# Patient Record
Sex: FEMALE | Race: WHITE | NOT HISPANIC OR LATINO | Employment: OTHER | URBAN - METROPOLITAN AREA
[De-identification: names, ages, dates, MRNs, and addresses within clinical notes are randomized per-mention and may not be internally consistent; named-entity substitution may affect disease eponyms.]

---

## 2018-11-05 ENCOUNTER — OFFICE VISIT (OUTPATIENT)
Dept: PULMONOLOGY | Facility: CLINIC | Age: 68
End: 2018-11-05
Payer: MEDICARE

## 2018-11-05 DIAGNOSIS — J44.9 CHRONIC OBSTRUCTIVE PULMONARY DISEASE, UNSPECIFIED COPD TYPE: ICD-10-CM

## 2018-11-05 DIAGNOSIS — R05.9 COUGH: Primary | ICD-10-CM

## 2018-11-05 DIAGNOSIS — R09.02 HYPOXEMIA: ICD-10-CM

## 2018-11-05 DIAGNOSIS — E88.01 HETEROZYGOUS ALPHA 1-ANTITRYPSIN DEFICIENCY: ICD-10-CM

## 2018-11-05 PROCEDURE — 99214 OFFICE O/P EST MOD 30 MIN: CPT | Mod: ,,, | Performed by: NURSE PRACTITIONER

## 2018-11-05 RX ORDER — DOXYCYCLINE HYCLATE 100 MG
100 TABLET ORAL EVERY 12 HOURS
Qty: 14 TABLET | Refills: 0 | Status: SHIPPED | OUTPATIENT
Start: 2018-11-05 | End: 2019-04-23 | Stop reason: ALTCHOICE

## 2018-11-05 RX ORDER — PREDNISONE 10 MG/1
10 TABLET ORAL DAILY
Qty: 20 TABLET | Refills: 0 | Status: SHIPPED | OUTPATIENT
Start: 2018-11-05 | End: 2019-07-01

## 2018-11-05 NOTE — PATIENT INSTRUCTIONS
COPD Flare    You have had a flare-up of your COPD.  COPD, or chronic obstructive pulmonary disease, is a common lung disease. It causes your airways to become irritated and narrower. This makes it harder for you to breathe. Emphysema and chronic bronchitis are both types of COPD. This is a chronic condition, which means you always have it. Sometimes it gets worse. When this happens, it is called a flare-up.  Symptoms of COPD  People with COPD may have symptoms most of the time. In a flare-up, your symptoms get worse. These symptoms may mean you are having a flare-up:  · Shortness of breath, shallow or rapid breathing, or wheezing that gets worse  · Lung infection  · Cough that gets worse  · More mucus, thicker mucus or mucus of a different color  · Tiredness, decreased energy, or trouble doing your usual activities  · Fever  · Chest tightness  · Your symptoms dont get better even when you use your usual medicines, inhalers, and nebulizer  · Trouble talking  · You feel confused  Causes of flare-ups  Unfortunately, a flare-up can happen even though you did everything right, and you followed your doctors instructions. Some causes of flare-ups are:  · Smoking or secondhand smoke  · Colds, the flu, or respiratory infections  · Air pollution  · Sudden change in the weather  · Dust, irritating chemicals, or strong fumes  · Not taking your medicines as prescribed  Home care  Here are some things you can do at home to treat a flare-up:  · Try not to panic. This makes it harder to breathe, and keeps you from doing the right things.  · Dont smoke or be around others who are smoking.  · Try to drink more fluids than usual during a flare-up, unless your doctor has told you not to because of heart and kidney problems. More fluids can help loosen the mucus.  · Use your inhalers and nebulizer, if you have one, as you have been told to.  · If you were given antibiotics, take them until they are used up or your doctor tells you  to stop. Its important to finish the antibiotics, even though you feel better. This will make sure the infection has cleared.  · If you were given prednisone or another steroid, finish it even if you feel better.  Preventing a flare-up  Even though flare-ups happen, the best way to treat one is to prevent it before it starts. Here are some pointers:  · Dont smoke or be around others who are smoking.  · Take your medicines as you have been told.  · Talk with your doctor about getting a flu shot every year. Also find out if you need a pneumonia shot.  · If there is a weather advisory warning to stay indoors, try to stay inside when possible.  · Try to eat healthy and get plenty of sleep.  · Try to avoid things that usually set you off, like dust, chemical fumes, hairsprays, or strong perfumes.  Follow-up care  Follow up with your healthcare provider, or as advised.  If a culture was done, you will be told if your treatment needs to be changed. You can call as directed for the results.  If X-rays were done, you will be notified of any new findings that may affect your care.  Call 911  Call 911 if any of these occur:  · You have trouble breathing  · You feel confused or its difficult to wake you up  · You faint or lose consciousness  · You have a rapid heart rate  · You have new pain in your chest, arm, shoulder, neck or upper back  When to seek medical advice  Call your healthcare provider right away if any of these occur:  · Wheezing or shortness of breath gets worse  · You need to use your inhalers more often than usual without relief  · Fever of 100.4°F (38ºC) or higher, or as directed by your healthcare provider  · Coughing up lots of dark-colored or bloody mucus (sputum)  · Chest pain with each breath  · You do not start to get better within 24 hours  · Swelling of your ankles gets worse  · Dizziness or weakness  Date Last Reviewed: 9/1/2016  © 3467-9977 [employer]. [address] [facility],  [place] [address]. All rights reserved. This information is not intended as a substitute for professional medical care. Always follow your healthcare professional's instructions.      Chest xray  Alpha 1 level  Doxycycline 100mg by mouth twice a day for a week  Short prednisone taper  Sputum culture  Continue your Symbicort, Tudorza and daliresp   PFTs before your next visit

## 2018-11-07 ENCOUNTER — TELEPHONE (OUTPATIENT)
Dept: PULMONOLOGY | Facility: CLINIC | Age: 68
End: 2018-11-07

## 2018-11-07 NOTE — TELEPHONE ENCOUNTER
Alpha 1 level is 117 which is WNL.  Chest xray is stable.  Awaiting sputum culture ID and sensitivies as it is growing heavy growth gram neg iliana.

## 2018-11-08 RX ORDER — LEVOFLOXACIN 500 MG/1
500 TABLET, FILM COATED ORAL DAILY
Qty: 7 TABLET | Refills: 0 | Status: SHIPPED | OUTPATIENT
Start: 2018-11-08 | End: 2019-04-23 | Stop reason: ALTCHOICE

## 2018-11-08 NOTE — TELEPHONE ENCOUNTER
Sputum culture  11/07/18Reduced Normal kervin present           Heavy growth of Serratia marcescens      _____________________________________________________________________________      Organism                      S.ron      Antibiotic                 ANGEL       INTRPCOST      _____________________________________________________________________________      Amox/K Clav'ate           >16/8   R       $      Ceftriaxone                <=8    S       $      Cefazolin                  >16    R       $      Ciprofloxacin              <=1    S       $$      Cefepime                   <=4    S       $$      Cefuroxime                 >16    R       $$      Ertapenem                  <=2    S       $$$      Tetracycline               >8     R       $      Gentamicin                 <=4    S       $      Tobramycin                 <=4    S       $$      Imipenem                   <=1    S       $$$      Levofloxacin               <=2    S       $      Meropenem                  <=1    S       $$$      Piperacillin/Tazo         <=16    S       $$      Trimeth/Sulfa            <=2/38   S       $      Ampicillin                 >16    R       $        I spoke with the patient told her to stop the doxycycline and I sent out Levaquin for her to start taking.  She is aware of all results

## 2018-12-03 ENCOUNTER — TELEPHONE (OUTPATIENT)
Dept: PULMONOLOGY | Facility: CLINIC | Age: 68
End: 2018-12-03

## 2018-12-03 NOTE — TELEPHONE ENCOUNTER
Here a couple weeks ago gave medications for congestion still having some problems wants to speak with someone about it .

## 2018-12-05 ENCOUNTER — OFFICE VISIT (OUTPATIENT)
Dept: PULMONOLOGY | Facility: CLINIC | Age: 68
End: 2018-12-05
Payer: MEDICARE

## 2018-12-05 VITALS
WEIGHT: 200 LBS | SYSTOLIC BLOOD PRESSURE: 125 MMHG | BODY MASS INDEX: 33.32 KG/M2 | OXYGEN SATURATION: 90 % | DIASTOLIC BLOOD PRESSURE: 75 MMHG | HEART RATE: 73 BPM | HEIGHT: 65 IN

## 2018-12-05 DIAGNOSIS — J44.1 COPD EXACERBATION: ICD-10-CM

## 2018-12-05 DIAGNOSIS — J96.11 CHRONIC HYPOXEMIC RESPIRATORY FAILURE: ICD-10-CM

## 2018-12-05 PROCEDURE — 99214 OFFICE O/P EST MOD 30 MIN: CPT | Mod: ,,, | Performed by: NURSE PRACTITIONER

## 2018-12-05 RX ORDER — DIGOXIN 250 MCG
TABLET ORAL
COMMUNITY
Start: 2018-10-26 | End: 2019-11-25 | Stop reason: SDUPTHER

## 2018-12-05 RX ORDER — LEVOFLOXACIN 500 MG/1
500 TABLET, FILM COATED ORAL DAILY
Qty: 7 TABLET | Refills: 0 | Status: SHIPPED | OUTPATIENT
Start: 2018-12-05 | End: 2019-04-23 | Stop reason: ALTCHOICE

## 2018-12-05 RX ORDER — SERTRALINE HYDROCHLORIDE 100 MG/1
TABLET, FILM COATED ORAL
Status: ON HOLD | COMMUNITY
Start: 2018-09-24 | End: 2019-11-29 | Stop reason: HOSPADM

## 2018-12-05 RX ORDER — FUROSEMIDE 40 MG/1
20 TABLET ORAL
COMMUNITY
Start: 2018-09-22 | End: 2020-12-17 | Stop reason: CLARIF

## 2018-12-05 RX ORDER — METOPROLOL TARTRATE 100 MG/1
TABLET ORAL
Status: ON HOLD | COMMUNITY
Start: 2018-09-22 | End: 2019-11-29 | Stop reason: HOSPADM

## 2018-12-05 RX ORDER — ZOLPIDEM TARTRATE 5 MG/1
TABLET ORAL
COMMUNITY
Start: 2018-09-07 | End: 2019-01-17 | Stop reason: SDUPTHER

## 2018-12-05 RX ORDER — SPIRONOLACTONE 25 MG/1
25 TABLET ORAL DAILY
COMMUNITY
Start: 2018-09-22 | End: 2021-02-11

## 2018-12-05 RX ORDER — OMEPRAZOLE 20 MG/1
CAPSULE, DELAYED RELEASE ORAL
Status: ON HOLD | COMMUNITY
Start: 2018-09-24 | End: 2019-11-29 | Stop reason: HOSPADM

## 2018-12-05 RX ORDER — PREDNISONE 10 MG/1
TABLET ORAL
Qty: 40 TABLET | Refills: 0 | Status: SHIPPED | OUTPATIENT
Start: 2018-12-05 | End: 2019-07-01

## 2018-12-05 NOTE — PROGRESS NOTES
SUBJECTIVE:    Patient ID: Lisa Smith is a 68 y.o. female.    Chief Complaint: Cough (follow up from upper resp infection.) and COPD    HPI   Patient here today to be checked on because she is still not feeling back to base line since last illness.  She states that over the weekend she felt very bad but better today.  She produces discolored mucous all day and feels herself wheezing.  Her sputum grew Serratia in November she finished the Levaquin that was called in.  She is using Tudora, Symbicort, and Daliresp faithfully.  She wears her oxygen all the time.    Past Medical History:   Diagnosis Date    Atrial fibrillation     Bell's palsy     GI bleed     Heterozygous alpha 1-antitrypsin deficiency     Lung disease     copd    MONSERRAT (obstructive sleep apnea)     Pneumonia     Pulmonary edema      Past Surgical History:   Procedure Laterality Date    CARDIAC ELECTROPHYSIOLOGY STUDY AND ABLATION      CARPAL TUNNEL RELEASE      CHOLECYSTECTOMY      HAND SURGERY      HYSTERECTOMY      INSERT / REPLACE / REMOVE PACEMAKER      KNEE ARTHROSCOPY       Family History   Problem Relation Age of Onset    Kidney failure Mother     Cancer Father     Cancer Brother         Social History:   Marital Status:   Occupation: Data Unavailable  Alcohol History:  reports that she does not drink alcohol.  Tobacco History:  reports that she quit smoking about 7 years ago. Her smoking use included cigarettes. She started smoking about 47 years ago. She has a 60.00 pack-year smoking history. she has never used smokeless tobacco.  Drug History:  reports that she does not use drugs.    Review of patient's allergies indicates:   Allergen Reactions    Sulfa (sulfonamide antibiotics)        Current Outpatient Medications   Medication Sig Dispense Refill    furosemide (LASIX) 40 MG tablet       metoprolol tartrate (LOPRESSOR) 100 MG tablet       omeprazole (PRILOSEC) 20 MG capsule       sertraline (ZOLOFT) 100 MG  "tablet       spironolactone (ALDACTONE) 25 MG tablet       zolpidem (AMBIEN) 5 MG Tab       digoxin (LANOXIN) 250 mcg tablet       doxycycline (VIBRA-TABS) 100 MG tablet Take 1 tablet (100 mg total) by mouth every 12 (twelve) hours. 14 tablet 0    levoFLOXacin (LEVAQUIN) 500 MG tablet Take 1 tablet (500 mg total) by mouth once daily. 7 tablet 0    levoFLOXacin (LEVAQUIN) 500 MG tablet Take 1 tablet (500 mg total) by mouth once daily. 7 tablet 0    predniSONE (DELTASONE) 10 MG tablet Take 1 tablet (10 mg total) by mouth once daily. Take 4 x2 days, 0p9tybd, 9a3xqev, 9k0xilg with breakfast 20 tablet 0    predniSONE (DELTASONE) 10 MG tablet Take 9l1toxf, 8b7bavz, 5z9lkaj, 9f5zzfs 40 tablet 0     No current facility-administered medications for this visit.        Alpha-1 Antitrypsin: MZ level was 117 on 11/2018      Review of Systems  General: not feeling well  Eyes: Vision is good.  ENT:  No sinusitis or pharyngitis.   Heart:: No chest pain or palpitations.  Lungs: wheezing, and lots of mucous production, worsening dyspnea .  GI: no appetite  : No dysuria, hesitancy, or nocturia.  Musculoskeletal: No joint pain or myalgias.  Skin: No lesions or rashes.  Neuro: No headaches or neuropathy.  Lymph: No edema or adenopathy.  Psych: No anxiety or depression.  Endo: No weight change.    OBJECTIVE:      /75 (BP Location: Left arm, Patient Position: Sitting)   Pulse 73   Ht 5' 5" (1.651 m)   Wt 90.7 kg (200 lb)   SpO2 (!) 90% Comment: did not have o2 on  BMI 33.28 kg/m²     Physical Exam  GENERAL: Older patient in no distress.  HEENT: Pupils equal and reactive. Extraocular movements intact. Nose intact.      Pharynx moist.  NECK: Supple.   HEART: Regular rate and rhythm. No murmur or gallop auscultated.  LUNGS: expiratory wheezing and rhonchi posteriorly and anteriorly   ABDOMEN: Bowel sounds present. Non-tender, no masses palpated.  EXTREMITIES: Normal muscle tone and joint movement, no cyanosis or " clubbing.   LYMPHATICS: No adenopathy palpated, no edema.  SKIN: Dry, intact, no lesions.   NEURO: Cranial nerves II-XII intact. Motor strength 5/5 bilaterally, upper and lower extremities.  PSYCH: Appropriate affect.    Assessment:       1. COPD exacerbation    2. Chronic hypoxemic respiratory failure          Plan:       COPD exacerbation  -     Culture, Respiratory with Gram Stain  -     Culture, Respiratory with Gram Stain    Chronic hypoxemic respiratory failure    Other orders  -     predniSONE (DELTASONE) 10 MG tablet; Take 7v5fzwl, 4x7lvyq, 9a3gjma, 1c9tvxs  Dispense: 40 tablet; Refill: 0  -     levoFLOXacin (LEVAQUIN) 500 MG tablet; Take 1 tablet (500 mg total) by mouth once daily.  Dispense: 7 tablet; Refill: 0       Prednisone long taper   Sputum culture  levaquin for a week   Continue the mucinex 1200mg twice a day   Call if you do not get better  Follow-up in about 2 months (around 2/5/2019).

## 2018-12-07 ENCOUNTER — TELEPHONE (OUTPATIENT)
Dept: PULMONOLOGY | Facility: CLINIC | Age: 68
End: 2018-12-07

## 2019-01-17 RX ORDER — ZOLPIDEM TARTRATE 5 MG/1
5 TABLET ORAL NIGHTLY PRN
Qty: 30 TABLET | Refills: 2 | Status: ON HOLD | OUTPATIENT
Start: 2019-01-17 | End: 2019-11-29 | Stop reason: HOSPADM

## 2019-02-07 ENCOUNTER — OFFICE VISIT (OUTPATIENT)
Dept: PULMONOLOGY | Facility: CLINIC | Age: 69
End: 2019-02-07
Payer: MEDICARE

## 2019-02-07 VITALS
OXYGEN SATURATION: 88 % | DIASTOLIC BLOOD PRESSURE: 80 MMHG | WEIGHT: 203 LBS | HEART RATE: 75 BPM | BODY MASS INDEX: 33.82 KG/M2 | HEIGHT: 65 IN | SYSTOLIC BLOOD PRESSURE: 130 MMHG

## 2019-02-07 DIAGNOSIS — J96.11 CHRONIC HYPOXEMIC RESPIRATORY FAILURE: ICD-10-CM

## 2019-02-07 DIAGNOSIS — J44.9 CHRONIC OBSTRUCTIVE PULMONARY DISEASE, UNSPECIFIED COPD TYPE: Primary | ICD-10-CM

## 2019-02-07 PROCEDURE — 99214 OFFICE O/P EST MOD 30 MIN: CPT | Mod: ,,, | Performed by: NURSE PRACTITIONER

## 2019-02-07 PROCEDURE — 99214 PR OFFICE/OUTPT VISIT, EST, LEVL IV, 30-39 MIN: ICD-10-PCS | Mod: ,,, | Performed by: NURSE PRACTITIONER

## 2019-02-07 RX ORDER — LOSARTAN POTASSIUM 25 MG/1
TABLET ORAL
COMMUNITY
Start: 2019-01-09 | End: 2019-02-07

## 2019-02-07 RX ORDER — PANTOPRAZOLE SODIUM 20 MG/1
TABLET, DELAYED RELEASE ORAL
COMMUNITY
Start: 2019-01-16 | End: 2019-11-25 | Stop reason: SDUPTHER

## 2019-02-07 RX ORDER — CLOPIDOGREL BISULFATE 75 MG/1
75 TABLET ORAL DAILY
COMMUNITY
Start: 2019-01-09 | End: 2021-02-08 | Stop reason: SDUPTHER

## 2019-02-07 NOTE — PATIENT INSTRUCTIONS
Treatment for COPD    Your healthcare provider will prescribe the best treatments for your COPD.  Treatment  Recommendations include the following:  · Medicines. Some medicines help relieve symptoms when you have them. Others are taken daily to control inflammation in the lungs. Always take your medicines as prescribed. Learn the names of your medicines, as well as how and when to use them.  · Oxygen therapy. Oxygen may be prescribed if tests show that your blood contains too little oxygen.  · Smoking. If you smoke, quit. Smoking is the main cause of COPD. Quitting will help you be able to better manage your COPD. Ask your healthcare provider about ways to help you quit smoking.  · Avoiding infections. Infections, like a cold or the flu, can cause your symptoms to worsen. Try to stay away from people who are sick. Wash your hands often. And, ask your healthcare provider about vaccines for the flu and pneumonia.  Coping with shortness of breath  Coping tips include the following:  · Exercise. Try to be as active as possible. This will improve energy levels and strengthen your muscles, so you can do more.  · Breathing techniques. Ask your healthcare provider or nurse to show you how to do pursed-lip breathing.  · Balance rest and activity. Each day, try to balance rest periods with activity. For example, you might start the day with getting dressed and eating breakfast, then relax and read the paper. After that, take a brief walk. And then sit with your feet up for a while.  · Pulmonary rehabilitation. Ask your provider, or call your local hospital to find out about pulmonary rehab programs. The programs help with managing your disease, breathing techniques, exercise, support and counseling.  · Healthy eating. Eating a healthy, balanced diet and making an effort to maintain your ideal weight are important to staying as healthy as possible. Make sure you have a lot of fruit and vegetables every day, as well as  balanced portions of whole grains, lean meats and fish, and low-fat dairy products.  Date Last Reviewed: 5/1/2016  © 4055-0837 [employer]. [address] [place] [address], [place] [address]. All rights reserved. This information is not intended as a substitute for professional medical care. Always follow your healthcare professional's instructions.      Continue the Symbicort, Tudorza and Daliresp   Oxygen all the time  Filled out paperwork for [facility]  Follow-up in about 6 months (around 8/7/2019).

## 2019-02-07 NOTE — PROGRESS NOTES
SUBJECTIVE:    Patient ID: Lisa Smith is a 68 y.o. female.    Chief Complaint: COPD    HPI   Patient here today feeling well.  She does still cough occasionally throughout the day but does not feel bad. She is using symbicort, Tudorza, and Daliresp. She wears her oxygen all the time.    Past Medical History:   Diagnosis Date    Atrial fibrillation     Bell's palsy     GI bleed     Heterozygous alpha 1-antitrypsin deficiency     Lung disease     copd    MONSERRAT (obstructive sleep apnea)     Pneumonia     Pulmonary edema      Past Surgical History:   Procedure Laterality Date    CARDIAC ELECTROPHYSIOLOGY STUDY AND ABLATION      CARPAL TUNNEL RELEASE      CHOLECYSTECTOMY      HAND SURGERY      HYSTERECTOMY      INSERT / REPLACE / REMOVE PACEMAKER      KNEE ARTHROSCOPY       Family History   Problem Relation Age of Onset    Kidney failure Mother     Cancer Father     Cancer Brother         Social History:   Marital Status:   Occupation: Data Unavailable  Alcohol History:  reports that she does not drink alcohol.  Tobacco History:  reports that she quit smoking about 8 years ago. Her smoking use included cigarettes. She started smoking about 48 years ago. She has a 60.00 pack-year smoking history. she has never used smokeless tobacco.  Drug History:  reports that she does not use drugs.    Review of patient's allergies indicates:   Allergen Reactions    Sulfa (sulfonamide antibiotics)        Current Outpatient Medications   Medication Sig Dispense Refill    clopidogrel (PLAVIX) 75 mg tablet       digoxin (LANOXIN) 250 mcg tablet       furosemide (LASIX) 40 MG tablet       metoprolol tartrate (LOPRESSOR) 100 MG tablet       omeprazole (PRILOSEC) 20 MG capsule       pantoprazole (PROTONIX) 20 MG tablet       sertraline (ZOLOFT) 100 MG tablet       spironolactone (ALDACTONE) 25 MG tablet       zolpidem (AMBIEN) 5 MG Tab Take 1 tablet (5 mg total) by mouth nightly as needed. 30 tablet 2  "   doxycycline (VIBRA-TABS) 100 MG tablet Take 1 tablet (100 mg total) by mouth every 12 (twelve) hours. 14 tablet 0    levoFLOXacin (LEVAQUIN) 500 MG tablet Take 1 tablet (500 mg total) by mouth once daily. 7 tablet 0    levoFLOXacin (LEVAQUIN) 500 MG tablet Take 1 tablet (500 mg total) by mouth once daily. 7 tablet 0    predniSONE (DELTASONE) 10 MG tablet Take 1 tablet (10 mg total) by mouth once daily. Take 4 x2 days, 1j4vdzq, 6v0uwoo, 3g6npfl with breakfast 20 tablet 0    predniSONE (DELTASONE) 10 MG tablet Take 5i1yhex, 2j8dkln, 4o1sszc, 2c3mbvt 40 tablet 0     No current facility-administered medications for this visit.        Alpha-1 Antitrypsin: MZ  Last PFT: 2018  Last Chest xray 11/2018    Review of Systems  General: Feeling Well.  Eyes: Vision is good.  ENT:  No sinusitis or pharyngitis.   Heart:: No chest pain or palpitations.  Lungs: still has a cough occasionally   GI: No Nausea, vomiting, constipation, diarrhea, or reflux.  : No dysuria, hesitancy, or nocturia.  Musculoskeletal: No joint pain or myalgias.  Skin: No lesions or rashes.  Neuro: No headaches or neuropathy.  Lymph: No edema or adenopathy.  Psych: No anxiety or depression.  Endo: weight is stable     OBJECTIVE:      /80 (BP Location: Left arm, Patient Position: Sitting, BP Method: Medium (Manual))   Pulse 75   Ht 5' 5" (1.651 m)   Wt 92.1 kg (203 lb)   SpO2 (!) 88% Comment: RA  BMI 33.78 kg/m²     Physical Exam  GENERAL: Older patient in no distress.  HEENT: Pupils equal and reactive. Extraocular movements intact. Nose intact.      Pharynx moist.  NECK: Supple.   HEART: Regular rate and rhythm. No murmur or gallop auscultated.  LUNGS: wheeze cleared after first breath to left upper posteriorly Lung excursion symmetrical. No change in fremitus. No adventitial noises.  ABDOMEN: Bowel sounds present. Non-tender, no masses palpated.  EXTREMITIES: Normal muscle tone and joint movement, no cyanosis or clubbing.   LYMPHATICS: No " adenopathy palpated, no edema.  SKIN: Dry, intact, no lesions.   NEURO: Cranial nerves II-XII intact. Motor strength 5/5 bilaterally, upper and lower extremities.  PSYCH: Appropriate affect.    Assessment:       1. Chronic obstructive pulmonary disease, unspecified COPD type    2. Chronic hypoxemic respiratory failure          Plan:       Chronic obstructive pulmonary disease, unspecified COPD type    Chronic hypoxemic respiratory failure       Continue the Symbicort, Tudorza and Daliresp   Oxygen all the time  Filled out paperwork for [facility] and gave scripts to send  Follow-up in about 6 months (around 8/7/2019).

## 2019-02-19 ENCOUNTER — DOCUMENTATION ONLY (OUTPATIENT)
Dept: PULMONOLOGY | Facility: CLINIC | Age: 69
End: 2019-02-19

## 2019-02-19 NOTE — PROGRESS NOTES
Called rebekah stated that she needs to call her insurance company an see what medication they will cover other than ambien .

## 2019-04-22 ENCOUNTER — TELEPHONE (OUTPATIENT)
Dept: PULMONOLOGY | Facility: CLINIC | Age: 69
End: 2019-04-22

## 2019-04-22 RX ORDER — LOSARTAN POTASSIUM 25 MG/1
TABLET ORAL
COMMUNITY
Start: 2019-03-25 | End: 2019-07-01

## 2019-04-22 RX ORDER — METOPROLOL SUCCINATE 100 MG/1
100 TABLET, EXTENDED RELEASE ORAL DAILY
COMMUNITY
Start: 2019-03-06 | End: 2021-02-26

## 2019-04-22 NOTE — TELEPHONE ENCOUNTER
Started last Thursday with body aches ,cough, and SOB . Wants to know if she can come in or can we call her in something .

## 2019-04-23 ENCOUNTER — OFFICE VISIT (OUTPATIENT)
Dept: PULMONOLOGY | Facility: CLINIC | Age: 69
End: 2019-04-23
Payer: MEDICARE

## 2019-04-23 VITALS
HEIGHT: 65 IN | HEART RATE: 68 BPM | SYSTOLIC BLOOD PRESSURE: 110 MMHG | DIASTOLIC BLOOD PRESSURE: 70 MMHG | BODY MASS INDEX: 31.49 KG/M2 | WEIGHT: 189 LBS | OXYGEN SATURATION: 93 %

## 2019-04-23 DIAGNOSIS — R09.02 HYPOXEMIA: ICD-10-CM

## 2019-04-23 DIAGNOSIS — R05.9 COUGH: Primary | ICD-10-CM

## 2019-04-23 DIAGNOSIS — J44.1 COPD EXACERBATION: ICD-10-CM

## 2019-04-23 DIAGNOSIS — J44.9 CHRONIC OBSTRUCTIVE PULMONARY DISEASE, UNSPECIFIED COPD TYPE: ICD-10-CM

## 2019-04-23 PROCEDURE — 99214 PR OFFICE/OUTPT VISIT, EST, LEVL IV, 30-39 MIN: ICD-10-PCS | Mod: ,,, | Performed by: NURSE PRACTITIONER

## 2019-04-23 PROCEDURE — 99214 OFFICE O/P EST MOD 30 MIN: CPT | Mod: ,,, | Performed by: NURSE PRACTITIONER

## 2019-04-23 RX ORDER — BENZONATATE 200 MG/1
200 CAPSULE ORAL 3 TIMES DAILY PRN
Qty: 90 CAPSULE | Refills: 3 | Status: SHIPPED | OUTPATIENT
Start: 2019-04-23 | End: 2019-05-03

## 2019-04-23 RX ORDER — LEVOFLOXACIN 500 MG/1
500 TABLET, FILM COATED ORAL DAILY
Qty: 7 TABLET | Refills: 0 | Status: ON HOLD | OUTPATIENT
Start: 2019-04-23 | End: 2019-11-29 | Stop reason: HOSPADM

## 2019-04-23 RX ORDER — PREDNISONE 10 MG/1
TABLET ORAL
Qty: 20 TABLET | Refills: 0 | Status: SHIPPED | OUTPATIENT
Start: 2019-04-23 | End: 2019-07-01

## 2019-04-23 NOTE — PROGRESS NOTES
SUBJECTIVE:    Patient ID: Lisa Smith is a 68 y.o. female.    Chief Complaint: Cough and COPD    HPI   Patient here today not feeling well.  She developed a dry cough and increased shortness of breath last Thursday.  She had low grade fever for 2 days but has not run any since Saturday.  She is using her Symbicort, Tudorza and Daliresp.  She is not producing mucous but feels very winded and states she has coughing spells.    Past Medical History:   Diagnosis Date    Atrial fibrillation     Bell's palsy     GI bleed     Heterozygous alpha 1-antitrypsin deficiency     Lung disease     copd    MONSERRAT (obstructive sleep apnea)     Pneumonia     Pulmonary edema      Past Surgical History:   Procedure Laterality Date    CARDIAC ELECTROPHYSIOLOGY STUDY AND ABLATION      CARPAL TUNNEL RELEASE      CHOLECYSTECTOMY      HAND SURGERY      HYSTERECTOMY      INSERT / REPLACE / REMOVE PACEMAKER      KNEE ARTHROSCOPY       Family History   Problem Relation Age of Onset    Kidney failure Mother     Cancer Father     Cancer Brother         Social History:   Marital Status:   Occupation: Data Unavailable  Alcohol History:  reports that she does not drink alcohol.  Tobacco History:  reports that she quit smoking about 8 years ago. Her smoking use included cigarettes. She started smoking about 48 years ago. She has a 60.00 pack-year smoking history. She has never used smokeless tobacco.  Drug History:  reports that she does not use drugs.    Review of patient's allergies indicates:   Allergen Reactions    Sulfa (sulfonamide antibiotics)        Current Outpatient Medications   Medication Sig Dispense Refill    clopidogrel (PLAVIX) 75 mg tablet       digoxin (LANOXIN) 250 mcg tablet       furosemide (LASIX) 40 MG tablet       losartan (COZAAR) 25 MG tablet       metoprolol succinate (TOPROL-XL) 100 MG 24 hr tablet       omeprazole (PRILOSEC) 20 MG capsule       pantoprazole (PROTONIX) 20 MG tablet     "   sertraline (ZOLOFT) 100 MG tablet       spironolactone (ALDACTONE) 25 MG tablet       zolpidem (AMBIEN) 5 MG Tab Take 1 tablet (5 mg total) by mouth nightly as needed. 30 tablet 2    benzonatate (TESSALON) 200 MG capsule Take 1 capsule (200 mg total) by mouth 3 (three) times daily as needed. 90 capsule 3    levoFLOXacin (LEVAQUIN) 500 MG tablet Take 1 tablet (500 mg total) by mouth once daily. 7 tablet 0    metoprolol tartrate (LOPRESSOR) 100 MG tablet       predniSONE (DELTASONE) 10 MG tablet Take 1 tablet (10 mg total) by mouth once daily. Take 4 x2 days, 8e4ojfl, 5h6nmix, 1m2ibbb with breakfast 20 tablet 0    predniSONE (DELTASONE) 10 MG tablet Take 0n5rrna, 4c3gepa, 0n0vwyd, 5a0osfr 40 tablet 0    predniSONE (DELTASONE) 10 MG tablet Take 4 tabs x 2 days, then take 3 tabs x 2 days, then take 2 tabs x 2 days, then take 1 tab x 2 days. 20 tablet 0     No current facility-administered medications for this visit.        Alpha-1 Antitrypsin: MZ  Last PFT: 2018  Last Chest xray 11/2018    Review of Systems   Respiratory: Positive for cough.      General:not feeling well   Eyes: Vision is good.  ENT:  No sinusitis or pharyngitis.   Heart:: No chest pain or palpitations.  Lungs: dry coughing spells since Thursday,  Increased dyspnea    GI: No Nausea, vomiting, constipation, diarrhea, or reflux.  : No dysuria, hesitancy, or nocturia.  Musculoskeletal: No joint pain or myalgias.  Skin: No lesions or rashes.  Neuro: No headaches or neuropathy.  Lymph: No edema or adenopathy.  Psych: No anxiety or depression.  Endo: losing weight     OBJECTIVE:      /70 (BP Location: Left arm, Patient Position: Sitting, BP Method: Medium (Manual))   Pulse 68   Ht 5' 5" (1.651 m)   Wt 85.7 kg (189 lb)   SpO2 (!) 93% Comment: 3 lpm pd  BMI 31.45 kg/m²     Physical Exam  GENERAL: Older patient in no distress.  HEENT: Pupils equal and reactive. Extraocular movements intact. Nose intact.      Pharynx moist.  NECK: Supple. "   HEART: Regular rate and rhythm. No murmur or gallop auscultated.  LUNGS: crackles to bases and right upper posteriorly and anteriorly   ABDOMEN: Bowel sounds present. Non-tender, no masses palpated.  EXTREMITIES: Normal muscle tone and joint movement, no cyanosis or clubbing.   LYMPHATICS: No adenopathy palpated, no edema.  SKIN: Dry, intact, no lesions.   NEURO: Cranial nerves II-XII intact. Motor strength 5/5 bilaterally, upper and lower extremities.  PSYCH: Appropriate affect.    Assessment:       1. Cough    2. Chronic obstructive pulmonary disease, unspecified COPD type    3. COPD exacerbation    4. Hypoxemia          Plan:       Cough  -     X-Ray Chest PA And Lateral; Future; Expected date: 04/23/2019    Chronic obstructive pulmonary disease, unspecified COPD type    COPD exacerbation    Hypoxemia    Other orders  -     predniSONE (DELTASONE) 10 MG tablet; Take 4 tabs x 2 days, then take 3 tabs x 2 days, then take 2 tabs x 2 days, then take 1 tab x 2 days.  Dispense: 20 tablet; Refill: 0  -     benzonatate (TESSALON) 200 MG capsule; Take 1 capsule (200 mg total) by mouth 3 (three) times daily as needed.  Dispense: 90 capsule; Refill: 3  -     levoFLOXacin (LEVAQUIN) 500 MG tablet; Take 1 tablet (500 mg total) by mouth once daily.  Dispense: 7 tablet; Refill: 0       Continue the Symbicort, Tudorza and Daliresp   Chest xray now   Prednisone short taper  Tessalon 200mg by mouth three times a day as needed for cough   Continue the antihistamine  Flonase 2 puffs to each nostril daily    Follow up in about 3 months (around 7/23/2019).    Chest xray shows a right upper lobe pneumonia sending out Levaquin for a week as well the patient is aware, will repeat chest xray in 3 weeks she was instructed to call if she gets worse

## 2019-04-23 NOTE — PATIENT INSTRUCTIONS
Continue the Symbicort, Tudorza and Daliresp   Chest xray   Prednisone short taper  Tessalon 200mg by mouth three times a day as needed for cough   Continue the antihistamine  Flonase 2 puffs to each nostril daily

## 2019-05-03 ENCOUNTER — TELEPHONE (OUTPATIENT)
Dept: PULMONOLOGY | Facility: CLINIC | Age: 69
End: 2019-05-03

## 2019-05-03 DIAGNOSIS — J18.9 PNEUMONIA DUE TO INFECTIOUS ORGANISM, UNSPECIFIED LATERALITY, UNSPECIFIED PART OF LUNG: Primary | ICD-10-CM

## 2019-05-06 ENCOUNTER — DOCUMENTATION ONLY (OUTPATIENT)
Dept: PULMONOLOGY | Facility: CLINIC | Age: 69
End: 2019-05-06

## 2019-05-06 DIAGNOSIS — J44.9 CHRONIC OBSTRUCTIVE PULMONARY DISEASE, UNSPECIFIED COPD TYPE: Primary | ICD-10-CM

## 2019-05-06 RX ORDER — ZOLPIDEM TARTRATE 5 MG/1
5 TABLET ORAL NIGHTLY PRN
Qty: 30 TABLET | Refills: 2 | OUTPATIENT
Start: 2019-05-06 | End: 2019-11-04

## 2019-05-08 ENCOUNTER — TELEPHONE (OUTPATIENT)
Dept: PULMONOLOGY | Facility: CLINIC | Age: 69
End: 2019-05-08

## 2019-05-08 DIAGNOSIS — J44.9 CHRONIC OBSTRUCTIVE PULMONARY DISEASE, UNSPECIFIED COPD TYPE: Primary | ICD-10-CM

## 2019-05-08 NOTE — TELEPHONE ENCOUNTER
Chest xray:  IMPRESSION: Stable chronic parenchymal opacities as described, having similar  appearance over multiple prior radiographs dating back to 01/11/2018, with no definite evidence of acute cardiopulmonary disease.

## 2019-05-08 NOTE — TELEPHONE ENCOUNTER
----- Message from Krystal Thayer sent at 5/8/2019  9:00 AM CDT -----  Looking for results of chest xray done on Monday.

## 2019-05-09 DIAGNOSIS — J44.9 CHRONIC OBSTRUCTIVE PULMONARY DISEASE, UNSPECIFIED COPD TYPE: Primary | ICD-10-CM

## 2019-05-09 RX ORDER — ZOLPIDEM TARTRATE 5 MG/1
5 TABLET ORAL NIGHTLY PRN
Qty: 30 TABLET | Refills: 2 | Status: SHIPPED | OUTPATIENT
Start: 2019-05-09 | End: 2019-11-07

## 2019-07-01 ENCOUNTER — OFFICE VISIT (OUTPATIENT)
Dept: PULMONOLOGY | Facility: CLINIC | Age: 69
End: 2019-07-01
Payer: MEDICARE

## 2019-07-01 VITALS
OXYGEN SATURATION: 94 % | SYSTOLIC BLOOD PRESSURE: 100 MMHG | BODY MASS INDEX: 29.99 KG/M2 | HEIGHT: 65 IN | HEART RATE: 69 BPM | WEIGHT: 180 LBS | DIASTOLIC BLOOD PRESSURE: 60 MMHG

## 2019-07-01 DIAGNOSIS — J44.9 CHRONIC OBSTRUCTIVE PULMONARY DISEASE, UNSPECIFIED COPD TYPE: ICD-10-CM

## 2019-07-01 DIAGNOSIS — J96.11 CHRONIC HYPOXEMIC RESPIRATORY FAILURE: ICD-10-CM

## 2019-07-01 DIAGNOSIS — E88.01 HETEROZYGOUS ALPHA 1-ANTITRYPSIN DEFICIENCY: ICD-10-CM

## 2019-07-01 DIAGNOSIS — J44.1 COPD EXACERBATION: Primary | ICD-10-CM

## 2019-07-01 PROCEDURE — 99214 OFFICE O/P EST MOD 30 MIN: CPT | Mod: ,,, | Performed by: NURSE PRACTITIONER

## 2019-07-01 PROCEDURE — 99214 PR OFFICE/OUTPT VISIT, EST, LEVL IV, 30-39 MIN: ICD-10-PCS | Mod: ,,, | Performed by: NURSE PRACTITIONER

## 2019-07-01 RX ORDER — ALBUTEROL SULFATE 1.25 MG/3ML
1.25 SOLUTION RESPIRATORY (INHALATION) EVERY 6 HOURS PRN
Qty: 120 VIAL | Refills: 6 | Status: SHIPPED | OUTPATIENT
Start: 2019-07-01 | End: 2020-01-03 | Stop reason: SDUPTHER

## 2019-07-01 RX ORDER — PREDNISONE 10 MG/1
TABLET ORAL
Qty: 20 TABLET | Refills: 0 | Status: ON HOLD | OUTPATIENT
Start: 2019-07-01 | End: 2019-11-29 | Stop reason: HOSPADM

## 2019-07-01 NOTE — PROGRESS NOTES
SUBJECTIVE:    Patient ID: Lisa Smith is a 68 y.o. female.    Chief Complaint: Cough    .  Patient here today with complaints of not feeling that well this morning. She has felt more short of breath with more yellow mucous production. She is using Symbicort, Tudorza, and Daliresp. She states she will no longer be able to use the Tudorza after this month because she will not be able to get with AZ[facility]ME.  This is her 4th exacerbation in 6 months.  She can not afford copay for Prolastin therapy.  She is still dieting and exercising. She uses her Albuterol three times a month.   Past Medical History:   Diagnosis Date    Atrial fibrillation     Bell's palsy     GI bleed     Heterozygous alpha 1-antitrypsin deficiency     Lung disease     copd    MONSERRAT (obstructive sleep apnea)     Pneumonia     Pulmonary edema      Past Surgical History:   Procedure Laterality Date    CARDIAC ELECTROPHYSIOLOGY STUDY AND ABLATION      CARPAL TUNNEL RELEASE      CHOLECYSTECTOMY      HAND SURGERY      HYSTERECTOMY      INSERT / REPLACE / REMOVE PACEMAKER      KNEE ARTHROSCOPY       Family History   Problem Relation Age of Onset    Kidney failure Mother     Cancer Father     Cancer Brother         Social History:   Marital Status:   Occupation: Data Unavailable  Alcohol History:  reports that she does not drink alcohol.  Tobacco History:  reports that she quit smoking about 8 years ago. Her smoking use included cigarettes. She started smoking about 48 years ago. She has a 60.00 pack-year smoking history. She has never used smokeless tobacco.  Drug History:  reports that she does not use drugs.    Review of patient's allergies indicates:   Allergen Reactions    Sulfa (sulfonamide antibiotics)        Current Outpatient Medications   Medication Sig Dispense Refill    clopidogrel (PLAVIX) 75 mg tablet       digoxin (LANOXIN) 250 mcg tablet       furosemide (LASIX) 40 MG tablet 20 mg.       metoprolol tartrate  "(LOPRESSOR) 100 MG tablet       omeprazole (PRILOSEC) 20 MG capsule       pantoprazole (PROTONIX) 20 MG tablet       spironolactone (ALDACTONE) 25 MG tablet       zolpidem (AMBIEN) 5 MG Tab Take 1 tablet (5 mg total) by mouth nightly as needed. 30 tablet 2    albuterol (ACCUNEB) 1.25 mg/3 mL Nebu Take 3 mLs (1.25 mg total) by nebulization every 6 (six) hours as needed. Rescue 120 vial 6    levoFLOXacin (LEVAQUIN) 500 MG tablet Take 1 tablet (500 mg total) by mouth once daily. 7 tablet 0    metoprolol succinate (TOPROL-XL) 100 MG 24 hr tablet       predniSONE (DELTASONE) 10 MG tablet Take 4 tabs x 2 days, then take 3 tabs x 2 days, then take 2 tabs x 2 days, then take 1 tab x 2 days. 20 tablet 0    sertraline (ZOLOFT) 100 MG tablet       zolpidem (AMBIEN) 5 MG Tab Take 1 tablet (5 mg total) by mouth nightly as needed. 30 tablet 2     No current facility-administered medications for this visit.        Alpha-1 Antitrypsin: MZ  Last PFT: 2018  Last Chest xray 05/2019  Review of Systems   Respiratory: Positive for cough.      General:no fever  Eyes: Vision is good.  ENT:  No sinusitis or pharyngitis.   Heart:: No chest pain or palpitations.  Lungs: increased dyspnea, yellow mucous   GI: No Nausea, vomiting, constipation, diarrhea, or reflux.  : No dysuria, hesitancy, or nocturia.  Musculoskeletal: No joint pain or myalgias.  Skin: No lesions or rashes.  Neuro: No headaches or neuropathy.  Lymph: No edema or adenopathy.  Psych: No anxiety or depression.  Endo: losing weight from diet and exercise     OBJECTIVE:      /60 (BP Location: Left arm, Patient Position: Sitting, BP Method: Medium (Manual))   Pulse 69   Ht 5' 5" (1.651 m)   Wt 81.6 kg (180 lb)   SpO2 (!) 94%   BMI 29.95 kg/m²     Physical Exam  GENERAL: Older patient in no distress.  HEENT: Pupils equal and reactive. Extraocular movements intact. Nose intact.      Pharynx moist.  NECK: Supple.   HEART: Regular rate and rhythm. No murmur or " gallop auscultated.  LUNGS: expiratory wheezing bilaterally   ABDOMEN: Bowel sounds present. Non-tender, no masses palpated.  EXTREMITIES: Normal muscle tone and joint movement, no cyanosis or clubbing.   LYMPHATICS: No adenopathy palpated, no edema.  SKIN: Dry, intact, no lesions.   NEURO: Cranial nerves II-XII intact. Motor strength 5/5 bilaterally, upper and lower extremities.  PSYCH: Appropriate affect.    Assessment:       1. COPD exacerbation    2. Chronic obstructive pulmonary disease, unspecified COPD type    3. Chronic hypoxemic respiratory failure    4. Heterozygous alpha 1-antitrypsin deficiency        Can not afford copay for prolastin  Plan:       COPD exacerbation    Chronic obstructive pulmonary disease, unspecified COPD type    Chronic hypoxemic respiratory failure    Heterozygous alpha 1-antitrypsin deficiency    Other orders  -     albuterol (ACCUNEB) 1.25 mg/3 mL Nebu; Take 3 mLs (1.25 mg total) by nebulization every 6 (six) hours as needed. Rescue  Dispense: 120 vial; Refill: 6  -     predniSONE (DELTASONE) 10 MG tablet; Take 4 tabs x 2 days, then take 3 tabs x 2 days, then take 2 tabs x 2 days, then take 1 tab x 2 days.  Dispense: 20 tablet; Refill: 0       Follow up in about 3 months (around 10/1/2019).      Refill albuterol for nebulizer  Mucinex 1200mg twice a day   Try Trelegy 1 puff daily, rinse after you use it  Continue the Daliresp  Do not use the Symbicort or Tudorza while using the Trelegy  Call me in 2 weeks with an update if like trelegy better will send to pharmacy and do [employer] paperwork    Prednisone short taper  Follow up in 3 months

## 2019-07-11 ENCOUNTER — TELEPHONE (OUTPATIENT)
Dept: PULMONOLOGY | Facility: CLINIC | Age: 69
End: 2019-07-11

## 2019-07-11 NOTE — TELEPHONE ENCOUNTER
----- Message from Krystal Thayer sent at 7/11/2019 10:36 AM CDT -----  Was in last week and you wanted her to call and speak with you about the new meds she has.

## 2019-07-11 NOTE — TELEPHONE ENCOUNTER
I spoke with her she loves [employer]. She will go on line and print [employer] assistance forms

## 2019-07-24 ENCOUNTER — OFFICE VISIT (OUTPATIENT)
Dept: PULMONOLOGY | Facility: CLINIC | Age: 69
End: 2019-07-24
Payer: MEDICARE

## 2019-07-24 VITALS
OXYGEN SATURATION: 94 % | WEIGHT: 185 LBS | HEIGHT: 65 IN | BODY MASS INDEX: 30.82 KG/M2 | HEART RATE: 72 BPM | SYSTOLIC BLOOD PRESSURE: 130 MMHG | DIASTOLIC BLOOD PRESSURE: 82 MMHG

## 2019-07-24 DIAGNOSIS — R09.02 HYPOXEMIA: ICD-10-CM

## 2019-07-24 DIAGNOSIS — J96.11 CHRONIC HYPOXEMIC RESPIRATORY FAILURE: ICD-10-CM

## 2019-07-24 DIAGNOSIS — J44.1 CHRONIC OBSTRUCTIVE PULMONARY DISEASE WITH ACUTE EXACERBATION: Primary | ICD-10-CM

## 2019-07-24 DIAGNOSIS — J44.9 CHRONIC OBSTRUCTIVE PULMONARY DISEASE, UNSPECIFIED COPD TYPE: ICD-10-CM

## 2019-07-24 DIAGNOSIS — G47.33 OSA (OBSTRUCTIVE SLEEP APNEA): ICD-10-CM

## 2019-07-24 PROCEDURE — 99214 PR OFFICE/OUTPT VISIT, EST, LEVL IV, 30-39 MIN: ICD-10-PCS | Mod: ,,, | Performed by: INTERNAL MEDICINE

## 2019-07-24 PROCEDURE — 1101F PR PT FALLS ASSESS DOC 0-1 FALLS W/OUT INJ PAST YR: ICD-10-PCS | Mod: ,,, | Performed by: INTERNAL MEDICINE

## 2019-07-24 PROCEDURE — 99214 OFFICE O/P EST MOD 30 MIN: CPT | Mod: ,,, | Performed by: INTERNAL MEDICINE

## 2019-07-24 PROCEDURE — 1101F PT FALLS ASSESS-DOCD LE1/YR: CPT | Mod: ,,, | Performed by: INTERNAL MEDICINE

## 2019-07-24 RX ORDER — PREDNISONE 10 MG/1
TABLET ORAL
Qty: 20 TABLET | Refills: 0 | Status: ON HOLD | OUTPATIENT
Start: 2019-07-24 | End: 2019-11-29 | Stop reason: HOSPADM

## 2019-07-24 RX ORDER — ROFLUMILAST 500 UG/1
500 TABLET ORAL DAILY
Status: ON HOLD | COMMUNITY
End: 2019-11-29 | Stop reason: HOSPADM

## 2019-07-24 RX ORDER — DILTIAZEM HYDROCHLORIDE 120 MG/1
180 CAPSULE, COATED, EXTENDED RELEASE ORAL DAILY
COMMUNITY
End: 2021-02-26

## 2019-07-24 RX ORDER — AZITHROMYCIN 250 MG/1
TABLET, FILM COATED ORAL
Qty: 6 TABLET | Refills: 0 | Status: ON HOLD | OUTPATIENT
Start: 2019-07-24 | End: 2019-11-29 | Stop reason: HOSPADM

## 2019-07-24 NOTE — PROGRESS NOTES
SUBJECTIVE:    Patient ID: Lisa Smith is a 68 y.o. female.    Chief Complaint: Cough (yellow sputum since yesterday has tessalon douglas )    HPI The patient was more short of breath and she was producing yellow sputum yesterday.  Today she is feeling better. She is doing better with the Trelegy. Her exercise tolerance is significantly better.  Past Medical History:   Diagnosis Date    Atrial fibrillation     Bell's palsy     COPD (chronic obstructive pulmonary disease)     GI bleed     Heterozygous alpha 1-antitrypsin deficiency     Lung disease     copd    MONSERRAT (obstructive sleep apnea)     Pneumonia     Pulmonary edema      Past Surgical History:   Procedure Laterality Date    CARDIAC ELECTROPHYSIOLOGY STUDY AND ABLATION      CARPAL TUNNEL RELEASE      CHOLECYSTECTOMY      HAND SURGERY      HYSTERECTOMY      INSERT / REPLACE / REMOVE PACEMAKER      KNEE ARTHROSCOPY       Family History   Problem Relation Age of Onset    Kidney failure Mother     Cancer Father     Cancer Brother         Social History:   Marital Status:   Occupation: housewife  Alcohol History:  reports that she does not drink alcohol.  Tobacco History:  reports that she quit smoking about 8 years ago. Her smoking use included cigarettes. She started smoking about 48 years ago. She has a 60.00 pack-year smoking history. She has never used smokeless tobacco.  Drug History:  reports that she does not use drugs.    Review of patient's allergies indicates:   Allergen Reactions    Sulfa (sulfonamide antibiotics)        Current Outpatient Medications   Medication Sig Dispense Refill    albuterol (ACCUNEB) 1.25 mg/3 mL Nebu Take 3 mLs (1.25 mg total) by nebulization every 6 (six) hours as needed. Rescue 120 vial 6    clopidogrel (PLAVIX) 75 mg tablet       digoxin (LANOXIN) 250 mcg tablet       diltiaZEM (CARTIA XT) 120 MG Cp24 Take 180 mg by mouth once daily.      fluticasone-umeclidin-vilanter (TRELEGY ELLIPTA)  100-62.5-25 mcg DsDv Inhale into the lungs.      furosemide (LASIX) 40 MG tablet 20 mg.       metoprolol succinate (TOPROL-XL) 100 MG 24 hr tablet       metoprolol tartrate (LOPRESSOR) 100 MG tablet       omeprazole (PRILOSEC) 20 MG capsule       pantoprazole (PROTONIX) 20 MG tablet       roflumilast (DALIRESP) 500 mcg Tab Take 500 mcg by mouth once daily.      spironolactone (ALDACTONE) 25 MG tablet       zolpidem (AMBIEN) 5 MG Tab Take 1 tablet (5 mg total) by mouth nightly as needed. 30 tablet 2    azithromycin (ZITHROMAX Z-CHRISTI) 250 MG tablet 2 on day one, then one daily 6 tablet 0    levoFLOXacin (LEVAQUIN) 500 MG tablet Take 1 tablet (500 mg total) by mouth once daily. 7 tablet 0    predniSONE (DELTASONE) 10 MG tablet Take 4 tabs x 2 days, then take 3 tabs x 2 days, then take 2 tabs x 2 days, then take 1 tab x 2 days. 20 tablet 0    predniSONE (DELTASONE) 10 MG tablet Take 4 tabs x 2 days, then take 3 tabs x 2 days, then take 2 tabs x 2 days, then take 1 tab x 2 days. 20 tablet 0    sertraline (ZOLOFT) 100 MG tablet       zolpidem (AMBIEN) 5 MG Tab Take 1 tablet (5 mg total) by mouth nightly as needed. 30 tablet 2     No current facility-administered medications for this visit.        Alpha-1 Antitrypsin: MZ  Last PFT: 6/25/18  Last CT:  Last CXR: 5/6/2019The lungs are symmetrically inflated, with localized right upper lobe  reticulonodular and groundglass opacities, unchanged over multiple prior exams  dating back to 01/11/2018. There are additional scattered chronic reticular  densities in both mid and lower lung zones, with stable chronic right lower  lung pleural thickening and or fluid. Right basilar opacities and blunting of  both costophrenic angles are unchanged. There is no new pleural or parenchymal abnormality.      Review of Systems  General: Feeling Well.  Eyes: Vision is good.  ENT:  No sinusitis or pharyngitis.   Heart:: No chest pain or palpitations.  Lungs: No cough, sputum, or  "wheezing.  GI: No Nausea, vomiting, constipation, diarrhea, or reflux.  : No dysuria, hesitancy, or nocturia.  Musculoskeletal: No joint pain or myalgias.  Skin: No lesions or rashes.  Neuro: No headaches or neuropathy.  Lymph: No edema or adenopathy.  Psych: No anxiety or depression.  Endo: No weight change.    OBJECTIVE:      /82 (BP Location: Left arm, Patient Position: Sitting)   Pulse 72   Ht 5' 5" (1.651 m)   Wt 83.9 kg (185 lb)   SpO2 (!) 94%   BMI 30.79 kg/m²     Physical Exam  GENERAL: Older patient in no distress.  HEENT: Pupils equal and reactive. Extraocular movements intact. Nose intact.      Pharynx moist.  NECK: Supple.   HEART: Regular rate and rhythm. No murmur or gallop auscultated.  LUNGS: There is an expiratory wheeze heard in the left lung base. This is low pitched coarse. There is a faint wheeze heard in the right upper posterior thorax. Lung excursion symmetrical. No change in fremitus.  ABDOMEN: Bowel sounds present. Non-tender, no masses palpated.EXTREMITIES: Normal muscle tone and joint movement, no cyanosis or clubbing.   LYMPHATICS: No adenopathy palpated, no edema.  SKIN: Dry, intact, no lesions.   NEURO: Cranial nerves II-XII intact. Motor strength 5/5 bilaterally, upper and lower extremities.  PSYCH: Appropriate affect.    Assessment:     No diagnosis found.   See plan  Plan:       Chronic obstructive pulmonary disease with acute exacerbation  -     azithromycin (ZITHROMAX Z-CHRISTI) 250 MG tablet; 2 on day one, then one daily  Dispense: 6 tablet; Refill: 0  -     predniSONE (DELTASONE) 10 MG tablet; Take 4 tabs x 2 days, then take 3 tabs x 2 days, then take 2 tabs x 2 days, then take 1 tab x 2 days.  Dispense: 20 tablet; Refill: 0    Chronic hypoxemic respiratory failure    Chronic obstructive pulmonary disease, unspecified COPD type    Hypoxemia    MONSERRAT (obstructive sleep apnea)         Follow up in about 3 months (around 10/24/2019).        "

## 2019-07-24 NOTE — PATIENT INSTRUCTIONS
Chronic Lung Disease: Preventing Lung Infections  Chronic lung diseases include chronic obstructive pulmonary disease (COPD), which includes chronic bronchitis and emphysema. Other chronic lung diseases include pulmonary fibrosis, sarcoidosis, and other conditions. When you have chronic lung diseases, it's very important to protect yourself from respiratory infections, like colds, the flu, and lung infections. Infections may cause your lung condition to worsen. Although you can't completely avoid them, there are things you can do to lessen the chance of infections.    Take precautions  Taking the following precautions can help you avoid illness:  · Remember to keep your hands away from your nose and mouth. Germs on your hands get into your respiratory system this way.  · Wash your hands often. When you wash them:  ¨ Use soap and warm water.  ¨ Rub your hands together well for at least 20 seconds.  ¨ Make sure to rinse them well.  ¨ Dry your hands on clean towels or air-dry them.  · Use hand  containing alcohol, if you are unable to wash your hands. Use the  after touching doorknobs, handles, and supermarket carts, for example, since lots of people touch them. Then wash your hands as soon as you can.  · To help prevent the flu, get a flu vaccination every year. This may be given at your healthcare provider's office, a drugstore, or pharmacy, or at work. Get your flu shot as soon as the vaccines are available in your area. This is usually around September each year.  · To help prevent pneumococcal pneumonia, get pneumonia vaccinations. Talk with your healthcare provider about which pneumococcal vaccinations you need.  · Try to stay away from people with respiratory infections, such as colds or the flu. Stay away from crowded places, like shopping centers or movie theatres during cold and flu season.  · If you smoke, think about quitting. In addition to causing or worsening many lung conditions, the  lung damage from smoking increases your risk of infections. Stay away from others who smoke, too. This is also harmful and increases your chance of infections.  Date Last Reviewed: 4/14/2016  © 9227-6525 [employer]. [address] [place] [address], [place] [address]. All rights reserved. This information is not intended as a substitute for professional medical care. Always follow your healthcare professional's instructions.      Keep your October appointment  :Let me know if you have to start the prednisone and zithromax

## 2019-08-12 DIAGNOSIS — J44.1 CHRONIC OBSTRUCTIVE PULMONARY DISEASE WITH ACUTE EXACERBATION: Primary | ICD-10-CM

## 2019-10-01 ENCOUNTER — OFFICE VISIT (OUTPATIENT)
Dept: PULMONOLOGY | Facility: CLINIC | Age: 69
End: 2019-10-01
Payer: MEDICARE

## 2019-10-01 VITALS
BODY MASS INDEX: 29.09 KG/M2 | HEIGHT: 66 IN | OXYGEN SATURATION: 96 % | SYSTOLIC BLOOD PRESSURE: 110 MMHG | WEIGHT: 181 LBS | DIASTOLIC BLOOD PRESSURE: 70 MMHG | HEART RATE: 76 BPM

## 2019-10-01 DIAGNOSIS — J44.9 CHRONIC OBSTRUCTIVE PULMONARY DISEASE, UNSPECIFIED COPD TYPE: Primary | ICD-10-CM

## 2019-10-01 PROCEDURE — 1101F PT FALLS ASSESS-DOCD LE1/YR: CPT | Mod: S$GLB,,, | Performed by: NURSE PRACTITIONER

## 2019-10-01 PROCEDURE — 99214 OFFICE O/P EST MOD 30 MIN: CPT | Mod: S$GLB,,, | Performed by: NURSE PRACTITIONER

## 2019-10-01 PROCEDURE — 99214 PR OFFICE/OUTPT VISIT, EST, LEVL IV, 30-39 MIN: ICD-10-PCS | Mod: S$GLB,,, | Performed by: NURSE PRACTITIONER

## 2019-10-01 PROCEDURE — 1101F PR PT FALLS ASSESS DOC 0-1 FALLS W/OUT INJ PAST YR: ICD-10-PCS | Mod: S$GLB,,, | Performed by: NURSE PRACTITIONER

## 2019-10-01 RX ORDER — BENZONATATE 200 MG/1
200 CAPSULE ORAL 3 TIMES DAILY PRN
Refills: 3 | COMMUNITY
Start: 2019-08-15 | End: 2020-01-13

## 2019-10-01 NOTE — PROGRESS NOTES
SUBJECTIVE:    Patient ID: Lisa Smith is a 68 y.o. female.    Chief Complaint: COPD (follow up 3 mo )    HPI     Patient here today feeling well. She loves Trelegy, feels great benefit from it. She is exercising on her own still.  She is wearing oxygen with sleep, and as needed. She is no longer taking Ambien, she is taking Melatonin.  She is also taking Daliresp.     Past Medical History:   Diagnosis Date    Atrial fibrillation     Bell's palsy     COPD (chronic obstructive pulmonary disease)     GI bleed     Heterozygous alpha 1-antitrypsin deficiency     Lung disease     copd    MONSERRAT (obstructive sleep apnea)     Pneumonia     Pulmonary edema      Past Surgical History:   Procedure Laterality Date    CARDIAC ELECTROPHYSIOLOGY STUDY AND ABLATION      CARPAL TUNNEL RELEASE      CHOLECYSTECTOMY      HAND SURGERY      HYSTERECTOMY      INSERT / REPLACE / REMOVE PACEMAKER      KNEE ARTHROSCOPY       Family History   Problem Relation Age of Onset    Kidney failure Mother     Cancer Father     Cancer Brother         Social History:   Marital Status:   Occupation: housewife  Alcohol History:  reports that she does not drink alcohol.  Tobacco History:  reports that she quit smoking about 8 years ago. Her smoking use included cigarettes. She started smoking about 48 years ago. She has a 60.00 pack-year smoking history. She has never used smokeless tobacco.  Drug History:  reports that she does not use drugs.    Review of patient's allergies indicates:   Allergen Reactions    Sulfa (sulfonamide antibiotics)        Current Outpatient Medications   Medication Sig Dispense Refill    albuterol (ACCUNEB) 1.25 mg/3 mL Nebu Take 3 mLs (1.25 mg total) by nebulization every 6 (six) hours as needed. Rescue 120 vial 6    clopidogrel (PLAVIX) 75 mg tablet       digoxin (LANOXIN) 250 mcg tablet       diltiaZEM (CARTIA XT) 120 MG Cp24 Take 180 mg by mouth once daily.       fluticasone-umeclidin-vilanter (TRELEGY ELLIPTA) 100-62.5-25 mcg DsDv Inhale into the lungs.      FLUZONE HIGH-DOSE 2019-20, PF, 180 mcg/0.5 mL Syrg       furosemide (LASIX) 40 MG tablet 20 mg.       metoprolol succinate (TOPROL-XL) 100 MG 24 hr tablet       pantoprazole (PROTONIX) 20 MG tablet       roflumilast (DALIRESP) 500 mcg Tab Take 500 mcg by mouth once daily.      spironolactone (ALDACTONE) 25 MG tablet       azithromycin (ZITHROMAX Z-CHRISTI) 250 MG tablet 2 on day one, then one daily (Patient not taking: Reported on 10/1/2019) 6 tablet 0    benzonatate (TESSALON) 200 MG capsule Take 200 mg by mouth 3 (three) times daily as needed.  3    fluticasone-umeclidin-vilanter (TRELEGY ELLIPTA) 100-62.5-25 mcg DsDv Inhale 1 puff into the lungs once daily. 3 each 3    levoFLOXacin (LEVAQUIN) 500 MG tablet Take 1 tablet (500 mg total) by mouth once daily. (Patient not taking: Reported on 10/1/2019) 7 tablet 0    metoprolol tartrate (LOPRESSOR) 100 MG tablet       omeprazole (PRILOSEC) 20 MG capsule       predniSONE (DELTASONE) 10 MG tablet Take 4 tabs x 2 days, then take 3 tabs x 2 days, then take 2 tabs x 2 days, then take 1 tab x 2 days. (Patient not taking: Reported on 10/1/2019) 20 tablet 0    predniSONE (DELTASONE) 10 MG tablet Take 4 tabs x 2 days, then take 3 tabs x 2 days, then take 2 tabs x 2 days, then take 1 tab x 2 days. (Patient not taking: Reported on 10/1/2019) 20 tablet 0    sertraline (ZOLOFT) 100 MG tablet       zolpidem (AMBIEN) 5 MG Tab Take 1 tablet (5 mg total) by mouth nightly as needed. (Patient not taking: Reported on 10/1/2019) 30 tablet 2    zolpidem (AMBIEN) 5 MG Tab Take 1 tablet (5 mg total) by mouth nightly as needed. (Patient not taking: Reported on 10/1/2019) 30 tablet 2     No current facility-administered medications for this visit.        Alpha-1 Antitrypsin: MZ  Last PFT: 6/25/18    Last CXR: 5/6/2019    Review of Systems  General: Feeling Well.  Eyes: Vision is  "good.  ENT:  No sinusitis or pharyngitis.   Heart:: No chest pain or palpitations.  Lungs: No cough, sputum, or wheezing.  GI: No Nausea, vomiting, constipation, diarrhea, or reflux.  : No dysuria, hesitancy, or nocturia.  Musculoskeletal: No joint pain or myalgias.  Skin: No lesions or rashes.  Neuro: No headaches or neuropathy.  Lymph: No edema or adenopathy.  Psych: No anxiety or depression.  Endo: No weight change.    OBJECTIVE:      /70 (BP Location: Left arm, Patient Position: Sitting)   Pulse 76   Ht 5' 6" (1.676 m)   Wt 82.1 kg (181 lb)   SpO2 96%   BMI 29.21 kg/m²     Physical Exam  GENERAL: Older patient in no distress.  HEENT: Pupils equal and reactive. Extraocular movements intact. Nose intact.      Pharynx moist.  NECK: Supple.   HEART: Regular rate and rhythm. No murmur or gallop auscultated.  LUNGS: clear   ABDOMEN: Bowel sounds present. Non-tender, no masses palpated.  EXTREMITIES: Normal muscle tone and joint movement, no cyanosis or clubbing.   LYMPHATICS: No adenopathy palpated, no edema.  SKIN: Dry, intact, no lesions.   NEURO: Cranial nerves II-XII intact. Motor strength 5/5 bilaterally, upper and lower extremities.  PSYCH: Appropriate affect.    Assessment:    COPD  Hypoxemia    Plan:            Continue the Trelegy daily  Already had flu shot  Keep exercising and losing weight  Follow up in 6 months   [employer] forms given for the trelegy  Do the Az&Me for Daliresp  PFT  sats need to be 90% or above      Follow up in about 6 months (around 4/1/2020).        "

## 2019-10-01 NOTE — PATIENT INSTRUCTIONS
Treatment for COPD    Your healthcare provider will prescribe the best treatments for your COPD.  Treatment  Recommendations include the following:  · Medicines. Some medicines help relieve symptoms when you have them. Others are taken daily to control inflammation in the lungs. Always take your medicines as prescribed. Learn the names of your medicines, as well as how and when to use them.  · Oxygen therapy. Oxygen may be prescribed if tests show that your blood contains too little oxygen.  · Smoking. If you smoke, quit. Smoking is the main cause of COPD. Quitting will help you be able to better manage your COPD. Ask your healthcare provider about ways to help you quit smoking.  · Avoiding infections. Infections, like a cold or the flu, can cause your symptoms to worsen. Try to stay away from people who are sick. Wash your hands often. And, ask your healthcare provider about vaccines for the flu and pneumonia.  Coping with shortness of breath  Coping tips include the following:  · Exercise. Try to be as active as possible. This will improve energy levels and strengthen your muscles, so you can do more.  · Breathing techniques. Ask your healthcare provider or nurse to show you how to do pursed-lip breathing.  · Balance rest and activity. Each day, try to balance rest periods with activity. For example, you might start the day with getting dressed and eating breakfast, then relax and read the paper. After that, take a brief walk. And then sit with your feet up for a while.  · Pulmonary rehabilitation. Ask your provider, or call your local hospital to find out about pulmonary rehab programs. The programs help with managing your disease, breathing techniques, exercise, support and counseling.  · Healthy eating. Eating a healthy, balanced diet and making an effort to maintain your ideal weight are important to staying as healthy as possible. Make sure you have a lot of fruit and vegetables every day, as well as  balanced portions of whole grains, lean meats and fish, and low-fat dairy products.  Date Last Reviewed: 5/1/2016  © 7816-4487 [employer] [address] [place] [address], [place] [address]. All rights reserved. This information is not intended as a substitute for professional medical care. Always follow your healthcare professional's instructions.    Continue the Trelegy daily  Already had flu shot  Keep exercising  Follow up in 6 months   PFT  [employer] forms given  Do the Az&Me for Cezar

## 2019-10-18 ENCOUNTER — HOSPITAL ENCOUNTER (OUTPATIENT)
Dept: PULMONOLOGY | Facility: HOSPITAL | Age: 69
Discharge: HOME | End: 2019-10-18
Attending: NURSE PRACTITIONER
Payer: MEDICARE

## 2019-10-18 DIAGNOSIS — J44.9 CHRONIC OBSTRUCTIVE PULMONARY DISEASE, UNSPECIFIED COPD TYPE: ICD-10-CM

## 2019-10-18 PROCEDURE — 94729 DIFFUSING CAPACITY: CPT

## 2019-10-18 PROCEDURE — 94727 GAS DIL/WSHOT DETER LNG VOL: CPT

## 2019-10-18 PROCEDURE — 94060 EVALUATION OF WHEEZING: CPT

## 2019-10-21 ENCOUNTER — TELEPHONE (OUTPATIENT)
Dept: PULMONOLOGY | Facility: CLINIC | Age: 69
End: 2019-10-21

## 2019-10-21 NOTE — TELEPHONE ENCOUNTER
PFT shows moderate obstruction, no restriction, moderate diffusion defect.  Good response in small airway.    Obstruction, and TLC better, DLCO a little worse.

## 2019-10-31 ENCOUNTER — TELEPHONE (OUTPATIENT)
Dept: PULMONOLOGY | Facility: CLINIC | Age: 69
End: 2019-10-31

## 2019-10-31 RX ORDER — ROFLUMILAST 500 UG/1
500 TABLET ORAL DAILY
Qty: 90 TABLET | Refills: 6 | Status: SHIPPED | OUTPATIENT
Start: 2019-10-31 | End: 2022-11-21 | Stop reason: SDUPTHER

## 2019-10-31 NOTE — TELEPHONE ENCOUNTER
----- Message from Krystal Thayer sent at 10/31/2019 11:00 AM CDT -----  Needs RX faxed to [facility] for her Daliresp ASAP. The fax number is [phone]. Please let her know when it has been done.

## 2019-11-25 ENCOUNTER — HOSPITAL ENCOUNTER (INPATIENT)
Facility: HOSPITAL | Age: 69
LOS: 3 days | Discharge: HOME | DRG: 193 | End: 2019-11-29
Attending: EMERGENCY MEDICINE | Admitting: FAMILY MEDICINE
Payer: MEDICARE

## 2019-11-25 ENCOUNTER — CLINICAL SUPPORT (OUTPATIENT)
Dept: CARDIOLOGY | Facility: HOSPITAL | Age: 69
DRG: 193 | End: 2019-11-25
Attending: FAMILY MEDICINE
Payer: MEDICARE

## 2019-11-25 VITALS — HEIGHT: 65 IN | BODY MASS INDEX: 30.49 KG/M2 | WEIGHT: 183 LBS

## 2019-11-25 DIAGNOSIS — R07.9 CHEST PAIN: ICD-10-CM

## 2019-11-25 DIAGNOSIS — J18.9 PNEUMONIA OF RIGHT LUNG DUE TO INFECTIOUS ORGANISM, UNSPECIFIED PART OF LUNG: ICD-10-CM

## 2019-11-25 DIAGNOSIS — J96.21 ACUTE ON CHRONIC RESPIRATORY FAILURE WITH HYPOXIA: ICD-10-CM

## 2019-11-25 DIAGNOSIS — J96.11 CHRONIC HYPOXEMIC RESPIRATORY FAILURE: Chronic | ICD-10-CM

## 2019-11-25 DIAGNOSIS — R09.02 HYPOXIA: Primary | ICD-10-CM

## 2019-11-25 DIAGNOSIS — R06.02 SHORTNESS OF BREATH: ICD-10-CM

## 2019-11-25 DIAGNOSIS — J18.9 PNEUMONIA: ICD-10-CM

## 2019-11-25 DIAGNOSIS — R06.02 SOB (SHORTNESS OF BREATH): ICD-10-CM

## 2019-11-25 DIAGNOSIS — J44.0 CHRONIC OBSTRUCTIVE PULMONARY DISEASE WITH ACUTE LOWER RESPIRATORY INFECTION: Chronic | ICD-10-CM

## 2019-11-25 DIAGNOSIS — J18.9 MULTIFOCAL PNEUMONIA: ICD-10-CM

## 2019-11-25 PROBLEM — J44.9 COPD (CHRONIC OBSTRUCTIVE PULMONARY DISEASE): Chronic | Status: ACTIVE | Noted: 2019-10-01

## 2019-11-25 PROBLEM — I25.10 CAD (CORONARY ARTERY DISEASE): Chronic | Status: ACTIVE | Noted: 2019-11-25

## 2019-11-25 PROBLEM — I10 HYPERTENSION: Chronic | Status: ACTIVE | Noted: 2019-11-25

## 2019-11-25 PROBLEM — G47.33 OSA (OBSTRUCTIVE SLEEP APNEA): Status: ACTIVE | Noted: 2019-11-25

## 2019-11-25 PROBLEM — I48.91 ATRIAL FIBRILLATION: Chronic | Status: ACTIVE | Noted: 2019-11-25

## 2019-11-25 PROBLEM — I10 HYPERTENSION: Status: ACTIVE | Noted: 2019-11-25

## 2019-11-25 PROBLEM — I48.91 ATRIAL FIBRILLATION: Status: ACTIVE | Noted: 2019-11-25

## 2019-11-25 PROBLEM — G47.33 OSA (OBSTRUCTIVE SLEEP APNEA): Chronic | Status: ACTIVE | Noted: 2019-11-25

## 2019-11-25 PROBLEM — Z95.0 S/P PLACEMENT OF CARDIAC PACEMAKER: Chronic | Status: ACTIVE | Noted: 2019-11-25

## 2019-11-25 PROBLEM — I35.0 AORTIC VALVE STENOSIS: Chronic | Status: ACTIVE | Noted: 2019-11-25

## 2019-11-25 PROBLEM — E88.01 HETEROZYGOUS ALPHA 1-ANTITRYPSIN DEFICIENCY: Chronic | Status: ACTIVE | Noted: 2018-11-05

## 2019-11-25 PROBLEM — I35.0 AORTIC VALVE STENOSIS: Status: ACTIVE | Noted: 2019-11-25

## 2019-11-25 PROBLEM — E87.1 HYPONATREMIA: Status: ACTIVE | Noted: 2019-11-25

## 2019-11-25 LAB
ALBUMIN SERPL BCP-MCNC: 4.5 G/DL (ref 3.5–5.2)
ALP SERPL-CCNC: 60 U/L (ref 55–135)
ALT SERPL W/O P-5'-P-CCNC: 12 U/L (ref 10–44)
ANION GAP SERPL CALC-SCNC: 11 MMOL/L (ref 8–16)
ANISOCYTOSIS BLD QL SMEAR: SLIGHT
AST SERPL-CCNC: 29 U/L (ref 10–40)
BASOPHILS # BLD AUTO: ABNORMAL K/UL (ref 0–0.2)
BASOPHILS NFR BLD: 0 % (ref 0–1.9)
BILIRUB SERPL-MCNC: 0.9 MG/DL (ref 0.1–1)
BNP SERPL-MCNC: 129 PG/ML (ref 0–99)
BUN SERPL-MCNC: 20 MG/DL (ref 8–23)
CALCIUM SERPL-MCNC: 9.2 MG/DL (ref 8.7–10.5)
CHLORIDE SERPL-SCNC: 96 MMOL/L (ref 95–110)
CO2 SERPL-SCNC: 26 MMOL/L (ref 23–29)
CREAT SERPL-MCNC: 0.7 MG/DL (ref 0.5–1.4)
DIFFERENTIAL METHOD: ABNORMAL
DIGOXIN SERPL-MCNC: 1.3 NG/ML (ref 0.8–2)
EOSINOPHIL # BLD AUTO: ABNORMAL K/UL (ref 0–0.5)
EOSINOPHIL NFR BLD: 0 % (ref 0–8)
ERYTHROCYTE [DISTWIDTH] IN BLOOD BY AUTOMATED COUNT: 14.6 % (ref 11.5–14.5)
EST. GFR  (AFRICAN AMERICAN): >60 ML/MIN/1.73 M^2
EST. GFR  (NON AFRICAN AMERICAN): >60 ML/MIN/1.73 M^2
GLUCOSE SERPL-MCNC: 127 MG/DL (ref 70–110)
HCT VFR BLD AUTO: 42.2 % (ref 37–48.5)
HGB BLD-MCNC: 14 G/DL (ref 12–16)
IMM GRANULOCYTES # BLD AUTO: ABNORMAL K/UL (ref 0–0.04)
IMM GRANULOCYTES NFR BLD AUTO: ABNORMAL % (ref 0–0.5)
INFLUENZA A, MOLECULAR: NEGATIVE
INFLUENZA B, MOLECULAR: NEGATIVE
INR PPP: 1
LDH SERPL L TO P-CCNC: 2.07 MMOL/L (ref 0.5–2.2)
LYMPHOCYTES # BLD AUTO: ABNORMAL K/UL (ref 1–4.8)
LYMPHOCYTES NFR BLD: 3 % (ref 18–48)
MAGNESIUM SERPL-MCNC: 1.5 MG/DL (ref 1.6–2.6)
MCH RBC QN AUTO: 31.4 PG (ref 27–31)
MCHC RBC AUTO-ENTMCNC: 33.2 G/DL (ref 32–36)
MCV RBC AUTO: 95 FL (ref 82–98)
MONOCYTES # BLD AUTO: ABNORMAL K/UL (ref 0.3–1)
MONOCYTES NFR BLD: 6 % (ref 4–15)
NEUTROPHILS # BLD AUTO: ABNORMAL K/UL (ref 1.8–7.7)
NEUTROPHILS NFR BLD: 81 % (ref 38–73)
NEUTS BAND NFR BLD MANUAL: 10 %
NRBC BLD-RTO: 0 /100 WBC
PLATELET # BLD AUTO: 297 K/UL (ref 150–350)
PMV BLD AUTO: 9.3 FL (ref 9.2–12.9)
POTASSIUM SERPL-SCNC: 4.4 MMOL/L (ref 3.5–5.1)
PROCALCITONIN SERPL IA-MCNC: 0.4 NG/ML (ref 0–0.5)
PROT SERPL-MCNC: 8 G/DL (ref 6–8.4)
PROTHROMBIN TIME: 12.8 SEC (ref 10.6–14.8)
RBC # BLD AUTO: 4.46 M/UL (ref 4–5.4)
SAMPLE: NORMAL
SODIUM SERPL-SCNC: 133 MMOL/L (ref 136–145)
SPECIMEN SOURCE: NORMAL
TROPONIN I SERPL DL<=0.01 NG/ML-MCNC: <0.03 NG/ML (ref 0.02–0.04)
WBC # BLD AUTO: 22.3 K/UL (ref 3.9–12.7)

## 2019-11-25 PROCEDURE — 25000242 PHARM REV CODE 250 ALT 637 W/ HCPCS: Performed by: EMERGENCY MEDICINE

## 2019-11-25 PROCEDURE — 63600175 PHARM REV CODE 636 W HCPCS: Performed by: FAMILY MEDICINE

## 2019-11-25 PROCEDURE — 87502 INFLUENZA DNA AMP PROBE: CPT

## 2019-11-25 PROCEDURE — 99285 EMERGENCY DEPT VISIT HI MDM: CPT | Mod: 25

## 2019-11-25 PROCEDURE — 85610 PROTHROMBIN TIME: CPT

## 2019-11-25 PROCEDURE — 85007 BL SMEAR W/DIFF WBC COUNT: CPT

## 2019-11-25 PROCEDURE — 83880 ASSAY OF NATRIURETIC PEPTIDE: CPT

## 2019-11-25 PROCEDURE — 83735 ASSAY OF MAGNESIUM: CPT

## 2019-11-25 PROCEDURE — 84145 PROCALCITONIN (PCT): CPT

## 2019-11-25 PROCEDURE — 27000221 HC OXYGEN, UP TO 24 HOURS

## 2019-11-25 PROCEDURE — 96365 THER/PROPH/DIAG IV INF INIT: CPT

## 2019-11-25 PROCEDURE — 80162 ASSAY OF DIGOXIN TOTAL: CPT

## 2019-11-25 PROCEDURE — G0378 HOSPITAL OBSERVATION PER HR: HCPCS

## 2019-11-25 PROCEDURE — 25000003 PHARM REV CODE 250: Performed by: EMERGENCY MEDICINE

## 2019-11-25 PROCEDURE — 94761 N-INVAS EAR/PLS OXIMETRY MLT: CPT

## 2019-11-25 PROCEDURE — 85027 COMPLETE CBC AUTOMATED: CPT

## 2019-11-25 PROCEDURE — 94640 AIRWAY INHALATION TREATMENT: CPT

## 2019-11-25 PROCEDURE — 25000003 PHARM REV CODE 250: Performed by: FAMILY MEDICINE

## 2019-11-25 PROCEDURE — 63600175 PHARM REV CODE 636 W HCPCS: Performed by: EMERGENCY MEDICINE

## 2019-11-25 PROCEDURE — 96375 TX/PRO/DX INJ NEW DRUG ADDON: CPT

## 2019-11-25 PROCEDURE — 87205 SMEAR GRAM STAIN: CPT

## 2019-11-25 PROCEDURE — 93306 TTE W/DOPPLER COMPLETE: CPT

## 2019-11-25 PROCEDURE — 87040 BLOOD CULTURE FOR BACTERIA: CPT

## 2019-11-25 PROCEDURE — 96368 THER/DIAG CONCURRENT INF: CPT

## 2019-11-25 PROCEDURE — 87070 CULTURE OTHR SPECIMN AEROBIC: CPT

## 2019-11-25 PROCEDURE — 25500020 PHARM REV CODE 255: Performed by: EMERGENCY MEDICINE

## 2019-11-25 PROCEDURE — 80053 COMPREHEN METABOLIC PANEL: CPT

## 2019-11-25 PROCEDURE — 83605 ASSAY OF LACTIC ACID: CPT

## 2019-11-25 PROCEDURE — 84484 ASSAY OF TROPONIN QUANT: CPT

## 2019-11-25 PROCEDURE — 36415 COLL VENOUS BLD VENIPUNCTURE: CPT

## 2019-11-25 PROCEDURE — 93005 ELECTROCARDIOGRAM TRACING: CPT

## 2019-11-25 PROCEDURE — 25000242 PHARM REV CODE 250 ALT 637 W/ HCPCS: Performed by: FAMILY MEDICINE

## 2019-11-25 RX ORDER — CLOPIDOGREL BISULFATE 75 MG/1
75 TABLET ORAL DAILY
Status: DISCONTINUED | OUTPATIENT
Start: 2019-11-25 | End: 2019-11-29 | Stop reason: HOSPADM

## 2019-11-25 RX ORDER — DIGOXIN 250 MCG
0.25 TABLET ORAL DAILY
Status: DISCONTINUED | OUTPATIENT
Start: 2019-11-25 | End: 2019-11-29 | Stop reason: HOSPADM

## 2019-11-25 RX ORDER — ROFLUMILAST 500 UG/1
500 TABLET ORAL DAILY
Status: DISCONTINUED | OUTPATIENT
Start: 2019-11-25 | End: 2019-11-29 | Stop reason: HOSPADM

## 2019-11-25 RX ORDER — DIGOXIN 250 MCG
0.25 TABLET ORAL DAILY
COMMUNITY
Start: 2019-11-04 | End: 2020-09-10 | Stop reason: SDUPTHER

## 2019-11-25 RX ORDER — PREDNISONE 20 MG/1
40 TABLET ORAL DAILY
Status: DISCONTINUED | OUTPATIENT
Start: 2019-11-26 | End: 2019-11-29 | Stop reason: HOSPADM

## 2019-11-25 RX ORDER — IPRATROPIUM BROMIDE 0.5 MG/2.5ML
0.5 SOLUTION RESPIRATORY (INHALATION)
Status: COMPLETED | OUTPATIENT
Start: 2019-11-25 | End: 2019-11-25

## 2019-11-25 RX ORDER — FLUTICASONE FUROATE AND VILANTEROL 100; 25 UG/1; UG/1
1 POWDER RESPIRATORY (INHALATION) DAILY
Status: DISCONTINUED | OUTPATIENT
Start: 2019-11-25 | End: 2019-11-29 | Stop reason: HOSPADM

## 2019-11-25 RX ORDER — ACETAMINOPHEN 325 MG/1
650 TABLET ORAL EVERY 8 HOURS PRN
Status: DISCONTINUED | OUTPATIENT
Start: 2019-11-25 | End: 2019-11-29 | Stop reason: HOSPADM

## 2019-11-25 RX ORDER — ACETAMINOPHEN 325 MG/1
650 TABLET ORAL EVERY 4 HOURS PRN
Status: DISCONTINUED | OUTPATIENT
Start: 2019-11-25 | End: 2019-11-29 | Stop reason: HOSPADM

## 2019-11-25 RX ORDER — IPRATROPIUM BROMIDE AND ALBUTEROL SULFATE 2.5; .5 MG/3ML; MG/3ML
3 SOLUTION RESPIRATORY (INHALATION)
Status: DISCONTINUED | OUTPATIENT
Start: 2019-11-25 | End: 2019-11-25

## 2019-11-25 RX ORDER — POTASSIUM CHLORIDE 20 MEQ/15ML
40 SOLUTION ORAL
Status: DISCONTINUED | OUTPATIENT
Start: 2019-11-25 | End: 2019-11-29 | Stop reason: HOSPADM

## 2019-11-25 RX ORDER — FUROSEMIDE 20 MG/1
20 TABLET ORAL DAILY
Status: DISCONTINUED | OUTPATIENT
Start: 2019-11-25 | End: 2019-11-29 | Stop reason: HOSPADM

## 2019-11-25 RX ORDER — GLUCAGON 1 MG
1 KIT INJECTION
Status: DISCONTINUED | OUTPATIENT
Start: 2019-11-25 | End: 2019-11-29 | Stop reason: HOSPADM

## 2019-11-25 RX ORDER — IBUPROFEN 200 MG
16 TABLET ORAL
Status: DISCONTINUED | OUTPATIENT
Start: 2019-11-25 | End: 2019-11-29 | Stop reason: HOSPADM

## 2019-11-25 RX ORDER — SODIUM,POTASSIUM PHOSPHATES 280-250MG
2 POWDER IN PACKET (EA) ORAL
Status: DISCONTINUED | OUTPATIENT
Start: 2019-11-25 | End: 2019-11-29 | Stop reason: HOSPADM

## 2019-11-25 RX ORDER — OSELTAMIVIR PHOSPHATE 75 MG/1
75 CAPSULE ORAL 2 TIMES DAILY
Status: DISCONTINUED | OUTPATIENT
Start: 2019-11-25 | End: 2019-11-29 | Stop reason: HOSPADM

## 2019-11-25 RX ORDER — DILTIAZEM HYDROCHLORIDE 180 MG/1
180 CAPSULE, COATED, EXTENDED RELEASE ORAL NIGHTLY
Status: DISCONTINUED | OUTPATIENT
Start: 2019-11-25 | End: 2019-11-29 | Stop reason: HOSPADM

## 2019-11-25 RX ORDER — METHYLPREDNISOLONE SOD SUCC 125 MG
125 VIAL (EA) INJECTION
Status: COMPLETED | OUTPATIENT
Start: 2019-11-25 | End: 2019-11-25

## 2019-11-25 RX ORDER — ONDANSETRON 2 MG/ML
4 INJECTION INTRAMUSCULAR; INTRAVENOUS EVERY 8 HOURS PRN
Status: DISCONTINUED | OUTPATIENT
Start: 2019-11-25 | End: 2019-11-29 | Stop reason: HOSPADM

## 2019-11-25 RX ORDER — ASPIRIN 325 MG
325 TABLET ORAL
Status: COMPLETED | OUTPATIENT
Start: 2019-11-25 | End: 2019-11-25

## 2019-11-25 RX ORDER — POTASSIUM CHLORIDE 20 MEQ/15ML
20 SOLUTION ORAL
Status: DISCONTINUED | OUTPATIENT
Start: 2019-11-25 | End: 2019-11-29 | Stop reason: HOSPADM

## 2019-11-25 RX ORDER — LANOLIN ALCOHOL/MO/W.PET/CERES
400 CREAM (GRAM) TOPICAL DAILY
COMMUNITY
Start: 2019-10-08

## 2019-11-25 RX ORDER — ACETAMINOPHEN 500 MG
5 TABLET ORAL NIGHTLY PRN
COMMUNITY
Start: 2019-10-04 | End: 2022-01-19

## 2019-11-25 RX ORDER — LANOLIN ALCOHOL/MO/W.PET/CERES
800 CREAM (GRAM) TOPICAL
Status: DISCONTINUED | OUTPATIENT
Start: 2019-11-25 | End: 2019-11-29 | Stop reason: HOSPADM

## 2019-11-25 RX ORDER — POLYETHYLENE GLYCOL 3350 17 G/17G
17 POWDER, FOR SOLUTION ORAL DAILY
Status: DISCONTINUED | OUTPATIENT
Start: 2019-11-25 | End: 2019-11-29 | Stop reason: HOSPADM

## 2019-11-25 RX ORDER — SODIUM CHLORIDE 0.9 % (FLUSH) 0.9 %
2 SYRINGE (ML) INJECTION
Status: DISCONTINUED | OUTPATIENT
Start: 2019-11-25 | End: 2019-11-29 | Stop reason: HOSPADM

## 2019-11-25 RX ORDER — SPIRONOLACTONE 25 MG/1
25 TABLET ORAL DAILY
Status: DISCONTINUED | OUTPATIENT
Start: 2019-11-25 | End: 2019-11-29 | Stop reason: HOSPADM

## 2019-11-25 RX ORDER — ACETAMINOPHEN 10 MG/ML
1000 INJECTION, SOLUTION INTRAVENOUS EVERY 8 HOURS
Status: DISCONTINUED | OUTPATIENT
Start: 2019-11-25 | End: 2019-11-25

## 2019-11-25 RX ORDER — TALC
6 POWDER (GRAM) TOPICAL NIGHTLY PRN
Status: DISCONTINUED | OUTPATIENT
Start: 2019-11-25 | End: 2019-11-29 | Stop reason: HOSPADM

## 2019-11-25 RX ORDER — IBUPROFEN 200 MG
24 TABLET ORAL
Status: DISCONTINUED | OUTPATIENT
Start: 2019-11-25 | End: 2019-11-29 | Stop reason: HOSPADM

## 2019-11-25 RX ORDER — MAGNESIUM SULFATE 1 G/100ML
1 INJECTION INTRAVENOUS ONCE
Status: COMPLETED | OUTPATIENT
Start: 2019-11-25 | End: 2019-11-25

## 2019-11-25 RX ORDER — IPRATROPIUM BROMIDE AND ALBUTEROL SULFATE 2.5; .5 MG/3ML; MG/3ML
3 SOLUTION RESPIRATORY (INHALATION) EVERY 6 HOURS PRN
Status: DISCONTINUED | OUTPATIENT
Start: 2019-11-25 | End: 2019-11-29 | Stop reason: HOSPADM

## 2019-11-25 RX ORDER — PANTOPRAZOLE SODIUM 40 MG/1
40 TABLET, DELAYED RELEASE ORAL DAILY
Status: DISCONTINUED | OUTPATIENT
Start: 2019-11-25 | End: 2019-11-29 | Stop reason: HOSPADM

## 2019-11-25 RX ORDER — ENOXAPARIN SODIUM 100 MG/ML
40 INJECTION SUBCUTANEOUS EVERY 24 HOURS
Status: DISCONTINUED | OUTPATIENT
Start: 2019-11-25 | End: 2019-11-29 | Stop reason: HOSPADM

## 2019-11-25 RX ORDER — METOPROLOL SUCCINATE 25 MG/1
100 TABLET, EXTENDED RELEASE ORAL DAILY
Status: DISCONTINUED | OUTPATIENT
Start: 2019-11-25 | End: 2019-11-29 | Stop reason: HOSPADM

## 2019-11-25 RX ORDER — PANTOPRAZOLE SODIUM 40 MG/1
40 TABLET, DELAYED RELEASE ORAL DAILY
COMMUNITY
Start: 2019-10-30 | End: 2020-10-03 | Stop reason: SDUPTHER

## 2019-11-25 RX ORDER — LEVALBUTEROL 1.25 MG/.5ML
1.25 SOLUTION, CONCENTRATE RESPIRATORY (INHALATION)
Status: COMPLETED | OUTPATIENT
Start: 2019-11-25 | End: 2019-11-25

## 2019-11-25 RX ORDER — IPRATROPIUM BROMIDE AND ALBUTEROL SULFATE 2.5; .5 MG/3ML; MG/3ML
3 SOLUTION RESPIRATORY (INHALATION)
Status: DISCONTINUED | OUTPATIENT
Start: 2019-11-25 | End: 2019-11-27

## 2019-11-25 RX ORDER — DILTIAZEM HYDROCHLORIDE 180 MG/1
180 CAPSULE, COATED, EXTENDED RELEASE ORAL DAILY
Status: DISCONTINUED | OUTPATIENT
Start: 2019-11-25 | End: 2019-11-25

## 2019-11-25 RX ADMIN — AZITHROMYCIN MONOHYDRATE 500 MG: 500 INJECTION, POWDER, LYOPHILIZED, FOR SOLUTION INTRAVENOUS at 04:11

## 2019-11-25 RX ADMIN — MAGNESIUM SULFATE HEPTAHYDRATE 1 G: 1 INJECTION, SOLUTION INTRAVENOUS at 12:11

## 2019-11-25 RX ADMIN — IPRATROPIUM BROMIDE AND ALBUTEROL SULFATE 3 ML: .5; 3 SOLUTION RESPIRATORY (INHALATION) at 07:11

## 2019-11-25 RX ADMIN — IOHEXOL 100 ML: 350 INJECTION, SOLUTION INTRAVENOUS at 10:11

## 2019-11-25 RX ADMIN — FUROSEMIDE 20 MG: 20 TABLET ORAL at 05:11

## 2019-11-25 RX ADMIN — CEFTRIAXONE SODIUM 1 G: 1 INJECTION, POWDER, FOR SOLUTION INTRAMUSCULAR; INTRAVENOUS at 05:11

## 2019-11-25 RX ADMIN — ENOXAPARIN SODIUM 40 MG: 100 INJECTION SUBCUTANEOUS at 05:11

## 2019-11-25 RX ADMIN — PANTOPRAZOLE SODIUM 40 MG: 40 TABLET, DELAYED RELEASE ORAL at 05:11

## 2019-11-25 RX ADMIN — LEVALBUTEROL HYDROCHLORIDE 1.25 MG: 1.25 SOLUTION, CONCENTRATE RESPIRATORY (INHALATION) at 08:11

## 2019-11-25 RX ADMIN — IPRATROPIUM BROMIDE 0.5 MG: 0.5 SOLUTION RESPIRATORY (INHALATION) at 08:11

## 2019-11-25 RX ADMIN — ASPIRIN 325 MG ORAL TABLET 325 MG: 325 PILL ORAL at 09:11

## 2019-11-25 RX ADMIN — DILTIAZEM HYDROCHLORIDE 180 MG: 180 CAPSULE, COATED, EXTENDED RELEASE ORAL at 09:11

## 2019-11-25 RX ADMIN — METHYLPREDNISOLONE SODIUM SUCCINATE 125 MG: 125 INJECTION, POWDER, FOR SOLUTION INTRAMUSCULAR; INTRAVENOUS at 09:11

## 2019-11-25 RX ADMIN — DOXYCYCLINE 100 MG: 100 INJECTION, POWDER, LYOPHILIZED, FOR SOLUTION INTRAVENOUS at 10:11

## 2019-11-25 RX ADMIN — SODIUM CHLORIDE, SODIUM LACTATE, POTASSIUM CHLORIDE, AND CALCIUM CHLORIDE 1000 ML: .6; .31; .03; .02 INJECTION, SOLUTION INTRAVENOUS at 09:11

## 2019-11-25 RX ADMIN — CEFTRIAXONE 2 G: 2 INJECTION, SOLUTION INTRAVENOUS at 09:11

## 2019-11-25 NOTE — ED TRIAGE NOTES
"Present to the ER with c/o SOB since last night, states " a few days but It got real bad last night" Hx: copd, reports non productive cough, reports she is unable to cough sputum, reports back, shoulder pain, frontal HA, chest pain with coughing, on home oxygen PRN SOB, rates pain 10/10, in no distress,   "

## 2019-11-25 NOTE — HPI
69-year-old female history of COPD on 3 L O2, chronic hypoxic respiratory failure, HFpEF, CKD3, s/p pacemaker, obesity, previous MONSERRAT (lost 100 lbs) comes in for shortness of breath.  Reports that she was in her usual health about 1 week ago which is solid developed some shortness of breath and nonproductive coughing with myalgias and generalized weakness.  Reports that symptoms worsen following 2 days however improved the following 3 days after that.  Reports that she was nearing baseline 2 days ago.  Reports that yesterday night she is tender developed a severe nonproductive cough with worsening shortness of breath.  Endorses using her inhalers and nebulizers as prescribed however had minimal relief.  Endorses having chills but unaware if she had any fevers.  Attempt to sleep throughout the night however continue workup with nonproductive coughing and shortness of breath.  She checked her O2 sats which was 85-88% on home 3 L oxygen.  Patient did want to call her daughter because she doing a workup.  This morning patient reports that her symptoms did not improve and she called her daughter to take her to the ED for further evaluation.  Of note, patient reports that she uses oxygen 3 L all the time at home.  Does not use oxygen when she leaves the house.    In the ED, vitals were stable with oxygen saturations that were appropriate with patient's history of COPD.  Labs significant for leukocytosis of 22.3, CTA chest found findings compatible with multilobar pneumonia.  ASA, Rocephin, doxycycline, bronchodilators, IV fluids, steroids, magnesium sulfate.

## 2019-11-25 NOTE — PLAN OF CARE
11/25/19 1709   SANTOS Message   Medicare Outpatient and Observation Notification regarding financial responsibility Given to patient/caregiver;Explained to patient/caregiver;Signed/date by patient/caregiver   Date SANTOS was signed 11/25/19   Time SANTOS was signed [id number]

## 2019-11-25 NOTE — SUBJECTIVE & OBJECTIVE
Past Medical History:   Diagnosis Date    Atrial fibrillation     Bell's palsy     COPD (chronic obstructive pulmonary disease)     GI bleed     Heterozygous alpha 1-antitrypsin deficiency     Hypertension     Lung disease     copd    MONSERRAT (obstructive sleep apnea)     Pneumonia     Pulmonary edema        Past Surgical History:   Procedure Laterality Date    CARDIAC ELECTROPHYSIOLOGY STUDY AND ABLATION      CARPAL TUNNEL RELEASE      CHOLECYSTECTOMY      HAND SURGERY      HYSTERECTOMY      INSERT / REPLACE / REMOVE PACEMAKER      KNEE ARTHROSCOPY         Review of patient's allergies indicates:   Allergen Reactions    Sulfa (sulfonamide antibiotics)        No current facility-administered medications on file prior to encounter.      Current Outpatient Medications on File Prior to Encounter   Medication Sig    albuterol (ACCUNEB) 1.25 mg/3 mL Nebu Take 3 mLs (1.25 mg total) by nebulization every 6 (six) hours as needed. Rescue    clopidogrel (PLAVIX) 75 mg tablet     digoxin (LANOXIN) 250 mcg tablet Take 0.25 mg by mouth once daily.    diltiaZEM (CARTIA XT) 120 MG Cp24 Take 180 mg by mouth once daily.    fluticasone-umeclidin-vilanter (TRELEGY ELLIPTA) 100-62.5-25 mcg DsDv Inhale 1 puff into the lungs once daily.    magnesium oxide (MAG-OX) 400 mg (241.3 mg magnesium) tablet Take 400 mg by mouth once daily.    melatonin 5 mg Tab Take 5 mg by mouth nightly as needed.    metoprolol succinate (TOPROL-XL) 100 MG 24 hr tablet     pantoprazole (PROTONIX) 40 MG tablet Take 40 mg by mouth once daily.    roflumilast (DALIRESP) 500 mcg Tab Take 1 tablet (500 mcg total) by mouth once daily.    spironolactone (ALDACTONE) 25 MG tablet 25 mg once daily.     [DISCONTINUED] digoxin (LANOXIN) 250 mcg tablet     azithromycin (ZITHROMAX Z-CHRISTI) 250 MG tablet 2 on day one, then one daily (Patient not taking: Reported on 10/1/2019)    benzonatate (TESSALON) 200 MG capsule Take 200 mg by mouth 3 (three) times  daily as needed.    fluticasone-umeclidin-vilanter (TRELEGY ELLIPTA) 100-62.5-25 mcg DsDv Inhale into the lungs.    FLUZONE HIGH-DOSE , PF, 180 mcg/0.5 mL Syrg     furosemide (LASIX) 40 MG tablet 20 mg.     levoFLOXacin (LEVAQUIN) 500 MG tablet Take 1 tablet (500 mg total) by mouth once daily. (Patient not taking: Reported on 10/1/2019)    metoprolol tartrate (LOPRESSOR) 100 MG tablet     omeprazole (PRILOSEC) 20 MG capsule     predniSONE (DELTASONE) 10 MG tablet Take 4 tabs x 2 days, then take 3 tabs x 2 days, then take 2 tabs x 2 days, then take 1 tab x 2 days. (Patient not taking: Reported on 10/1/2019)    predniSONE (DELTASONE) 10 MG tablet Take 4 tabs x 2 days, then take 3 tabs x 2 days, then take 2 tabs x 2 days, then take 1 tab x 2 days. (Patient not taking: Reported on 10/1/2019)    roflumilast (DALIRESP) 500 mcg Tab Take 500 mcg by mouth once daily.    sertraline (ZOLOFT) 100 MG tablet     zolpidem (AMBIEN) 5 MG Tab Take 1 tablet (5 mg total) by mouth nightly as needed. (Patient not taking: Reported on 10/1/2019)    [DISCONTINUED] pantoprazole (PROTONIX) 20 MG tablet      Family History     Problem Relation (Age of Onset)    Cancer Father, Brother    Kidney failure Mother        Tobacco Use    Smoking status: Former Smoker     Packs/day: 2.00     Years: 30.00     Pack years: 60.00     Types: Cigarettes     Start date: 1971     Last attempt to quit: 2011     Years since quittin.8    Smokeless tobacco: Never Used   Substance and Sexual Activity    Alcohol use: No     Frequency: Never    Drug use: No    Sexual activity: Never     Review of Systems   Constitutional: Positive for activity change, chills and fatigue. Negative for fever and unexpected weight change.   HENT: Positive for rhinorrhea. Negative for ear pain, sneezing and sore throat.    Eyes: Negative for visual disturbance.   Respiratory: Positive for shortness of breath and wheezing. Negative for cough and chest  tightness.    Cardiovascular: Negative for chest pain.   Gastrointestinal: Negative for abdominal pain, constipation, diarrhea, nausea and vomiting.   Endocrine: Negative for polyuria.   Genitourinary: Negative for dysuria and hematuria.   Musculoskeletal: Positive for arthralgias, back pain and myalgias.   Neurological: Negative for seizures and headaches.     Objective:     Vital Signs (Most Recent):  Temp: 99 °F (37.2 °C) (11/25/19 1041)  Pulse: 60 (11/25/19 1105)  Resp: (!) 25 (11/25/19 1105)  BP: 132/63 (11/25/19 1100)  SpO2: 95 % (11/25/19 1105) Vital Signs (24h Range):  Temp:  [99 °F (37.2 °C)-99.1 °F (37.3 °C)] 99 °F (37.2 °C)  Pulse:  [60-67] 60  Resp:  [24-36] 25  SpO2:  [90 %-98 %] 95 %  BP: (110-172)/(60-77) 132/63     Weight: 83 kg (183 lb)  Body mass index is 30.45 kg/m².    Physical Exam   Constitutional: She is oriented to person, place, and time. She appears well-developed and well-nourished. No distress.   HENT:   Head: Normocephalic and atraumatic.   Right Ear: External ear normal.   Left Ear: External ear normal.   Nose: Nose normal.   Mouth/Throat: Oropharynx is clear and moist. No oropharyngeal exudate.   Eyes: Pupils are equal, round, and reactive to light. Conjunctivae and EOM are normal. Right eye exhibits no discharge. Left eye exhibits no discharge. No scleral icterus.   Neck: Normal range of motion. Neck supple. No thyromegaly present.   Cardiovascular: Normal rate, regular rhythm and intact distal pulses. Exam reveals no gallop and no friction rub.   Murmur heard.  Pulmonary/Chest: Effort normal. No respiratory distress. She has wheezes. She has rales. She exhibits no tenderness.   3 L NC   Abdominal: Soft. Bowel sounds are normal. She exhibits no distension and no mass. There is no tenderness. There is no rebound and no guarding. No hernia.   Musculoskeletal: Normal range of motion. She exhibits no edema or tenderness.   Lymphadenopathy:     She has no cervical adenopathy.    Neurological: She is alert and oriented to person, place, and time.   Skin: Skin is warm. She is not diaphoretic.   Psychiatric: She has a normal mood and affect. Her behavior is normal. Judgment and thought content normal.   Nursing note and vitals reviewed.        CRANIAL NERVES     CN III, IV, VI   Pupils are equal, round, and reactive to light.  Extraocular motions are normal.        Significant Labs:   CBC:   Recent Labs   Lab 11/25/19  0849   WBC 22.30*   HGB 14.0   HCT 42.2        CMP:   Recent Labs   Lab 11/25/19  0849   *   K 4.4   CL 96   CO2 26   *   BUN 20   CREATININE 0.7   CALCIUM 9.2   PROT 8.0   ALBUMIN 4.5   BILITOT 0.9   ALKPHOS 60   AST 29   ALT 12   ANIONGAP 11   EGFRNONAA >60.0     Troponin:   Recent Labs   Lab 11/25/19  0849   TROPONINI <0.030     EKG  My personal interpretation: No acute ST elevation or depression appreciated     Significant Imaging:   CTA chest  Impression:       1. Scattered right lung airspace opacities compatible with multilobar pneumonia, with associated scattered bronchiolitis throughout the right lung, as well as in the lingula and in the left lower lobe.  2. Trace simple appearing right pleural effusion.  3. Several enlarged mediastinal and right hilar lymph nodes are nonspecific, but presumably reactive.  4. Negative for pulmonary thromboembolism, with enlarged central pulmonary arteries, suggesting pulmonary arterial hypertension.  5. Cardiomegaly, with aortic and coronary arterial vascular calcifications.  6. Small sliding-type hiatal hernia.  .       CTA head  Impression:       1. No acute intracranial process.    2. Chronic/involutional findings as noted above.         CXR 1 view  Impression:       1.  Patchy consolidative airspace opacities predominantly in the right lung suggestive of multifocal pneumonia, or possibly pulmonary edema in the appropriate clinical setting.  Consider radiographic follow-up to document resolution.    2.  Trace  bilateral pleural effusions and adjacent atelectasis.

## 2019-11-25 NOTE — ASSESSMENT & PLAN NOTE
Likely 2/2 multifocal pneumonia, likely viral vs COPD exacerbation  On 3 L at home  Procalcitonin pending  Empiric rocephin + azithro IV  BCx pending  Sputum Cx pending  Tamiflu BID  Bronchodilators, steroids  Echo pending    Hyponatremia  Fluid restrict    Note:  Dr. Pérez, Dr. Haro are patient's cardio and pulm

## 2019-11-25 NOTE — ED PROVIDER NOTES
Encounter Date: 2019       History     Chief Complaint   Patient presents with    Shortness of Breath     X FEW DAYS    Cough    BODYACHES     A 69-year-old female presented emergency department with 4-5 days of productive cough and wheezing and shortness of breath and generalized malaise and weakness. Patient also complains of chest pain only with coughing and deep inspiration.  Patient complains of a headache which started this morning.  Patient describes this headache as mild to moderate.  Patient states she has pain on the left side of the neck when she turns the head to the left.  Denies abdominal pain or any focal weakness however has generalized malaise and weakness.        Review of patient's allergies indicates:   Allergen Reactions    Sulfa (sulfonamide antibiotics)      Past Medical History:   Diagnosis Date    Atrial fibrillation     Bell's palsy     COPD (chronic obstructive pulmonary disease)     GI bleed     Heterozygous alpha 1-antitrypsin deficiency     Hypertension     Lung disease     copd    MONSERRAT (obstructive sleep apnea)     Pneumonia     Pulmonary edema      Past Surgical History:   Procedure Laterality Date    CARDIAC ELECTROPHYSIOLOGY STUDY AND ABLATION      CARPAL TUNNEL RELEASE      CHOLECYSTECTOMY      HAND SURGERY      HYSTERECTOMY      INSERT / REPLACE / REMOVE PACEMAKER      KNEE ARTHROSCOPY       Family History   Problem Relation Age of Onset    Kidney failure Mother     Cancer Father     Cancer Brother      Social History     Tobacco Use    Smoking status: Former Smoker     Packs/day: 2.00     Years: 30.00     Pack years: 60.00     Types: Cigarettes     Start date: 1971     Last attempt to quit: 2011     Years since quittin.8    Smokeless tobacco: Never Used   Substance Use Topics    Alcohol use: No     Frequency: Never    Drug use: No     Review of Systems   Constitutional: Positive for appetite change and fatigue.   HENT: Positive for  congestion, postnasal drip, rhinorrhea and sinus pressure. Negative for ear pain and sore throat.    Eyes: Negative for pain and visual disturbance.   Respiratory: Positive for cough, shortness of breath and wheezing.    Cardiovascular: Negative.  Negative for chest pain.   Gastrointestinal: Negative for abdominal pain, constipation, diarrhea and vomiting.   Genitourinary: Negative for difficulty urinating and dysuria.   Musculoskeletal: Positive for neck pain. Negative for myalgias.   Skin: Negative.    Neurological: Positive for headaches.   Hematological: Negative for adenopathy.   All other systems reviewed and are negative.      Physical Exam     Initial Vitals [11/25/19 0834]   BP Pulse Resp Temp SpO2   137/64 63 (!) 26 99 °F (37.2 °C) (!) 93 %      MAP       --         Physical Exam    Nursing note and vitals reviewed.  Constitutional: She appears well-developed and well-nourished. No distress.   HENT:   Head: Normocephalic and atraumatic.   Nose: Nose normal.   Mouth/Throat: Oropharynx is clear and moist. No oropharyngeal exudate.   Eyes: Conjunctivae and EOM are normal. Pupils are equal, round, and reactive to light. Right eye exhibits no discharge. Left eye exhibits no discharge.   Neck: Normal range of motion. Neck supple. No thyromegaly present. No tracheal deviation present. No JVD present.   Cardiovascular: Normal rate, regular rhythm, normal heart sounds and intact distal pulses.   No murmur heard.  Pulmonary/Chest: No stridor. No respiratory distress. She has no wheezes. She has rhonchi. She has no rales.   Right lung rhonchi noted.  Slightly decreased breath sounds diffusely   Abdominal: Soft. Bowel sounds are normal. She exhibits no distension. There is no tenderness. There is no rebound.   Musculoskeletal: Normal range of motion. She exhibits no edema or tenderness.   Left paraspinal cervical tenderness and pain in the neck muscles when patient looks to the left which could be reproduced.    Neurological: She is alert and oriented to person, place, and time. No cranial nerve deficit or sensory deficit. GCS score is 15. GCS eye subscore is 4. GCS verbal subscore is 5. GCS motor subscore is 6.   Skin: Skin is warm. Capillary refill takes less than 2 seconds.   Psychiatric: She has a normal mood and affect. Thought content normal.         ED Course   Procedures  Labs Reviewed   CBC W/ AUTO DIFFERENTIAL - Abnormal; Notable for the following components:       Result Value    WBC 22.30 (*)     Mean Corpuscular Hemoglobin 31.4 (*)     RDW 14.6 (*)     All other components within normal limits   CULTURE, BLOOD   CULTURE, BLOOD   COMPREHENSIVE METABOLIC PANEL   TROPONIN I   B-TYPE NATRIURETIC PEPTIDE   PROTIME-INR   MAGNESIUM   LACTIC ACID, PLASMA     EKG Readings: (Independently Interpreted)   Initial Reading: No STEMI. Rhythm: Paced Rhythm. Ectopy: No Ectopy.       Imaging Results          X-Ray Chest AP Portable (Final result)  Result time 11/25/19 08:57:50    Final result by Ian Moreno MD (11/25/19 08:57:50)                 Impression:      1.  Patchy consolidative airspace opacities predominantly in the right lung suggestive of multifocal pneumonia, or possibly pulmonary edema in the appropriate clinical setting.  Consider radiographic follow-up to document resolution.    2.  Trace bilateral pleural effusions and adjacent atelectasis.      Electronically signed by: Ian Moreno MD  Date:    11/25/2019  Time:    08:57             Narrative:    CLINICAL HISTORY:  ([id number])70 y/o  (1950) F    CHF;    TECHNIQUE:  (A#[id number], exam time 11/25/2019 8:55)    XR CHEST AP PORTABLE [id number]    COMPARISON:  Most recent radiograph from 05/06/2019.    FINDINGS:  Patchy airspace opacity in the base of the right upper lobe and right costophrenic sulcus.  There is a faint interstitial opacity in the infrahilar left lower lung zone.  There is blunting of both costophrenic angles consistent with trace  pleural effusions and adjacent atelectasis. No pneumothorax is identified. The heart is mildly enlarged. Left-sided pacemaker is unchanged in configuration. The mediastinum is within normal limits. Osseous structures appear within normal limits. The visualized upper abdomen is unremarkable.                              X-Rays:   Independently Interpreted Readings:   Other Readings:  Chest x-ray shows right middle and lower lobe infiltrates    Medical Decision Making:   Differential Diagnosis:   69-year-old female productive cough and wheezing and shortness of breath and pleuritic chest pain worse with exertion.  Patient hypoxic with minimal exertion.  Patient has a headache and left-sided neck pain started this morning.  Patient's headache is gradual onset headache which started this morning and worse every time she coughs or moves her head to the left.  Patient has neck pain appears to be musculoskeletal pain.  Patient does not have any neck stiffness or photophobia.  Patient does not have any meningeal signs.  Given patient's age head CT done.  CT of the chest for further evaluation of symptoms. IV fluids given.  Broad-spectrum antibiotics started.  Given patient's presentation and chest x-ray findings and hypoxia Hospital Medicine consulted for evaluation for admission and further management.  Patient given steroids and breathing treatments in the emergency department.  Symptoms improved.  Hypoxia treated with supplemental oxygen and patient maintaining oxygen saturations well with supplemental oxygen.  Clinical Tests:   Lab Tests: Reviewed  Radiological Study: Reviewed  Medical Tests: Reviewed                                 Clinical Impression:       ICD-10-CM ICD-9-CM   1. Hypoxia R09.02 799.02   2. Shortness of breath R06.02 786.05   3. Pneumonia of right lung due to infectious organism, unspecified part of lung J18.9 483.8     Headache                        Josh De La Cruz MD  11/25/19 0950

## 2019-11-25 NOTE — H&P
[facility]  History & Physical    Patient Name: Lisa Smith  MRN: [id number]  Admission Date: 11/25/2019  Attending Physician: Sabino Walker MD   Primary Care Provider: Raymond Pérez MD         Patient information was obtained from patient, relative(s) and ER records.     Subjective:     Principal Problem:Acute on chronic respiratory failure with hypoxia    Chief Complaint:   Chief Complaint   Patient presents with    Shortness of Breath     X FEW DAYS    Cough    BODYACHES        HPI: 69-year-old female history of COPD on 3 L O2, chronic hypoxic respiratory failure, HFpEF, CKD3, s/p pacemaker, obesity, previous MONSERRAT (lost 100 lbs) comes in for shortness of breath.  Reports that she was in her usual health about 1 week ago which is solid developed some shortness of breath and nonproductive coughing with myalgias and generalized weakness.  Reports that symptoms worsen following 2 days however improved the following 3 days after that.  Reports that she was nearing baseline 2 days ago.  Reports that yesterday night she is tender developed a severe nonproductive cough with worsening shortness of breath.  Endorses using her inhalers and nebulizers as prescribed however had minimal relief.  Endorses having chills but unaware if she had any fevers.  Attempt to sleep throughout the night however continue workup with nonproductive coughing and shortness of breath.  She checked her O2 sats which was 85-88% on home 3 L oxygen.  Patient did want to call her daughter because she doing a workup.  This morning patient reports that her symptoms did not improve and she called her daughter to take her to the ED for further evaluation.  Of note, patient reports that she uses oxygen 3 L all the time at home.  Does not use oxygen when she leaves the house.    In the ED, vitals were stable with oxygen saturations that were appropriate with patient's history of COPD.  Labs significant for  leukocytosis of 22.3, CTA chest found findings compatible with multilobar pneumonia.  ASA, Rocephin, doxycycline, bronchodilators, IV fluids, steroids, magnesium sulfate.    Past Medical History:   Diagnosis Date    Atrial fibrillation     Bell's palsy     COPD (chronic obstructive pulmonary disease)     GI bleed     Heterozygous alpha 1-antitrypsin deficiency     Hypertension     Lung disease     copd    MONSERRAT (obstructive sleep apnea)     Pneumonia     Pulmonary edema        Past Surgical History:   Procedure Laterality Date    CARDIAC ELECTROPHYSIOLOGY STUDY AND ABLATION      CARPAL TUNNEL RELEASE      CHOLECYSTECTOMY      HAND SURGERY      HYSTERECTOMY      INSERT / REPLACE / REMOVE PACEMAKER      KNEE ARTHROSCOPY         Review of patient's allergies indicates:   Allergen Reactions    Sulfa (sulfonamide antibiotics)        No current facility-administered medications on file prior to encounter.      Current Outpatient Medications on File Prior to Encounter   Medication Sig    albuterol (ACCUNEB) 1.25 mg/3 mL Nebu Take 3 mLs (1.25 mg total) by nebulization every 6 (six) hours as needed. Rescue    clopidogrel (PLAVIX) 75 mg tablet     digoxin (LANOXIN) 250 mcg tablet Take 0.25 mg by mouth once daily.    diltiaZEM (CARTIA XT) 120 MG Cp24 Take 180 mg by mouth once daily.    fluticasone-umeclidin-vilanter (TRELEGY ELLIPTA) 100-62.5-25 mcg DsDv Inhale 1 puff into the lungs once daily.    magnesium oxide (MAG-OX) 400 mg (241.3 mg magnesium) tablet Take 400 mg by mouth once daily.    melatonin 5 mg Tab Take 5 mg by mouth nightly as needed.    metoprolol succinate (TOPROL-XL) 100 MG 24 hr tablet     pantoprazole (PROTONIX) 40 MG tablet Take 40 mg by mouth once daily.    roflumilast (DALIRESP) 500 mcg Tab Take 1 tablet (500 mcg total) by mouth once daily.    spironolactone (ALDACTONE) 25 MG tablet 25 mg once daily.     [DISCONTINUED] digoxin (LANOXIN) 250 mcg tablet     azithromycin  (ZITHROMAX Z-CHRISTI) 250 MG tablet 2 on day one, then one daily (Patient not taking: Reported on 10/1/2019)    benzonatate (TESSALON) 200 MG capsule Take 200 mg by mouth 3 (three) times daily as needed.    fluticasone-umeclidin-vilanter (TRELEGY ELLIPTA) 100-62.5-25 mcg DsDv Inhale into the lungs.    FLUZONE HIGH-DOSE 2019, PF, 180 mcg/0.5 mL Syrg     furosemide (LASIX) 40 MG tablet 20 mg.     levoFLOXacin (LEVAQUIN) 500 MG tablet Take 1 tablet (500 mg total) by mouth once daily. (Patient not taking: Reported on 10/1/2019)    metoprolol tartrate (LOPRESSOR) 100 MG tablet     omeprazole (PRILOSEC) 20 MG capsule     predniSONE (DELTASONE) 10 MG tablet Take 4 tabs x 2 days, then take 3 tabs x 2 days, then take 2 tabs x 2 days, then take 1 tab x 2 days. (Patient not taking: Reported on 10/1/2019)    predniSONE (DELTASONE) 10 MG tablet Take 4 tabs x 2 days, then take 3 tabs x 2 days, then take 2 tabs x 2 days, then take 1 tab x 2 days. (Patient not taking: Reported on 10/1/2019)    roflumilast (DALIRESP) 500 mcg Tab Take 500 mcg by mouth once daily.    sertraline (ZOLOFT) 100 MG tablet     zolpidem (AMBIEN) 5 MG Tab Take 1 tablet (5 mg total) by mouth nightly as needed. (Patient not taking: Reported on 10/1/2019)    [DISCONTINUED] pantoprazole (PROTONIX) 20 MG tablet      Family History     Problem Relation (Age of Onset)    Cancer Father, Brother    Kidney failure Mother        Tobacco Use    Smoking status: Former Smoker     Packs/day: 2.00     Years: 30.00     Pack years: 60.00     Types: Cigarettes     Start date: 1971     Last attempt to quit: 2011     Years since quittin.8    Smokeless tobacco: Never Used   Substance and Sexual Activity    Alcohol use: No     Frequency: Never    Drug use: No    Sexual activity: Never     Review of Systems   Constitutional: Positive for activity change, chills and fatigue. Negative for fever and unexpected weight change.   HENT: Positive for rhinorrhea.  Negative for ear pain, sneezing and sore throat.    Eyes: Negative for visual disturbance.   Respiratory: Positive for shortness of breath and wheezing. Negative for cough and chest tightness.    Cardiovascular: Negative for chest pain.   Gastrointestinal: Negative for abdominal pain, constipation, diarrhea, nausea and vomiting.   Endocrine: Negative for polyuria.   Genitourinary: Negative for dysuria and hematuria.   Musculoskeletal: Positive for arthralgias, back pain and myalgias.   Neurological: Negative for seizures and headaches.     Objective:     Vital Signs (Most Recent):  Temp: 99 °F (37.2 °C) (11/25/19 1041)  Pulse: 60 (11/25/19 1105)  Resp: (!) 25 (11/25/19 1105)  BP: 132/63 (11/25/19 1100)  SpO2: 95 % (11/25/19 1105) Vital Signs (24h Range):  Temp:  [99 °F (37.2 °C)-99.1 °F (37.3 °C)] 99 °F (37.2 °C)  Pulse:  [60-67] 60  Resp:  [24-36] 25  SpO2:  [90 %-98 %] 95 %  BP: (110-172)/(60-77) 132/63     Weight: 83 kg (183 lb)  Body mass index is 30.45 kg/m².    Physical Exam   Constitutional: She is oriented to person, place, and time. She appears well-developed and well-nourished. No distress.   HENT:   Head: Normocephalic and atraumatic.   Right Ear: External ear normal.   Left Ear: External ear normal.   Nose: Nose normal.   Mouth/Throat: Oropharynx is clear and moist. No oropharyngeal exudate.   Eyes: Pupils are equal, round, and reactive to light. Conjunctivae and EOM are normal. Right eye exhibits no discharge. Left eye exhibits no discharge. No scleral icterus.   Neck: Normal range of motion. Neck supple. No thyromegaly present.   Cardiovascular: Normal rate, regular rhythm and intact distal pulses. Exam reveals no gallop and no friction rub.   Murmur heard.  Pulmonary/Chest: Effort normal. No respiratory distress. She has wheezes. She has rales. She exhibits no tenderness.   3 L NC   Abdominal: Soft. Bowel sounds are normal. She exhibits no distension and no mass. There is no tenderness. There is no  rebound and no guarding. No hernia.   Musculoskeletal: Normal range of motion. She exhibits no edema or tenderness.   Lymphadenopathy:     She has no cervical adenopathy.   Neurological: She is alert and oriented to person, place, and time.   Skin: Skin is warm. She is not diaphoretic.   Psychiatric: She has a normal mood and affect. Her behavior is normal. Judgment and thought content normal.   Nursing note and vitals reviewed.        CRANIAL NERVES     CN III, IV, VI   Pupils are equal, round, and reactive to light.  Extraocular motions are normal.        Significant Labs:   CBC:   Recent Labs   Lab 11/25/19  0849   WBC 22.30*   HGB 14.0   HCT 42.2        CMP:   Recent Labs   Lab 11/25/19  0849   *   K 4.4   CL 96   CO2 26   *   BUN 20   CREATININE 0.7   CALCIUM 9.2   PROT 8.0   ALBUMIN 4.5   BILITOT 0.9   ALKPHOS 60   AST 29   ALT 12   ANIONGAP 11   EGFRNONAA >60.0     Troponin:   Recent Labs   Lab 11/25/19  0849   TROPONINI <0.030     EKG  My personal interpretation: No acute ST elevation or depression appreciated     Significant Imaging:   CTA chest  Impression:       1. Scattered right lung airspace opacities compatible with multilobar pneumonia, with associated scattered bronchiolitis throughout the right lung, as well as in the lingula and in the left lower lobe.  2. Trace simple appearing right pleural effusion.  3. Several enlarged mediastinal and right hilar lymph nodes are nonspecific, but presumably reactive.  4. Negative for pulmonary thromboembolism, with enlarged central pulmonary arteries, suggesting pulmonary arterial hypertension.  5. Cardiomegaly, with aortic and coronary arterial vascular calcifications.  6. Small sliding-type hiatal hernia.  .       CTA head  Impression:       1. No acute intracranial process.    2. Chronic/involutional findings as noted above.         CXR 1 view  Impression:       1.  Patchy consolidative airspace opacities predominantly in the right lung  suggestive of multifocal pneumonia, or possibly pulmonary edema in the appropriate clinical setting.  Consider radiographic follow-up to document resolution.    2.  Trace bilateral pleural effusions and adjacent atelectasis.           Assessment/Plan:     * Acute on chronic respiratory failure with hypoxia  Likely 2/2 multifocal pneumonia, likely viral vs COPD exacerbation  On 3 L at home  Procalcitonin pending  Empiric rocephin + azithro IV  BCx pending  Sputum Cx pending  Tamiflu BID  Bronchodilators, steroids  Echo pending    Hyponatremia  Fluid restrict    Note:  Dr. Pérez, Dr. Haro are patient's cardio and pulm    VTE Risk Mitigation (From admission, onward)         Ordered     enoxaparin injection 40 mg  Daily      11/25/19 1156     IP VTE HIGH RISK PATIENT  Once      11/25/19 1156                   Sabino Walker MD  Department of Hospital Medicine   [facility]  Date of service: 11/25/2019 2:37 PM

## 2019-11-26 PROBLEM — R09.02 HYPOXIA: Status: ACTIVE | Noted: 2019-11-26

## 2019-11-26 LAB
ANION GAP SERPL CALC-SCNC: 9 MMOL/L (ref 8–16)
AORTIC ROOT ANNULUS: 3.24 CM
AORTIC VALVE CUSP SEPERATION: 1 CM
AV INDEX (PROSTH): 0.24
AV MEAN GRADIENT: 33 MMHG
AV PEAK GRADIENT: 59 MMHG
AV VALVE AREA: 0.86 CM2
AV VELOCITY RATIO: 24.89
BACTERIA BLD CULT: ABNORMAL
BSA FOR ECHO PROCEDURE: 1.95 M2
BUN SERPL-MCNC: 14 MG/DL (ref 8–23)
CALCIUM SERPL-MCNC: 9.3 MG/DL (ref 8.7–10.5)
CHLORIDE SERPL-SCNC: 101 MMOL/L (ref 95–110)
CO2 SERPL-SCNC: 28 MMOL/L (ref 23–29)
CREAT SERPL-MCNC: 0.7 MG/DL (ref 0.5–1.4)
CV ECHO LV RWT: 0.38 CM
DOP CALC AO PEAK VEL: 3.83 M/S
DOP CALC AO VTI: 81.45 CM
DOP CALC LVOT AREA: 3.6 CM2
DOP CALC LVOT DIAMETER: 2.15 CM
DOP CALC LVOT PEAK VEL: 95.33 M/S
DOP CALC LVOT STROKE VOLUME: 69.78 CM3
DOP CALCLVOT PEAK VEL VTI: 19.23 CM
E WAVE DECELERATION TIME: 178.19 MSEC
E/A RATIO: 4.71
E/E' RATIO: 12 M/S
ECHO LV POSTERIOR WALL: 1.08 CM (ref 0.6–1.1)
ERYTHROCYTE [DISTWIDTH] IN BLOOD BY AUTOMATED COUNT: 14.6 % (ref 11.5–14.5)
EST. GFR  (AFRICAN AMERICAN): >60 ML/MIN/1.73 M^2
EST. GFR  (NON AFRICAN AMERICAN): >60 ML/MIN/1.73 M^2
FRACTIONAL SHORTENING: 25 % (ref 28–44)
GLUCOSE SERPL-MCNC: 129 MG/DL (ref 70–110)
HCT VFR BLD AUTO: 35.4 % (ref 37–48.5)
HGB BLD-MCNC: 11.6 G/DL (ref 12–16)
INTERVENTRICULAR SEPTUM: 1.36 CM (ref 0.6–1.1)
IVRT: 71.83 MSEC
LEFT ATRIUM SIZE: 4.52 CM
LEFT INTERNAL DIMENSION IN SYSTOLE: 4.26 CM (ref 2.1–4)
LEFT VENTRICLE DIASTOLIC VOLUME INDEX: 59.43 ML/M2
LEFT VENTRICLE DIASTOLIC VOLUME: 113.2 ML
LEFT VENTRICLE MASS INDEX: 153 G/M2
LEFT VENTRICLE SYSTOLIC VOLUME INDEX: 26.6 ML/M2
LEFT VENTRICLE SYSTOLIC VOLUME: 50.6 ML
LEFT VENTRICULAR INTERNAL DIMENSION IN DIASTOLE: 5.66 CM (ref 3.5–6)
LEFT VENTRICULAR MASS: 291.9 G
LV LATERAL E/E' RATIO: 11 M/S
LV SEPTAL E/E' RATIO: 13.2 M/S
MAGNESIUM SERPL-MCNC: 2 MG/DL (ref 1.6–2.6)
MCH RBC QN AUTO: 31.4 PG (ref 27–31)
MCHC RBC AUTO-ENTMCNC: 32.8 G/DL (ref 32–36)
MCV RBC AUTO: 96 FL (ref 82–98)
MV PEAK A VEL: 0.28 M/S
MV PEAK E VEL: 1.32 M/S
PHOSPHATE SERPL-MCNC: 3.3 MG/DL (ref 2.7–4.5)
PISA TR MAX VEL: 3.23 M/S
PLATELET # BLD AUTO: 221 K/UL (ref 150–350)
PMV BLD AUTO: 9.6 FL (ref 9.2–12.9)
POTASSIUM SERPL-SCNC: 4 MMOL/L (ref 3.5–5.1)
PV PEAK VELOCITY: 180.89 CM/S
RA PRESSURE: 8 MMHG
RBC # BLD AUTO: 3.7 M/UL (ref 4–5.4)
RIGHT VENTRICULAR END-DIASTOLIC DIMENSION: 260 CM
SODIUM SERPL-SCNC: 138 MMOL/L (ref 136–145)
TDI LATERAL: 0.12 M/S
TDI SEPTAL: 0.1 M/S
TDI: 0.11 M/S
TR MAX PG: 42 MMHG
TV REST PULMONARY ARTERY PRESSURE: 50 MMHG
WBC # BLD AUTO: 18.66 K/UL (ref 3.9–12.7)

## 2019-11-26 PROCEDURE — 94640 AIRWAY INHALATION TREATMENT: CPT

## 2019-11-26 PROCEDURE — 83735 ASSAY OF MAGNESIUM: CPT

## 2019-11-26 PROCEDURE — 27000221 HC OXYGEN, UP TO 24 HOURS

## 2019-11-26 PROCEDURE — 85027 COMPLETE CBC AUTOMATED: CPT

## 2019-11-26 PROCEDURE — 94761 N-INVAS EAR/PLS OXIMETRY MLT: CPT

## 2019-11-26 PROCEDURE — 63600175 PHARM REV CODE 636 W HCPCS: Performed by: FAMILY MEDICINE

## 2019-11-26 PROCEDURE — 25000003 PHARM REV CODE 250: Performed by: INTERNAL MEDICINE

## 2019-11-26 PROCEDURE — 84100 ASSAY OF PHOSPHORUS: CPT

## 2019-11-26 PROCEDURE — 80048 BASIC METABOLIC PNL TOTAL CA: CPT

## 2019-11-26 PROCEDURE — 63600175 PHARM REV CODE 636 W HCPCS: Performed by: INTERNAL MEDICINE

## 2019-11-26 PROCEDURE — 12000002 HC ACUTE/MED SURGE SEMI-PRIVATE ROOM

## 2019-11-26 PROCEDURE — 25000003 PHARM REV CODE 250: Performed by: FAMILY MEDICINE

## 2019-11-26 PROCEDURE — 36415 COLL VENOUS BLD VENIPUNCTURE: CPT

## 2019-11-26 PROCEDURE — 25000242 PHARM REV CODE 250 ALT 637 W/ HCPCS: Performed by: FAMILY MEDICINE

## 2019-11-26 RX ORDER — GUAIFENESIN 600 MG/1
600 TABLET, EXTENDED RELEASE ORAL 2 TIMES DAILY
Status: DISCONTINUED | OUTPATIENT
Start: 2019-11-26 | End: 2019-11-29 | Stop reason: HOSPADM

## 2019-11-26 RX ORDER — CODEINE PHOSPHATE AND GUAIFENESIN 10; 100 MG/5ML; MG/5ML
10 SOLUTION ORAL EVERY 4 HOURS PRN
Status: DISCONTINUED | OUTPATIENT
Start: 2019-11-26 | End: 2019-11-29 | Stop reason: HOSPADM

## 2019-11-26 RX ADMIN — IPRATROPIUM BROMIDE AND ALBUTEROL SULFATE 3 ML: .5; 3 SOLUTION RESPIRATORY (INHALATION) at 08:11

## 2019-11-26 RX ADMIN — CLOPIDOGREL BISULFATE 75 MG: 75 TABLET, FILM COATED ORAL at 10:11

## 2019-11-26 RX ADMIN — DILTIAZEM HYDROCHLORIDE 180 MG: 180 CAPSULE, COATED, EXTENDED RELEASE ORAL at 08:11

## 2019-11-26 RX ADMIN — PREDNISONE 40 MG: 20 TABLET ORAL at 10:11

## 2019-11-26 RX ADMIN — ENOXAPARIN SODIUM 40 MG: 100 INJECTION SUBCUTANEOUS at 05:11

## 2019-11-26 RX ADMIN — FUROSEMIDE 20 MG: 20 TABLET ORAL at 11:11

## 2019-11-26 RX ADMIN — CEFTRIAXONE SODIUM 1 G: 1 INJECTION, POWDER, FOR SOLUTION INTRAMUSCULAR; INTRAVENOUS at 01:11

## 2019-11-26 RX ADMIN — ACETAMINOPHEN 650 MG: 325 TABLET ORAL at 04:11

## 2019-11-26 RX ADMIN — IPRATROPIUM BROMIDE AND ALBUTEROL SULFATE 3 ML: .5; 3 SOLUTION RESPIRATORY (INHALATION) at 02:11

## 2019-11-26 RX ADMIN — ACETAMINOPHEN 650 MG: 325 TABLET ORAL at 02:11

## 2019-11-26 RX ADMIN — FLUTICASONE FUROATE AND VILANTEROL TRIFENATATE 1 PUFF: 100; 25 POWDER RESPIRATORY (INHALATION) at 07:11

## 2019-11-26 RX ADMIN — SPIRONOLACTONE 25 MG: 25 TABLET ORAL at 10:11

## 2019-11-26 RX ADMIN — GUAIFENESIN 600 MG: 600 TABLET, EXTENDED RELEASE ORAL at 08:11

## 2019-11-26 RX ADMIN — ROFLUMILAST 500 MCG: 500 TABLET ORAL at 10:11

## 2019-11-26 RX ADMIN — OSELTAMIVIR PHOSPHATE 75 MG: 75 CAPSULE ORAL at 08:11

## 2019-11-26 RX ADMIN — VANCOMYCIN HYDROCHLORIDE 1500 MG: 1 INJECTION, POWDER, LYOPHILIZED, FOR SOLUTION INTRAVENOUS at 04:11

## 2019-11-26 RX ADMIN — IPRATROPIUM BROMIDE AND ALBUTEROL SULFATE 3 ML: .5; 3 SOLUTION RESPIRATORY (INHALATION) at 07:11

## 2019-11-26 RX ADMIN — AZITHROMYCIN MONOHYDRATE 500 MG: 500 INJECTION, POWDER, LYOPHILIZED, FOR SOLUTION INTRAVENOUS at 05:11

## 2019-11-26 RX ADMIN — PANTOPRAZOLE SODIUM 40 MG: 40 TABLET, DELAYED RELEASE ORAL at 04:11

## 2019-11-26 RX ADMIN — OSELTAMIVIR PHOSPHATE 75 MG: 75 CAPSULE ORAL at 10:11

## 2019-11-26 NOTE — PROGRESS NOTES
Notified by RN that one set of blood cultures is growing Gram + cocci.  On IV abx.  I have added a one time dose of IV vanc while await speciation and sensitivities.

## 2019-11-26 NOTE — PLAN OF CARE
11/26/19 0729   Patient Assessment/Suction   Level of Consciousness (AVPU) alert   Respiratory Effort Normal;Unlabored   Expansion/Accessory Muscles/Retractions no retractions;no use of accessory muscles   All Lung Fields Breath Sounds coarse;rhonchi   Cough Type good;productive  (little bit of red sputum per pt)   PRE-TX-O2   O2 Device (Oxygen Therapy) nasal cannula   $ Is the patient on Low Flow Oxygen? Yes   Flow (L/min) 2.5  (increased to 3, per pt home regimen)   SpO2 97 %   Pulse Oximetry Type Intermittent   $ Pulse Oximetry - Multiple Charge Pulse Oximetry - Multiple   Pulse 62   Resp 18   Aerosol Therapy   $ Aerosol Therapy Charges Aerosol Treatment   Daily Review of Necessity (SVN) completed   Respiratory Treatment Status (SVN) given   Treatment Route (SVN) mask   Patient Position (SVN) sitting on edge of bed   Post Treatment Assessment (SVN) breath sounds improved   Signs of Intolerance (SVN) none   Inhaler   $ Inhaler Charges MDI (Metered Dose Inahler) Treatment   Daily Review of Necessity (Inhaler) completed   Respiratory Treatment Status (Inhaler) given   Treatment Route (Inhaler) mouthpiece   Patient Position (Inhaler) sitting on edge of bed   Post Treatment Assessment (Inhaler) breath sounds improved   Signs of Intolerance (Inhaler) none   Breath Sounds Post-Respiratory Treatment   Post-treatment Heart Rate (beats/min) 67   Post-treatment Resp Rate (breaths/min) 18

## 2019-11-26 NOTE — PLAN OF CARE
11/25/19 1929   Patient Assessment/Suction   Level of Consciousness (AVPU) alert   Respiratory Effort Short of breath   All Lung Fields Breath Sounds crackles, coarse   PRE-TX-O2   O2 Device (Oxygen Therapy) nasal cannula   $ Is the patient on Low Flow Oxygen? Yes   Flow (L/min) 2   SpO2 98 %   Pulse Oximetry Type Intermittent   $ Pulse Oximetry - Multiple Charge Pulse Oximetry - Multiple   Pulse 71   Resp 20   Aerosol Therapy   $ Aerosol Therapy Charges Aerosol Treatment   Respiratory Treatment Status (SVN) given   Treatment Route (SVN) mask   Patient Position (SVN) HOB elevated   Post Treatment Assessment (SVN) breath sounds improved   Signs of Intolerance (SVN) none   Breath Sounds Post-Respiratory Treatment   Post-treatment Heart Rate (beats/min) 70   Post-treatment Resp Rate (breaths/min) 19

## 2019-11-26 NOTE — PROGRESS NOTES
[facility]  Progress Note    Patient Name: Lisa Smith  MRN: [id number]  Admission Date: 11/25/2019  Attending Physician: Dima Seals MD   Primary Care Provider: Raymond Pérez MD  DOS: 11/26/2019    Subjective:     Principal Problem:Acute on chronic respiratory failure with hypoxia    Chief Complaint:   Chief Complaint   Patient presents with    Shortness of Breath     X FEW DAYS    Cough    BODYACHES        HPI: 69-year-old female history of COPD on 3 L O2, chronic hypoxic respiratory failure, HFpEF, CKD3, s/p pacemaker, obesity, previous MONSERRAT (lost 100 lbs) comes in for shortness of breath.  Reports that she was in her usual health about 1 week ago which is solid developed some shortness of breath and nonproductive coughing with myalgias and generalized weakness.  Reports that symptoms worsen following 2 days however improved the following 3 days after that.  Reports that she was nearing baseline 2 days ago.  Reports that yesterday night she is tender developed a severe nonproductive cough with worsening shortness of breath.  Endorses using her inhalers and nebulizers as prescribed however had minimal relief.  Endorses having chills but unaware if she had any fevers.  Attempt to sleep throughout the night however continue workup with nonproductive coughing and shortness of breath.  She checked her O2 sats which was 85-88% on home 3 L oxygen.  Patient did want to call her daughter because she doing a workup.  This morning patient reports that her symptoms did not improve and she called her daughter to take her to the ED for further evaluation.  Of note, patient reports that she uses oxygen 3 L all the time at home.  Does not use oxygen when she leaves the house.    In the ED, vitals were stable with oxygen saturations that were appropriate with patient's history of COPD.  Labs significant for leukocytosis of 22.3, CTA chest found findings compatible with multilobar  pneumonia.  ASA, Rocephin, doxycycline, bronchodilators, IV fluids, steroids, magnesium sulfate.    Interval history:  Today the patient reports persistent shortness of breath, constant timing, worse with exertion, improved with medical treatment overnight.  She has significant cough is productive of sputum with occasional blood tinge.  She has some pain associated with coughing.  No fever or chills.  She is currently on 3 L nasal cannula which is her home regimen.  No chest pain. No abdominal pain, nausea, or vomiting.  Family at bedside for discussion.     Physical exam:  Vital signs reviewed  General:  Appears frail, sitting up at bedside comfortably, nontoxic  Head and eyes:  Anicteric sclerae, no conjunctival discharge  ENT:  Moist mucous membranes  Pulmonary:  Comfortable work of breathing, speaking comfortably, nasal cannula oxygen 3 L in place, decent air movement with some expiratory wheezes diffusely  Cardiovascular:  2+ radial pulses, regular rate and rhythm  GI:  Abdomen soft and nontender  Skin:  Dry and warm no jaundice  Psych:  Mood is calm, affect normal, insight good  Neuro:  Nonfocal motor exam, alert and oriented, fluent speech    Laboratory data:  Hemoglobin 11  Hematocrit 35  Sodium 138  Magnesium 2  WBC 18    Respiratory culture with normal respiratory kervin  Blood culture with strep viridans    Echocardiogram:  · Eccentric left ventricular hypertrophy.  · Mild left ventricular enlargement.  · Low normal left ventricular systolic function. The estimated ejection fraction is 50%  · Grade II (moderate) left ventricular diastolic dysfunction consistent with pseudonormalization.  · No wall motion abnormalities.  · Normal right ventricular systolic function.  · Mild left atrial enlargement.  · Mild right atrial enlargement.  · Moderate aortic valve stenosis.  · Aortic valve area is 0.86 cm2; peak velocity is 3.83 m/s; mean gradient is 33 mmHg.  · Mild mitral regurgitation.  · Mild mitral  sclerosis.  · Moderate tricuspid regurgitation.  · Moderate to severe pulmonary hypertension present.  · Intermediate central venous pressure (8 mm Hg).  · The estimated PA systolic pressure is 50 mm Hg     1) atrial fib With lbbb  2) moderate aortic steno sis  3) moderate pulmonary hi bp   4) grade ll diastolic dysfunction  Mean left atrial pressure = 12 mmhg     Assessment/Plan:     Assessment:  Acute exacerbation of COPD  Acute on chronic hypoxemic respiratory failure  Likely acute bacterial pneumonia  Possible influenza  Suspect contaminated blood culture  Atrial fibrillation  Left bundle-branch block  Aortic stenosis  Pulmonary hypertension likely secondary to underlying COPD  Hypertensive heart disease with diastolic dysfunction  Hypomagnesemia improved  Hyponatremia improved  Anemia likely due to chronic disease    Plan update today:  Consultation with the patient's established pulmonologist Dr. Haro  Repeat blood culture.  Strep viridans is likely a contaminant.  Repeat sputum culture if patient continues to have productive cough.  Will add mucolytic and antitussive.  Continue bronchodilators.    Continue empiric respiratory antibiotics.  Continue empiric Tamiflu.  Check procalcitonin.  Check respiratory viral panel.  Continue supplemental oxygen and wean as able  Mobilize with PT/OT as able  VTE prophylaxis  This patient is appropriate for inpatient status based on severity of illness with workup and management as above.  Please see history and physical from yesterday for additional information including PMFSHx and ROS which has not changed except as noted.  This is a high-risk patient secondary to severe exacerbation of chronic illness    * Acute on chronic respiratory failure with hypoxia  Likely 2/2 multifocal pneumonia, likely viral vs COPD exacerbation  On 3 L at home  Procalcitonin pending  Empiric rocephin + azithro IV  BCx pending  Sputum Cx pending  Tamiflu BID  Bronchodilators, steroids  Echo  pending    Hyponatremia  Fluid restrict    Note:  Dr. Pérez, Dr. Haro are patient's cardio and pulm    VTE Risk Mitigation (From admission, onward)         Ordered     enoxaparin injection 40 mg  Daily      11/25/19 1156     IP VTE HIGH RISK PATIENT  Once      11/25/19 1156                   iDma Seals MD  Department of Hospital Medicine   [facility]  Date of service: 11/26/2019 2:37 PM

## 2019-11-27 LAB
ANION GAP SERPL CALC-SCNC: 8 MMOL/L (ref 8–16)
BACTERIA SPEC AEROBE CULT: NORMAL
BUN SERPL-MCNC: 15 MG/DL (ref 8–23)
CALCIUM SERPL-MCNC: 9.2 MG/DL (ref 8.7–10.5)
CHLORIDE SERPL-SCNC: 102 MMOL/L (ref 95–110)
CO2 SERPL-SCNC: 28 MMOL/L (ref 23–29)
CREAT SERPL-MCNC: 0.6 MG/DL (ref 0.5–1.4)
ERYTHROCYTE [DISTWIDTH] IN BLOOD BY AUTOMATED COUNT: 14.4 % (ref 11.5–14.5)
EST. GFR  (AFRICAN AMERICAN): >60 ML/MIN/1.73 M^2
EST. GFR  (NON AFRICAN AMERICAN): >60 ML/MIN/1.73 M^2
GLUCOSE SERPL-MCNC: 96 MG/DL (ref 70–110)
GRAM STN SPEC: NORMAL
HCT VFR BLD AUTO: 35 % (ref 37–48.5)
HGB BLD-MCNC: 11.3 G/DL (ref 12–16)
MAGNESIUM SERPL-MCNC: 1.9 MG/DL (ref 1.6–2.6)
MCH RBC QN AUTO: 31.1 PG (ref 27–31)
MCHC RBC AUTO-ENTMCNC: 32.3 G/DL (ref 32–36)
MCV RBC AUTO: 96 FL (ref 82–98)
PHOSPHATE SERPL-MCNC: 3.2 MG/DL (ref 2.7–4.5)
PLATELET # BLD AUTO: 249 K/UL (ref 150–350)
PMV BLD AUTO: 9.8 FL (ref 9.2–12.9)
POTASSIUM SERPL-SCNC: 3.7 MMOL/L (ref 3.5–5.1)
PROCALCITONIN SERPL IA-MCNC: 0.25 NG/ML (ref 0–0.5)
RBC # BLD AUTO: 3.63 M/UL (ref 4–5.4)
SODIUM SERPL-SCNC: 138 MMOL/L (ref 136–145)
WBC # BLD AUTO: 14.67 K/UL (ref 3.9–12.7)

## 2019-11-27 PROCEDURE — 84145 PROCALCITONIN (PCT): CPT

## 2019-11-27 PROCEDURE — 25000242 PHARM REV CODE 250 ALT 637 W/ HCPCS: Performed by: FAMILY MEDICINE

## 2019-11-27 PROCEDURE — 63600175 PHARM REV CODE 636 W HCPCS: Performed by: FAMILY MEDICINE

## 2019-11-27 PROCEDURE — 97163 PT EVAL HIGH COMPLEX 45 MIN: CPT

## 2019-11-27 PROCEDURE — 80048 BASIC METABOLIC PNL TOTAL CA: CPT

## 2019-11-27 PROCEDURE — 83735 ASSAY OF MAGNESIUM: CPT

## 2019-11-27 PROCEDURE — 27000221 HC OXYGEN, UP TO 24 HOURS

## 2019-11-27 PROCEDURE — 25000003 PHARM REV CODE 250: Performed by: INTERNAL MEDICINE

## 2019-11-27 PROCEDURE — 12000002 HC ACUTE/MED SURGE SEMI-PRIVATE ROOM

## 2019-11-27 PROCEDURE — 94640 AIRWAY INHALATION TREATMENT: CPT

## 2019-11-27 PROCEDURE — 25000003 PHARM REV CODE 250: Performed by: FAMILY MEDICINE

## 2019-11-27 PROCEDURE — 97116 GAIT TRAINING THERAPY: CPT

## 2019-11-27 PROCEDURE — 87040 BLOOD CULTURE FOR BACTERIA: CPT

## 2019-11-27 PROCEDURE — 36415 COLL VENOUS BLD VENIPUNCTURE: CPT

## 2019-11-27 PROCEDURE — 97530 THERAPEUTIC ACTIVITIES: CPT

## 2019-11-27 PROCEDURE — 97165 OT EVAL LOW COMPLEX 30 MIN: CPT

## 2019-11-27 PROCEDURE — 84100 ASSAY OF PHOSPHORUS: CPT

## 2019-11-27 PROCEDURE — 94761 N-INVAS EAR/PLS OXIMETRY MLT: CPT

## 2019-11-27 PROCEDURE — 25000242 PHARM REV CODE 250 ALT 637 W/ HCPCS: Performed by: INTERNAL MEDICINE

## 2019-11-27 PROCEDURE — 85027 COMPLETE CBC AUTOMATED: CPT

## 2019-11-27 RX ORDER — AZITHROMYCIN 250 MG/1
500 TABLET, FILM COATED ORAL DAILY
Status: DISCONTINUED | OUTPATIENT
Start: 2019-11-27 | End: 2019-11-29 | Stop reason: HOSPADM

## 2019-11-27 RX ORDER — IPRATROPIUM BROMIDE AND ALBUTEROL SULFATE 2.5; .5 MG/3ML; MG/3ML
3 SOLUTION RESPIRATORY (INHALATION) EVERY 6 HOURS
Status: DISCONTINUED | OUTPATIENT
Start: 2019-11-27 | End: 2019-11-29 | Stop reason: HOSPADM

## 2019-11-27 RX ADMIN — PREDNISONE 40 MG: 20 TABLET ORAL at 09:11

## 2019-11-27 RX ADMIN — IPRATROPIUM BROMIDE AND ALBUTEROL SULFATE 3 ML: .5; 3 SOLUTION RESPIRATORY (INHALATION) at 09:11

## 2019-11-27 RX ADMIN — CEFTRIAXONE SODIUM 1 G: 1 INJECTION, POWDER, FOR SOLUTION INTRAMUSCULAR; INTRAVENOUS at 01:11

## 2019-11-27 RX ADMIN — ENOXAPARIN SODIUM 40 MG: 100 INJECTION SUBCUTANEOUS at 05:11

## 2019-11-27 RX ADMIN — METOPROLOL SUCCINATE 100 MG: 25 TABLET, FILM COATED, EXTENDED RELEASE ORAL at 09:11

## 2019-11-27 RX ADMIN — FUROSEMIDE 20 MG: 20 TABLET ORAL at 09:11

## 2019-11-27 RX ADMIN — GUAIFENESIN 600 MG: 600 TABLET, EXTENDED RELEASE ORAL at 09:11

## 2019-11-27 RX ADMIN — DILTIAZEM HYDROCHLORIDE 180 MG: 180 CAPSULE, COATED, EXTENDED RELEASE ORAL at 10:11

## 2019-11-27 RX ADMIN — DIGOXIN 0.25 MG: 250 TABLET ORAL at 09:11

## 2019-11-27 RX ADMIN — ROFLUMILAST 500 MCG: 500 TABLET ORAL at 09:11

## 2019-11-27 RX ADMIN — IPRATROPIUM BROMIDE AND ALBUTEROL SULFATE 3 ML: .5; 3 SOLUTION RESPIRATORY (INHALATION) at 07:11

## 2019-11-27 RX ADMIN — OSELTAMIVIR PHOSPHATE 75 MG: 75 CAPSULE ORAL at 10:11

## 2019-11-27 RX ADMIN — IPRATROPIUM BROMIDE AND ALBUTEROL SULFATE 3 ML: .5; 3 SOLUTION RESPIRATORY (INHALATION) at 01:11

## 2019-11-27 RX ADMIN — FLUTICASONE FUROATE AND VILANTEROL TRIFENATATE 1 PUFF: 100; 25 POWDER RESPIRATORY (INHALATION) at 07:11

## 2019-11-27 RX ADMIN — AZITHROMYCIN 500 MG: 250 TABLET, FILM COATED ORAL at 10:11

## 2019-11-27 RX ADMIN — PANTOPRAZOLE SODIUM 40 MG: 40 TABLET, DELAYED RELEASE ORAL at 05:11

## 2019-11-27 RX ADMIN — OSELTAMIVIR PHOSPHATE 75 MG: 75 CAPSULE ORAL at 09:11

## 2019-11-27 RX ADMIN — SPIRONOLACTONE 25 MG: 25 TABLET ORAL at 09:11

## 2019-11-27 RX ADMIN — CLOPIDOGREL BISULFATE 75 MG: 75 TABLET, FILM COATED ORAL at 09:11

## 2019-11-27 RX ADMIN — GUAIFENESIN 600 MG: 600 TABLET, EXTENDED RELEASE ORAL at 10:11

## 2019-11-27 NOTE — CONSULTS
Pulmonary/Critical Care Consult      Patient name: Lisa Smith  MRN: [id number]  Date: 11/27/2019    Admit Date: 11/25/2019  Consult Requested By: Dima Seals MD    Reason for Consult:   Pneumonia and COPD exacerbation    HPI:    The patient states she began feeling poorly last week when she developed some lumbar back pain.  Over the weekend she developed some neck pain.  Sunday, she was feeling well went to [employer] when out to dinner but awakened Monday morning very short of breath and unable to catch her breath.  She was admitted to the hospital Monday morning.  We are being consulted this morning.  She ise xpectorating purulent bloody mucus.  ROS  General: Feeling a little better.  She is still fatigued.  Eyes: Vision is good.  ENT:  Her neck still hurts.  Heart:: No chest pain or palpitations.  Lungs:  She is coughing and producing bloody purulent mucus.  GI: No Nausea, vomiting, constipation, diarrhea, or reflux.  : No dysuria, hesitancy, or nocturia.  Skin: No lesions or rashes.  Musculoskeletal:  Her back hurts in her neck hurts.  Neuro: No headaches or neuropathy.  Lymph: No edema or adenopathy.  Psych: No anxiety or depression.  Endo: No weight change.    Past Medical History    Past Medical History:   Diagnosis Date    Atrial fibrillation     Bell's palsy     COPD (chronic obstructive pulmonary disease)     GI bleed     Heterozygous alpha 1-antitrypsin deficiency     Hypertension     Lung disease     copd    MONSERRAT (obstructive sleep apnea)     Pneumonia     Pulmonary edema        Past Surgical History    Past Surgical History:   Procedure Laterality Date    CARDIAC ELECTROPHYSIOLOGY STUDY AND ABLATION      CARPAL TUNNEL RELEASE      CHOLECYSTECTOMY      HAND SURGERY      HYSTERECTOMY      INSERT / REPLACE / REMOVE PACEMAKER      KNEE ARTHROSCOPY         Medications (scheduled):      albuterol-ipratropium  3 mL Nebulization Q6H WAKE    azithromycin  500 mg Intravenous Q24H     cefTRIAXone (ROCEPHIN) IVPB  1 g Intravenous Q24H    clopidogrel  75 mg Oral Daily    digoxin  0.25 mg Oral Daily    diltiaZEM  180 mg Oral QHS    enoxaparin  40 mg Subcutaneous Daily    fluticasone furoate-vilanterol  1 puff Inhalation Daily    furosemide  20 mg Oral Daily    guaiFENesin  600 mg Oral BID    metoprolol succinate  100 mg Oral Daily    oseltamivir  75 mg Oral BID    pantoprazole  40 mg Oral Daily    polyethylene glycol  17 g Oral Daily    predniSONE  40 mg Oral Daily    roflumilast  500 mcg Oral Daily    spironolactone  25 mg Oral Daily       Medications (infusions):         Medications (prn):     acetaminophen, acetaminophen, albuterol-ipratropium, dextrose 50%, dextrose 50%, glucagon (human recombinant), glucose, glucose, guaifenesin-codeine 100-10 mg/5 ml, magnesium oxide, magnesium oxide, melatonin, ondansetron, potassium chloride 10%, potassium chloride 10%, potassium chloride 10%, potassium, sodium phosphates, potassium, sodium phosphates, potassium, sodium phosphates, sodium chloride 0.9%  Review of patient's allergies indicates:   Allergen Reactions    Sulfa (sulfonamide antibiotics)          Social History:     Tobacco:   Social History     Tobacco Use   Smoking Status Former Smoker    Packs/day: 2.00    Years: 30.00    Pack years: 60.00    Types: Cigarettes    Start date: 1971    Last attempt to quit: 2011    Years since quittin.8   Smokeless Tobacco Never Used                                EtOH:   Social History     Substance and Sexual Activity   Alcohol Use No    Frequency: Never                                Drugs:   Social History     Substance and Sexual Activity   Drug Use No                                Family History:   Family History   Problem Relation Age of Onset    Kidney failure Mother     Cancer Father     Cancer Brother        Physical Exam    Vital signs:  Temp:  [97.5 °F (36.4 °C)-98.4 °F (36.9 °C)]   Pulse:  [58-73]   Resp:   "[18-21]   BP: ()/(57-75)   SpO2:  [93 %-100 %]     Intake/Output:     Intake/Output Summary (Last 24 hours) at 11/27/2019 0805  Last data filed at 11/27/2019 0604  Gross per 24 hour   Intake 960 ml   Output --   Net 960 ml        BMI: Estimated body mass index is 30.45 kg/m² as calculated from the following:    Height as of this encounter: 5' 5" (1.651 m).    Weight as of this encounter: 83 kg (183 lb).    Physical Exam  GENERAL: Older patient in no distress.  HEENT: Pupils equal and reactive. Extraocular movements intact. Nose intact.      Pharynx moist.  NECK: Supple.   HEART: Regular rate and rhythm. No murmur or gallop auscultated.  LUNGS:  There crackles and wheezes bilaterally.  There are rhonchi in the right upper anterior thorax.  Her cough sounds productive.  Lung excursion symmetrical. No change in fremitus.   ABDOMEN: Bowel sounds present. Non-tender, no masses palpated.  : Normal anatomy.   EXTREMITIES: Normal muscle tone and joint movement, no cyanosis or clubbing.   LYMPHATICS: No adenopathy palpated, no edema.  SKIN: Dry, intact, no lesions.   NEURO: Cranial nerves II-XII intact. Motor strength 5/5 bilaterally, upper and lower extremities.  PSYCH: Appropriate affect.    Laboratory    Recent Labs   Lab 11/27/19 0512   WBC 14.67*   RBC 3.63*   HGB 11.3*   HCT 35.0*      MCV 96   MCH 31.1*   MCHC 32.3       Recent Labs   Lab 11/27/19 0512   CALCIUM 9.2      K 3.7   CO2 28      BUN 15   CREATININE 0.6         Microbiology:       Microbiology Results (last 7 days)     Procedure Component Value Units Date/Time    Blood culture [[id number]] Collected:  11/27/19 0512    Order Status:  Sent Specimen:  Blood Updated:  11/27/19 0529    Respiratory Viral Panel by PCR Hume; Nasopharyngeal Swab [[id number]]     Order Status:  No result Specimen:  Respiratory     Culture, Respiratory with Gram Stain [[id number]] Collected:  11/25/19 1146    Order Status:  Completed Specimen:  Respiratory " from Sputum Updated:  11/26/19 1542     Respiratory Culture Normal respiratory kervin     Gram Stain (Respiratory) <10 epithelial cells per low power field.     Gram Stain (Respiratory) Many WBC's     Gram Stain (Respiratory) Many Gram negative coccobacilli     Gram Stain (Respiratory) Few Gram positive cocci in pairs    Blood culture [[id number]] Collected:  11/25/19 0932    Order Status:  Completed Specimen:  Blood from Peripheral, Hand, Left Updated:  11/26/19 1032     Blood Culture, Routine No Growth to date      No Growth to date    Blood culture [[id number]]  (Abnormal) Collected:  11/25/19 0923    Order Status:  Completed Specimen:  Blood from Peripheral, Antecubital, Left Updated:  11/26/19 1028     Blood Culture, Routine Gram stain aer bottle: Gram positive cocci      Results called to and read back by: Carmela Huffman Rn/1223      VIRIDANS STREPTOCOCCUS  Organism is a probable contaminant            Radiology  CTA 11/25/19  Impression:       1. Scattered right lung airspace opacities compatible with multilobar pneumonia, with associated scattered bronchiolitis throughout the right lung, as well as in the lingula and in the left lower lobe.  2. Trace simple appearing right pleural effusion.  3. Several enlarged mediastinal and right hilar lymph nodes are nonspecific, but presumably reactive.  4. Negative for pulmonary thromboembolism, with enlarged central pulmonary arteries, suggesting pulmonary arterial hypertension.  5. Cardiomegaly, with aortic and coronary arterial vascular calcifications.  6. Small sliding-type hiatal hernia.  .           Impression/Plan    Multilobar pneumonia   COPD  Alpha 1 deficiency (genotype MZ)-could not afford copay for Prolastin   MONSERRAT-corrected with significant weight loss  Hypoxemia   Contaminant blood culture    Change Zithromax to po  Change duonebs to q6  Continue Prednisone   Continue Breo  Oxygen per protocol  OOB

## 2019-11-27 NOTE — PT/OT/SLP EVAL
Physical Therapy Evaluation    Patient Name:  Lisa Smith   MRN:  [id number]    Recommendations:     Discharge Recommendations:  home   Discharge Equipment Recommendations: none   Barriers to discharge: None    Assessment:     Lisa Smith is a 69 y.o. female admitted with a medical diagnosis of Acute on chronic respiratory failure with hypoxia.  She presents with the following impairments/functional limitations:  impaired endurance, weakness, impaired functional mobilty, gait instability. Pt lives at home alone and was I with ADL's and mobility w/o an AD. Pt reported baseline  Unsteadiness with gait.  Pt ambulated in the  [facility] with  CGA w/o AD, but pt was recommended to use her RW for now for increased stability.  Pt had mod SOB after gait of 100 ft  . 02 sat was 93% after gait  with 4 L 02 in place.  Pt should be safe for return home alone at D/C as pt has  several family members living close by.    Rehab Prognosis: Good; patient would benefit from acute skilled PT services to address these deficits and reach maximum level of function.    Recent Surgery: * No surgery found *      Plan:     During this hospitalization, patient to be seen once more to address the identified rehab impairments via therapeutic activities, therapeutic exercises, gait training and progress toward the following goals:    · Plan of Care Expires:  12/27/19    Subjective     Chief Complaint: GAN  Patient/Family Comments/goals: home  Pain/Comfort:  ·      Patients cultural, spiritual, Yarsani conflicts given the current situation:      Living Environment:  Pt lives at home in a one story house without entry steps.  Has family members living close by.  Prior to admission, patients level of function was independent .  Equipment used at home: None .  DME owned (not currently used): rolling walker.  Upon discharge, patient will have assistance from family.    Objective:     Communicated with nurse prior to session.  Patient found up in  chair with    upon PT entry to room.    General Precautions: Standard,     Orthopedic Precautions:    Braces:       Exams:  · Cognitive Exam:  Patient is oriented to Person, Place, Time and Situation  · RLE ROM: WNL  · RLE Strength: WNL  · LLE ROM: WNL  · LLE Strength: WNL    Functional Mobility:  · Transfers:     · Sit to Stand:  modified independence with no AD  · Gait: AD.      Therapeutic Activities and Exercises:  T/f and gait training with assesssment  Of 02 sat on 4 L 02     AM-PAC 6 CLICK MOBILITY  Total Score:22     Patient left up in chair with call button in reach.    GOALS:   Multidisciplinary Problems     Physical Therapy Goals        Problem: Physical Therapy Goal    Goal Priority Disciplines Outcome Goal Variances Interventions   Physical Therapy Goal     PT, PT/OT      Description:  Goals to be met by:D/C    Patient will increase functional independence with mobility by performing:    Gait  x 150 f feet with Modified [place] using Rolling Walker.                       History:     Past Medical History:   Diagnosis Date    Atrial fibrillation     Bell's palsy     COPD (chronic obstructive pulmonary disease)     GI bleed     Heterozygous alpha 1-antitrypsin deficiency     Hypertension     Lung disease     copd    MONSERRAT (obstructive sleep apnea)     Pneumonia     Pulmonary edema        Past Surgical History:   Procedure Laterality Date    CARDIAC ELECTROPHYSIOLOGY STUDY AND ABLATION      CARPAL TUNNEL RELEASE      CHOLECYSTECTOMY      HAND SURGERY      HYSTERECTOMY      INSERT / REPLACE / REMOVE PACEMAKER      KNEE ARTHROSCOPY         Time Tracking:     PT Received On: 11/27/19  PT Start Time: 1050     PT Stop Time: 1102  PT Total Time (min): 12 min     Billable Minutes: Evaluation 4 mins and Gait Training 8 mins      Cindy Simon, PT  11/27/2019

## 2019-11-27 NOTE — PLAN OF CARE
11/26/19 2046   Patient Assessment/Suction   Level of Consciousness (AVPU) alert   Respiratory Effort Unlabored   Expansion/Accessory Muscles/Retractions no use of accessory muscles   All Lung Fields Breath Sounds diminished   Rhythm/Pattern, Respiratory unlabored   Cough Frequency infrequent   Cough Type good   PRE-TX-O2   O2 Device (Oxygen Therapy) room air  (PT NOT WEARING NC 3LPM)   SpO2 (!) 93 %   Pulse 70   Resp 18   Aerosol Therapy   $ Aerosol Therapy Charges Aerosol Treatment   Daily Review of Necessity (SVN) completed   Respiratory Treatment Status (SVN) given   Treatment Route (SVN) mask   Patient Position (SVN) sitting in chair   Post Treatment Assessment (SVN) breath sounds improved   Signs of Intolerance (SVN) none   Breath Sounds Post-Respiratory Treatment   Throughout All Fields Post-Treatment All Fields   Throughout All Fields Post-Treatment no change   Post-treatment Heart Rate (beats/min) 68   Post-treatment Resp Rate (breaths/min) 18

## 2019-11-27 NOTE — CARE UPDATE
11/27/19 0735   Patient Assessment/Suction   Level of Consciousness (AVPU) alert   Respiratory Effort Normal   All Lung Fields Breath Sounds coarse;diminished   Cough Frequency infrequent   Cough Type good   PRE-TX-O2   O2 Device (Oxygen Therapy) nasal cannula   $ Is the patient on Low Flow Oxygen? Yes   Flow (L/min) 3   SpO2 97 %   Pulse Oximetry Type Intermittent   $ Pulse Oximetry - Multiple Charge Pulse Oximetry - Multiple   Pulse 70   Resp 18   Aerosol Therapy   $ Aerosol Therapy Charges Aerosol Treatment   Daily Review of Necessity (SVN) completed   Respiratory Treatment Status (SVN) given   Treatment Route (SVN) mask   Patient Position (SVN) HOB elevated   Post Treatment Assessment (SVN) breath sounds improved   Signs of Intolerance (SVN) none   Inhaler   $ Inhaler Charges MDI (Metered Dose Inahler) Treatment   Daily Review of Necessity (Inhaler) completed   Respiratory Treatment Status (Inhaler) given   Treatment Route (Inhaler) mouthpiece   Patient Position (Inhaler) HOB elevated   Post Treatment Assessment (Inhaler) breath sounds improved   Signs of Intolerance (Inhaler) none   Breath Sounds Post-Respiratory Treatment   Throughout All Fields Post-Treatment All Fields   Throughout All Fields Post-Treatment no change   Post-treatment Heart Rate (beats/min) 68   Post-treatment Resp Rate (breaths/min) 20

## 2019-11-27 NOTE — PT/OT/SLP EVAL
Occupational Therapy   Evaluation and Discharge Note    Name: Lisa Smith  MRN: [id number]  Admitting Diagnosis:  Acute on chronic respiratory failure with hypoxia      Recommendations:     Discharge Recommendations: home  Discharge Equipment Recommendations:  none  Barriers to discharge:  None    Assessment:     Lisa Smith is a 69 y.o. female with a medical diagnosis of Acute on chronic respiratory failure with hypoxia. At this time, patient is functioning near her baseline except for c/o increased SOB.  Pt was able to complete ADLs with 3L02 and 02 sats in mid 90's throughout.  She is already using energy conservation techniques at home with good understanding and safety awareness.      Plan:     During this hospitalization, patient does not require further acute OT services.  Please re-consult if situation changes.    · Plan of Care Reviewed with:      Subjective     Chief Complaint: sob  Patient/Family Comments/goals: return home    Occupational Profile:  Living Environment: pt lives alone in a single story home with no steps to enter  Previous level of function: Independent ADLs and IADLs, driving, uses power chair at the store for long distances, uses energy conservation techniques  Roles and Routines: independent caretaker for self  Equipment Used at home:  rollator, shower chair  Assistance upon Discharge: pt has a daughter and grandson who are supportive and live nearby    Pain/Comfort:  · Pain Rating 1: 0/10  · Pain Rating Post-Intervention 1: 0/10    Objective:     Communicated with: RN prior to session.  Patient found up in chair with oxygen upon OT entry to room.    General Precautions: Standard, fall   Orthopedic Precautions:N/A   Braces: N/A     Occupational Performance:    Functional Mobility/Transfers:  · Patient completed Sit <> Stand Transfer with stand by assistance  with  no assistive device   · Functional Mobility: completed in hospital room SBA no AD    Activities of Daily  Living:  · Grooming: stand by assistance standing at sink  · Bathing: pt reports just getting out of shower; she completed the task with modified independence; she did not wear her oxygen; OT provided education on importance of wearing oxygen with strenous tasks such as bathing and she verbalized understanding      Cognitive/Visual Perceptual:  Cognitive/Psychosocial Skills:     -       Oriented to: Person, Place, Time and Situation   -       Follows Commands/attention:Follows multistep  commands  -       Communication: clear/fluent  -       Memory: No Deficits noted  -       Safety awareness/insight to disability: intact   -       Mood/Affect/Coping skills/emotional control: appropriate to situation    Physical Exam:  Upper Extremity Range of Motion:     -       Right Upper Extremity: WFL  -       Left Upper Extremity: WFL  Upper Extremity Strength:    -       Right Upper Extremity: WFL  -       Left Upper Extremity: WFL   Strength:    -       Right Upper Extremity: WFL  -       Left Upper Extremity: WFL  Fine Motor Coordination:    -       Intact    AMPAC 6 Click ADL:  AMPAC Total Score: 24    Treatment & Education:  OT role/discharge  Reviewed energy conservation techniques; discussed specific techniques to increase safety/independence with bathing (ventilation, use of shower chair/non slip mat, PLB, energy saving techniques); pt verbalized understanding  Education:    Patient left up in chair with all lines intact and call button in reach      History:     Past Medical History:   Diagnosis Date    Atrial fibrillation     Bell's palsy     COPD (chronic obstructive pulmonary disease)     GI bleed     Heterozygous alpha 1-antitrypsin deficiency     Hypertension     Lung disease     copd    MONSERRAT (obstructive sleep apnea)     Pneumonia     Pulmonary edema        Past Surgical History:   Procedure Laterality Date    CARDIAC ELECTROPHYSIOLOGY STUDY AND ABLATION      CARPAL TUNNEL RELEASE       CHOLECYSTECTOMY      HAND SURGERY      HYSTERECTOMY      INSERT / REPLACE / REMOVE PACEMAKER      KNEE ARTHROSCOPY         Time Tracking:     OT Date of Treatment: 11/27/19  OT Start Time: 1020  OT Stop Time: 1040  OT Total Time (min): 20 min    Billable Minutes:Evaluation 10  Therapeutic Activity 10    Luis Ervin, OT  11/27/2019

## 2019-11-27 NOTE — PROGRESS NOTES
[facility] Medicine  Progress Note    Patient Name: Lisa Smith  MRN: [id number]  Admission Date: 11/25/2019  Attending Physician: Dima Seals MD   Primary Care Provider: Raymond Pérez MD  DOS: 11/27/2019    Subjective:     Principal Problem:Acute on chronic respiratory failure with hypoxia    Chief Complaint:   Chief Complaint   Patient presents with    Shortness of Breath     X FEW DAYS    Cough    BODYACHES        HPI: 69-year-old female history of COPD on 3 L O2, chronic hypoxic respiratory failure, HFpEF, CKD3, s/p pacemaker, obesity, previous MONSERRAT (lost 100 lbs) comes in for shortness of breath.  Reports that she was in her usual health about 1 week ago which is solid developed some shortness of breath and nonproductive coughing with myalgias and generalized weakness.  Reports that symptoms worsen following 2 days however improved the following 3 days after that.  Reports that she was nearing baseline 2 days ago.  Reports that yesterday night she is tender developed a severe nonproductive cough with worsening shortness of breath.  Endorses using her inhalers and nebulizers as prescribed however had minimal relief.  Endorses having chills but unaware if she had any fevers.  Attempt to sleep throughout the night however continue workup with nonproductive coughing and shortness of breath.  She checked her O2 sats which was 85-88% on home 3 L oxygen.  Patient did want to call her daughter because she doing a workup.  This morning patient reports that her symptoms did not improve and she called her daughter to take her to the ED for further evaluation.  Of note, patient reports that she uses oxygen 3 L all the time at home.  Does not use oxygen when she leaves the house.    In the ED, vitals were stable with oxygen saturations that were appropriate with patient's history of COPD.  Labs significant for leukocytosis of 22.3, CTA chest found findings compatible with multilobar  pneumonia.  ASA, Rocephin, doxycycline, bronchodilators, IV fluids, steroids, magnesium sulfate.    Interval history:  Today the patient reports persistent but improving shortness of breath, constant timing, mild intensity, worse with exertion.  Persistent cough is productive of sputum with unchanged occasional blood tinge.  She has some pain associated with coughing that is better with medication.  No fever or chills.  She is currently on 3 L nasal cannula which is her home regimen.  No abdominal pain, nausea, or vomiting.  Family at bedside for discussion.     Physical exam:  Vital signs reviewed  General:  Appears frail, sitting up at bedside comfortably, nontoxic  Head and eyes:  Anicteric sclerae, no conjunctival discharge  ENT:  Moist mucous membranes  Pulmonary:  Comfortable work of breathing, speaking comfortably, nasal cannula oxygen 3 L in place, decent air movement with minimal scattered expiratory wheezes  Cardiovascular:  2+ radial pulses, regular rate and rhythm  GI:  Abdomen soft and nontender  Skin:  Dry and warm no jaundice  Psych:  Mood is calm, affect normal, insight good  Neuro:  Nonfocal motor exam, alert and oriented, fluent speech    Laboratory data:  Hemoglobin 11  Hematocrit 35  Repeat blood culture no growth to date  Respiratory culture with normal respiratory kervin  Blood culture with strep viridans    Echocardiogram:  · Eccentric left ventricular hypertrophy.  · Mild left ventricular enlargement.  · Low normal left ventricular systolic function. The estimated ejection fraction is 50%  · Grade II (moderate) left ventricular diastolic dysfunction consistent with pseudonormalization.  · No wall motion abnormalities.  · Normal right ventricular systolic function.  · Mild left atrial enlargement.  · Mild right atrial enlargement.  · Moderate aortic valve stenosis.  · Aortic valve area is 0.86 cm2; peak velocity is 3.83 m/s; mean gradient is 33 mmHg.  · Mild mitral regurgitation.  · Mild mitral  sclerosis.  · Moderate tricuspid regurgitation.  · Moderate to severe pulmonary hypertension present.  · Intermediate central venous pressure (8 mm Hg).  · The estimated PA systolic pressure is 50 mm Hg     1) atrial fib With lbbb  2) moderate aortic steno sis  3) moderate pulmonary hi bp   4) grade ll diastolic dysfunction  Mean left atrial pressure = 12 mmhg     Assessment/Plan:     Assessment:  Acute exacerbation of COPD  Acute on chronic hypoxemic respiratory failure  Likely acute bacterial pneumonia  Possible influenza  Suspect contaminated blood culture  Atrial fibrillation  Left bundle-branch block  Aortic stenosis  Pulmonary hypertension likely secondary to underlying COPD  Hypertensive heart disease with diastolic dysfunction  Hypomagnesemia improved  Hyponatremia improved  Anemia likely due to chronic disease    Plan update today:  Appreciate Pulmonary  Repeat blood culture negative.  Strep viridans is likely a contaminant.  Sputum culture with normal respiratory kervin.  Continue mucolytic and antitussive.  Continue serial bronchodilators q.6 hours.    Continue empiric respiratory antibiotics, transition to oral azithromycin.  Continue empiric Tamiflu.  Continue steroids and transition of prednisone in near future.  Continue inhaled corticosteroid  Follow-up respiratory viral panel.  Procalcitonin negative  Continue supplemental oxygen at home regimen baseline  Mobilize with PT/OT as able  VTE prophylaxis with Lovenox  This is a high-risk patient secondary to severe exacerbation of chronic illness    * Acute on chronic respiratory failure with hypoxia  Likely 2/2 multifocal pneumonia, likely viral vs COPD exacerbation  On 3 L at home  Procalcitonin pending  Empiric rocephin + azithro IV  BCx pending  Sputum Cx pending  Tamiflu BID  Bronchodilators, steroids  Echo pending    Hyponatremia  Fluid restrict    Note:  Dr. Pérez, Dr. Haro are patient's cardio and pulm    VTE Risk Mitigation (From admission,  onward)         Ordered     enoxaparin injection 40 mg  Daily      11/25/19 1156     IP VTE HIGH RISK PATIENT  Once      11/25/19 1156                   Dima Seals MD  Department of Hospital Medicine   [facility]

## 2019-11-28 LAB
ANION GAP SERPL CALC-SCNC: 8 MMOL/L (ref 8–16)
BUN SERPL-MCNC: 19 MG/DL (ref 8–23)
CALCIUM SERPL-MCNC: 9.2 MG/DL (ref 8.7–10.5)
CHLORIDE SERPL-SCNC: 101 MMOL/L (ref 95–110)
CO2 SERPL-SCNC: 31 MMOL/L (ref 23–29)
CREAT SERPL-MCNC: 0.6 MG/DL (ref 0.5–1.4)
ERYTHROCYTE [DISTWIDTH] IN BLOOD BY AUTOMATED COUNT: 14.6 % (ref 11.5–14.5)
EST. GFR  (AFRICAN AMERICAN): >60 ML/MIN/1.73 M^2
EST. GFR  (NON AFRICAN AMERICAN): >60 ML/MIN/1.73 M^2
GLUCOSE SERPL-MCNC: 99 MG/DL (ref 70–110)
HCT VFR BLD AUTO: 33.2 % (ref 37–48.5)
HGB BLD-MCNC: 10.8 G/DL (ref 12–16)
MAGNESIUM SERPL-MCNC: 2 MG/DL (ref 1.6–2.6)
MCH RBC QN AUTO: 31.6 PG (ref 27–31)
MCHC RBC AUTO-ENTMCNC: 32.5 G/DL (ref 32–36)
MCV RBC AUTO: 97 FL (ref 82–98)
PHOSPHATE SERPL-MCNC: 4.7 MG/DL (ref 2.7–4.5)
PLATELET # BLD AUTO: 262 K/UL (ref 150–350)
PMV BLD AUTO: 9.5 FL (ref 9.2–12.9)
POTASSIUM SERPL-SCNC: 4.1 MMOL/L (ref 3.5–5.1)
RBC # BLD AUTO: 3.42 M/UL (ref 4–5.4)
SODIUM SERPL-SCNC: 140 MMOL/L (ref 136–145)
WBC # BLD AUTO: 11.37 K/UL (ref 3.9–12.7)

## 2019-11-28 PROCEDURE — 25000003 PHARM REV CODE 250: Performed by: INTERNAL MEDICINE

## 2019-11-28 PROCEDURE — 85027 COMPLETE CBC AUTOMATED: CPT

## 2019-11-28 PROCEDURE — 94640 AIRWAY INHALATION TREATMENT: CPT

## 2019-11-28 PROCEDURE — 27000221 HC OXYGEN, UP TO 24 HOURS

## 2019-11-28 PROCEDURE — 25000003 PHARM REV CODE 250: Performed by: FAMILY MEDICINE

## 2019-11-28 PROCEDURE — 84100 ASSAY OF PHOSPHORUS: CPT

## 2019-11-28 PROCEDURE — 80048 BASIC METABOLIC PNL TOTAL CA: CPT

## 2019-11-28 PROCEDURE — 12000002 HC ACUTE/MED SURGE SEMI-PRIVATE ROOM

## 2019-11-28 PROCEDURE — 94761 N-INVAS EAR/PLS OXIMETRY MLT: CPT

## 2019-11-28 PROCEDURE — 83735 ASSAY OF MAGNESIUM: CPT

## 2019-11-28 PROCEDURE — 25000242 PHARM REV CODE 250 ALT 637 W/ HCPCS: Performed by: INTERNAL MEDICINE

## 2019-11-28 PROCEDURE — 63600175 PHARM REV CODE 636 W HCPCS: Performed by: FAMILY MEDICINE

## 2019-11-28 PROCEDURE — 36415 COLL VENOUS BLD VENIPUNCTURE: CPT

## 2019-11-28 PROCEDURE — 99232 PR SUBSEQUENT HOSPITAL CARE,LEVL II: ICD-10-PCS | Mod: ,,, | Performed by: INTERNAL MEDICINE

## 2019-11-28 PROCEDURE — 99232 SBSQ HOSP IP/OBS MODERATE 35: CPT | Mod: ,,, | Performed by: INTERNAL MEDICINE

## 2019-11-28 RX ADMIN — FLUTICASONE FUROATE AND VILANTEROL TRIFENATATE 1 PUFF: 100; 25 POWDER RESPIRATORY (INHALATION) at 07:11

## 2019-11-28 RX ADMIN — ENOXAPARIN SODIUM 40 MG: 100 INJECTION SUBCUTANEOUS at 05:11

## 2019-11-28 RX ADMIN — DIGOXIN 0.25 MG: 250 TABLET ORAL at 08:11

## 2019-11-28 RX ADMIN — SPIRONOLACTONE 25 MG: 25 TABLET ORAL at 08:11

## 2019-11-28 RX ADMIN — PANTOPRAZOLE SODIUM 40 MG: 40 TABLET, DELAYED RELEASE ORAL at 06:11

## 2019-11-28 RX ADMIN — IPRATROPIUM BROMIDE AND ALBUTEROL SULFATE 3 ML: .5; 3 SOLUTION RESPIRATORY (INHALATION) at 07:11

## 2019-11-28 RX ADMIN — PREDNISONE 40 MG: 20 TABLET ORAL at 08:11

## 2019-11-28 RX ADMIN — OSELTAMIVIR PHOSPHATE 75 MG: 75 CAPSULE ORAL at 09:11

## 2019-11-28 RX ADMIN — IPRATROPIUM BROMIDE AND ALBUTEROL SULFATE 3 ML: .5; 3 SOLUTION RESPIRATORY (INHALATION) at 01:11

## 2019-11-28 RX ADMIN — ROFLUMILAST 500 MCG: 500 TABLET ORAL at 08:11

## 2019-11-28 RX ADMIN — METOPROLOL SUCCINATE 100 MG: 25 TABLET, FILM COATED, EXTENDED RELEASE ORAL at 08:11

## 2019-11-28 RX ADMIN — DILTIAZEM HYDROCHLORIDE 180 MG: 180 CAPSULE, COATED, EXTENDED RELEASE ORAL at 08:11

## 2019-11-28 RX ADMIN — GUAIFENESIN 600 MG: 600 TABLET, EXTENDED RELEASE ORAL at 08:11

## 2019-11-28 RX ADMIN — FLUTICASONE FUROATE AND VILANTEROL TRIFENATATE 1 PUFF: 100; 25 POWDER RESPIRATORY (INHALATION) at 09:11

## 2019-11-28 RX ADMIN — CLOPIDOGREL BISULFATE 75 MG: 75 TABLET, FILM COATED ORAL at 09:11

## 2019-11-28 RX ADMIN — FUROSEMIDE 20 MG: 20 TABLET ORAL at 08:11

## 2019-11-28 RX ADMIN — OSELTAMIVIR PHOSPHATE 75 MG: 75 CAPSULE ORAL at 08:11

## 2019-11-28 RX ADMIN — AZITHROMYCIN 500 MG: 250 TABLET, FILM COATED ORAL at 08:11

## 2019-11-28 RX ADMIN — CEFTRIAXONE SODIUM 1 G: 1 INJECTION, POWDER, FOR SOLUTION INTRAMUSCULAR; INTRAVENOUS at 01:11

## 2019-11-28 NOTE — PROGRESS NOTES
Progress Note  PULMONARY    Admit Date: 11/25/2019 11/28/2019      SUBJECTIVE:     Nov 28- pt seen Dr Haro yesterday, copd and pneumonia--had c/o back pain,  Had hemoptysis- none since yesterday.        PFSH and Allergies reviewed.    OBJECTIVE:     Vitals (Most recent):  Vitals:    11/28/19 0820   BP: 116/66   Pulse: 61   Resp: 16   Temp: 98.2 °F (36.8 °C)       Vitals (24 hour range):  Temp:  [97.5 °F (36.4 °C)-98.6 °F (37 °C)]   Pulse:  [60-86]   Resp:  [16-19]   BP: ()/(50-70)   SpO2:  [19 %-100 %]     No intake or output data in the 24 hours ending 11/28/19 1003       Physical Exam:  The patient's neuro status (alertness,orientation,cognitive function,motor skills,), pharyngeal exam (oral lesions, hygiene, abn dentition,), Neck (jvd,mass,thyroid,nodes in neck and above/below clavicle),RESPIRATORY(symmetry,effort,fremitus,percussion,auscultation),  Cor(rhythm,heart tones including gallops,perfusion,edema)ABD(distention,hepatic&splenomegaly,tenderness,masses), Skin(rash,cyanosis),Psyc(affect,judgement,).  Exam negative except for these pertinent findings:    Alert, chest is symmetric, no distress, normal percussion, normal fremitus and good normal breath sounds      Radiographs reviewed: view by direct vision - ct chest 2/8/18- vol loss right lung, bronchiectasis seen left lower, now 2/25 patchy pneumonia right, vol loss.      No results found for this or any previous visit.]    Labs     Recent Labs   Lab 11/28/19  0505   WBC 11.37   HGB 10.8*   HCT 33.2*        Recent Labs   Lab 11/28/19  0505      K 4.1      CO2 31*   BUN 19   CREATININE 0.6   GLU 99   CALCIUM 9.2   MG 2.0   PHOS 4.7*   No results for input(s): PH, PCO2, PO2, HCO3 in the last 24 hours.  Microbiology Results (last 7 days)     Procedure Component Value Units Date/Time    Blood culture [[id number]] Collected:  11/27/19 0512    Order Status:  Completed Specimen:  Blood Updated:  11/28/19 0632     Blood Culture, Routine  No Growth to date      No Growth to date    Blood culture [[id number]] Collected:  11/25/19 0932    Order Status:  Completed Specimen:  Blood from Peripheral, Hand, Left Updated:  11/27/19 1032     Blood Culture, Routine No Growth to date      No Growth to date      No Growth to date    Culture, Respiratory with Gram Stain [[id number]] Collected:  11/25/19 1146    Order Status:  Completed Specimen:  Respiratory from Sputum Updated:  11/27/19 0844     Respiratory Culture Normal respiratory kervin     Gram Stain (Respiratory) <10 epithelial cells per low power field.     Gram Stain (Respiratory) Many WBC's     Gram Stain (Respiratory) Many Gram negative coccobacilli     Gram Stain (Respiratory) Few Gram positive cocci in pairs    Respiratory Viral Panel by PCR [place]; Nasopharyngeal Swab [[id number]]     Order Status:  No result Specimen:  Respiratory     Blood culture [[id number]]  (Abnormal) Collected:  11/25/19 0923    Order Status:  Completed Specimen:  Blood from Peripheral, Antecubital, Left Updated:  11/26/19 1028     Blood Culture, Routine Gram stain aer bottle: Gram positive cocci      Results called to and read back by: Carmela Huffman Rn/[id number]      VIRIDANS STREPTOCOCCUS  Organism is a probable contaminant            Impression:  Active Hospital Problems    Diagnosis  POA    *Acute on chronic respiratory failure with hypoxia [J96.21]  Yes    Hypoxia [R09.02]  Yes    Multifocal pneumonia [J18.9]  Yes    CAD (coronary artery disease) [I25.10]  Yes     Chronic     Dr. Pérez cardiologist      Aortic valve stenosis [I35.0]  Yes     Chronic    S/P placement of cardiac pacemaker [Z95.0]  Yes     Chronic    Pneumonia [J18.9]  Yes    Hyponatremia [E87.1]  Yes    COPD (chronic obstructive pulmonary disease) [J44.9]  Yes     Chronic     Dr. Haro pulmonologist      Chronic hypoxemic respiratory failure [J96.11]  Yes     Chronic     3 L at home      Heterozygous alpha 1-antitrypsin deficiency [E88.01]  Yes      Chronic      Resolved Hospital Problems   No resolved problems to display.               Plan:     Nov 28 strep viridans bacteremia?, nl kervin sputum from 25th.    Wbc 22 on 25th to 11.4 today.  Chr right lung vol loss-- has had pneumonias intermittently since child- last pneumonia was mid 90's    Consider outpt in a day or 2, uses 3lpm ox at home, mobilizes well                            .

## 2019-11-28 NOTE — PLAN OF CARE
11/28/19 0730   Inhaler   $ Inhaler Charges MDI (Metered Dose [place]) Treatment  (anoro)   Daily Review of Necessity (Inhaler) completed   Respiratory Treatment Status (Inhaler) given;mouth rinsed post treatment   Treatment Route (Inhaler) mouthpiece;breath hold   Patient Position (Inhaler) sitting in chair   Post Treatment Assessment (Inhaler) breath sounds unchanged   Signs of Intolerance (Inhaler) none

## 2019-11-28 NOTE — PLAN OF CARE
11/27/19 2113   Patient Assessment/Suction   Level of Consciousness (AVPU) alert   Respiratory Effort Unlabored   Expansion/Accessory Muscles/Retractions no use of accessory muscles   All Lung Fields Breath Sounds crackles, fine   Rhythm/Pattern, Respiratory unlabored   Cough Frequency infrequent   Cough Type no productive sputum   PRE-TX-O2   O2 Device (Oxygen Therapy) nasal cannula   Flow (L/min) 3   SpO2 98 %   Pulse 72   Resp 18   Aerosol Therapy   $ Aerosol Therapy Charges Aerosol Treatment   Daily Review of Necessity (SVN) completed   Respiratory Treatment Status (SVN) given   Treatment Route (SVN) mask   Patient Position (SVN) semi-Martin's   Post Treatment Assessment (SVN) breath sounds improved   Signs of Intolerance (SVN) none   Breath Sounds Post-Respiratory Treatment   Throughout All Fields Post-Treatment All Fields   Throughout All Fields Post-Treatment no change   Post-treatment Heart Rate (beats/min) 73   Post-treatment Resp Rate (breaths/min) 18

## 2019-11-28 NOTE — PLAN OF CARE
11/28/19 0724   Patient Assessment/Suction   Level of Consciousness (AVPU) alert   Respiratory Effort Normal;Unlabored   Expansion/Accessory Muscles/Retractions expansion symmetric;no use of accessory muscles   ANDREW Breath Sounds diminished   LLL Breath Sounds diminished   RUL Breath Sounds clear   RML Breath Sounds clear   RLL Breath Sounds clear   Rhythm/Pattern, Respiratory pattern regular   Cough Frequency infrequent   Cough Type   (intermittently productive per pt)   Secretions Amount small   Secretions Color red-streaked   Secretions Characteristics thin   PRE-TX-O2   O2 Device (Oxygen Therapy) nasal cannula   $ Is the patient on Low Flow Oxygen? Yes   Flow (L/min) 3  (home setting)   SpO2 98 %   Pulse Oximetry Type Intermittent   $ Pulse Oximetry - Multiple Charge Pulse Oximetry - Multiple   Pulse 60   Resp 18   Positioning Sitting in chair   Aerosol Therapy   $ Aerosol Therapy Charges Aerosol Treatment   Daily Review of Necessity (SVN) completed   Respiratory Treatment Status (SVN) given   Treatment Route (SVN) mask;oxygen   Patient Position (SVN) HOB elevated   Post Treatment Assessment (SVN) increased aeration   Signs of Intolerance (SVN) none   Breath Sounds Post-Respiratory Treatment   Throughout All Fields Post-Treatment All Fields   Throughout All Fields Post-Treatment aeration increased   Post-treatment Heart Rate (beats/min) 60   Post-treatment Resp Rate (breaths/min) 18

## 2019-11-29 VITALS
WEIGHT: 183 LBS | HEART RATE: 60 BPM | SYSTOLIC BLOOD PRESSURE: 116 MMHG | DIASTOLIC BLOOD PRESSURE: 68 MMHG | OXYGEN SATURATION: 95 % | TEMPERATURE: 98 F | BODY MASS INDEX: 30.49 KG/M2 | RESPIRATION RATE: 18 BRPM | HEIGHT: 65 IN

## 2019-11-29 LAB
ANION GAP SERPL CALC-SCNC: 9 MMOL/L (ref 8–16)
BUN SERPL-MCNC: 20 MG/DL (ref 8–23)
CALCIUM SERPL-MCNC: 9.1 MG/DL (ref 8.7–10.5)
CHLORIDE SERPL-SCNC: 103 MMOL/L (ref 95–110)
CO2 SERPL-SCNC: 29 MMOL/L (ref 23–29)
CREAT SERPL-MCNC: 0.5 MG/DL (ref 0.5–1.4)
ERYTHROCYTE [DISTWIDTH] IN BLOOD BY AUTOMATED COUNT: 14.4 % (ref 11.5–14.5)
EST. GFR  (AFRICAN AMERICAN): >60 ML/MIN/1.73 M^2
EST. GFR  (NON AFRICAN AMERICAN): >60 ML/MIN/1.73 M^2
GLUCOSE SERPL-MCNC: 89 MG/DL (ref 70–110)
HCT VFR BLD AUTO: 35.3 % (ref 37–48.5)
HGB BLD-MCNC: 11.5 G/DL (ref 12–16)
MAGNESIUM SERPL-MCNC: 1.9 MG/DL (ref 1.6–2.6)
MCH RBC QN AUTO: 31.4 PG (ref 27–31)
MCHC RBC AUTO-ENTMCNC: 32.6 G/DL (ref 32–36)
MCV RBC AUTO: 96 FL (ref 82–98)
PHOSPHATE SERPL-MCNC: 4.1 MG/DL (ref 2.7–4.5)
PLATELET # BLD AUTO: 280 K/UL (ref 150–350)
PMV BLD AUTO: 9.2 FL (ref 9.2–12.9)
POTASSIUM SERPL-SCNC: 4.1 MMOL/L (ref 3.5–5.1)
RBC # BLD AUTO: 3.66 M/UL (ref 4–5.4)
SODIUM SERPL-SCNC: 141 MMOL/L (ref 136–145)
WBC # BLD AUTO: 9.5 K/UL (ref 3.9–12.7)

## 2019-11-29 PROCEDURE — 94761 N-INVAS EAR/PLS OXIMETRY MLT: CPT

## 2019-11-29 PROCEDURE — 85027 COMPLETE CBC AUTOMATED: CPT

## 2019-11-29 PROCEDURE — 84100 ASSAY OF PHOSPHORUS: CPT

## 2019-11-29 PROCEDURE — 63600175 PHARM REV CODE 636 W HCPCS: Performed by: FAMILY MEDICINE

## 2019-11-29 PROCEDURE — 94640 AIRWAY INHALATION TREATMENT: CPT

## 2019-11-29 PROCEDURE — 36415 COLL VENOUS BLD VENIPUNCTURE: CPT

## 2019-11-29 PROCEDURE — 25000242 PHARM REV CODE 250 ALT 637 W/ HCPCS: Performed by: INTERNAL MEDICINE

## 2019-11-29 PROCEDURE — 25000003 PHARM REV CODE 250: Performed by: FAMILY MEDICINE

## 2019-11-29 PROCEDURE — 83735 ASSAY OF MAGNESIUM: CPT

## 2019-11-29 PROCEDURE — 80048 BASIC METABOLIC PNL TOTAL CA: CPT

## 2019-11-29 PROCEDURE — 25000003 PHARM REV CODE 250: Performed by: INTERNAL MEDICINE

## 2019-11-29 PROCEDURE — 97116 GAIT TRAINING THERAPY: CPT

## 2019-11-29 PROCEDURE — 27000221 HC OXYGEN, UP TO 24 HOURS

## 2019-11-29 PROCEDURE — 97530 THERAPEUTIC ACTIVITIES: CPT

## 2019-11-29 RX ORDER — PREDNISONE 20 MG/1
10 TABLET ORAL DAILY
Qty: 30 TABLET | Refills: 0 | Status: SHIPPED | OUTPATIENT
Start: 2019-11-30 | End: 2020-01-13

## 2019-11-29 RX ORDER — CEFUROXIME AXETIL 250 MG/1
250 TABLET ORAL EVERY 12 HOURS
Qty: 12 TABLET | Refills: 0 | Status: SHIPPED | OUTPATIENT
Start: 2019-11-29 | End: 2019-12-05

## 2019-11-29 RX ORDER — OSELTAMIVIR PHOSPHATE 75 MG/1
75 CAPSULE ORAL 2 TIMES DAILY
Qty: 2 CAPSULE | Refills: 0 | Status: SHIPPED | OUTPATIENT
Start: 2019-11-29 | End: 2019-11-30

## 2019-11-29 RX ORDER — AZITHROMYCIN 500 MG/1
250 TABLET, FILM COATED ORAL DAILY
Qty: 2 TABLET | Refills: 0 | Status: SHIPPED | OUTPATIENT
Start: 2019-11-30 | End: 2019-12-02

## 2019-11-29 RX ORDER — CODEINE PHOSPHATE AND GUAIFENESIN 10; 100 MG/5ML; MG/5ML
10 SOLUTION ORAL EVERY 4 HOURS PRN
Qty: 120 ML | Refills: 0 | Status: SHIPPED | OUTPATIENT
Start: 2019-11-29 | End: 2019-12-09

## 2019-11-29 RX ADMIN — CLOPIDOGREL BISULFATE 75 MG: 75 TABLET, FILM COATED ORAL at 10:11

## 2019-11-29 RX ADMIN — ROFLUMILAST 500 MCG: 500 TABLET ORAL at 10:11

## 2019-11-29 RX ADMIN — GUAIFENESIN 600 MG: 600 TABLET, EXTENDED RELEASE ORAL at 10:11

## 2019-11-29 RX ADMIN — FLUTICASONE FUROATE AND VILANTEROL TRIFENATATE 1 PUFF: 100; 25 POWDER RESPIRATORY (INHALATION) at 07:11

## 2019-11-29 RX ADMIN — SPIRONOLACTONE 25 MG: 25 TABLET ORAL at 10:11

## 2019-11-29 RX ADMIN — IPRATROPIUM BROMIDE AND ALBUTEROL SULFATE 3 ML: .5; 3 SOLUTION RESPIRATORY (INHALATION) at 12:11

## 2019-11-29 RX ADMIN — IPRATROPIUM BROMIDE AND ALBUTEROL SULFATE 3 ML: .5; 3 SOLUTION RESPIRATORY (INHALATION) at 07:11

## 2019-11-29 RX ADMIN — PREDNISONE 40 MG: 20 TABLET ORAL at 10:11

## 2019-11-29 RX ADMIN — CEFTRIAXONE SODIUM 1 G: 1 INJECTION, POWDER, FOR SOLUTION INTRAMUSCULAR; INTRAVENOUS at 02:11

## 2019-11-29 RX ADMIN — IPRATROPIUM BROMIDE AND ALBUTEROL SULFATE 3 ML: .5; 3 SOLUTION RESPIRATORY (INHALATION) at 02:11

## 2019-11-29 RX ADMIN — DIGOXIN 0.25 MG: 250 TABLET ORAL at 10:11

## 2019-11-29 RX ADMIN — FUROSEMIDE 20 MG: 20 TABLET ORAL at 10:11

## 2019-11-29 RX ADMIN — OSELTAMIVIR PHOSPHATE 75 MG: 75 CAPSULE ORAL at 10:11

## 2019-11-29 RX ADMIN — AZITHROMYCIN 500 MG: 250 TABLET, FILM COATED ORAL at 10:11

## 2019-11-29 RX ADMIN — PANTOPRAZOLE SODIUM 40 MG: 40 TABLET, DELAYED RELEASE ORAL at 06:11

## 2019-11-29 RX ADMIN — METOPROLOL SUCCINATE 100 MG: 25 TABLET, FILM COATED, EXTENDED RELEASE ORAL at 10:11

## 2019-11-29 NOTE — PT/OT/SLP PROGRESS
Physical Therapy Treatment    Patient Name:  Lisa Smith   MRN:  [id number]    Recommendations:     Discharge Recommendations:  home   Discharge Equipment Recommendations: none   Barriers to discharge: None    Assessment:     Lisa Smith is a 69 y.o. female admitted with a medical diagnosis of Acute on chronic respiratory failure with hypoxia.  She presents with the following impairments/functional limitations:    .    Rehab Prognosis: Good; patient would benefit from acute skilled PT services to address these deficits and reach maximum level of function.    Recent Surgery: * No surgery found *      Plan:     During this hospitalization, patient to be seen 6 x/week to address the identified rehab impairments via therapeutic activities, therapeutic exercises, gait training and progress toward the following goals:    · Plan of Care Expires:  12/27/19    Subjective     Chief Complaint: none  Patient/Family Comments/goals: home  Pain/Comfort:  ·        Objective:     Communicated with RN prior to session.  Patient found supine with   upon PT entry to room.     General Precautions: Standard,     Orthopedic Precautions:    Braces:       Functional Mobility:  · Bed Mobility:     · Supine to Sit: independence      AM-PAC 6 CLICK MOBILITYamb 175 ft without device with independence. 02 3lpm.          Therapeutic Activities and Exercises:   bed mobility with independence    Patient left up in chair with call button in reach..    GOALS:   Multidisciplinary Problems     Physical Therapy Goals        Problem: Physical Therapy Goal    Goal Priority Disciplines Outcome Goal Variances Interventions   Physical Therapy Goal     PT, PT/OT Ongoing, Progressing     Description:  Goals to be met by:D/C    Patient will increase functional independence with mobility by performing:    Gait  x 150 f feet with Modified [place] using Rolling Walker.                       Time Tracking:     PT Received On: 11/29/19  PT Start Time:  0945     PT Stop Time: 1008  PT Total Time (min): 23 min     Billable Minutes: Gait Training 13 min and Therapeutic Activity 10 min    Treatment Type: Treatment  PT/PTA: PTA     PTA Visit Number: 1     Modesto Gomez PTA  11/29/2019

## 2019-11-29 NOTE — PLAN OF CARE
11/28/19 1955   Patient Assessment/Suction   Level of Consciousness (AVPU) alert   Respiratory Effort Normal;Unlabored   Expansion/Accessory Muscles/Retractions no use of accessory muscles   All Lung Fields Breath Sounds clear;diminished   Rhythm/Pattern, Respiratory unlabored   Cough Frequency infrequent   Cough Type good;nonproductive   PRE-TX-O2   O2 Device (Oxygen Therapy) nasal cannula   Flow (L/min) 3   SpO2 98 %   Pulse Oximetry Type Intermittent   $ Pulse Oximetry - Multiple Charge Pulse Oximetry - Multiple   Pulse 76   Resp 18   Aerosol Therapy   $ Aerosol Therapy Charges Aerosol Treatment   Daily Review of Necessity (SVN) completed   Respiratory Treatment Status (SVN) given   Treatment Route (SVN) mask   Patient Position (SVN) HOB elevated   Post Treatment Assessment (SVN) breath sounds unchanged   Signs of Intolerance (SVN) none   Breath Sounds Post-Respiratory Treatment   Post-treatment Heart Rate (beats/min) 72   Post-treatment Resp Rate (breaths/min) 16

## 2019-11-29 NOTE — PT/OT/SLP DISCHARGE
Physical Therapy Discharge Summary    Name: Lisa Smith  MRN: [id number]   Principal Problem: Acute on chronic respiratory failure with hypoxia     Patient Discharged from acute Physical Therapy on 11/29/2019.  Please refer to prior PT noted date on 11/29/2019 for functional status.     Assessment:     Patient has met all goals and is not appropriate for therapy.    Objective:     GOALS:   Multidisciplinary Problems     Physical Therapy Goals     Not on file          Multidisciplinary Problems (Resolved)        Problem: Physical Therapy Goal    Goal Priority Disciplines Outcome Goal Variances Interventions   Physical Therapy Goal   (Resolved)     PT, PT/OT Met     Description:  Goals to be met by:D/C    Patient will increase functional independence with mobility by performing:    Gait  x 150 f feet with Modified [place] using Rolling Walker.                       Reasons for Discontinuation of Therapy Services  Satisfactory goal achievement.      Plan:     Patient Discharged to: Home no PT services needed.    Gris Cortez, PT  11/29/2019

## 2019-11-29 NOTE — PROGRESS NOTES
[facility] Medicine  Progress Note    Patient Name: Lisa Smith  MRN: [id number]  Admission Date: 11/25/2019  Attending Physician: Dima Seals MD   Primary Care Provider: Raymond Pérez MD  DOS: 11/28/2019    Subjective:     Principal Problem:Acute on chronic respiratory failure with hypoxia    Chief Complaint:   Chief Complaint   Patient presents with    Shortness of Breath     X FEW DAYS    Cough    BODYACHES        HPI: 69-year-old female history of COPD on 3 L O2, chronic hypoxic respiratory failure, HFpEF, CKD3, s/p pacemaker, obesity, previous MONSERRAT (lost 100 lbs) comes in for shortness of breath.  Reports that she was in her usual health about 1 week ago which is solid developed some shortness of breath and nonproductive coughing with myalgias and generalized weakness.  Reports that symptoms worsen following 2 days however improved the following 3 days after that.  Reports that she was nearing baseline 2 days ago.  Reports that yesterday night she is tender developed a severe nonproductive cough with worsening shortness of breath.  Endorses using her inhalers and nebulizers as prescribed however had minimal relief.  Endorses having chills but unaware if she had any fevers.  Attempt to sleep throughout the night however continue workup with nonproductive coughing and shortness of breath.  She checked her O2 sats which was 85-88% on home 3 L oxygen.  Patient did want to call her daughter because she doing a workup.  This morning patient reports that her symptoms did not improve and she called her daughter to take her to the ED for further evaluation.  Of note, patient reports that she uses oxygen 3 L all the time at home.  Does not use oxygen when she leaves the house.    In the ED, vitals were stable with oxygen saturations that were appropriate with patient's history of COPD.  Labs significant for leukocytosis of 22.3, CTA chest found findings compatible with multilobar  pneumonia.  ASA, Rocephin, doxycycline, bronchodilators, IV fluids, steroids, magnesium sulfate.    Interval history:  Today the patient reports improving shortness of breath, constant timing, mild intensity, still worsens with exertion.  Cough is improving with resolution of blood-tinged sputum.  No fever or chills.  She is currently on 3 L nasal cannula which is her home regimen.  No abdominal pain, nausea, or vomiting.  Family at bedside for discussion.     Physical exam:  Vital signs reviewed  General:  Appears frail, sitting up at bedside comfortably, nontoxic  Head and eyes:  Anicteric sclerae, no conjunctival discharge  ENT:  Moist mucous membranes  Pulmonary:  Comfortable work of breathing, 3LNC in place, decent air movement with no wheezes, diminished breath sounds at bases  Cardiovascular:  2+ radial pulses, regular rate and rhythm  GI:  Abdomen soft and nontender    Laboratory data:  Hemoglobin 10  Hematocrit 33  Repeat blood culture no growth to date  Respiratory culture with normal respiratory kervin  Blood culture with strep viridans    Echocardiogram:  · Eccentric left ventricular hypertrophy.  · Mild left ventricular enlargement.  · Low normal left ventricular systolic function. The estimated ejection fraction is 50%  · Grade II (moderate) left ventricular diastolic dysfunction consistent with pseudonormalization.  · No wall motion abnormalities.  · Normal right ventricular systolic function.  · Mild left atrial enlargement.  · Mild right atrial enlargement.  · Moderate aortic valve stenosis.  · Aortic valve area is 0.86 cm2; peak velocity is 3.83 m/s; mean gradient is 33 mmHg.  · Mild mitral regurgitation.  · Mild mitral sclerosis.  · Moderate tricuspid regurgitation.  · Moderate to severe pulmonary hypertension present.  · Intermediate central venous pressure (8 mm Hg).  · The estimated PA systolic pressure is 50 mm Hg     1) atrial fib With lbbb  2) moderate aortic steno sis  3) moderate pulmonary  hi bp   4) grade ll diastolic dysfunction  Mean left atrial pressure = 12 mmhg     Assessment/Plan:     Assessment:  Acute exacerbation of COPD  Acute on chronic hypoxemic respiratory failure  Likely acute bacterial pneumonia  Possible influenza  Suspect contaminated blood culture  Atrial fibrillation  Left bundle-branch block  Aortic stenosis  Pulmonary hypertension likely secondary to underlying COPD  Hypertensive heart disease with diastolic dysfunction  Hypomagnesemia improved  Hyponatremia improved  Anemia likely due to chronic disease    Plan update today:  Appreciate Pulmonary  Repeat blood culture negative.  Strep viridans is likely a contaminant.  Sputum culture with normal respiratory kervin.  Continue mucolytic and antitussive.  Continue serial bronchodilators q.6 hours.    Continue oral azithromycin.  Continue empiric Tamiflu.  Continue prednisone.  Continue inhaled corticosteroid  Follow-up respiratory viral panel.  Procalcitonin negative  Continue supplemental oxygen at home regimen baseline  Mobilize with PT/OT as able  VTE prophylaxis with Lovenox  This is a moderate risk patient secondary to multiple comorbid conditions and multiple medications being managed; prescription drug management  Disposition:  Possible discharge 24 to 48 hr    * Acute on chronic respiratory failure with hypoxia  Likely 2/2 multifocal pneumonia, likely viral vs COPD exacerbation  On 3 L at home  Procalcitonin pending  Empiric rocephin + azithro IV  BCx pending  Sputum Cx pending  Tamiflu BID  Bronchodilators, steroids  Echo pending    Hyponatremia  Fluid restrict    Note:  Dr. Pérez, Dr. Haro are patient's cardio and pulm    VTE Risk Mitigation (From admission, onward)         Ordered     enoxaparin injection 40 mg  Daily      11/25/19 1156     IP VTE HIGH RISK PATIENT  Once      11/25/19 1156                   Dima Seals MD  Department of Hospital Medicine   Count includes the [facility]

## 2019-11-29 NOTE — PROGRESS NOTES
"[facility]  Adult Nutrition   Progress Note (Initial Assessment)     SUMMARY     Recommendations  Recommendation/Intervention: 1.) Continue diet as tolerated 2.)  to assist with meal choices daily  Goals: 1.) Pt to continue to meet >75% estimated needs via PO diet  Nutrition Goal Status: new    Reason for Assessment    Reason For Assessment: length of stay  Diagnosis: pulmonary disease  Relevant Medical History: COPD, CKD stage III, MONSERRAT    Nutrition Risk Screen    Nutrition Risk Screen: no indicators present     MST Score: 0  Have you recently lost weight without trying?: No  Weight loss score: 0  Have you been eating poorly because of a decreased appetite?: No  Appetite score: 0       Nutrition/Diet History    Patient Reported Diet/Restrictions/Preferences: general  Food Allergies: NKFA  Factors Affecting Nutritional Intake: None identified at this time    Anthropometrics    Temp: 98 °F (36.7 °C)  Height Method: Stated  Height: 5' 5" (165.1 cm)  Height (inches): 65 in  Weight Method: Bed Scale  Weight: 83 kg (183 lb)  Weight (lb): 183 lb  Ideal Body Weight (IBW), Female: 125 lb  % Ideal Body Weight, Female (lb): 146.4 lb  BMI (Calculated): 30.5  BMI Grade: 30 - 34.9- obesity - grade I       Weight History:  Wt Readings from Last 10 Encounters:   11/29/19 83 kg (183 lb)   11/25/19 83 kg (183 lb)   10/01/19 82.1 kg (181 lb)   07/24/19 83.9 kg (185 lb)   07/01/19 81.6 kg (180 lb)   04/23/19 85.7 kg (189 lb)   02/07/19 92.1 kg (203 lb)   12/05/18 90.7 kg (200 lb)   11/05/18 (P) 91.2 kg (201 lb)       Lab/Procedures/Meds: Pertinent Labs Reviewed  Clinical Chemistry:  Recent Labs   Lab 11/25/19  0849  11/27/19  0512 11/28/19  0505 11/29/19  0504   *   < > 138 140 141   K 4.4   < > 3.7 4.1 4.1   CL 96   < > 102 101 103   CO2 26   < > 28 31* 29   *   < > 96 99 89   BUN 20   < > 15 19 20   CREATININE 0.7   < > 0.6 0.6 0.5   CALCIUM 9.2   < > 9.2 9.2 9.1   PROT 8.0  --   --   --   --  "   ALBUMIN 4.5  --   --   --   --    BILITOT 0.9  --   --   --   --    ALKPHOS 60  --   --   --   --    AST 29  --   --   --   --    ALT 12  --   --   --   --    ANIONGAP 11   < > 8 8 9   ESTGFRAFRICA >60.0   < > >60.0 >60.0 >60.0   EGFRNONAA >60.0   < > >60.0 >60.0 >60.0   MG 1.5*   < > 1.9 2.0 1.9   PHOS  --    < > 3.2 4.7* 4.1    < > = values in this interval not displayed.     CBC:   Recent Labs   Lab 11/29/19  0504   WBC 9.50   RBC 3.66*   HGB 11.5*   HCT 35.3*      MCV 96   MCH 31.4*   MCHC 32.6     Cardiac Profile:  Recent Labs   Lab 11/25/19  0849   *   TROPONINI <0.030       Medications: Pertinent Medications reviewed  Scheduled Meds:   albuterol-ipratropium  3 mL Nebulization Q6H    azithromycin  500 mg Oral Daily    cefTRIAXone (ROCEPHIN) IVPB  1 g Intravenous Q24H    clopidogrel  75 mg Oral Daily    digoxin  0.25 mg Oral Daily    diltiaZEM  180 mg Oral QHS    enoxaparin  40 mg Subcutaneous Daily    fluticasone furoate-vilanterol  1 puff Inhalation Daily    furosemide  20 mg Oral Daily    guaiFENesin  600 mg Oral BID    metoprolol succinate  100 mg Oral Daily    oseltamivir  75 mg Oral BID    pantoprazole  40 mg Oral Daily    polyethylene glycol  17 g Oral Daily    predniSONE  40 mg Oral Daily    roflumilast  500 mcg Oral Daily    spironolactone  25 mg Oral Daily     Continuous Infusions:  PRN Meds:.acetaminophen, acetaminophen, albuterol-ipratropium, dextrose 50%, dextrose 50%, glucagon (human recombinant), glucose, glucose, guaifenesin-codeine 100-10 mg/5 ml, magnesium oxide, magnesium oxide, melatonin, ondansetron, potassium chloride 10%, potassium chloride 10%, potassium chloride 10%, potassium, sodium phosphates, potassium, sodium phosphates, potassium, sodium phosphates, sodium chloride 0.9%    Estimated/Assessed Needs    Weight Used For Calorie Calculations: 83 kg (182 lb 15.7 oz)  Energy Calorie Requirements (kcal): 5725-1121 (20-25 kcal/kg)  Energy Need Method:  Kcal/kg  Protein Requirements: 86 g (1.5 g/kg) per IBW  Weight Used For Protein Calculations: 57 kg (125 lb 10.6 oz)(IBW)  Fluid Requirements (mL): 2000 ml per MD  Estimated Fluid Requirement Method: other (see comments)  RDA Method (mL): 1660       Nutrition Prescription Ordered    Current Diet Order: cardiac; 2L fluid restriction    Evaluation of Received Nutrient/Fluid Intake    Energy Calories Required: meeting needs  Protein Required: meeting needs  Fluid Required: meeting needs  Tolerance: tolerating   No intake or output data in the 24 hours ending 11/29/19 1425     % Meal Intake: 75 - 100 %    Dietitian Rounds Brief    Pt assessed by RD 2' LOS. Tolerating diet; intake good. Denies any N/V. LBM 11/28. Appetite improving as pt is feeling better. States ready to go home.  No needs voiced at moment.    Nutrition Risk    Level of Risk/Frequency of Follow-up: moderate       Monitor and Evaluation    Food and Nutrient Intake: food and beverage intake, energy intake  Food and Nutrient Adminstration: diet order  Physical Activity and Function: nutrition-related ADLs and IADLs  Anthropometric Measurements: weight change  Biochemical Data, Medical Tests and Procedures: gastrointestinal profile, electrolyte and renal panel, glucose/endocrine profile  Nutrition-Focused Physical Findings: overall appearance     Nutrition Follow-Up    RD Follow-up?: Yes    Ermelinda Vu RD 11/29/2019 2:25 PM

## 2019-11-29 NOTE — PLAN OF CARE
Problem: Physical Therapy Goal  Goal: Physical Therapy Goal  Description  Goals to be met by:D/C    Patient will increase functional independence with mobility by performing:    Gait  x 150 f feet with Modified [place] using Rolling Walker.      Outcome: Ongoing, Progressing   Pt progressing to reach goals.

## 2019-11-29 NOTE — NURSING
Pt stated that she feels way better and she is ready to go home. She feels that the current tx has been working.

## 2019-11-29 NOTE — CARE UPDATE
11/29/19 0753   PRE-TX-O2   O2 Device (Oxygen Therapy) nasal cannula   $ Is the patient on Low Flow Oxygen? Yes   Flow (L/min) 3   SpO2 99 %   Pulse Oximetry Type Intermittent   $ Pulse Oximetry - Multiple Charge Pulse Oximetry - Multiple   Pulse 63   Resp 20   Aerosol Therapy   $ Aerosol Therapy Charges Aerosol Treatment   Daily Review of Necessity (SVN) completed   Respiratory Treatment Status (SVN) given   Treatment Route (SVN) mask;oxygen   Patient Position (SVN) sitting in chair   Signs of Intolerance (SVN) none   Inhaler   $ Inhaler Charges MDI (Metered Dose Inahler) Treatment   Daily Review of Necessity (Inhaler) completed   Respiratory Treatment Status (Inhaler) given;independent   Treatment Route (Inhaler) breath hold;mouthpiece   Patient Position (Inhaler) sitting in chair   Post Treatment Assessment (Inhaler) breath sounds improved   Signs of Intolerance (Inhaler) none   Breath Sounds Post-Respiratory Treatment   Throughout All Fields Post-Treatment All Fields   Throughout All Fields Post-Treatment aeration increased

## 2019-11-30 LAB — BACTERIA BLD CULT: NORMAL

## 2019-12-02 ENCOUNTER — TELEPHONE (OUTPATIENT)
Dept: PULMONOLOGY | Facility: CLINIC | Age: 69
End: 2019-12-02

## 2019-12-02 LAB — BACTERIA BLD CULT: NORMAL

## 2019-12-03 ENCOUNTER — HOSPITAL ENCOUNTER (OUTPATIENT)
Dept: RADIOLOGY | Facility: HOSPITAL | Age: 69
Discharge: HOME | End: 2019-12-03
Attending: NURSE PRACTITIONER
Payer: MEDICARE

## 2019-12-03 ENCOUNTER — OFFICE VISIT (OUTPATIENT)
Dept: PULMONOLOGY | Facility: CLINIC | Age: 69
End: 2019-12-03
Payer: MEDICARE

## 2019-12-03 ENCOUNTER — TELEPHONE (OUTPATIENT)
Dept: PULMONOLOGY | Facility: CLINIC | Age: 69
End: 2019-12-03

## 2019-12-03 VITALS
BODY MASS INDEX: 30.29 KG/M2 | WEIGHT: 182 LBS | DIASTOLIC BLOOD PRESSURE: 70 MMHG | SYSTOLIC BLOOD PRESSURE: 122 MMHG | OXYGEN SATURATION: 93 % | HEART RATE: 72 BPM

## 2019-12-03 DIAGNOSIS — J18.9 PNEUMONIA DUE TO INFECTIOUS ORGANISM, UNSPECIFIED LATERALITY, UNSPECIFIED PART OF LUNG: Primary | ICD-10-CM

## 2019-12-03 DIAGNOSIS — J44.0 CHRONIC OBSTRUCTIVE PULMONARY DISEASE WITH ACUTE LOWER RESPIRATORY INFECTION: Chronic | ICD-10-CM

## 2019-12-03 DIAGNOSIS — J18.9 MULTIFOCAL PNEUMONIA: ICD-10-CM

## 2019-12-03 DIAGNOSIS — J18.9 PNEUMONIA DUE TO INFECTIOUS ORGANISM, UNSPECIFIED LATERALITY, UNSPECIFIED PART OF LUNG: ICD-10-CM

## 2019-12-03 DIAGNOSIS — R09.02 HYPOXEMIA: ICD-10-CM

## 2019-12-03 PROBLEM — G47.33 OSA (OBSTRUCTIVE SLEEP APNEA): Status: RESOLVED | Noted: 2019-11-25 | Resolved: 2019-12-03

## 2019-12-03 PROCEDURE — 1111F PR DISCHARGE MEDS RECONCILED W/ CURRENT OUTPATIENT MED LIST: ICD-10-PCS | Mod: S$GLB,,, | Performed by: NURSE PRACTITIONER

## 2019-12-03 PROCEDURE — 1111F DSCHRG MED/CURRENT MED MERGE: CPT | Mod: S$GLB,,, | Performed by: NURSE PRACTITIONER

## 2019-12-03 PROCEDURE — 1159F MED LIST DOCD IN RCRD: CPT | Mod: S$GLB,,, | Performed by: NURSE PRACTITIONER

## 2019-12-03 PROCEDURE — 1126F PR PAIN SEVERITY QUANTIFIED, NO PAIN PRESENT: ICD-10-PCS | Mod: S$GLB,,, | Performed by: NURSE PRACTITIONER

## 2019-12-03 PROCEDURE — 71046 X-RAY EXAM CHEST 2 VIEWS: CPT | Mod: TC

## 2019-12-03 PROCEDURE — 1159F PR MEDICATION LIST DOCUMENTED IN MEDICAL RECORD: ICD-10-PCS | Mod: S$GLB,,, | Performed by: NURSE PRACTITIONER

## 2019-12-03 PROCEDURE — 1126F AMNT PAIN NOTED NONE PRSNT: CPT | Mod: S$GLB,,, | Performed by: NURSE PRACTITIONER

## 2019-12-03 PROCEDURE — 1101F PT FALLS ASSESS-DOCD LE1/YR: CPT | Mod: S$GLB,,, | Performed by: NURSE PRACTITIONER

## 2019-12-03 PROCEDURE — 99214 OFFICE O/P EST MOD 30 MIN: CPT | Mod: S$GLB,,, | Performed by: NURSE PRACTITIONER

## 2019-12-03 PROCEDURE — 99214 PR OFFICE/OUTPT VISIT, EST, LEVL IV, 30-39 MIN: ICD-10-PCS | Mod: S$GLB,,, | Performed by: NURSE PRACTITIONER

## 2019-12-03 PROCEDURE — 1101F PR PT FALLS ASSESS DOC 0-1 FALLS W/OUT INJ PAST YR: ICD-10-PCS | Mod: S$GLB,,, | Performed by: NURSE PRACTITIONER

## 2019-12-03 NOTE — PROGRESS NOTES
SUBJECTIVE:    Patient ID: Lisa Smith is a 69 y.o. female.    Chief Complaint: Hospital Follow Up (pneumonia admitted monday an discharged friday @[facility] ) and Cough (still cough up mucus )        Patient here today for hospital follow up. She was in hospital for 5 days last week for multilobar pneumonia.  She is wearing her oxygen all the time presently.  She is using Trelegy and taking Dalirsp.  She feels much better, not coughing and not running fever. However she does have night sweats occasionally. She states she was supposed to be discharged with antibiotics and steroids but none were sent to her pharmacy.  So she has been on none since Friday.    Past Medical History:   Diagnosis Date    Atrial fibrillation     Bell's palsy     COPD (chronic obstructive pulmonary disease)     GI bleed     Heterozygous alpha 1-antitrypsin deficiency     Hypertension     Lung disease     copd    MONSERRAT (obstructive sleep apnea)     Pneumonia     Pulmonary edema      Past Surgical History:   Procedure Laterality Date    CARDIAC ELECTROPHYSIOLOGY STUDY AND ABLATION      CARPAL TUNNEL RELEASE      CHOLECYSTECTOMY      HAND SURGERY      HYSTERECTOMY      INSERT / REPLACE / REMOVE PACEMAKER      KNEE ARTHROSCOPY       Family History   Problem Relation Age of Onset    Kidney failure Mother     Cancer Father     Cancer Brother         Social History:   Marital Status:   Occupation: housewife  Alcohol History:  reports that she does not drink alcohol.  Tobacco History:  reports that she quit smoking about 8 years ago. Her smoking use included cigarettes. She started smoking about 48 years ago. She has a 60.00 pack-year smoking history. She has never used smokeless tobacco.  Drug History:  reports that she does not use drugs.    Review of patient's allergies indicates:   Allergen Reactions    Sulfa (sulfonamide antibiotics)        Current Outpatient Medications   Medication Sig Dispense Refill    albuterol  (ACCUNEB) 1.25 mg/3 mL Nebu Take 3 mLs (1.25 mg total) by nebulization every 6 (six) hours as needed. Rescue 120 vial 6    benzonatate (TESSALON) 200 MG capsule Take 200 mg by mouth 3 (three) times daily as needed.  3    cefUROXime (CEFTIN) 250 MG tablet Take 1 tablet (250 mg total) by mouth every 12 (twelve) hours. for 6 days 12 tablet 0    clopidogrel (PLAVIX) 75 mg tablet       digoxin (LANOXIN) 250 mcg tablet Take 0.25 mg by mouth once daily.      diltiaZEM (CARTIA XT) 120 MG Cp24 Take 180 mg by mouth once daily.      fluticasone-umeclidin-vilanter (TRELEGY ELLIPTA) 100-62.5-25 mcg DsDv Inhale 1 puff into the lungs once daily. 3 each 3    FLUZONE HIGH-DOSE 2019-20, PF, 180 mcg/0.5 mL Syrg       furosemide (LASIX) 40 MG tablet 20 mg.       guaifenesin-codeine 100-10 mg/5 ml (TUSSI-ORGANIDIN NR)  mg/5 mL syrup Take 10 mLs by mouth every 4 (four) hours as needed for Cough or Congestion. 120 mL 0    magnesium oxide (MAG-OX) 400 mg (241.3 mg magnesium) tablet Take 400 mg by mouth once daily.      melatonin 5 mg Tab Take 5 mg by mouth nightly as needed.      metoprolol succinate (TOPROL-XL) 100 MG 24 hr tablet       pantoprazole (PROTONIX) 40 MG tablet Take 40 mg by mouth once daily.      predniSONE (DELTASONE) 20 MG tablet Take 0.5 tablets (10 mg total) by mouth once daily. Take 3 tablets daily x5 days, then take 2 tablets daily x5 days, then take 1 tablet daily x5 days 30 tablet 0    roflumilast (DALIRESP) 500 mcg Tab Take 1 tablet (500 mcg total) by mouth once daily. 90 tablet 6    spironolactone (ALDACTONE) 25 MG tablet 25 mg once daily.        No current facility-administered medications for this visit.        Alpha-1 Antitrypsin: MZ  Last PFT: 6/25/18  Last ct 11/2019    General: feeling fatigued still but much better then last week, has had hot flashes lately   Eyes: Vision is good.  ENT:  No sinusitis or pharyngitis.   Heart:: No chest pain or palpitations.  Lungs: shortness of breath is  better, mucous not bloody or brown anymore  GI: No Nausea, vomiting, constipation, diarrhea, or reflux.  : No dysuria, hesitancy, or nocturia.  Musculoskeletal: No joint pain or myalgias.  Skin: No lesions or rashes.  Neuro: No headaches or neuropathy.  Lymph: No edema or adenopathy.  Psych: No anxiety or depression.  Endo: No weight change.    OBJECTIVE:      /70 (BP Location: Left arm, Patient Position: Sitting)   Pulse 72   Wt 82.6 kg (182 lb)   LMP  (LMP Unknown)   SpO2 (!) 93% Comment: 3.0 oxygen level  BMI 30.29 kg/m²     Physical Exam  GENERAL: Older patient in no distress.  HEENT: Pupils equal and reactive. Extraocular movements intact. Nose intact.      Pharynx moist.  NECK: Supple.   HEART: Regular rate and rhythm.  murmur   LUNGS: rhonchi and crackles to left posteriorly and anteriorly   ABDOMEN: Bowel sounds present. Non-tender, no masses palpated.  EXTREMITIES: Normal muscle tone and joint movement, no cyanosis or clubbing.   LYMPHATICS: No adenopathy palpated, no edema.  SKIN: Dry, intact, no lesions.   NEURO: Cranial nerves II-XII intact. Motor strength 5/5 bilaterally, upper and lower extremities.  PSYCH: Appropriate affect.    Assessment:    Multilobar pneumonia  COPD  Chronic hypoxemic respiratory failure     Plan:          Chest xray   CBC  Continue the Reuben and Cezar   Will call you with results   Follow up in about 3 months (around 3/3/2020).

## 2019-12-03 NOTE — TELEPHONE ENCOUNTER
Impression       1. Improved aeration of the right lung with persistent yet decreased patchy opacities.  2. Persistent obscuration of the right hemidiaphragm and costophrenic angle may reflect pleural effusion, atelectasis and/or consolidation.  3. Unchanged blunting of the left costophrenic angle, reflecting a small pleural effusion, atelectasis and/or scarring     WBC normal on CBC.    Called patient told her will hold off on more antibiotic and steroids since she is feeling better. Will repeat a chest xray in 2 weeks to make sure her chest xray is continuing to improve.

## 2019-12-04 NOTE — DISCHARGE SUMMARY
[facility] Medicine  Discharge Summary      Patient Name: Lisa Smith  MRN: [id number]  Admission Date: 11/25/2019  Discharge Date and Time: 11/29/2019  5:31 PM  Discharging Provider: Dima Seals MD  Primary Care Provider: Raymond Pérez MD    HPI:  69-year-old female history of COPD on 3 L O2, chronic hypoxic respiratory failure, HFpEF, CKD3, s/p pacemaker, obesity, previous MONSERRAT (lost 100 lbs) comes in for shortness of breath.  Reports that she was in her usual health about 1 week ago which is solid developed some shortness of breath and nonproductive coughing with myalgias and generalized weakness.  Reports that symptoms worsen following 2 days however improved the following 3 days after that.  Reports that she was nearing baseline 2 days ago.  Reports that yesterday night she is tender developed a severe nonproductive cough with worsening shortness of breath.  Endorses using her inhalers and nebulizers as prescribed however had minimal relief.  Endorses having chills but unaware if she had any fevers.  Attempt to sleep throughout the night however continue workup with nonproductive coughing and shortness of breath.  She checked her O2 sats which was 85-88% on home 3 L oxygen.  Patient did want to call her daughter because she doing a workup.  This morning patient reports that her symptoms did not improve and she called her daughter to take her to the ED for further evaluation.  Of note, patient reports that she uses oxygen 3 L all the time at home.  Does not use oxygen when she leaves the house. In the ED, vitals were stable with oxygen saturations that were appropriate with patient's history of COPD.  Labs significant for leukocytosis of 22.3, CTA chest found findings compatible with multilobar pneumonia.  ASA, Rocephin, doxycycline, bronchodilators, IV fluids, steroids, magnesium sulfate.    Hospital course:  Patient was admitted to the hospital for treatment including serial  bronchodilators, supplemental oxygen, systemic steroids, and empiric IV antibiotics.  Cultures of blood and sputum were obtained.  Influenza nasal swab testing was negative but with high clinical concern the patient was started on Tamiflu therapy.  Consultation was obtained with Pulmonary and Cardiology.  Patient had initial blood culture positive with strep viridans which is likely a contaminant.  Repeat blood culture the next day was negative.  Sputum culture grew normal respiratory kervin.  The patient's antibiotic regimen was weaned to oral azithromycin.  She slowly improved with medical treatment and systemic steroids were weaned to oral prednisone.  Patient slowly began to mobilize better and supplemental oxygen was weaned to home baseline 3 L nasal cannula.  Today the patient is doing much better, eating well, and mobilizing without difficulty.  She is stable for discharge home.  She will continue course of oral prednisone, oral antibiotics, and antitussive at discharge. The patient and family are in understanding and agreement with discharge plan.     Discharge diagnoses:  Acute exacerbation of COPD  Acute on chronic hypoxemic respiratory failure  Likely acute bacterial pneumonia  Possible influenza  Suspect contaminated blood culture  Atrial fibrillation  Left bundle-branch block  Aortic stenosis  Pulmonary hypertension likely secondary to underlying COPD  Hypertensive heart disease with diastolic dysfunction  Hypomagnesemia improved  Hyponatremia improved  Anemia likely due to chronic disease    Discharge Physical exam:  General:  sitting up at bedside comfortably, nontoxic  ENT:  Moist mucous membranes  Pulmonary:  Comfortable work of breathing, 3LNC in place, good air movement without wheezing  Cardiovascular:  2+ radial pulses, regular rate and rhythm     Echocardiogram:  · Eccentric left ventricular hypertrophy.  · Mild left ventricular enlargement.  · Low normal left ventricular systolic function. The  estimated ejection fraction is 50%  · Grade II (moderate) left ventricular diastolic dysfunction consistent with pseudonormalization.  · No wall motion abnormalities.  · Normal right ventricular systolic function.  · Mild left atrial enlargement.  · Mild right atrial enlargement.  · Moderate aortic valve stenosis.  · Aortic valve area is 0.86 cm2; peak velocity is 3.83 m/s; mean gradient is 33 mmHg.  · Mild mitral regurgitation.  · Mild mitral sclerosis.  · Moderate tricuspid regurgitation.  · Moderate to severe pulmonary hypertension present.  · Intermediate central venous pressure (8 mm Hg).  · The estimated PA systolic pressure is 50 mm Hg     1) atrial fib With lbbb  2) moderate aortic steno sis  3) moderate pulmonary hi bp   4) grade ll diastolic dysfunction  Mean left atrial pressure = 12 mmhg      Consults:   Consults (From admission, onward)        Status Ordering Provider     Inpatient consult to Pulmonology  Once     Provider:  Kathryn Haro MD    Completed CAMACHO LE        Final Active Diagnoses:    Diagnosis Date Noted POA    PRINCIPAL PROBLEM:  Acute on chronic respiratory failure with hypoxia [J96.21] 11/25/2019 Yes    Hypoxia [R09.02] 11/26/2019 Yes    Multifocal pneumonia [J18.9] 11/25/2019 Yes    CAD (coronary artery disease) [I25.10] 11/25/2019 Yes     Chronic    Aortic valve stenosis [I35.0] 11/25/2019 Yes     Chronic    S/P placement of cardiac pacemaker [Z95.0] 11/25/2019 Yes     Chronic    Pneumonia [J18.9] 11/25/2019 Yes    Hyponatremia [E87.1] 11/25/2019 Yes    COPD (chronic obstructive pulmonary disease) [J44.9] 10/01/2019 Yes     Chronic    Chronic hypoxemic respiratory failure [J96.11] 12/05/2018 Yes     Chronic    Heterozygous alpha 1-antitrypsin deficiency [E88.01] 11/05/2018 Yes     Chronic      Problems Resolved During this Admission:       Discharged Condition: good    Disposition: Home or Self Care    Follow Up:  Follow-up Information     Raymond Pérez MD  In 2 weeks.    Specialty:  Cardiology  Contact information:  [address]  [facility]  [place] [id number]  [phone]             Kathryn Haro MD In 1 week.    Specialties:  Pulmonary Disease, Sleep Medicine  Contact information:  [address]  [place] [id number]  [phone]                 Patient Instructions:      Diet Cardiac     Notify your health care provider if you experience any of the following:  difficulty breathing or increased cough     Notify your health care provider if you experience any of the following:  temperature >100.4     Activity as tolerated       Pending Diagnostic Studies:     None         Medications:  Reconciled Home Medications:      Medication List      START taking these medications    cefUROXime 250 MG tablet  Commonly known as:  CEFTIN  Take 1 tablet (250 mg total) by mouth every 12 (twelve) hours. for 6 days     guaifenesin-codeine 100-10 mg/5 ml  mg/5 mL syrup  Commonly known as:  TUSSI-ORGANIDIN NR  Take 10 mLs by mouth every 4 (four) hours as needed for Cough or Congestion.        CHANGE how you take these medications    fluticasone-umeclidin-vilanter 100-62.5-25 mcg Dsdv  Commonly known as:  Trelegy Ellipta  Inhale 1 puff into the lungs once daily.  What changed:  Another medication with the same name was removed. Continue taking this medication, and follow the directions you see here.     predniSONE 20 MG tablet  Commonly known as:  DELTASONE  Take 0.5 tablets (10 mg total) by mouth once daily. Take 3 tablets daily x5 days, then take 2 tablets daily x5 days, then take 1 tablet daily x5 days  What changed:    · medication strength  · how much to take  · how to take this  · when to take this  · additional instructions  · Another medication with the same name was removed. Continue taking this medication, and follow the directions you see here.     roflumilast 500 mcg Tab  Commonly known as:  Daliresp  Take 1 tablet (500 mcg total)  by mouth once daily.  What changed:  Another medication with the same name was removed. Continue taking this medication, and follow the directions you see here.        CONTINUE taking these medications    albuterol 1.25 mg/3 mL Nebu  Commonly known as:  ACCUNEB  Take 3 mLs (1.25 mg total) by nebulization every 6 (six) hours as needed. Rescue     benzonatate 200 MG capsule  Commonly known as:  TESSALON  Take 200 mg by mouth 3 (three) times daily as needed.     Cartia  MG Cp24  Generic drug:  diltiaZEM  Take 180 mg by mouth once daily.     clopidogrel 75 mg tablet  Commonly known as:  PLAVIX     digoxin 250 mcg tablet  Commonly known as:  LANOXIN  Take 0.25 mg by mouth once daily.     Fluzone High-Dose 2019-20 (PF) 180 mcg/0.5 mL Syrg  Generic drug:  flu vacc [id number](65yr up)PF     furosemide 40 MG tablet  Commonly known as:  LASIX  20 mg.     magnesium oxide 400 mg (241.3 mg magnesium) tablet  Commonly known as:  MAG-OX  Take 400 mg by mouth once daily.     melatonin 5 mg Tab  Take 5 mg by mouth nightly as needed.     metoprolol succinate 100 MG 24 hr tablet  Commonly known as:  TOPROL-XL     pantoprazole 40 MG tablet  Commonly known as:  PROTONIX  Take 40 mg by mouth once daily.     spironolactone 25 MG tablet  Commonly known as:  ALDACTONE  25 mg once daily.        STOP taking these medications    azithromycin 250 MG tablet  Commonly known as:  Zithromax Z-Perico     levoFLOXacin 500 MG tablet  Commonly known as:  LEVAQUIN     metoprolol tartrate 100 MG tablet  Commonly known as:  LOPRESSOR     omeprazole 20 MG capsule  Commonly known as:  PRILOSEC     sertraline 100 MG tablet  Commonly known as:  ZOLOFT     zolpidem 5 MG Tab  Commonly known as:  AMBIEN        ASK your doctor about these medications    azithromycin 500 MG tablet  Commonly known as:  ZITHROMAX  Take 0.5 tablets (250 mg total) by mouth once daily. for 2 days  Ask about: Should I take this medication?     oseltamivir 75 MG capsule  Commonly known  as:  TAMIFLU  Take 1 capsule (75 mg total) by mouth 2 (two) times daily. for 2 doses  Ask about: Should I take this medication?            Indwelling Lines/Drains at time of discharge:   Lines/Drains/Airways     None                 Time spent on the discharge of patient: 36 minutes  Patient was seen and examined on the date of discharge and determined to be suitable for discharge.      Dima Seals MD  Department of Hospital Medicine  [facility]

## 2019-12-05 ENCOUNTER — HOSPITAL ENCOUNTER (EMERGENCY)
Facility: HOSPITAL | Age: 69
Discharge: HOME | End: 2019-12-06
Attending: EMERGENCY MEDICINE
Payer: MEDICARE

## 2019-12-05 DIAGNOSIS — R10.9 FLANK PAIN: ICD-10-CM

## 2019-12-05 DIAGNOSIS — J18.9 PNEUMONIA: ICD-10-CM

## 2019-12-05 DIAGNOSIS — R06.02 SHORTNESS OF BREATH: Primary | ICD-10-CM

## 2019-12-05 LAB
ALBUMIN SERPL BCP-MCNC: 3.7 G/DL (ref 3.5–5.2)
ALP SERPL-CCNC: 64 U/L (ref 55–135)
ALT SERPL W/O P-5'-P-CCNC: 15 U/L (ref 10–44)
ANION GAP SERPL CALC-SCNC: 12 MMOL/L (ref 8–16)
APTT PPP: 28.1 SEC (ref 23.6–33.3)
AST SERPL-CCNC: 20 U/L (ref 10–40)
BASOPHILS # BLD AUTO: 0.07 K/UL (ref 0–0.2)
BASOPHILS NFR BLD: 0.6 % (ref 0–1.9)
BILIRUB SERPL-MCNC: 0.7 MG/DL (ref 0.1–1)
BUN SERPL-MCNC: 29 MG/DL (ref 8–23)
CALCIUM SERPL-MCNC: 9.2 MG/DL (ref 8.7–10.5)
CHLORIDE SERPL-SCNC: 98 MMOL/L (ref 95–110)
CO2 SERPL-SCNC: 27 MMOL/L (ref 23–29)
CREAT SERPL-MCNC: 0.7 MG/DL (ref 0.5–1.4)
DIFFERENTIAL METHOD: ABNORMAL
EOSINOPHIL # BLD AUTO: 0.1 K/UL (ref 0–0.5)
EOSINOPHIL NFR BLD: 0.4 % (ref 0–8)
ERYTHROCYTE [DISTWIDTH] IN BLOOD BY AUTOMATED COUNT: 14 % (ref 11.5–14.5)
EST. GFR  (AFRICAN AMERICAN): >60 ML/MIN/1.73 M^2
EST. GFR  (NON AFRICAN AMERICAN): >60 ML/MIN/1.73 M^2
GLUCOSE SERPL-MCNC: 130 MG/DL (ref 70–110)
HCT VFR BLD AUTO: 37.4 % (ref 37–48.5)
HGB BLD-MCNC: 12.1 G/DL (ref 12–16)
IMM GRANULOCYTES # BLD AUTO: 0.14 K/UL (ref 0–0.04)
IMM GRANULOCYTES NFR BLD AUTO: 1.2 % (ref 0–0.5)
INR PPP: 1
LACTATE SERPL-SCNC: 1.1 MMOL/L (ref 0.5–1.9)
LIPASE SERPL-CCNC: 47 U/L (ref 4–60)
LYMPHOCYTES # BLD AUTO: 1 K/UL (ref 1–4.8)
LYMPHOCYTES NFR BLD: 8.8 % (ref 18–48)
MCH RBC QN AUTO: 31.1 PG (ref 27–31)
MCHC RBC AUTO-ENTMCNC: 32.4 G/DL (ref 32–36)
MCV RBC AUTO: 96 FL (ref 82–98)
MONOCYTES # BLD AUTO: 0.5 K/UL (ref 0.3–1)
MONOCYTES NFR BLD: 4.1 % (ref 4–15)
NEUTROPHILS # BLD AUTO: 10.1 K/UL (ref 1.8–7.7)
NEUTROPHILS NFR BLD: 84.9 % (ref 38–73)
NRBC BLD-RTO: 0 /100 WBC
OB PNL STL: NEGATIVE
PLATELET # BLD AUTO: 324 K/UL (ref 150–350)
PMV BLD AUTO: 9.3 FL (ref 9.2–12.9)
POTASSIUM SERPL-SCNC: 4.1 MMOL/L (ref 3.5–5.1)
PROT SERPL-MCNC: 7.6 G/DL (ref 6–8.4)
PROTHROMBIN TIME: 12.6 SEC (ref 10.6–14.8)
RBC # BLD AUTO: 3.89 M/UL (ref 4–5.4)
SODIUM SERPL-SCNC: 137 MMOL/L (ref 136–145)
WBC # BLD AUTO: 11.84 K/UL (ref 3.9–12.7)

## 2019-12-05 PROCEDURE — 99285 EMERGENCY DEPT VISIT HI MDM: CPT | Mod: 25

## 2019-12-05 PROCEDURE — 85730 THROMBOPLASTIN TIME PARTIAL: CPT

## 2019-12-05 PROCEDURE — 63600175 PHARM REV CODE 636 W HCPCS: Performed by: STUDENT IN AN ORGANIZED HEALTH CARE EDUCATION/TRAINING PROGRAM

## 2019-12-05 PROCEDURE — 82272 OCCULT BLD FECES 1-3 TESTS: CPT

## 2019-12-05 PROCEDURE — 96376 TX/PRO/DX INJ SAME DRUG ADON: CPT

## 2019-12-05 PROCEDURE — 96374 THER/PROPH/DIAG INJ IV PUSH: CPT | Mod: 59

## 2019-12-05 PROCEDURE — 80053 COMPREHEN METABOLIC PANEL: CPT

## 2019-12-05 PROCEDURE — 85610 PROTHROMBIN TIME: CPT

## 2019-12-05 PROCEDURE — 96375 TX/PRO/DX INJ NEW DRUG ADDON: CPT

## 2019-12-05 PROCEDURE — 83605 ASSAY OF LACTIC ACID: CPT

## 2019-12-05 PROCEDURE — 93005 ELECTROCARDIOGRAM TRACING: CPT

## 2019-12-05 PROCEDURE — 83690 ASSAY OF LIPASE: CPT

## 2019-12-05 PROCEDURE — 85025 COMPLETE CBC W/AUTO DIFF WBC: CPT

## 2019-12-05 RX ORDER — MORPHINE SULFATE 4 MG/ML
4 INJECTION, SOLUTION INTRAMUSCULAR; INTRAVENOUS
Status: COMPLETED | OUTPATIENT
Start: 2019-12-05 | End: 2019-12-05

## 2019-12-05 RX ORDER — PANTOPRAZOLE SODIUM 40 MG/10ML
80 INJECTION, POWDER, LYOPHILIZED, FOR SOLUTION INTRAVENOUS
Status: DISCONTINUED | OUTPATIENT
Start: 2019-12-05 | End: 2019-12-05

## 2019-12-05 RX ORDER — HYDROMORPHONE HYDROCHLORIDE 1 MG/ML
0.5 INJECTION, SOLUTION INTRAMUSCULAR; INTRAVENOUS; SUBCUTANEOUS
Status: COMPLETED | OUTPATIENT
Start: 2019-12-05 | End: 2019-12-05

## 2019-12-05 RX ORDER — ONDANSETRON 2 MG/ML
4 INJECTION INTRAMUSCULAR; INTRAVENOUS
Status: COMPLETED | OUTPATIENT
Start: 2019-12-05 | End: 2019-12-05

## 2019-12-05 RX ADMIN — HYDROMORPHONE HYDROCHLORIDE 0.5 MG: 1 INJECTION, SOLUTION INTRAMUSCULAR; INTRAVENOUS; SUBCUTANEOUS at 10:12

## 2019-12-05 RX ADMIN — MORPHINE SULFATE 4 MG: 4 INJECTION INTRAVENOUS at 09:12

## 2019-12-05 RX ADMIN — ONDANSETRON 4 MG: 2 INJECTION INTRAMUSCULAR; INTRAVENOUS at 09:12

## 2019-12-05 RX ADMIN — SODIUM CHLORIDE, POTASSIUM CHLORIDE, SODIUM LACTATE AND CALCIUM CHLORIDE 500 ML: 600; 310; 30; 20 INJECTION, SOLUTION INTRAVENOUS at 11:12

## 2019-12-06 VITALS
RESPIRATION RATE: 18 BRPM | HEART RATE: 64 BPM | OXYGEN SATURATION: 100 % | WEIGHT: 182 LBS | SYSTOLIC BLOOD PRESSURE: 159 MMHG | HEIGHT: 65 IN | BODY MASS INDEX: 30.32 KG/M2 | DIASTOLIC BLOOD PRESSURE: 71 MMHG | TEMPERATURE: 98 F

## 2019-12-06 LAB
BILIRUB UR QL STRIP: NEGATIVE
CLARITY UR: CLEAR
COLOR UR: YELLOW
GLUCOSE UR QL STRIP: NEGATIVE
HGB UR QL STRIP: NEGATIVE
KETONES UR QL STRIP: NEGATIVE
LEUKOCYTE ESTERASE UR QL STRIP: NEGATIVE
NITRITE UR QL STRIP: NEGATIVE
PH UR STRIP: 8 [PH] (ref 5–8)
PROT UR QL STRIP: ABNORMAL
SP GR UR STRIP: 1.02 (ref 1–1.03)
URN SPEC COLLECT METH UR: ABNORMAL
UROBILINOGEN UR STRIP-ACNC: NEGATIVE EU/DL

## 2019-12-06 PROCEDURE — 63600175 PHARM REV CODE 636 W HCPCS: Performed by: EMERGENCY MEDICINE

## 2019-12-06 PROCEDURE — 81003 URINALYSIS AUTO W/O SCOPE: CPT

## 2019-12-06 PROCEDURE — 96361 HYDRATE IV INFUSION ADD-ON: CPT

## 2019-12-06 RX ORDER — HYDROMORPHONE HYDROCHLORIDE 1 MG/ML
0.5 INJECTION, SOLUTION INTRAMUSCULAR; INTRAVENOUS; SUBCUTANEOUS
Status: COMPLETED | OUTPATIENT
Start: 2019-12-06 | End: 2019-12-06

## 2019-12-06 RX ADMIN — HYDROMORPHONE HYDROCHLORIDE 0.5 MG: 1 INJECTION, SOLUTION INTRAMUSCULAR; INTRAVENOUS; SUBCUTANEOUS at 12:12

## 2019-12-06 NOTE — ED PROVIDER NOTES
Encounter Date: 2019       History     Chief Complaint   Patient presents with    Shortness of Breath     SOB for most of the day. Recently D/C for pneumonia and MD Tahir told her she still has some.    Abdominal Pain     N/V since this afternoon     HPI     The patient is a 69F w/ PMH of alpha-1 AT deficiency, CAD, aortic valve stenosis, pacemaker, discharged 5 days ago from hospital after stay for pneumonia presenting with acute onset right flank pain, vomiting, and 1 dark stool this morning. Patient state that her shortness of breath ahs improved since she left hospital, also endorses resolution of productive cough. States that she has vomiting three times today (non-bloody) but has been able to tolerate small sips of water. She denies abdominal pain, dysuria. No chest pain. Denies diarrhea. Endorses 1 dark stool earlier today. ROS otherwise negative.    Review of patient's allergies indicates:   Allergen Reactions    Sulfa (sulfonamide antibiotics)      Past Medical History:   Diagnosis Date    Atrial fibrillation     Bell's palsy     COPD (chronic obstructive pulmonary disease)     GI bleed     Heterozygous alpha 1-antitrypsin deficiency     Hypertension     Lung disease     copd    MONSERRAT (obstructive sleep apnea)     Pneumonia     Pulmonary edema      Past Surgical History:   Procedure Laterality Date    CARDIAC ELECTROPHYSIOLOGY STUDY AND ABLATION      CARPAL TUNNEL RELEASE      CHOLECYSTECTOMY      HAND SURGERY      HYSTERECTOMY      INSERT / REPLACE / REMOVE PACEMAKER      KNEE ARTHROSCOPY       Family History   Problem Relation Age of Onset    Kidney failure Mother     Cancer Father     Cancer Brother      Social History     Tobacco Use    Smoking status: Former Smoker     Packs/day: 2.00     Years: 30.00     Pack years: 60.00     Types: Cigarettes     Start date: 1971     Last attempt to quit: 2011     Years since quittin.8    Smokeless tobacco: Never Used    Substance Use Topics    Alcohol use: No     Frequency: Never    Drug use: No     Review of Systems   Constitutional: Negative for fever.   HENT: Negative for sore throat.    Respiratory: Negative for shortness of breath.    Cardiovascular: Negative for chest pain.   Gastrointestinal: Positive for nausea and vomiting. Negative for diarrhea.   Genitourinary: Negative for dysuria.   Musculoskeletal: Negative for back pain.   Skin: Negative for rash.   Neurological: Negative for weakness.   Hematological: Does not bruise/bleed easily.       Physical Exam     Initial Vitals [12/05/19 2058]   BP Pulse Resp Temp SpO2   (!) 193/84 68 (!) 34 98 °F (36.7 °C) 100 %      MAP       --         Physical Exam    Constitutional: She appears well-developed and well-nourished.   HENT:   Head: Normocephalic and atraumatic.   Eyes: Right eye exhibits no chemosis, no discharge and no exudate. Left eye exhibits no chemosis, no discharge and no exudate.   Neck: Normal range of motion. No stridor present.   Cardiovascular: Normal rate, regular rhythm and normal heart sounds.   4/6 systolic murmur   Pulmonary/Chest: Breath sounds normal. No respiratory distress. She has no wheezes. She has no rales.   Abdominal: Soft. She exhibits no distension. There is no tenderness.   Neurological: She is alert.   Skin: Skin is warm and dry.   Psychiatric: She has a normal mood and affect. Her speech is normal and behavior is normal.         ED Course   Procedures  Labs Reviewed   CBC W/ AUTO DIFFERENTIAL - Abnormal; Notable for the following components:       Result Value    RBC 3.89 (*)     Mean Corpuscular Hemoglobin 31.1 (*)     Immature Granulocytes 1.2 (*)     Gran # (ANC) 10.1 (*)     Immature Grans (Abs) 0.14 (*)     Gran% 84.9 (*)     Lymph% 8.8 (*)     All other components within normal limits   COMPREHENSIVE METABOLIC PANEL - Abnormal; Notable for the following components:    Glucose 130 (*)     BUN, Bld 29 (*)     All other components  within normal limits   URINALYSIS, REFLEX TO URINE CULTURE - Abnormal; Notable for the following components:    Protein, UA Trace (*)     All other components within normal limits    Narrative:     Preferred Collection Type->Urine, Clean Catch  Specimen Source->Urine   LIPASE   PROTIME-INR   LACTIC ACID, PLASMA   APTT   OCCULT BLOOD X 1, STOOL          Imaging Results          X-Ray Chest PA And Lateral (In process)                CT Abdomen Pelvis  Without Contrast (Final result)  Result time 12/05/19 22:22:03   Procedure changed from CT Abdomen Without Contrast     Final result by Gaurang Reyes MD (12/05/19 22:22:03)                 Narrative:        Exam: CT OF THE ABDOMEN/PELVIS WITHOUT CONTRAST    Clinical data: Left flank pain.    Technique: Direct contiguous axial CT images were acquired through the abdomen  and pelvis without contrast using soft tissue and bone algorithms.  Reformatted/MPR images were performed. Radiation dose: CTDIvol = 14.00 mGy, DLP  = 710.80 mGy x cm.    Limitations: Lack of intravenous contrast limits evaluation of solid viscera.  Lack of oral contrast limits evaluation of the bowel loops.    Prior Studies: No prior studies submitted.    Findings: Lung bases: Bilateral minimal pleural effusion with pleural thickening  and underlying atelectasis, predominantly on right side.    Liver:   Unremarkable size, contour, and density. No evidence of mass. No  evidence of dilated ducts.    Gallbladder: Status post cholecystectomy.    Spleen:  Grossly unremarkable.    Pancreas/adrenal glands:   Grossly unremarkable size, contour and density.    Kidneys:   In anatomic position. Grossly unremarkable renal size, contour and  density. No renal or ureteral calculi. No hydronephrosis.  Perinephric space is  unremarkable.    Retroperitoneum: No retroperitoneal lymphadenopathy.  Atheromatous changes in  abdominal aorta and its branches.  The IVC is grossly unremarkable.    Peritoneal cavity:  No  evidence of free air or ascites.    Gastrointestinal tract: Nondistended small bowel and colon.  No evidence of  obstruction.  Numerous colonic diverticuli without diverticulitis.    Appendix:  Unremarkable.    Pelvis:  Solid and hollow viscera grossly unremarkable. There is no evidence of  lymphadenopathy or pelvic fluid collection within the pelvis.    Osseous structures:  No acute or destructive bony process identified.  Degenerative changes are seen in spine.    IMPRESSION:    Colonic diverticulosis without acute diverticulitis.    2.  Bilateral minimal pleural effusion with pleural thickening and underlying  atelectasis, predominantly on right side.    3.  No other significant abnormality observed.    Recommendation:    Follow up as clinically indicated.    All CT scans at this facility utilize dose modulation, iterative reconstruction,  and/or weight based dosing when appropriate to reduce radiation dose to as low  as reasonably achievable.      Electronically Signed by EWELINA GRIMES MD at 12/6/2019 1:11:54 AM                                     APC / Resident Notes:   PGY-3 MDM:     Assessment: Patient is a 69 y.o.F w/ PMH of alpha-1 AT deficiency, CAD, aortic valve stenosis, pacemaker, discharged 5 days ago from hospital after stay for pneumonia presenting with acute onset right flank pain, vomiting, and 1 dark stool this morning. She denies abdominal pain, dysuria. No chest pain. Denies diarrhea. Endorses 1 dark stool earlier today. ROS otherwise negative. Vital signs stable, patient afebrile, non-tachycardic and non-hypoxic. She does appear in significant pain. Ddx includes but is not limited to nephrolithiasis, diverticulitis, MSK, UTI. Stool guiac negative with good sample.     Ddx: CT abdomen reveals non-obstructing stone in left kidney. Labs WNL. Urine without evidence of infection.     EKG ventricular ly paced at rate of 60 BPM. Sgarbossa criteria negative.     Patient with much improved pain.  Return precautions discussed. .bbstable    Workup ongoing, will continue to re-assess patient and update management as needed.     Renu Mendez  [facility], PGY3   12/6/2019 2:00 AM                                      Clinical Impression:       ICD-10-CM ICD-9-CM   1. Shortness of breath R06.02 786.05   2. Pneumonia J18.9 486   3. Flank pain R10.9 789.09                             Renu Mendez MD  Resident  12/06/19 0205

## 2019-12-06 NOTE — ED NOTES
Pt states she is having left sided flank pain. She denies any pain with urination. States she had a kidney stone when she was in her 20s but none since. Pain 10/10.

## 2019-12-12 NOTE — PT/OT/SLP DISCHARGE
Occupational Therapy Discharge Summary    Lisa Smith  MRN: [id number]   Principal Problem: Acute on chronic respiratory failure with hypoxia      Patient Discharged from acute Occupational Therapy on 11/29/2019.  Please refer to prior OT note dated 11/27/2019 for functional status.    Assessment:      Patient appropriate for care in another setting.    Objective:     GOALS:   Multidisciplinary Problems     Occupational Therapy Goals     Not on file                Reasons for Discontinuation of Therapy Services  Transfer to alternate level of care.      Plan:     Patient Discharged to: Home no OT services needed    Luis Ervin, OT  12/12/2019

## 2019-12-31 ENCOUNTER — TELEPHONE (OUTPATIENT)
Dept: PULMONOLOGY | Facility: CLINIC | Age: 69
End: 2019-12-31

## 2020-01-02 ENCOUNTER — TELEPHONE (OUTPATIENT)
Dept: PULMONOLOGY | Facility: CLINIC | Age: 70
End: 2020-01-02

## 2020-01-02 DIAGNOSIS — R05.9 COUGH: Primary | ICD-10-CM

## 2020-01-03 RX ORDER — ALBUTEROL SULFATE 1.25 MG/3ML
SOLUTION RESPIRATORY (INHALATION)
Qty: 360 ML | Refills: 5 | Status: SHIPPED | OUTPATIENT
Start: 2020-01-03 | End: 2020-06-17

## 2020-01-07 ENCOUNTER — HOSPITAL ENCOUNTER (OUTPATIENT)
Dept: RADIOLOGY | Facility: HOSPITAL | Age: 70
Discharge: HOME | End: 2020-01-07
Attending: NURSE PRACTITIONER
Payer: MEDICARE

## 2020-01-07 DIAGNOSIS — R05.9 COUGH: ICD-10-CM

## 2020-01-07 PROCEDURE — 71046 X-RAY EXAM CHEST 2 VIEWS: CPT | Mod: TC

## 2020-01-08 ENCOUNTER — TELEPHONE (OUTPATIENT)
Dept: PULMONOLOGY | Facility: CLINIC | Age: 70
End: 2020-01-08

## 2020-01-08 DIAGNOSIS — R05.9 COUGH: Primary | ICD-10-CM

## 2020-01-08 NOTE — TELEPHONE ENCOUNTER
Impression       Stable cardiomegaly, scattered right lung parenchymal opacities, and small right and tiny left pleural effusions     I spoke with the patient she is aware of chest xray results. She has an appointment with her cardiologist next week Dr. MASOUD Pérez. Will send copy of report to him, likely needs more diuretic, she states she takes 20mg a day.  She has been still coughing.  I ordered a sputum culture.

## 2020-01-09 ENCOUNTER — LAB VISIT (OUTPATIENT)
Dept: LAB | Facility: HOSPITAL | Age: 70
End: 2020-01-09
Attending: INTERNAL MEDICINE
Payer: MEDICARE

## 2020-01-09 DIAGNOSIS — R05.9 COUGH: ICD-10-CM

## 2020-01-09 LAB
GRAM STN SPEC: NORMAL

## 2020-01-09 PROCEDURE — 87205 SMEAR GRAM STAIN: CPT

## 2020-01-10 ENCOUNTER — TELEPHONE (OUTPATIENT)
Dept: PULMONOLOGY | Facility: CLINIC | Age: 70
End: 2020-01-10

## 2020-01-10 DIAGNOSIS — R05.9 COUGH: Primary | ICD-10-CM

## 2020-01-10 NOTE — TELEPHONE ENCOUNTER
----- Message from Obed Dejesus MA sent at 1/9/2020  3:13 PM CST -----  Carlee from [facility] lab called said they could not perform sputum culture or gramstain on specimen provided due to it being spit.

## 2020-01-13 ENCOUNTER — LAB VISIT (OUTPATIENT)
Dept: LAB | Facility: HOSPITAL | Age: 70
End: 2020-01-13
Attending: NURSE PRACTITIONER
Payer: MEDICARE

## 2020-01-13 ENCOUNTER — OFFICE VISIT (OUTPATIENT)
Dept: FAMILY MEDICINE | Facility: CLINIC | Age: 70
End: 2020-01-13
Payer: MEDICARE

## 2020-01-13 VITALS
TEMPERATURE: 98 F | HEIGHT: 65 IN | DIASTOLIC BLOOD PRESSURE: 76 MMHG | SYSTOLIC BLOOD PRESSURE: 124 MMHG | WEIGHT: 183.63 LBS | OXYGEN SATURATION: 98 % | BODY MASS INDEX: 30.59 KG/M2 | HEART RATE: 63 BPM

## 2020-01-13 DIAGNOSIS — H72.91 ACUTE OTITIS MEDIA WITH PERFORATION, RIGHT: Primary | ICD-10-CM

## 2020-01-13 DIAGNOSIS — H66.91 ACUTE OTITIS MEDIA WITH PERFORATION, RIGHT: Primary | ICD-10-CM

## 2020-01-13 DIAGNOSIS — R05.9 COUGH: ICD-10-CM

## 2020-01-13 DIAGNOSIS — J30.9 ALLERGIC RHINITIS, UNSPECIFIED SEASONALITY, UNSPECIFIED TRIGGER: ICD-10-CM

## 2020-01-13 PROCEDURE — 87077 CULTURE AEROBIC IDENTIFY: CPT

## 2020-01-13 PROCEDURE — 1125F AMNT PAIN NOTED PAIN PRSNT: CPT | Mod: S$GLB,,, | Performed by: NURSE PRACTITIONER

## 2020-01-13 PROCEDURE — 1101F PT FALLS ASSESS-DOCD LE1/YR: CPT | Mod: S$GLB,,, | Performed by: NURSE PRACTITIONER

## 2020-01-13 PROCEDURE — 87147 CULTURE TYPE IMMUNOLOGIC: CPT | Mod: 59

## 2020-01-13 PROCEDURE — 1125F PR PAIN SEVERITY QUANTIFIED, PAIN PRESENT: ICD-10-PCS | Mod: S$GLB,,, | Performed by: NURSE PRACTITIONER

## 2020-01-13 PROCEDURE — 1101F PR PT FALLS ASSESS DOC 0-1 FALLS W/OUT INJ PAST YR: ICD-10-PCS | Mod: S$GLB,,, | Performed by: NURSE PRACTITIONER

## 2020-01-13 PROCEDURE — 1159F PR MEDICATION LIST DOCUMENTED IN MEDICAL RECORD: ICD-10-PCS | Mod: S$GLB,,, | Performed by: NURSE PRACTITIONER

## 2020-01-13 PROCEDURE — 99213 PR OFFICE/OUTPT VISIT, EST, LEVL III, 20-29 MIN: ICD-10-PCS | Mod: S$GLB,,, | Performed by: NURSE PRACTITIONER

## 2020-01-13 PROCEDURE — 87070 CULTURE OTHR SPECIMN AEROBIC: CPT

## 2020-01-13 PROCEDURE — 87186 SC STD MICRODIL/AGAR DIL: CPT

## 2020-01-13 PROCEDURE — 87205 SMEAR GRAM STAIN: CPT

## 2020-01-13 PROCEDURE — 99213 OFFICE O/P EST LOW 20 MIN: CPT | Mod: S$GLB,,, | Performed by: NURSE PRACTITIONER

## 2020-01-13 PROCEDURE — 1159F MED LIST DOCD IN RCRD: CPT | Mod: S$GLB,,, | Performed by: NURSE PRACTITIONER

## 2020-01-13 RX ORDER — LORATADINE 10 MG/1
10 TABLET ORAL DAILY
Qty: 30 TABLET | Refills: 2 | Status: SHIPPED | OUTPATIENT
Start: 2020-01-13 | End: 2021-05-02 | Stop reason: SDUPTHER

## 2020-01-13 RX ORDER — ACETAMINOPHEN 325 MG/1
325 TABLET ORAL EVERY 6 HOURS PRN
COMMUNITY

## 2020-01-13 RX ORDER — AMOXICILLIN AND CLAVULANATE POTASSIUM 875; 125 MG/1; MG/1
1 TABLET, FILM COATED ORAL 2 TIMES DAILY
Qty: 14 TABLET | Refills: 0 | Status: SHIPPED | OUTPATIENT
Start: 2020-01-13 | End: 2020-01-20

## 2020-01-13 RX ORDER — FLUTICASONE PROPIONATE 50 MCG
2 SPRAY, SUSPENSION (ML) NASAL DAILY
Qty: 1 BOTTLE | Refills: 2 | Status: SHIPPED | OUTPATIENT
Start: 2020-01-13 | End: 2022-04-11

## 2020-01-13 NOTE — PROGRESS NOTES
"    SUBJECTIVE:      Patient ID: Lisa Smith is a 69 y.o. female.    Chief Complaint: Ear Fullness (popped  4 days ago, feels full, yellow drainage from it thurs)    New patient here for c/o right ear pain and fullness over the past week. She felt a "pop" in her right ear 4 days ago and then had some yellowish/clear discharge drain out. She denies any pain at this time but does report some fullness still. She denies any recent illness or UTI symptoms. She was treated for pneumonia in November and is currently seeing pulmonary (Tahir) and cardiology (Beto).     Ear Fullness    There is pain in the right ear. This is a new problem. The current episode started in the past 7 days. The problem has been unchanged. There has been no fever. Associated symptoms include ear discharge (clear/yellow). Pertinent negatives include no abdominal pain, coughing, headaches, rhinorrhea, sore throat or vomiting. She has tried nothing for the symptoms. Her past medical history is significant for hearing loss.       Family History   Problem Relation Age of Onset    Kidney failure Mother     Cancer Father     Cancer Brother       Social History     Socioeconomic History    Marital status:      Spouse name: Not on file    Number of children: 1    Years of education: Not on file    Highest education level: Not on file   Occupational History    Occupation: RETIRED     Comment: [employer]   Social Needs    Financial resource strain: Not on file    Food insecurity:     Worry: Not on file     Inability: Not on file    Transportation needs:     Medical: Not on file     Non-medical: Not on file   Tobacco Use    Smoking status: Former Smoker     Packs/day: 2.00     Years: 30.00     Pack years: 60.00     Types: Cigarettes     Start date: 1971     Last attempt to quit: 2011     Years since quittin.9    Smokeless tobacco: Never Used   Substance and Sexual Activity    Alcohol use: No     " Frequency: Never    Drug use: No    Sexual activity: Never   Lifestyle    Physical activity:     Days per week: Not on file     Minutes per session: Not on file    Stress: Not on file   Relationships    Social connections:     Talks on phone: Not on file     Gets together: Not on file     Attends Sikhism service: Not on file     Active member of club or organization: Not on file     Attends meetings of clubs or organizations: Not on file     Relationship status: Not on file   Other Topics Concern    Not on file   Social History Narrative    Not on file     Current Outpatient Medications   Medication Sig Dispense Refill    acetaminophen (TYLENOL) 325 MG tablet Take 325 mg by mouth every 6 (six) hours as needed for Pain.      albuterol (ACCUNEB) 1.25 mg/3 mL Nebu INHALE THE CONTENTS OF 1 VIAL BY NEBULIZATION EVERY 6 (SIX) HOURS AS NEEDED. RESCUE. 360 mL 5    clopidogrel (PLAVIX) 75 mg tablet Take 75 mg by mouth once daily.       digoxin (LANOXIN) 250 mcg tablet Take 0.25 mg by mouth once daily.      diltiaZEM (CARTIA XT) 120 MG Cp24 Take 180 mg by mouth once daily.      fluticasone-umeclidin-vilanter (TRELEGY ELLIPTA) 100-62.5-25 mcg DsDv Inhale 1 puff into the lungs once daily. 3 each 3    furosemide (LASIX) 40 MG tablet 20 mg.       magnesium oxide (MAG-OX) 400 mg (241.3 mg magnesium) tablet Take 400 mg by mouth once daily.      melatonin 5 mg Tab Take 5 mg by mouth nightly as needed.      metoprolol succinate (TOPROL-XL) 100 MG 24 hr tablet Take 100 mg by mouth once daily.       pantoprazole (PROTONIX) 40 MG tablet Take 40 mg by mouth once daily.      roflumilast (DALIRESP) 500 mcg Tab Take 1 tablet (500 mcg total) by mouth once daily. 90 tablet 6    spironolactone (ALDACTONE) 25 MG tablet Take 25 mg by mouth once daily.       amoxicillin-clavulanate 875-125mg (AUGMENTIN) 875-125 mg per tablet Take 1 tablet by mouth 2 (two) times daily. Take with food for 7 days 14 tablet 0    fluticasone  "propionate (FLONASE) 50 mcg/actuation nasal spray 2 sprays (100 mcg total) by Each Nostril route once daily. 1 Bottle 2    loratadine (CLARITIN) 10 mg tablet Take 1 tablet (10 mg total) by mouth once daily. 30 tablet 2     No current facility-administered medications for this visit.      Review of patient's allergies indicates:   Allergen Reactions    Sulfa (sulfonamide antibiotics)       Past Medical History:   Diagnosis Date    Atrial fibrillation     Bell's palsy     COPD (chronic obstructive pulmonary disease)     GI bleed     Heart murmur     Heterozygous alpha 1-antitrypsin deficiency     Hypertension     Lung disease     copd    MONSERRAT (obstructive sleep apnea)     Pneumonia     Pulmonary edema      Past Surgical History:   Procedure Laterality Date    CARDIAC ELECTROPHYSIOLOGY STUDY AND ABLATION      CARPAL TUNNEL RELEASE      CHOLECYSTECTOMY      HAND SURGERY      HYSTERECTOMY      INSERT / REPLACE / REMOVE PACEMAKER      KNEE ARTHROSCOPY         Review of Systems   Constitutional: Negative for chills, fatigue and fever.   HENT: Positive for ear discharge (clear/yellow), ear pain, postnasal drip and sneezing. Negative for congestion, rhinorrhea, sinus pressure, sinus pain, sore throat and trouble swallowing.    Eyes: Negative for pain and discharge.   Respiratory: Negative for cough and wheezing.    Cardiovascular: Negative for chest pain.   Gastrointestinal: Negative for abdominal pain, nausea and vomiting.   Genitourinary: Negative for dysuria and frequency.   Musculoskeletal: Negative for myalgias.   Allergic/Immunologic: Positive for environmental allergies.   Neurological: Negative for dizziness and headaches.      OBJECTIVE:      Vitals:    01/13/20 0915   BP: 124/76   BP Location: Left arm   Patient Position: Sitting   BP Method: Large (Manual)   Pulse: 63   Temp: 98.2 °F (36.8 °C)   TempSrc: Oral   SpO2: 98%   Weight: 83.3 kg (183 lb 9.6 oz)   Height: 5' 5" (1.651 m)     Physical " Exam   Constitutional: She is oriented to person, place, and time. She appears well-developed and well-nourished. No distress.   HENT:   Head: Normocephalic.   Right Ear: External ear and ear canal normal. No drainage. No mastoid tenderness. Tympanic membrane is perforated. Tympanic membrane is not erythematous.   Left Ear: Tympanic membrane, external ear and ear canal normal.   Nose: Rhinorrhea (clear rhinorrhea ) present. No mucosal edema. Right sinus exhibits no maxillary sinus tenderness and no frontal sinus tenderness. Left sinus exhibits no maxillary sinus tenderness and no frontal sinus tenderness.   Mouth/Throat: Uvula is midline and mucous membranes are normal. Oropharyngeal exudate (clear rhinorrhea ) present. No posterior oropharyngeal edema or posterior oropharyngeal erythema. Tonsils are 1+ on the right. Tonsils are 1+ on the left. No tonsillar exudate.   Eyes: Pupils are equal, round, and reactive to light. Conjunctivae are normal.   Neck: Neck supple.   Cardiovascular: Normal rate and regular rhythm.   Murmur (aortic stenosis grade 3/6) heard.  Pulmonary/Chest: Effort normal and breath sounds normal. No respiratory distress. She has no wheezes.   Lymphadenopathy:     She has no cervical adenopathy.   Neurological: She is alert and oriented to person, place, and time.   Skin: Skin is warm and dry. No rash noted. She is not diaphoretic.   Psychiatric: She has a normal mood and affect. Her behavior is normal.   Nursing note and vitals reviewed.     Assessment:       1. Acute otitis media with perforation, right    2. Allergic rhinitis, unspecified seasonality, unspecified trigger        Plan:       Acute otitis media with perforation, right  -     fluticasone propionate (FLONASE) 50 mcg/actuation nasal spray; 2 sprays (100 mcg total) by Each Nostril route once daily.  Dispense: 1 Bottle; Refill: 2  -     amoxicillin-clavulanate 875-125mg (AUGMENTIN) 875-125 mg per tablet; Take 1 tablet by mouth 2 (two)  times daily. Take with food for 7 days  Dispense: 14 tablet; Refill: 0    Allergic rhinitis, unspecified seasonality, unspecified trigger  -     fluticasone propionate (FLONASE) 50 mcg/actuation nasal spray; 2 sprays (100 mcg total) by Each Nostril route once daily.  Dispense: 1 Bottle; Refill: 2  -     loratadine (CLARITIN) 10 mg tablet; Take 1 tablet (10 mg total) by mouth once daily.  Dispense: 30 tablet; Refill: 2    *Advised use antibiotics as prescribed, Flonase, and start Claritin daily- recheck in 1-2 weeks if no improvement     Follow up in about 1 month (around 2/13/2020) for est care.      1/13/2020 DIAN Oquendo, LIANNAP

## 2020-01-13 NOTE — PATIENT INSTRUCTIONS
Common Middle Ear Problems    Your middle ear may have been injured or infected recently. Over time, certain growths or bone disease can also harm the middle ear. Left untreated, middle ear problems often lead to lifelong hearing loss. There are two types of hearing loss: conductive and sensorineural. One or both kinds can occur. Injury, infection, certain growths, or bone disease can cause your symptoms. A ruptured eardrum or a long-lasting (chronic) ear infection may be painful and decrease hearing.  Symptoms  · Hearing loss in one or both ears  · Fluid, often smelly, draining from the ear  · Pain, pressure, or discomfort in the ear  · Ringing in the ear  Conductive and sensorineural hearing loss  Sound waves may be disrupted before they reach the inner ear. If this happens, conductive hearing loss may occur. The ear canal can be blocked by wax, infection, a tumor, or a foreign object. The eardrum can be injured or infected. Abnormal bone growth, infection, or tumors in the middle ear can block sound waves.  Sound waves may not be processed correctly in the inner ear. If this happens, sensorineural hearing loss may occur. Permanent hearing loss is most commonly associated with sensorineural problems.  The tests and evaluations used to diagnose what type of hearing problem you have will depend on your symptoms.   Date Last Reviewed: 10/1/2016  © 3724-5471 [employer]. [address] [place] [address], [place] [address]. All rights reserved. This information is not intended as a substitute for professional medical care. Always follow your healthcare professional's instructions.        Understanding Nasal Anatomy: Inside View  A lot happens under the surface of the nose. The bone and cartilage under the skin give the nose most of its size and shape. Other structures inside and behind the nose help you breathe. Learning the anatomy of the nose can help you better understand how the nose works.           Bone  "supports the upper third (bridge) of the nose. The upper cartilage supports the side of the nose. The lower cartilage adds support, width, and height. It helps shape the nostrils and the tip of the nose. Skin also helps shape the nose.     The nasal cavity is a hollow space behind the nose that air flows through. The septum is a thin "wall" made of cartilage and bone. It divides the inside of the nose into two chambers. The mucous membrane is thin tissue that lines the nose, sinuses, and throat. It warms and moistens the air you breathe in. It also makes the sticky mucus that helps clean the air of dust and other small particles. The turbinates on each side of the nose are curved, bony ridges lined with mucous membrane. They warm and moisten the air you breathe in. The sinuses are hollow, air-filled chambers in the bone around your nose. Mucus from the sinuses drains into the nasal cavity.  Date Last Reviewed: 11/1/2016  © 9649-2960 [employer]. [address] [place] [address]. All rights reserved. This information is not intended as a substitute for professional medical care. Always follow your healthcare professional's instructions.        Causes of Nasal Allergies  Nasal allergies are most commonly caused by one or more of 4 kinds of allergens: pollen (which causes seasonal allergies), house-dust mites, mold, and animals. Other substances, called irritants, can bother the nose and make allergy symptoms worse.    Pollen  Plants reproduce by moving tiny grains of pollen from plant to plant. Some pollen is carried by bees, and some is blown by the wind. Its the wind-blown pollen that causes nasal allergies. The amount of pollen in the air varies from season to season.  House-dust mites  House-dust mites are tiny bugs too small to see. They can live in mattresses, blankets, stuffed toys, carpets, and curtains. The droppings of these mites are a common indoor cause of nasal " allergies.  Mold  Mold loves dark, damp areas. It tends to grow in bathrooms, basements, refrigerators, and in the soil of houseplants. Mold reproduces by sending tiny grains called spores into the air. If these spores are breathed in, they can cause a nasal allergic reaction.  Animals  Pets, such as cats, dogs, birds, horses, and rabbits, are common causes of nasal allergies. Flakes of skin (dander), saliva left on fur when an animal cleans itself, urine in litter boxes and cages, and feathers can all cause nasal allergies.  Irritants make allergies worse  Although irritants dont cause nasal allergies, they can make allergy symptoms worse. Cigarette smoke, perfume, aerosol sprays, smoke from wood stoves or fireplaces, car exhaust, and strong odors are examples of irritants.   Date Last Reviewed: 9/1/2016  © 1394-8757 [employer]. [address] [place] [address], [place] [address]. All rights reserved. This information is not intended as a substitute for professional medical care. Always follow your healthcare professional's instructions.

## 2020-01-17 ENCOUNTER — TELEPHONE (OUTPATIENT)
Dept: PULMONOLOGY | Facility: CLINIC | Age: 70
End: 2020-01-17

## 2020-01-17 LAB
BACTERIA SPEC AEROBE CULT: ABNORMAL
BACTERIA SPEC AEROBE CULT: ABNORMAL
GRAM STN SPEC: ABNORMAL

## 2020-01-17 RX ORDER — CLINDAMYCIN HYDROCHLORIDE 300 MG/1
300 CAPSULE ORAL 3 TIMES DAILY
Qty: 15 CAPSULE | Refills: 0 | Status: SHIPPED | OUTPATIENT
Start: 2020-01-17 | End: 2022-01-19

## 2020-01-17 NOTE — TELEPHONE ENCOUNTER
Sputum culture with STAPHYLOCOCCUS AUREUS   Moderate   Susceptibility pending     I spoke with Carlee in micro she does not believe MRSA awaiting sensitivities.       Susceptibility      Staphylococcus aureus     CULTURE, RESPIRATORY     Ceftriaxone <=8 mcg/mL Sensitive     Clindamycin <=0.5 mcg/mL Sensitive     Erythromycin >4 mcg/mL Resistant     Oxacillin 1 mcg/mL Sensitive     Penicillin >8 mcg/mL Resistant     Tetracycline <=4 mcg/mL Sensitive     Trimeth/Sulfa <=0.5/9.5 m... Sensitive     Vancomycin 1 mcg/mL Sensitive        I spoke with the patient she is still coughing, she is not feeling well. She is on Augmentin for ear infection with perf.  The Staph is resistant to pencillin, she is allergic to Bactrim. Will send Clindaymcin which it is sensitive to 300mg q8 hours x5days.     I sent message to JACK Tang NP to see if she would like the patient to continue the Augmentin. She messaged back she would like patient to stop the augmentin. The patient is aware. She was encouraged to call me if she gets worse.

## 2020-02-06 ENCOUNTER — TELEPHONE (OUTPATIENT)
Dept: PULMONOLOGY | Facility: CLINIC | Age: 70
End: 2020-02-06

## 2020-02-06 NOTE — TELEPHONE ENCOUNTER
Patient had a ear infection an her primary called in Loratadine she has been on it for 30 days . She has 2 days left. She has refills she wants to know if she should continue to take it

## 2020-02-13 ENCOUNTER — OFFICE VISIT (OUTPATIENT)
Dept: FAMILY MEDICINE | Facility: CLINIC | Age: 70
End: 2020-02-13
Payer: MEDICARE

## 2020-02-13 VITALS
DIASTOLIC BLOOD PRESSURE: 72 MMHG | HEART RATE: 66 BPM | OXYGEN SATURATION: 97 % | HEIGHT: 65 IN | SYSTOLIC BLOOD PRESSURE: 122 MMHG | BODY MASS INDEX: 30.32 KG/M2 | WEIGHT: 182 LBS

## 2020-02-13 DIAGNOSIS — Z09 FOLLOW-UP EXAM: Primary | ICD-10-CM

## 2020-02-13 DIAGNOSIS — Z95.0 S/P PLACEMENT OF CARDIAC PACEMAKER: Chronic | ICD-10-CM

## 2020-02-13 DIAGNOSIS — Z86.79 S/P ABLATION OF ATRIAL FIBRILLATION: ICD-10-CM

## 2020-02-13 DIAGNOSIS — B37.0 ORAL CANDIDIASIS: ICD-10-CM

## 2020-02-13 DIAGNOSIS — R05.3 CHRONIC COUGH: ICD-10-CM

## 2020-02-13 DIAGNOSIS — I35.0 AORTIC VALVE STENOSIS, MODERATE: ICD-10-CM

## 2020-02-13 DIAGNOSIS — R61 UNEXPLAINED NIGHT SWEATS: ICD-10-CM

## 2020-02-13 DIAGNOSIS — J44.9 CHRONIC OBSTRUCTIVE PULMONARY DISEASE, UNSPECIFIED COPD TYPE: Chronic | ICD-10-CM

## 2020-02-13 DIAGNOSIS — Z98.890 S/P ABLATION OF ATRIAL FIBRILLATION: ICD-10-CM

## 2020-02-13 DIAGNOSIS — R73.9 HYPERGLYCEMIA: ICD-10-CM

## 2020-02-13 DIAGNOSIS — I25.10 CORONARY ARTERY DISEASE WITHOUT ANGINA PECTORIS, UNSPECIFIED VESSEL OR LESION TYPE, UNSPECIFIED WHETHER NATIVE OR TRANSPLANTED HEART: Chronic | ICD-10-CM

## 2020-02-13 DIAGNOSIS — R53.83 FATIGUE, UNSPECIFIED TYPE: ICD-10-CM

## 2020-02-13 PROCEDURE — 1126F AMNT PAIN NOTED NONE PRSNT: CPT | Mod: S$GLB,,, | Performed by: NURSE PRACTITIONER

## 2020-02-13 PROCEDURE — 1101F PT FALLS ASSESS-DOCD LE1/YR: CPT | Mod: S$GLB,,, | Performed by: NURSE PRACTITIONER

## 2020-02-13 PROCEDURE — 1101F PR PT FALLS ASSESS DOC 0-1 FALLS W/OUT INJ PAST YR: ICD-10-PCS | Mod: S$GLB,,, | Performed by: NURSE PRACTITIONER

## 2020-02-13 PROCEDURE — 1126F PR PAIN SEVERITY QUANTIFIED, NO PAIN PRESENT: ICD-10-PCS | Mod: S$GLB,,, | Performed by: NURSE PRACTITIONER

## 2020-02-13 PROCEDURE — 1159F MED LIST DOCD IN RCRD: CPT | Mod: S$GLB,,, | Performed by: NURSE PRACTITIONER

## 2020-02-13 PROCEDURE — 1159F PR MEDICATION LIST DOCUMENTED IN MEDICAL RECORD: ICD-10-PCS | Mod: S$GLB,,, | Performed by: NURSE PRACTITIONER

## 2020-02-13 PROCEDURE — 1170F PR FUNCTIONAL STATUS ASSESSED: ICD-10-PCS | Mod: S$GLB,,, | Performed by: NURSE PRACTITIONER

## 2020-02-13 PROCEDURE — 1170F FXNL STATUS ASSESSED: CPT | Mod: S$GLB,,, | Performed by: NURSE PRACTITIONER

## 2020-02-13 PROCEDURE — 99214 PR OFFICE/OUTPT VISIT, EST, LEVL IV, 30-39 MIN: ICD-10-PCS | Mod: S$GLB,,, | Performed by: NURSE PRACTITIONER

## 2020-02-13 PROCEDURE — 99214 OFFICE O/P EST MOD 30 MIN: CPT | Mod: S$GLB,,, | Performed by: NURSE PRACTITIONER

## 2020-02-13 NOTE — PROGRESS NOTES
SUBJECTIVE:    Patient ID: Lisa Smith is a 69 y.o. female.    Chief Complaint: Follow-up (right ear) and Hot Flashes    Ms Smith is a 70yo lady here for follow up after acute R OM. Symptoms have improved. She has COPD with chronic allergic rhinitis.  Her shortness of breath and cough are unchanged from her baseline.    Denies any chest pain, palpitations, dyspepsia, hemoptysis or melena.    11/25/2019 -     · Eccentric left ventricular hypertrophy.  · Mild left ventricular enlargement.  · Low normal left ventricular systolic function. The estimated ejection fraction is 50%  · Grade II (moderate) left ventricular diastolic dysfunction consistent with pseudonormalization.  · No wall motion abnormalities.  · Normal right ventricular systolic function.  · Mild left atrial enlargement.  · Mild right atrial enlargement.  · Moderate aortic valve stenosis.  · Aortic valve area is 0.86 cm2; peak velocity is 3.83 m/s; mean gradient is 33 mmHg.  · Mild mitral regurgitation.  · Mild mitral sclerosis.  · Moderate tricuspid regurgitation.  · Moderate to severe pulmonary hypertension present.  · Intermediate central venous pressure (8 mm Hg).  · The estimated PA systolic pressure is 50 mm Hg     1) atrial fib  With lbbb  2) moderate aortic steno sis  3) moderate pulmonary hi bp    grade ll diastolic dysfunction  Mean left atrial pressure = 12 mmhg       Past Medical History:   Diagnosis Date    Atrial fibrillation     Bell's palsy     COPD (chronic obstructive pulmonary disease)     GI bleed     Heart murmur     Heterozygous alpha 1-antitrypsin deficiency     Hypertension     Lung disease     copd    MONSERRAT (obstructive sleep apnea)     Pneumonia     Pulmonary edema      Past Surgical History:   Procedure Laterality Date    CARDIAC ELECTROPHYSIOLOGY STUDY AND ABLATION      CARPAL TUNNEL RELEASE      CHOLECYSTECTOMY      HAND SURGERY      HYSTERECTOMY      INSERT / REPLACE / REMOVE PACEMAKER      KNEE  ARTHROSCOPY       Family History   Problem Relation Age of Onset    Kidney failure Mother     Cancer Father     Cancer Brother        Marital Status:   Alcohol History:  reports that she does not drink alcohol.  Tobacco History:  reports that she quit smoking about 9 years ago. Her smoking use included cigarettes. She started smoking about 49 years ago. She has a 60.00 pack-year smoking history. She has never used smokeless tobacco.  Drug History:  reports that she does not use drugs.    Review of patient's allergies indicates:   Allergen Reactions    Sulfa (sulfonamide antibiotics)        Current Outpatient Medications:     acetaminophen (TYLENOL) 325 MG tablet, Take 325 mg by mouth every 6 (six) hours as needed for Pain., Disp: , Rfl:     albuterol (ACCUNEB) 1.25 mg/3 mL Nebu, INHALE THE CONTENTS OF 1 VIAL BY NEBULIZATION EVERY 6 (SIX) HOURS AS NEEDED. RESCUE., Disp: 360 mL, Rfl: 5    clopidogrel (PLAVIX) 75 mg tablet, Take 75 mg by mouth once daily. , Disp: , Rfl:     digoxin (LANOXIN) 250 mcg tablet, Take 0.25 mg by mouth once daily., Disp: , Rfl:     diltiaZEM (CARTIA XT) 120 MG Cp24, Take 180 mg by mouth once daily., Disp: , Rfl:     fluticasone propionate (FLONASE) 50 mcg/actuation nasal spray, 2 sprays (100 mcg total) by Each Nostril route once daily., Disp: 1 Bottle, Rfl: 2    fluticasone-umeclidin-vilanter (TRELEGY ELLIPTA) 100-62.5-25 mcg DsDv, Inhale 1 puff into the lungs once daily., Disp: 3 each, Rfl: 3    furosemide (LASIX) 40 MG tablet, 20 mg. , Disp: , Rfl:     loratadine (CLARITIN) 10 mg tablet, Take 1 tablet (10 mg total) by mouth once daily., Disp: 30 tablet, Rfl: 2    magnesium oxide (MAG-OX) 400 mg (241.3 mg magnesium) tablet, Take 400 mg by mouth once daily., Disp: , Rfl:     melatonin 5 mg Tab, Take 5 mg by mouth nightly as needed., Disp: , Rfl:     metoprolol succinate (TOPROL-XL) 100 MG 24 hr tablet, Take 100 mg by mouth once daily. , Disp: , Rfl:     pantoprazole  "(PROTONIX) 40 MG tablet, Take 40 mg by mouth once daily., Disp: , Rfl:     roflumilast (DALIRESP) 500 mcg Tab, Take 1 tablet (500 mcg total) by mouth once daily., Disp: 90 tablet, Rfl: 6    spironolactone (ALDACTONE) 25 MG tablet, Take 25 mg by mouth once daily. , Disp: , Rfl:     clindamycin (CLEOCIN) 300 MG capsule, Take 1 capsule (300 mg total) by mouth 3 (three) times daily. (Patient not taking: Reported on 2/13/2020), Disp: 15 capsule, Rfl: 0    nystatin (MYCOSTATIN) 100,000 unit/mL suspension, Take 6 mLs (600,000 Units total) by mouth 4 (four) times daily with meals and nightly. Should always rinse mouth after oral inhalers. for 10 days, Disp: 240 mL, Rfl: 0    Review of Systems   Respiratory: Positive for cough and shortness of breath (with exertion). Choking: improved, mildly productive for clear to milky sputum.    All other systems reviewed and are negative.         Objective:      Vitals:    02/13/20 1320   BP: 122/72   Pulse: 66   SpO2: 97%   Weight: 82.6 kg (182 lb)   Height: 5' 5" (1.651 m)     Body mass index is 30.29 kg/m².  Physical Exam   Constitutional: She appears well-developed and well-nourished.   HENT:   Head: Normocephalic.   Right Ear: Tympanic membrane, external ear and ear canal normal.   Left Ear: Tympanic membrane, external ear and ear canal normal.   Mouth/Throat: Oropharynx is clear and moist and mucous membranes are normal.       Multiple small localized patch of soft raised white lesions to midline posterior tongue        Eyes: Pupils are equal, round, and reactive to light. Conjunctivae and EOM are normal.   Neck: Normal range of motion. Neck supple. No thyromegaly present.   Cardiovascular: Normal rate and regular rhythm.   Murmur heard.   Systolic murmur is present with a grade of 3/6.  Abdominal: Soft. Bowel sounds are normal.   Lymphadenopathy:     She has no cervical adenopathy.   Nursing note and vitals reviewed.        Assessment:       1. Follow-up exam    2. Chronic " obstructive pulmonary disease, unspecified COPD type    3. Chronic cough    4. Coronary artery disease without angina pectoris, unspecified vessel or lesion type, unspecified whether native or transplanted heart    5. Aortic valve stenosis, moderate    6. S/P placement of cardiac pacemaker    7. S/P ablation of atrial fibrillation    8. Unexplained night sweats    9. Fatigue, unspecified type    10. Hyperglycemia    11. Oral candidiasis         Plan:          Follow-up exam  A his media has resolved.    Chronic obstructive pulmonary disease, unspecified COPD type  Follow up with pulmonology as previously scheduled.  Advised she should always rinse out her mouth after using inhaled steroids.    Chronic cough  Follow up with pulmonology as previously scheduled.  Advised she should always rinse out her mouth after using inhaled steroids.    Coronary artery disease without angina pectoris, unspecified vessel or lesion type, unspecified whether native or transplanted heart  -     Lipid panel; Future; Expected date: 02/13/2020    Aortic valve stenosis, moderate  Follow up with Cardiology as previously scheduled.  Continue current regimen.    S/P placement of cardiac pacemaker  Follow up with Cardiology as previously scheduled.  Continue current regimen.    S/P ablation of atrial fibrillation  Follow up with Cardiology as previously scheduled.  She is not currently on any core anticoagulation other than Plavix and 325 ASA.  Defer to Cardiology.    Unexplained night sweats  -     CBC auto differential; Future; Expected date: 02/13/2020  -     Comprehensive metabolic panel; Future; Expected date: 02/13/2020  -     TSH; Future; Expected date: 02/13/2020  -     T4, free; Future; Expected date: 02/13/2020  -     QuantiFERON gold; future; expected date:  2/13/2020    Fatigue, unspecified type  -     CBC auto differential; Future; Expected date: 02/13/2020  -     TSH; Future; Expected date: 02/13/2020  -     T4, free; Future;  Expected date: 02/13/2020    Hyperglycemia  -     Hemoglobin A1c; Future; Expected date: 02/13/2020    Oral candidiasis  -     nystatin (MYCOSTATIN) 100,000 unit/mL suspension; Take 6 mLs (600,000 Units total) by mouth 4 (four) times daily with meals and nightly. Should always rinse mouth after oral inhalers. for 10 days  Dispense: 240 mL; Refill: 0  Advised she should always rinse her mouth after use inhaled steroids.      Follow up in about 4 weeks (around 3/12/2020), or if symptoms worsen or fail to improve.

## 2020-02-14 RX ORDER — NYSTATIN 100000 [USP'U]/ML
6 SUSPENSION ORAL
Qty: 240 ML | Refills: 0 | Status: SHIPPED | OUTPATIENT
Start: 2020-02-14 | End: 2020-02-24

## 2020-02-14 NOTE — PATIENT INSTRUCTIONS
Candida Infection: Thrush  Thrush is a type of fungal infection in the mouth and throat. Thrush does not usually affect healthy adults. It is more common in people with a weakened immune system. It is also more likely if you take antibiotics. Thrush is normally not contagious.  Understanding fungus in the mouth and throat  Your mouth and throat normally contain millions of tiny organisms. These include bacteria and yeasts. Many of these do not cause any problems. In fact, they may help fight disease.  Yeasts are a type of fungus. A type of yeast called Candida normally lives on your skin. It is also found on the membranes of your mouth and throat. Usually, this yeast grows only in small amounts and is harmless. But in some cases, Candida can grow out of control and cause thrush. Thrush is related to other kinds of Candida infections that can grow all over the body. Thrush refers to an infection of only the mouth and throat.  What causes thrush?  Thrush happens when something lets too much Candida grow inside your mouth and throat. Certain things that change the normal balance of organisms in the mouth can lead to thrush. One example is antibiotic medicine. This medicine may kill some of the normal bacteria in your mouth. Candida can then grow freely. People on antibiotics have an increased risk for thrush.  You have a higher risk for thrush if you:  · Wear dentures  · Are getting chemotherapy  · Are getting radiation therapy  · Have diabetes  · Have a transplanted organ  · Use corticosteroids  · Have a weakened immune system, such as from AIDS  · Are an older adult  Symptoms of thrush  Some people with thrush do not have any symptoms. Symptoms of thrush can include:  · A dry, cottony feeling in your mouth  · Loss of taste  · Pain while eating or swallowing  · White or red patches inside your mouth or on the back of your throat  Diagnosing thrush  Your health care provider will ask about your medical history and  your symptoms. He or she will look closely at your mouth and throat. White or red patches will be scraped with a tongue depressor. The sample will be sent to a lab to test. This test can usually confirm thrush.  If you have thrush, you may also have esophageal candidiasis. This is common in people who have HIV. Your health care provider may check for this condition with an upper endoscopy. This is a procedure to look at the esophagus. A tissue sample may be taken to test.  Treatment for thrush  It is important to treat thrush early. Untreated, it may turn into a more serious form of widespread infection. Thrush is usually treated with antifungal medicine. The medicine is put directly in your mouth and throat. You may be given a swish and swallow medicine or an antifungal lozenge.  In some cases, you may need an antifungal pill. This can remove Candida throughout your body. Or you may need medicine through an IV. These treatments depend on how severe your infection is, and what other health conditions you have.  If you are at high risk for thrush, you may need to keep taking oral antifungal medicine. This is to help prevent thrush in the future.  What happens if you dont get treated for thrush?  If untreated, the Candida may spread throughout your body. They may even enter your bloodstream. This can cause serious problems, such as organ failure and even death. Bloodstream infection may need to be treated with high doses of antifungal medicine through an IV.  Systemic infection is much more likely in people who are very ill. It is also more common in those who have serious problems with their immune system. Additional risk factors for systemic infection in very ill people include:  · Central venous lines  · IV nutrition  · Broad-spectrum antibiotics  · Kidney failure  · Recent surgery  Preventing thrush  You may be able to help prevent some cases of thrush. Make sure to:  · Practice good oral hygiene. Try using a  chlorhexidine mouthwash.  · Clean your dentures regularly as instructed. Make sure they fit you correctly.  · After using a corticosteroid inhaler, rinse out your mouth with water or mouthwash.  · Do not use broad-spectrum antibiotics, if possible.  · Get treated for health problems that increase your risk for thrush, such as diabetes.     When to call the health care provider  Call your health care provider right away if you have any of these:  · Cottony feeling in your mouth  · Loss of taste  · Pain while eating or swallowing  · White or red patches inside your mouth or on the back of your throat   Date Last Reviewed: 3/19/2015  © 0860-6879 [employer]. [address] [place] [address], [place] [address]. All rights reserved. This information is not intended as a substitute for professional medical care. Always follow your healthcare professional's instructions.

## 2020-02-17 ENCOUNTER — TELEPHONE (OUTPATIENT)
Dept: FAMILY MEDICINE | Facility: CLINIC | Age: 70
End: 2020-02-17

## 2020-02-17 ENCOUNTER — LAB VISIT (OUTPATIENT)
Dept: LAB | Facility: HOSPITAL | Age: 70
End: 2020-02-17
Attending: NURSE PRACTITIONER
Payer: MEDICARE

## 2020-02-17 DIAGNOSIS — R73.9 HYPERGLYCEMIA: ICD-10-CM

## 2020-02-17 DIAGNOSIS — R53.83 FATIGUE, UNSPECIFIED TYPE: ICD-10-CM

## 2020-02-17 DIAGNOSIS — I25.10 CORONARY ARTERY DISEASE WITHOUT ANGINA PECTORIS, UNSPECIFIED VESSEL OR LESION TYPE, UNSPECIFIED WHETHER NATIVE OR TRANSPLANTED HEART: Chronic | ICD-10-CM

## 2020-02-17 DIAGNOSIS — R61 UNEXPLAINED NIGHT SWEATS: ICD-10-CM

## 2020-02-17 LAB
ALBUMIN SERPL BCP-MCNC: 4.3 G/DL (ref 3.5–5.2)
ALP SERPL-CCNC: 55 U/L (ref 55–135)
ALT SERPL W/O P-5'-P-CCNC: 10 U/L (ref 10–44)
ANION GAP SERPL CALC-SCNC: 12 MMOL/L (ref 8–16)
AST SERPL-CCNC: 20 U/L (ref 10–40)
BASOPHILS # BLD AUTO: 0.04 K/UL (ref 0–0.2)
BASOPHILS NFR BLD: 0.5 % (ref 0–1.9)
BILIRUB SERPL-MCNC: 0.7 MG/DL (ref 0.1–1)
BUN SERPL-MCNC: 20 MG/DL (ref 8–23)
CALCIUM SERPL-MCNC: 9.6 MG/DL (ref 8.7–10.5)
CHLORIDE SERPL-SCNC: 99 MMOL/L (ref 95–110)
CHOLEST SERPL-MCNC: 190 MG/DL (ref 120–199)
CHOLEST/HDLC SERPL: 2.8 {RATIO} (ref 2–5)
CO2 SERPL-SCNC: 31 MMOL/L (ref 23–29)
CREAT SERPL-MCNC: 0.6 MG/DL (ref 0.5–1.4)
DIFFERENTIAL METHOD: ABNORMAL
EOSINOPHIL # BLD AUTO: 0.2 K/UL (ref 0–0.5)
EOSINOPHIL NFR BLD: 2.5 % (ref 0–8)
ERYTHROCYTE [DISTWIDTH] IN BLOOD BY AUTOMATED COUNT: 13.8 % (ref 11.5–14.5)
EST. GFR  (AFRICAN AMERICAN): >60 ML/MIN/1.73 M^2
EST. GFR  (NON AFRICAN AMERICAN): >60 ML/MIN/1.73 M^2
ESTIMATED AVG GLUCOSE: 103 MG/DL (ref 68–131)
GLUCOSE SERPL-MCNC: 107 MG/DL (ref 70–110)
HBA1C MFR BLD HPLC: 5.2 % (ref 4.5–6.2)
HCT VFR BLD AUTO: 39.3 % (ref 37–48.5)
HDLC SERPL-MCNC: 68 MG/DL (ref 40–75)
HDLC SERPL: 35.8 % (ref 20–50)
HGB BLD-MCNC: 12.9 G/DL (ref 12–16)
IMM GRANULOCYTES # BLD AUTO: 0.02 K/UL (ref 0–0.04)
IMM GRANULOCYTES NFR BLD AUTO: 0.3 % (ref 0–0.5)
LDLC SERPL CALC-MCNC: 106 MG/DL (ref 63–159)
LYMPHOCYTES # BLD AUTO: 1.3 K/UL (ref 1–4.8)
LYMPHOCYTES NFR BLD: 18.1 % (ref 18–48)
MCH RBC QN AUTO: 31.7 PG (ref 27–31)
MCHC RBC AUTO-ENTMCNC: 32.8 G/DL (ref 32–36)
MCV RBC AUTO: 97 FL (ref 82–98)
MONOCYTES # BLD AUTO: 0.6 K/UL (ref 0.3–1)
MONOCYTES NFR BLD: 8.4 % (ref 4–15)
NEUTROPHILS # BLD AUTO: 5.2 K/UL (ref 1.8–7.7)
NEUTROPHILS NFR BLD: 70.2 % (ref 38–73)
NONHDLC SERPL-MCNC: 122 MG/DL
NRBC BLD-RTO: 0 /100 WBC
PLATELET # BLD AUTO: 248 K/UL (ref 150–350)
PMV BLD AUTO: 9.9 FL (ref 9.2–12.9)
POTASSIUM SERPL-SCNC: 4.3 MMOL/L (ref 3.5–5.1)
PROT SERPL-MCNC: 8 G/DL (ref 6–8.4)
RBC # BLD AUTO: 4.07 M/UL (ref 4–5.4)
SODIUM SERPL-SCNC: 142 MMOL/L (ref 136–145)
T4 FREE SERPL-MCNC: 0.67 NG/DL (ref 0.71–1.51)
TRIGL SERPL-MCNC: 80 MG/DL (ref 30–150)
TSH SERPL DL<=0.005 MIU/L-ACNC: 0.63 UIU/ML (ref 0.34–5.6)
WBC # BLD AUTO: 7.34 K/UL (ref 3.9–12.7)

## 2020-02-17 PROCEDURE — 80053 COMPREHEN METABOLIC PANEL: CPT

## 2020-02-17 PROCEDURE — 84443 ASSAY THYROID STIM HORMONE: CPT

## 2020-02-17 PROCEDURE — 86480 TB TEST CELL IMMUN MEASURE: CPT

## 2020-02-17 PROCEDURE — 84439 ASSAY OF FREE THYROXINE: CPT

## 2020-02-17 PROCEDURE — 85025 COMPLETE CBC W/AUTO DIFF WBC: CPT

## 2020-02-17 PROCEDURE — 83036 HEMOGLOBIN GLYCOSYLATED A1C: CPT

## 2020-02-17 PROCEDURE — 36415 COLL VENOUS BLD VENIPUNCTURE: CPT

## 2020-02-17 PROCEDURE — 80061 LIPID PANEL: CPT

## 2020-02-17 NOTE — TELEPHONE ENCOUNTER
----- Message from Madisyn Bell NP sent at 2/17/2020  2:00 PM CST -----  Call patient, lab results were normal. Follow up as scheduled, sooner if there are concerns.

## 2020-02-20 LAB
GAMMA INTERFERON BACKGROUND BLD IA-ACNC: 0.03 IU/ML
M TB IFN-G BLD-IMP: NEGATIVE
M TB IFN-G CD4+ BCKGRND COR BLD-ACNC: 0.03 IU/ML
MITOGEN IGNF BLD-ACNC: >10 IU/ML
QUANTIFERON CRITERIA: NORMAL
QUANTIFERON TB2 AG VALUE: 0.02 IU/ML

## 2020-03-05 ENCOUNTER — OFFICE VISIT (OUTPATIENT)
Dept: PULMONOLOGY | Facility: CLINIC | Age: 70
End: 2020-03-05
Payer: MEDICARE

## 2020-03-05 ENCOUNTER — HOSPITAL ENCOUNTER (OUTPATIENT)
Dept: RADIOLOGY | Facility: HOSPITAL | Age: 70
Discharge: HOME | End: 2020-03-05
Attending: NURSE PRACTITIONER
Payer: MEDICARE

## 2020-03-05 VITALS
OXYGEN SATURATION: 95 % | WEIGHT: 183 LBS | SYSTOLIC BLOOD PRESSURE: 130 MMHG | BODY MASS INDEX: 30.45 KG/M2 | DIASTOLIC BLOOD PRESSURE: 80 MMHG | HEART RATE: 64 BPM

## 2020-03-05 DIAGNOSIS — J44.9 CHRONIC OBSTRUCTIVE PULMONARY DISEASE, UNSPECIFIED COPD TYPE: Primary | ICD-10-CM

## 2020-03-05 DIAGNOSIS — J44.9 CHRONIC OBSTRUCTIVE PULMONARY DISEASE, UNSPECIFIED COPD TYPE: ICD-10-CM

## 2020-03-05 PROCEDURE — 1126F AMNT PAIN NOTED NONE PRSNT: CPT | Mod: S$GLB,,, | Performed by: NURSE PRACTITIONER

## 2020-03-05 PROCEDURE — 1159F PR MEDICATION LIST DOCUMENTED IN MEDICAL RECORD: ICD-10-PCS | Mod: S$GLB,,, | Performed by: NURSE PRACTITIONER

## 2020-03-05 PROCEDURE — 1101F PT FALLS ASSESS-DOCD LE1/YR: CPT | Mod: S$GLB,,, | Performed by: NURSE PRACTITIONER

## 2020-03-05 PROCEDURE — 99214 PR OFFICE/OUTPT VISIT, EST, LEVL IV, 30-39 MIN: ICD-10-PCS | Mod: S$GLB,,, | Performed by: NURSE PRACTITIONER

## 2020-03-05 PROCEDURE — 71046 X-RAY EXAM CHEST 2 VIEWS: CPT | Mod: TC

## 2020-03-05 PROCEDURE — 1101F PR PT FALLS ASSESS DOC 0-1 FALLS W/OUT INJ PAST YR: ICD-10-PCS | Mod: S$GLB,,, | Performed by: NURSE PRACTITIONER

## 2020-03-05 PROCEDURE — 1126F PR PAIN SEVERITY QUANTIFIED, NO PAIN PRESENT: ICD-10-PCS | Mod: S$GLB,,, | Performed by: NURSE PRACTITIONER

## 2020-03-05 PROCEDURE — 99214 OFFICE O/P EST MOD 30 MIN: CPT | Mod: S$GLB,,, | Performed by: NURSE PRACTITIONER

## 2020-03-05 PROCEDURE — 1159F MED LIST DOCD IN RCRD: CPT | Mod: S$GLB,,, | Performed by: NURSE PRACTITIONER

## 2020-03-05 RX ORDER — FLUTICASONE PROPIONATE 50 MCG
2 SPRAY, SUSPENSION (ML) NASAL DAILY
Qty: 3 G | Refills: 6 | Status: SHIPPED | OUTPATIENT
Start: 2020-03-05 | End: 2022-04-11

## 2020-03-05 RX ORDER — LORATADINE 10 MG/1
10 TABLET ORAL DAILY
Qty: 90 TABLET | Refills: 6 | Status: SHIPPED | OUTPATIENT
Start: 2020-03-05 | End: 2021-03-05

## 2020-03-05 NOTE — PROGRESS NOTES
SUBJECTIVE:    Patient ID: Lisa Smith is a 69 y.o. female.    Chief Complaint: COPD (6 month follow up ) and Medication Problem (want to know if she still needs to take medications /gsk )       Patient here today feeling well. She is using Trelegy and Daliresp. She is wearing her oxygen when she sleeps and if she is physically exerting herself.  She is still riding her bike multiple times a week 6 miles.  She needs clearance for an angiogram.  She is taking Claritin and flonase for allergies.    Past Medical History:   Diagnosis Date    Atrial fibrillation     Bell's palsy     COPD (chronic obstructive pulmonary disease)     GI bleed     Heart murmur     Heterozygous alpha 1-antitrypsin deficiency     Hypertension     Lung disease     copd    MONSERRAT (obstructive sleep apnea)     Pneumonia     Pulmonary edema      Past Surgical History:   Procedure Laterality Date    CARDIAC ELECTROPHYSIOLOGY STUDY AND ABLATION      CARPAL TUNNEL RELEASE      CHOLECYSTECTOMY      HAND SURGERY      HYSTERECTOMY      INSERT / REPLACE / REMOVE PACEMAKER      KNEE ARTHROSCOPY       Family History   Problem Relation Age of Onset    Kidney failure Mother     Cancer Father     Cancer Brother         Social History:   Marital Status:   Occupation: housewife  Alcohol History:  reports that she does not drink alcohol.  Tobacco History:  reports that she quit smoking about 9 years ago. Her smoking use included cigarettes. She started smoking about 49 years ago. She has a 60.00 pack-year smoking history. She has never used smokeless tobacco.  Drug History:  reports that she does not use drugs.    Review of patient's allergies indicates:   Allergen Reactions    Sulfa (sulfonamide antibiotics)        Current Outpatient Medications   Medication Sig Dispense Refill    acetaminophen (TYLENOL) 325 MG tablet Take 325 mg by mouth every 6 (six) hours as needed for Pain.      albuterol (ACCUNEB) 1.25 mg/3 mL Nebu INHALE  THE CONTENTS OF 1 VIAL BY NEBULIZATION EVERY 6 (SIX) HOURS AS NEEDED. RESCUE. 360 mL 5    clopidogrel (PLAVIX) 75 mg tablet Take 75 mg by mouth once daily.       digoxin (LANOXIN) 250 mcg tablet Take 0.25 mg by mouth once daily.      diltiaZEM (CARTIA XT) 120 MG Cp24 Take 180 mg by mouth once daily.      fluticasone propionate (FLONASE) 50 mcg/actuation nasal spray 2 sprays (100 mcg total) by Each Nostril route once daily. 1 Bottle 2    fluticasone-umeclidin-vilanter (TRELEGY ELLIPTA) 100-62.5-25 mcg DsDv Inhale 1 puff into the lungs once daily. 3 each 3    furosemide (LASIX) 40 MG tablet 20 mg.       loratadine (CLARITIN) 10 mg tablet Take 1 tablet (10 mg total) by mouth once daily. 30 tablet 2    magnesium oxide (MAG-OX) 400 mg (241.3 mg magnesium) tablet Take 400 mg by mouth once daily.      melatonin 5 mg Tab Take 5 mg by mouth nightly as needed.      metoprolol succinate (TOPROL-XL) 100 MG 24 hr tablet Take 100 mg by mouth once daily.       pantoprazole (PROTONIX) 40 MG tablet Take 40 mg by mouth once daily.      roflumilast (DALIRESP) 500 mcg Tab Take 1 tablet (500 mcg total) by mouth once daily. 90 tablet 6    spironolactone (ALDACTONE) 25 MG tablet Take 25 mg by mouth once daily.       clindamycin (CLEOCIN) 300 MG capsule Take 1 capsule (300 mg total) by mouth 3 (three) times daily. (Patient not taking: Reported on 2/13/2020) 15 capsule 0    fluticasone propionate (FLONASE) 50 mcg/actuation nasal spray 2 sprays (100 mcg total) by Each Nostril route once daily. 3 g 6    loratadine (CLARITIN) 10 mg tablet Take 1 tablet (10 mg total) by mouth once daily. 90 tablet 6     No current facility-administered medications for this visit.        Alpha-1 Antitrypsin: MZ  Last PFT:10/2019 moderate obstruction with good response, no restriction, moderate diffusion defect  Last ct 11/2019    General: feeling well  Eyes: Vision is good.  ENT:  No more ear drainage   Heart:: needs an angiogram  Lungs:  breathing is good, not coughing much  GI: No Nausea, vomiting, constipation, diarrhea, or reflux.  : No dysuria, hesitancy, or nocturia.  Musculoskeletal: hands hurt  Skin: No lesions or rashes.  Neuro: No headaches or neuropathy.  Lymph: No edema or adenopathy.  Psych: No anxiety or depression.  Endo: weight is stable     OBJECTIVE:      /80 (BP Location: Left arm, Patient Position: Sitting)   Pulse 64   Wt 83 kg (183 lb)   LMP  (LMP Unknown)   SpO2 95%   BMI 30.45 kg/m²     Physical Exam  GENERAL: Older patient in no distress.  HEENT: Pupils equal and reactive. Extraocular movements intact. Nose intact.      Pharynx moist.  NECK: Supple.   HEART: Regular rate and rhythm.  murmur   LUNGS: inspiratory squeak right posterior upper lobe   ABDOMEN: Bowel sounds present. Non-tender, no masses palpated.  EXTREMITIES: Normal muscle tone and joint movement, no cyanosis or clubbing.   LYMPHATICS: No adenopathy palpated, no edema.  SKIN: Dry, intact, no lesions.   NEURO: Cranial nerves II-XII intact. Motor strength 5/5 bilaterally, upper and lower extremities.  PSYCH: Appropriate affect.    Assessment:     COPD  Chronic hypoxemic respiratory failure     Plan:           Continue the Trelegy and Daliresp   flonase and Loratadine 3 month supply to [facility]   Fax [employer] form   Clearance to Dr. GEMMA Pérez for angiogram   If sats less then 90% oxygen on, keep sleeping on it   Chest xray     Follow up in about 6 months (around 9/5/2020).

## 2020-03-05 NOTE — PATIENT INSTRUCTIONS
Treatment for COPD    Your healthcare provider will prescribe the best treatments for your COPD.  Treatment  Recommendations include the following:  · Medicines. Some medicines help relieve symptoms when you have them. Others are taken daily to control inflammation in the lungs. Always take your medicines as prescribed. Learn the names of your medicines, as well as how and when to use them.  · Oxygen therapy. Oxygen may be prescribed if tests show that your blood contains too little oxygen.  · Smoking. If you smoke, quit. Smoking is the main cause of COPD. Quitting will help you be able to better manage your COPD. Ask your healthcare provider about ways to help you quit smoking.  · Avoiding infections. Infections, like a cold or the flu, can cause your symptoms to worsen. Try to stay away from people who are sick. Wash your hands often. And, ask your healthcare provider about vaccines for the flu and pneumonia.  Coping with shortness of breath  Coping tips include the following:  · Exercise. Try to be as active as possible. This will improve energy levels and strengthen your muscles, so you can do more.  · Breathing techniques. Ask your healthcare provider or nurse to show you how to do pursed-lip breathing.  · Balance rest and activity. Each day, try to balance rest periods with activity. For example, you might start the day with getting dressed and eating breakfast, then relax and read the paper. After that, take a brief walk. And then sit with your feet up for a while.  · Pulmonary rehabilitation. Ask your provider, or call your local hospital to find out about pulmonary rehab programs. The programs help with managing your disease, breathing techniques, exercise, support and counseling.  · Healthy eating. Eating a healthy, balanced diet and making an effort to maintain your ideal weight are important to staying as healthy as possible. Make sure you have a lot of fruit and vegetables every day, as well as  balanced portions of whole grains, lean meats and fish, and low-fat dairy products.  Date Last Reviewed: 5/1/2016  © 1157-2866 [employer]. [address] [place] [address], [place] [address]. All rights reserved. This information is not intended as a substitute for professional medical care. Always follow your healthcare professional's instructions.    Continue the Trelegy and Daliresp   flonase and Loratadine 3 month supply to [facility]   Fax [employer] form   Clearance to Dr. GEMMA Pérez for angiogram   If sats less then 90% oxygen on, keep sleeping on it   Chest xray     Follow up in about 6 month

## 2020-03-10 ENCOUNTER — TELEPHONE (OUTPATIENT)
Dept: PULMONOLOGY | Facility: CLINIC | Age: 70
End: 2020-03-10

## 2020-03-11 DIAGNOSIS — I35.0 NODULAR CALCIFIC AORTIC VALVE STENOSIS: Primary | ICD-10-CM

## 2020-04-13 ENCOUNTER — TELEPHONE (OUTPATIENT)
Dept: PULMONOLOGY | Facility: CLINIC | Age: 70
End: 2020-04-13

## 2020-04-13 DIAGNOSIS — J44.9 CHRONIC OBSTRUCTIVE PULMONARY DISEASE, UNSPECIFIED COPD TYPE: Primary | ICD-10-CM

## 2020-08-17 NOTE — PROGRESS NOTES
SUBJECTIVE:    Patient ID: Lisa Smith is a 68 y.o. female.    Chief Complaint: COPD and Apnea    HPI the patient is here with a cough for a few months. She is producing yellow sputum. She does not feel bad, nor is she running a fever.  Past Medical History:   Diagnosis Date    Atrial fibrillation     Bell's palsy     GI bleed     Heterozygous alpha 1-antitrypsin deficiency     Lung disease     copd    MONSERRAT (obstructive sleep apnea)     Pneumonia     Pulmonary edema      Past Surgical History:   Procedure Laterality Date    CARDIAC ELECTROPHYSIOLOGY STUDY AND ABLATION      CARPAL TUNNEL RELEASE      CHOLECYSTECTOMY      HAND SURGERY      HYSTERECTOMY      INSERT / REPLACE / REMOVE PACEMAKER      KNEE ARTHROSCOPY       Family History   Problem Relation Age of Onset    Kidney failure Mother     Cancer Father     Cancer Brother         Social History:   Marital Status:   Occupation: Housewife  Alcohol History:  reports that she does not drink alcohol.  Tobacco History:  reports that she has quit smoking. Her smoking use included cigarettes. She has a 60.00 pack-year smoking history. she has never used smokeless tobacco.  Drug History:  reports that she does not use drugs.    Review of patient's allergies indicates:   Allergen Reactions    Sulfa (sulfonamide antibiotics)        Current Outpatient Medications   Medication Sig Dispense Refill    doxycycline (VIBRA-TABS) 100 MG tablet Take 1 tablet (100 mg total) by mouth every 12 (twelve) hours. 14 tablet 0    predniSONE (DELTASONE) 10 MG tablet Take 1 tablet (10 mg total) by mouth once daily. Take 4 x2 days, [id number], 7b4boqp, 9c2qdoi with breakfast 20 tablet 0     No current facility-administered medications for this visit.        Alpha-1 Antitrypsin: MZ  Last PFT:  08/22/2016  Last CT:    Review of Systems  General: Feeling Well.  Eyes: Vision is good.  ENT:  No sinusitis or pharyngitis.   Heart:: No chest pain or palpitations.  Lungs:  Nutrition Tips for Home:  • Try to eat at least 6 small meals or snacks a day. You may want to set an alarm to eat every 2-3 hours.  • Remember to include protein foods often such as eggs, nuts, nut butters, beans, meats, poultry and fish, milk, yogurt and cottage cheese, tofu/soy products.  • Include high calorie foods like cheese, avocado, olive or canola oil, dried fruit and nuts, granola, and cream cheese.  • Drink most fluids between meals instead of with meals.  • You may try oral nutrition supplement drinks such as [place] Instant Breakfast, Boost or Ensure, Glucerna (for diabetics) or Nepro (for dialysis), or store brand version of supplements such as [employer]/[facility].  o Tips for adding oral supplements to your daily routine:  - Drink when taking medications if they can be taken with food  - Serve chilled or pour over ice  - Freeze into a popsicle or blend with ice cream and fruit into a shake  - Pour over cereal instead of milk  - Add to coffee instead of milk or cream  - Enjoy as a snack     "cough with mucous for the last several months   GI: No Nausea, vomiting, constipation, diarrhea, or reflux.  : No dysuria, hesitancy, or nocturia.  Musculoskeletal: No joint pain or myalgias.  Skin: No lesions or rashes.  Neuro: No headaches or neuropathy.  Lymph: No edema or adenopathy.  Psych: No anxiety or depression.  Endo:weight los from diet     OBJECTIVE:      BP (P) 120/60 (BP Location: Right arm, Patient Position: Sitting)   Pulse (P) 82   Ht (P) 5' 7" (1.702 m)   Wt (P) 91.2 kg (201 lb)   SpO2 (P) 97%   BMI (P) 31.48 kg/m²     Physical Exam  GENERAL: Older patient in no distress.  HEENT: Pupils equal and reactive. Extraocular movements intact. Nose intact.      Pharynx moist.  NECK: Supple.   HEART: Regular rate and rhythm. No murmur or gallop auscultated.  LUNGS: The patient has inspiratory and expiratory wheezes in the left lower lobe. There are rhonchi in the left lower lobe. There is an inspiratory wheeze in the right lower lobe.   ABDOMEN: Bowel sounds present. Non-tender, no masses palpated.  EXTREMITIES: Normal muscle tone and joint movement, no cyanosis or clubbing.   LYMPHATICS: No adenopathy palpated, no edema.  SKIN: Dry, intact, no lesions.   NEURO: Cranial nerves II-XII intact. Motor strength 5/5 bilaterally, upper and lower extremities.  PSYCH: Appropriate affect.    Assessment:       1. Cough    2. Chronic obstructive pulmonary disease, unspecified COPD type    3. Hypoxemia    4. Heterozygous alpha 1-antitrypsin deficiency          Plan:       Cough  -     X-Ray Chest PA And Lateral; Future  -     Alpha-1-antitrypsin; Future  -     Culture, Respiratory with Gram Stain    Chronic obstructive pulmonary disease, unspecified COPD type  -     Complete PFT without bronchodilator; Future    Hypoxemia    Heterozygous alpha 1-antitrypsin deficiency    Other orders  -     doxycycline (VIBRA-TABS) 100 MG tablet; Take 1 tablet (100 mg total) by mouth every 12 (twelve) hours.  Dispense: 14 tablet; " Refill: 0  -     predniSONE (DELTASONE) 10 MG tablet; Take 1 tablet (10 mg total) by mouth once daily. Take 4 x2 days, 9i9xhin, 3g4sgcs, 2l5kyqq with breakfast  Dispense: 20 tablet; Refill: 0    Chest xray  Alpha 1 level  Doxycycline 100mg by mouth twice a day for a week  Short prednisone taper  Sputum culture  Continue your Symbicort, Tudorza and daliresp   PFTs before your next visit      Follow-up in about 6 months (around 5/5/2019).

## 2020-09-21 ENCOUNTER — TELEPHONE (OUTPATIENT)
Dept: PULMONOLOGY | Facility: CLINIC | Age: 70
End: 2020-09-21

## 2020-09-21 NOTE — TELEPHONE ENCOUNTER
Pt can't afford Trelegy she just paid $500 for a 90 day supply and she can't continue to pay for it.  Fredrick told her we need to contact them to get a tier reduction.

## 2020-10-20 ENCOUNTER — HOSPITAL ENCOUNTER (OUTPATIENT)
Dept: RADIOLOGY | Facility: HOSPITAL | Age: 70
Discharge: HOME | End: 2020-10-20
Attending: NURSE PRACTITIONER
Payer: MEDICARE

## 2020-10-20 ENCOUNTER — TELEPHONE (OUTPATIENT)
Dept: PULMONOLOGY | Facility: CLINIC | Age: 70
End: 2020-10-20

## 2020-10-20 ENCOUNTER — OFFICE VISIT (OUTPATIENT)
Dept: PULMONOLOGY | Facility: CLINIC | Age: 70
End: 2020-10-20
Payer: MEDICARE

## 2020-10-20 VITALS
HEIGHT: 65 IN | HEART RATE: 63 BPM | TEMPERATURE: 97 F | WEIGHT: 180 LBS | DIASTOLIC BLOOD PRESSURE: 75 MMHG | BODY MASS INDEX: 29.99 KG/M2 | SYSTOLIC BLOOD PRESSURE: 135 MMHG | OXYGEN SATURATION: 93 %

## 2020-10-20 DIAGNOSIS — J44.9 CHRONIC OBSTRUCTIVE PULMONARY DISEASE, UNSPECIFIED COPD TYPE: Primary | ICD-10-CM

## 2020-10-20 DIAGNOSIS — R05.9 COUGH: ICD-10-CM

## 2020-10-20 DIAGNOSIS — Z01.84 ENCOUNTER FOR ANTIBODY RESPONSE EXAMINATION: ICD-10-CM

## 2020-10-20 DIAGNOSIS — J44.9 CHRONIC OBSTRUCTIVE PULMONARY DISEASE, UNSPECIFIED COPD TYPE: ICD-10-CM

## 2020-10-20 DIAGNOSIS — Z86.16 HISTORY OF 2019 NOVEL CORONAVIRUS DISEASE (COVID-19): ICD-10-CM

## 2020-10-20 PROCEDURE — 3008F PR BODY MASS INDEX (BMI) DOCUMENTED: ICD-10-PCS | Mod: S$GLB,,, | Performed by: NURSE PRACTITIONER

## 2020-10-20 PROCEDURE — 71046 X-RAY EXAM CHEST 2 VIEWS: CPT | Mod: TC

## 2020-10-20 PROCEDURE — 99214 OFFICE O/P EST MOD 30 MIN: CPT | Mod: S$GLB,,, | Performed by: NURSE PRACTITIONER

## 2020-10-20 PROCEDURE — 1101F PT FALLS ASSESS-DOCD LE1/YR: CPT | Mod: S$GLB,,, | Performed by: NURSE PRACTITIONER

## 2020-10-20 PROCEDURE — 99214 PR OFFICE/OUTPT VISIT, EST, LEVL IV, 30-39 MIN: ICD-10-PCS | Mod: S$GLB,,, | Performed by: NURSE PRACTITIONER

## 2020-10-20 PROCEDURE — 3008F BODY MASS INDEX DOCD: CPT | Mod: S$GLB,,, | Performed by: NURSE PRACTITIONER

## 2020-10-20 PROCEDURE — 1101F PR PT FALLS ASSESS DOC 0-1 FALLS W/OUT INJ PAST YR: ICD-10-PCS | Mod: S$GLB,,, | Performed by: NURSE PRACTITIONER

## 2020-10-20 PROCEDURE — 1159F PR MEDICATION LIST DOCUMENTED IN MEDICAL RECORD: ICD-10-PCS | Mod: S$GLB,,, | Performed by: NURSE PRACTITIONER

## 2020-10-20 PROCEDURE — 1159F MED LIST DOCD IN RCRD: CPT | Mod: S$GLB,,, | Performed by: NURSE PRACTITIONER

## 2020-10-20 RX ORDER — INFLUENZA A VIRUS A/MICHIGAN/45/2015 X-275 (H1N1) ANTIGEN (FORMALDEHYDE INACTIVATED), INFLUENZA A VIRUS A/SINGAPORE/INFIMH-16-0019/2016 IVR-186 (H3N2) ANTIGEN (FORMALDEHYDE INACTIVATED), INFLUENZA B VIRUS B/PHUKET/3073/2013 ANTIGEN (FORMALDEHYDE INACTIVATED), AND INFLUENZA B VIRUS B/MARYLAND/15/2016 BX-69A ANTIGEN (FORMALDEHYDE INACTIVATED) 60; 60; 60; 60 UG/.7ML; UG/.7ML; UG/.7ML; UG/.7ML
INJECTION, SUSPENSION INTRAMUSCULAR
COMMUNITY
Start: 2020-09-16 | End: 2022-04-27

## 2020-10-20 NOTE — PATIENT INSTRUCTIONS
Treatment for COPD    Your healthcare provider will prescribe the best treatments for your COPD.  Treatment  Recommendations include the following:  · Medicines. Some medicines help relieve symptoms when you have them. Others are taken daily to control inflammation in the lungs. Always take your medicines as prescribed. Learn the names of your medicines, as well as how and when to use them.  · Oxygen therapy. Oxygen may be prescribed if tests show that your blood contains too little oxygen.  · Smoking. If you smoke, quit. Smoking is the main cause of COPD. Quitting will help you be able to better manage your COPD. Ask your healthcare provider about ways to help you quit smoking.  · Avoiding infections. Infections, like a cold or the flu, can cause your symptoms to worsen. Try to stay away from people who are sick. Wash your hands often. And, ask your healthcare provider about vaccines for the flu and pneumonia.  Coping with shortness of breath  Coping tips include the following:  · Exercise. Try to be as active as possible. This will improve energy levels and strengthen your muscles, so you can do more.  · Breathing techniques. Ask your healthcare provider or nurse to show you how to do pursed-lip breathing.  · Balance rest and activity. Each day, try to balance rest periods with activity. For example, you might start the day with getting dressed and eating breakfast, then relax and read the paper. After that, take a brief walk. And then sit with your feet up for a while.  · Pulmonary rehabilitation. Ask your provider, or call your local hospital to find out about pulmonary rehab programs. The programs help with managing your disease, breathing techniques, exercise, support and counseling.  · Healthy eating. Eating a healthy, balanced diet and making an effort to maintain your ideal weight are important to staying as healthy as possible. Make sure you have a lot of fruit and vegetables every day, as well as  balanced portions of whole grains, lean meats and fish, and low-fat dairy products.  Date Last Reviewed: 5/1/2016  © 3700-4706 [employer]. [address] [place] [address], [place] [address]. All rights reserved. This information is not intended as a substitute for professional medical care. Always follow your healthcare professional's instructions.      Continue the Trelegy and Daliresp   PFT and 6 minute walk  Chest xray  Sputum culture   GSK form and script for Trelegy   mucinex 1200mg twice a day  Talk to your PCP about pain medication   covid antibody, so can donate plasma

## 2020-10-20 NOTE — TELEPHONE ENCOUNTER
Chest xray  Impression:     1. Stable cardiomegaly, scattered mild nonspecific lower lung interstitial opacities, and small bilateral pleural effusions.  2. No definite evidence of acute cardiopulmonary disease.

## 2020-10-20 NOTE — PROGRESS NOTES
SUBJECTIVE:    Patient ID: Lisa Smith is a 70 y.o. female.    Chief Complaint: Follow-up (pt had COVID 8/2020)       Patient here today feeling well. She is using Trelegy and Daliresp. She had Covid at end of July and states she is still not 100% since having it but did not have to be hospitalized.  She does have a productive cough with yellow white mucous,however,  she always suffers with chronic cough and mucous productive every day.  This has been going on for years.  She has used an acapella in the past and it does not help mobilize the secretions.  She is physically unable to do postural drainage and has no caregiver to do CPT.  She is wearing her oxygen most of the time, does sleep on it. She is interested in donating Plasma.  She was supposed to have an angiogram but has not because of Covid.   Past Medical History:   Diagnosis Date    Atrial fibrillation     Bell's palsy     COPD (chronic obstructive pulmonary disease)     GI bleed     Heart murmur     Heterozygous alpha 1-antitrypsin deficiency     Hypertension     Lung disease     copd    MONSERRAT (obstructive sleep apnea)     Pneumonia     Pulmonary edema      Past Surgical History:   Procedure Laterality Date    CARDIAC ELECTROPHYSIOLOGY STUDY AND ABLATION      CARPAL TUNNEL RELEASE      CHOLECYSTECTOMY      HAND SURGERY      HYSTERECTOMY      INSERT / REPLACE / REMOVE PACEMAKER      KNEE ARTHROSCOPY       Family History   Problem Relation Age of Onset    Kidney failure Mother     Cancer Father     Cancer Brother         Social History:   Marital Status:   Occupation: housewife  Alcohol History:  reports no history of alcohol use.  Tobacco History:  reports that she quit smoking about 9 years ago. Her smoking use included cigarettes. She started smoking about 49 years ago. She has a 60.00 pack-year smoking history. She has never used smokeless tobacco.  Drug History:  reports no history of drug use.    Review of patient's  allergies indicates:   Allergen Reactions    Sulfa (sulfonamide antibiotics)        Current Outpatient Medications   Medication Sig Dispense Refill    acetaminophen (TYLENOL) 325 MG tablet Take 325 mg by mouth every 6 (six) hours as needed for Pain.      albuterol (ACCUNEB) 1.25 mg/3 mL Nebu INHALE THE CONTENTS OF 1 VIAL VIA NEBULIZER EVERY 6 HOURS AS NEEDED FOR RESCUE 360 mL 5    clindamycin (CLEOCIN) 300 MG capsule Take 1 capsule (300 mg total) by mouth 3 (three) times daily. (Patient not taking: Reported on 2/13/2020) 15 capsule 0    clopidogrel (PLAVIX) 75 mg tablet Take 75 mg by mouth once daily.       digoxin (LANOXIN) 250 mcg tablet TAKE 1 TABLET EVERY DAY 90 tablet 3    diltiaZEM (CARTIA XT) 120 MG Cp24 Take 180 mg by mouth once daily.      fluticasone propionate (FLONASE) 50 mcg/actuation nasal spray 2 sprays (100 mcg total) by Each Nostril route once daily. 1 Bottle 2    fluticasone propionate (FLONASE) 50 mcg/actuation nasal spray 2 sprays (100 mcg total) by Each Nostril route once daily. 3 g 6    FLUZONE HIGHDOSE QUAD 20-21  mcg/0.7 mL Syrg PHARMACIST ADMINISTERED IMMUNIZATION ADMINISTERED AT TIME OF DISPENSING      furosemide (LASIX) 40 MG tablet 20 mg.       loratadine (CLARITIN) 10 mg tablet Take 1 tablet (10 mg total) by mouth once daily. 30 tablet 2    loratadine (CLARITIN) 10 mg tablet Take 1 tablet (10 mg total) by mouth once daily. 90 tablet 6    magnesium oxide (MAG-OX) 400 mg (241.3 mg magnesium) tablet Take 400 mg by mouth once daily.      melatonin 5 mg Tab Take 5 mg by mouth nightly as needed.      metoprolol succinate (TOPROL-XL) 100 MG 24 hr tablet Take 100 mg by mouth once daily.       pantoprazole (PROTONIX) 40 MG tablet TAKE 1 TABLET EVERY DAY 90 tablet 3    roflumilast (DALIRESP) 500 mcg Tab Take 1 tablet (500 mcg total) by mouth once daily. 90 tablet 6    spironolactone (ALDACTONE) 25 MG tablet Take 25 mg by mouth once daily.        No current  "facility-administered medications for this visit.        Alpha-1 Antitrypsin: MZ  Last PFT:10/2019 moderate obstruction with good response, no restriction, moderate diffusion defect  Last ct 11/2019    General: feeling well  Eyes: Vision is good.  ENT:  No more ear drainage   Heart:: orthopnea   Lungs: productive cough yellow in am then clears in the evening  GI: No Nausea, vomiting, constipation, diarrhea, or reflux.  : No dysuria, hesitancy, or nocturia.  Musculoskeletal:  back pain   Skin: No lesions or rashes.  Neuro: headaches, neuropathy   Lymph: No edema or adenopathy.  Psych: No anxiety or depression.  Endo: weight is stable     OBJECTIVE:      /75 (BP Location: Left arm, Patient Position: Sitting, BP Method: Medium (Manual))   Pulse 63   Temp 97.4 °F (36.3 °C)   Ht 5' 5" (1.651 m)   Wt 81.6 kg (180 lb)   LMP  (LMP Unknown)   SpO2 (!) 93%   BMI 29.95 kg/m²     Physical Exam  GENERAL: Older patient in no distress.  HEENT: Pupils equal and reactive. Extraocular movements intact. Nose intact.      Pharynx moist.  NECK: Supple.   HEART: Regular rate and rhythm. loud murmur   LUNGS: faint inspiratory squeak all over    ABDOMEN: Bowel sounds present. Non-tender, no masses palpated.  EXTREMITIES: Normal muscle tone and joint movement, no cyanosis or clubbing.   LYMPHATICS: No adenopathy palpated, no edema.  SKIN: Dry, intact, no lesions.   NEURO: Cranial nerves II-XII intact. Motor strength 5/5 bilaterally, upper and lower extremities.  PSYCH: Appropriate affect.    Assessment:     COPD  Chronic hypoxemic respiratory failure   S/p Covid   Plan:           Continue the Trelegy and Daliresp   PFT and 6 minute walk  Chest xray  Sputum culture   GSK form and script for Trelegy   mucinex 1200mg twice a day  Talk to your PCP about pain medication   covid antibody, so can donate plasma   Follow up in about 6 months (around 4/20/2021).          "

## 2020-10-21 ENCOUNTER — LAB VISIT (OUTPATIENT)
Dept: LAB | Facility: HOSPITAL | Age: 70
End: 2020-10-21
Attending: NURSE PRACTITIONER
Payer: MEDICARE

## 2020-10-21 DIAGNOSIS — R05.9 COUGH: ICD-10-CM

## 2020-10-21 PROCEDURE — 87186 SC STD MICRODIL/AGAR DIL: CPT

## 2020-10-21 PROCEDURE — 87205 SMEAR GRAM STAIN: CPT

## 2020-10-21 PROCEDURE — 87077 CULTURE AEROBIC IDENTIFY: CPT

## 2020-10-21 PROCEDURE — 87147 CULTURE TYPE IMMUNOLOGIC: CPT

## 2020-10-21 PROCEDURE — 87070 CULTURE OTHR SPECIMN AEROBIC: CPT

## 2020-10-22 ENCOUNTER — TELEPHONE (OUTPATIENT)
Dept: PULMONOLOGY | Facility: CLINIC | Age: 70
End: 2020-10-22

## 2020-10-22 DIAGNOSIS — J47.9 BRONCHIECTASIS WITHOUT COMPLICATION: Primary | ICD-10-CM

## 2020-10-23 LAB
BACTERIA SPEC AEROBE CULT: ABNORMAL
GRAM STN SPEC: ABNORMAL

## 2020-10-30 ENCOUNTER — HOSPITAL ENCOUNTER (OUTPATIENT)
Dept: RADIOLOGY | Facility: HOSPITAL | Age: 70
Discharge: HOME | End: 2020-10-30
Attending: NURSE PRACTITIONER
Payer: MEDICARE

## 2020-10-30 ENCOUNTER — HOSPITAL ENCOUNTER (OUTPATIENT)
Dept: PULMONOLOGY | Facility: HOSPITAL | Age: 70
Discharge: HOME | End: 2020-10-30
Attending: NURSE PRACTITIONER
Payer: MEDICARE

## 2020-10-30 VITALS — BODY MASS INDEX: 29.99 KG/M2 | HEIGHT: 65 IN | WEIGHT: 180 LBS

## 2020-10-30 DIAGNOSIS — J47.9 BRONCHIECTASIS WITHOUT COMPLICATION: ICD-10-CM

## 2020-10-30 DIAGNOSIS — J44.9 CHRONIC OBSTRUCTIVE PULMONARY DISEASE, UNSPECIFIED COPD TYPE: ICD-10-CM

## 2020-10-30 PROCEDURE — 71250 CT THORAX DX C-: CPT | Mod: TC

## 2020-10-30 PROCEDURE — 94729 DIFFUSING CAPACITY: CPT

## 2020-10-30 PROCEDURE — 94010 BREATHING CAPACITY TEST: CPT

## 2020-10-30 PROCEDURE — 94727 GAS DIL/WSHOT DETER LNG VOL: CPT

## 2020-10-30 PROCEDURE — 94618 PULMONARY STRESS TESTING: CPT

## 2020-10-30 PROCEDURE — 94060 EVALUATION OF WHEEZING: CPT

## 2020-10-30 NOTE — CARE UPDATE
"   10/30/20 0758   6MW Test   Ordering Provider Dr. Kathryn Haro   Diagnosis Shortness of Breath   Height 5' 5" (1.651 m)   Weight 81.6 kg (180 lb)   BMI (Calculated) 30   Predicted Distance 271.04   Patient Race    6MWT Status completed without stopping   Patient Reported Dyspnea;Leg pain   Was O2 used? No   6MW Distance walked (feet) 1100 feet   Distance walked (meters) 335.28 meters   Did patient stop? Yes   How many times? 1   Stop Time 1 180 seconds  (Shortness of breath and leg pain)   Restart Time 1 210 seconds   Did patient restart? Yes   Type of assistive device(s) used? no assistive devices   Is extra documentation required for this patient? Yes   Pre-Exercise   Oxygen Saturation 95 %   Supplemental Oxygen Room Air   Heart Rate 69 bpm   [place] Dyspnea Rating  moderate   Post Exercise   Oxygen Saturation 91 %   Supplemental Oxygen Room Air   Heart Rate 77 bpm   [place] Dyspnea Rating  somewhat heavy   Recovery   Oxygen Saturation 92 %   Supplemental Oxygen Room Air   Heart Rate 72 bpm   [place] Dyspnea Rating  moderate   Interpretation   Is procedure ready for interpretation? Yes   Did the patient stop or pause? No   Total Laps Walked 5.5   Final Partial Lap Distance (feet) 0 feet   Total Distance Feet (Calculated) 1100 feet   Total Distance Meters (Calculated) 335.28 meters   Predicted Distance Meters (Calculated) 412.23 meters   Percentage of Predicted (Calculated) 81.33   Peak VO2 (Calculated) 14.04   Mets 4.01   Comments This is a Non-Hypoxemic 6 min. walk. Patient did not qualify for Home Oxygen.   Oxygen Qualification   Oxygen Qualification? No     "

## 2020-11-02 ENCOUNTER — TELEPHONE (OUTPATIENT)
Dept: PULMONOLOGY | Facility: CLINIC | Age: 70
End: 2020-11-02

## 2020-11-02 NOTE — TELEPHONE ENCOUNTER
PFT shows moderate obstruction with good response to bronchodilator, air trapping and a moderate diffusion defect.   6 minute walk non hypoxemic     CT scan   IMPRESSION:  1. Small airspace opacity in the right upper lobe and scattered  interstitial opacities in the right midlung zone suggesting a  combination of atelectasis, scarring and possibly mild atypical  infection or pneumonia.  2. Numerous mildly enlarged mediastinal lymph nodes, possibly  reactive/inflammatory in nature, smaller than on the previous  comparison exam.  3. Mild cylindrical basilar predominant bronchiectasis.  4. Trace debris in the posterior segment left lower lobe suggesting  aspiration.  5. Trace right-sided pleural effusion and adjacent atelectasis,  unchanged from the previous exam.  6. Cardiomegaly with predominantly biatrial enlargement and scattered  coronary arterial calcifications.  7. Atheromatous calcifications in the thoracic aorta with ectasia of  the ace ascending thoracic aorta.  8. Additional, and incidental findings as noted above.

## 2020-11-04 ENCOUNTER — TELEPHONE (OUTPATIENT)
Dept: PULMONOLOGY | Facility: CLINIC | Age: 70
End: 2020-11-04

## 2020-11-04 NOTE — TELEPHONE ENCOUNTER
----- Message from Julia Brody sent at 11/3/2020 10:29 AM CST -----  Patient returned your call.  She is home when you call back.      Thank You      Julia

## 2020-11-04 NOTE — TELEPHONE ENCOUNTER
Spoke with her. Reviewed, pft, walk and CT results. Will order a percussion vest for her bronchiectasis

## 2020-11-04 NOTE — TELEPHONE ENCOUNTER
Spoke with patient will order vest to use twice a day never after she eats, use Trelegy after vest therapy in the morning.

## 2020-11-11 ENCOUNTER — PATIENT MESSAGE (OUTPATIENT)
Dept: PULMONOLOGY | Facility: CLINIC | Age: 70
End: 2020-11-11

## 2020-11-12 ENCOUNTER — TELEPHONE (OUTPATIENT)
Dept: PULMONOLOGY | Facility: CLINIC | Age: 70
End: 2020-11-12

## 2020-11-12 NOTE — TELEPHONE ENCOUNTER
----- Message from Obed Dejesus MA sent at 11/12/2020  8:22 AM CST -----  Regarding: Marley Lindquist wants to know if you can make an addendum saying pt has had a daily productive cough for 6 continuous months and has tried other (name it) treatments to mobilize secretions unsuccessfully

## 2020-11-16 ENCOUNTER — PATIENT MESSAGE (OUTPATIENT)
Dept: PULMONOLOGY | Facility: CLINIC | Age: 70
End: 2020-11-16

## 2020-11-18 DIAGNOSIS — I48.0 PAROXYSMAL ATRIAL FIBRILLATION: Primary | ICD-10-CM

## 2020-12-04 ENCOUNTER — TELEPHONE (OUTPATIENT)
Dept: CARDIOLOGY | Facility: CLINIC | Age: 70
End: 2020-12-04

## 2020-12-04 NOTE — TELEPHONE ENCOUNTER
----- Message from Mahsa Piper sent at 12/4/2020 11:24 AM CST -----  Regarding: returning call  Said she received a call no  and I didn't see a message in epic   Please call her if needed     [phone]

## 2020-12-17 RX ORDER — FUROSEMIDE 20 MG/1
TABLET ORAL
Qty: 90 TABLET | Refills: 3 | Status: SHIPPED | OUTPATIENT
Start: 2020-12-17 | End: 2021-09-16

## 2021-02-02 ENCOUNTER — HOSPITAL ENCOUNTER (OUTPATIENT)
Dept: CARDIOLOGY | Facility: CLINIC | Age: 71
Discharge: HOME | End: 2021-02-02
Attending: INTERNAL MEDICINE
Payer: MEDICARE

## 2021-02-02 DIAGNOSIS — I48.0 PAROXYSMAL ATRIAL FIBRILLATION: ICD-10-CM

## 2021-02-02 PROCEDURE — 93279 CARDIAC DEVICE CHECK - IN CLINIC & HOSPITAL: ICD-10-PCS | Mod: S$GLB,,, | Performed by: INTERNAL MEDICINE

## 2021-02-02 PROCEDURE — 93279 PRGRMG DEV EVAL PM/LDLS PM: CPT | Mod: S$GLB,,, | Performed by: INTERNAL MEDICINE

## 2021-02-07 LAB
IMPEDANCE RA LEAD: 615 OHMS
THRESHOLD MS RA LEAD: 0.4 MS
THRESHOLD V RA LEAD: 0.62 V

## 2021-03-05 ENCOUNTER — IMMUNIZATION (OUTPATIENT)
Dept: FAMILY MEDICINE | Facility: CLINIC | Age: 71
End: 2021-03-05
Payer: MEDICARE

## 2021-03-05 DIAGNOSIS — Z23 NEED FOR VACCINATION: Primary | ICD-10-CM

## 2021-03-05 PROCEDURE — 0001A COVID-19, MRNA, LNP-S, PF, 30 MCG/0.3 ML DOSE VACCINE: CPT | Mod: CV19,,, | Performed by: FAMILY MEDICINE

## 2021-03-05 PROCEDURE — 91300 COVID-19, MRNA, LNP-S, PF, 30 MCG/0.3 ML DOSE VACCINE: ICD-10-PCS | Mod: ,,, | Performed by: FAMILY MEDICINE

## 2021-03-05 PROCEDURE — 91300 COVID-19, MRNA, LNP-S, PF, 30 MCG/0.3 ML DOSE VACCINE: CPT | Mod: ,,, | Performed by: FAMILY MEDICINE

## 2021-03-05 PROCEDURE — 0001A COVID-19, MRNA, LNP-S, PF, 30 MCG/0.3 ML DOSE VACCINE: ICD-10-PCS | Mod: CV19,,, | Performed by: FAMILY MEDICINE

## 2021-03-16 ENCOUNTER — TELEPHONE (OUTPATIENT)
Dept: PULMONOLOGY | Facility: CLINIC | Age: 71
End: 2021-03-16

## 2021-03-26 ENCOUNTER — IMMUNIZATION (OUTPATIENT)
Dept: FAMILY MEDICINE | Facility: CLINIC | Age: 71
End: 2021-03-26
Payer: MEDICARE

## 2021-03-26 DIAGNOSIS — Z23 NEED FOR VACCINATION: Primary | ICD-10-CM

## 2021-03-26 PROCEDURE — 91300 COVID-19, MRNA, LNP-S, PF, 30 MCG/0.3 ML DOSE VACCINE: ICD-10-PCS | Mod: S$GLB,,, | Performed by: FAMILY MEDICINE

## 2021-03-26 PROCEDURE — 0002A COVID-19, MRNA, LNP-S, PF, 30 MCG/0.3 ML DOSE VACCINE: CPT | Mod: CV19,S$GLB,, | Performed by: FAMILY MEDICINE

## 2021-03-26 PROCEDURE — 0002A COVID-19, MRNA, LNP-S, PF, 30 MCG/0.3 ML DOSE VACCINE: ICD-10-PCS | Mod: CV19,S$GLB,, | Performed by: FAMILY MEDICINE

## 2021-03-26 PROCEDURE — 91300 COVID-19, MRNA, LNP-S, PF, 30 MCG/0.3 ML DOSE VACCINE: CPT | Mod: S$GLB,,, | Performed by: FAMILY MEDICINE

## 2021-04-20 ENCOUNTER — OFFICE VISIT (OUTPATIENT)
Dept: PULMONOLOGY | Facility: CLINIC | Age: 71
End: 2021-04-20
Payer: MEDICARE

## 2021-04-20 VITALS
SYSTOLIC BLOOD PRESSURE: 160 MMHG | OXYGEN SATURATION: 96 % | DIASTOLIC BLOOD PRESSURE: 95 MMHG | WEIGHT: 185 LBS | BODY MASS INDEX: 30.82 KG/M2 | HEIGHT: 65 IN | HEART RATE: 68 BPM

## 2021-04-20 DIAGNOSIS — J44.9 CHRONIC OBSTRUCTIVE PULMONARY DISEASE, UNSPECIFIED COPD TYPE: Primary | ICD-10-CM

## 2021-04-20 DIAGNOSIS — G47.34 NOCTURNAL HYPOXEMIA: ICD-10-CM

## 2021-04-20 DIAGNOSIS — J47.9 BRONCHIECTASIS WITHOUT COMPLICATION: ICD-10-CM

## 2021-04-20 PROCEDURE — 1126F PR PAIN SEVERITY QUANTIFIED, NO PAIN PRESENT: ICD-10-PCS | Mod: S$GLB,,, | Performed by: NURSE PRACTITIONER

## 2021-04-20 PROCEDURE — 99214 OFFICE O/P EST MOD 30 MIN: CPT | Mod: S$GLB,,, | Performed by: NURSE PRACTITIONER

## 2021-04-20 PROCEDURE — 1159F MED LIST DOCD IN RCRD: CPT | Mod: S$GLB,,, | Performed by: NURSE PRACTITIONER

## 2021-04-20 PROCEDURE — 3008F BODY MASS INDEX DOCD: CPT | Mod: S$GLB,,, | Performed by: NURSE PRACTITIONER

## 2021-04-20 PROCEDURE — 99214 PR OFFICE/OUTPT VISIT, EST, LEVL IV, 30-39 MIN: ICD-10-PCS | Mod: S$GLB,,, | Performed by: NURSE PRACTITIONER

## 2021-04-20 PROCEDURE — 1126F AMNT PAIN NOTED NONE PRSNT: CPT | Mod: S$GLB,,, | Performed by: NURSE PRACTITIONER

## 2021-04-20 PROCEDURE — 1159F PR MEDICATION LIST DOCUMENTED IN MEDICAL RECORD: ICD-10-PCS | Mod: S$GLB,,, | Performed by: NURSE PRACTITIONER

## 2021-04-20 PROCEDURE — 3008F PR BODY MASS INDEX (BMI) DOCUMENTED: ICD-10-PCS | Mod: S$GLB,,, | Performed by: NURSE PRACTITIONER

## 2021-05-06 NOTE — ED NOTES
"Upon walking into room, pt is getting ready to go to restroom, family member has disconnected pt, pt states" the doctor said I can walk if I want" reports going to urinate, daughter with wc at bedside, noted taking pt out of room  " Who is his pulm? I do not see any notes.   Okay to place referral for pulm therapy but need a diagnosis if we are placing that order  Please call pt to get info

## 2021-05-21 NOTE — PATIENT INSTRUCTIONS
COPD Flare    You have had a flare-up of your COPD.  COPD, or chronic obstructive pulmonary disease, is a common lung disease. It causes your airways to become irritated and narrower. This makes it harder for you to breathe. Emphysema and chronic bronchitis are both types of COPD. This is a chronic condition, which means you always have it. Sometimes it gets worse. When this happens, it is called a flare-up.  Symptoms of COPD  People with COPD may have symptoms most of the time. In a flare-up, your symptoms get worse. These symptoms may mean you are having a flare-up:  · Shortness of breath, shallow or rapid breathing, or wheezing that gets worse  · Lung infection  · Cough that gets worse  · More mucus, thicker mucus or mucus of a different color  · Tiredness, decreased energy, or trouble doing your usual activities  · Fever  · Chest tightness  · Your symptoms dont get better even when you use your usual medicines, inhalers, and nebulizer  · Trouble talking  · You feel confused  Causes of flare-ups  Unfortunately, a flare-up can happen even though you did everything right, and you followed your doctors instructions. Some causes of flare-ups are:  · Smoking or secondhand smoke  · Colds, the flu, or respiratory infections  · Air pollution  · Sudden change in the weather  · Dust, irritating chemicals, or strong fumes  · Not taking your medicines as prescribed  Home care  Here are some things you can do at home to treat a flare-up:  · Try not to panic. This makes it harder to breathe, and keeps you from doing the right things.  · Dont smoke or be around others who are smoking.  · Try to drink more fluids than usual during a flare-up, unless your doctor has told you not to because of heart and kidney problems. More fluids can help loosen the mucus.  · Use your inhalers and nebulizer, if you have one, as you have been told to.  · If you were given antibiotics, take them until they are used up or your doctor tells you  to stop. Its important to finish the antibiotics, even though you feel better. This will make sure the infection has cleared.  · If you were given prednisone or another steroid, finish it even if you feel better.  Preventing a flare-up  Even though flare-ups happen, the best way to treat one is to prevent it before it starts. Here are some pointers:  · Dont smoke or be around others who are smoking.  · Take your medicines as you have been told.  · Talk with your doctor about getting a flu shot every year. Also find out if you need a pneumonia shot.  · If there is a weather advisory warning to stay indoors, try to stay inside when possible.  · Try to eat healthy and get plenty of sleep.  · Try to avoid things that usually set you off, like dust, chemical fumes, hairsprays, or strong perfumes.  Follow-up care  Follow up with your healthcare provider, or as advised.  If a culture was done, you will be told if your treatment needs to be changed. You can call as directed for the results.  If X-rays were done, you will be notified of any new findings that may affect your care.  Call 911  Call 911 if any of these occur:  · You have trouble breathing  · You feel confused or its difficult to wake you up  · You faint or lose consciousness  · You have a rapid heart rate  · You have new pain in your chest, arm, shoulder, neck or upper back  When to seek medical advice  Call your healthcare provider right away if any of these occur:  · Wheezing or shortness of breath gets worse  · You need to use your inhalers more often than usual without relief  · Fever of 100.4°F (38ºC) or higher, or as directed by your healthcare provider  · Coughing up lots of dark-colored or bloody mucus (sputum)  · Chest pain with each breath  · You do not start to get better within 24 hours  · Swelling of your ankles gets worse  · Dizziness or weakness  Date Last Reviewed: 9/1/2016  © 6482-8503 [employer]. [address] [facility],  [place] [address]. All rights reserved. This information is not intended as a substitute for professional medical care. Always follow your healthcare professional's instructions.      Prednisone long taper   Sputum culture  levaquin for a week   Continue the mucinex 1200mg twice a day   Call if you do not get better     No

## 2021-08-17 ENCOUNTER — HOSPITAL ENCOUNTER (OUTPATIENT)
Dept: CARDIOLOGY | Facility: CLINIC | Age: 71
Discharge: HOME | End: 2021-08-17
Attending: INTERNAL MEDICINE
Payer: MEDICARE

## 2021-08-17 DIAGNOSIS — I48.0 PAROXYSMAL ATRIAL FIBRILLATION: ICD-10-CM

## 2021-08-17 DIAGNOSIS — Z95.0 S/P PLACEMENT OF CARDIAC PACEMAKER: Primary | ICD-10-CM

## 2021-08-17 DIAGNOSIS — I25.10 CORONARY ARTERY DISEASE WITHOUT ANGINA PECTORIS, UNSPECIFIED VESSEL OR LESION TYPE, UNSPECIFIED WHETHER NATIVE OR TRANSPLANTED HEART: ICD-10-CM

## 2021-08-17 PROCEDURE — 93279 CARDIAC DEVICE CHECK - IN CLINIC & HOSPITAL: ICD-10-PCS | Mod: S$GLB,,, | Performed by: INTERNAL MEDICINE

## 2021-08-17 PROCEDURE — 93279 PRGRMG DEV EVAL PM/LDLS PM: CPT | Mod: S$GLB,,, | Performed by: INTERNAL MEDICINE

## 2021-08-23 ENCOUNTER — CLINICAL SUPPORT (OUTPATIENT)
Dept: FAMILY MEDICINE | Facility: CLINIC | Age: 71
End: 2021-08-23
Payer: MEDICARE

## 2021-08-23 DIAGNOSIS — R19.7 DIARRHEA, UNSPECIFIED TYPE: ICD-10-CM

## 2021-08-23 DIAGNOSIS — R05.9 COUGH: Primary | ICD-10-CM

## 2021-08-23 LAB — SARS-COV-2 RDRP RESP QL NAA+PROBE: NEGATIVE

## 2021-08-23 PROCEDURE — U0002 COVID-19 LAB TEST NON-CDC: HCPCS | Performed by: FAMILY MEDICINE

## 2021-08-29 LAB
BATTERY VOLTAGE (V): 2.65 V
IMPEDANCE RA LEAD: 574 OHMS
P/R-WAVE RA LEAD: NORMAL MV
THRESHOLD MS RA LEAD: 0.4 MS
THRESHOLD V RA LEAD: 0.62 V

## 2021-09-30 DIAGNOSIS — I48.91 ATRIAL FIBRILLATION, UNSPECIFIED TYPE: Primary | ICD-10-CM

## 2021-10-05 ENCOUNTER — HOSPITAL ENCOUNTER (OUTPATIENT)
Dept: CARDIOLOGY | Facility: CLINIC | Age: 71
Discharge: HOME | End: 2021-10-05
Attending: INTERNAL MEDICINE
Payer: MEDICARE

## 2021-10-05 DIAGNOSIS — Z95.0 S/P PLACEMENT OF CARDIAC PACEMAKER: ICD-10-CM

## 2021-10-05 DIAGNOSIS — I25.10 CORONARY ARTERY DISEASE WITHOUT ANGINA PECTORIS, UNSPECIFIED VESSEL OR LESION TYPE, UNSPECIFIED WHETHER NATIVE OR TRANSPLANTED HEART: ICD-10-CM

## 2021-10-05 PROCEDURE — 93279 CARDIAC DEVICE CHECK - IN CLINIC & HOSPITAL: ICD-10-PCS | Mod: S$GLB,,, | Performed by: INTERNAL MEDICINE

## 2021-10-05 PROCEDURE — 93279 PRGRMG DEV EVAL PM/LDLS PM: CPT | Mod: S$GLB,,, | Performed by: INTERNAL MEDICINE

## 2021-10-09 LAB
BATTERY VOLTAGE (V): 2.62 V
IMPEDANCE RA LEAD: 576 OHMS
P/R-WAVE RA LEAD: NORMAL MV
THRESHOLD MS RA LEAD: 0.4 MS
THRESHOLD V RA LEAD: 0.6 V

## 2021-10-21 ENCOUNTER — OFFICE VISIT (OUTPATIENT)
Dept: PULMONOLOGY | Facility: CLINIC | Age: 71
End: 2021-10-21
Payer: MEDICARE

## 2021-10-21 VITALS
WEIGHT: 183.38 LBS | SYSTOLIC BLOOD PRESSURE: 102 MMHG | HEART RATE: 65 BPM | OXYGEN SATURATION: 97 % | DIASTOLIC BLOOD PRESSURE: 70 MMHG | BODY MASS INDEX: 30.52 KG/M2

## 2021-10-21 DIAGNOSIS — R59.9 ADENOPATHY: ICD-10-CM

## 2021-10-21 DIAGNOSIS — J44.9 CHRONIC OBSTRUCTIVE PULMONARY DISEASE, UNSPECIFIED COPD TYPE: Primary | ICD-10-CM

## 2021-10-21 PROCEDURE — 1159F PR MEDICATION LIST DOCUMENTED IN MEDICAL RECORD: ICD-10-PCS | Mod: S$GLB,,, | Performed by: NURSE PRACTITIONER

## 2021-10-21 PROCEDURE — 1125F PR PAIN SEVERITY QUANTIFIED, PAIN PRESENT: ICD-10-PCS | Mod: S$GLB,,, | Performed by: NURSE PRACTITIONER

## 2021-10-21 PROCEDURE — 3008F BODY MASS INDEX DOCD: CPT | Mod: S$GLB,,, | Performed by: NURSE PRACTITIONER

## 2021-10-21 PROCEDURE — 3078F DIAST BP <80 MM HG: CPT | Mod: S$GLB,,, | Performed by: NURSE PRACTITIONER

## 2021-10-21 PROCEDURE — 3078F PR MOST RECENT DIASTOLIC BLOOD PRESSURE < 80 MM HG: ICD-10-PCS | Mod: S$GLB,,, | Performed by: NURSE PRACTITIONER

## 2021-10-21 PROCEDURE — 1159F MED LIST DOCD IN RCRD: CPT | Mod: S$GLB,,, | Performed by: NURSE PRACTITIONER

## 2021-10-21 PROCEDURE — 1101F PR PT FALLS ASSESS DOC 0-1 FALLS W/OUT INJ PAST YR: ICD-10-PCS | Mod: S$GLB,,, | Performed by: NURSE PRACTITIONER

## 2021-10-21 PROCEDURE — 3288F FALL RISK ASSESSMENT DOCD: CPT | Mod: S$GLB,,, | Performed by: NURSE PRACTITIONER

## 2021-10-21 PROCEDURE — 3008F PR BODY MASS INDEX (BMI) DOCUMENTED: ICD-10-PCS | Mod: S$GLB,,, | Performed by: NURSE PRACTITIONER

## 2021-10-21 PROCEDURE — 1125F AMNT PAIN NOTED PAIN PRSNT: CPT | Mod: S$GLB,,, | Performed by: NURSE PRACTITIONER

## 2021-10-21 PROCEDURE — 3074F PR MOST RECENT SYSTOLIC BLOOD PRESSURE < 130 MM HG: ICD-10-PCS | Mod: S$GLB,,, | Performed by: NURSE PRACTITIONER

## 2021-10-21 PROCEDURE — 1101F PT FALLS ASSESS-DOCD LE1/YR: CPT | Mod: S$GLB,,, | Performed by: NURSE PRACTITIONER

## 2021-10-21 PROCEDURE — 3074F SYST BP LT 130 MM HG: CPT | Mod: S$GLB,,, | Performed by: NURSE PRACTITIONER

## 2021-10-21 PROCEDURE — 99214 OFFICE O/P EST MOD 30 MIN: CPT | Mod: S$GLB,,, | Performed by: NURSE PRACTITIONER

## 2021-10-21 PROCEDURE — 3288F PR FALLS RISK ASSESSMENT DOCUMENTED: ICD-10-PCS | Mod: S$GLB,,, | Performed by: NURSE PRACTITIONER

## 2021-10-21 PROCEDURE — 99214 PR OFFICE/OUTPT VISIT, EST, LEVL IV, 30-39 MIN: ICD-10-PCS | Mod: S$GLB,,, | Performed by: NURSE PRACTITIONER

## 2021-11-11 ENCOUNTER — TELEPHONE (OUTPATIENT)
Dept: PULMONOLOGY | Facility: CLINIC | Age: 71
End: 2021-11-11
Payer: MEDICARE

## 2021-11-22 RX ORDER — CLOPIDOGREL BISULFATE 75 MG/1
75 TABLET ORAL DAILY
COMMUNITY
End: 2021-11-22 | Stop reason: SDUPTHER

## 2021-11-29 RX ORDER — SPIRONOLACTONE 25 MG/1
25 TABLET ORAL DAILY
Qty: 90 TABLET | Refills: 0 | Status: SHIPPED | OUTPATIENT
Start: 2021-11-29 | End: 2022-01-31

## 2021-11-29 RX ORDER — CLOPIDOGREL BISULFATE 75 MG/1
75 TABLET ORAL DAILY
Qty: 90 TABLET | Refills: 0 | Status: SHIPPED | OUTPATIENT
Start: 2021-11-29 | End: 2022-01-10

## 2021-12-06 ENCOUNTER — TELEPHONE (OUTPATIENT)
Dept: CARDIOLOGY | Facility: CLINIC | Age: 71
End: 2021-12-06
Payer: MEDICARE

## 2021-12-15 ENCOUNTER — TELEPHONE (OUTPATIENT)
Dept: FAMILY MEDICINE | Facility: CLINIC | Age: 71
End: 2021-12-15
Payer: MEDICARE

## 2021-12-16 NOTE — ADDENDUM NOTE
Addended by: RODO GERARDO on: 7/1/2019 04:41 PM     Modules accepted: Orders    
2 = A lot of assistance

## 2021-12-22 ENCOUNTER — TELEPHONE (OUTPATIENT)
Dept: PULMONOLOGY | Facility: CLINIC | Age: 71
End: 2021-12-22
Payer: MEDICARE

## 2021-12-22 DIAGNOSIS — I25.10 CORONARY ARTERY DISEASE WITHOUT ANGINA PECTORIS, UNSPECIFIED VESSEL OR LESION TYPE, UNSPECIFIED WHETHER NATIVE OR TRANSPLANTED HEART: Primary | ICD-10-CM

## 2021-12-22 DIAGNOSIS — I49.5 SSS (SICK SINUS SYNDROME): ICD-10-CM

## 2021-12-23 ENCOUNTER — OFFICE VISIT (OUTPATIENT)
Dept: FAMILY MEDICINE | Facility: CLINIC | Age: 71
End: 2021-12-23
Payer: MEDICARE

## 2021-12-23 VITALS
TEMPERATURE: 98 F | RESPIRATION RATE: 18 BRPM | OXYGEN SATURATION: 96 % | HEIGHT: 65 IN | WEIGHT: 188.5 LBS | BODY MASS INDEX: 31.4 KG/M2 | HEART RATE: 73 BPM | DIASTOLIC BLOOD PRESSURE: 76 MMHG | SYSTOLIC BLOOD PRESSURE: 130 MMHG

## 2021-12-23 DIAGNOSIS — I25.10 CORONARY ARTERY DISEASE WITHOUT ANGINA PECTORIS, UNSPECIFIED VESSEL OR LESION TYPE, UNSPECIFIED WHETHER NATIVE OR TRANSPLANTED HEART: ICD-10-CM

## 2021-12-23 DIAGNOSIS — Z85.820 HISTORY OF MELANOMA: ICD-10-CM

## 2021-12-23 DIAGNOSIS — Z11.59 ENCOUNTER FOR HEPATITIS C SCREENING TEST FOR LOW RISK PATIENT: ICD-10-CM

## 2021-12-23 DIAGNOSIS — J44.9 CHRONIC OBSTRUCTIVE PULMONARY DISEASE, UNSPECIFIED COPD TYPE: ICD-10-CM

## 2021-12-23 DIAGNOSIS — R53.83 FATIGUE, UNSPECIFIED TYPE: ICD-10-CM

## 2021-12-23 DIAGNOSIS — L98.9 SKIN LESIONS: Primary | ICD-10-CM

## 2021-12-23 PROCEDURE — 1159F MED LIST DOCD IN RCRD: CPT | Mod: S$GLB,,, | Performed by: NURSE PRACTITIONER

## 2021-12-23 PROCEDURE — 1101F PR PT FALLS ASSESS DOC 0-1 FALLS W/OUT INJ PAST YR: ICD-10-PCS | Mod: S$GLB,,, | Performed by: NURSE PRACTITIONER

## 2021-12-23 PROCEDURE — 99214 OFFICE O/P EST MOD 30 MIN: CPT | Mod: S$GLB,,, | Performed by: NURSE PRACTITIONER

## 2021-12-23 PROCEDURE — 3008F PR BODY MASS INDEX (BMI) DOCUMENTED: ICD-10-PCS | Mod: S$GLB,,, | Performed by: NURSE PRACTITIONER

## 2021-12-23 PROCEDURE — 3075F PR MOST RECENT SYSTOLIC BLOOD PRESS GE 130-139MM HG: ICD-10-PCS | Mod: S$GLB,,, | Performed by: NURSE PRACTITIONER

## 2021-12-23 PROCEDURE — 1160F RVW MEDS BY RX/DR IN RCRD: CPT | Mod: S$GLB,,, | Performed by: NURSE PRACTITIONER

## 2021-12-23 PROCEDURE — 3078F PR MOST RECENT DIASTOLIC BLOOD PRESSURE < 80 MM HG: ICD-10-PCS | Mod: S$GLB,,, | Performed by: NURSE PRACTITIONER

## 2021-12-23 PROCEDURE — 3288F PR FALLS RISK ASSESSMENT DOCUMENTED: ICD-10-PCS | Mod: S$GLB,,, | Performed by: NURSE PRACTITIONER

## 2021-12-23 PROCEDURE — 1159F PR MEDICATION LIST DOCUMENTED IN MEDICAL RECORD: ICD-10-PCS | Mod: S$GLB,,, | Performed by: NURSE PRACTITIONER

## 2021-12-23 PROCEDURE — 1101F PT FALLS ASSESS-DOCD LE1/YR: CPT | Mod: S$GLB,,, | Performed by: NURSE PRACTITIONER

## 2021-12-23 PROCEDURE — 1160F PR REVIEW ALL MEDS BY PRESCRIBER/CLIN PHARMACIST DOCUMENTED: ICD-10-PCS | Mod: S$GLB,,, | Performed by: NURSE PRACTITIONER

## 2021-12-23 PROCEDURE — 3008F BODY MASS INDEX DOCD: CPT | Mod: S$GLB,,, | Performed by: NURSE PRACTITIONER

## 2021-12-23 PROCEDURE — 3288F FALL RISK ASSESSMENT DOCD: CPT | Mod: S$GLB,,, | Performed by: NURSE PRACTITIONER

## 2021-12-23 PROCEDURE — 3075F SYST BP GE 130 - 139MM HG: CPT | Mod: S$GLB,,, | Performed by: NURSE PRACTITIONER

## 2021-12-23 PROCEDURE — 99214 PR OFFICE/OUTPT VISIT, EST, LEVL IV, 30-39 MIN: ICD-10-PCS | Mod: S$GLB,,, | Performed by: NURSE PRACTITIONER

## 2021-12-23 PROCEDURE — 3078F DIAST BP <80 MM HG: CPT | Mod: S$GLB,,, | Performed by: NURSE PRACTITIONER

## 2022-01-07 DIAGNOSIS — I49.5 SSS (SICK SINUS SYNDROME): Primary | ICD-10-CM

## 2022-01-09 ENCOUNTER — PATIENT MESSAGE (OUTPATIENT)
Dept: CARDIOLOGY | Facility: CLINIC | Age: 72
End: 2022-01-09
Payer: MEDICARE

## 2022-01-10 ENCOUNTER — TELEPHONE (OUTPATIENT)
Dept: CARDIOLOGY | Facility: CLINIC | Age: 72
End: 2022-01-10
Payer: MEDICARE

## 2022-01-10 ENCOUNTER — OFFICE VISIT (OUTPATIENT)
Dept: CARDIOLOGY | Facility: CLINIC | Age: 72
End: 2022-01-10
Payer: MEDICARE

## 2022-01-10 VITALS
HEIGHT: 65 IN | BODY MASS INDEX: 31.49 KG/M2 | DIASTOLIC BLOOD PRESSURE: 76 MMHG | HEART RATE: 64 BPM | WEIGHT: 189 LBS | SYSTOLIC BLOOD PRESSURE: 130 MMHG

## 2022-01-10 DIAGNOSIS — I25.10 CORONARY ARTERY DISEASE WITHOUT ANGINA PECTORIS, UNSPECIFIED VESSEL OR LESION TYPE, UNSPECIFIED WHETHER NATIVE OR TRANSPLANTED HEART: ICD-10-CM

## 2022-01-10 DIAGNOSIS — Z95.0 S/P PLACEMENT OF CARDIAC PACEMAKER: ICD-10-CM

## 2022-01-10 DIAGNOSIS — I49.5 SSS (SICK SINUS SYNDROME): ICD-10-CM

## 2022-01-10 DIAGNOSIS — Z45.010 PACEMAKER BATTERY DEPLETION: Primary | ICD-10-CM

## 2022-01-10 DIAGNOSIS — I48.91 ATRIAL FIBRILLATION, UNSPECIFIED TYPE: ICD-10-CM

## 2022-01-10 PROCEDURE — 1126F AMNT PAIN NOTED NONE PRSNT: CPT | Mod: CPTII,S$GLB,, | Performed by: NURSE PRACTITIONER

## 2022-01-10 PROCEDURE — 1101F PT FALLS ASSESS-DOCD LE1/YR: CPT | Mod: CPTII,S$GLB,, | Performed by: NURSE PRACTITIONER

## 2022-01-10 PROCEDURE — 3078F PR MOST RECENT DIASTOLIC BLOOD PRESSURE < 80 MM HG: ICD-10-PCS | Mod: CPTII,S$GLB,, | Performed by: NURSE PRACTITIONER

## 2022-01-10 PROCEDURE — 1159F PR MEDICATION LIST DOCUMENTED IN MEDICAL RECORD: ICD-10-PCS | Mod: CPTII,S$GLB,, | Performed by: NURSE PRACTITIONER

## 2022-01-10 PROCEDURE — 3288F FALL RISK ASSESSMENT DOCD: CPT | Mod: CPTII,S$GLB,, | Performed by: NURSE PRACTITIONER

## 2022-01-10 PROCEDURE — 99214 PR OFFICE/OUTPT VISIT, EST, LEVL IV, 30-39 MIN: ICD-10-PCS | Mod: S$GLB,,, | Performed by: NURSE PRACTITIONER

## 2022-01-10 PROCEDURE — 3078F DIAST BP <80 MM HG: CPT | Mod: CPTII,S$GLB,, | Performed by: NURSE PRACTITIONER

## 2022-01-10 PROCEDURE — 3008F BODY MASS INDEX DOCD: CPT | Mod: CPTII,S$GLB,, | Performed by: NURSE PRACTITIONER

## 2022-01-10 PROCEDURE — 3075F PR MOST RECENT SYSTOLIC BLOOD PRESS GE 130-139MM HG: ICD-10-PCS | Mod: CPTII,S$GLB,, | Performed by: NURSE PRACTITIONER

## 2022-01-10 PROCEDURE — 1159F MED LIST DOCD IN RCRD: CPT | Mod: CPTII,S$GLB,, | Performed by: NURSE PRACTITIONER

## 2022-01-10 PROCEDURE — 1160F RVW MEDS BY RX/DR IN RCRD: CPT | Mod: CPTII,S$GLB,, | Performed by: NURSE PRACTITIONER

## 2022-01-10 PROCEDURE — 99214 OFFICE O/P EST MOD 30 MIN: CPT | Mod: S$GLB,,, | Performed by: NURSE PRACTITIONER

## 2022-01-10 PROCEDURE — 3008F PR BODY MASS INDEX (BMI) DOCUMENTED: ICD-10-PCS | Mod: CPTII,S$GLB,, | Performed by: NURSE PRACTITIONER

## 2022-01-10 PROCEDURE — 1160F PR REVIEW ALL MEDS BY PRESCRIBER/CLIN PHARMACIST DOCUMENTED: ICD-10-PCS | Mod: CPTII,S$GLB,, | Performed by: NURSE PRACTITIONER

## 2022-01-10 PROCEDURE — 1101F PR PT FALLS ASSESS DOC 0-1 FALLS W/OUT INJ PAST YR: ICD-10-PCS | Mod: CPTII,S$GLB,, | Performed by: NURSE PRACTITIONER

## 2022-01-10 PROCEDURE — 1126F PR PAIN SEVERITY QUANTIFIED, NO PAIN PRESENT: ICD-10-PCS | Mod: CPTII,S$GLB,, | Performed by: NURSE PRACTITIONER

## 2022-01-10 PROCEDURE — 3075F SYST BP GE 130 - 139MM HG: CPT | Mod: CPTII,S$GLB,, | Performed by: NURSE PRACTITIONER

## 2022-01-10 PROCEDURE — 3288F PR FALLS RISK ASSESSMENT DOCUMENTED: ICD-10-PCS | Mod: CPTII,S$GLB,, | Performed by: NURSE PRACTITIONER

## 2022-01-10 NOTE — H&P (VIEW-ONLY)
Subjective:    Patient ID:  Lisa Smith is a 71 y.o. female patient here for evaluation Coronary Artery Disease      History of Present Illness:       Ms. Smith is here for her follow up visit. Patient has a pacer check last month that showed ANNAMARIE in 1 month. Patient has had an ablation and is dependent. She denies chest pain or SOB. No arm neck or jaw pain. No lightheaded or dizziness. She has been doing well. Recovered from COVID infection last year.    12/21/2021  · 1. BATTERY LESS THAN ONE MONTH TO ANNAMARIE.  · 2. Four high rate episodes longest lasting six seconds. Avg V rates 130 bpm.  · 3. No changes made.  · 4. RTC: One month.          Review of patient's allergies indicates:   Allergen Reactions    Sulfa (sulfonamide antibiotics)        Past Medical History:   Diagnosis Date    Atrial fibrillation     Bell's palsy     COPD (chronic obstructive pulmonary disease)     GI bleed     Heart murmur     Heterozygous alpha 1-antitrypsin deficiency     Hypertension     Lung disease     copd    MONSERRAT (obstructive sleep apnea)     Pneumonia     Pulmonary edema      Past Surgical History:   Procedure Laterality Date    CARDIAC ELECTROPHYSIOLOGY STUDY AND ABLATION      CARPAL TUNNEL RELEASE      CHOLECYSTECTOMY      HAND SURGERY      HYSTERECTOMY      INSERT / REPLACE / REMOVE PACEMAKER      KNEE ARTHROSCOPY       Social History     Tobacco Use    Smoking status: Former Smoker     Packs/day: 2.00     Years: 30.00     Pack years: 60.00     Types: Cigarettes     Start date: 2/5/1971     Quit date: 2/5/2011     Years since quitting: 10.9    Smokeless tobacco: Never Used   Substance Use Topics    Alcohol use: No    Drug use: No        Review of Systems:    As noted in HPI in addition      REVIEW OF SYSTEMS  CARDIOVASCULAR: No recent chest pain, palpitations, arm, neck, or jaw pain  RESPIRATORY: No recent fever, cough chills, SOB or congestion  : No blood in the urine  GI: No Nausea, vomiting,  constipation, diarrhea, blood, or reflux.  MUSCULOSKELETAL: No myalgias  NEURO: No lightheadedness or dizziness  EYES: No Double vision, blurry, vision or headache              Objective        Vitals:    01/10/22 1433   BP: 130/76   Pulse: 64       LIPIDS - LAST 2   Lab Results   Component Value Date    CHOL 190 02/17/2020    CHOL 178 03/23/2004    HDL 68 02/17/2020    HDL 95.0 (H) 03/23/2004    LDLCALC 106.0 02/17/2020    LDLCALC 68.4 03/23/2004    TRIG 80 02/17/2020    TRIG 73 03/23/2004    CHOLHDL 35.8 02/17/2020    CHOLHDL 53.4 (H) 03/23/2004       CBC - LAST 2  Lab Results   Component Value Date    WBC 7.34 02/17/2020    WBC 11.84 12/05/2019    RBC 4.07 02/17/2020    RBC 3.89 (L) 12/05/2019    HGB 12.9 02/17/2020    HGB 12.1 12/05/2019    HCT 39.3 02/17/2020    HCT 37.4 12/05/2019    MCV 97 02/17/2020    MCV 96 12/05/2019    MCH 31.7 (H) 02/17/2020    MCH 31.1 (H) 12/05/2019    MCHC 32.8 02/17/2020    MCHC 32.4 12/05/2019    RDW 13.8 02/17/2020    RDW 14.0 12/05/2019     02/17/2020     12/05/2019    MPV 9.9 02/17/2020    MPV 9.3 12/05/2019    GRAN 5.2 02/17/2020    GRAN 70.2 02/17/2020    LYMPH 1.3 02/17/2020    LYMPH 18.1 02/17/2020    MONO 0.6 02/17/2020    MONO 8.4 02/17/2020    BASO 0.04 02/17/2020    BASO 0.07 12/05/2019    NRBC 0 02/17/2020    NRBC 0 12/05/2019       CHEMISTRY & LIVER FUNCTION - LAST 2  Lab Results   Component Value Date     02/17/2020     12/05/2019    K 4.3 02/17/2020    K 4.1 12/05/2019    CL 99 02/17/2020    CL 98 12/05/2019    CO2 31 (H) 02/17/2020    CO2 27 12/05/2019    ANIONGAP 12 02/17/2020    ANIONGAP 12 12/05/2019    BUN 20 02/17/2020    BUN 29 (H) 12/05/2019    CREATININE 0.6 02/17/2020    CREATININE 0.7 12/05/2019     02/17/2020     (H) 12/05/2019    CALCIUM 9.6 02/17/2020    CALCIUM 9.2 12/05/2019    MG 1.9 11/29/2019    MG 2.0 11/28/2019    ALBUMIN 4.3 02/17/2020    ALBUMIN 3.7 12/05/2019    PROT 8.0 02/17/2020    PROT 7.6 12/05/2019     ALKPHOS 55 02/17/2020    ALKPHOS 64 12/05/2019    ALT 10 02/17/2020    ALT 15 12/05/2019    AST 20 02/17/2020    AST 20 12/05/2019    BILITOT 0.7 02/17/2020    BILITOT 0.7 12/05/2019        CARDIAC PROFILE - LAST 2  Lab Results   Component Value Date     (H) 11/25/2019    TROPONINI <0.030 11/25/2019        COAGULATION - LAST 2  Lab Results   Component Value Date    LABPT 12.6 12/05/2019    LABPT 12.8 11/25/2019    INR 1.0 12/05/2019    INR 1.0 11/25/2019    APTT 28.1 12/05/2019       ENDOCRINE & PSA - LAST 2  Lab Results   Component Value Date    HGBA1C 5.2 02/17/2020    TSH 0.630 02/17/2020    PROCAL 0.25 11/27/2019    PROCAL 0.40 11/25/2019        ECHOCARDIOGRAM RESULTS  Results for orders placed during the hospital encounter of 11/25/19    Echo Color Flow Doppler? Yes    Interpretation Summary  · Eccentric left ventricular hypertrophy.  · Mild left ventricular enlargement.  · Low normal left ventricular systolic function. The estimated ejection fraction is 50%  · Grade II (moderate) left ventricular diastolic dysfunction consistent with pseudonormalization.  · No wall motion abnormalities.  · Normal right ventricular systolic function.  · Mild left atrial enlargement.  · Mild right atrial enlargement.  · Moderate aortic valve stenosis.  · Aortic valve area is 0.86 cm2; peak velocity is 3.83 m/s; mean gradient is 33 mmHg.  · Mild mitral regurgitation.  · Mild mitral sclerosis.  · Moderate tricuspid regurgitation.  · Moderate to severe pulmonary hypertension present.  · Intermediate central venous pressure (8 mm Hg).  · The estimated PA systolic pressure is 50 mm Hg    1) atrial fib  With lbbb  2) moderate aortic steno sis  3) moderate pulmonary hi bp  grade ll diastolic dysfunction  Mean left atrial pressure = 12 mmhg      CURRENT/PREVIOUS VISIT EKG  Results for orders placed or performed during the hospital encounter of 12/05/19   EKG 12-lead    Collection Time: 12/05/19  9:03 PM    Narrative    Test  Reason : R06.02,    Vent. Rate : 060 BPM     Atrial Rate : 087 BPM     P-R Int : 000 ms          QRS Dur : 182 ms      QT Int : 460 ms       P-R-T Axes : 000 -83 086 degrees     QTc Int : 460 ms    Ventricular-paced rhythm  Abnormal ECG  When compared with ECG of 25-NOV-2019 08:42,  Vent. rate has decreased BY   4 BPM  Confirmed by Isidoro Malik MD [id number]) on 12/6/2019 3:07:47 PM    Referred By: FLORY   SELF           Confirmed By:Isidoro Malik MD       PHYSICAL EXAM  CONSTITUTIONAL: Well built, well nourished in no apparent distress  NECK: BRUIT  LUNGS: CTA  CHEST WALL: no tenderness  HEART: Systolic murmur  ABDOMEN: soft, non-tender; bowel sounds normal; no masses,  no organomegaly  EXTREMITIES: Extremities normal, no edema, no calf tenderness noted  NEURO: AAO X 3    I HAVE REVIEWED :    The vital signs, nurses notes, and all the pertinent radiology and labs.        Current Outpatient Medications   Medication Instructions    acetaminophen (TYLENOL) 325 mg, Oral, Every 6 hours PRN    albuterol (ACCUNEB) 1.25 mg/3 mL Nebu INHALE THE CONTENTS OF 1 VIAL VIA NEBULIZER EVERY 6 HOURS AS NEEDED FOR RESCUE    clindamycin (CLEOCIN) 300 mg, Oral, 3 times daily    clopidogreL (PLAVIX) 75 mg tablet TAKE 1 TABLET EVERY DAY    digoxin (LANOXIN) 250 mcg tablet TAKE 1 TABLET EVERY DAY    diltiaZEM (CARDIZEM CD) 120 MG Cp24 TAKE 1 CAPSULE EVERY DAY    fluticasone propionate (FLONASE) 50 mcg/actuation nasal spray USE 2 SPRAYS (100 MCG TOTAL) IN EACH NOSTRIL ONCE DAILY.    fluticasone propionate (FLONASE) 100 mcg, Each Nostril, Daily    fluticasone propionate (FLONASE) 100 mcg, Each Nostril, Daily    FLUZONE HIGHDOSE QUAD 20-21  mcg/0.7 mL Syrg PHARMACIST ADMINISTERED IMMUNIZATION ADMINISTERED AT TIME OF DISPENSING    furosemide (LASIX) 20 MG tablet TAKE 1 TABLET EVERY DAY    loratadine (CLARITIN) 10 mg, Oral, Daily    magnesium oxide (MAG-OX) 400 mg, Oral, Daily    melatonin (MELATIN) 5 mg, Oral,  Nightly PRN    metoprolol succinate (TOPROL-XL) 100 MG 24 hr tablet TAKE 1 TABLET EVERY DAY    pantoprazole (PROTONIX) 40 MG tablet TAKE 1 TABLET EVERY DAY    roflumilast (DALIRESP) 500 mcg, Oral, Daily    spironolactone (ALDACTONE) 25 mg, Oral, Daily    TRELEGY ELLIPTA 100-62.5-25 mcg DsDv INHALE 1 PUFF ONE TIME DAILY          Assessment & Plan     Pacemaker battery depletion  Plan for Generator Change  Risk of the procedure including risk of bleeding and infection has been explained to the patient  All questions have been answered  Informed consent obtained    S/P placement of cardiac pacemaker  Now at [facility]  Will plan on replacing Generator      COPD (chronic obstructive pulmonary disease)  Per Pulmonary          No follow-ups on file.

## 2022-01-10 NOTE — TELEPHONE ENCOUNTER
----- Message from Madhuri Hunter NP sent at 1/9/2022  2:57 PM CST -----  Regarding: BATTERY ANNAMARIE  MS DANIEL NEEDS AN APT SOON  HER BATTERY IS AT ANNAMARIE    ASHLY PERALTA

## 2022-01-10 NOTE — PROGRESS NOTES
Subjective:    Patient ID:  Lisa Smith is a 71 y.o. female patient here for evaluation Coronary Artery Disease      History of Present Illness:       Ms. Smith is here for her follow up visit. Patient has a pacer check last month that showed ANNAMARIE in 1 month. Patient has had an ablation and is dependent. She denies chest pain or SOB. No arm neck or jaw pain. No lightheaded or dizziness. She has been doing well. Recovered from COVID infection last year.    12/21/2021  · 1. BATTERY LESS THAN ONE MONTH TO ANNAMARIE.  · 2. Four high rate episodes longest lasting six seconds. Avg V rates 130 bpm.  · 3. No changes made.  · 4. RTC: One month.          Review of patient's allergies indicates:   Allergen Reactions    Sulfa (sulfonamide antibiotics)        Past Medical History:   Diagnosis Date    Atrial fibrillation     Bell's palsy     COPD (chronic obstructive pulmonary disease)     GI bleed     Heart murmur     Heterozygous alpha 1-antitrypsin deficiency     Hypertension     Lung disease     copd    MONSERRAT (obstructive sleep apnea)     Pneumonia     Pulmonary edema      Past Surgical History:   Procedure Laterality Date    CARDIAC ELECTROPHYSIOLOGY STUDY AND ABLATION      CARPAL TUNNEL RELEASE      CHOLECYSTECTOMY      HAND SURGERY      HYSTERECTOMY      INSERT / REPLACE / REMOVE PACEMAKER      KNEE ARTHROSCOPY       Social History     Tobacco Use    Smoking status: Former Smoker     Packs/day: 2.00     Years: 30.00     Pack years: 60.00     Types: Cigarettes     Start date: 2/5/1971     Quit date: 2/5/2011     Years since quitting: 10.9    Smokeless tobacco: Never Used   Substance Use Topics    Alcohol use: No    Drug use: No        Review of Systems:    As noted in HPI in addition      REVIEW OF SYSTEMS  CARDIOVASCULAR: No recent chest pain, palpitations, arm, neck, or jaw pain  RESPIRATORY: No recent fever, cough chills, SOB or congestion  : No blood in the urine  GI: No Nausea, vomiting,  constipation, diarrhea, blood, or reflux.  MUSCULOSKELETAL: No myalgias  NEURO: No lightheadedness or dizziness  EYES: No Double vision, blurry, vision or headache              Objective        Vitals:    01/10/22 1433   BP: 130/76   Pulse: 64       LIPIDS - LAST 2   Lab Results   Component Value Date    CHOL 190 02/17/2020    CHOL 178 03/23/2004    HDL 68 02/17/2020    HDL 95.0 (H) 03/23/2004    LDLCALC 106.0 02/17/2020    LDLCALC 68.4 03/23/2004    TRIG 80 02/17/2020    TRIG 73 03/23/2004    CHOLHDL 35.8 02/17/2020    CHOLHDL 53.4 (H) 03/23/2004       CBC - LAST 2  Lab Results   Component Value Date    WBC 7.34 02/17/2020    WBC 11.84 12/05/2019    RBC 4.07 02/17/2020    RBC 3.89 (L) 12/05/2019    HGB 12.9 02/17/2020    HGB 12.1 12/05/2019    HCT 39.3 02/17/2020    HCT 37.4 12/05/2019    MCV 97 02/17/2020    MCV 96 12/05/2019    MCH 31.7 (H) 02/17/2020    MCH 31.1 (H) 12/05/2019    MCHC 32.8 02/17/2020    MCHC 32.4 12/05/2019    RDW 13.8 02/17/2020    RDW 14.0 12/05/2019     02/17/2020     12/05/2019    MPV 9.9 02/17/2020    MPV 9.3 12/05/2019    GRAN 5.2 02/17/2020    GRAN 70.2 02/17/2020    LYMPH 1.3 02/17/2020    LYMPH 18.1 02/17/2020    MONO 0.6 02/17/2020    MONO 8.4 02/17/2020    BASO 0.04 02/17/2020    BASO 0.07 12/05/2019    NRBC 0 02/17/2020    NRBC 0 12/05/2019       CHEMISTRY & LIVER FUNCTION - LAST 2  Lab Results   Component Value Date     02/17/2020     12/05/2019    K 4.3 02/17/2020    K 4.1 12/05/2019    CL 99 02/17/2020    CL 98 12/05/2019    CO2 31 (H) 02/17/2020    CO2 27 12/05/2019    ANIONGAP 12 02/17/2020    ANIONGAP 12 12/05/2019    BUN 20 02/17/2020    BUN 29 (H) 12/05/2019    CREATININE 0.6 02/17/2020    CREATININE 0.7 12/05/2019     02/17/2020     (H) 12/05/2019    CALCIUM 9.6 02/17/2020    CALCIUM 9.2 12/05/2019    MG 1.9 11/29/2019    MG 2.0 11/28/2019    ALBUMIN 4.3 02/17/2020    ALBUMIN 3.7 12/05/2019    PROT 8.0 02/17/2020    PROT 7.6 12/05/2019     ALKPHOS 55 02/17/2020    ALKPHOS 64 12/05/2019    ALT 10 02/17/2020    ALT 15 12/05/2019    AST 20 02/17/2020    AST 20 12/05/2019    BILITOT 0.7 02/17/2020    BILITOT 0.7 12/05/2019        CARDIAC PROFILE - LAST 2  Lab Results   Component Value Date     (H) 11/25/2019    TROPONINI <0.030 11/25/2019        COAGULATION - LAST 2  Lab Results   Component Value Date    LABPT 12.6 12/05/2019    LABPT 12.8 11/25/2019    INR 1.0 12/05/2019    INR 1.0 11/25/2019    APTT 28.1 12/05/2019       ENDOCRINE & PSA - LAST 2  Lab Results   Component Value Date    HGBA1C 5.2 02/17/2020    TSH 0.630 02/17/2020    PROCAL 0.25 11/27/2019    PROCAL 0.40 11/25/2019        ECHOCARDIOGRAM RESULTS  Results for orders placed during the hospital encounter of 11/25/19    Echo Color Flow Doppler? Yes    Interpretation Summary  · Eccentric left ventricular hypertrophy.  · Mild left ventricular enlargement.  · Low normal left ventricular systolic function. The estimated ejection fraction is 50%  · Grade II (moderate) left ventricular diastolic dysfunction consistent with pseudonormalization.  · No wall motion abnormalities.  · Normal right ventricular systolic function.  · Mild left atrial enlargement.  · Mild right atrial enlargement.  · Moderate aortic valve stenosis.  · Aortic valve area is 0.86 cm2; peak velocity is 3.83 m/s; mean gradient is 33 mmHg.  · Mild mitral regurgitation.  · Mild mitral sclerosis.  · Moderate tricuspid regurgitation.  · Moderate to severe pulmonary hypertension present.  · Intermediate central venous pressure (8 mm Hg).  · The estimated PA systolic pressure is 50 mm Hg    1) atrial fib  With lbbb  2) moderate aortic steno sis  3) moderate pulmonary hi bp  grade ll diastolic dysfunction  Mean left atrial pressure = 12 mmhg      CURRENT/PREVIOUS VISIT EKG  Results for orders placed or performed during the hospital encounter of 12/05/19   EKG 12-lead    Collection Time: 12/05/19  9:03 PM    Narrative    Test  Reason : R06.02,    Vent. Rate : 060 BPM     Atrial Rate : 087 BPM     P-R Int : 000 ms          QRS Dur : 182 ms      QT Int : 460 ms       P-R-T Axes : 000 -83 086 degrees     QTc Int : 460 ms    Ventricular-paced rhythm  Abnormal ECG  When compared with ECG of 25-NOV-2019 08:42,  Vent. rate has decreased BY   4 BPM  Confirmed by Isidoro Malik MD ([id number]) on 12/6/2019 3:07:47 PM    Referred By: FLORY   SELF           Confirmed By:Isidoro Malik MD       PHYSICAL EXAM  CONSTITUTIONAL: Well built, well nourished in no apparent distress  NECK: BRUIT  LUNGS: CTA  CHEST WALL: no tenderness  HEART: Systolic murmur  ABDOMEN: soft, non-tender; bowel sounds normal; no masses,  no organomegaly  EXTREMITIES: Extremities normal, no edema, no calf tenderness noted  NEURO: AAO X 3    I HAVE REVIEWED :    The vital signs, nurses notes, and all the pertinent radiology and labs.        Current Outpatient Medications   Medication Instructions    acetaminophen (TYLENOL) 325 mg, Oral, Every 6 hours PRN    albuterol (ACCUNEB) 1.25 mg/3 mL Nebu INHALE THE CONTENTS OF 1 VIAL VIA NEBULIZER EVERY 6 HOURS AS NEEDED FOR RESCUE    clindamycin (CLEOCIN) 300 mg, Oral, 3 times daily    clopidogreL (PLAVIX) 75 mg tablet TAKE 1 TABLET EVERY DAY    digoxin (LANOXIN) 250 mcg tablet TAKE 1 TABLET EVERY DAY    diltiaZEM (CARDIZEM CD) 120 MG Cp24 TAKE 1 CAPSULE EVERY DAY    fluticasone propionate (FLONASE) 50 mcg/actuation nasal spray USE 2 SPRAYS (100 MCG TOTAL) IN EACH NOSTRIL ONCE DAILY.    fluticasone propionate (FLONASE) 100 mcg, Each Nostril, Daily    fluticasone propionate (FLONASE) 100 mcg, Each Nostril, Daily    FLUZONE HIGHDOSE QUAD 20-21  mcg/0.7 mL Syrg PHARMACIST ADMINISTERED IMMUNIZATION ADMINISTERED AT TIME OF DISPENSING    furosemide (LASIX) 20 MG tablet TAKE 1 TABLET EVERY DAY    loratadine (CLARITIN) 10 mg, Oral, Daily    magnesium oxide (MAG-OX) 400 mg, Oral, Daily    melatonin (MELATIN) 5 mg, Oral,  Nightly PRN    metoprolol succinate (TOPROL-XL) 100 MG 24 hr tablet TAKE 1 TABLET EVERY DAY    pantoprazole (PROTONIX) 40 MG tablet TAKE 1 TABLET EVERY DAY    roflumilast (DALIRESP) 500 mcg, Oral, Daily    spironolactone (ALDACTONE) 25 mg, Oral, Daily    TRELEGY ELLIPTA 100-62.5-25 mcg DsDv INHALE 1 PUFF ONE TIME DAILY          Assessment & Plan     Pacemaker battery depletion  Plan for Generator Change  Risk of the procedure including risk of bleeding and infection has been explained to the patient  All questions have been answered  Informed consent obtained    S/P placement of cardiac pacemaker  Now at [facility]  Will plan on replacing Generator      COPD (chronic obstructive pulmonary disease)  Per Pulmonary          No follow-ups on file.

## 2022-01-10 NOTE — TELEPHONE ENCOUNTER
----- Message from Madhuri Hunter NP sent at 1/9/2022  2:58 PM CST -----  NEEDS APR   NEAR ANNAMARIE

## 2022-01-10 NOTE — ASSESSMENT & PLAN NOTE
Plan for Generator Change  Risk of the procedure including risk of bleeding and infection has been explained to the patient  All questions have been answered  Informed consent obtained

## 2022-01-12 ENCOUNTER — HOSPITAL ENCOUNTER (OUTPATIENT)
Dept: RADIOLOGY | Facility: HOSPITAL | Age: 72
Discharge: HOME | End: 2022-01-12
Attending: NURSE PRACTITIONER
Payer: MEDICARE

## 2022-01-12 DIAGNOSIS — Z45.010 PACEMAKER BATTERY DEPLETION: ICD-10-CM

## 2022-01-12 PROCEDURE — 71045 XR CHEST 1 VIEW: ICD-10-PCS | Mod: 26,,, | Performed by: RADIOLOGY

## 2022-01-12 PROCEDURE — 71045 X-RAY EXAM CHEST 1 VIEW: CPT | Mod: 26,,, | Performed by: RADIOLOGY

## 2022-01-12 PROCEDURE — 71045 X-RAY EXAM CHEST 1 VIEW: CPT | Mod: TC,FY

## 2022-01-13 ENCOUNTER — TELEPHONE (OUTPATIENT)
Dept: CARDIOLOGY | Facility: CLINIC | Age: 72
End: 2022-01-13
Payer: MEDICARE

## 2022-01-13 NOTE — TELEPHONE ENCOUNTER
----- Message from Lm Ojeda sent at 1/13/2022  9:05 AM CST -----  Contact: Self  Pt is calling to get an update at what is going on with her prescription. She states she is suppose to get an antibiotic to take before her and after her surgery and she has yet to receive her prescription. Please call pt back ASAP at [phone] to update and advise.         [address] [address]  Phone: [phone] Fax: [phone]

## 2022-01-13 NOTE — TELEPHONE ENCOUNTER
Spoke with pt, let her know abx will be prescribed from the hospital. Pt verbalized understanding.

## 2022-01-19 ENCOUNTER — HOSPITAL ENCOUNTER (OUTPATIENT)
Dept: PREADMISSION TESTING | Facility: HOSPITAL | Age: 72
Discharge: HOME | End: 2022-01-19
Attending: INTERNAL MEDICINE
Payer: MEDICARE

## 2022-01-19 VITALS — WEIGHT: 191 LBS | BODY MASS INDEX: 31.82 KG/M2 | HEIGHT: 65 IN

## 2022-01-19 DIAGNOSIS — Z01.811 PRE-OP CHEST EXAM: ICD-10-CM

## 2022-01-19 DIAGNOSIS — Z45.010 PACEMAKER BATTERY DEPLETION: ICD-10-CM

## 2022-01-19 DIAGNOSIS — Z01.818 PRE-OP EXAM: Primary | ICD-10-CM

## 2022-01-19 LAB
ANION GAP SERPL CALC-SCNC: 11 MMOL/L (ref 8–16)
APTT PPP: 25.5 SEC (ref 23.3–35.1)
BACTERIA #/AREA URNS HPF: NEGATIVE /HPF
BASOPHILS # BLD AUTO: 0.05 K/UL (ref 0–0.2)
BASOPHILS NFR BLD: 0.6 % (ref 0–1.9)
BILIRUB UR QL STRIP: NEGATIVE
BUN SERPL-MCNC: 23 MG/DL (ref 8–23)
CALCIUM SERPL-MCNC: 9.5 MG/DL (ref 8.7–10.5)
CHLORIDE SERPL-SCNC: 101 MMOL/L (ref 95–110)
CLARITY UR: CLEAR
CO2 SERPL-SCNC: 25 MMOL/L (ref 23–29)
COLOR UR: YELLOW
CREAT SERPL-MCNC: 0.8 MG/DL (ref 0.5–1.4)
DIFFERENTIAL METHOD: ABNORMAL
EOSINOPHIL # BLD AUTO: 0.1 K/UL (ref 0–0.5)
EOSINOPHIL NFR BLD: 1.1 % (ref 0–8)
ERYTHROCYTE [DISTWIDTH] IN BLOOD BY AUTOMATED COUNT: 14 % (ref 11.5–14.5)
EST. GFR  (AFRICAN AMERICAN): >60 ML/MIN/1.73 M^2
EST. GFR  (NON AFRICAN AMERICAN): >60 ML/MIN/1.73 M^2
GLUCOSE SERPL-MCNC: 107 MG/DL (ref 70–110)
GLUCOSE UR QL STRIP: NEGATIVE
HCT VFR BLD AUTO: 38.1 % (ref 37–48.5)
HGB BLD-MCNC: 12.7 G/DL (ref 12–16)
HGB UR QL STRIP: NEGATIVE
HYALINE CASTS #/AREA URNS LPF: 1 /LPF
IMM GRANULOCYTES # BLD AUTO: 0.07 K/UL (ref 0–0.04)
IMM GRANULOCYTES NFR BLD AUTO: 0.8 % (ref 0–0.5)
INR PPP: 1.1
KETONES UR QL STRIP: NEGATIVE
LEUKOCYTE ESTERASE UR QL STRIP: ABNORMAL
LYMPHOCYTES # BLD AUTO: 1.2 K/UL (ref 1–4.8)
LYMPHOCYTES NFR BLD: 13.5 % (ref 18–48)
MCH RBC QN AUTO: 32.2 PG (ref 27–31)
MCHC RBC AUTO-ENTMCNC: 33.3 G/DL (ref 32–36)
MCV RBC AUTO: 97 FL (ref 82–98)
MICROSCOPIC COMMENT: ABNORMAL
MONOCYTES # BLD AUTO: 0.7 K/UL (ref 0.3–1)
MONOCYTES NFR BLD: 7.9 % (ref 4–15)
MRSA SCREEN BY PCR: POSITIVE
NEUTROPHILS # BLD AUTO: 6.8 K/UL (ref 1.8–7.7)
NEUTROPHILS NFR BLD: 76.1 % (ref 38–73)
NITRITE UR QL STRIP: NEGATIVE
NRBC BLD-RTO: 0 /100 WBC
PH UR STRIP: 7 [PH] (ref 5–8)
PLATELET # BLD AUTO: 266 K/UL (ref 150–450)
PMV BLD AUTO: 9.5 FL (ref 9.2–12.9)
POTASSIUM SERPL-SCNC: 4.4 MMOL/L (ref 3.5–5.1)
PROT UR QL STRIP: NEGATIVE
PROTHROMBIN TIME: 13.2 SEC (ref 11.4–13.7)
RBC # BLD AUTO: 3.95 M/UL (ref 4–5.4)
RBC #/AREA URNS HPF: 1 /HPF (ref 0–4)
SODIUM SERPL-SCNC: 137 MMOL/L (ref 136–145)
SP GR UR STRIP: 1.01 (ref 1–1.03)
SQUAMOUS #/AREA URNS HPF: 2 /HPF
URN SPEC COLLECT METH UR: ABNORMAL
UROBILINOGEN UR STRIP-ACNC: NEGATIVE EU/DL
WBC # BLD AUTO: 8.96 K/UL (ref 3.9–12.7)
WBC #/AREA URNS HPF: 7 /HPF (ref 0–5)

## 2022-01-19 PROCEDURE — 85025 COMPLETE CBC W/AUTO DIFF WBC: CPT | Performed by: INTERNAL MEDICINE

## 2022-01-19 PROCEDURE — 81001 URINALYSIS AUTO W/SCOPE: CPT | Performed by: NURSE PRACTITIONER

## 2022-01-19 PROCEDURE — 85610 PROTHROMBIN TIME: CPT | Performed by: INTERNAL MEDICINE

## 2022-01-19 PROCEDURE — 93010 ELECTROCARDIOGRAM REPORT: CPT | Mod: ,,, | Performed by: INTERNAL MEDICINE

## 2022-01-19 PROCEDURE — 80048 BASIC METABOLIC PNL TOTAL CA: CPT | Performed by: INTERNAL MEDICINE

## 2022-01-19 PROCEDURE — 85730 THROMBOPLASTIN TIME PARTIAL: CPT | Performed by: INTERNAL MEDICINE

## 2022-01-19 PROCEDURE — 93010 EKG 12-LEAD: ICD-10-PCS | Mod: ,,, | Performed by: INTERNAL MEDICINE

## 2022-01-19 PROCEDURE — 87641 MR-STAPH DNA AMP PROBE: CPT | Performed by: INTERNAL MEDICINE

## 2022-01-19 PROCEDURE — 93005 ELECTROCARDIOGRAM TRACING: CPT | Performed by: INTERNAL MEDICINE

## 2022-01-19 PROCEDURE — 36415 COLL VENOUS BLD VENIPUNCTURE: CPT | Performed by: INTERNAL MEDICINE

## 2022-01-19 RX ORDER — ASPIRIN 81 MG/1
81 TABLET ORAL DAILY
COMMUNITY
End: 2023-12-04 | Stop reason: CLARIF

## 2022-01-19 RX ORDER — FEXOFENADINE HCL 60 MG
60 TABLET ORAL DAILY PRN
COMMUNITY

## 2022-01-19 NOTE — DISCHARGE INSTRUCTIONS
 Nothing to eat or drink after midnight the night before your procedure.   Do not take any medications the morning of your procedure   Bring all your medications with you in the original pill bottles from pharmacy.   If you take blood thinners, ask your doctor when you should stop taking them.   Do your Hibiclens wash the night before and morning of your procedure.   Make arrangements for someone you know to drive you home after your procedure. Taxi and Uber are not acceptable.

## 2022-01-20 ENCOUNTER — HOSPITAL ENCOUNTER (OUTPATIENT)
Facility: HOSPITAL | Age: 72
Discharge: HOME | End: 2022-01-21
Attending: INTERNAL MEDICINE | Admitting: INTERNAL MEDICINE
Payer: MEDICARE

## 2022-01-20 DIAGNOSIS — Z95.0 S/P PLACEMENT OF CARDIAC PACEMAKER: Primary | Chronic | ICD-10-CM

## 2022-01-20 DIAGNOSIS — Z45.010 PACEMAKER BATTERY DEPLETION: ICD-10-CM

## 2022-01-20 PROCEDURE — 99152 PR MOD CONSCIOUS SEDATION, SAME PHYS, 5+ YRS, FIRST 15 MIN: ICD-10-PCS | Mod: ,,, | Performed by: INTERNAL MEDICINE

## 2022-01-20 PROCEDURE — 33227 REMOVE&REPLACE PM GEN SINGL: CPT | Mod: ,,, | Performed by: INTERNAL MEDICINE

## 2022-01-20 PROCEDURE — 99152 MOD SED SAME PHYS/QHP 5/>YRS: CPT | Performed by: INTERNAL MEDICINE

## 2022-01-20 PROCEDURE — 25000003 PHARM REV CODE 250: Performed by: INTERNAL MEDICINE

## 2022-01-20 PROCEDURE — 99152 MOD SED SAME PHYS/QHP 5/>YRS: CPT | Mod: ,,, | Performed by: INTERNAL MEDICINE

## 2022-01-20 PROCEDURE — 93010 EKG 12-LEAD: ICD-10-PCS | Mod: ,,, | Performed by: SPECIALIST

## 2022-01-20 PROCEDURE — 96365 THER/PROPH/DIAG IV INF INIT: CPT | Mod: 59 | Performed by: INTERNAL MEDICINE

## 2022-01-20 PROCEDURE — 93010 ELECTROCARDIOGRAM REPORT: CPT | Mod: ,,, | Performed by: SPECIALIST

## 2022-01-20 PROCEDURE — 27201423 OPTIME MED/SURG SUP & DEVICES STERILE SUPPLY: Performed by: INTERNAL MEDICINE

## 2022-01-20 PROCEDURE — 93005 ELECTROCARDIOGRAM TRACING: CPT | Mod: 59 | Performed by: SPECIALIST

## 2022-01-20 PROCEDURE — 27800903 OPTIME MED/SURG SUP & DEVICES OTHER IMPLANTS: Performed by: INTERNAL MEDICINE

## 2022-01-20 PROCEDURE — C1786 PMKR, SINGLE, RATE-RESP: HCPCS | Performed by: INTERNAL MEDICINE

## 2022-01-20 PROCEDURE — 33227 REMOVE&REPLACE PM GEN SINGL: CPT | Performed by: INTERNAL MEDICINE

## 2022-01-20 PROCEDURE — 63600175 PHARM REV CODE 636 W HCPCS: Performed by: INTERNAL MEDICINE

## 2022-01-20 PROCEDURE — 33227 PR REMOVE&REPLACE PM GEN SINGL: ICD-10-PCS | Mod: ,,, | Performed by: INTERNAL MEDICINE

## 2022-01-20 PROCEDURE — 99153 MOD SED SAME PHYS/QHP EA: CPT | Performed by: INTERNAL MEDICINE

## 2022-01-20 DEVICE — ENVELOPE CMRM6133 ABSORB LRG MR
Type: IMPLANTABLE DEVICE | Site: CHEST  WALL | Status: FUNCTIONAL
Brand: TYRX™

## 2022-01-20 DEVICE — IMPLANTABLE DEVICE: Type: IMPLANTABLE DEVICE | Site: CHEST  WALL | Status: FUNCTIONAL

## 2022-01-20 RX ORDER — FLUTICASONE PROPIONATE 50 MCG
2 SPRAY, SUSPENSION (ML) NASAL DAILY
Status: DISCONTINUED | OUTPATIENT
Start: 2022-01-20 | End: 2022-01-21 | Stop reason: HOSPADM

## 2022-01-20 RX ORDER — ALBUTEROL SULFATE 0.83 MG/ML
1.25 SOLUTION RESPIRATORY (INHALATION) 3 TIMES DAILY PRN
Status: DISCONTINUED | OUTPATIENT
Start: 2022-01-20 | End: 2022-01-21 | Stop reason: HOSPADM

## 2022-01-20 RX ORDER — HYDROCODONE BITARTRATE AND ACETAMINOPHEN 5; 325 MG/1; MG/1
1 TABLET ORAL ONCE
Status: COMPLETED | OUTPATIENT
Start: 2022-01-21 | End: 2022-01-20

## 2022-01-20 RX ORDER — LIDOCAINE HYDROCHLORIDE 10 MG/ML
INJECTION, SOLUTION EPIDURAL; INFILTRATION; INTRACAUDAL; PERINEURAL
Status: DISCONTINUED | OUTPATIENT
Start: 2022-01-20 | End: 2022-01-20

## 2022-01-20 RX ORDER — ASPIRIN 81 MG/1
81 TABLET ORAL DAILY
Status: DISCONTINUED | OUTPATIENT
Start: 2022-01-20 | End: 2022-01-21 | Stop reason: HOSPADM

## 2022-01-20 RX ORDER — METOPROLOL SUCCINATE 50 MG/1
100 TABLET, EXTENDED RELEASE ORAL DAILY
Status: DISCONTINUED | OUTPATIENT
Start: 2022-01-21 | End: 2022-01-21 | Stop reason: HOSPADM

## 2022-01-20 RX ORDER — FUROSEMIDE 20 MG/1
20 TABLET ORAL DAILY
Status: DISCONTINUED | OUTPATIENT
Start: 2022-01-20 | End: 2022-01-21 | Stop reason: HOSPADM

## 2022-01-20 RX ORDER — SPIRONOLACTONE 25 MG/1
25 TABLET ORAL DAILY
Status: DISCONTINUED | OUTPATIENT
Start: 2022-01-20 | End: 2022-01-21 | Stop reason: HOSPADM

## 2022-01-20 RX ORDER — FENTANYL CITRATE 50 UG/ML
INJECTION, SOLUTION INTRAMUSCULAR; INTRAVENOUS
Status: DISCONTINUED | OUTPATIENT
Start: 2022-01-20 | End: 2022-01-20

## 2022-01-20 RX ORDER — DIGOXIN 250 MCG
0.25 TABLET ORAL DAILY
Status: DISCONTINUED | OUTPATIENT
Start: 2022-01-20 | End: 2022-01-21 | Stop reason: HOSPADM

## 2022-01-20 RX ORDER — MIDAZOLAM HYDROCHLORIDE 1 MG/ML
INJECTION INTRAMUSCULAR; INTRAVENOUS
Status: DISCONTINUED | OUTPATIENT
Start: 2022-01-20 | End: 2022-01-20

## 2022-01-20 RX ORDER — FLUTICASONE PROPIONATE 50 MCG
1 SPRAY, SUSPENSION (ML) NASAL DAILY
Status: DISCONTINUED | OUTPATIENT
Start: 2022-01-20 | End: 2022-01-20 | Stop reason: SDUPTHER

## 2022-01-20 RX ORDER — ACETAMINOPHEN 325 MG/1
325 TABLET ORAL EVERY 6 HOURS PRN
Status: DISCONTINUED | OUTPATIENT
Start: 2022-01-20 | End: 2022-01-21 | Stop reason: HOSPADM

## 2022-01-20 RX ORDER — FLUTICASONE PROPIONATE 50 MCG
2 SPRAY, SUSPENSION (ML) NASAL DAILY
Status: DISCONTINUED | OUTPATIENT
Start: 2022-01-20 | End: 2022-01-20 | Stop reason: SDUPTHER

## 2022-01-20 RX ORDER — LANOLIN ALCOHOL/MO/W.PET/CERES
400 CREAM (GRAM) TOPICAL DAILY
Status: DISCONTINUED | OUTPATIENT
Start: 2022-01-20 | End: 2022-01-21 | Stop reason: HOSPADM

## 2022-01-20 RX ORDER — DILTIAZEM HYDROCHLORIDE 120 MG/1
120 CAPSULE, COATED, EXTENDED RELEASE ORAL DAILY
Status: DISCONTINUED | OUTPATIENT
Start: 2022-01-20 | End: 2022-01-21 | Stop reason: HOSPADM

## 2022-01-20 RX ORDER — PANTOPRAZOLE SODIUM 40 MG/1
40 TABLET, DELAYED RELEASE ORAL DAILY
Status: DISCONTINUED | OUTPATIENT
Start: 2022-01-21 | End: 2022-01-21 | Stop reason: HOSPADM

## 2022-01-20 RX ORDER — ROFLUMILAST 500 UG/1
500 TABLET ORAL DAILY
Status: DISCONTINUED | OUTPATIENT
Start: 2022-01-20 | End: 2022-01-21 | Stop reason: HOSPADM

## 2022-01-20 RX ADMIN — VANCOMYCIN HYDROCHLORIDE 1500 MG: 1 INJECTION, POWDER, LYOPHILIZED, FOR SOLUTION INTRAVENOUS at 09:01

## 2022-01-20 RX ADMIN — HYDROCODONE BITARTRATE AND ACETAMINOPHEN 1 TABLET: 5; 325 TABLET ORAL at 11:01

## 2022-01-20 NOTE — Clinical Note
The lead thresholds were tested.      The chronic RV lead was disconnected from chronic generator and connected to a replacement chronic generator.

## 2022-01-20 NOTE — Clinical Note
The site was marked. The left chest was prepped. The site was prepped with ChloraPrep. The patient was draped. The patient was positioned supine.

## 2022-01-21 ENCOUNTER — PATIENT MESSAGE (OUTPATIENT)
Dept: CARDIOLOGY | Facility: CLINIC | Age: 72
End: 2022-01-21
Payer: MEDICARE

## 2022-01-21 ENCOUNTER — TELEPHONE (OUTPATIENT)
Dept: CARDIOLOGY | Facility: CLINIC | Age: 72
End: 2022-01-21
Payer: MEDICARE

## 2022-01-21 VITALS
OXYGEN SATURATION: 96 % | HEART RATE: 60 BPM | SYSTOLIC BLOOD PRESSURE: 126 MMHG | RESPIRATION RATE: 16 BRPM | TEMPERATURE: 99 F | DIASTOLIC BLOOD PRESSURE: 64 MMHG | BODY MASS INDEX: 31.82 KG/M2 | HEIGHT: 65 IN | WEIGHT: 191 LBS

## 2022-01-21 PROCEDURE — 94799 UNLISTED PULMONARY SVC/PX: CPT

## 2022-01-21 PROCEDURE — 96366 THER/PROPH/DIAG IV INF ADDON: CPT | Mod: 59

## 2022-01-21 PROCEDURE — 63600175 PHARM REV CODE 636 W HCPCS: Performed by: INTERNAL MEDICINE

## 2022-01-21 PROCEDURE — 99900031 HC PATIENT EDUCATION (STAT)

## 2022-01-21 PROCEDURE — 94761 N-INVAS EAR/PLS OXIMETRY MLT: CPT

## 2022-01-21 PROCEDURE — 25000003 PHARM REV CODE 250: Performed by: INTERNAL MEDICINE

## 2022-01-21 PROCEDURE — 99900035 HC TECH TIME PER 15 MIN (STAT)

## 2022-01-21 RX ADMIN — METOPROLOL SUCCINATE 100 MG: 50 TABLET, FILM COATED, EXTENDED RELEASE ORAL at 08:01

## 2022-01-21 RX ADMIN — DIGOXIN 0.25 MG: 250 TABLET ORAL at 08:01

## 2022-01-21 RX ADMIN — ASPIRIN 81 MG: 81 TABLET, DELAYED RELEASE ORAL at 08:01

## 2022-01-21 RX ADMIN — SPIRONOLACTONE 25 MG: 25 TABLET ORAL at 08:01

## 2022-01-21 RX ADMIN — VANCOMYCIN HYDROCHLORIDE 1500 MG: 1.5 INJECTION, POWDER, LYOPHILIZED, FOR SOLUTION INTRAVENOUS at 03:01

## 2022-01-21 RX ADMIN — MAGNESIUM OXIDE 400 MG: 400 TABLET ORAL at 08:01

## 2022-01-21 RX ADMIN — PANTOPRAZOLE SODIUM 40 MG: 40 TABLET, DELAYED RELEASE ORAL at 05:01

## 2022-01-21 RX ADMIN — ROFLUMILAST 500 MCG: 500 TABLET ORAL at 08:01

## 2022-01-21 RX ADMIN — DILTIAZEM HYDROCHLORIDE 120 MG: 120 CAPSULE, COATED, EXTENDED RELEASE ORAL at 08:01

## 2022-01-21 NOTE — TELEPHONE ENCOUNTER
----- Message from Francisco Rossi sent at 1/21/2022  1:05 PM CST -----  Contact: pt  Type:  Sooner Apoointment Request    Caller is requesting a sooner appointment.  Caller declined first available appointment listed below.  Caller will not accept being placed on the waitlist and is requesting a message be sent to doctor.    Name of Caller:  pt  When is the first available appointment?  N/a   Symptoms:  needs 1 week f/u   Best Call Back Number:  [phone]  Additional Information:  thanks

## 2022-01-21 NOTE — DISCHARGE SUMMARY
Patient was brought in for an elective battery change as an OP.   She received preop and postop Vancomycin  She did well during procedure and postoperatively  She is stable for discharge.  She can follow up in the office in 1 week time for suture removal.       Medication List        CHANGE how you take these medications      * fluticasone propionate 50 mcg/actuation nasal spray  Commonly known as: FLONASE  2 sprays (100 mcg total) by Each Nostril route once daily.  What changed: Another medication with the same name was changed. Make sure you understand how and when to take each.     * fluticasone propionate 50 mcg/actuation nasal spray  Commonly known as: FLONASE  2 sprays (100 mcg total) by Each Nostril route once daily.  What changed: Another medication with the same name was changed. Make sure you understand how and when to take each.     * fluticasone propionate 50 mcg/actuation nasal spray  Commonly known as: FLONASE  USE 2 SPRAYS (100 MCG TOTAL) IN EACH NOSTRIL ONCE DAILY.  What changed: See the new instructions.           * This list has 3 medication(s) that are the same as other medications prescribed for you. Read the directions carefully, and ask your doctor or other care provider to review them with you.                CONTINUE taking these medications      acetaminophen 325 MG tablet  Commonly known as: TYLENOL     albuterol 1.25 mg/3 mL Nebu  Commonly known as: ACCUNEB  INHALE THE CONTENTS OF 1 VIAL VIA NEBULIZER EVERY 6 HOURS AS NEEDED FOR RESCUE     aspirin 81 MG EC tablet  Commonly known as: ECOTRIN     digoxin 250 mcg tablet  Commonly known as: LANOXIN  TAKE 1 TABLET EVERY DAY     diltiaZEM 120 MG Cp24  Commonly known as: CARDIZEM CD  TAKE 1 CAPSULE EVERY DAY     fexofenadine 60 MG tablet  Commonly known as: ALLEGRA     furosemide 20 MG tablet  Commonly known as: LASIX  TAKE 1 TABLET EVERY DAY     magnesium oxide 400 mg (241.3 mg magnesium) tablet  Commonly known as: MAG-OX     metoprolol  succinate 100 MG 24 hr tablet  Commonly known as: TOPROL-XL  TAKE 1 TABLET EVERY DAY     pantoprazole 40 MG tablet  Commonly known as: PROTONIX  TAKE 1 TABLET EVERY DAY     roflumilast 500 mcg Tab  Commonly known as: DALIRESP  Take 1 tablet (500 mcg total) by mouth once daily.     spironolactone 25 MG tablet  Commonly known as: ALDACTONE  Take 1 tablet (25 mg total) by mouth once daily.     TRELEGY ELLIPTA 100-62.5-25 mcg Dsdv  Generic drug: fluticasone-umeclidin-vilanter  INHALE 1 PUFF ONE TIME DAILY            ASK your doctor about these medications      clopidogreL 75 mg tablet  Commonly known as: PLAVIX  TAKE 1 TABLET EVERY DAY     FLUZONE HIGHDOSE QUAD 20-21  mcg/0.7 mL Syrg  Generic drug: flu vacc [id number](65yr up)-PF               -Resume Plavix on Monday.    -Follow up in  Clinic in 1 week for Suture Removal.

## 2022-01-21 NOTE — NURSING
Pt. Transferred to floor per wheelchair. NAD noted. Sling to left arm, Dressing noted to [facility], site CDI. PPPx4 extremeties.  Ice pack applied . Telemetry monitor on. VSS will continue to mtr.

## 2022-01-21 NOTE — PROGRESS NOTES
Renal Dosing of Medication    An order has been entered by a pharmacist to adjust the dose and/or frequency of the medication listed below based on the patient's renal function.    Objective:  71 y.o., female, Actual Body Weight = 86.6 kg (191 lb)        Current Regimen:  Vancomycin 1.5 gram IVPB q12h x 2 doses    Assessment:  Estimated Creatinine Clearance: 68 mL/min (based on SCr of 0.8 mg/dL).    Plan:  Orders have been entered to adjust the dose and/or frequency based on the patient's weight and renal function:  Vancomycin 1.5 gm IVPB  mg IV every q18h  hours x 2 doses    Thank you for allowing us to participate in this patient's care.     Andrés Metcalf 1/20/2022 6:52 PM  Department of Pharmacy  Ext [phone]

## 2022-01-21 NOTE — PLAN OF CARE
01/21/22 1217   Final Note   Assessment Type Final Discharge Note   Anticipated Discharge Disposition Home   What phone number can be called within the next 1-3 days to see how you are doing after discharge? [phone]   Post-Acute Status   Discharge Delays None known at this time       Discharge orders reviewed. No case management/discharge planning needs noted.

## 2022-01-21 NOTE — NURSING TRANSFER
Nursing Transfer Note      1/20/2022     Reason patient is being transferred: admit to room    Transfer To: 2521 from 1405    Transfer via wheelchair    Transfer with cardiac monitoring    Transported by BL    Medicines sent: na    Any special needs or follow-up needed: na    Chart send with patient: Yes    Notified: daughter    Patient reassessed at: 01/20/2022,1800    Upon arrival to floor: cardiac monitor applied, patient oriented to room, call bell in reach and bed in lowest position

## 2022-01-21 NOTE — NURSING
PT given DC orders and states understanding. IV and tele removed.  Instructed to resume Plavix on Monday per mathieu PRADO request.  Daughter to drive PT home.

## 2022-01-21 NOTE — PLAN OF CARE
01/21/22 0923   Patient Assessment/Suction   Level of Consciousness (AVPU) alert   Respiratory Effort Normal;Unlabored   Expansion/Accessory Muscles/Retractions no use of accessory muscles;no retractions   All Lung Fields Breath Sounds diminished   PRE-TX-O2   O2 Device (Oxygen Therapy) room air   SpO2 96 %   Pulse Oximetry Type Intermittent   $ Pulse Oximetry - Multiple Charge Pulse Oximetry - Multiple   Pulse 60   Resp 16   Aerosol Therapy   $ Aerosol Therapy Charges PRN treatment not required   Education   $ Education Bronchodilator;15 min   Respiratory Evaluation   $ Care Plan Tech Time 15 min   $ Eval/Re-eval Charges Re-evaluation

## 2022-01-27 ENCOUNTER — CLINICAL SUPPORT (OUTPATIENT)
Dept: CARDIOLOGY | Facility: CLINIC | Age: 72
End: 2022-01-27
Payer: MEDICARE

## 2022-01-27 NOTE — PROGRESS NOTES
Patient came in for a site check from recent battery change. Site looks great, dressing was removed. She will follow up as directed.

## 2022-03-07 RX ORDER — DIGOXIN 250 MCG
250 TABLET ORAL DAILY
Qty: 90 TABLET | Refills: 3 | Status: SHIPPED | OUTPATIENT
Start: 2022-03-07 | End: 2023-01-09

## 2022-03-07 NOTE — TELEPHONE ENCOUNTER
----- Message from Dany Kaplan sent at 3/7/2022  9:02 AM CST -----  Type:  RX Refill Request    Who Called: pt   Refill or New Rx: ref  RX Name and Strength:digoxin (LANOXIN) 250 mcg tablet  How is the patient currently taking it? (ex. 1XDay):  Is this a 30 day or 90 day RX:  Preferred Pharmacy with phone number:[facility]   Local or Mail Order: mail  Ordering Provider: Dale   Would the patient rather a call back or a response via [facility]?   Best Call Back Number:  Additional Information: several refill attempts made by pharmacy, no correspondence

## 2022-04-20 ENCOUNTER — OFFICE VISIT (OUTPATIENT)
Dept: PULMONOLOGY | Facility: CLINIC | Age: 72
End: 2022-04-20
Payer: MEDICARE

## 2022-04-20 VITALS
HEIGHT: 65 IN | SYSTOLIC BLOOD PRESSURE: 160 MMHG | HEART RATE: 63 BPM | OXYGEN SATURATION: 95 % | DIASTOLIC BLOOD PRESSURE: 95 MMHG | BODY MASS INDEX: 32.32 KG/M2 | WEIGHT: 194 LBS

## 2022-04-20 DIAGNOSIS — J44.9 CHRONIC OBSTRUCTIVE PULMONARY DISEASE, UNSPECIFIED COPD TYPE: Primary | ICD-10-CM

## 2022-04-20 DIAGNOSIS — G47.34 NOCTURNAL HYPOXEMIA: ICD-10-CM

## 2022-04-20 DIAGNOSIS — E88.01 HETEROZYGOUS ALPHA 1-ANTITRYPSIN DEFICIENCY: ICD-10-CM

## 2022-04-20 PROCEDURE — 99213 OFFICE O/P EST LOW 20 MIN: CPT | Mod: S$GLB,,, | Performed by: NURSE PRACTITIONER

## 2022-04-20 PROCEDURE — 3080F PR MOST RECENT DIASTOLIC BLOOD PRESSURE >= 90 MM HG: ICD-10-PCS | Mod: S$GLB,,, | Performed by: NURSE PRACTITIONER

## 2022-04-20 PROCEDURE — 99213 PR OFFICE/OUTPT VISIT, EST, LEVL III, 20-29 MIN: ICD-10-PCS | Mod: S$GLB,,, | Performed by: NURSE PRACTITIONER

## 2022-04-20 PROCEDURE — 1125F AMNT PAIN NOTED PAIN PRSNT: CPT | Mod: S$GLB,,, | Performed by: NURSE PRACTITIONER

## 2022-04-20 PROCEDURE — 3080F DIAST BP >= 90 MM HG: CPT | Mod: S$GLB,,, | Performed by: NURSE PRACTITIONER

## 2022-04-20 PROCEDURE — 3077F SYST BP >= 140 MM HG: CPT | Mod: S$GLB,,, | Performed by: NURSE PRACTITIONER

## 2022-04-20 PROCEDURE — 3008F BODY MASS INDEX DOCD: CPT | Mod: S$GLB,,, | Performed by: NURSE PRACTITIONER

## 2022-04-20 PROCEDURE — 3008F PR BODY MASS INDEX (BMI) DOCUMENTED: ICD-10-PCS | Mod: S$GLB,,, | Performed by: NURSE PRACTITIONER

## 2022-04-20 PROCEDURE — 3077F PR MOST RECENT SYSTOLIC BLOOD PRESSURE >= 140 MM HG: ICD-10-PCS | Mod: S$GLB,,, | Performed by: NURSE PRACTITIONER

## 2022-04-20 PROCEDURE — 1125F PR PAIN SEVERITY QUANTIFIED, PAIN PRESENT: ICD-10-PCS | Mod: S$GLB,,, | Performed by: NURSE PRACTITIONER

## 2022-04-20 PROCEDURE — 1159F PR MEDICATION LIST DOCUMENTED IN MEDICAL RECORD: ICD-10-PCS | Mod: S$GLB,,, | Performed by: NURSE PRACTITIONER

## 2022-04-20 PROCEDURE — 1159F MED LIST DOCD IN RCRD: CPT | Mod: S$GLB,,, | Performed by: NURSE PRACTITIONER

## 2022-04-20 NOTE — PROGRESS NOTES
SUBJECTIVE:    Patient ID: Lisa Smith is a 71 y.o. female.    Chief Complaint: COPD and Follow-up    Patient here today feeling well. She states she had her battery changed in her pacemaker since her last visit. She is using Trelegy daily, feels her breathing is stable. She has not been able to wear her oxygen at night for few days because the nasal cannula is irritating her face, she would like an oxymask.      Past Medical History:   Diagnosis Date    Atrial fibrillation     Bell's palsy     COPD (chronic obstructive pulmonary disease)     GI bleed     Heart murmur     Heterozygous alpha 1-antitrypsin deficiency     Hypertension     Lung disease     copd    MONSERART (obstructive sleep apnea)     Pneumonia     Pulmonary edema      Past Surgical History:   Procedure Laterality Date    CARDIAC ELECTROPHYSIOLOGY STUDY AND ABLATION      CARPAL TUNNEL RELEASE      CHOLECYSTECTOMY      HAND SURGERY      HYSTERECTOMY      INSERT / REPLACE / REMOVE PACEMAKER      KNEE ARTHROSCOPY      REPLACEMENT OF PACEMAKER GENERATOR Left 1/20/2022    Procedure: REPLACEMENT, PACEMAKER GENERATOR;  Surgeon: Raymond Pérez MD;  Location: [facility] CATH/EP LAB;  Service: Cardiology;  Laterality: Left;     Family History   Problem Relation Age of Onset    Kidney failure Mother     Cancer Father     Cancer Brother         Social History:   Marital Status:   Occupation: housewife  Alcohol History:  reports current alcohol use of about 1.0 standard drink of alcohol per week.  Tobacco History:  reports that she quit smoking about 11 years ago. Her smoking use included cigarettes. She started smoking about 51 years ago. She has a 60.00 pack-year smoking history. She has never used smokeless tobacco.  Drug History:  reports no history of drug use.    Review of patient's allergies indicates:   Allergen Reactions    Sulfa (sulfonamide antibiotics) Itching       Current Outpatient Medications   Medication Sig Dispense  Refill    acetaminophen (TYLENOL) 325 MG tablet Take 325 mg by mouth every 6 (six) hours as needed for Pain.      albuterol (ACCUNEB) 1.25 mg/3 mL Nebu INHALE THE CONTENTS OF 1 VIAL VIA NEBULIZER EVERY 6 HOURS AS NEEDED FOR RESCUE 360 mL 5    aspirin (ECOTRIN) 81 MG EC tablet Take 81 mg by mouth once daily.      clopidogreL (PLAVIX) 75 mg tablet Take 1 tablet (75 mg total) by mouth once daily. 90 tablet 3    digoxin (LANOXIN) 250 mcg tablet Take 1 tablet (250 mcg total) by mouth once daily. 90 tablet 3    diltiaZEM (CARDIZEM CD) 120 MG Cp24 TAKE 1 CAPSULE EVERY DAY (Patient taking differently: Take 120 mg by mouth once daily.) 90 capsule 3    fexofenadine (ALLEGRA) 60 MG tablet Take 60 mg by mouth once daily.      fluticasone propionate (FLONASE) 50 mcg/actuation nasal spray USE 2 SPRAYS (100 MCG TOTAL) IN EACH NOSTRIL ONCE DAILY. 48 g 5    furosemide (LASIX) 20 MG tablet TAKE 1 TABLET EVERY DAY (Patient taking differently: Take 20 mg by mouth once daily.) 90 tablet 3    magnesium oxide (MAG-OX) 400 mg (241.3 mg magnesium) tablet Take 400 mg by mouth once daily.      metoprolol succinate (TOPROL-XL) 100 MG 24 hr tablet TAKE 1 TABLET EVERY DAY (Patient taking differently: Take 100 mg by mouth once daily.) 90 tablet 3    pantoprazole (PROTONIX) 40 MG tablet Take 1 tablet (40 mg total) by mouth once daily. 90 tablet 3    roflumilast (DALIRESP) 500 mcg Tab Take 1 tablet (500 mcg total) by mouth once daily. 90 tablet 6    spironolactone (ALDACTONE) 25 MG tablet TAKE 1 TABLET EVERY DAY 90 tablet 0    TRELEGY ELLIPTA 100-62.5-25 mcg DsDv INHALE 1 PUFF ONE TIME DAILY 3 each 6    fluticasone-umeclidin-vilanter (TRELEGY ELLIPTA) 100-62.5-25 mcg DsDv Inhale 1 puff into the lungs once daily. 3 each 6    FLUZONE HIGHDOSE QUAD 20-21  mcg/0.7 mL Syrg PHARMACIST ADMINISTERED IMMUNIZATION ADMINISTERED AT TIME OF DISPENSING       No current facility-administered medications for this visit.       Alpha-1  "Antitrypsin: MZ  Last PFT:10/2020 moderate obstruction with good response, air trapping  moderate diffusion defect  Last ct 10/2020  IMPRESSION:  1. Small airspace opacity in the right upper lobe and scattered  interstitial opacities in the right midlung zone suggesting a  combination of atelectasis, scarring and possibly mild atypical  infection or pneumonia.  2. Numerous mildly enlarged mediastinal lymph nodes, possibly  reactive/inflammatory in nature, smaller than on the previous  comparison exam.  3. Mild cylindrical basilar predominant bronchiectasis.  4. Trace debris in the posterior segment left lower lobe suggesting  aspiration.  5. Trace right-sided pleural effusion and adjacent atelectasis,  unchanged from the previous exam.  6. Cardiomegaly with predominantly biatrial enlargement and scattered  coronary arterial calcifications.  7. Atheromatous calcifications in the thoracic aorta with ectasia of  the ace ascending thoracic aorta.  8. Additional, and incidental findings as noted above      General: feeling well  Eyes: Vision is good.  ENT:  No current issues   Heart:: no chest pain, no palpitations   Lungs: breathing is well   GI: No Nausea, vomiting, constipation, diarrhea, or reflux.  : No dysuria, hesitancy, or nocturia.  Musculoskeletal:  back pain   Skin: No lesions or rashes.  Neuro: headaches, neuropathy   Lymph: No edema or adenopathy.  Psych: No anxiety or depression.  Endo: weight gain     OBJECTIVE:      BP (!) 160/95 (BP Location: Left arm, Patient Position: Sitting, BP Method: Medium (Manual))   Pulse 63   Ht 5' 5" (1.651 m)   Wt 88 kg (194 lb)   LMP  (LMP Unknown)   SpO2 95%   BMI 32.28 kg/m²     Physical Exam  GENERAL: Older patient in no distress.  HEENT: Pupils equal and reactive. Extraocular movements intact. Nose intact.      Pharynx moist.  NECK: Supple.   HEART: Regular rate and rhythm.  murmur   LUNGS: clear   ABDOMEN: Bowel sounds present. Non-tender, no masses " palpated.  EXTREMITIES: Normal muscle tone and joint movement, no cyanosis or clubbing.   LYMPHATICS: No adenopathy palpated, no edema.  SKIN: Dry, intact, no lesions.   NEURO: Cranial nerves II-XII intact. Motor strength 5/5 bilaterally, upper and lower extremities.  PSYCH: Appropriate affect.    Assessment:     COPD  Bronchiectasis    nocturnal hypoxemia  Plan:         Continue the Trelegy daily, rinse after you use it  Keep sleeping on oxygen   Needs oxymask

## 2022-04-27 ENCOUNTER — OFFICE VISIT (OUTPATIENT)
Dept: FAMILY MEDICINE | Facility: CLINIC | Age: 72
End: 2022-04-27
Payer: MEDICARE

## 2022-04-27 VITALS
SYSTOLIC BLOOD PRESSURE: 120 MMHG | HEIGHT: 65 IN | WEIGHT: 192.38 LBS | BODY MASS INDEX: 32.05 KG/M2 | HEART RATE: 65 BPM | TEMPERATURE: 98 F | DIASTOLIC BLOOD PRESSURE: 70 MMHG | RESPIRATION RATE: 22 BRPM | OXYGEN SATURATION: 95 %

## 2022-04-27 DIAGNOSIS — Z85.820 HISTORY OF MELANOMA: ICD-10-CM

## 2022-04-27 DIAGNOSIS — Z00.00 PREVENTATIVE HEALTH CARE: ICD-10-CM

## 2022-04-27 DIAGNOSIS — Z86.2 HISTORY OF ANEMIA: ICD-10-CM

## 2022-04-27 DIAGNOSIS — Z11.59 ENCOUNTER FOR HEPATITIS C SCREENING TEST FOR LOW RISK PATIENT: ICD-10-CM

## 2022-04-27 DIAGNOSIS — E66.09 OTHER OBESITY DUE TO EXCESS CALORIES: ICD-10-CM

## 2022-04-27 DIAGNOSIS — G25.81 RESTLESS LEGS: Primary | ICD-10-CM

## 2022-04-27 DIAGNOSIS — L98.9 SKIN LESIONS: ICD-10-CM

## 2022-04-27 PROCEDURE — 99213 PR OFFICE/OUTPT VISIT, EST, LEVL III, 20-29 MIN: ICD-10-PCS | Mod: S$GLB,,, | Performed by: NURSE PRACTITIONER

## 2022-04-27 PROCEDURE — 1101F PT FALLS ASSESS-DOCD LE1/YR: CPT | Mod: S$GLB,,, | Performed by: NURSE PRACTITIONER

## 2022-04-27 PROCEDURE — 1126F AMNT PAIN NOTED NONE PRSNT: CPT | Mod: S$GLB,,, | Performed by: NURSE PRACTITIONER

## 2022-04-27 PROCEDURE — 1126F PR PAIN SEVERITY QUANTIFIED, NO PAIN PRESENT: ICD-10-PCS | Mod: S$GLB,,, | Performed by: NURSE PRACTITIONER

## 2022-04-27 PROCEDURE — 1160F RVW MEDS BY RX/DR IN RCRD: CPT | Mod: S$GLB,,, | Performed by: NURSE PRACTITIONER

## 2022-04-27 PROCEDURE — 1159F PR MEDICATION LIST DOCUMENTED IN MEDICAL RECORD: ICD-10-PCS | Mod: S$GLB,,, | Performed by: NURSE PRACTITIONER

## 2022-04-27 PROCEDURE — 1159F MED LIST DOCD IN RCRD: CPT | Mod: S$GLB,,, | Performed by: NURSE PRACTITIONER

## 2022-04-27 PROCEDURE — 1101F PR PT FALLS ASSESS DOC 0-1 FALLS W/OUT INJ PAST YR: ICD-10-PCS | Mod: S$GLB,,, | Performed by: NURSE PRACTITIONER

## 2022-04-27 PROCEDURE — 3008F BODY MASS INDEX DOCD: CPT | Mod: S$GLB,,, | Performed by: NURSE PRACTITIONER

## 2022-04-27 PROCEDURE — 3288F FALL RISK ASSESSMENT DOCD: CPT | Mod: S$GLB,,, | Performed by: NURSE PRACTITIONER

## 2022-04-27 PROCEDURE — 3008F PR BODY MASS INDEX (BMI) DOCUMENTED: ICD-10-PCS | Mod: S$GLB,,, | Performed by: NURSE PRACTITIONER

## 2022-04-27 PROCEDURE — 1160F PR REVIEW ALL MEDS BY PRESCRIBER/CLIN PHARMACIST DOCUMENTED: ICD-10-PCS | Mod: S$GLB,,, | Performed by: NURSE PRACTITIONER

## 2022-04-27 PROCEDURE — 3288F PR FALLS RISK ASSESSMENT DOCUMENTED: ICD-10-PCS | Mod: S$GLB,,, | Performed by: NURSE PRACTITIONER

## 2022-04-27 PROCEDURE — 99213 OFFICE O/P EST LOW 20 MIN: CPT | Mod: S$GLB,,, | Performed by: NURSE PRACTITIONER

## 2022-04-27 RX ORDER — ROPINIROLE 0.25 MG/1
0.25 TABLET, FILM COATED ORAL NIGHTLY
Qty: 30 TABLET | Refills: 1 | Status: SHIPPED | OUTPATIENT
Start: 2022-04-27 | End: 2022-05-27 | Stop reason: SDUPTHER

## 2022-04-27 NOTE — PROGRESS NOTES
SUBJECTIVE:      Patient ID: Lisa Smith is a 71 y.o. female.    Chief Complaint: Referral (Dermatology ) and Skin Cancer    Lisa is a previous patient of THAIS Bell NP here for c/o restless legs intermittently at night while trying to sleep over the past several months. Denies back pain, tingling or numbness in legs. Feels like she can't stop moving them. Denies color change or swelling. Overdue for labs- has an extensive medical hx including CAD, A-fib, SSS s/p pacemaker, COPD, and HTN and is followed by cardiology and pulmonary currently.     Also needs a new referral to dermatology- does not like her current group of [facility]. Was seeing them for hx of melanoma but wants to switch. Previously saw Dr. Bonds who no longer takes her insurnace.       Family History   Problem Relation Age of Onset    Kidney failure Mother     Cancer Father     Cancer Brother       Social History     Socioeconomic History    Marital status:     Number of children: 1   Occupational History    Occupation: RETIRED     Comment: [employer]   Tobacco Use    Smoking status: Former Smoker     Packs/day: 2.00     Years: 30.00     Pack years: 60.00     Types: Cigarettes     Start date: 1971     Quit date: 2011     Years since quittin.2    Smokeless tobacco: Never Used   Substance and Sexual Activity    Alcohol use: Yes     Alcohol/week: 1.0 standard drink     Types: 1 Shots of liquor per week    Drug use: No    Sexual activity: Never     Current Outpatient Medications   Medication Sig Dispense Refill    clopidogreL (PLAVIX) 75 mg tablet Take 1 tablet (75 mg total) by mouth once daily. 90 tablet 3    digoxin (LANOXIN) 250 mcg tablet Take 1 tablet (250 mcg total) by mouth once daily. 90 tablet 3    diltiaZEM (CARDIZEM CD) 120 MG Cp24 TAKE 1 CAPSULE EVERY DAY (Patient taking differently: Take 120 mg by mouth once daily.) 90 capsule 3    magnesium oxide (MAG-OX) 400 mg (241.3 mg magnesium)  tablet Take 400 mg by mouth once daily.      metoprolol succinate (TOPROL-XL) 100 MG 24 hr tablet TAKE 1 TABLET EVERY DAY (Patient taking differently: Take 100 mg by mouth once daily.) 90 tablet 3    pantoprazole (PROTONIX) 40 MG tablet Take 1 tablet (40 mg total) by mouth once daily. 90 tablet 3    spironolactone (ALDACTONE) 25 MG tablet TAKE 1 TABLET EVERY DAY 90 tablet 0    acetaminophen (TYLENOL) 325 MG tablet Take 325 mg by mouth every 6 (six) hours as needed for Pain.      albuterol (ACCUNEB) 1.25 mg/3 mL Nebu INHALE THE CONTENTS OF 1 VIAL VIA NEBULIZER EVERY 6 HOURS AS NEEDED FOR RESCUE 360 mL 5    aspirin (ECOTRIN) 81 MG EC tablet Take 81 mg by mouth once daily.      fexofenadine (ALLEGRA) 60 MG tablet Take 60 mg by mouth once daily.      fluticasone propionate (FLONASE) 50 mcg/actuation nasal spray USE 2 SPRAYS (100 MCG TOTAL) IN EACH NOSTRIL ONCE DAILY. 48 g 5    fluticasone-umeclidin-vilanter (TRELEGY ELLIPTA) 100-62.5-25 mcg DsDv Inhale 1 puff into the lungs once daily. 3 each 6    furosemide (LASIX) 20 MG tablet TAKE 1 TABLET EVERY DAY (Patient taking differently: Take 20 mg by mouth once daily.) 90 tablet 3    roflumilast (DALIRESP) 500 mcg Tab Take 1 tablet (500 mcg total) by mouth once daily. 90 tablet 6    rOPINIRole (REQUIP) 0.25 MG tablet Take 1 tablet (0.25 mg total) by mouth every evening. 30 tablet 1    TRELEGY ELLIPTA 100-62.5-25 mcg DsDv INHALE 1 PUFF ONE TIME DAILY 3 each 6     No current facility-administered medications for this visit.     Review of patient's allergies indicates:   Allergen Reactions    Sulfa (sulfonamide antibiotics) Itching      Past Medical History:   Diagnosis Date    Atrial fibrillation     Bell's palsy     COPD (chronic obstructive pulmonary disease)     GI bleed     Heart murmur     Heterozygous alpha 1-antitrypsin deficiency     Hypertension     Lung disease     copd    MONSERRAT (obstructive sleep apnea)     Pneumonia     Pulmonary edema   "    Past Surgical History:   Procedure Laterality Date    CARDIAC ELECTROPHYSIOLOGY STUDY AND ABLATION      CARPAL TUNNEL RELEASE      CHOLECYSTECTOMY      HAND SURGERY      HYSTERECTOMY      INSERT / REPLACE / REMOVE PACEMAKER      KNEE ARTHROSCOPY      REPLACEMENT OF PACEMAKER GENERATOR Left 1/20/2022    Procedure: REPLACEMENT, PACEMAKER GENERATOR;  Surgeon: Raymond Pérez MD;  [facility] CATH[facility]EP LAB;  Service: Cardiology;  Laterality: Left;       Review of Systems   Constitutional: Negative for activity change, chills, fever and unexpected weight change.   HENT: Positive for hearing loss. Negative for congestion, rhinorrhea and trouble swallowing.    Eyes: Negative for discharge and visual disturbance.   Respiratory: Positive for wheezing. Negative for cough, chest tightness and shortness of breath.    Cardiovascular: Negative for chest pain, palpitations and leg swelling.   Gastrointestinal: Negative for abdominal pain, blood in stool, constipation, diarrhea, nausea and vomiting.   Endocrine: Negative for polydipsia and polyuria.   Genitourinary: Negative for difficulty urinating, dysuria, frequency, hematuria and menstrual problem.   Musculoskeletal: Positive for arthralgias, joint swelling and myalgias. Negative for back pain and neck pain.   Skin: Negative for color change and rash.        LESION-RIGHT HAND    Neurological: Negative for dizziness, weakness, numbness and headaches.   Hematological: Negative for adenopathy. Bruises/bleeds easily.   Psychiatric/Behavioral: Negative for confusion and dysphoric mood.      OBJECTIVE:      Vitals:    04/27/22 1032   BP: 120/70   BP Location: Right arm   Patient Position: Sitting   BP Method: Medium (Manual)   Pulse: 65   Resp: (!) 22   Temp: 98.4 °F (36.9 °C)   TempSrc: Oral   SpO2: 95%   Weight: 87.3 kg (192 lb 6.4 oz)   Height: 5' 5" (1.651 m)     Physical Exam  Vitals and nursing note reviewed.   Constitutional:       General: She is not in acute " distress.     Appearance: Normal appearance. She is well-developed. She is obese. She is not ill-appearing.   HENT:      Head: Normocephalic.      Mouth/Throat:      Mouth: Mucous membranes are moist.   Eyes:      Extraocular Movements: Extraocular movements intact.      Conjunctiva/sclera: Conjunctivae normal.      Pupils: Pupils are equal, round, and reactive to light.   Neck:      Thyroid: No thyromegaly.   Cardiovascular:      Rate and Rhythm: Normal rate and regular rhythm.      Pulses:           Dorsalis pedis pulses are 1+ on the right side and 1+ on the left side.        Posterior tibial pulses are 1+ on the right side and 1+ on the left side.      Heart sounds: Murmur heard.    Systolic murmur is present with a grade of 3/6.  Pulmonary:      Effort: Pulmonary effort is normal.      Breath sounds: Normal breath sounds.   Abdominal:      General: Bowel sounds are normal.      Palpations: Abdomen is soft.      Tenderness: There is no abdominal tenderness.   Musculoskeletal:         General: Normal range of motion.      Cervical back: Normal range of motion and neck supple.      Right lower leg: No edema.      Left lower leg: No edema.   Lymphadenopathy:      Cervical: No cervical adenopathy.   Skin:     General: Skin is warm and dry.      Capillary Refill: Capillary refill takes less than 2 seconds.      Coloration: Skin is not jaundiced or pale.      Findings: Lesion (R hand) present. No bruising.   Neurological:      Mental Status: She is alert and oriented to person, place, and time.      Sensory: No sensory deficit.      Gait: Gait normal.   Psychiatric:         Mood and Affect: Mood normal.         Thought Content: Thought content normal.        Assessment:       1. Restless legs    2. Skin lesions    3. History of melanoma    4. Encounter for hepatitis C screening test for low risk patient    5. Preventative health care    6. Other obesity due to excess calories     7. History of anemia        Plan:        Restless legs  -     CBC Auto Differential; Future; Expected date: 04/27/2022  -     Comprehensive Metabolic Panel; Future; Expected date: 04/27/2022  -     Ferritin; Future; Expected date: 04/27/2022  -     Iron and TIBC; Future; Expected date: 04/27/2022  -     rOPINIRole (REQUIP) 0.25 MG tablet; Take 1 tablet (0.25 mg total) by mouth every evening.  Dispense: 30 tablet; Refill: 1  -trial of Requip low dose prn; get labs; SE and proper use of medication discussed     Skin lesions  -     Ambulatory referral/consult to Dermatology; Future; Expected date: 05/04/2022    History of melanoma  -     Ambulatory referral/consult to Dermatology; Future; Expected date: 05/04/2022    Encounter for hepatitis C screening test for low risk patient  -     Hepatitis C Antibody; Future; Expected date: 04/27/2022    Preventative health care  -     Comprehensive Metabolic Panel; Future; Expected date: 04/27/2022  -     Lipid Panel; Future; Expected date: 04/27/2022  -     TSH; Future; Expected date: 04/27/2022    Other obesity due to excess calories   -     Lipid Panel; Future; Expected date: 04/27/2022    History of anemia  -     CBC Auto Differential; Future; Expected date: 04/27/2022  -     TSH; Future; Expected date: 04/27/2022  -     Ferritin; Future; Expected date: 04/27/2022  -     Iron and TIBC; Future; Expected date: 04/27/2022        Follow up in about 1 month (around 5/27/2022) for f/u labs, RLS .      4/27/2022 DIAN Oquendo, LIANNAP    This note was created using [employer] voice recognition software that occasionally misinterprets phrases or words.

## 2022-05-09 ENCOUNTER — PATIENT MESSAGE (OUTPATIENT)
Dept: CARDIOLOGY | Facility: CLINIC | Age: 72
End: 2022-05-09
Payer: MEDICARE

## 2022-05-09 RX ORDER — SPIRONOLACTONE 25 MG/1
25 TABLET ORAL DAILY
Qty: 90 TABLET | Refills: 3 | Status: SHIPPED | OUTPATIENT
Start: 2022-05-09 | End: 2023-01-31

## 2022-05-10 ENCOUNTER — LAB VISIT (OUTPATIENT)
Dept: LAB | Facility: HOSPITAL | Age: 72
End: 2022-05-10
Attending: NURSE PRACTITIONER
Payer: MEDICARE

## 2022-05-10 DIAGNOSIS — E66.09 OTHER OBESITY DUE TO EXCESS CALORIES: ICD-10-CM

## 2022-05-10 DIAGNOSIS — Z00.00 PREVENTATIVE HEALTH CARE: ICD-10-CM

## 2022-05-10 DIAGNOSIS — G25.81 RESTLESS LEGS: ICD-10-CM

## 2022-05-10 DIAGNOSIS — Z11.59 ENCOUNTER FOR HEPATITIS C SCREENING TEST FOR LOW RISK PATIENT: ICD-10-CM

## 2022-05-10 DIAGNOSIS — Z86.2 HISTORY OF ANEMIA: ICD-10-CM

## 2022-05-10 LAB
ALBUMIN SERPL BCP-MCNC: 4.4 G/DL (ref 3.5–5.2)
ALP SERPL-CCNC: 72 U/L (ref 55–135)
ALT SERPL W/O P-5'-P-CCNC: 14 U/L (ref 10–44)
ANION GAP SERPL CALC-SCNC: 11 MMOL/L (ref 8–16)
AST SERPL-CCNC: 25 U/L (ref 10–40)
BASOPHILS # BLD AUTO: 0.05 K/UL (ref 0–0.2)
BASOPHILS NFR BLD: 0.6 % (ref 0–1.9)
BILIRUB SERPL-MCNC: 0.6 MG/DL (ref 0.1–1)
BUN SERPL-MCNC: 18 MG/DL (ref 8–23)
CALCIUM SERPL-MCNC: 9.4 MG/DL (ref 8.7–10.5)
CHLORIDE SERPL-SCNC: 99 MMOL/L (ref 95–110)
CHOLEST SERPL-MCNC: 199 MG/DL (ref 120–199)
CHOLEST/HDLC SERPL: 3.4 {RATIO} (ref 2–5)
CO2 SERPL-SCNC: 27 MMOL/L (ref 23–29)
CREAT SERPL-MCNC: 0.7 MG/DL (ref 0.5–1.4)
DIFFERENTIAL METHOD: ABNORMAL
EOSINOPHIL # BLD AUTO: 0.2 K/UL (ref 0–0.5)
EOSINOPHIL NFR BLD: 2.3 % (ref 0–8)
ERYTHROCYTE [DISTWIDTH] IN BLOOD BY AUTOMATED COUNT: 13.8 % (ref 11.5–14.5)
EST. GFR  (AFRICAN AMERICAN): >60 ML/MIN/1.73 M^2
EST. GFR  (NON AFRICAN AMERICAN): >60 ML/MIN/1.73 M^2
FERRITIN SERPL-MCNC: 145 NG/ML (ref 20–300)
GLUCOSE SERPL-MCNC: 118 MG/DL (ref 70–110)
HCT VFR BLD AUTO: 39.2 % (ref 37–48.5)
HDLC SERPL-MCNC: 59 MG/DL (ref 40–75)
HDLC SERPL: 29.6 % (ref 20–50)
HGB BLD-MCNC: 13.1 G/DL (ref 12–16)
IMM GRANULOCYTES # BLD AUTO: 0.09 K/UL (ref 0–0.04)
IMM GRANULOCYTES NFR BLD AUTO: 1.1 % (ref 0–0.5)
IRON SERPL-MCNC: 87 UG/DL (ref 30–160)
LDLC SERPL CALC-MCNC: 112 MG/DL (ref 63–159)
LYMPHOCYTES # BLD AUTO: 1.5 K/UL (ref 1–4.8)
LYMPHOCYTES NFR BLD: 17.6 % (ref 18–48)
MCH RBC QN AUTO: 32.4 PG (ref 27–31)
MCHC RBC AUTO-ENTMCNC: 33.4 G/DL (ref 32–36)
MCV RBC AUTO: 97 FL (ref 82–98)
MONOCYTES # BLD AUTO: 0.8 K/UL (ref 0.3–1)
MONOCYTES NFR BLD: 9 % (ref 4–15)
NEUTROPHILS # BLD AUTO: 5.8 K/UL (ref 1.8–7.7)
NEUTROPHILS NFR BLD: 69.4 % (ref 38–73)
NONHDLC SERPL-MCNC: 140 MG/DL
NRBC BLD-RTO: 0 /100 WBC
PLATELET # BLD AUTO: 251 K/UL (ref 150–450)
PMV BLD AUTO: 8.8 FL (ref 9.2–12.9)
POTASSIUM SERPL-SCNC: 4.6 MMOL/L (ref 3.5–5.1)
PROT SERPL-MCNC: 8.1 G/DL (ref 6–8.4)
RBC # BLD AUTO: 4.04 M/UL (ref 4–5.4)
SATURATED IRON: 23 % (ref 20–50)
SODIUM SERPL-SCNC: 137 MMOL/L (ref 136–145)
TOTAL IRON BINDING CAPACITY: 379 UG/DL (ref 250–450)
TRANSFERRIN SERPL-MCNC: 271 MG/DL (ref 200–375)
TRIGL SERPL-MCNC: 140 MG/DL (ref 30–150)
TSH SERPL DL<=0.005 MIU/L-ACNC: 1.04 UIU/ML (ref 0.34–5.6)
WBC # BLD AUTO: 8.31 K/UL (ref 3.9–12.7)

## 2022-05-10 PROCEDURE — 84466 ASSAY OF TRANSFERRIN: CPT | Performed by: NURSE PRACTITIONER

## 2022-05-10 PROCEDURE — 80061 LIPID PANEL: CPT | Performed by: NURSE PRACTITIONER

## 2022-05-10 PROCEDURE — 80053 COMPREHEN METABOLIC PANEL: CPT | Performed by: NURSE PRACTITIONER

## 2022-05-10 PROCEDURE — 86803 HEPATITIS C AB TEST: CPT | Performed by: NURSE PRACTITIONER

## 2022-05-10 PROCEDURE — 84443 ASSAY THYROID STIM HORMONE: CPT | Performed by: NURSE PRACTITIONER

## 2022-05-10 PROCEDURE — 82728 ASSAY OF FERRITIN: CPT | Performed by: NURSE PRACTITIONER

## 2022-05-10 PROCEDURE — 36415 COLL VENOUS BLD VENIPUNCTURE: CPT | Performed by: NURSE PRACTITIONER

## 2022-05-10 PROCEDURE — 85025 COMPLETE CBC W/AUTO DIFF WBC: CPT | Performed by: NURSE PRACTITIONER

## 2022-05-11 LAB — HCV AB S/CO SERPL IA: <0.1 S/CO RATIO (ref 0–0.9)

## 2022-05-12 NOTE — PROGRESS NOTES
Glucose and cholesterol are slightly elevated, otherwise labs look normal; follow-up as planned for further discussion at her appointment

## 2022-05-27 ENCOUNTER — OFFICE VISIT (OUTPATIENT)
Dept: FAMILY MEDICINE | Facility: CLINIC | Age: 72
End: 2022-05-27
Payer: MEDICARE

## 2022-05-27 VITALS
BODY MASS INDEX: 31.97 KG/M2 | OXYGEN SATURATION: 97 % | HEART RATE: 60 BPM | SYSTOLIC BLOOD PRESSURE: 128 MMHG | DIASTOLIC BLOOD PRESSURE: 70 MMHG | HEIGHT: 65 IN | WEIGHT: 191.88 LBS | TEMPERATURE: 98 F

## 2022-05-27 DIAGNOSIS — G25.81 RESTLESS LEGS: Primary | ICD-10-CM

## 2022-05-27 DIAGNOSIS — Z23 IMMUNIZATION DUE: ICD-10-CM

## 2022-05-27 DIAGNOSIS — Z12.11 COLON CANCER SCREENING: ICD-10-CM

## 2022-05-27 DIAGNOSIS — Z12.31 ENCOUNTER FOR SCREENING MAMMOGRAM FOR MALIGNANT NEOPLASM OF BREAST: ICD-10-CM

## 2022-05-27 DIAGNOSIS — Z78.0 ASYMPTOMATIC MENOPAUSAL STATE: ICD-10-CM

## 2022-05-27 DIAGNOSIS — R73.09 ABNORMAL GLUCOSE: ICD-10-CM

## 2022-05-27 LAB — HBA1C MFR BLD: 5.2 %

## 2022-05-27 PROCEDURE — 99214 PR OFFICE/OUTPT VISIT, EST, LEVL IV, 30-39 MIN: ICD-10-PCS | Mod: 25,S$GLB,, | Performed by: NURSE PRACTITIONER

## 2022-05-27 PROCEDURE — 90677 PNEUMOCOCCAL CONJUGATE VACCINE 20-VALENT: ICD-10-PCS | Mod: S$GLB,,, | Performed by: NURSE PRACTITIONER

## 2022-05-27 PROCEDURE — 3288F FALL RISK ASSESSMENT DOCD: CPT | Mod: CPTII,S$GLB,, | Performed by: NURSE PRACTITIONER

## 2022-05-27 PROCEDURE — 3008F PR BODY MASS INDEX (BMI) DOCUMENTED: ICD-10-PCS | Mod: CPTII,S$GLB,, | Performed by: NURSE PRACTITIONER

## 2022-05-27 PROCEDURE — 1101F PR PT FALLS ASSESS DOC 0-1 FALLS W/OUT INJ PAST YR: ICD-10-PCS | Mod: CPTII,S$GLB,, | Performed by: NURSE PRACTITIONER

## 2022-05-27 PROCEDURE — 3288F PR FALLS RISK ASSESSMENT DOCUMENTED: ICD-10-PCS | Mod: CPTII,S$GLB,, | Performed by: NURSE PRACTITIONER

## 2022-05-27 PROCEDURE — 1126F PR PAIN SEVERITY QUANTIFIED, NO PAIN PRESENT: ICD-10-PCS | Mod: CPTII,S$GLB,, | Performed by: NURSE PRACTITIONER

## 2022-05-27 PROCEDURE — 99214 OFFICE O/P EST MOD 30 MIN: CPT | Mod: 25,S$GLB,, | Performed by: NURSE PRACTITIONER

## 2022-05-27 PROCEDURE — G0009 PNEUMOCOCCAL CONJUGATE VACCINE 20-VALENT: ICD-10-PCS | Mod: S$GLB,,, | Performed by: NURSE PRACTITIONER

## 2022-05-27 PROCEDURE — G0009 ADMIN PNEUMOCOCCAL VACCINE: HCPCS | Mod: S$GLB,,, | Performed by: NURSE PRACTITIONER

## 2022-05-27 PROCEDURE — 90677 PCV20 VACCINE IM: CPT | Mod: S$GLB,,, | Performed by: NURSE PRACTITIONER

## 2022-05-27 PROCEDURE — 1160F RVW MEDS BY RX/DR IN RCRD: CPT | Mod: CPTII,S$GLB,, | Performed by: NURSE PRACTITIONER

## 2022-05-27 PROCEDURE — 3044F HG A1C LEVEL LT 7.0%: CPT | Mod: CPTII,S$GLB,, | Performed by: NURSE PRACTITIONER

## 2022-05-27 PROCEDURE — 3008F BODY MASS INDEX DOCD: CPT | Mod: CPTII,S$GLB,, | Performed by: NURSE PRACTITIONER

## 2022-05-27 PROCEDURE — 83036 POCT HEMOGLOBIN A1C: ICD-10-PCS | Mod: QW,S$GLB,, | Performed by: NURSE PRACTITIONER

## 2022-05-27 PROCEDURE — 3044F PR MOST RECENT HEMOGLOBIN A1C LEVEL <7.0%: ICD-10-PCS | Mod: CPTII,S$GLB,, | Performed by: NURSE PRACTITIONER

## 2022-05-27 PROCEDURE — 1101F PT FALLS ASSESS-DOCD LE1/YR: CPT | Mod: CPTII,S$GLB,, | Performed by: NURSE PRACTITIONER

## 2022-05-27 PROCEDURE — 1159F MED LIST DOCD IN RCRD: CPT | Mod: CPTII,S$GLB,, | Performed by: NURSE PRACTITIONER

## 2022-05-27 PROCEDURE — 1160F PR REVIEW ALL MEDS BY PRESCRIBER/CLIN PHARMACIST DOCUMENTED: ICD-10-PCS | Mod: CPTII,S$GLB,, | Performed by: NURSE PRACTITIONER

## 2022-05-27 PROCEDURE — 1159F PR MEDICATION LIST DOCUMENTED IN MEDICAL RECORD: ICD-10-PCS | Mod: CPTII,S$GLB,, | Performed by: NURSE PRACTITIONER

## 2022-05-27 PROCEDURE — 83036 HEMOGLOBIN GLYCOSYLATED A1C: CPT | Mod: QW,S$GLB,, | Performed by: NURSE PRACTITIONER

## 2022-05-27 PROCEDURE — 1126F AMNT PAIN NOTED NONE PRSNT: CPT | Mod: CPTII,S$GLB,, | Performed by: NURSE PRACTITIONER

## 2022-05-27 RX ORDER — ROPINIROLE 0.25 MG/1
0.25 TABLET, FILM COATED ORAL NIGHTLY
Qty: 90 TABLET | Refills: 1 | Status: SHIPPED | OUTPATIENT
Start: 2022-05-27 | End: 2022-08-23

## 2022-05-27 NOTE — PROGRESS NOTES
SUBJECTIVE:      Patient ID: Lisa Smith is a 71 y.o. female.    Chief Complaint: lab review and restless leg syndrome    Lisa is here for f/u on RLS medication and lab review.  She has been taking the Requip nightly as prescribed with significant improvement in her RLS symptoms- patient wishes to continue.  She denies any side effects or complaints from the medication.     Labs reviewed with pt today.  She is overdue for her follow-up with Cardiology and I have strongly encouraged her to make this appointment.     Office Visit on 05/27/2022  Hemoglobin A1C                                Date: 05/27/2022  Value: 5.2         Ref range: %                  Status: Final  ------------  Lab Visit on 05/10/2022  WBC                                           Date: 05/10/2022  Value: 8.31        Ref range: 3.90 - 12.70 K/uL  Status: Final  RBC                                           Date: 05/10/2022  Value: 4.04        Ref range: 4.00 - 5.40 M/uL   Status: Final  Hemoglobin                                    Date: 05/10/2022  Value: 13.1        Ref range: 12.0 - 16.0 g/dL   Status: Final  Hematocrit                                    Date: 05/10/2022  Value: 39.2        Ref range: 37.0 - 48.5 %      Status: Final  MCV                                           Date: 05/10/2022  Value: 97          Ref range: 82 - 98 fL         Status: Final  MCH                                           Date: 05/10/2022  Value: 32.4 (A)    Ref range: 27.0 - 31.0 pg     Status: Final  MCHC                                          Date: 05/10/2022  Value: 33.4        Ref range: 32.0 - 36.0 g/dL   Status: Final  RDW                                           Date: 05/10/2022  Value: 13.8        Ref range: 11.5 - 14.5 %      Status: Final  Platelets                                     Date: 05/10/2022  Value: 251         Ref range: 150 - 450 K/uL     Status: Final  MPV                                           Date:  05/10/2022  Value: 8.8 (A)     Ref range: 9.2 - 12.9 fL      Status: Final  Immature Granulocytes                         Date: 05/10/2022  Value: 1.1 (A)     Ref range: 0.0 - 0.5 %        Status: Final  Gran # (ANC)                                  Date: 05/10/2022  Value: 5.8         Ref range: 1.8 - 7.7 K/uL     Status: Final  Immature Grans (Abs)                          Date: 05/10/2022  Value: 0.09 (A)    Ref range: 0.00 - 0.04 K/uL   Status: Final                Comment: Mild elevation in immature granulocytes is non specific and   can be seen in a variety of conditions including stress response,   acute inflammation, trauma and pregnancy. Correlation with other   laboratory and clinical findings is essential.    Lymph #                                       Date: 05/10/2022  Value: 1.5         Ref range: 1.0 - 4.8 K/uL     Status: Final  Mono #                                        Date: 05/10/2022  Value: 0.8         Ref range: 0.3 - 1.0 K/uL     Status: Final  Eos #                                         Date: 05/10/2022  Value: 0.2         Ref range: 0.0 - 0.5 K/uL     Status: Final  Baso #                                        Date: 05/10/2022  Value: 0.05        Ref range: 0.00 - 0.20 K/uL   Status: Final  nRBC                                          Date: 05/10/2022  Value: 0           Ref range: 0 /100 WBC         Status: Final  Gran %                                        Date: 05/10/2022  Value: 69.4        Ref range: 38.0 - 73.0 %      Status: Final  Lymph %                                       Date: 05/10/2022  Value: 17.6 (A)    Ref range: 18.0 - 48.0 %      Status: Final  Mono %                                        Date: 05/10/2022  Value: 9.0         Ref range: 4.0 - 15.0 %       Status: Final  Eosinophil %                                  Date: 05/10/2022  Value: 2.3         Ref range: 0.0 - 8.0 %        Status: Final  Basophil %                                    Date:  05/10/2022  Value: 0.6         Ref range: 0.0 - 1.9 %        Status: Final  Differential Method                           Date: 05/10/2022  Value: Automated     Status: Final  Sodium                                        Date: 05/10/2022  Value: 137         Ref range: 136 - 145 mmol/L   Status: Final  Potassium                                     Date: 05/10/2022  Value: 4.6         Ref range: 3.5 - 5.1 mmol/L   Status: Final  Chloride                                      Date: 05/10/2022  Value: 99          Ref range: 95 - 110 mmol/L    Status: Final  CO2                                           Date: 05/10/2022  Value: 27          Ref range: 23 - 29 mmol/L     Status: Final  Glucose                                       Date: 05/10/2022  Value: 118 (A)     Ref range: 70 - 110 mg/dL     Status: Final  BUN                                           Date: 05/10/2022  Value: 18          Ref range: 8 - 23 mg/dL       Status: Final  Creatinine                                    Date: 05/10/2022  Value: 0.7         Ref range: 0.5 - 1.4 mg/dL    Status: Final  Calcium                                       Date: 05/10/2022  Value: 9.4         Ref range: 8.7 - 10.5 mg/dL   Status: Final  Total Protein                                 Date: 05/10/2022  Value: 8.1         Ref range: 6.0 - 8.4 g/dL     Status: Final  Albumin                                       Date: 05/10/2022  Value: 4.4         Ref range: 3.5 - 5.2 g/dL     Status: Final  Total Bilirubin                               Date: 05/10/2022  Value: 0.6         Ref range: 0.1 - 1.0 mg/dL    Status: Final                Comment: For infants and newborns, interpretation of results should be based  on gestational age, weight and in agreement with clinical  observations.    Premature Infant recommended reference ranges:  Up to 24 hours.............<8.0 mg/dL  Up to 48 hours............<12.0 mg/dL  3-5 days..................<15.0 mg/dL  6-29 days.................<15.0  mg/dL    Alkaline Phosphatase                          Date: 05/10/2022  Value: 72          Ref range: 55 - 135 U/L       Status: Final  AST                                           Date: 05/10/2022  Value: 25          Ref range: 10 - 40 U/L        Status: Final  ALT                                           Date: 05/10/2022  Value: 14          Ref range: 10 - 44 U/L        Status: Final  Anion Gap                                     Date: 05/10/2022  Value: 11          Ref range: 8 - 16 mmol/L      Status: Final  eGFR if                       Date: 05/10/2022  Value: >60.0       Ref range: >60 mL/min/1.73 *  Status: Final  eGFR if non                   Date: 05/10/2022  Value: >60.0       Ref range: >60 mL/min/1.73 *  Status: Final                Comment: Calculation used to obtain the estimated glomerular filtration  rate (eGFR) is the CKD-EPI equation.     Cholesterol                                   Date: 05/10/2022  Value: 199         Ref range: 120 - 199 mg/dL    Status: Final                Comment: The National Cholesterol Education Program (NCEP) has set the  following guidelines (reference ranges) for Cholesterol:  Optimal.....................<200 mg/dL  Borderline High.............200-239 mg/dL  High........................> or = 240 mg/dL    Triglycerides                                 Date: 05/10/2022  Value: 140         Ref range: 30 - 150 mg/dL     Status: Final                Comment: The National Cholesterol Education Program (NCEP) has set the  following guidelines (reference values) for triglycerides:  Normal......................<150 mg/dL  Borderline High.............150-199 mg/dL  High........................200-499 mg/dL    HDL                                           Date: 05/10/2022  Value: 59          Ref range: 40 - 75 mg/dL      Status: Final                Comment: The National Cholesterol Education Program (NCEP) has set the  following guidelines  (reference values) for HDL Cholesterol:  Low...............<40 mg/dL  Optimal...........>60 mg/dL    LDL Cholesterol                               Date: 05/10/2022  Value: 112.0       Ref range: 63.0 - 159.0 mg/*  Status: Final                Comment: The National Cholesterol Education Program (NCEP) has set the  following guidelines (reference values) for LDL Cholesterol:  Optimal.......................<130 mg/dL  Borderline High...............130-159 mg/dL  High..........................160-189 mg/dL  Very High.....................>190 mg/dL    HDL/Cholesterol Ratio                         Date: 05/10/2022  Value: 29.6        Ref range: 20.0 - 50.0 %      Status: Final  Total Cholesterol/HDL Ratio                   Date: 05/10/2022  Value: 3.4         Ref range: 2.0 - 5.0          Status: Final  Non-HDL Cholesterol                           Date: 05/10/2022  Value: 140         Ref range: mg/dL              Status: Final                Comment: Risk category and Non-HDL cholesterol goals:  Coronary heart disease (CHD)or equivalent (10-year risk of CHD >20%):  Non-HDL cholesterol goal     <130 mg/dL  Two or more CHD risk factors and 10-year risk of CHD <= 20%:  Non-HDL cholesterol goal     <160 mg/dL  0 to 1 CHD risk factor:  Non-HDL cholesterol goal     <190 mg/dL    TSH                                           Date: 05/10/2022  Value: 1.040       Ref range: 0.340 - 5.600 uI*  Status: Final  Ferritin                                      Date: 05/10/2022  Value: 145         Ref range: 20.0 - 300.0 ng/*  Status: Final  Iron                                          Date: 05/10/2022  Value: 87          Ref range: 30 - 160 ug/dL     Status: Final  Transferrin                                   Date: 05/10/2022  Value: 271         Ref range: 200 - 375 mg/dL    Status: Final  TIBC                                          Date: 05/10/2022  Value: 379         Ref range: 250 - 450 ug/dL    Status: Final  Saturated Iron                                 Date: 05/10/2022  Value: 23          Ref range: 20 - 50 %          Status: Final  Hepatitis C Ab                                Date: 05/10/2022  Value: <0.1        Ref range: 0.0 - 0.9 s/co r*  Status: Final                Comment: Negative:     < 0.8  Indeterminate: 0.8 - 0.9  Positive:     > 0.9  The CDC recommends that a positive HCV antibody result  be followed up with a HCV Nucleic Acid Amplification  test (704728).  Performed at:  [address]  [id number]  : [place] No MD, Phone:  [phone]    ------------)    HM:   Overdue for breast cancer screening-patient denies any breast complaints at this time; she refuses a mammogram and states she does not want this orders sent today    DEXA scan overdue-no known history of osteoporosis    Colon cancer screening overdue; patient declines colonoscopy referral-refuses blood in stool or changes in her bowel habits; agreeable to Cologuard testing    Vaccines need to be reviewed today with patient      Family History   Problem Relation Age of Onset    Kidney failure Mother     Cancer Father     Cancer Brother       Social History     Socioeconomic History    Marital status:     Number of children: 1   Occupational History    Occupation: RETIRED     Comment: [employer]   Tobacco Use    Smoking status: Former Smoker     Packs/day: 2.00     Years: 30.00     Pack years: 60.00     Types: Cigarettes     Start date: 1971     Quit date: 2011     Years since quittin.3    Smokeless tobacco: Never Used   Substance and Sexual Activity    Alcohol use: Yes     Alcohol/week: 1.0 standard drink     Types: 1 Shots of liquor per week    Drug use: No    Sexual activity: Never     Current Outpatient Medications   Medication Sig Dispense Refill    acetaminophen (TYLENOL) 325 MG tablet Take 325 mg by mouth every 6 (six) hours as needed for Pain.       albuterol (ACCUNEB) 1.25 mg/3 mL Nebu INHALE THE CONTENTS OF 1 VIAL VIA NEBULIZER EVERY 6 HOURS AS NEEDED FOR RESCUE 360 mL 5    aspirin (ECOTRIN) 81 MG EC tablet Take 81 mg by mouth once daily.      clopidogreL (PLAVIX) 75 mg tablet Take 1 tablet (75 mg total) by mouth once daily. 90 tablet 3    digoxin (LANOXIN) 250 mcg tablet Take 1 tablet (250 mcg total) by mouth once daily. 90 tablet 3    diltiaZEM (CARDIZEM CD) 120 MG Cp24 TAKE 1 CAPSULE EVERY DAY (Patient taking differently: Take 120 mg by mouth once daily.) 90 capsule 3    fexofenadine (ALLEGRA) 60 MG tablet Take 60 mg by mouth once daily.      fluticasone propionate (FLONASE) 50 mcg/actuation nasal spray USE 2 SPRAYS (100 MCG TOTAL) IN EACH NOSTRIL ONCE DAILY. 48 g 5    fluticasone-umeclidin-vilanter (TRELEGY ELLIPTA) 100-62.5-25 mcg DsDv Inhale 1 puff into the lungs once daily. 3 each 6    furosemide (LASIX) 20 MG tablet TAKE 1 TABLET EVERY DAY (Patient taking differently: Take 20 mg by mouth once daily.) 90 tablet 3    magnesium oxide (MAG-OX) 400 mg (241.3 mg magnesium) tablet Take 400 mg by mouth once daily.      metoprolol succinate (TOPROL-XL) 100 MG 24 hr tablet TAKE 1 TABLET EVERY DAY (Patient taking differently: Take 100 mg by mouth once daily.) 90 tablet 3    pantoprazole (PROTONIX) 40 MG tablet TAKE 1 TABLET EVERY DAY 90 tablet 3    roflumilast (DALIRESP) 500 mcg Tab Take 1 tablet (500 mcg total) by mouth once daily. 90 tablet 6    spironolactone (ALDACTONE) 25 MG tablet Take 1 tablet (25 mg total) by mouth once daily. 90 tablet 3    TRELEGY ELLIPTA 100-62.5-25 mcg DsDv INHALE 1 PUFF ONE TIME DAILY 3 each 6    rOPINIRole (REQUIP) 0.25 MG tablet Take 1 tablet (0.25 mg total) by mouth every evening. 90 tablet 1     No current facility-administered medications for this visit.     Review of patient's allergies indicates:   Allergen Reactions    Sulfa (sulfonamide antibiotics) Itching      Past Medical History:   Diagnosis Date     Atrial fibrillation     Bell's palsy     COPD (chronic obstructive pulmonary disease)     GI bleed     Heart murmur     Heterozygous alpha 1-antitrypsin deficiency     Hypertension     Lung disease     copd    MONSERRAT (obstructive sleep apnea)     Pneumonia     Pulmonary edema      Past Surgical History:   Procedure Laterality Date    CARDIAC ELECTROPHYSIOLOGY STUDY AND ABLATION      CARPAL TUNNEL RELEASE      CHOLECYSTECTOMY      HAND SURGERY      HYSTERECTOMY      INSERT / REPLACE / REMOVE PACEMAKER      KNEE ARTHROSCOPY      REPLACEMENT OF PACEMAKER GENERATOR Left 1/20/2022    Procedure: REPLACEMENT, PACEMAKER GENERATOR;  Surgeon: Raymond Pérez MD;  Location: [facility] CATH/EP LAB;  Service: Cardiology;  Laterality: Left;       Review of Systems   Constitutional: Negative for activity change, appetite change, chills, fatigue, fever and unexpected weight change.   HENT: Negative for congestion, hearing loss, rhinorrhea and trouble swallowing.    Eyes: Negative for discharge and visual disturbance.   Respiratory: Negative for cough, chest tightness, shortness of breath and wheezing.    Cardiovascular: Negative for chest pain, palpitations and leg swelling.   Gastrointestinal: Negative for abdominal pain, blood in stool, constipation, diarrhea, nausea and vomiting.   Endocrine: Negative for polydipsia and polyuria.   Genitourinary: Negative for difficulty urinating, dysuria, frequency, hematuria and menstrual problem.   Musculoskeletal: Positive for arthralgias and myalgias. Negative for back pain, joint swelling and neck pain.   Skin: Negative for color change, rash and wound.   Neurological: Negative for dizziness, weakness, numbness and headaches.   Hematological: Negative for adenopathy. Bruises/bleeds easily.   Psychiatric/Behavioral: Negative for confusion, dysphoric mood and sleep disturbance. The patient is not nervous/anxious.       OBJECTIVE:      Vitals:    05/27/22 1018   BP: 128/70   BP  "Location: Right arm   Patient Position: Sitting   BP Method: Medium (Manual)   Pulse: 60   Temp: 97.8 °F (36.6 °C)   TempSrc: Oral   SpO2: 97%   Weight: 87 kg (191 lb 14.4 oz)   Height: 5' 5" (1.651 m)     Physical Exam  Vitals and nursing note reviewed.   Constitutional:       General: She is not in acute distress.     Appearance: Normal appearance. She is well-developed. She is obese. She is not ill-appearing.   HENT:      Head: Normocephalic.      Mouth/Throat:      Mouth: Mucous membranes are moist.   Eyes:      Extraocular Movements: Extraocular movements intact.      Conjunctiva/sclera: Conjunctivae normal.      Pupils: Pupils are equal, round, and reactive to light.   Neck:      Thyroid: No thyroid mass, thyromegaly or thyroid tenderness.   Cardiovascular:      Rate and Rhythm: Normal rate and regular rhythm.      Pulses:           Dorsalis pedis pulses are 1+ on the right side and 1+ on the left side.        Posterior tibial pulses are 1+ on the right side and 1+ on the left side.      Heart sounds: Murmur heard.    Systolic murmur is present with a grade of 3/6.  Pulmonary:      Effort: Pulmonary effort is normal. No respiratory distress.      Breath sounds: Normal breath sounds. No wheezing, rhonchi or rales.   Abdominal:      General: Bowel sounds are normal.      Palpations: Abdomen is soft.      Tenderness: There is no abdominal tenderness.   Musculoskeletal:         General: Normal range of motion.      Cervical back: Normal range of motion and neck supple.      Right lower leg: No edema.      Left lower leg: No edema.   Lymphadenopathy:      Cervical: No cervical adenopathy.   Skin:     General: Skin is warm and dry.      Coloration: Skin is not jaundiced or pale.      Findings: Lesion (R hand) present. No bruising.   Neurological:      Mental Status: She is alert and oriented to person, place, and time.   Psychiatric:         Mood and Affect: Mood normal.         Behavior: Behavior normal.         " Thought Content: Thought content normal.         Judgment: Judgment normal.        Assessment:       1. Restless legs    2. Abnormal glucose    3. Colon cancer screening    4. Immunization due    5. Encounter for screening mammogram for malignant neoplasm of breast    6. Asymptomatic menopausal state        Plan:       Restless legs  -     rOPINIRole (REQUIP) 0.25 MG tablet; Take 1 tablet (0.25 mg total) by mouth every evening.  Dispense: 90 tablet; Refill: 1  -improved on medication, cont as prescribed     Abnormal glucose  -     Hemoglobin A1C, POCT (5.2-normal)    Colon cancer screening  -     Cologuard Screening (Multitarget Stool DNA); Future; Expected date: 05/27/2022  -declines Colonoscopy; agreeable to Cologuard     Immunization due  -     (In Office Administered) Pneumococcal Conjugate Vaccine (20 Valent) (IM)  -Pneumo done; recommended shingrix at pharmacy in 2-4 weeks    Encounter for screening mammogram for malignant neoplasm of breast   -pt strongly declines; will revisit ; recommended monthly SBE and pt voiced understanding     Asymptomatic menopausal state  -     DXA Bone Density Spine And Hip; Future; Expected date: 05/27/2022        Follow up in about 6 months (around 11/27/2022) for f/u RLS .      5/27/2022 DIAN Oquendo, FNP    This note was created using [place] [employer] voice recognition software that occasionally misinterprets phrases or words.

## 2022-06-16 LAB — NONINV COLON CA DNA+OCC BLD SCRN STL QL: NEGATIVE

## 2022-06-23 ENCOUNTER — PATIENT MESSAGE (OUTPATIENT)
Dept: PULMONOLOGY | Facility: CLINIC | Age: 72
End: 2022-06-23

## 2022-06-23 DIAGNOSIS — J44.9 CHRONIC OBSTRUCTIVE PULMONARY DISEASE, UNSPECIFIED COPD TYPE: Primary | ICD-10-CM

## 2022-06-23 RX ORDER — ALBUTEROL SULFATE 1.25 MG/3ML
SOLUTION RESPIRATORY (INHALATION)
Qty: 360 ML | Refills: 5 | Status: SHIPPED | OUTPATIENT
Start: 2022-06-23 | End: 2023-10-10 | Stop reason: SDUPTHER

## 2022-07-25 RX ORDER — FUROSEMIDE 20 MG/1
20 TABLET ORAL DAILY
Qty: 90 TABLET | Refills: 3 | Status: SHIPPED | OUTPATIENT
Start: 2022-07-25 | End: 2023-11-22

## 2022-10-10 ENCOUNTER — TELEPHONE (OUTPATIENT)
Dept: CARDIOLOGY | Facility: CLINIC | Age: 72
End: 2022-10-10
Payer: MEDICARE

## 2022-10-10 ENCOUNTER — OFFICE VISIT (OUTPATIENT)
Dept: FAMILY MEDICINE | Facility: CLINIC | Age: 72
End: 2022-10-10
Payer: MEDICARE

## 2022-10-10 VITALS
OXYGEN SATURATION: 97 % | WEIGHT: 197.88 LBS | DIASTOLIC BLOOD PRESSURE: 70 MMHG | BODY MASS INDEX: 32.97 KG/M2 | RESPIRATION RATE: 18 BRPM | TEMPERATURE: 98 F | HEIGHT: 65 IN | SYSTOLIC BLOOD PRESSURE: 128 MMHG | HEART RATE: 71 BPM

## 2022-10-10 DIAGNOSIS — Z45.010 PACEMAKER BATTERY DEPLETION: Primary | ICD-10-CM

## 2022-10-10 DIAGNOSIS — G25.81 RESTLESS LEGS: ICD-10-CM

## 2022-10-10 PROCEDURE — 99213 PR OFFICE/OUTPT VISIT, EST, LEVL III, 20-29 MIN: ICD-10-PCS | Mod: S$GLB,,, | Performed by: NURSE PRACTITIONER

## 2022-10-10 PROCEDURE — 1160F PR REVIEW ALL MEDS BY PRESCRIBER/CLIN PHARMACIST DOCUMENTED: ICD-10-PCS | Mod: CPTII,S$GLB,, | Performed by: NURSE PRACTITIONER

## 2022-10-10 PROCEDURE — 3078F DIAST BP <80 MM HG: CPT | Mod: CPTII,S$GLB,, | Performed by: NURSE PRACTITIONER

## 2022-10-10 PROCEDURE — 3008F PR BODY MASS INDEX (BMI) DOCUMENTED: ICD-10-PCS | Mod: CPTII,S$GLB,, | Performed by: NURSE PRACTITIONER

## 2022-10-10 PROCEDURE — 1160F RVW MEDS BY RX/DR IN RCRD: CPT | Mod: CPTII,S$GLB,, | Performed by: NURSE PRACTITIONER

## 2022-10-10 PROCEDURE — 3288F FALL RISK ASSESSMENT DOCD: CPT | Mod: CPTII,S$GLB,, | Performed by: NURSE PRACTITIONER

## 2022-10-10 PROCEDURE — 1101F PR PT FALLS ASSESS DOC 0-1 FALLS W/OUT INJ PAST YR: ICD-10-PCS | Mod: CPTII,S$GLB,, | Performed by: NURSE PRACTITIONER

## 2022-10-10 PROCEDURE — 3044F PR MOST RECENT HEMOGLOBIN A1C LEVEL <7.0%: ICD-10-PCS | Mod: CPTII,S$GLB,, | Performed by: NURSE PRACTITIONER

## 2022-10-10 PROCEDURE — 3008F BODY MASS INDEX DOCD: CPT | Mod: CPTII,S$GLB,, | Performed by: NURSE PRACTITIONER

## 2022-10-10 PROCEDURE — 1159F PR MEDICATION LIST DOCUMENTED IN MEDICAL RECORD: ICD-10-PCS | Mod: CPTII,S$GLB,, | Performed by: NURSE PRACTITIONER

## 2022-10-10 PROCEDURE — 1159F MED LIST DOCD IN RCRD: CPT | Mod: CPTII,S$GLB,, | Performed by: NURSE PRACTITIONER

## 2022-10-10 PROCEDURE — 3074F PR MOST RECENT SYSTOLIC BLOOD PRESSURE < 130 MM HG: ICD-10-PCS | Mod: CPTII,S$GLB,, | Performed by: NURSE PRACTITIONER

## 2022-10-10 PROCEDURE — 1126F AMNT PAIN NOTED NONE PRSNT: CPT | Mod: CPTII,S$GLB,, | Performed by: NURSE PRACTITIONER

## 2022-10-10 PROCEDURE — 3288F PR FALLS RISK ASSESSMENT DOCUMENTED: ICD-10-PCS | Mod: CPTII,S$GLB,, | Performed by: NURSE PRACTITIONER

## 2022-10-10 PROCEDURE — 3074F SYST BP LT 130 MM HG: CPT | Mod: CPTII,S$GLB,, | Performed by: NURSE PRACTITIONER

## 2022-10-10 PROCEDURE — 3078F PR MOST RECENT DIASTOLIC BLOOD PRESSURE < 80 MM HG: ICD-10-PCS | Mod: CPTII,S$GLB,, | Performed by: NURSE PRACTITIONER

## 2022-10-10 PROCEDURE — 3044F HG A1C LEVEL LT 7.0%: CPT | Mod: CPTII,S$GLB,, | Performed by: NURSE PRACTITIONER

## 2022-10-10 PROCEDURE — 1126F PR PAIN SEVERITY QUANTIFIED, NO PAIN PRESENT: ICD-10-PCS | Mod: CPTII,S$GLB,, | Performed by: NURSE PRACTITIONER

## 2022-10-10 PROCEDURE — 99213 OFFICE O/P EST LOW 20 MIN: CPT | Mod: S$GLB,,, | Performed by: NURSE PRACTITIONER

## 2022-10-10 PROCEDURE — 1101F PT FALLS ASSESS-DOCD LE1/YR: CPT | Mod: CPTII,S$GLB,, | Performed by: NURSE PRACTITIONER

## 2022-10-10 RX ORDER — ROPINIROLE 0.5 MG/1
0.5 TABLET, FILM COATED ORAL NIGHTLY
Qty: 90 TABLET | Refills: 1 | Status: SHIPPED | OUTPATIENT
Start: 2022-10-10 | End: 2023-01-09

## 2022-10-10 NOTE — PROGRESS NOTES
SUBJECTIVE:      Patient ID: Lisa Smith is a 71 y.o. female.    Chief Complaint: Follow-up    Lisa is here for f/u on RLS medication..  She has been taking the Requip nightly as prescribed with improvement in her RLS symptoms. Recently, she has noted the medication wears off In the middle of the night and she has to take an additional dose to rest/get comfortable. Wants to discuss a dose increase today.  She denies any side effects or complaints from the medication. Recent vaccinations reviewed/noted. Has not seen cardiology but plans to call soon.         Family History   Problem Relation Age of Onset    Kidney failure Mother     Cancer Father     Cancer Brother       Social History     Socioeconomic History    Marital status:     Number of children: 1   Occupational History    Occupation: RETIRED     Comment: [employer]   Tobacco Use    Smoking status: Former     Packs/day: 2.00     Years: 30.00     Pack years: 60.00     Types: Cigarettes     Start date: 1971     Quit date: 2011     Years since quittin.6     Passive exposure: Past    Smokeless tobacco: Never   Substance and Sexual Activity    Alcohol use: Yes     Alcohol/week: 1.0 standard drink     Types: 1 Shots of liquor per week    Drug use: No    Sexual activity: Never     Current Outpatient Medications   Medication Sig Dispense Refill    acetaminophen (TYLENOL) 325 MG tablet Take 325 mg by mouth every 6 (six) hours as needed for Pain.      albuterol (ACCUNEB) 1.25 mg/3 mL Nebu INHALE THE CONTENTS OF 1 VIAL VIA NEBULIZER EVERY 6 HOURS AS NEEDED FOR RESCUE 360 mL 5    aspirin (ECOTRIN) 81 MG EC tablet Take 81 mg by mouth once daily.      clopidogreL (PLAVIX) 75 mg tablet Take 1 tablet (75 mg total) by mouth once daily. 90 tablet 3    digoxin (LANOXIN) 250 mcg tablet Take 1 tablet (250 mcg total) by mouth once daily. 90 tablet 3    diltiaZEM (CARDIZEM CD) 120 MG Cp24 Take 1 capsule (120 mg total) by mouth once  daily. 90 capsule 3    fexofenadine (ALLEGRA) 60 MG tablet Take 60 mg by mouth once daily.      fluticasone propionate (FLONASE) 50 mcg/actuation nasal spray USE 2 SPRAYS (100 MCG TOTAL) IN EACH NOSTRIL ONCE DAILY. 48 g 5    fluticasone-umeclidin-vilanter (TRELEGY ELLIPTA) 100-62.5-25 mcg DsDv Inhale 1 puff into the lungs once daily. 3 each 6    furosemide (LASIX) 20 MG tablet Take 1 tablet (20 mg total) by mouth once daily. 90 tablet 3    magnesium oxide (MAG-OX) 400 mg (241.3 mg magnesium) tablet Take 400 mg by mouth once daily.      metoprolol succinate (TOPROL-XL) 100 MG 24 hr tablet Take 1 tablet (100 mg total) by mouth once daily. 90 tablet 3    pantoprazole (PROTONIX) 40 MG tablet TAKE 1 TABLET EVERY DAY 90 tablet 3    roflumilast (DALIRESP) 500 mcg Tab Take 1 tablet (500 mcg total) by mouth once daily. 90 tablet 6    spironolactone (ALDACTONE) 25 MG tablet Take 1 tablet (25 mg total) by mouth once daily. 90 tablet 3    TRELEGY ELLIPTA 100-62.5-25 mcg DsDv INHALE 1 PUFF ONE TIME DAILY 3 each 6    rOPINIRole (REQUIP) 0.5 MG tablet Take 1 tablet (0.5 mg total) by mouth every evening. 90 tablet 1     No current facility-administered medications for this visit.     Review of patient's allergies indicates:   Allergen Reactions    Sulfa (sulfonamide antibiotics) Itching      Past Medical History:   Diagnosis Date    Atrial fibrillation     Bell's palsy     COPD (chronic obstructive pulmonary disease)     GI bleed     Heart murmur     Heterozygous alpha 1-antitrypsin deficiency     Hypertension     Lung disease     copd    MONSERRAT (obstructive sleep apnea)     Pneumonia     Pulmonary edema      Past Surgical History:   Procedure Laterality Date    CARDIAC ELECTROPHYSIOLOGY STUDY AND ABLATION      CARPAL TUNNEL RELEASE      CHOLECYSTECTOMY      HAND SURGERY      HYSTERECTOMY      INSERT / REPLACE / REMOVE PACEMAKER      KNEE ARTHROSCOPY      REPLACEMENT OF PACEMAKER GENERATOR Left 1/20/2022    Procedure: REPLACEMENT,  "PACEMAKER GENERATOR;  Surgeon: Raymond Pérez MD;  Location: [facility] CATH/EP LAB;  Service: Cardiology;  Laterality: Left;       Review of Systems   Constitutional:  Negative for activity change, appetite change, chills, fatigue, fever and unexpected weight change.   HENT:  Positive for hearing loss. Negative for congestion, rhinorrhea and trouble swallowing.    Eyes:  Negative for discharge and visual disturbance.   Respiratory:  Positive for wheezing. Negative for cough, chest tightness and shortness of breath.    Cardiovascular:  Negative for chest pain, palpitations and leg swelling.   Gastrointestinal:  Negative for abdominal pain, blood in stool, constipation, diarrhea, nausea and vomiting.   Endocrine: Negative for polydipsia and polyuria.   Genitourinary:  Negative for difficulty urinating, dysuria, frequency, hematuria and menstrual problem.   Musculoskeletal:  Positive for arthralgias and myalgias. Negative for back pain, joint swelling and neck pain.   Skin:  Negative for color change, pallor, rash and wound.   Neurological:  Negative for dizziness, weakness, numbness and headaches.   Hematological:  Negative for adenopathy. Bruises/bleeds easily.   Psychiatric/Behavioral:  Negative for confusion, dysphoric mood and sleep disturbance. The patient is not nervous/anxious.     OBJECTIVE:      Vitals:    10/10/22 0941 10/10/22 1001   BP: (!) 138/90 128/70   BP Location: Left arm    Patient Position: Sitting    BP Method: Medium (Manual)    Pulse: 71    Resp: 18    Temp: 98 °F (36.7 °C)    TempSrc: Oral    SpO2: 97%    Weight: 89.8 kg (197 lb 14.4 oz)    Height: 5' 5" (1.651 m)      Physical Exam  Vitals and nursing note reviewed.   Constitutional:       General: She is not in acute distress.     Appearance: Normal appearance. She is well-developed. She is obese. She is not ill-appearing.   HENT:      Head: Normocephalic.      Mouth/Throat:      Mouth: Mucous membranes are moist.   Eyes:      Pupils: Pupils are " equal, round, and reactive to light.   Neck:      Thyroid: No thyroid mass, thyromegaly or thyroid tenderness.   Cardiovascular:      Rate and Rhythm: Normal rate and regular rhythm.      Pulses:           Dorsalis pedis pulses are 1+ on the right side and 1+ on the left side.        Posterior tibial pulses are 1+ on the right side and 1+ on the left side.      Heart sounds: Murmur heard.   Systolic murmur is present with a grade of 3/6.   Pulmonary:      Effort: Pulmonary effort is normal. No respiratory distress.      Breath sounds: Normal breath sounds. No wheezing, rhonchi or rales.   Abdominal:      General: Bowel sounds are normal.      Palpations: Abdomen is soft.   Musculoskeletal:         General: Normal range of motion.      Cervical back: Normal range of motion and neck supple.      Right lower leg: No edema.      Left lower leg: No edema.   Lymphadenopathy:      Cervical: No cervical adenopathy.   Skin:     General: Skin is warm and dry.   Neurological:      Mental Status: She is alert and oriented to person, place, and time.   Psychiatric:         Mood and Affect: Mood normal.         Behavior: Behavior normal.         Thought Content: Thought content normal.         Judgment: Judgment normal.      Assessment:       1. Restless legs        Plan:       Restless legs  -     rOPINIRole (REQUIP) 0.5 MG tablet; Take 1 tablet (0.5 mg total) by mouth every evening.  Dispense: 90 tablet; Refill: 1   -may increase to 0.5 mg nightly    Follow up in about 6 months (around 4/10/2023) for f/u HTN, RLS.      10/10/2022 DIAN Oquendo, LIANNAP    This note was created using [employer] voice recognition software that occasionally misinterprets phrases or words.

## 2022-10-19 ENCOUNTER — OFFICE VISIT (OUTPATIENT)
Dept: PULMONOLOGY | Facility: CLINIC | Age: 72
End: 2022-10-19
Payer: MEDICARE

## 2022-10-19 VITALS
DIASTOLIC BLOOD PRESSURE: 68 MMHG | TEMPERATURE: 98 F | SYSTOLIC BLOOD PRESSURE: 120 MMHG | WEIGHT: 199 LBS | HEART RATE: 66 BPM | BODY MASS INDEX: 33.12 KG/M2 | OXYGEN SATURATION: 95 %

## 2022-10-19 DIAGNOSIS — G47.34 NOCTURNAL HYPOXEMIA: ICD-10-CM

## 2022-10-19 DIAGNOSIS — J47.9 BRONCHIECTASIS WITHOUT COMPLICATION: ICD-10-CM

## 2022-10-19 DIAGNOSIS — J44.9 CHRONIC OBSTRUCTIVE PULMONARY DISEASE, UNSPECIFIED COPD TYPE: Primary | ICD-10-CM

## 2022-10-19 PROCEDURE — 3078F PR MOST RECENT DIASTOLIC BLOOD PRESSURE < 80 MM HG: ICD-10-PCS | Mod: CPTII,S$GLB,, | Performed by: NURSE PRACTITIONER

## 2022-10-19 PROCEDURE — 99214 OFFICE O/P EST MOD 30 MIN: CPT | Mod: S$GLB,,, | Performed by: NURSE PRACTITIONER

## 2022-10-19 PROCEDURE — 3044F PR MOST RECENT HEMOGLOBIN A1C LEVEL <7.0%: ICD-10-PCS | Mod: CPTII,S$GLB,, | Performed by: NURSE PRACTITIONER

## 2022-10-19 PROCEDURE — 3074F SYST BP LT 130 MM HG: CPT | Mod: CPTII,S$GLB,, | Performed by: NURSE PRACTITIONER

## 2022-10-19 PROCEDURE — 3044F HG A1C LEVEL LT 7.0%: CPT | Mod: CPTII,S$GLB,, | Performed by: NURSE PRACTITIONER

## 2022-10-19 PROCEDURE — 1126F PR PAIN SEVERITY QUANTIFIED, NO PAIN PRESENT: ICD-10-PCS | Mod: CPTII,S$GLB,, | Performed by: NURSE PRACTITIONER

## 2022-10-19 PROCEDURE — 1159F PR MEDICATION LIST DOCUMENTED IN MEDICAL RECORD: ICD-10-PCS | Mod: CPTII,S$GLB,, | Performed by: NURSE PRACTITIONER

## 2022-10-19 PROCEDURE — 1159F MED LIST DOCD IN RCRD: CPT | Mod: CPTII,S$GLB,, | Performed by: NURSE PRACTITIONER

## 2022-10-19 PROCEDURE — 99214 PR OFFICE/OUTPT VISIT, EST, LEVL IV, 30-39 MIN: ICD-10-PCS | Mod: S$GLB,,, | Performed by: NURSE PRACTITIONER

## 2022-10-19 PROCEDURE — 3078F DIAST BP <80 MM HG: CPT | Mod: CPTII,S$GLB,, | Performed by: NURSE PRACTITIONER

## 2022-10-19 PROCEDURE — 3074F PR MOST RECENT SYSTOLIC BLOOD PRESSURE < 130 MM HG: ICD-10-PCS | Mod: CPTII,S$GLB,, | Performed by: NURSE PRACTITIONER

## 2022-10-19 PROCEDURE — 1126F AMNT PAIN NOTED NONE PRSNT: CPT | Mod: CPTII,S$GLB,, | Performed by: NURSE PRACTITIONER

## 2022-10-19 NOTE — PROGRESS NOTES
SUBJECTIVE:    Patient ID: Lisa Smith is a 72 y.o. female.    Chief Complaint: COPD and Follow-up (Getting up at night to do a breathing treatment)    Patient here today feeling well.  She is using Trelegy daily, feels her breathing is stable. She is sleeping on her oxygen. The Trelegy is expensive and she is in her donut hole.  She is expectorating mucous throughout the day. She does wake up during the night at times needed to use nebulizer.  She has gained some weight but is not working on getting it off again.   Past Medical History:   Diagnosis Date    Atrial fibrillation     Bell's palsy     COPD (chronic obstructive pulmonary disease)     GI bleed     Heart murmur     Heterozygous alpha 1-antitrypsin deficiency     Hypertension     Lung disease     copd    MONSERRAT (obstructive sleep apnea)     Pneumonia     Pulmonary edema      Past Surgical History:   Procedure Laterality Date    CARDIAC ELECTROPHYSIOLOGY STUDY AND ABLATION      CARPAL TUNNEL RELEASE      CHOLECYSTECTOMY      HAND SURGERY      HYSTERECTOMY      INSERT / REPLACE / REMOVE PACEMAKER      KNEE ARTHROSCOPY      REPLACEMENT OF PACEMAKER GENERATOR Left 1/20/2022    Procedure: REPLACEMENT, PACEMAKER GENERATOR;  Surgeon: Raymond Pérez MD;  Location: [facility] CATH/EP LAB;  Service: Cardiology;  Laterality: Left;     Family History   Problem Relation Age of Onset    Kidney failure Mother     Cancer Father     Cancer Brother         Social History:   Marital Status:   Occupation: housewife  Alcohol History:  reports current alcohol use of about 1.0 standard drink per week.  Tobacco History:  reports that she quit smoking about 11 years ago. Her smoking use included cigarettes. She started smoking about 51 years ago. She has a 60.00 pack-year smoking history. She has been exposed to tobacco smoke. She has never used smokeless tobacco.  Drug History:  reports no history of drug use.    Review of patient's allergies indicates:   Allergen Reactions     Sulfa (sulfonamide antibiotics) Itching       Current Outpatient Medications   Medication Sig Dispense Refill    acetaminophen (TYLENOL) 325 MG tablet Take 325 mg by mouth every 6 (six) hours as needed for Pain.      albuterol (ACCUNEB) 1.25 mg/3 mL Nebu INHALE THE CONTENTS OF 1 VIAL VIA NEBULIZER EVERY 6 HOURS AS NEEDED FOR RESCUE 360 mL 5    aspirin (ECOTRIN) 81 MG EC tablet Take 81 mg by mouth once daily.      clopidogreL (PLAVIX) 75 mg tablet Take 1 tablet (75 mg total) by mouth once daily. 90 tablet 3    digoxin (LANOXIN) 250 mcg tablet Take 1 tablet (250 mcg total) by mouth once daily. 90 tablet 3    diltiaZEM (CARDIZEM CD) 120 MG Cp24 Take 1 capsule (120 mg total) by mouth once daily. 90 capsule 3    fexofenadine (ALLEGRA) 60 MG tablet Take 60 mg by mouth once daily.      fluticasone propionate (FLONASE) 50 mcg/actuation nasal spray USE 2 SPRAYS (100 MCG TOTAL) IN EACH NOSTRIL ONCE DAILY. 48 g 5    fluticasone-umeclidin-vilanter (TRELEGY ELLIPTA) 100-62.5-25 mcg DsDv Inhale 1 puff into the lungs once daily. 3 each 6    furosemide (LASIX) 20 MG tablet Take 1 tablet (20 mg total) by mouth once daily. 90 tablet 3    magnesium oxide (MAG-OX) 400 mg (241.3 mg magnesium) tablet Take 400 mg by mouth once daily.      metoprolol succinate (TOPROL-XL) 100 MG 24 hr tablet Take 1 tablet (100 mg total) by mouth once daily. 90 tablet 3    pantoprazole (PROTONIX) 40 MG tablet TAKE 1 TABLET EVERY DAY 90 tablet 3    roflumilast (DALIRESP) 500 mcg Tab Take 1 tablet (500 mcg total) by mouth once daily. 90 tablet 6    rOPINIRole (REQUIP) 0.5 MG tablet Take 1 tablet (0.5 mg total) by mouth every evening. 90 tablet 1    spironolactone (ALDACTONE) 25 MG tablet Take 1 tablet (25 mg total) by mouth once daily. 90 tablet 3    TRELEGY ELLIPTA 100-62.5-25 mcg DsDv INHALE 1 PUFF ONE TIME DAILY 3 each 6     No current facility-administered medications for this visit.       Alpha-1 Antitrypsin: MZ  Last PFT:10/2020 moderate obstruction  with good response, air trapping  moderate diffusion defect  Last ct 10/2020  IMPRESSION:  1. Small airspace opacity in the right upper lobe and scattered  interstitial opacities in the right midlung zone suggesting a  combination of atelectasis, scarring and possibly mild atypical  infection or pneumonia.  2. Numerous mildly enlarged mediastinal lymph nodes, possibly  reactive/inflammatory in nature, smaller than on the previous  comparison exam.  3. Mild cylindrical basilar predominant bronchiectasis.  4. Trace debris in the posterior segment left lower lobe suggesting  aspiration.  5. Trace right-sided pleural effusion and adjacent atelectasis,  unchanged from the previous exam.  6. Cardiomegaly with predominantly biatrial enlargement and scattered  coronary arterial calcifications.  7. Atheromatous calcifications in the thoracic aorta with ectasia of  the ace ascending thoracic aorta.  8. Additional, and incidental findings as noted above      General: feeling well  Eyes: Vision is good.  ENT:  No current issues   Heart:: no chest pain, no palpitations   Lungs: breathing is well   GI: No Nausea, vomiting, constipation, diarrhea, or reflux.  : No dysuria, hesitancy, or nocturia.  Musculoskeletal:  back pain   Skin: No lesions or rashes.  Neuro: headaches, neuropathy   Lymph: No edema or adenopathy.  Psych: No anxiety or depression.  Endo: weight gain     OBJECTIVE:      /68 (BP Location: Left arm, Patient Position: Sitting, BP Method: Medium (Manual))   Pulse 66   Temp 98 °F (36.7 °C)   Wt 90.3 kg (199 lb)   LMP  (LMP Unknown)   SpO2 95%   BMI 33.12 kg/m²     Physical Exam  GENERAL: Older patient in no distress.  HEENT: Pupils equal and reactive. Extraocular movements intact. Nose intact.      Pharynx moist.  NECK: Supple.   HEART: Regular rate and rhythm.  murmur   LUNGS: rhonchi cleared with cough   ABDOMEN: Bowel sounds present. Non-tender, no masses palpated.  EXTREMITIES: Normal muscle tone and  joint movement, no cyanosis or clubbing.   LYMPHATICS: No adenopathy palpated, no edema.  SKIN: Dry, intact, no lesions.   NEURO: Cranial nerves II-XII intact. Motor strength 5/5 bilaterally, upper and lower extremities.  PSYCH: Appropriate affect.    Assessment:     COPD  Bronchiectasis    nocturnal hypoxemia  Plan:         Continue the Trelegy daily, rinse after you use it  Call and see if you you can get Breztri through [facility] with the [facility]   If so send the script, Breztri is 2 puffs twice a day  Call me an let me know   Keep sleeping on oxygen

## 2022-10-19 NOTE — PATIENT INSTRUCTIONS
Continue the Trelegy daily, rinse after you use it  Call and see if you you can get Breztri through [facility] with the [facility]   If so send the script, Breztri is 2 puffs twice a day  Call me an let me know   Keep sleeping on oxygen

## 2022-10-31 ENCOUNTER — HOSPITAL ENCOUNTER (OUTPATIENT)
Dept: RADIOLOGY | Facility: HOSPITAL | Age: 72
Discharge: HOME | End: 2022-10-31
Attending: NURSE PRACTITIONER
Payer: MEDICARE

## 2022-10-31 ENCOUNTER — TELEPHONE (OUTPATIENT)
Dept: PULMONOLOGY | Facility: CLINIC | Age: 72
End: 2022-10-31

## 2022-10-31 ENCOUNTER — OFFICE VISIT (OUTPATIENT)
Dept: PULMONOLOGY | Facility: CLINIC | Age: 72
End: 2022-10-31
Payer: MEDICARE

## 2022-10-31 VITALS
OXYGEN SATURATION: 94 % | TEMPERATURE: 98 F | HEART RATE: 74 BPM | DIASTOLIC BLOOD PRESSURE: 62 MMHG | BODY MASS INDEX: 33.28 KG/M2 | SYSTOLIC BLOOD PRESSURE: 120 MMHG | WEIGHT: 200 LBS

## 2022-10-31 DIAGNOSIS — R05.9 COUGH, UNSPECIFIED TYPE: ICD-10-CM

## 2022-10-31 DIAGNOSIS — J44.1 COPD EXACERBATION: ICD-10-CM

## 2022-10-31 DIAGNOSIS — R05.9 COUGH, UNSPECIFIED TYPE: Primary | ICD-10-CM

## 2022-10-31 PROCEDURE — 3044F PR MOST RECENT HEMOGLOBIN A1C LEVEL <7.0%: ICD-10-PCS | Mod: CPTII,S$GLB,, | Performed by: NURSE PRACTITIONER

## 2022-10-31 PROCEDURE — 3078F DIAST BP <80 MM HG: CPT | Mod: CPTII,S$GLB,, | Performed by: NURSE PRACTITIONER

## 2022-10-31 PROCEDURE — 3074F SYST BP LT 130 MM HG: CPT | Mod: CPTII,S$GLB,, | Performed by: NURSE PRACTITIONER

## 2022-10-31 PROCEDURE — 71046 X-RAY EXAM CHEST 2 VIEWS: CPT | Mod: TC

## 2022-10-31 PROCEDURE — 3044F HG A1C LEVEL LT 7.0%: CPT | Mod: CPTII,S$GLB,, | Performed by: NURSE PRACTITIONER

## 2022-10-31 PROCEDURE — 1126F AMNT PAIN NOTED NONE PRSNT: CPT | Mod: CPTII,S$GLB,, | Performed by: NURSE PRACTITIONER

## 2022-10-31 PROCEDURE — 3078F PR MOST RECENT DIASTOLIC BLOOD PRESSURE < 80 MM HG: ICD-10-PCS | Mod: CPTII,S$GLB,, | Performed by: NURSE PRACTITIONER

## 2022-10-31 PROCEDURE — 99214 OFFICE O/P EST MOD 30 MIN: CPT | Mod: S$GLB,,, | Performed by: NURSE PRACTITIONER

## 2022-10-31 PROCEDURE — 99214 PR OFFICE/OUTPT VISIT, EST, LEVL IV, 30-39 MIN: ICD-10-PCS | Mod: S$GLB,,, | Performed by: NURSE PRACTITIONER

## 2022-10-31 PROCEDURE — 3074F PR MOST RECENT SYSTOLIC BLOOD PRESSURE < 130 MM HG: ICD-10-PCS | Mod: CPTII,S$GLB,, | Performed by: NURSE PRACTITIONER

## 2022-10-31 PROCEDURE — 1126F PR PAIN SEVERITY QUANTIFIED, NO PAIN PRESENT: ICD-10-PCS | Mod: CPTII,S$GLB,, | Performed by: NURSE PRACTITIONER

## 2022-10-31 RX ORDER — LEVOFLOXACIN 500 MG/1
500 TABLET, FILM COATED ORAL DAILY
Qty: 7 TABLET | Refills: 0 | Status: SHIPPED | OUTPATIENT
Start: 2022-10-31 | End: 2022-12-20 | Stop reason: ALTCHOICE

## 2022-10-31 RX ORDER — PREDNISONE 10 MG/1
TABLET ORAL
Qty: 20 TABLET | Refills: 0 | Status: SHIPPED | OUTPATIENT
Start: 2022-10-31 | End: 2022-12-20 | Stop reason: ALTCHOICE

## 2022-10-31 NOTE — TELEPHONE ENCOUNTER
Chest xray  IMPRESSION:     1.  Blunting of the bilateral costophrenic angles may reflect small effusions, atelectasis and or scarring.  2.  Right basilar linear and hazy lung opacities could reflect underlying atelectasis, scarring or infiltrate.

## 2022-10-31 NOTE — PATIENT INSTRUCTIONS
Continue the Trelegy daily, rinse after you use it  Chest xray   Prednisone taper   Sputum culture

## 2022-10-31 NOTE — PROGRESS NOTES
SUBJECTIVE:    Patient ID: Lisa Smith is a 72 y.o. female.    Chief Complaint: Follow-up (Follow up SOB on exertion), COPD, and Cough (Cough at night dark phelm and has to take breathing treatments)    Patient here today with increased dyspnea. She states she has been waking up in middle of night needing breathing treatment. She is more short of breath with exertion. She is expectorating much more mucous over the last several days.  Past Medical History:   Diagnosis Date    Atrial fibrillation     Bell's palsy     COPD (chronic obstructive pulmonary disease)     GI bleed     Heart murmur     Heterozygous alpha 1-antitrypsin deficiency     Hypertension     Lung disease     copd    MONSERRAT (obstructive sleep apnea)     Pneumonia     Pulmonary edema      Past Surgical History:   Procedure Laterality Date    CARDIAC ELECTROPHYSIOLOGY STUDY AND ABLATION      CARPAL TUNNEL RELEASE      CHOLECYSTECTOMY      HAND SURGERY      HYSTERECTOMY      INSERT / REPLACE / REMOVE PACEMAKER      KNEE ARTHROSCOPY      REPLACEMENT OF PACEMAKER GENERATOR Left 1/20/2022    Procedure: REPLACEMENT, PACEMAKER GENERATOR;  Surgeon: Raymond Pérez MD;  Location: [facility] CATH/EP LAB;  Service: Cardiology;  Laterality: Left;     Family History   Problem Relation Age of Onset    Kidney failure Mother     Cancer Father     Cancer Brother         Social History:   Marital Status:   Occupation: housewife  Alcohol History:  reports current alcohol use of about 1.0 standard drink per week.  Tobacco History:  reports that she quit smoking about 11 years ago. Her smoking use included cigarettes. She started smoking about 51 years ago. She has a 60.00 pack-year smoking history. She has been exposed to tobacco smoke. She has never used smokeless tobacco.  Drug History:  reports no history of drug use.    Review of patient's allergies indicates:   Allergen Reactions    Sulfa (sulfonamide antibiotics) Itching       Current Outpatient Medications    Medication Sig Dispense Refill    acetaminophen (TYLENOL) 325 MG tablet Take 325 mg by mouth every 6 (six) hours as needed for Pain.      albuterol (ACCUNEB) 1.25 mg/3 mL Nebu INHALE THE CONTENTS OF 1 VIAL VIA NEBULIZER EVERY 6 HOURS AS NEEDED FOR RESCUE 360 mL 5    aspirin (ECOTRIN) 81 MG EC tablet Take 81 mg by mouth once daily.      clopidogreL (PLAVIX) 75 mg tablet Take 1 tablet (75 mg total) by mouth once daily. 90 tablet 3    digoxin (LANOXIN) 250 mcg tablet Take 1 tablet (250 mcg total) by mouth once daily. 90 tablet 3    diltiaZEM (CARDIZEM CD) 120 MG Cp24 Take 1 capsule (120 mg total) by mouth once daily. 90 capsule 3    fexofenadine (ALLEGRA) 60 MG tablet Take 60 mg by mouth once daily.      fluticasone propionate (FLONASE) 50 mcg/actuation nasal spray USE 2 SPRAYS (100 MCG TOTAL) IN EACH NOSTRIL ONCE DAILY. 48 g 5    fluticasone-umeclidin-vilanter (TRELEGY ELLIPTA) 100-62.5-25 mcg DsDv Inhale 1 puff into the lungs once daily. 3 each 6    furosemide (LASIX) 20 MG tablet Take 1 tablet (20 mg total) by mouth once daily. 90 tablet 3    magnesium oxide (MAG-OX) 400 mg (241.3 mg magnesium) tablet Take 400 mg by mouth once daily.      metoprolol succinate (TOPROL-XL) 100 MG 24 hr tablet Take 1 tablet (100 mg total) by mouth once daily. 90 tablet 3    pantoprazole (PROTONIX) 40 MG tablet TAKE 1 TABLET EVERY DAY 90 tablet 3    roflumilast (DALIRESP) 500 mcg Tab Take 1 tablet (500 mcg total) by mouth once daily. 90 tablet 6    rOPINIRole (REQUIP) 0.5 MG tablet Take 1 tablet (0.5 mg total) by mouth every evening. 90 tablet 1    spironolactone (ALDACTONE) 25 MG tablet Take 1 tablet (25 mg total) by mouth once daily. 90 tablet 3    levoFLOXacin (LEVAQUIN) 500 MG tablet Take 1 tablet (500 mg total) by mouth once daily. 7 tablet 0    predniSONE (DELTASONE) 10 MG tablet Take 4 tabs x 2 days, then take 3 tabs x 2 days, then take 2 tabs x 2 days, then take 1 tab x 2 days. 20 tablet 0    TRELEGY ELLIPTA 100-62.5-25 mcg  DsDv INHALE 1 PUFF ONE TIME DAILY 3 each 6     No current facility-administered medications for this visit.       Alpha-1 Antitrypsin: MZ  Last PFT:10/2020 moderate obstruction with good response, air trapping  moderate diffusion defect  Last ct 10/2020  IMPRESSION:  1. Small airspace opacity in the right upper lobe and scattered  interstitial opacities in the right midlung zone suggesting a  combination of atelectasis, scarring and possibly mild atypical  infection or pneumonia.  2. Numerous mildly enlarged mediastinal lymph nodes, possibly  reactive/inflammatory in nature, smaller than on the previous  comparison exam.  3. Mild cylindrical basilar predominant bronchiectasis.  4. Trace debris in the posterior segment left lower lobe suggesting  aspiration.  5. Trace right-sided pleural effusion and adjacent atelectasis,  unchanged from the previous exam.  6. Cardiomegaly with predominantly biatrial enlargement and scattered  coronary arterial calcifications.  7. Atheromatous calcifications in the thoracic aorta with ectasia of  the ace ascending thoracic aorta.  8. Additional, and incidental findings as noted above      General: does not feel well.   Eyes: Vision is good.  ENT:  No current issues   Heart:: no chest pain, no palpitations   Lungs: increased dyspnea, especially at night, expectorating lots more mucous  GI: No Nausea, vomiting, constipation, diarrhea, or reflux.  : No dysuria, hesitancy, or nocturia.  Musculoskeletal:  back pain   Skin: No lesions or rashes.  Neuro: headaches, neuropathy   Lymph: No edema or adenopathy.  Psych: No anxiety or depression.  Endo: weight gain     OBJECTIVE:      /62 (BP Location: Right arm, Patient Position: Sitting, BP Method: Medium (Manual))   Pulse 74   Temp 97.6 °F (36.4 °C)   Wt 90.7 kg (200 lb)   LMP  (LMP Unknown)   SpO2 (!) 94%   BMI 33.28 kg/m²     Physical Exam  GENERAL: Older patient in no distress.  HEENT: Pupils equal and reactive. Extraocular  movements intact. Nose intact.      Pharynx moist.  NECK: Supple.   HEART: Regular rate and rhythm.  murmur   LUNGS: inspiratory squeak and wheeze, with rhonchi   ABDOMEN: Bowel sounds present. Non-tender, no masses palpated.  EXTREMITIES: Normal muscle tone and joint movement, no cyanosis or clubbing.   LYMPHATICS: No adenopathy palpated, no edema.  SKIN: Dry, intact, no lesions.   NEURO: Cranial nerves II-XII intact. Motor strength 5/5 bilaterally, upper and lower extremities.  PSYCH: Appropriate affect.    Assessment:     COPD exacerbation  Bronchiectasis  Nocturnal hypoxemia   Plan:         Continue the Trelegy daily, rinse after you use it  Chest xray   Prednisone taper   Levaquin daily for a week  Sputum culture

## 2022-11-02 ENCOUNTER — TELEPHONE (OUTPATIENT)
Dept: PULMONOLOGY | Facility: CLINIC | Age: 72
End: 2022-11-02

## 2022-11-02 DIAGNOSIS — R91.1 SOLITARY PULMONARY NODULE: Primary | ICD-10-CM

## 2022-11-02 NOTE — TELEPHONE ENCOUNTER
Spoke with patient.  She is feeling much better. She never did have follow up CT that was ordered in 2021 due to covid will order .

## 2022-11-07 ENCOUNTER — HOSPITAL ENCOUNTER (OUTPATIENT)
Dept: RADIOLOGY | Facility: HOSPITAL | Age: 72
Discharge: HOME | End: 2022-11-07
Attending: NURSE PRACTITIONER
Payer: MEDICARE

## 2022-11-07 DIAGNOSIS — R91.1 SOLITARY PULMONARY NODULE: ICD-10-CM

## 2022-11-07 PROCEDURE — 71250 CT THORAX DX C-: CPT | Mod: TC,PO

## 2022-11-08 ENCOUNTER — TELEPHONE (OUTPATIENT)
Dept: PULMONOLOGY | Facility: CLINIC | Age: 72
End: 2022-11-08

## 2022-11-08 DIAGNOSIS — J47.9 BRONCHIECTASIS WITHOUT COMPLICATION: Primary | ICD-10-CM

## 2022-11-08 NOTE — TELEPHONE ENCOUNTER
CT chest  IMPRESSION:  1. No suspicious pulmonary nodules or masses.  2. Stable scattered right lung airspace opacities suggesting chronic atelectasis and scarring, or perhaps organizing pneumonia.  3. Additional scattered subsegmental atelectasis, subpleural scarring and findings of small airways inflammation, with localized bronchiectasis and mucous plugging in the left lower lobe.  4. Stable trace right pleural effusion.  5. Cardiomegaly, with aortic and coronary arterial calcifications, and enlarged central pulmonary arteries suggesting pulmonary arterial hypertension.

## 2022-11-08 NOTE — TELEPHONE ENCOUNTER
Discussed CT with patient. She had a vest but could not afford so sent it back. Will order an acapella. For her to use. She is feeling a lot better.

## 2022-11-11 ENCOUNTER — TELEPHONE (OUTPATIENT)
Dept: CARDIOLOGY | Facility: CLINIC | Age: 72
End: 2022-11-11
Payer: MEDICARE

## 2022-11-16 ENCOUNTER — OFFICE VISIT (OUTPATIENT)
Dept: CARDIOLOGY | Facility: CLINIC | Age: 72
End: 2022-11-16
Payer: MEDICARE

## 2022-11-16 VITALS
SYSTOLIC BLOOD PRESSURE: 130 MMHG | BODY MASS INDEX: 32.32 KG/M2 | HEART RATE: 76 BPM | DIASTOLIC BLOOD PRESSURE: 80 MMHG | WEIGHT: 194 LBS | HEIGHT: 65 IN

## 2022-11-16 DIAGNOSIS — I48.91 ATRIAL FIBRILLATION, UNSPECIFIED TYPE: ICD-10-CM

## 2022-11-16 DIAGNOSIS — R09.89 BILATERAL CAROTID BRUITS: Primary | ICD-10-CM

## 2022-11-16 DIAGNOSIS — Z45.010 PACEMAKER BATTERY DEPLETION: ICD-10-CM

## 2022-11-16 DIAGNOSIS — I25.10 CORONARY ARTERY DISEASE WITHOUT ANGINA PECTORIS, UNSPECIFIED VESSEL OR LESION TYPE, UNSPECIFIED WHETHER NATIVE OR TRANSPLANTED HEART: ICD-10-CM

## 2022-11-16 DIAGNOSIS — I35.0 NONRHEUMATIC AORTIC VALVE STENOSIS: ICD-10-CM

## 2022-11-16 DIAGNOSIS — Z95.0 S/P PLACEMENT OF CARDIAC PACEMAKER: Chronic | ICD-10-CM

## 2022-11-16 PROCEDURE — 3075F SYST BP GE 130 - 139MM HG: CPT | Mod: CPTII,S$GLB,, | Performed by: NURSE PRACTITIONER

## 2022-11-16 PROCEDURE — 1160F RVW MEDS BY RX/DR IN RCRD: CPT | Mod: CPTII,S$GLB,, | Performed by: NURSE PRACTITIONER

## 2022-11-16 PROCEDURE — 1126F AMNT PAIN NOTED NONE PRSNT: CPT | Mod: CPTII,S$GLB,, | Performed by: NURSE PRACTITIONER

## 2022-11-16 PROCEDURE — 3044F HG A1C LEVEL LT 7.0%: CPT | Mod: CPTII,S$GLB,, | Performed by: NURSE PRACTITIONER

## 2022-11-16 PROCEDURE — 3075F PR MOST RECENT SYSTOLIC BLOOD PRESS GE 130-139MM HG: ICD-10-PCS | Mod: CPTII,S$GLB,, | Performed by: NURSE PRACTITIONER

## 2022-11-16 PROCEDURE — 1101F PR PT FALLS ASSESS DOC 0-1 FALLS W/OUT INJ PAST YR: ICD-10-PCS | Mod: CPTII,S$GLB,, | Performed by: NURSE PRACTITIONER

## 2022-11-16 PROCEDURE — 1159F PR MEDICATION LIST DOCUMENTED IN MEDICAL RECORD: ICD-10-PCS | Mod: CPTII,S$GLB,, | Performed by: NURSE PRACTITIONER

## 2022-11-16 PROCEDURE — 99999 PR PBB SHADOW E&M-EST. PATIENT-LVL IV: CPT | Mod: PBBFAC,,, | Performed by: NURSE PRACTITIONER

## 2022-11-16 PROCEDURE — 99214 PR OFFICE/OUTPT VISIT, EST, LEVL IV, 30-39 MIN: ICD-10-PCS | Mod: S$GLB,,, | Performed by: NURSE PRACTITIONER

## 2022-11-16 PROCEDURE — 3008F BODY MASS INDEX DOCD: CPT | Mod: CPTII,S$GLB,, | Performed by: NURSE PRACTITIONER

## 2022-11-16 PROCEDURE — 1160F PR REVIEW ALL MEDS BY PRESCRIBER/CLIN PHARMACIST DOCUMENTED: ICD-10-PCS | Mod: CPTII,S$GLB,, | Performed by: NURSE PRACTITIONER

## 2022-11-16 PROCEDURE — 1126F PR PAIN SEVERITY QUANTIFIED, NO PAIN PRESENT: ICD-10-PCS | Mod: CPTII,S$GLB,, | Performed by: NURSE PRACTITIONER

## 2022-11-16 PROCEDURE — 3288F PR FALLS RISK ASSESSMENT DOCUMENTED: ICD-10-PCS | Mod: CPTII,S$GLB,, | Performed by: NURSE PRACTITIONER

## 2022-11-16 PROCEDURE — 3008F PR BODY MASS INDEX (BMI) DOCUMENTED: ICD-10-PCS | Mod: CPTII,S$GLB,, | Performed by: NURSE PRACTITIONER

## 2022-11-16 PROCEDURE — 3079F PR MOST RECENT DIASTOLIC BLOOD PRESSURE 80-89 MM HG: ICD-10-PCS | Mod: CPTII,S$GLB,, | Performed by: NURSE PRACTITIONER

## 2022-11-16 PROCEDURE — 1101F PT FALLS ASSESS-DOCD LE1/YR: CPT | Mod: CPTII,S$GLB,, | Performed by: NURSE PRACTITIONER

## 2022-11-16 PROCEDURE — 99214 OFFICE O/P EST MOD 30 MIN: CPT | Mod: S$GLB,,, | Performed by: NURSE PRACTITIONER

## 2022-11-16 PROCEDURE — 3288F FALL RISK ASSESSMENT DOCD: CPT | Mod: CPTII,S$GLB,, | Performed by: NURSE PRACTITIONER

## 2022-11-16 PROCEDURE — 3044F PR MOST RECENT HEMOGLOBIN A1C LEVEL <7.0%: ICD-10-PCS | Mod: CPTII,S$GLB,, | Performed by: NURSE PRACTITIONER

## 2022-11-16 PROCEDURE — 99999 PR PBB SHADOW E&M-EST. PATIENT-LVL IV: ICD-10-PCS | Mod: PBBFAC,,, | Performed by: NURSE PRACTITIONER

## 2022-11-16 PROCEDURE — 3079F DIAST BP 80-89 MM HG: CPT | Mod: CPTII,S$GLB,, | Performed by: NURSE PRACTITIONER

## 2022-11-16 PROCEDURE — 1159F MED LIST DOCD IN RCRD: CPT | Mod: CPTII,S$GLB,, | Performed by: NURSE PRACTITIONER

## 2022-11-16 NOTE — ASSESSMENT & PLAN NOTE
BP stable on current regimen. Continue current meds.  No bleeding with anticoagulation. Continue aspirin and plavix.  Eat a heart healthy diet and increase exercise.

## 2022-11-16 NOTE — ASSESSMENT & PLAN NOTE
Battery was changed in January.  Wound healed over well. No problems with battery since generator change. Phone number given to patient to call for information to access the claudia.

## 2022-11-16 NOTE — PROGRESS NOTES
Subjective:    Patient ID:  Lisa Smith is a 72 y.o. female patient here for evaluation Coronary Artery Disease      History of Present Illness:     Patient is here for a f/u visit. She had pneumonia 3 weeks ago. She has recovered from this.  Her shortness of breath is improving. She had the generator change performed in January for her pacemaker.      She is having a difficulty time accessing her pacemaker information via the claudia.  She admits to being lightheaded at times but denies any near syncope.  Denies recent falls.  Denies CP, shortness of breath, palpitations, HA, and edema.  Overall she is feeling well.  Her BP has been running 120-130/80s at home.  She denies bleeding.           Review of patient's allergies indicates:   Allergen Reactions    Sulfa (sulfonamide antibiotics) Itching       Past Medical History:   Diagnosis Date    Atrial fibrillation     Bell's palsy     COPD (chronic obstructive pulmonary disease)     GI bleed     Heart murmur     Heterozygous alpha 1-antitrypsin deficiency     Hypertension     Lung disease     copd    MONSERRAT (obstructive sleep apnea)     Pneumonia     Pulmonary edema      Past Surgical History:   Procedure Laterality Date    CARDIAC ELECTROPHYSIOLOGY STUDY AND ABLATION      CARPAL TUNNEL RELEASE      CHOLECYSTECTOMY      HAND SURGERY      HYSTERECTOMY      INSERT / REPLACE / REMOVE PACEMAKER      KNEE ARTHROSCOPY      REPLACEMENT OF PACEMAKER GENERATOR Left 2022    Procedure: REPLACEMENT, PACEMAKER GENERATOR;  Surgeon: Raymond Pérez MD;  Location: [facility] CATH/EP LAB;  Service: Cardiology;  Laterality: Left;     Social History     Tobacco Use    Smoking status: Former     Packs/day: 2.00     Years: 30.00     Pack years: 60.00     Types: Cigarettes     Start date: 1971     Quit date: 2011     Years since quittin.7     Passive exposure: Past    Smokeless tobacco: Never   Substance Use Topics    Alcohol use: Yes     Alcohol/week: 1.0 standard drink      Types: 1 Shots of liquor per week    Drug use: No        Review of Systems:    As noted in HPI in addition      REVIEW OF SYSTEMS  CARDIOVASCULAR: No recent chest pain, palpitations, arm, neck, or jaw pain  RESPIRATORY: No recent fever, cough chills, SOB or congestion  : No blood in the urine  GI: No Nausea, vomiting, constipation, diarrhea, blood, or reflux.  MUSCULOSKELETAL: No myalgias  NEURO: + lightheadedness , no dizziness  EYES: No Double vision, blurry, vision or headache              Objective        Vitals:    11/16/22 1245   BP: 130/80   Pulse: 76       LIPIDS - LAST 2   Lab Results   Component Value Date    CHOL 199 05/10/2022    CHOL 190 02/17/2020    HDL 59 05/10/2022    HDL 68 02/17/2020    LDLCALC 112.0 05/10/2022    LDLCALC 106.0 02/17/2020    TRIG 140 05/10/2022    TRIG 80 02/17/2020    CHOLHDL 29.6 05/10/2022    CHOLHDL 35.8 02/17/2020       CBC - LAST 2  Lab Results   Component Value Date    WBC 8.31 05/10/2022    WBC 8.96 01/19/2022    RBC 4.04 05/10/2022    RBC 3.95 (L) 01/19/2022    HGB 13.1 05/10/2022    HGB 12.7 01/19/2022    HCT 39.2 05/10/2022    HCT 38.1 01/19/2022    MCV 97 05/10/2022    MCV 97 01/19/2022    MCH 32.4 (H) 05/10/2022    MCH 32.2 (H) 01/19/2022    MCHC 33.4 05/10/2022    MCHC 33.3 01/19/2022    RDW 13.8 05/10/2022    RDW 14.0 01/19/2022     05/10/2022     01/19/2022    MPV 8.8 (L) 05/10/2022    MPV 9.5 01/19/2022    GRAN 5.8 05/10/2022    GRAN 69.4 05/10/2022    LYMPH 1.5 05/10/2022    LYMPH 17.6 (L) 05/10/2022    MONO 0.8 05/10/2022    MONO 9.0 05/10/2022    BASO 0.05 05/10/2022    BASO 0.05 01/19/2022    NRBC 0 05/10/2022    NRBC 0 01/19/2022       CHEMISTRY & LIVER FUNCTION - LAST 2  Lab Results   Component Value Date     05/10/2022     01/19/2022    K 4.6 05/10/2022    K 4.4 01/19/2022    CL 99 05/10/2022     01/19/2022    CO2 27 05/10/2022    CO2 25 01/19/2022    ANIONGAP 11 05/10/2022    ANIONGAP 11 01/19/2022    BUN 18 05/10/2022     BUN 23 01/19/2022    CREATININE 0.7 05/10/2022    CREATININE 0.8 01/19/2022     (H) 05/10/2022     01/19/2022    CALCIUM 9.4 05/10/2022    CALCIUM 9.5 01/19/2022    MG 1.9 11/29/2019    MG 2.0 11/28/2019    ALBUMIN 4.4 05/10/2022    ALBUMIN 4.3 02/17/2020    PROT 8.1 05/10/2022    PROT 8.0 02/17/2020    ALKPHOS 72 05/10/2022    ALKPHOS 55 02/17/2020    ALT 14 05/10/2022    ALT 10 02/17/2020    AST 25 05/10/2022    AST 20 02/17/2020    BILITOT 0.6 05/10/2022    BILITOT 0.7 02/17/2020        CARDIAC PROFILE - LAST 2  Lab Results   Component Value Date     (H) 11/25/2019    TROPONINI <0.030 11/25/2019        COAGULATION - LAST 2  Lab Results   Component Value Date    LABPT 13.2 01/19/2022    LABPT 12.6 12/05/2019    INR 1.1 01/19/2022    INR 1.0 12/05/2019    APTT 25.5 01/19/2022    APTT 28.1 12/05/2019       ENDOCRINE & PSA - LAST 2  Lab Results   Component Value Date    HGBA1C 5.2 05/27/2022    HGBA1C 5.2 02/17/2020    TSH 1.040 05/10/2022    TSH 0.630 02/17/2020    PROCAL 0.25 11/27/2019    PROCAL 0.40 11/25/2019        ECHOCARDIOGRAM RESULTS  Results for orders placed during the hospital encounter of 11/25/19    Echo Color Flow Doppler? Yes    Interpretation Summary  · Eccentric left ventricular hypertrophy.  · Mild left ventricular enlargement.  · Low normal left ventricular systolic function. The estimated ejection fraction is 50%  · Grade II (moderate) left ventricular diastolic dysfunction consistent with pseudonormalization.  · No wall motion abnormalities.  · Normal right ventricular systolic function.  · Mild left atrial enlargement.  · Mild right atrial enlargement.  · Moderate aortic valve stenosis.  · Aortic valve area is 0.86 cm2; peak velocity is 3.83 m/s; mean gradient is 33 mmHg.  · Mild mitral regurgitation.  · Mild mitral sclerosis.  · Moderate tricuspid regurgitation.  · Moderate to severe pulmonary hypertension present.  · Intermediate central venous pressure (8 mm Hg).  · The  estimated PA systolic pressure is 50 mm Hg    1) atrial fib  With lbbb  2) moderate aortic steno sis  3) moderate pulmonary hi bp  grade ll diastolic dysfunction  Mean left atrial pressure = 12 mmhg      CURRENT/PREVIOUS VISIT EKG  Results for orders placed or performed during the hospital encounter of 01/20/22   EKG 12-lead    Collection Time: 01/20/22 11:07 AM    Narrative    Test Reason : Z45.010,    Vent. Rate : 060 BPM     Atrial Rate : 066 BPM     P-R Int : 000 ms          QRS Dur : 184 ms      QT Int : 452 ms       P-R-T Axes : 000 -81 085 degrees     QTc Int : 452 ms    Ventricular-paced rhythm  Abnormal ECG  When compared with ECG of 19-JAN-2022 11:20,  Sinus rhythm is no longer with complete heart block  Vent. rate has decreased BY   5 BPM  Confirmed by Shade BOUCHER, Sabino PORTER ([id number]) on 1/23/2022 8:57:39 PM    Referred By:             Confirmed By:Sabino Laguerre MD     No valid procedures specified.   No results found for this or any previous visit.    No valid procedures specified.    PHYSICAL EXAM  CONSTITUTIONAL: Well built, well nourished in no apparent distress  NECK: + bilateral carotid bruit, no JVD  LUNGS: diminished in lower lobes  CHEST WALL: no tenderness  HEART: regular rate and rhythm, loud murmur- no palpable thrill; no click, rub or gallop   ABDOMEN: soft, non-tender; bowel sounds normal; no masses,  no organomegaly  EXTREMITIES: Extremities normal, no edema, no calf tenderness noted  NEURO: AAO X 3    I HAVE REVIEWED :    The vital signs, nurses notes, and all the pertinent radiology and labs.        Current Outpatient Medications   Medication Instructions    acetaminophen (TYLENOL) 325 mg, Oral, Every 6 hours PRN    albuterol (ACCUNEB) 1.25 mg/3 mL Nebu INHALE THE CONTENTS OF 1 VIAL VIA NEBULIZER EVERY 6 HOURS AS NEEDED FOR RESCUE    aspirin (ECOTRIN) 81 mg, Oral, Daily    clopidogreL (PLAVIX) 75 mg, Oral, Daily    digoxin (LANOXIN) 250 mcg, Oral, Daily    diltiaZEM (CARDIZEM CD) 120 mg,  Oral, Daily    fexofenadine (ALLEGRA) 60 mg, Oral, Daily    fluticasone propionate (FLONASE) 50 mcg/actuation nasal spray USE 2 SPRAYS (100 MCG TOTAL) IN EACH NOSTRIL ONCE DAILY.    fluticasone-umeclidin-vilanter (TRELEGY ELLIPTA) 100-62.5-25 mcg DsDv 1 puff, Inhalation, Daily    furosemide (LASIX) 20 mg, Oral, Daily    levoFLOXacin (LEVAQUIN) 500 mg, Oral, Daily    magnesium oxide (MAG-OX) 400 mg, Oral, Daily    metoprolol succinate (TOPROL-XL) 100 mg, Oral, Daily    pantoprazole (PROTONIX) 40 MG tablet TAKE 1 TABLET EVERY DAY    predniSONE (DELTASONE) 10 MG tablet Take 4 tabs x 2 days, then take 3 tabs x 2 days, then take 2 tabs x 2 days, then take 1 tab x 2 days.    roflumilast (DALIRESP) 500 mcg, Oral, Daily    rOPINIRole (REQUIP) 0.5 mg, Oral, Nightly    spironolactone (ALDACTONE) 25 mg, Oral, Daily    TRELEGY ELLIPTA 100-62.5-25 mcg DsDv INHALE 1 PUFF ONE TIME DAILY          Assessment & Plan     Pacemaker battery depletion  Battery was changed in January.  Wound healed over well. No problems with battery since generator change. Phone number given to patient to call for information to access the claudia.     Bilateral carotid bruits  Noted bilateral carotid bruits.  Ordered carotid US.      Nonrheumatic aortic valve stenosis  Loud murmur upon ausculation.  History of moderate aortic stenosis. Last ECHO 2019. Echo ordered today in office.     CAD (coronary artery disease)  BP stable on current regimen. Continue current meds.  No bleeding with anticoagulation. Continue aspirin and plavix.  Eat a heart healthy diet and increase exercise.     S/P placement of cardiac pacemaker  Functioning device in situ          No follow-ups on file.

## 2022-11-16 NOTE — ASSESSMENT & PLAN NOTE
Loud murmur upon ausculation.  History of moderate aortic stenosis. Last ECHO 2019. Echo ordered today in office.

## 2022-11-20 ENCOUNTER — HOSPITAL ENCOUNTER (OUTPATIENT)
Dept: RADIOLOGY | Facility: HOSPITAL | Age: 72
Discharge: HOME | End: 2022-11-20
Payer: MEDICARE

## 2022-11-20 DIAGNOSIS — I25.10 CORONARY ARTERY DISEASE WITHOUT ANGINA PECTORIS, UNSPECIFIED VESSEL OR LESION TYPE, UNSPECIFIED WHETHER NATIVE OR TRANSPLANTED HEART: ICD-10-CM

## 2022-11-20 DIAGNOSIS — Z45.010 PACEMAKER BATTERY DEPLETION: ICD-10-CM

## 2022-11-20 DIAGNOSIS — I48.91 ATRIAL FIBRILLATION, UNSPECIFIED TYPE: ICD-10-CM

## 2022-11-20 PROCEDURE — 93880 EXTRACRANIAL BILAT STUDY: CPT | Mod: TC

## 2022-11-21 ENCOUNTER — HOSPITAL ENCOUNTER (OUTPATIENT)
Dept: CARDIOLOGY | Facility: HOSPITAL | Age: 72
Discharge: HOME | End: 2022-11-21
Payer: MEDICARE

## 2022-11-21 ENCOUNTER — TELEPHONE (OUTPATIENT)
Dept: PULMONOLOGY | Facility: CLINIC | Age: 72
End: 2022-11-21

## 2022-11-21 VITALS — WEIGHT: 195 LBS | HEIGHT: 65 IN | BODY MASS INDEX: 32.49 KG/M2

## 2022-11-21 DIAGNOSIS — Z45.010 PACEMAKER BATTERY DEPLETION: ICD-10-CM

## 2022-11-21 DIAGNOSIS — I48.91 ATRIAL FIBRILLATION, UNSPECIFIED TYPE: ICD-10-CM

## 2022-11-21 DIAGNOSIS — I25.10 CORONARY ARTERY DISEASE WITHOUT ANGINA PECTORIS, UNSPECIFIED VESSEL OR LESION TYPE, UNSPECIFIED WHETHER NATIVE OR TRANSPLANTED HEART: ICD-10-CM

## 2022-11-21 PROCEDURE — 93306 ECHO (CUPID ONLY): ICD-10-PCS | Mod: 26,,, | Performed by: SPECIALIST

## 2022-11-21 PROCEDURE — 93306 TTE W/DOPPLER COMPLETE: CPT | Mod: 26,,, | Performed by: SPECIALIST

## 2022-11-21 PROCEDURE — 93306 TTE W/DOPPLER COMPLETE: CPT

## 2022-11-22 ENCOUNTER — PATIENT MESSAGE (OUTPATIENT)
Dept: CARDIOLOGY | Facility: CLINIC | Age: 72
End: 2022-11-22
Payer: MEDICARE

## 2022-11-22 ENCOUNTER — TELEPHONE (OUTPATIENT)
Dept: CARDIOLOGY | Facility: CLINIC | Age: 72
End: 2022-11-22
Payer: MEDICARE

## 2022-11-22 DIAGNOSIS — I65.29 OCCLUSION AND STENOSIS OF UNSPECIFIED CAROTID ARTERY: Primary | ICD-10-CM

## 2022-11-22 NOTE — TELEPHONE ENCOUNTER
----- Message from Rebeca Hernandez NP sent at 11/22/2022  8:08 AM CST -----  Order CTA of the neck and inform patient please.  Continue aspirin and statin.  Thank you!

## 2022-11-24 LAB
AORTIC ROOT ANNULUS: 3.1 CM
AORTIC VALVE CUSP SEPERATION: 0.9 CM
AV INDEX (PROSTH): 0.27
AV MEAN GRADIENT: 30 MMHG
AV PEAK GRADIENT: 48 MMHG
AV VALVE AREA: 0.84 CM2
AV VELOCITY RATIO: 0.26
BSA FOR ECHO PROCEDURE: 2.01 M2
CV ECHO LV RWT: 0.34 CM
DOP CALC AO PEAK VEL: 3.46 M/S
DOP CALC AO VTI: 75.6 CM
DOP CALC LVOT AREA: 3.1 CM2
DOP CALC LVOT DIAMETER: 2 CM
DOP CALC LVOT PEAK VEL: 0.9 M/S
DOP CALC LVOT STROKE VOLUME: 63.74 CM3
DOP CALCLVOT PEAK VEL VTI: 20.3 CM
E WAVE DECELERATION TIME: 225 MSEC
E/A RATIO: 2.06
E/E' RATIO: 15.57 M/S
ECHO LV POSTERIOR WALL: 0.93 CM (ref 0.6–1.1)
EJECTION FRACTION: 45 %
FRACTIONAL SHORTENING: -77 % (ref 28–44)
INTERVENTRICULAR SEPTUM: 1.02 CM (ref 0.6–1.1)
IVRT: 137 MSEC
LEFT ATRIUM SIZE: 5.3 CM
LEFT INTERNAL DIMENSION IN SYSTOLE: 9.59 CM (ref 2.1–4)
LEFT VENTRICLE DIASTOLIC VOLUME INDEX: 72.96 ML/M2
LEFT VENTRICLE DIASTOLIC VOLUME: 143 ML
LEFT VENTRICLE MASS INDEX: 103 G/M2
LEFT VENTRICLE SYSTOLIC VOLUME INDEX: 48.9 ML/M2
LEFT VENTRICLE SYSTOLIC VOLUME: 95.9 ML
LEFT VENTRICULAR INTERNAL DIMENSION IN DIASTOLE: 5.43 CM (ref 3.5–6)
LEFT VENTRICULAR MASS: 201.81 G
LV LATERAL E/E' RATIO: 13.63 M/S
LV SEPTAL E/E' RATIO: 18.17 M/S
LVOT MG: 2 MMHG
LVOT MV: 0.64 CM/S
MV PEAK A VEL: 0.53 M/S
MV PEAK E VEL: 1.09 M/S
MV STENOSIS PRESSURE HALF TIME: 86 MS
MV VALVE AREA P 1/2 METHOD: 2.56 CM2
PISA TR MAX VEL: 2.91 M/S
RA PRESSURE: 15 MMHG
RIGHT VENTRICULAR END-DIASTOLIC DIMENSION: 2.41 CM
TDI LATERAL: 0.08 M/S
TDI SEPTAL: 0.06 M/S
TDI: 0.07 M/S
TR MAX PG: 34 MMHG
TV REST PULMONARY ARTERY PRESSURE: 49 MMHG

## 2022-11-28 ENCOUNTER — TELEPHONE (OUTPATIENT)
Dept: PULMONOLOGY | Facility: CLINIC | Age: 72
End: 2022-11-28

## 2022-11-28 ENCOUNTER — PATIENT MESSAGE (OUTPATIENT)
Dept: PULMONOLOGY | Facility: CLINIC | Age: 72
End: 2022-11-28

## 2022-11-28 NOTE — TELEPHONE ENCOUNTER
Dr Haro wrote tussionex 115ml rx and I carried it down to [facility] here at the hospital.  Pt was notified.

## 2022-11-29 ENCOUNTER — TELEPHONE (OUTPATIENT)
Dept: CARDIOLOGY | Facility: CLINIC | Age: 72
End: 2022-11-29
Payer: MEDICARE

## 2022-11-29 NOTE — TELEPHONE ENCOUNTER
----- Message from Reza Marcelino sent at 11/29/2022  8:43 AM CST -----  Contact: pt  Type: Needs Medical Advice  Who Called:  pt   Best Call Back Number: [phone]    Additional Information: pt would like to know the results to her first scan. Please advise.

## 2022-11-29 NOTE — TELEPHONE ENCOUNTER
Good afternoon.  This pt. Would like the results of her echo.  How would you like me to proceed.  The results are abnormal.

## 2022-11-30 ENCOUNTER — PATIENT MESSAGE (OUTPATIENT)
Dept: CARDIOLOGY | Facility: CLINIC | Age: 72
End: 2022-11-30
Payer: MEDICARE

## 2022-12-06 ENCOUNTER — TELEPHONE (OUTPATIENT)
Dept: CARDIOLOGY | Facility: CLINIC | Age: 72
End: 2022-12-06
Payer: MEDICARE

## 2022-12-06 ENCOUNTER — HOSPITAL ENCOUNTER (OUTPATIENT)
Dept: RADIOLOGY | Facility: HOSPITAL | Age: 72
Discharge: HOME | End: 2022-12-06
Attending: NURSE PRACTITIONER
Payer: MEDICARE

## 2022-12-06 DIAGNOSIS — I65.29 STENOSIS OF CAROTID ARTERY, UNSPECIFIED LATERALITY: ICD-10-CM

## 2022-12-06 DIAGNOSIS — I65.29 OCCLUSION AND STENOSIS OF UNSPECIFIED CAROTID ARTERY: ICD-10-CM

## 2022-12-06 LAB
CREAT SERPL-MCNC: 0.7 MG/DL (ref 0.5–1.4)
SAMPLE: NORMAL

## 2022-12-06 PROCEDURE — 25500020 PHARM REV CODE 255: Mod: PO | Performed by: NURSE PRACTITIONER

## 2022-12-06 PROCEDURE — 70498 CT ANGIOGRAPHY NECK: CPT | Mod: TC,PO

## 2022-12-06 RX ADMIN — IOHEXOL 100 ML: 350 INJECTION, SOLUTION INTRAVENOUS at 08:12

## 2022-12-07 ENCOUNTER — PATIENT MESSAGE (OUTPATIENT)
Dept: CARDIOLOGY | Facility: CLINIC | Age: 72
End: 2022-12-07
Payer: MEDICARE

## 2022-12-07 DIAGNOSIS — I65.29 STENOSIS OF CAROTID ARTERY, UNSPECIFIED LATERALITY: Primary | ICD-10-CM

## 2022-12-08 ENCOUNTER — TELEPHONE (OUTPATIENT)
Dept: VASCULAR SURGERY | Facility: CLINIC | Age: 72
End: 2022-12-08
Payer: MEDICARE

## 2022-12-08 NOTE — TELEPHONE ENCOUNTER
----- Message from Samantha Maki sent at 12/8/2022  7:30 AM CST -----  Contact: jordana  Type:  Sooner Appointment Request    Caller is requesting an appointment.      Name of Caller:  Jordana, daughter  When is the first available appointment?  N/a  Symptoms:  Stenosis of carotid artery, unspecified laterality  Best Call Back Number:  [phone]  Additional Information:

## 2022-12-14 ENCOUNTER — OFFICE VISIT (OUTPATIENT)
Dept: VASCULAR SURGERY | Facility: CLINIC | Age: 72
End: 2022-12-14
Payer: MEDICARE

## 2022-12-14 VITALS
HEIGHT: 65 IN | DIASTOLIC BLOOD PRESSURE: 73 MMHG | SYSTOLIC BLOOD PRESSURE: 139 MMHG | HEART RATE: 63 BPM | BODY MASS INDEX: 32.45 KG/M2

## 2022-12-14 DIAGNOSIS — I65.22 CAROTID STENOSIS, LEFT: Primary | ICD-10-CM

## 2022-12-14 DIAGNOSIS — R09.89 BILATERAL CAROTID BRUITS: Primary | ICD-10-CM

## 2022-12-14 PROCEDURE — 3078F DIAST BP <80 MM HG: CPT | Mod: CPTII,S$GLB,, | Performed by: THORACIC SURGERY (CARDIOTHORACIC VASCULAR SURGERY)

## 2022-12-14 PROCEDURE — 1101F PR PT FALLS ASSESS DOC 0-1 FALLS W/OUT INJ PAST YR: ICD-10-PCS | Mod: CPTII,S$GLB,, | Performed by: THORACIC SURGERY (CARDIOTHORACIC VASCULAR SURGERY)

## 2022-12-14 PROCEDURE — 3288F FALL RISK ASSESSMENT DOCD: CPT | Mod: CPTII,S$GLB,, | Performed by: THORACIC SURGERY (CARDIOTHORACIC VASCULAR SURGERY)

## 2022-12-14 PROCEDURE — 3078F PR MOST RECENT DIASTOLIC BLOOD PRESSURE < 80 MM HG: ICD-10-PCS | Mod: CPTII,S$GLB,, | Performed by: THORACIC SURGERY (CARDIOTHORACIC VASCULAR SURGERY)

## 2022-12-14 PROCEDURE — 1126F PR PAIN SEVERITY QUANTIFIED, NO PAIN PRESENT: ICD-10-PCS | Mod: CPTII,S$GLB,, | Performed by: THORACIC SURGERY (CARDIOTHORACIC VASCULAR SURGERY)

## 2022-12-14 PROCEDURE — 1159F PR MEDICATION LIST DOCUMENTED IN MEDICAL RECORD: ICD-10-PCS | Mod: CPTII,S$GLB,, | Performed by: THORACIC SURGERY (CARDIOTHORACIC VASCULAR SURGERY)

## 2022-12-14 PROCEDURE — 3075F SYST BP GE 130 - 139MM HG: CPT | Mod: CPTII,S$GLB,, | Performed by: THORACIC SURGERY (CARDIOTHORACIC VASCULAR SURGERY)

## 2022-12-14 PROCEDURE — 3008F BODY MASS INDEX DOCD: CPT | Mod: CPTII,S$GLB,, | Performed by: THORACIC SURGERY (CARDIOTHORACIC VASCULAR SURGERY)

## 2022-12-14 PROCEDURE — 1101F PT FALLS ASSESS-DOCD LE1/YR: CPT | Mod: CPTII,S$GLB,, | Performed by: THORACIC SURGERY (CARDIOTHORACIC VASCULAR SURGERY)

## 2022-12-14 PROCEDURE — 3044F HG A1C LEVEL LT 7.0%: CPT | Mod: CPTII,S$GLB,, | Performed by: THORACIC SURGERY (CARDIOTHORACIC VASCULAR SURGERY)

## 2022-12-14 PROCEDURE — 3288F PR FALLS RISK ASSESSMENT DOCUMENTED: ICD-10-PCS | Mod: CPTII,S$GLB,, | Performed by: THORACIC SURGERY (CARDIOTHORACIC VASCULAR SURGERY)

## 2022-12-14 PROCEDURE — 99204 OFFICE O/P NEW MOD 45 MIN: CPT | Mod: S$GLB,,, | Performed by: THORACIC SURGERY (CARDIOTHORACIC VASCULAR SURGERY)

## 2022-12-14 PROCEDURE — 3008F PR BODY MASS INDEX (BMI) DOCUMENTED: ICD-10-PCS | Mod: CPTII,S$GLB,, | Performed by: THORACIC SURGERY (CARDIOTHORACIC VASCULAR SURGERY)

## 2022-12-14 PROCEDURE — 1126F AMNT PAIN NOTED NONE PRSNT: CPT | Mod: CPTII,S$GLB,, | Performed by: THORACIC SURGERY (CARDIOTHORACIC VASCULAR SURGERY)

## 2022-12-14 PROCEDURE — 3075F PR MOST RECENT SYSTOLIC BLOOD PRESS GE 130-139MM HG: ICD-10-PCS | Mod: CPTII,S$GLB,, | Performed by: THORACIC SURGERY (CARDIOTHORACIC VASCULAR SURGERY)

## 2022-12-14 PROCEDURE — 3044F PR MOST RECENT HEMOGLOBIN A1C LEVEL <7.0%: ICD-10-PCS | Mod: CPTII,S$GLB,, | Performed by: THORACIC SURGERY (CARDIOTHORACIC VASCULAR SURGERY)

## 2022-12-14 PROCEDURE — 99204 PR OFFICE/OUTPT VISIT, NEW, LEVL IV, 45-59 MIN: ICD-10-PCS | Mod: S$GLB,,, | Performed by: THORACIC SURGERY (CARDIOTHORACIC VASCULAR SURGERY)

## 2022-12-14 PROCEDURE — 1159F MED LIST DOCD IN RCRD: CPT | Mod: CPTII,S$GLB,, | Performed by: THORACIC SURGERY (CARDIOTHORACIC VASCULAR SURGERY)

## 2022-12-14 NOTE — PROGRESS NOTES
This patient has a significant left carotid stenosis.  She was referred for left carotid endarterectomy.  She has an occlusion of the right carotid artery and has been asymptomatic.  She does notice that she does get lightheaded and feels somewhat dizzy at times but has had no lateralizing signs or symptoms.    She has chronic COPD.  She has had atrial fibrillation in the past and has undergone ablations and has a pacemaker.  She has been smoker for decades.    Medicines are noted and are part of the epic record.  Her problem list was reviewed.  She does take aspirin and Plavix.    Family history is not pertinent at this time.  Review of systems is significant for shortness of breath with exertion and the need for oxygen at night.    On exam vital signs are stable.  Pupils are equal and round reactive to light.  Neck is supple.  Chest is equal breath sounds.  Heart is in a regular rate and rhythm.  Abdomen is benign.  Perfusion to the legs and feet seems to be satisfactory.    The patient has significant left carotid stenosis.  The right carotid is occluded.  Recommendation is for left carotid endarterectomy.  The risks and potential complications were discussed.  She should discontinue her Plavix 3 days before the procedure.

## 2022-12-14 NOTE — H&P (VIEW-ONLY)
This patient has a significant left carotid stenosis.  She was referred for left carotid endarterectomy.  She has an occlusion of the right carotid artery and has been asymptomatic.  She does notice that she does get lightheaded and feels somewhat dizzy at times but has had no lateralizing signs or symptoms.    She has chronic COPD.  She has had atrial fibrillation in the past and has undergone ablations and has a pacemaker.  She has been smoker for decades.    Medicines are noted and are part of the epic record.  Her problem list was reviewed.  She does take aspirin and Plavix.    Family history is not pertinent at this time.  Review of systems is significant for shortness of breath with exertion and the need for oxygen at night.    On exam vital signs are stable.  Pupils are equal and round reactive to light.  Neck is supple.  Chest is equal breath sounds.  Heart is in a regular rate and rhythm.  Abdomen is benign.  Perfusion to the legs and feet seems to be satisfactory.    The patient has significant left carotid stenosis.  The right carotid is occluded.  Recommendation is for left carotid endarterectomy.  The risks and potential complications were discussed.  She should discontinue her Plavix 3 days before the procedure.    
" Evalaution of susidal ideation"

## 2022-12-15 DIAGNOSIS — I65.22 STENOSIS OF LEFT CAROTID ARTERY: ICD-10-CM

## 2022-12-15 DIAGNOSIS — I65.22 CAROTID STENOSIS, LEFT: Primary | ICD-10-CM

## 2022-12-20 ENCOUNTER — HOSPITAL ENCOUNTER (OUTPATIENT)
Dept: RADIOLOGY | Facility: HOSPITAL | Age: 72
Discharge: HOME | DRG: 038 | End: 2022-12-20
Attending: THORACIC SURGERY (CARDIOTHORACIC VASCULAR SURGERY)
Payer: MEDICARE

## 2022-12-20 ENCOUNTER — HOSPITAL ENCOUNTER (OUTPATIENT)
Dept: PREADMISSION TESTING | Facility: HOSPITAL | Age: 72
Discharge: HOME | DRG: 038 | End: 2022-12-20
Attending: THORACIC SURGERY (CARDIOTHORACIC VASCULAR SURGERY)
Payer: MEDICARE

## 2022-12-20 VITALS
RESPIRATION RATE: 16 BRPM | TEMPERATURE: 98 F | OXYGEN SATURATION: 97 % | HEART RATE: 62 BPM | SYSTOLIC BLOOD PRESSURE: 123 MMHG | HEIGHT: 65 IN | BODY MASS INDEX: 32.32 KG/M2 | WEIGHT: 194 LBS | DIASTOLIC BLOOD PRESSURE: 77 MMHG

## 2022-12-20 DIAGNOSIS — I65.22 CAROTID STENOSIS, LEFT: ICD-10-CM

## 2022-12-20 DIAGNOSIS — Z01.818 PRE-OP TESTING: ICD-10-CM

## 2022-12-20 LAB
ABO + RH BLD: NORMAL
ANION GAP SERPL CALC-SCNC: 10 MMOL/L (ref 8–16)
BASOPHILS # BLD AUTO: 0.04 K/UL (ref 0–0.2)
BASOPHILS NFR BLD: 0.3 % (ref 0–1.9)
BLD GP AB SCN CELLS X3 SERPL QL: NORMAL
BUN SERPL-MCNC: 20 MG/DL (ref 8–23)
CALCIUM SERPL-MCNC: 9.9 MG/DL (ref 8.7–10.5)
CHLORIDE SERPL-SCNC: 98 MMOL/L (ref 95–110)
CO2 SERPL-SCNC: 29 MMOL/L (ref 23–29)
CREAT SERPL-MCNC: 0.6 MG/DL (ref 0.5–1.4)
DIFFERENTIAL METHOD: ABNORMAL
EOSINOPHIL # BLD AUTO: 0.1 K/UL (ref 0–0.5)
EOSINOPHIL NFR BLD: 0.7 % (ref 0–8)
ERYTHROCYTE [DISTWIDTH] IN BLOOD BY AUTOMATED COUNT: 14.4 % (ref 11.5–14.5)
EST. GFR  (NO RACE VARIABLE): >60 ML/MIN/1.73 M^2
GLUCOSE SERPL-MCNC: 100 MG/DL (ref 70–110)
HCT VFR BLD AUTO: 36.2 % (ref 37–48.5)
HGB BLD-MCNC: 11.6 G/DL (ref 12–16)
IMM GRANULOCYTES # BLD AUTO: 0.06 K/UL (ref 0–0.04)
IMM GRANULOCYTES NFR BLD AUTO: 0.5 % (ref 0–0.5)
LYMPHOCYTES # BLD AUTO: 1.3 K/UL (ref 1–4.8)
LYMPHOCYTES NFR BLD: 10.3 % (ref 18–48)
MCH RBC QN AUTO: 31.3 PG (ref 27–31)
MCHC RBC AUTO-ENTMCNC: 32 G/DL (ref 32–36)
MCV RBC AUTO: 98 FL (ref 82–98)
MONOCYTES # BLD AUTO: 0.9 K/UL (ref 0.3–1)
MONOCYTES NFR BLD: 7.1 % (ref 4–15)
NEUTROPHILS # BLD AUTO: 9.9 K/UL (ref 1.8–7.7)
NEUTROPHILS NFR BLD: 81.1 % (ref 38–73)
NRBC BLD-RTO: 0 /100 WBC
PLATELET # BLD AUTO: 302 K/UL (ref 150–450)
PMV BLD AUTO: 9.3 FL (ref 9.2–12.9)
POTASSIUM SERPL-SCNC: 4.3 MMOL/L (ref 3.5–5.1)
RBC # BLD AUTO: 3.71 M/UL (ref 4–5.4)
SODIUM SERPL-SCNC: 137 MMOL/L (ref 136–145)
WBC # BLD AUTO: 12.19 K/UL (ref 3.9–12.7)

## 2022-12-20 PROCEDURE — 86901 BLOOD TYPING SEROLOGIC RH(D): CPT | Performed by: THORACIC SURGERY (CARDIOTHORACIC VASCULAR SURGERY)

## 2022-12-20 PROCEDURE — 93010 ELECTROCARDIOGRAM REPORT: CPT | Mod: ,,, | Performed by: SPECIALIST

## 2022-12-20 PROCEDURE — 85025 COMPLETE CBC W/AUTO DIFF WBC: CPT | Performed by: THORACIC SURGERY (CARDIOTHORACIC VASCULAR SURGERY)

## 2022-12-20 PROCEDURE — 93005 ELECTROCARDIOGRAM TRACING: CPT | Performed by: SPECIALIST

## 2022-12-20 PROCEDURE — 93010 EKG 12-LEAD: ICD-10-PCS | Mod: ,,, | Performed by: SPECIALIST

## 2022-12-20 PROCEDURE — 71046 X-RAY EXAM CHEST 2 VIEWS: CPT | Mod: TC

## 2022-12-20 PROCEDURE — 80048 BASIC METABOLIC PNL TOTAL CA: CPT | Performed by: THORACIC SURGERY (CARDIOTHORACIC VASCULAR SURGERY)

## 2022-12-20 NOTE — DISCHARGE INSTRUCTIONS
To confirm, Your doctor has instructed you that surgery is scheduled for: Thursday December 22, 2022    Pre-Op will call the afternoon prior to surgery between 4:00 and 6:00 PM with the final arrival time.  Wednesday     Please report to Registration at front of the hospital --it is [facility]    Do not eat or drink anything after midnight the night before your surgery - THIS INCLUDES  WATER, GUM, MINTS AND CANDY.    YOU MAY BRUSH YOUR TEETH     TAKE APPROVED  MEDICATIONS WITH A SMALL SIP OF WATER THE MORNING OF YOUR PROCEDURE: See Medication list    PLEASE NOTE:  The surgery schedule has many variables which may affect the time of your surgery case.  Family members should be available if your surgery time changes.  procedure between 4-6 hours.    DO NOT TAKE THESE MEDICATIONS 5-7 DAYS PRIOR to your procedure or per your surgeon's request: ASPIRIN, ALEVE, ADVIL, IBUPROFEN,  SHAMA SELTZER, BC , FISH OIL , VITAMIN E, HERBALS  (May take Tylenol)    ONLY if you are prescribed any types of blood thinners such as:  Aspirin, Coumadin, Plavix, Pradaxa, Xarelto, Aggrenox, Effient, Eliquis, Savasya, Brilinta, or any other, ask your surgeon whether you should stop taking them and how long before surgery you should stop.  You may also need to verify with the prescribing physician if it is ok to stop your medication.                                                     IMPORTANT INSTRUCTIONS    Do not smoke, vape or drink alcoholic beverages 24 hours prior to your procedure.  Shower the night before AND the morning of your procedure with a Chlorhexidine wash such as Hibiclens or Dial antibacterial soap from the neck down.    Do not get it on your face or in your eyes.  You may use your own shampoo and face wash. This helps your skin to be as bacteria free as possible.   Do not apply any deodorant, lotion or powder after you shower.   DO NOT remove hair from the surgery site.  Do not shave the incision  site unless you are given specific instructions to do so.    Sleep in a bed with clean sheets.  Do not sleep with a pet in the bed.   If you wear contact lenses, dentures, hearing aids or glasses, bring a container to put them in during surgery and give to a family member for safe keeping.    Please leave all jewelry, piercing's and valuables at home.   If your doctor has scheduled you for an overnight stay, bring a small overnight bag with any personal items you need.  Wear comfortable clothing that is easy to remove and place back on after surgery.     If you have any questions about these instructions, call Pre-Op Admit  Nursing at [phone] , Pre-Op Day Surgery Unit at [phone]

## 2022-12-20 NOTE — PRE ADMISSION SCREENING
Preadmit assessment complete. Review of pt health history and home medications. Questions answered. Pt given list of meds to take am of procedure. Pt voiced understanding.

## 2022-12-22 ENCOUNTER — HOSPITAL ENCOUNTER (INPATIENT)
Facility: HOSPITAL | Age: 72
LOS: 1 days | Discharge: HOME | DRG: 038 | End: 2022-12-23
Attending: THORACIC SURGERY (CARDIOTHORACIC VASCULAR SURGERY) | Admitting: THORACIC SURGERY (CARDIOTHORACIC VASCULAR SURGERY)
Payer: MEDICARE

## 2022-12-22 ENCOUNTER — ANESTHESIA EVENT (OUTPATIENT)
Dept: SURGERY | Facility: HOSPITAL | Age: 72
DRG: 038 | End: 2022-12-22
Payer: MEDICARE

## 2022-12-22 ENCOUNTER — ANESTHESIA (OUTPATIENT)
Dept: SURGERY | Facility: HOSPITAL | Age: 72
DRG: 038 | End: 2022-12-22
Payer: MEDICARE

## 2022-12-22 DIAGNOSIS — I65.22 STENOSIS OF LEFT CAROTID ARTERY: ICD-10-CM

## 2022-12-22 DIAGNOSIS — I65.22 CAROTID STENOSIS, LEFT: ICD-10-CM

## 2022-12-22 LAB
HCT VFR BLD AUTO: 32.7 % (ref 37–48.5)
POC ACTIVATED CLOTTING TIME K: 137 SEC (ref 74–137)
SAMPLE: NORMAL

## 2022-12-22 PROCEDURE — C1729 CATH, DRAINAGE: HCPCS | Performed by: THORACIC SURGERY (CARDIOTHORACIC VASCULAR SURGERY)

## 2022-12-22 PROCEDURE — 85014 HEMATOCRIT: CPT | Performed by: THORACIC SURGERY (CARDIOTHORACIC VASCULAR SURGERY)

## 2022-12-22 PROCEDURE — 27800903 OPTIME MED/SURG SUP & DEVICES OTHER IMPLANTS: Performed by: THORACIC SURGERY (CARDIOTHORACIC VASCULAR SURGERY)

## 2022-12-22 PROCEDURE — D9220A PRA ANESTHESIA: Mod: ANES,,, | Performed by: ANESTHESIOLOGY

## 2022-12-22 PROCEDURE — 36000707: Performed by: THORACIC SURGERY (CARDIOTHORACIC VASCULAR SURGERY)

## 2022-12-22 PROCEDURE — 25000003 PHARM REV CODE 250: Performed by: THORACIC SURGERY (CARDIOTHORACIC VASCULAR SURGERY)

## 2022-12-22 PROCEDURE — D9220A PRA ANESTHESIA: Mod: CRNA,,, | Performed by: NURSE ANESTHETIST, CERTIFIED REGISTERED

## 2022-12-22 PROCEDURE — 63600175 PHARM REV CODE 636 W HCPCS: Performed by: ANESTHESIOLOGY

## 2022-12-22 PROCEDURE — 37000008 HC ANESTHESIA 1ST 15 MINUTES: Performed by: THORACIC SURGERY (CARDIOTHORACIC VASCULAR SURGERY)

## 2022-12-22 PROCEDURE — 25000003 PHARM REV CODE 250: Performed by: ANESTHESIOLOGY

## 2022-12-22 PROCEDURE — 63600175 PHARM REV CODE 636 W HCPCS: Performed by: NURSE ANESTHETIST, CERTIFIED REGISTERED

## 2022-12-22 PROCEDURE — 88311 DECALCIFY TISSUE: CPT | Mod: TC

## 2022-12-22 PROCEDURE — D9220A PRA ANESTHESIA: ICD-10-PCS | Mod: CRNA,,, | Performed by: NURSE ANESTHETIST, CERTIFIED REGISTERED

## 2022-12-22 PROCEDURE — 36415 COLL VENOUS BLD VENIPUNCTURE: CPT | Performed by: THORACIC SURGERY (CARDIOTHORACIC VASCULAR SURGERY)

## 2022-12-22 PROCEDURE — 20000000 HC ICU ROOM

## 2022-12-22 PROCEDURE — 35301 RECHANNELING OF ARTERY: CPT | Mod: LT,,, | Performed by: THORACIC SURGERY (CARDIOTHORACIC VASCULAR SURGERY)

## 2022-12-22 PROCEDURE — 36620 INSERTION CATHETER ARTERY: CPT | Performed by: ANESTHESIOLOGY

## 2022-12-22 PROCEDURE — 36000706: Performed by: THORACIC SURGERY (CARDIOTHORACIC VASCULAR SURGERY)

## 2022-12-22 PROCEDURE — 35301 PR THROMBOENDARTECTMY NECK,NECK INCIS: ICD-10-PCS | Mod: LT,,, | Performed by: THORACIC SURGERY (CARDIOTHORACIC VASCULAR SURGERY)

## 2022-12-22 PROCEDURE — 37000009 HC ANESTHESIA EA ADD 15 MINS: Performed by: THORACIC SURGERY (CARDIOTHORACIC VASCULAR SURGERY)

## 2022-12-22 PROCEDURE — 27201423 OPTIME MED/SURG SUP & DEVICES STERILE SUPPLY: Performed by: THORACIC SURGERY (CARDIOTHORACIC VASCULAR SURGERY)

## 2022-12-22 PROCEDURE — D9220A PRA ANESTHESIA: ICD-10-PCS | Mod: ANES,,, | Performed by: ANESTHESIOLOGY

## 2022-12-22 PROCEDURE — 63600175 PHARM REV CODE 636 W HCPCS: Performed by: THORACIC SURGERY (CARDIOTHORACIC VASCULAR SURGERY)

## 2022-12-22 PROCEDURE — 25000003 PHARM REV CODE 250: Performed by: NURSE ANESTHETIST, CERTIFIED REGISTERED

## 2022-12-22 PROCEDURE — C1763 CONN TISS, NON-HUMAN: HCPCS | Performed by: THORACIC SURGERY (CARDIOTHORACIC VASCULAR SURGERY)

## 2022-12-22 DEVICE — PATCH VASCULAR .8CMX8CM: Type: IMPLANTABLE DEVICE | Site: CAROTID | Status: FUNCTIONAL

## 2022-12-22 RX ORDER — METOPROLOL SUCCINATE 50 MG/1
100 TABLET, EXTENDED RELEASE ORAL DAILY
Status: DISCONTINUED | OUTPATIENT
Start: 2022-12-22 | End: 2022-12-23 | Stop reason: HOSPADM

## 2022-12-22 RX ORDER — DEXMEDETOMIDINE HYDROCHLORIDE 100 UG/ML
INJECTION, SOLUTION INTRAVENOUS
Status: DISCONTINUED | OUTPATIENT
Start: 2022-12-22 | End: 2022-12-22

## 2022-12-22 RX ORDER — ONDANSETRON 2 MG/ML
4 INJECTION INTRAMUSCULAR; INTRAVENOUS EVERY 12 HOURS PRN
Status: DISCONTINUED | OUTPATIENT
Start: 2022-12-22 | End: 2022-12-23 | Stop reason: HOSPADM

## 2022-12-22 RX ORDER — MIDAZOLAM HYDROCHLORIDE 1 MG/ML
INJECTION, SOLUTION INTRAMUSCULAR; INTRAVENOUS
Status: DISCONTINUED | OUTPATIENT
Start: 2022-12-22 | End: 2022-12-22

## 2022-12-22 RX ORDER — HEPARIN SODIUM 10000 [USP'U]/ML
INJECTION, SOLUTION INTRAVENOUS; SUBCUTANEOUS
Status: DISCONTINUED | OUTPATIENT
Start: 2022-12-22 | End: 2022-12-22

## 2022-12-22 RX ORDER — DEXAMETHASONE SODIUM PHOSPHATE 4 MG/ML
INJECTION, SOLUTION INTRA-ARTICULAR; INTRALESIONAL; INTRAMUSCULAR; INTRAVENOUS; SOFT TISSUE
Status: DISCONTINUED | OUTPATIENT
Start: 2022-12-22 | End: 2022-12-22

## 2022-12-22 RX ORDER — GABAPENTIN 100 MG/1
200 CAPSULE ORAL
Status: COMPLETED | OUTPATIENT
Start: 2022-12-22 | End: 2022-12-22

## 2022-12-22 RX ORDER — SODIUM CHLORIDE 9 MG/ML
INJECTION, SOLUTION INTRAVENOUS CONTINUOUS
Status: DISCONTINUED | OUTPATIENT
Start: 2022-12-22 | End: 2022-12-23 | Stop reason: HOSPADM

## 2022-12-22 RX ORDER — ROCURONIUM BROMIDE 10 MG/ML
INJECTION, SOLUTION INTRAVENOUS
Status: DISCONTINUED | OUTPATIENT
Start: 2022-12-22 | End: 2022-12-22

## 2022-12-22 RX ORDER — DIGOXIN 125 MCG
250 TABLET ORAL DAILY
Status: DISCONTINUED | OUTPATIENT
Start: 2022-12-22 | End: 2022-12-23 | Stop reason: HOSPADM

## 2022-12-22 RX ORDER — FENTANYL CITRATE 50 UG/ML
INJECTION, SOLUTION INTRAMUSCULAR; INTRAVENOUS
Status: DISCONTINUED | OUTPATIENT
Start: 2022-12-22 | End: 2022-12-22

## 2022-12-22 RX ORDER — LIDOCAINE HYDROCHLORIDE 40 MG/ML
SOLUTION TOPICAL
Status: DISCONTINUED | OUTPATIENT
Start: 2022-12-22 | End: 2022-12-22

## 2022-12-22 RX ORDER — DILTIAZEM HYDROCHLORIDE 120 MG/1
120 CAPSULE, COATED, EXTENDED RELEASE ORAL DAILY
Status: DISCONTINUED | OUTPATIENT
Start: 2022-12-22 | End: 2022-12-23 | Stop reason: HOSPADM

## 2022-12-22 RX ORDER — ONDANSETRON 2 MG/ML
4 INJECTION INTRAMUSCULAR; INTRAVENOUS DAILY PRN
Status: DISCONTINUED | OUTPATIENT
Start: 2022-12-22 | End: 2022-12-22

## 2022-12-22 RX ORDER — MUPIROCIN 20 MG/G
1 OINTMENT TOPICAL 2 TIMES DAILY
Status: DISCONTINUED | OUTPATIENT
Start: 2022-12-22 | End: 2022-12-23 | Stop reason: HOSPADM

## 2022-12-22 RX ORDER — LIDOCAINE HYDROCHLORIDE 10 MG/ML
INJECTION, SOLUTION INTRAVENOUS
Status: DISCONTINUED | OUTPATIENT
Start: 2022-12-22 | End: 2022-12-22

## 2022-12-22 RX ORDER — FAMOTIDINE 10 MG/ML
INJECTION INTRAVENOUS
Status: DISCONTINUED | OUTPATIENT
Start: 2022-12-22 | End: 2022-12-22

## 2022-12-22 RX ORDER — ONDANSETRON 2 MG/ML
INJECTION INTRAMUSCULAR; INTRAVENOUS
Status: DISCONTINUED | OUTPATIENT
Start: 2022-12-22 | End: 2022-12-22

## 2022-12-22 RX ORDER — CEFAZOLIN SODIUM 1 G/3ML
INJECTION, POWDER, FOR SOLUTION INTRAMUSCULAR; INTRAVENOUS
Status: DISCONTINUED | OUTPATIENT
Start: 2022-12-22 | End: 2022-12-22 | Stop reason: HOSPADM

## 2022-12-22 RX ORDER — CEFAZOLIN SODIUM 2 G/50ML
2 SOLUTION INTRAVENOUS
Status: COMPLETED | OUTPATIENT
Start: 2022-12-22 | End: 2022-12-22

## 2022-12-22 RX ORDER — DIPHENHYDRAMINE HYDROCHLORIDE 50 MG/ML
12.5 INJECTION INTRAMUSCULAR; INTRAVENOUS ONCE AS NEEDED
Status: DISCONTINUED | OUTPATIENT
Start: 2022-12-22 | End: 2022-12-22

## 2022-12-22 RX ORDER — SCOLOPAMINE TRANSDERMAL SYSTEM 1 MG/1
1 PATCH, EXTENDED RELEASE TRANSDERMAL
Status: DISCONTINUED | OUTPATIENT
Start: 2022-12-22 | End: 2022-12-23 | Stop reason: HOSPADM

## 2022-12-22 RX ORDER — PHENYLEPHRINE HYDROCHLORIDE 10 MG/ML
INJECTION INTRAVENOUS CONTINUOUS PRN
Status: DISCONTINUED | OUTPATIENT
Start: 2022-12-22 | End: 2022-12-22

## 2022-12-22 RX ORDER — PANTOPRAZOLE SODIUM 40 MG/1
40 TABLET, DELAYED RELEASE ORAL
Status: DISCONTINUED | OUTPATIENT
Start: 2022-12-23 | End: 2022-12-23 | Stop reason: HOSPADM

## 2022-12-22 RX ORDER — MORPHINE SULFATE 2 MG/ML
2 INJECTION, SOLUTION INTRAMUSCULAR; INTRAVENOUS
Status: DISCONTINUED | OUTPATIENT
Start: 2022-12-22 | End: 2022-12-23 | Stop reason: HOSPADM

## 2022-12-22 RX ORDER — FUROSEMIDE 20 MG/1
20 TABLET ORAL DAILY
Status: DISCONTINUED | OUTPATIENT
Start: 2022-12-22 | End: 2022-12-23 | Stop reason: HOSPADM

## 2022-12-22 RX ORDER — CEFAZOLIN SODIUM 2 G/50ML
2 SOLUTION INTRAVENOUS
Status: COMPLETED | OUTPATIENT
Start: 2022-12-22 | End: 2022-12-23

## 2022-12-22 RX ORDER — OXYCODONE HYDROCHLORIDE 5 MG/1
5 TABLET ORAL
Status: DISCONTINUED | OUTPATIENT
Start: 2022-12-22 | End: 2022-12-22

## 2022-12-22 RX ORDER — ASPIRIN 81 MG/1
81 TABLET ORAL DAILY
Status: DISCONTINUED | OUTPATIENT
Start: 2022-12-22 | End: 2022-12-23 | Stop reason: HOSPADM

## 2022-12-22 RX ORDER — PROTAMINE SULFATE 10 MG/ML
INJECTION, SOLUTION INTRAVENOUS
Status: DISCONTINUED | OUTPATIENT
Start: 2022-12-22 | End: 2022-12-22

## 2022-12-22 RX ORDER — HEPARIN SODIUM 5000 [USP'U]/ML
INJECTION, SOLUTION INTRAVENOUS; SUBCUTANEOUS
Status: DISCONTINUED | OUTPATIENT
Start: 2022-12-22 | End: 2022-12-22 | Stop reason: HOSPADM

## 2022-12-22 RX ORDER — MORPHINE SULFATE 2 MG/ML
2 INJECTION, SOLUTION INTRAMUSCULAR; INTRAVENOUS
Status: DISCONTINUED | OUTPATIENT
Start: 2022-12-22 | End: 2022-12-22

## 2022-12-22 RX ORDER — ETOMIDATE 2 MG/ML
INJECTION INTRAVENOUS
Status: DISCONTINUED | OUTPATIENT
Start: 2022-12-22 | End: 2022-12-22

## 2022-12-22 RX ORDER — LIDOCAINE HYDROCHLORIDE 10 MG/ML
INJECTION INFILTRATION; PERINEURAL
Status: DISCONTINUED | OUTPATIENT
Start: 2022-12-22 | End: 2022-12-22 | Stop reason: HOSPADM

## 2022-12-22 RX ORDER — HYDROCODONE BITARTRATE AND ACETAMINOPHEN 5; 325 MG/1; MG/1
1 TABLET ORAL EVERY 4 HOURS PRN
Status: DISCONTINUED | OUTPATIENT
Start: 2022-12-22 | End: 2022-12-23 | Stop reason: HOSPADM

## 2022-12-22 RX ADMIN — ROCURONIUM BROMIDE 20 MG: 10 INJECTION, SOLUTION INTRAVENOUS at 01:12

## 2022-12-22 RX ADMIN — ASPIRIN 81 MG: 81 TABLET, DELAYED RELEASE ORAL at 03:12

## 2022-12-22 RX ADMIN — HYDROCODONE BITARTRATE AND ACETAMINOPHEN 1 TABLET: 5; 325 TABLET ORAL at 10:12

## 2022-12-22 RX ADMIN — SODIUM CHLORIDE: 0.9 INJECTION, SOLUTION INTRAVENOUS at 01:12

## 2022-12-22 RX ADMIN — OXYCODONE HYDROCHLORIDE 5 MG: 5 TABLET ORAL at 03:12

## 2022-12-22 RX ADMIN — ONDANSETRON 4 MG: 2 INJECTION INTRAMUSCULAR; INTRAVENOUS at 01:12

## 2022-12-22 RX ADMIN — PHENYLEPHRINE HYDROCHLORIDE 0.05 MCG/KG/MIN: 10 INJECTION INTRAVENOUS at 01:12

## 2022-12-22 RX ADMIN — DEXAMETHASONE SODIUM PHOSPHATE 8 MG: 4 INJECTION, SOLUTION INTRAMUSCULAR; INTRAVENOUS at 01:12

## 2022-12-22 RX ADMIN — FENTANYL CITRATE 50 MCG: 50 INJECTION INTRAMUSCULAR; INTRAVENOUS at 01:12

## 2022-12-22 RX ADMIN — GABAPENTIN 200 MG: 100 CAPSULE ORAL at 11:12

## 2022-12-22 RX ADMIN — FAMOTIDINE 20 MG: 10 INJECTION, SOLUTION INTRAVENOUS at 01:12

## 2022-12-22 RX ADMIN — PROTAMINE SULFATE 30 MG: 10 INJECTION, SOLUTION INTRAVENOUS at 02:12

## 2022-12-22 RX ADMIN — SCOPOLAMINE 1 PATCH: 1 PATCH TRANSDERMAL at 11:12

## 2022-12-22 RX ADMIN — CEFAZOLIN SODIUM 2 G: 2 SOLUTION INTRAVENOUS at 01:12

## 2022-12-22 RX ADMIN — MIDAZOLAM 2 MG: 1 INJECTION INTRAMUSCULAR; INTRAVENOUS at 01:12

## 2022-12-22 RX ADMIN — SODIUM CHLORIDE: 0.9 INJECTION, SOLUTION INTRAVENOUS at 03:12

## 2022-12-22 RX ADMIN — DIGOXIN 250 MCG: 125 TABLET ORAL at 03:12

## 2022-12-22 RX ADMIN — ROCURONIUM BROMIDE 10 MG: 10 INJECTION, SOLUTION INTRAVENOUS at 02:12

## 2022-12-22 RX ADMIN — SODIUM CHLORIDE, SODIUM LACTATE, POTASSIUM CHLORIDE, AND CALCIUM CHLORIDE: .6; .31; .03; .02 INJECTION, SOLUTION INTRAVENOUS at 01:12

## 2022-12-22 RX ADMIN — MUPIROCIN 1 G: 20 OINTMENT TOPICAL at 09:12

## 2022-12-22 RX ADMIN — LIDOCAINE HYDROCHLORIDE 50 MG: 10 INJECTION, SOLUTION INTRAVENOUS at 01:12

## 2022-12-22 RX ADMIN — LIDOCAINE HYDROCHLORIDE 1 MG: 40 SOLUTION TOPICAL at 01:12

## 2022-12-22 RX ADMIN — HEPARIN SODIUM 3000 UNITS: 10000 INJECTION, SOLUTION INTRAVENOUS; SUBCUTANEOUS at 02:12

## 2022-12-22 RX ADMIN — MORPHINE SULFATE 2 MG: 2 INJECTION, SOLUTION INTRAMUSCULAR; INTRAVENOUS at 03:12

## 2022-12-22 RX ADMIN — ETOMIDATE 12 MG: 2 INJECTION, SOLUTION INTRAVENOUS at 01:12

## 2022-12-22 RX ADMIN — DEXTROSE 2 G: 50 INJECTION, SOLUTION INTRAVENOUS at 10:12

## 2022-12-22 RX ADMIN — SUGAMMADEX 200 MG: 100 INJECTION, SOLUTION INTRAVENOUS at 02:12

## 2022-12-22 RX ADMIN — HYDROCODONE BITARTRATE AND ACETAMINOPHEN 1 TABLET: 5; 325 TABLET ORAL at 05:12

## 2022-12-22 RX ADMIN — DEXMEDETOMIDINE HYDROCHLORIDE 12 MCG: 100 INJECTION, SOLUTION INTRAVENOUS at 01:12

## 2022-12-22 RX ADMIN — HEPARIN SODIUM 5000 UNITS: 10000 INJECTION, SOLUTION INTRAVENOUS; SUBCUTANEOUS at 01:12

## 2022-12-22 RX ADMIN — ROCURONIUM BROMIDE 10 MG: 10 INJECTION, SOLUTION INTRAVENOUS at 01:12

## 2022-12-22 NOTE — ANESTHESIA PREPROCEDURE EVALUATION
12/22/2022  Lisa Smith is a 72 y.o., female.      Patient Active Problem List   Diagnosis    Chronic cough    Hypoxemia    Heterozygous alpha 1-antitrypsin deficiency    COPD exacerbation    Chronic hypoxemic respiratory failure    COPD (chronic obstructive pulmonary disease)    Acute on chronic respiratory failure with hypoxia    Multifocal pneumonia    CAD (coronary artery disease)    Nonrheumatic aortic valve stenosis    S/P placement of cardiac pacemaker    Pneumonia    Hyponatremia    Hypoxia    S/P ablation of atrial fibrillation    Pacemaker battery depletion    Restless legs    Bilateral carotid bruits       Past Surgical History:   Procedure Laterality Date    CARDIAC ELECTROPHYSIOLOGY STUDY AND ABLATION      CARPAL TUNNEL RELEASE Left     CHOLECYSTECTOMY      EYE SURGERY Bilateral     cataract    HAND SURGERY      HYSTERECTOMY      INSERT / REPLACE / REMOVE PACEMAKER      KNEE ARTHROSCOPY Left     REPLACEMENT OF PACEMAKER GENERATOR Left 01/20/2022    Procedure: REPLACEMENT, PACEMAKER GENERATOR;  Surgeon: Raymond Pérez MD;  Location: [facility] CATH/EP LAB;  Service: Cardiology;  Laterality: Left;        Tobacco Use:  The patient  reports that she quit smoking about 11 years ago. Her smoking use included cigarettes. She started smoking about 51 years ago. She has a 60.00 pack-year smoking history. She has been exposed to tobacco smoke. She has never used smokeless tobacco.     Results for orders placed or performed during the hospital encounter of 12/20/22   EKG 12-LEAD    Collection Time: 12/20/22 12:55 PM    Narrative    Test Reason : I65.22,    Vent. Rate : 061 BPM     Atrial Rate : 074 BPM     P-R Int : 000 ms          QRS Dur : 182 ms      QT Int : 436 ms       P-R-T Axes : 000 -82 088 degrees     QTc Int : 438 ms    Ventricular-paced rhythm  Abnormal ECG  When  compared with ECG of 20-JAN-2022 11:07,  No significant change was found    Referred By:             Confirmed By:              Lab Results   Component Value Date    WBC 12.19 12/20/2022    HGB 11.6 (L) 12/20/2022    HCT 36.2 (L) 12/20/2022    MCV 98 12/20/2022     12/20/2022     BMP  Lab Results   Component Value Date     12/20/2022    K 4.3 12/20/2022    CL 98 12/20/2022    CO2 29 12/20/2022    BUN 20 12/20/2022    CREATININE 0.6 12/20/2022    CALCIUM 9.9 12/20/2022    ANIONGAP 10 12/20/2022     12/20/2022     (H) 05/10/2022     01/19/2022       Results for orders placed during the hospital encounter of 11/21/22    Echo    Interpretation Summary  · The left ventricle is normal in size with concentric remodeling and mildly decreased systolic function.  · The estimated ejection fraction is 45%.  · Grade III left ventricular diastolic dysfunction.  · There are segmental left ventricular wall motion abnormalities.  · There is abnormal septal wall motion consistent with right ventricular pacemaker.  · Normal right ventricular size with normal right ventricular systolic function.  · Mild left atrial enlargement.  · There is severe aortic valve stenosis.  · Aortic valve area is 0.84 cm2; peak velocity is 3.46 m/s; mean gradient is 30 mmHg.  · Elevated central venous pressure (15 mmHg).  · The estimated PA systolic pressure is 49 mmHg.    S suggestion of old small anterior apical infarction  Ejection fraction pressed 45%  Pulmonary hypertension  Mean left atrial pressure 15  Of paced rhythm        Pre-op Assessment    I have reviewed the Patient Summary Reports.     I have reviewed the Nursing Notes. I have reviewed the NPO Status.   I have reviewed the Medications.     Review of Systems  Anesthesia Hx:  No problems with previous Anesthesia  Denies Family Hx of Anesthesia complications.   Denies Personal Hx of Anesthesia complications.   Social:  Former Smoker, No Alcohol Use     Hematology/Oncology:         -- Anemia: Hematology Comments: Plavix therapy last dose 12/18/2022   EENT/Dental:   Eyes: Visual Impairment Has Bilateral and S/P Extraction - Bilateral Catarract   Cardiovascular:   Pacemaker Hypertension Valvular problems/Murmurs, AS CAD asymptomatic Dysrhythmias atrial fibrillation PVD ECG has been reviewed. There is severe aortic valve stenosis.    Aortic valve area is 0.84 cm2    S/P ablation of atrial fibrillation    [employer] PM    Patient followed by Dr. Pérez last seen 1 month ago    Pulmonary:   COPD Asthma Sleep Apnea History of home oxygen therapy 3 L / NC     History of Acute on chronic respiratory failure with hypoxia    Chronic cough    Hypoxemia    Patient with daily maintenance inhaler use     Patient denies rescue inhaler use     Previously hospitalized 2011 for exacerbation     MONSERRAT resolved with 130 lb wt. loss    Patient followed by Dr. Haro seen 3 months ago    Education provided regarding risk of obstructive sleep apnea     Hepatic/GI:   GERD, well controlled Liver Disease, History of GI bleed    Musculoskeletal:   Arthritis     Neurological:   Neuromuscular Disease, Bell's palsy - resolved  Movement Disorder Dx, Restless Leg Syndrome Neuromuscular Disease   Endocrine:  Obesity / BMI > 30      Physical Exam  General: Well nourished, Cooperative, Alert and Oriented    Airway:  Mallampati: II   Mouth Opening: Normal  TM Distance: Normal  Tongue: Normal  Neck ROM: Normal ROM    Dental:  Dentures    Chest/Lungs:  Clear to auscultation, Normal Respiratory Rate    Heart:  Rate: Normal  Rhythm: Regular Rhythm  Murmur: Systolic;        Anesthesia Plan  Type of Anesthesia, risks & benefits discussed:    Anesthesia Type: Gen ETT  Intra-op Monitoring Plan: Standard ASA Monitors and Art Line  Post Op Pain Control Plan: multimodal analgesia and IV/PO Opioids PRN  Induction:  IV  Airway Plan: Direct and Video, Post-Induction  Informed Consent: Informed consent signed with  the Patient and all parties understand the risks and agree with anesthesia plan.  All questions answered. Patient consented to blood products? Yes  ASA Score: 4  Anesthesia Plan Notes:       No Ofirmev  GETA  LTA  Left Radial Artery Catheter   Minimal Fentanyl & Versed    Slow Etomidate Induction   Esmolol available  Decadron 8 mg, iv Zofran 4 mg iv, Pepcid 20 mg iv, Scopolamine Patch   Neurontin 200 mg po, Precedex,  Sugammadex     Ready For Surgery From Anesthesia Perspective.     .

## 2022-12-22 NOTE — ANESTHESIA PROCEDURE NOTES
Arterial    Diagnosis: Left carotid artery stenosis    Patient location during procedure: holding area  Procedure start time: 12/22/2022 11:52 AM  Timeout: 12/22/2022 11:43 AM  Procedure end time: 12/22/2022 11:55 AM    Staffing  Authorizing Provider: Harvey Hooper MD  Performing Provider: Harvey Hooper MD    Anesthesiologist was present at the time of the procedure.    Preanesthetic Checklist  Completed: patient identified, IV checked, site marked, risks and benefits discussed, surgical consent, monitors and equipment checked, pre-op evaluation, timeout performed and anesthesia consent givenArterial  Skin Prep: chlorhexidine gluconate and isopropyl alcohol  Local Infiltration: lidocaine  Orientation: left  Location: radial    Catheter Size: 20 G  Catheter placement by Ultrasound guidance. Heme positive aspiration all ports.   Vessel Caliber: medium, patent, compressibility normal  Needle advanced into vessel with real time Ultrasound guidance.  Guidewire confirmed in vessel.  Sterile sheath used.Insertion Attempts: 1  Assessment  Dressing: secured with tape and tegaderm, secured with tape, sutured in place and taped, tegaderm and steri-strips  Patient: Tolerated well

## 2022-12-22 NOTE — OP NOTE
This is Dr. Connolly dictating with date of service being 22nd December 2022.    Preoperative diagnosis:  High-grade left carotid stenosis.    Postoperative diagnosis:  Same.    Operation:  Left carotid endarterectomy with bovine patch angioplasty over an indwelling temporary shunt.    Operation in detail:  The patient was prepped and draped in usual sterile fashion.  A left-sided neck incision was made anterior to the sternocleidomastoid muscle and carried down to and through the subcutaneous tissue and platysma muscle with the cautery.  Self-retaining retractors were placed to maintain exposure.    The carotid sheath was incised and the common carotid artery encircled at the base of the neck with a vessel loop.  It was followed distally to its bifurcation into the external and internal carotid arteries both of which were carefully controlled with vessel loops.    The patient was heparinized and after adequate circulation of the heparin the vessels were secured with loops and clamps.  The common carotid artery was incised and opened and carried across the bifurcation onto the internal carotid artery distally.    An appropriately-sized shunt was inserted to reestablish antegrade cerebral flow.    Extensive thromboendarterectomy was performed of the common and internal carotid arteries with eversion endarterectomy of the external carotid artery.  The endarterectomized segment was carefully debrided.    Tacking stitches of 7 0 Prolene were used to tack the distal internal carotid artery intima to the endarterectomized segment below.  These interrupted posterior sutures were tied on the outside of the vessel.  Bovine patch angioplasty was then applied with running 5 0 HS 7 suture.  Once this was nearly complete the shunt was withdrawn.    The site was thoroughly de-aired and then forward flow established 1st into the external and then internal carotid arteries.  Where necessary the suture line was reinforced with  interrupted 7 0 Prolene suture.    Once hemostasis was satisfactory a GIOVANNI drain was inserted through separate stab wound and secured to skin with 2-0 silk suture.  A small amount of Tisseel coagulant was placed over the patch angioplasty.    Closure was then undertaken of the platysma muscle and subcutaneous tissue with running 2 0 Monocryl stitch and the skin was closed with running 3 0 subcuticular Monocryl stitch.    Overall patient tolerated the procedure well and there appeared to be no major obvious intraoperative complications.  Estimated blood loss was 100 cc.  The patient was brought to the ICU in satisfactory condition.

## 2022-12-22 NOTE — ANESTHESIA PROCEDURE NOTES
Intubation    Date/Time: 12/22/2022 1:30 PM  Performed by: Elida Senior CRNA  Authorized by: Elida Senior CRNA     Intubation:     Induction:  Intravenous    Intubated:  Postinduction    Mask Ventilation:  Easy mask    Attempts:  1    Attempted By:  CRNA    Method of Intubation:  Direct    Blade:  Cantor 3    Laryngeal View Grade: Grade I - full view of cords      Difficult Airway Encountered?: No      Complications:  None    Airway Device:  Oral endotracheal tube    Airway Device Size:  7.5    Style/Cuff Inflation:  Cuffed (inflated to minimal occlusive pressure)    Tube secured:  21    Secured at:  The lips    Placement Verified By:  Capnometry    Complicating Factors:  None    Findings Post-Intubation:  BS equal bilateral and atraumatic/condition of teeth unchanged

## 2022-12-22 NOTE — ANESTHESIA POSTPROCEDURE EVALUATION
Anesthesia Post Evaluation    Patient: Lisa Smith    Procedure(s) Performed: Procedure(s) (LRB):  ENDARTERECTOMY-CAROTID (Left)    Final Anesthesia Type: general      Patient location during evaluation: PACU  Patient participation: Yes- Able to Participate  Level of consciousness: awake and alert, oriented and awake  Post-procedure vital signs: reviewed and stable  Pain management: adequate  Airway patency: patent    PONV status at discharge: No PONV  Anesthetic complications: no      Cardiovascular status: blood pressure returned to baseline, hemodynamically stable and stable  Respiratory status: unassisted, spontaneous ventilation and nasal cannula  Hydration status: euvolemic  Follow-up not needed.          Vitals Value Taken Time   /51 12/22/22 1501   Temp 36.1 °C (96.9 °F) 12/22/22 1501   Pulse 60 12/22/22 1613   Resp 18 12/22/22 1613   SpO2 95 % 12/22/22 1613   Vitals shown include unvalidated device data.      No case tracking events are documented in the log.      Pain/Vazquez Score: Pain Rating Prior to Med Admin: 10 (12/22/2022  3:30 PM)

## 2022-12-22 NOTE — TRANSFER OF CARE
"Anesthesia Transfer of Care Note    Patient: Lisa Smith    Procedure(s) Performed: Procedure(s) (LRB):  ENDARTERECTOMY-CAROTID (Left)    Patient location: ICU    Anesthesia Type: general    Transport from OR: Transported from OR on 2-3 L/min O2 by NC with adequate spontaneous ventilation    Post pain: adequate analgesia    Post assessment: no apparent anesthetic complications    Post vital signs: stable    Level of consciousness: awake and alert    Nausea/Vomiting: no nausea/vomiting    Complications: none    Transfer of care protocol was followed      Last vitals:   Visit Vitals  /71 (BP Location: Right arm, Patient Position: Lying)   Pulse 65   Temp 36.6 °C (97.8 °F) (Oral)   Resp 18   Ht 5' 5" (1.651 m)   Wt 87.9 kg (193 lb 12.6 oz)   LMP  (LMP Unknown)   SpO2 95%   Breastfeeding No   BMI 32.25 kg/m²     "

## 2022-12-23 ENCOUNTER — TELEPHONE (OUTPATIENT)
Dept: VASCULAR SURGERY | Facility: CLINIC | Age: 72
End: 2022-12-23
Payer: MEDICARE

## 2022-12-23 VITALS
HEART RATE: 60 BPM | WEIGHT: 193.81 LBS | TEMPERATURE: 98 F | OXYGEN SATURATION: 97 % | SYSTOLIC BLOOD PRESSURE: 99 MMHG | BODY MASS INDEX: 32.29 KG/M2 | DIASTOLIC BLOOD PRESSURE: 45 MMHG | HEIGHT: 65 IN | RESPIRATION RATE: 20 BRPM

## 2022-12-23 LAB
ANION GAP SERPL CALC-SCNC: 6 MMOL/L (ref 8–16)
BUN SERPL-MCNC: 23 MG/DL (ref 8–23)
CALCIUM SERPL-MCNC: 8.5 MG/DL (ref 8.7–10.5)
CHLORIDE SERPL-SCNC: 99 MMOL/L (ref 95–110)
CO2 SERPL-SCNC: 27 MMOL/L (ref 23–29)
CREAT SERPL-MCNC: 0.7 MG/DL (ref 0.5–1.4)
DIGOXIN SERPL-MCNC: 0.8 NG/ML (ref 0.8–2)
EST. GFR  (NO RACE VARIABLE): >60 ML/MIN/1.73 M^2
GLUCOSE SERPL-MCNC: 141 MG/DL (ref 70–110)
HGB BLD-MCNC: 10.2 G/DL (ref 12–16)
POC ACTIVATED CLOTTING TIME K: 137 SEC (ref 74–137)
POC ACTIVATED CLOTTING TIME K: 227 SEC (ref 74–137)
POTASSIUM SERPL-SCNC: 4.7 MMOL/L (ref 3.5–5.1)
SAMPLE: ABNORMAL
SAMPLE: NORMAL
SODIUM SERPL-SCNC: 132 MMOL/L (ref 136–145)

## 2022-12-23 PROCEDURE — 80162 ASSAY OF DIGOXIN TOTAL: CPT | Performed by: THORACIC SURGERY (CARDIOTHORACIC VASCULAR SURGERY)

## 2022-12-23 PROCEDURE — 25000242 PHARM REV CODE 250 ALT 637 W/ HCPCS: Performed by: THORACIC SURGERY (CARDIOTHORACIC VASCULAR SURGERY)

## 2022-12-23 PROCEDURE — 94640 AIRWAY INHALATION TREATMENT: CPT

## 2022-12-23 PROCEDURE — 25000003 PHARM REV CODE 250: Performed by: THORACIC SURGERY (CARDIOTHORACIC VASCULAR SURGERY)

## 2022-12-23 PROCEDURE — 63600175 PHARM REV CODE 636 W HCPCS: Performed by: THORACIC SURGERY (CARDIOTHORACIC VASCULAR SURGERY)

## 2022-12-23 PROCEDURE — 99900031 HC PATIENT EDUCATION (STAT)

## 2022-12-23 PROCEDURE — 27000221 HC OXYGEN, UP TO 24 HOURS

## 2022-12-23 PROCEDURE — 94761 N-INVAS EAR/PLS OXIMETRY MLT: CPT

## 2022-12-23 PROCEDURE — 85018 HEMOGLOBIN: CPT | Performed by: THORACIC SURGERY (CARDIOTHORACIC VASCULAR SURGERY)

## 2022-12-23 PROCEDURE — 80048 BASIC METABOLIC PNL TOTAL CA: CPT | Performed by: THORACIC SURGERY (CARDIOTHORACIC VASCULAR SURGERY)

## 2022-12-23 PROCEDURE — 99900035 HC TECH TIME PER 15 MIN (STAT)

## 2022-12-23 RX ORDER — LEVALBUTEROL INHALATION SOLUTION 1.25 MG/3ML
1.25 SOLUTION RESPIRATORY (INHALATION)
Status: DISCONTINUED | OUTPATIENT
Start: 2022-12-23 | End: 2022-12-23 | Stop reason: HOSPADM

## 2022-12-23 RX ADMIN — LEVALBUTEROL HYDROCHLORIDE 1.25 MG: 1.25 SOLUTION RESPIRATORY (INHALATION) at 07:12

## 2022-12-23 RX ADMIN — ASPIRIN 81 MG: 81 TABLET, DELAYED RELEASE ORAL at 08:12

## 2022-12-23 RX ADMIN — DEXTROSE 2 G: 50 INJECTION, SOLUTION INTRAVENOUS at 05:12

## 2022-12-23 RX ADMIN — HYDROCODONE BITARTRATE AND ACETAMINOPHEN 1 TABLET: 5; 325 TABLET ORAL at 02:12

## 2022-12-23 RX ADMIN — DIGOXIN 250 MCG: 125 TABLET ORAL at 08:12

## 2022-12-23 RX ADMIN — HYDROCODONE BITARTRATE AND ACETAMINOPHEN 1 TABLET: 5; 325 TABLET ORAL at 08:12

## 2022-12-23 RX ADMIN — PANTOPRAZOLE SODIUM 40 MG: 40 TABLET, DELAYED RELEASE ORAL at 05:12

## 2022-12-23 RX ADMIN — MUPIROCIN 1 G: 20 OINTMENT TOPICAL at 08:12

## 2022-12-23 NOTE — DISCHARGE SUMMARY
This patient was brought in for elective carotid endarterectomy.  She had a high-grade left carotid stenosis.  She had a routine unremarkable physical exam.  Laboratory work was acceptable.    She was brought in and underwent the procedure as described.  There were no procedural or postprocedural complications.  On postoperative day 1 her drain was removed.  Activity was increased.  She would no adverse events.    She was told to see me in 2 weeks.  Her home medicines were resumed.  She was given a prescription for Percocet.  Her condition is satisfactory.

## 2022-12-23 NOTE — PLAN OF CARE
Discussed importance of eating and drinking, pt had a sandwich tray on my shift. VSS. Calm and cooperative, bed alarm on, safety WNL. Will continue to monitor.      Problem: Adult Inpatient Plan of Care  Goal: Plan of Care Review  Outcome: Ongoing, Progressing  Goal: Patient-Specific Goal (Individualized)  Outcome: Ongoing, Progressing  Goal: Absence of Hospital-Acquired Illness or Injury  Outcome: Ongoing, Progressing  Goal: Optimal Comfort and Wellbeing  Outcome: Ongoing, Progressing  Goal: Readiness for Transition of Care  Outcome: Ongoing, Progressing     Problem: Skin Injury Risk Increased  Goal: Skin Health and Integrity  Outcome: Ongoing, Progressing

## 2022-12-23 NOTE — TELEPHONE ENCOUNTER
----- Message from David Mao sent at 12/23/2022 10:36 AM CST -----  Contact: pt at [phone]  Type: Needs Medical Advice  Who Called:  pt  Best Call Back Number: [phone]  Additional Information: pt is calling the office to schedule her 2 wk f/u following her surgery on yesterday but nothing comes up in the system. Please call back and advise.

## 2022-12-23 NOTE — CARE UPDATE
12/23/22 0750   Patient Assessment/Suction   Level of Consciousness (AVPU) alert   Respiratory Effort Normal;Unlabored   Expansion/Accessory Muscles/Retractions expansion symmetric   All Lung Fields Breath Sounds clear;diminished   Rhythm/Pattern, Respiratory unlabored   Cough Frequency frequent   Cough Type loose   PRE-TX-O2   Device (Oxygen Therapy) nasal cannula   $ Is the patient on Low Flow Oxygen? Yes   Flow (L/min) 3  (home o2)   SpO2 97 %   Pulse Oximetry Type Continuous   $ Pulse Oximetry - Multiple Charge Pulse Oximetry - Multiple   Pulse 60   Resp (!) 22   Aerosol Therapy   $ Aerosol Therapy Charges Aerosol Treatment   Daily Review of Necessity (SVN) completed   Respiratory Treatment Status (SVN) given   Treatment Route (SVN) mask   Patient Position (SVN) semi-Martin's   Post Treatment Assessment (SVN) increased aeration   Signs of Intolerance (SVN) none   Breath Sounds Post-Respiratory Treatment   Throughout All Fields Post-Treatment All Fields   Throughout All Fields Post-Treatment aeration increased   Post-treatment Heart Rate (beats/min) 60   Post-treatment Resp Rate (breaths/min) 22   Education   $ Education Bronchodilator;DME Oxygen;15 min   Respiratory Evaluation   $ Care Plan Tech Time 15 min

## 2022-12-23 NOTE — PLAN OF CARE
12/23/22 1542   Final Note   Assessment Type Final Discharge Note   Anticipated Discharge Disposition Home   What phone number can be called within the next 1-3 days to see how you are doing after discharge? [phone]   Post-Acute Status   Discharge Delays None known at this time     Discharge orders and chart reviewed with no further post-acute discharge needs identified at this time.  At this time, patient is cleared for discharge from Case Management standpoint.

## 2022-12-23 NOTE — PLAN OF CARE
[facility]  Initial Discharge Assessment       Primary Care Provider: OLIVA Oquendo    Admission Diagnosis: Carotid stenosis, left [I65.22]  Stenosis of left carotid artery [I65.22]    Admission Date: 12/22/2022  Expected Discharge Date: 12/23/2022    Discharge Barriers Identified: None    Payor: [employer] MANAGED MEDICARE / Plan: [employer] MEDICARE HMO / Product Type: Capitation /     Extended Emergency Contact Information  Primary Emergency Contact: Jordana Marsh  Address: [id number] [place] [address] [id number] [place] [address]  Home Phone: [phone]  Mobile Phone: [phone]  Relation: Daughter    Discharge Plan A: Home, Home with family  Discharge Plan B: Home, Home with family      [address] [employer]  [address] [place] [address]  Phone: [phone] Fax: [phone]    [facility] - [facility] [address] [facility]  [address] [facility] [address]  Phone: [phone] Fax: [phone]      Initial Assessment (most recent)       Adult Discharge Assessment - 12/23/22 0932          Discharge Assessment    Assessment Type Discharge Planning Assessment     Confirmed/corrected address, phone number and insurance Yes     Confirmed Demographics Correct on Facesheet     Source of Information health record     Does patient/caregiver understand observation status Yes     Communicated PILI with patient/caregiver Yes     Reason For Admission carotid stenosis     Facility Arrived From: home     Do you expect to return to your current living situation? Yes     Do you have help at home or someone to help you manage your care at home? Yes     Who are your caregiver(s) and their phone number(s)? Jordana Marsh (Daughter)   [phone]     Prior to hospitilization cognitive status: Alert/Oriented     Current cognitive status: Alert/Oriented     Readmission within 30 days? No     Patient  currently being followed by outpatient case management? No     Do you currently have service(s) that help you manage your care at home? No     Do you take prescription medications? Yes     Do you have prescription coverage? Yes     Coverage [employer]     Do you have any problems affording any of your prescribed medications? No     Is the patient taking medications as prescribed? yes     Who is going to help you get home at discharge? Jordana Marsh (Daughter)   [phone]     How do you get to doctors appointments? family or friend will provide     Are you on dialysis? No     Do you take coumadin? No     Discharge Plan A Home;Home with family     Discharge Plan B Home;Home with family     DME Needed Upon Discharge  none     Discharge Barriers Identified None

## 2022-12-27 ENCOUNTER — PATIENT MESSAGE (OUTPATIENT)
Dept: VASCULAR SURGERY | Facility: CLINIC | Age: 72
End: 2022-12-27
Payer: MEDICARE

## 2023-01-11 ENCOUNTER — OFFICE VISIT (OUTPATIENT)
Dept: VASCULAR SURGERY | Facility: CLINIC | Age: 73
End: 2023-01-11
Payer: MEDICARE

## 2023-01-11 VITALS
HEART RATE: 63 BPM | HEIGHT: 65 IN | BODY MASS INDEX: 32.25 KG/M2 | SYSTOLIC BLOOD PRESSURE: 154 MMHG | DIASTOLIC BLOOD PRESSURE: 79 MMHG

## 2023-01-11 DIAGNOSIS — Z98.890 S/P CAROTID ENDARTERECTOMY: ICD-10-CM

## 2023-01-11 PROCEDURE — 1101F PR PT FALLS ASSESS DOC 0-1 FALLS W/OUT INJ PAST YR: ICD-10-PCS | Mod: CPTII,S$GLB,, | Performed by: THORACIC SURGERY (CARDIOTHORACIC VASCULAR SURGERY)

## 2023-01-11 PROCEDURE — 3077F SYST BP >= 140 MM HG: CPT | Mod: CPTII,S$GLB,, | Performed by: THORACIC SURGERY (CARDIOTHORACIC VASCULAR SURGERY)

## 2023-01-11 PROCEDURE — 1101F PT FALLS ASSESS-DOCD LE1/YR: CPT | Mod: CPTII,S$GLB,, | Performed by: THORACIC SURGERY (CARDIOTHORACIC VASCULAR SURGERY)

## 2023-01-11 PROCEDURE — 3077F PR MOST RECENT SYSTOLIC BLOOD PRESSURE >= 140 MM HG: ICD-10-PCS | Mod: CPTII,S$GLB,, | Performed by: THORACIC SURGERY (CARDIOTHORACIC VASCULAR SURGERY)

## 2023-01-11 PROCEDURE — 1126F PR PAIN SEVERITY QUANTIFIED, NO PAIN PRESENT: ICD-10-PCS | Mod: CPTII,S$GLB,, | Performed by: THORACIC SURGERY (CARDIOTHORACIC VASCULAR SURGERY)

## 2023-01-11 PROCEDURE — 3288F FALL RISK ASSESSMENT DOCD: CPT | Mod: CPTII,S$GLB,, | Performed by: THORACIC SURGERY (CARDIOTHORACIC VASCULAR SURGERY)

## 2023-01-11 PROCEDURE — 1126F AMNT PAIN NOTED NONE PRSNT: CPT | Mod: CPTII,S$GLB,, | Performed by: THORACIC SURGERY (CARDIOTHORACIC VASCULAR SURGERY)

## 2023-01-11 PROCEDURE — 3008F PR BODY MASS INDEX (BMI) DOCUMENTED: ICD-10-PCS | Mod: CPTII,S$GLB,, | Performed by: THORACIC SURGERY (CARDIOTHORACIC VASCULAR SURGERY)

## 2023-01-11 PROCEDURE — 99024 PR POST-OP FOLLOW-UP VISIT: ICD-10-PCS | Mod: S$GLB,,, | Performed by: THORACIC SURGERY (CARDIOTHORACIC VASCULAR SURGERY)

## 2023-01-11 PROCEDURE — 3078F DIAST BP <80 MM HG: CPT | Mod: CPTII,S$GLB,, | Performed by: THORACIC SURGERY (CARDIOTHORACIC VASCULAR SURGERY)

## 2023-01-11 PROCEDURE — 3078F PR MOST RECENT DIASTOLIC BLOOD PRESSURE < 80 MM HG: ICD-10-PCS | Mod: CPTII,S$GLB,, | Performed by: THORACIC SURGERY (CARDIOTHORACIC VASCULAR SURGERY)

## 2023-01-11 PROCEDURE — 3288F PR FALLS RISK ASSESSMENT DOCUMENTED: ICD-10-PCS | Mod: CPTII,S$GLB,, | Performed by: THORACIC SURGERY (CARDIOTHORACIC VASCULAR SURGERY)

## 2023-01-11 PROCEDURE — 99024 POSTOP FOLLOW-UP VISIT: CPT | Mod: S$GLB,,, | Performed by: THORACIC SURGERY (CARDIOTHORACIC VASCULAR SURGERY)

## 2023-01-11 PROCEDURE — 3008F BODY MASS INDEX DOCD: CPT | Mod: CPTII,S$GLB,, | Performed by: THORACIC SURGERY (CARDIOTHORACIC VASCULAR SURGERY)

## 2023-01-11 RX ORDER — INFLUENZA A VIRUS A/VICTORIA/2570/2019 IVR-215 (H1N1) ANTIGEN (FORMALDEHYDE INACTIVATED), INFLUENZA A VIRUS A/DARWIN/6/2021 IVR-227 (H3N2) ANTIGEN (FORMALDEHYDE INACTIVATED), INFLUENZA B VIRUS B/AUSTRIA/1359417/2021 BVR-26 ANTIGEN (FORMALDEHYDE INACTIVATED), INFLUENZA B VIRUS B/PHUKET/3073/2013 BVR-1B ANTIGEN (FORMALDEHYDE INACTIVATED) 15; 15; 15; 15 UG/.5ML; UG/.5ML; UG/.5ML; UG/.5ML
INJECTION, SUSPENSION INTRAMUSCULAR
COMMUNITY
Start: 2022-08-25

## 2023-01-11 NOTE — PROGRESS NOTES
This patient is status post left carotid endarterectomy.  She comes back to the office today in follow-up.  She has done well without major complaints but states that she has had some discomfort and pain inferior and lateral to her incision.    Medicines are noted and are part of the epic record.  Her problem list was reviewed.    On exam vital signs are stable.  Her surgical wounds are clean and dry without evidence of infection.    Neurologically she remains intact.    At this point routine follow-up is indicated.    She should have a repeat carotid ultrasound in 3-4 months and based on this, further recommendations can be made.

## 2023-02-04 ENCOUNTER — HOSPITAL ENCOUNTER (EMERGENCY)
Facility: HOSPITAL | Age: 73
Discharge: HOME | End: 2023-02-04
Attending: EMERGENCY MEDICINE
Payer: MEDICARE

## 2023-02-04 VITALS
RESPIRATION RATE: 16 BRPM | OXYGEN SATURATION: 93 % | TEMPERATURE: 98 F | SYSTOLIC BLOOD PRESSURE: 131 MMHG | BODY MASS INDEX: 33.32 KG/M2 | WEIGHT: 200 LBS | HEART RATE: 62 BPM | DIASTOLIC BLOOD PRESSURE: 62 MMHG | HEIGHT: 65 IN
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
## 2023-02-04 DIAGNOSIS — T14.8XXA HEMATOMA: Primary | ICD-10-CM

## 2023-02-04 PROCEDURE — 99284 EMERGENCY DEPT VISIT MOD MDM: CPT

## 2023-02-04 RX ORDER — TRAMADOL HYDROCHLORIDE 50 MG/1
50 TABLET ORAL EVERY 6 HOURS PRN
Qty: 12 TABLET | Refills: 0 | Status: SHIPPED | OUTPATIENT
Start: 2023-02-04 | End: 2023-02-28

## 2023-02-04 RX ORDER — TRAMADOL HYDROCHLORIDE 50 MG/1
50 TABLET ORAL
Status: DISCONTINUED | OUTPATIENT
Start: 2023-02-04 | End: 2023-02-04

## 2023-02-05 NOTE — ED PROVIDER NOTES
Encounter Date: 2/4/2023       History     Chief Complaint   Patient presents with    Hand Injury     L hand swelling, bruising, throbbing pain started around 1715. Denies trauma, reports hitting her hand on a small pair of scissors but not hard enough to cause this. Pt can move fingers and has good PMS. Is on 75mg of plavix daily.      Presents with complaint of left hand pain swelling and bruising.  Patient reports she is currently taking Plavix daily.  She is on 75 mg.  She reports hitting her hand on a small pair of scissors but not hard enough she thinks to cause the swelling and bruising.    Review of patient's allergies indicates:   Allergen Reactions    Sulfa (sulfonamide antibiotics) Itching     Past Medical History:   Diagnosis Date    Arthritis     Atrial fibrillation     Bell's palsy     Boil of buttock     burst 12/19/22    COPD (chronic obstructive pulmonary disease)     GI bleed     Heart murmur     Heterozygous alpha 1-antitrypsin deficiency     History of home oxygen therapy     3L NC at night    Hypertension     Lung disease     copd    MONSERRAT (obstructive sleep apnea)     Pneumonia     Pulmonary edema      Past Surgical History:   Procedure Laterality Date    CARDIAC ELECTROPHYSIOLOGY STUDY AND ABLATION      CAROTID ENDARTERECTOMY Left 12/22/2022    Procedure: ENDARTERECTOMY-CAROTID;  Surgeon: Maximilian Connolly MD;  Location: [facility] OR;  Service: Peripheral Vascular;  Laterality: Left;    CARPAL TUNNEL RELEASE Left     CHOLECYSTECTOMY      EYE SURGERY Bilateral     cataract    HAND SURGERY      HYSTERECTOMY      INSERT / REPLACE / REMOVE PACEMAKER      KNEE ARTHROSCOPY Left     REPLACEMENT OF PACEMAKER GENERATOR Left 01/20/2022    Procedure: REPLACEMENT, PACEMAKER GENERATOR;  Surgeon: Raymond Pérez MD;  Location: [facility] CATH/EP LAB;  Service: Cardiology;  Laterality: Left;     Family History   Problem Relation Age of Onset    Kidney failure Mother     Cancer Father     Cancer Brother      Social  History     Tobacco Use    Smoking status: Former     Packs/day: 2.00     Years: 30.00     Pack years: 60.00     Types: Cigarettes     Start date: 1971     Quit date: 2011     Years since quittin.0     Passive exposure: Past    Smokeless tobacco: Never   Substance Use Topics    Alcohol use: Not Currently     Alcohol/week: 1.0 standard drink     Types: 1 Shots of liquor per week     Comment: occasional    Drug use: No     Review of Systems   Constitutional:  Negative for activity change and fever.   Respiratory:  Negative for cough, shortness of breath and wheezing.    Cardiovascular:  Negative for chest pain, palpitations and leg swelling.   Gastrointestinal:  Negative for abdominal pain, diarrhea, nausea and vomiting.   Musculoskeletal:  Negative for back pain and neck pain.   Skin:  Positive for color change. Negative for rash.        Swelling and bruising and tenderness to the left hand dorsum region   Neurological:  Negative for dizziness, weakness, light-headedness and headaches.   Hematological:  Bruises/bleeds easily.     Physical Exam     Initial Vitals [23]   BP Pulse Resp Temp SpO2   (!) 144/81 60 16 98.5 °F (36.9 °C) (!) 94 %      MAP       --         Physical Exam    Constitutional: She appears well-developed and well-nourished.   HENT:   Head: Normocephalic.   Mouth/Throat: Oropharynx is clear and moist.   Eyes: Conjunctivae are normal.   Neck: Neck supple.   Normal range of motion.  Cardiovascular:  Normal rate, regular rhythm and normal heart sounds.           Pulmonary/Chest: Breath sounds normal. No respiratory distress.   Musculoskeletal:         General: Tenderness present. Normal range of motion.      Cervical back: Normal range of motion and neck supple.      Comments: Tenderness and swelling along with bruising to the dorsum of the left hand.  Fingers are warm.  Cap refill is less than 2 seconds.  Patient has full range of motion to this hand as well as the fingers.      Neurological: She is alert and oriented to person, place, and time. No sensory deficit. GCS score is 15. GCS eye subscore is 4. GCS verbal subscore is 5. GCS motor subscore is 6.   Skin: Skin is warm and dry. Capillary refill takes less than 2 seconds. No erythema. No pallor.   Moderate bruising to the dorsum of the left hand.   Psychiatric: She has a normal mood and affect. Thought content normal.       ED Course   Procedures  Labs Reviewed - No data to display       Imaging Results              US Soft Tissue Upper Extremity, Left (Final result)  Result time 02/04/23 22:45:53      Final result by Margarita Morrison MD (02/04/23 22:45:53)                   Narrative:    PROCEDURE:    US EXTREMITY MUSCULOSKELETAL LIMITED  dated  2/4/2023 9:09 PM CST    CLINICAL HISTORY:   Female 72 years of age.   left hand swelling and bruising    TECHNIQUE:  Ultrasound was performed of the dorsal soft tissue of the left hand.    PREVIOUS STUDIES:  None Available    FINDINGS:    Within the dorsal soft tissue is encapsulated or partially encapsulated heterogeneous hyperechoic structure that measures 4.8 cm x 5.2 cm x 2.4 cm. Color evaluation for flow is limited. There is no convincing internal vascularity.    IMPRESSION:  Within the dorsal soft tissue, there is heterogeneous masslike structure. Differential is primarily hematoma or tumor. Clinical correlation needed.    Electronically signed by:  Margarita Deshpande MD  2/4/2023 10:45 PM CST Workstation: [id number]                                     X-Ray Hand 2 View Left (In process)                      Medications - No data to display  Medical Decision Making:   Initial Assessment:   Presents with complaint left hand tenderness swelling and bruising.  Onset prior to arrival.  Patient reports she is on 75 mg of Plavix daily.  She hit her hand on a small pair scissors today.  She reports she does not feel that that is what caused the swelling in the bruising and the  tenderness.  Differential Diagnosis:   Hematoma versus tumor  Clinical Tests:   Radiological Study: Reviewed  ED Management:  X-ray of the hand is negative.  Ultrasound of the left hand reveals a hematoma versus a tumor.  I feel this is definitely hematoma as it came up quickly.  It also appeared after slight trauma.  Patient has been instructed to keep the hand elevated and to apply warm moist heat.  She is been instructed to follow up with her primary care doctor.  She knows the importance of ensuring this does heal.  I wrote her prescription for Ultram for pain.  At no time while in the ED did she appear to be in any acute distress.  She refused any other pain medication here in the ED as she is driving.  Have discussed this patient with Dr. Ann.          Attending Attestation:     Physician Attestation Statement for NP/PA:       Other NP/PA Attestation Additions:    History of Present Illness: I was not called upon to see this patient but was available for consultation                           Clinical Impression:   Final diagnoses:  [T14.8XXA] Hematoma (Primary)        ED Disposition Condition    Discharge Stable          ED Prescriptions       Medication Sig Dispense Start Date End Date Auth. Provider    traMADoL (ULTRAM) 50 mg tablet Take 1 tablet (50 mg total) by mouth every 6 (six) hours as needed for Pain. 12 tablet 2/4/2023 -- Helen June NP          Follow-up Information       Follow up With Specialties Details Why Contact Info    [facility] In 3 days  [address] [facility]  [address]  [facility] [address]  [phone]               Helen June NP  02/04/23 4078       Lm Ann MD  02/05/23 9649

## 2023-02-05 NOTE — DISCHARGE INSTRUCTIONS
Use warm moist heat to area several times a day as instructed.  Follow-up with your primary care doctor in about 3 days.  Return to the ED for any worsening of symptoms or no improvement or any other things that concern you.

## 2023-02-07 ENCOUNTER — OFFICE VISIT (OUTPATIENT)
Dept: FAMILY MEDICINE | Facility: CLINIC | Age: 73
End: 2023-02-07
Payer: MEDICARE

## 2023-02-07 VITALS
TEMPERATURE: 98 F | HEART RATE: 62 BPM | BODY MASS INDEX: 30.96 KG/M2 | WEIGHT: 185.81 LBS | HEIGHT: 65 IN | SYSTOLIC BLOOD PRESSURE: 141 MMHG | DIASTOLIC BLOOD PRESSURE: 67 MMHG | RESPIRATION RATE: 20 BRPM | OXYGEN SATURATION: 96 %

## 2023-02-07 DIAGNOSIS — Z23 NEED FOR TDAP VACCINATION: ICD-10-CM

## 2023-02-07 DIAGNOSIS — R22.32 LOCALIZED SWELLING ON LEFT HAND: ICD-10-CM

## 2023-02-07 DIAGNOSIS — T14.8XXA HEMATOMA: Primary | ICD-10-CM

## 2023-02-07 DIAGNOSIS — S60.512D ABRASION OF LEFT HAND, SUBSEQUENT ENCOUNTER: ICD-10-CM

## 2023-02-07 PROCEDURE — 1160F PR REVIEW ALL MEDS BY PRESCRIBER/CLIN PHARMACIST DOCUMENTED: ICD-10-PCS | Mod: CPTII,S$GLB,, | Performed by: NURSE PRACTITIONER

## 2023-02-07 PROCEDURE — 1125F AMNT PAIN NOTED PAIN PRSNT: CPT | Mod: CPTII,S$GLB,, | Performed by: NURSE PRACTITIONER

## 2023-02-07 PROCEDURE — 90471 IMMUNIZATION ADMIN: CPT | Mod: S$GLB,,, | Performed by: NURSE PRACTITIONER

## 2023-02-07 PROCEDURE — 1159F MED LIST DOCD IN RCRD: CPT | Mod: CPTII,S$GLB,, | Performed by: NURSE PRACTITIONER

## 2023-02-07 PROCEDURE — 1125F PR PAIN SEVERITY QUANTIFIED, PAIN PRESENT: ICD-10-PCS | Mod: CPTII,S$GLB,, | Performed by: NURSE PRACTITIONER

## 2023-02-07 PROCEDURE — 3008F BODY MASS INDEX DOCD: CPT | Mod: CPTII,S$GLB,, | Performed by: NURSE PRACTITIONER

## 2023-02-07 PROCEDURE — 1101F PT FALLS ASSESS-DOCD LE1/YR: CPT | Mod: CPTII,S$GLB,, | Performed by: NURSE PRACTITIONER

## 2023-02-07 PROCEDURE — 3078F PR MOST RECENT DIASTOLIC BLOOD PRESSURE < 80 MM HG: ICD-10-PCS | Mod: CPTII,S$GLB,, | Performed by: NURSE PRACTITIONER

## 2023-02-07 PROCEDURE — 90715 TDAP VACCINE GREATER THAN OR EQUAL TO 7YO IM: ICD-10-PCS | Mod: S$GLB,,, | Performed by: NURSE PRACTITIONER

## 2023-02-07 PROCEDURE — 90715 TDAP VACCINE 7 YRS/> IM: CPT | Mod: S$GLB,,, | Performed by: NURSE PRACTITIONER

## 2023-02-07 PROCEDURE — 3077F PR MOST RECENT SYSTOLIC BLOOD PRESSURE >= 140 MM HG: ICD-10-PCS | Mod: CPTII,S$GLB,, | Performed by: NURSE PRACTITIONER

## 2023-02-07 PROCEDURE — 1159F PR MEDICATION LIST DOCUMENTED IN MEDICAL RECORD: ICD-10-PCS | Mod: CPTII,S$GLB,, | Performed by: NURSE PRACTITIONER

## 2023-02-07 PROCEDURE — 1160F RVW MEDS BY RX/DR IN RCRD: CPT | Mod: CPTII,S$GLB,, | Performed by: NURSE PRACTITIONER

## 2023-02-07 PROCEDURE — 3078F DIAST BP <80 MM HG: CPT | Mod: CPTII,S$GLB,, | Performed by: NURSE PRACTITIONER

## 2023-02-07 PROCEDURE — 90471 TDAP VACCINE GREATER THAN OR EQUAL TO 7YO IM: ICD-10-PCS | Mod: S$GLB,,, | Performed by: NURSE PRACTITIONER

## 2023-02-07 PROCEDURE — 3008F PR BODY MASS INDEX (BMI) DOCUMENTED: ICD-10-PCS | Mod: CPTII,S$GLB,, | Performed by: NURSE PRACTITIONER

## 2023-02-07 PROCEDURE — 3288F PR FALLS RISK ASSESSMENT DOCUMENTED: ICD-10-PCS | Mod: CPTII,S$GLB,, | Performed by: NURSE PRACTITIONER

## 2023-02-07 PROCEDURE — 1101F PR PT FALLS ASSESS DOC 0-1 FALLS W/OUT INJ PAST YR: ICD-10-PCS | Mod: CPTII,S$GLB,, | Performed by: NURSE PRACTITIONER

## 2023-02-07 PROCEDURE — 99214 PR OFFICE/OUTPT VISIT, EST, LEVL IV, 30-39 MIN: ICD-10-PCS | Mod: 25,S$GLB,, | Performed by: NURSE PRACTITIONER

## 2023-02-07 PROCEDURE — 3288F FALL RISK ASSESSMENT DOCD: CPT | Mod: CPTII,S$GLB,, | Performed by: NURSE PRACTITIONER

## 2023-02-07 PROCEDURE — 3077F SYST BP >= 140 MM HG: CPT | Mod: CPTII,S$GLB,, | Performed by: NURSE PRACTITIONER

## 2023-02-07 PROCEDURE — 99214 OFFICE O/P EST MOD 30 MIN: CPT | Mod: 25,S$GLB,, | Performed by: NURSE PRACTITIONER

## 2023-02-07 RX ORDER — CEPHALEXIN 500 MG/1
500 CAPSULE ORAL 3 TIMES DAILY
Qty: 21 CAPSULE | Refills: 0 | Status: SHIPPED | OUTPATIENT
Start: 2023-02-07 | End: 2023-02-14

## 2023-02-07 NOTE — PROGRESS NOTES
"    SUBJECTIVE:      Patient ID: Lisa Smith is a 72 y.o. female.    Chief Complaint: Hand Injury    Mrs. Gonzalez presents to the clinic for f/u on ER visit on 2/4 for hematoma, swelling and pain to left hand dorsal side. All records reviewed- xray and US left hand revealed hematoma vs tumor. Pt reports that Friday 2/3 she was cleaning her dentures, clenching then hard and did "barely" hit the side of one her her fingers on a pair of scissors, but no force trauma that she can recall. She then noticed the dorsal side her her hand swelling and bruising. On Saturday she noticed the bruising migrating down her fingers, her hand turning red, increase swelling, and throbbing pains. She then went to ER. She was given Ultram. Pt states the pain is intermittent 0-5/10 and more of a throbbing pain when her hand is not elevated. She reports taking ultram once for pain at night. Pt reports that the swelling has decreased in size with elevation. Pt was instructed to use heat by ER, she reported that she was using running warm water d/t heating pad was malfunctioning. Over all pt reports that the throbbing and swelling has been slowly improving,however she noticed yesterday evening her hand had increase erythema and warmth to the initial location of where the swelling occurred on dorsal side of hand. Denies noticing opening areas, drainage, or numbness to left hand. PT is on Plavix and currently dose not recall her last tetanu shot nor is it up today according the EMR.      Family History   Problem Relation Age of Onset    Kidney failure Mother     Cancer Father     Cancer Brother       Social History     Socioeconomic History    Marital status:     Number of children: 1   Occupational History    Occupation: RETIRED     Comment: [employer]   Tobacco Use    Smoking status: Former     Packs/day: 2.00     Years: 30.00     Pack years: 60.00     Types: Cigarettes     Start date: 2/5/1971     Quit date: " 2011     Years since quittin.0     Passive exposure: Past    Smokeless tobacco: Never   Substance and Sexual Activity    Alcohol use: Not Currently     Alcohol/week: 1.0 standard drink     Types: 1 Shots of liquor per week     Comment: occasional    Drug use: No    Sexual activity: Never     Current Outpatient Medications   Medication Sig Dispense Refill    acetaminophen (TYLENOL ARTHRITIS ORAL) Take 2 tablets by mouth daily as needed.      acetaminophen (TYLENOL) 325 MG tablet Take 325 mg by mouth every 6 (six) hours as needed for Pain.      albuterol (ACCUNEB) 1.25 mg/3 mL Nebu INHALE THE CONTENTS OF 1 VIAL VIA NEBULIZER EVERY 6 HOURS AS NEEDED FOR RESCUE 360 mL 5    aspirin (ECOTRIN) 81 MG EC tablet Take 81 mg by mouth once daily.      clopidogreL (PLAVIX) 75 mg tablet TAKE 1 TABLET (75 MG TOTAL) BY MOUTH ONCE DAILY. 90 tablet 3    digoxin (LANOXIN) 250 mcg tablet TAKE 1 TABLET EVERY DAY 90 tablet 3    diltiaZEM (CARDIZEM CD) 120 MG Cp24 Take 1 capsule (120 mg total) by mouth once daily. 90 capsule 3    fexofenadine (ALLEGRA) 60 MG tablet Take 60 mg by mouth daily as needed. Pt takes everyother day      FLUAD QUAD -,65Y UP,,PF, 60 mcg (15 mcg x 4)/0.5 mL Syrg       fluticasone propionate (FLONASE) 50 mcg/actuation nasal spray USE 2 SPRAYS (100 MCG TOTAL) IN EACH NOSTRIL ONCE DAILY. 48 g 5    fluticasone-umeclidin-vilanter (TRELEGY ELLIPTA) 100-62.5-25 mcg DsDv Inhale 1 puff into the lungs once daily. 3 each 6    furosemide (LASIX) 20 MG tablet Take 1 tablet (20 mg total) by mouth once daily. 90 tablet 3    hydrocodone-chlorpheniramine (TUSSIONEX) 10-8 mg/5 mL suspension Take 5 ml by mouth every 12 hours as needed for cough 115 mL 0    magnesium oxide (MAG-OX) 400 mg (241.3 mg magnesium) tablet Take 400 mg by mouth once daily.      metoprolol succinate (TOPROL-XL) 100 MG 24 hr tablet Take 1 tablet (100 mg total) by mouth once daily. 90 tablet 3    pantoprazole (PROTONIX) 40 MG tablet TAKE 1 TABLET  EVERY DAY 90 tablet 3    roflumilast (DALIRESP) 500 mcg Tab Take 1 tablet (500 mcg total) by mouth once daily. 90 tablet 6    rOPINIRole (REQUIP) 0.5 MG tablet TAKE 1 TABLET EVERY EVENING 90 tablet 0    sars-cov-2, covid-19, (MODERNA COVID-19) 100 mcg/0.5 ml injection       spironolactone (ALDACTONE) 25 MG tablet TAKE 1 TABLET (25 MG TOTAL) BY MOUTH ONCE DAILY. 90 tablet 3    traMADoL (ULTRAM) 50 mg tablet Take 1 tablet (50 mg total) by mouth every 6 (six) hours as needed for Pain. 12 tablet 0    TRELEGY ELLIPTA 100-62.5-25 mcg DsDv INHALE 1 PUFF ONE TIME DAILY 3 each 6    cephALEXin (KEFLEX) 500 MG capsule Take 1 capsule (500 mg total) by mouth 3 (three) times daily. for 7 days 21 capsule 0     No current facility-administered medications for this visit.     Review of patient's allergies indicates:   Allergen Reactions    Sulfa (sulfonamide antibiotics) Itching      Past Medical History:   Diagnosis Date    Arthritis     Atrial fibrillation     Bell's palsy     Boil of buttock     burst 12/19/22    COPD (chronic obstructive pulmonary disease)     GI bleed     Heart murmur     Heterozygous alpha 1-antitrypsin deficiency     History of home oxygen therapy     3L NC at night    Hypertension     Lung disease     copd    MONSERRAT (obstructive sleep apnea)     Pneumonia     Pulmonary edema      Past Surgical History:   Procedure Laterality Date    CARDIAC ELECTROPHYSIOLOGY STUDY AND ABLATION      CAROTID ENDARTERECTOMY Left 12/22/2022    Procedure: ENDARTERECTOMY-CAROTID;  Surgeon: Maximilian Connolly MD;  Location: [facility] OR;  Service: Peripheral Vascular;  Laterality: Left;    CARPAL TUNNEL RELEASE Left     CHOLECYSTECTOMY      EYE SURGERY Bilateral     cataract    HAND SURGERY      HYSTERECTOMY      INSERT / REPLACE / REMOVE PACEMAKER      KNEE ARTHROSCOPY Left     REPLACEMENT OF PACEMAKER GENERATOR Left 01/20/2022    Procedure: REPLACEMENT, PACEMAKER GENERATOR;  Surgeon: Raymond Pérez MD;  Location: [facility] CATH/EP LAB;   "Service: Cardiology;  Laterality: Left;       Review of Systems   Constitutional:  Negative for activity change, appetite change, chills and fever.   HENT:  Negative for congestion, ear pain, sore throat, trouble swallowing and voice change.    Eyes:  Negative for pain and discharge.   Respiratory:  Negative for cough and shortness of breath.    Cardiovascular:  Negative for chest pain and palpitations.   Gastrointestinal:  Negative for abdominal pain, diarrhea, nausea and vomiting.   Endocrine: Negative for cold intolerance and heat intolerance.   Genitourinary:  Negative for difficulty urinating and dysuria.   Musculoskeletal:  Positive for arthralgias. Negative for gait problem and neck pain.   Skin:  Positive for color change (left hand). Negative for rash and wound.   Allergic/Immunologic: Negative for immunocompromised state.   Neurological:  Negative for dizziness, speech difficulty, weakness, numbness and headaches.   Hematological:  Negative for adenopathy. Bruises/bleeds easily (left hand and on plavix).   Psychiatric/Behavioral:  Negative for confusion, self-injury and suicidal ideas.     OBJECTIVE:      Vitals:    02/07/23 0839   BP: (!) 141/67   BP Location: Right arm   Patient Position: Sitting   BP Method: Large (Automatic)   Pulse: 62   Resp: 20   Temp: 98.2 °F (36.8 °C)   SpO2: 96%   Weight: 84.3 kg (185 lb 12.8 oz)   Height: 5' 5" (1.651 m)     Physical Exam  Vitals and nursing note reviewed.   Constitutional:       General: She is not in acute distress.     Appearance: Normal appearance. She is well-developed. She is obese. She is not ill-appearing.   HENT:      Head: Normocephalic and atraumatic.      Mouth/Throat:      Mouth: Mucous membranes are moist.      Pharynx: Uvula midline.   Eyes:      General: Lids are normal.      Conjunctiva/sclera: Conjunctivae normal.      Pupils: Pupils are equal, round, and reactive to light.      Right eye: Pupil is round and reactive.      Left eye: Pupil is " round and reactive.   Neck:      Thyroid: No thyromegaly.      Vascular: No JVD.      Trachea: Trachea normal.   Cardiovascular:      Rate and Rhythm: Normal rate and regular rhythm.      Pulses: Normal pulses.           Radial pulses are 2+ on the right side and 2+ on the left side.      Heart sounds: Normal heart sounds.   Pulmonary:      Effort: Pulmonary effort is normal. No tachypnea or respiratory distress.      Breath sounds: Normal breath sounds.   Musculoskeletal:         General: Swelling (left hand), tenderness (left hand) and signs of injury (left hand) present. Normal range of motion.      Left hand: Swelling (left hand hematoma present) and tenderness present. No lacerations. Normal range of motion. Normal strength. Normal sensation. Normal capillary refill. Normal pulse.        Arms:       Cervical back: Normal range of motion and neck supple.   Lymphadenopathy:      Cervical: No cervical adenopathy.   Skin:     General: Skin is warm and dry.      Capillary Refill: Capillary refill takes 2 to 3 seconds.      Coloration: Skin is not jaundiced or pale.      Findings: Bruising (left hand), ecchymosis and erythema present. No abrasion, laceration, rash or wound.             Comments: Left hand dorsal side: Erythema, ecchymosis and warmth: no drainage or notable open area  Fingers: ecchymosis, no warmth or limited mobility   Neurological:      Mental Status: She is alert and oriented to person, place, and time.   Psychiatric:         Mood and Affect: Mood normal.         Speech: Speech normal.         Behavior: Behavior normal. Behavior is cooperative.         Thought Content: Thought content normal.              Assessment:       1. Hematoma    2. Localized swelling on left hand    3. Need for Tdap vaccination    4. Abrasion of left hand, subsequent encounter          Plan:       Hematoma  -     Ambulatory referral/consult to Orthopedics; Future; Expected date: 02/14/2023    Localized swelling on left  hand  -     cephALEXin (KEFLEX) 500 MG capsule; Take 1 capsule (500 mg total) by mouth 3 (three) times daily. for 7 days  Dispense: 21 capsule; Refill: 0  -     Ambulatory referral/consult to Orthopedics; Future; Expected date: 02/14/2023  -concerns for possible early cellulitis due to warmth and erythema; will tx with abx as instructed and continue elevation, ice, meds as needed; see ortho if no improvement or worsened in 3-5 days or ER if severe sx     Need for Tdap vaccination  -     (In Office Administered) Tdap Vaccine   -given due to injury     Abrasion of left hand, subsequent encounter  -     (In Office Administered) Tdap Vaccine        Follow up if symptoms worsen or fail to improve.      2/7/2023 DIAN Oquendo, FNP    This note was created using [employer] voice recognition software that occasionally misinterprets phrases or words.

## 2023-02-14 ENCOUNTER — OFFICE VISIT (OUTPATIENT)
Dept: ORTHOPEDICS | Facility: CLINIC | Age: 73
End: 2023-02-14
Payer: MEDICARE

## 2023-02-14 VITALS — HEIGHT: 65 IN | BODY MASS INDEX: 30.82 KG/M2 | WEIGHT: 185 LBS

## 2023-02-14 DIAGNOSIS — R22.32 MASS OF LEFT HAND: Primary | ICD-10-CM

## 2023-02-14 DIAGNOSIS — T14.8XXA HEMATOMA: ICD-10-CM

## 2023-02-14 PROCEDURE — 1101F PR PT FALLS ASSESS DOC 0-1 FALLS W/OUT INJ PAST YR: ICD-10-PCS | Mod: CPTII,S$GLB,, | Performed by: ORTHOPAEDIC SURGERY

## 2023-02-14 PROCEDURE — 1125F PR PAIN SEVERITY QUANTIFIED, PAIN PRESENT: ICD-10-PCS | Mod: CPTII,S$GLB,, | Performed by: ORTHOPAEDIC SURGERY

## 2023-02-14 PROCEDURE — 99203 OFFICE O/P NEW LOW 30 MIN: CPT | Mod: S$GLB,,, | Performed by: ORTHOPAEDIC SURGERY

## 2023-02-14 PROCEDURE — 1101F PT FALLS ASSESS-DOCD LE1/YR: CPT | Mod: CPTII,S$GLB,, | Performed by: ORTHOPAEDIC SURGERY

## 2023-02-14 PROCEDURE — 3288F PR FALLS RISK ASSESSMENT DOCUMENTED: ICD-10-PCS | Mod: CPTII,S$GLB,, | Performed by: ORTHOPAEDIC SURGERY

## 2023-02-14 PROCEDURE — 1160F PR REVIEW ALL MEDS BY PRESCRIBER/CLIN PHARMACIST DOCUMENTED: ICD-10-PCS | Mod: CPTII,S$GLB,, | Performed by: ORTHOPAEDIC SURGERY

## 2023-02-14 PROCEDURE — 1159F PR MEDICATION LIST DOCUMENTED IN MEDICAL RECORD: ICD-10-PCS | Mod: CPTII,S$GLB,, | Performed by: ORTHOPAEDIC SURGERY

## 2023-02-14 PROCEDURE — 3288F FALL RISK ASSESSMENT DOCD: CPT | Mod: CPTII,S$GLB,, | Performed by: ORTHOPAEDIC SURGERY

## 2023-02-14 PROCEDURE — 99203 PR OFFICE/OUTPT VISIT, NEW, LEVL III, 30-44 MIN: ICD-10-PCS | Mod: S$GLB,,, | Performed by: ORTHOPAEDIC SURGERY

## 2023-02-14 PROCEDURE — 1159F MED LIST DOCD IN RCRD: CPT | Mod: CPTII,S$GLB,, | Performed by: ORTHOPAEDIC SURGERY

## 2023-02-14 PROCEDURE — 1125F AMNT PAIN NOTED PAIN PRSNT: CPT | Mod: CPTII,S$GLB,, | Performed by: ORTHOPAEDIC SURGERY

## 2023-02-14 PROCEDURE — 3008F BODY MASS INDEX DOCD: CPT | Mod: CPTII,S$GLB,, | Performed by: ORTHOPAEDIC SURGERY

## 2023-02-14 PROCEDURE — 1160F RVW MEDS BY RX/DR IN RCRD: CPT | Mod: CPTII,S$GLB,, | Performed by: ORTHOPAEDIC SURGERY

## 2023-02-14 PROCEDURE — 3008F PR BODY MASS INDEX (BMI) DOCUMENTED: ICD-10-PCS | Mod: CPTII,S$GLB,, | Performed by: ORTHOPAEDIC SURGERY

## 2023-02-14 NOTE — PROGRESS NOTES
[facility]    Subjective:     Chief Complaint:   Chief Complaint   Patient presents with    Left Hand - Pain     Patient is here with complaints of Left hand pain, has a lump, went to ED at [facility]. No known injury.        Past Medical History:   Diagnosis Date    Arthritis     Atrial fibrillation     Bell's palsy     Boil of buttock     burst 12/19/22    COPD (chronic obstructive pulmonary disease)     GI bleed     Heart murmur     Heterozygous alpha 1-antitrypsin deficiency     History of home oxygen therapy     3L NC at night    Hypertension     Lung disease     copd    MONSERRAT (obstructive sleep apnea)     Pneumonia     Pulmonary edema        Past Surgical History:   Procedure Laterality Date    CARDIAC ELECTROPHYSIOLOGY STUDY AND ABLATION      CAROTID ENDARTERECTOMY Left 12/22/2022    Procedure: ENDARTERECTOMY-CAROTID;  Surgeon: Maximilian Connolly MD;  Location: [facility] OR;  Service: Peripheral Vascular;  Laterality: Left;    CARPAL TUNNEL RELEASE Left     CHOLECYSTECTOMY      EYE SURGERY Bilateral     cataract    HAND SURGERY      HYSTERECTOMY      INSERT / REPLACE / REMOVE PACEMAKER      KNEE ARTHROSCOPY Left     REPLACEMENT OF PACEMAKER GENERATOR Left 01/20/2022    Procedure: REPLACEMENT, PACEMAKER GENERATOR;  Surgeon: Raymond Pérez MD;  Location: [facility] CATH/EP LAB;  Service: Cardiology;  Laterality: Left;       Current Outpatient Medications   Medication Sig    acetaminophen (TYLENOL ARTHRITIS ORAL) Take 2 tablets by mouth daily as needed.    acetaminophen (TYLENOL) 325 MG tablet Take 325 mg by mouth every 6 (six) hours as needed for Pain.    albuterol (ACCUNEB) 1.25 mg/3 mL Nebu INHALE THE CONTENTS OF 1 VIAL VIA NEBULIZER EVERY 6 HOURS AS NEEDED FOR RESCUE    aspirin (ECOTRIN) 81 MG EC tablet Take 81 mg by mouth once daily.    cephALEXin (KEFLEX) 500 MG capsule Take 1 capsule (500 mg total) by mouth 3 (three) times daily. for 7 days    clopidogreL (PLAVIX) 75 mg tablet TAKE 1 TABLET (75 MG TOTAL) BY  MOUTH ONCE DAILY.    digoxin (LANOXIN) 250 mcg tablet TAKE 1 TABLET EVERY DAY    diltiaZEM (CARDIZEM CD) 120 MG Cp24 Take 1 capsule (120 mg total) by mouth once daily.    fexofenadine (ALLEGRA) 60 MG tablet Take 60 mg by mouth daily as needed. Pt takes everyother day    FLUAD QUAD 2022-23,65Y UP,,PF, 60 mcg (15 mcg x 4)/0.5 mL Syrg     fluticasone propionate (FLONASE) 50 mcg/actuation nasal spray USE 2 SPRAYS (100 MCG TOTAL) IN EACH NOSTRIL ONCE DAILY.    fluticasone-umeclidin-vilanter (TRELEGY ELLIPTA) 100-62.5-25 mcg DsDv Inhale 1 puff into the lungs once daily.    furosemide (LASIX) 20 MG tablet Take 1 tablet (20 mg total) by mouth once daily.    hydrocodone-chlorpheniramine (TUSSIONEX) 10-8 mg/5 mL suspension Take 5 ml by mouth every 12 hours as needed for cough    magnesium oxide (MAG-OX) 400 mg (241.3 mg magnesium) tablet Take 400 mg by mouth once daily.    metoprolol succinate (TOPROL-XL) 100 MG 24 hr tablet Take 1 tablet (100 mg total) by mouth once daily.    pantoprazole (PROTONIX) 40 MG tablet TAKE 1 TABLET EVERY DAY    roflumilast (DALIRESP) 500 mcg Tab Take 1 tablet (500 mcg total) by mouth once daily.    rOPINIRole (REQUIP) 0.5 MG tablet TAKE 1 TABLET EVERY EVENING    sars-cov-2, covid-19, (MODERNA COVID-19) 100 mcg/0.5 ml injection     spironolactone (ALDACTONE) 25 MG tablet TAKE 1 TABLET (25 MG TOTAL) BY MOUTH ONCE DAILY.    traMADoL (ULTRAM) 50 mg tablet Take 1 tablet (50 mg total) by mouth every 6 (six) hours as needed for Pain.    TRELEGY ELLIPTA 100-62.5-25 mcg DsDv INHALE 1 PUFF ONE TIME DAILY     No current facility-administered medications for this visit.       Review of patient's allergies indicates:   Allergen Reactions    Sulfa (sulfonamide antibiotics) Itching       Family History   Problem Relation Age of Onset    Kidney failure Mother     Cancer Father     Cancer Brother        Social History     Socioeconomic History    Marital status:     Number of children: 1   Occupational  History    Occupation: RETIRED     Comment: [employer]   Tobacco Use    Smoking status: Former     Packs/day: 2.00     Years: 30.00     Pack years: 60.00     Types: Cigarettes     Start date: 1971     Quit date: 2011     Years since quittin.0     Passive exposure: Past    Smokeless tobacco: Never   Substance and Sexual Activity    Alcohol use: Not Currently     Alcohol/week: 1.0 standard drink     Types: 1 Shots of liquor per week     Comment: occasional    Drug use: No    Sexual activity: Never       History of present illness:  Ms. Gonzalez presents to the office today as a new patient chief complaint of left hand swelling and bruising that has been present for approximately 10 days.  She does not recollect any injury or trauma to the hand.  Fortunately, she is right-hand dominant.  She is on Plavix and aspirin blood thinning medication.    Review of Systems:    Constitution: Negative for chills, fever, and sweats.  Negative for unexplained weight loss.    HENT:  Negative for headaches and blurry vision.    Cardiovascular:Negative for chest pain or irregular heart beat. Negative for hypertension.    Respiratory:  Negative for cough and shortness of breath.    Gastrointestinal: Negative for abdominal pain, heartburn, melena, nausea, and vomitting.    Genitourinary:  Negative bladder incontinence and dysuria.    Musculoskeletal:  See HPI for details.     Neurological: Negative for numbness.    Psychiatric/Behavioral: Negative for depression.  The patient is not nervous/anxious.      Endocrine: Negative for polyuria    Hematologic/Lymphatic: Negative for bleeding problem.  Does not bruise/bleed easily.    Skin: Negative for poor would healing and rash    Objective:      Physical Examination:    Vital Signs:  There were no vitals filed for this visit.    Body mass index is 30.79 kg/m².    This a well-developed, well nourished patient in no acute distress.  They are alert and oriented and  "cooperative to examination.        Left hand exam:  Skin to her left hand is clean dry and intact.  There is no erythema.  She has diffuse ecchymosis about the dorsum of the left hand extending into all digits.  She has a large hematoma in the dorsum of her hand.  It is not warm.  There are no signs or symptoms of infection.  She is neurovascularly intact throughout the left upper extremity.  She can oppose her left thumb to all digits in her left hand.  She can generally close her left hand into a fist and fully open the hand.  Capillary refill is brisk and less than 2 seconds to all digits in the left hand.    Pertinent New Results:    XRAY Report / Interpretation:   No new radiographs were taken on today's clinic visit.  However, did review results of left hand radiographs and ultrasound taken on previous visits to an outside provider.  They reveal no acute fractures or dislocations.  Visualized soft tissues appear unremarkable.    Assessment/Plan:      1. Left hand hematoma.      Discussed treatment options with the patient today.  We discussed warm moist heat versus aspiration versus surgical removal.  I think the most appropriate initial course of action would be conservative treatment with warm moist heat.  Patient is in agreement with this treatment plan.  She will try that for the next 2 weeks.  She will follow up in 2 weeks to reassess her left hand.  At that time we can further discuss aspiration versus surgical excision if she so desires.  However, I think giving this a tincture of time it should resolve on its own.    Fabian Lane, Physician Assistant, served in the capacity as a "scribe" for this patient encounter.  A "face-to-face" encounter occurred with Dr. Ralph Vann on this date.  The treatment plan and medical decision-making is outlined above. Patient was seen and examined with a chaperone.       This note was created using Dragon voice recognition software that occasionally " misinterpreted phrases or words.

## 2023-02-28 ENCOUNTER — PATIENT MESSAGE (OUTPATIENT)
Dept: CARDIOLOGY | Facility: CLINIC | Age: 73
End: 2023-02-28
Payer: MEDICARE

## 2023-02-28 ENCOUNTER — OFFICE VISIT (OUTPATIENT)
Dept: ORTHOPEDICS | Facility: CLINIC | Age: 73
End: 2023-02-28
Payer: MEDICARE

## 2023-02-28 VITALS — BODY MASS INDEX: 30.82 KG/M2 | WEIGHT: 185 LBS | HEIGHT: 65 IN

## 2023-02-28 DIAGNOSIS — T14.8XXA HEMATOMA: Primary | ICD-10-CM

## 2023-02-28 DIAGNOSIS — R23.3 SPONTANEOUS HEMATOMA OF HAND: Primary | ICD-10-CM

## 2023-02-28 PROCEDURE — 1125F PR PAIN SEVERITY QUANTIFIED, PAIN PRESENT: ICD-10-PCS | Mod: CPTII,S$GLB,, | Performed by: ORTHOPAEDIC SURGERY

## 2023-02-28 PROCEDURE — 3288F PR FALLS RISK ASSESSMENT DOCUMENTED: ICD-10-PCS | Mod: CPTII,S$GLB,, | Performed by: ORTHOPAEDIC SURGERY

## 2023-02-28 PROCEDURE — 99213 OFFICE O/P EST LOW 20 MIN: CPT | Mod: S$GLB,,, | Performed by: ORTHOPAEDIC SURGERY

## 2023-02-28 PROCEDURE — 1101F PR PT FALLS ASSESS DOC 0-1 FALLS W/OUT INJ PAST YR: ICD-10-PCS | Mod: CPTII,S$GLB,, | Performed by: ORTHOPAEDIC SURGERY

## 2023-02-28 PROCEDURE — 1159F PR MEDICATION LIST DOCUMENTED IN MEDICAL RECORD: ICD-10-PCS | Mod: CPTII,S$GLB,, | Performed by: ORTHOPAEDIC SURGERY

## 2023-02-28 PROCEDURE — 3288F FALL RISK ASSESSMENT DOCD: CPT | Mod: CPTII,S$GLB,, | Performed by: ORTHOPAEDIC SURGERY

## 2023-02-28 PROCEDURE — 99213 PR OFFICE/OUTPT VISIT, EST, LEVL III, 20-29 MIN: ICD-10-PCS | Mod: S$GLB,,, | Performed by: ORTHOPAEDIC SURGERY

## 2023-02-28 PROCEDURE — 1101F PT FALLS ASSESS-DOCD LE1/YR: CPT | Mod: CPTII,S$GLB,, | Performed by: ORTHOPAEDIC SURGERY

## 2023-02-28 PROCEDURE — 1160F PR REVIEW ALL MEDS BY PRESCRIBER/CLIN PHARMACIST DOCUMENTED: ICD-10-PCS | Mod: CPTII,S$GLB,, | Performed by: ORTHOPAEDIC SURGERY

## 2023-02-28 PROCEDURE — 3008F PR BODY MASS INDEX (BMI) DOCUMENTED: ICD-10-PCS | Mod: CPTII,S$GLB,, | Performed by: ORTHOPAEDIC SURGERY

## 2023-02-28 PROCEDURE — 1160F RVW MEDS BY RX/DR IN RCRD: CPT | Mod: CPTII,S$GLB,, | Performed by: ORTHOPAEDIC SURGERY

## 2023-02-28 PROCEDURE — 3008F BODY MASS INDEX DOCD: CPT | Mod: CPTII,S$GLB,, | Performed by: ORTHOPAEDIC SURGERY

## 2023-02-28 PROCEDURE — 1159F MED LIST DOCD IN RCRD: CPT | Mod: CPTII,S$GLB,, | Performed by: ORTHOPAEDIC SURGERY

## 2023-02-28 PROCEDURE — 1125F AMNT PAIN NOTED PAIN PRSNT: CPT | Mod: CPTII,S$GLB,, | Performed by: ORTHOPAEDIC SURGERY

## 2023-02-28 NOTE — PROGRESS NOTES
[facility]    Subjective:     Chief Complaint:   Chief Complaint   Patient presents with    Left Hand - Pain     Patient is here for a f/up on Left hand mass, has decreased in size very little, wants to discuss her options       Past Medical History:   Diagnosis Date    Arthritis     Atrial fibrillation     Bell's palsy     Boil of buttock     burst 12/19/22    COPD (chronic obstructive pulmonary disease)     GI bleed     Heart murmur     Heterozygous alpha 1-antitrypsin deficiency     History of home oxygen therapy     3L NC at night    Hypertension     Lung disease     copd    MONSERRAT (obstructive sleep apnea)     Pneumonia     Pulmonary edema        Past Surgical History:   Procedure Laterality Date    CARDIAC ELECTROPHYSIOLOGY STUDY AND ABLATION      CAROTID ENDARTERECTOMY Left 12/22/2022    Procedure: ENDARTERECTOMY-CAROTID;  Surgeon: Maximilian Connolly MD;  Location: [facility] OR;  Service: Peripheral Vascular;  Laterality: Left;    CARPAL TUNNEL RELEASE Left     CHOLECYSTECTOMY      EYE SURGERY Bilateral     cataract    HAND SURGERY      HYSTERECTOMY      INSERT / REPLACE / REMOVE PACEMAKER      KNEE ARTHROSCOPY Left     REPLACEMENT OF PACEMAKER GENERATOR Left 01/20/2022    Procedure: REPLACEMENT, PACEMAKER GENERATOR;  Surgeon: Raymond Pérez MD;  Location: [facility] CATH/EP LAB;  Service: Cardiology;  Laterality: Left;       Current Outpatient Medications   Medication Sig    acetaminophen (TYLENOL ARTHRITIS ORAL) Take 2 tablets by mouth daily as needed.    acetaminophen (TYLENOL) 325 MG tablet Take 325 mg by mouth every 6 (six) hours as needed for Pain.    albuterol (ACCUNEB) 1.25 mg/3 mL Nebu INHALE THE CONTENTS OF 1 VIAL VIA NEBULIZER EVERY 6 HOURS AS NEEDED FOR RESCUE    aspirin (ECOTRIN) 81 MG EC tablet Take 81 mg by mouth once daily.    clopidogreL (PLAVIX) 75 mg tablet TAKE 1 TABLET (75 MG TOTAL) BY MOUTH ONCE DAILY.    digoxin (LANOXIN) 250 mcg tablet TAKE 1 TABLET EVERY DAY    diltiaZEM (CARDIZEM  CD) 120 MG Cp24 Take 1 capsule (120 mg total) by mouth once daily.    fexofenadine (ALLEGRA) 60 MG tablet Take 60 mg by mouth daily as needed. Pt takes everyother day    FLUAD QUAD 2022-23,65Y UP,,PF, 60 mcg (15 mcg x 4)/0.5 mL Syrg     fluticasone propionate (FLONASE) 50 mcg/actuation nasal spray USE 2 SPRAYS (100 MCG TOTAL) IN EACH NOSTRIL ONCE DAILY.    fluticasone-umeclidin-vilanter (TRELEGY ELLIPTA) 100-62.5-25 mcg DsDv Inhale 1 puff into the lungs once daily.    furosemide (LASIX) 20 MG tablet Take 1 tablet (20 mg total) by mouth once daily.    hydrocodone-chlorpheniramine (TUSSIONEX) 10-8 mg/5 mL suspension Take 5 ml by mouth every 12 hours as needed for cough    magnesium oxide (MAG-OX) 400 mg (241.3 mg magnesium) tablet Take 400 mg by mouth once daily.    metoprolol succinate (TOPROL-XL) 100 MG 24 hr tablet Take 1 tablet (100 mg total) by mouth once daily.    pantoprazole (PROTONIX) 40 MG tablet TAKE 1 TABLET EVERY DAY    roflumilast (DALIRESP) 500 mcg Tab Take 1 tablet (500 mcg total) by mouth once daily.    rOPINIRole (REQUIP) 0.5 MG tablet TAKE 1 TABLET EVERY EVENING    sars-cov-2, covid-19, (MODERNA COVID-19) 100 mcg/0.5 ml injection     spironolactone (ALDACTONE) 25 MG tablet TAKE 1 TABLET (25 MG TOTAL) BY MOUTH ONCE DAILY.    TRELEGY ELLIPTA 100-62.5-25 mcg DsDv INHALE 1 PUFF ONE TIME DAILY    traMADoL (ULTRAM) 50 mg tablet Take 1 tablet (50 mg total) by mouth every 6 (six) hours as needed for Pain. (Patient not taking: Reported on 2/28/2023)     No current facility-administered medications for this visit.       Review of patient's allergies indicates:   Allergen Reactions    Sulfa (sulfonamide antibiotics) Itching       Family History   Problem Relation Age of Onset    Kidney failure Mother     Cancer Father     Cancer Brother        Social History     Socioeconomic History    Marital status:     Number of children: 1   Occupational History    Occupation: RETIRED     Comment: [employer]   Tobacco Use    Smoking status: Former     Packs/day: 2.00     Years: 30.00     Pack years: 60.00     Types: Cigarettes     Start date: 1971     Quit date: 2011     Years since quittin.0     Passive exposure: Past    Smokeless tobacco: Never   Substance and Sexual Activity    Alcohol use: Not Currently     Alcohol/week: 1.0 standard drink     Types: 1 Shots of liquor per week     Comment: occasional    Drug use: No    Sexual activity: Never       History of present illness: Ms. Gonzalez presents to the office today for follow-up of left hand swelling and bruising that has been present for approximately 1 month.  She does not recollect any injury or trauma to the hand.  Fortunately, she is right-hand dominant.  She is on Plavix and aspirin blood thinning medication.    Review of Systems:    Constitution: Negative for chills, fever, and sweats.  Negative for unexplained weight loss.    HENT:  Negative for headaches and blurry vision.    Cardiovascular:Negative for chest pain or irregular heart beat. Negative for hypertension.    Respiratory:  Negative for cough and shortness of breath.    Gastrointestinal: Negative for abdominal pain, heartburn, melena, nausea, and vomitting.    Genitourinary:  Negative bladder incontinence and dysuria.    Musculoskeletal:  See HPI for details.     Neurological: Negative for numbness.    Psychiatric/Behavioral: Negative for depression.  The patient is not nervous/anxious.      Endocrine: Negative for polyuria    Hematologic/Lymphatic: Negative for bleeding problem.  Does not bruise/bleed easily.    Skin: Negative for poor would healing and rash    Objective:      Physical Examination:    Vital Signs:  There were no vitals filed for this visit.    Body mass index is 30.79 kg/m².    This a well-developed, well nourished patient in no acute distress.  They are alert and oriented and cooperative to examination.        Left hand exam:  Left hand exam is unchanged from  "where she was roughly 2 weeks ago. Skin to her left hand is clean dry and intact.  There is no erythema.  She has diffuse ecchymosis about the dorsum of the left hand extending into all digits.  She has a large hematoma in the dorsum of her hand.  It is not warm.  There are no signs or symptoms of infection.  She is neurovascularly intact throughout the left upper extremity.  She can oppose her left thumb to all digits in her left hand.  She can generally close her left hand into a fist and fully open the hand.  Capillary refill is brisk and less than 2 seconds to all digits in the left hand.    Pertinent New Results:    XRAY Report / Interpretation:   No new radiographs were taken on today's clinic visit.    Assessment/Plan:      1. Left hand dorsal hematoma.      Since she is not improved with conservative treatment measures and is painful for her, we discussed surgical evacuation of this hematoma.  Risks and benefits were discussed today.  She will have to get off of her Plavix preoperatively.  She needs to obtain clearance to do so from her treating cardiologist, Dr. Pérez.  We instructed her to reach out to him.  All of her questions in regards to the procedure were answered today.  She understood and wished to proceed.  Surgical consents were obtained today.    Risks of the procedure were reviewed with the patient.  This includes, but is not limited to: infection, bleeding, pain, swelling, decreased range of motion, tendon injury, nerve injury/damage, scar formation, numbness, recurrence, wound dehiscence, and possible need for further surgery.    Fabian Lane, Physician Assistant, served in the capacity as a "scribe" for this patient encounter.  A "face-to-face" encounter occurred with Dr. Ralph Vann on this date.  The treatment plan and medical decision-making is outlined above. Patient was seen and examined with a chaperone.       This note was created using Dragon voice recognition software " that occasionally misinterpreted phrases or words.

## 2023-03-02 ENCOUNTER — HOSPITAL ENCOUNTER (OUTPATIENT)
Dept: PREADMISSION TESTING | Facility: HOSPITAL | Age: 73
Discharge: HOME | End: 2023-03-02
Attending: ORTHOPAEDIC SURGERY
Payer: MEDICARE

## 2023-03-02 ENCOUNTER — TELEPHONE (OUTPATIENT)
Dept: ORTHOPEDICS | Facility: CLINIC | Age: 73
End: 2023-03-02

## 2023-03-02 ENCOUNTER — LAB VISIT (OUTPATIENT)
Dept: LAB | Facility: HOSPITAL | Age: 73
End: 2023-03-02
Attending: ORTHOPAEDIC SURGERY
Payer: MEDICARE

## 2023-03-02 VITALS
OXYGEN SATURATION: 94 % | WEIGHT: 185 LBS | BODY MASS INDEX: 30.82 KG/M2 | HEIGHT: 65 IN | TEMPERATURE: 98 F | SYSTOLIC BLOOD PRESSURE: 120 MMHG | RESPIRATION RATE: 18 BRPM | HEART RATE: 60 BPM | DIASTOLIC BLOOD PRESSURE: 66 MMHG

## 2023-03-02 DIAGNOSIS — R23.3 SPONTANEOUS HEMATOMA OF HAND: ICD-10-CM

## 2023-03-02 LAB
ALBUMIN SERPL BCP-MCNC: 4.2 G/DL (ref 3.5–5.2)
ALP SERPL-CCNC: 84 U/L (ref 55–135)
ALT SERPL W/O P-5'-P-CCNC: 13 U/L (ref 10–44)
ANION GAP SERPL CALC-SCNC: 8 MMOL/L (ref 8–16)
AST SERPL-CCNC: 20 U/L (ref 10–40)
BASOPHILS # BLD AUTO: 0.03 K/UL (ref 0–0.2)
BASOPHILS NFR BLD: 0.3 % (ref 0–1.9)
BILIRUB SERPL-MCNC: 0.5 MG/DL (ref 0.1–1)
BUN SERPL-MCNC: 15 MG/DL (ref 8–23)
CALCIUM SERPL-MCNC: 9.3 MG/DL (ref 8.7–10.5)
CHLORIDE SERPL-SCNC: 101 MMOL/L (ref 95–110)
CO2 SERPL-SCNC: 31 MMOL/L (ref 23–29)
CREAT SERPL-MCNC: 0.6 MG/DL (ref 0.5–1.4)
DIFFERENTIAL METHOD: ABNORMAL
EOSINOPHIL # BLD AUTO: 0.2 K/UL (ref 0–0.5)
EOSINOPHIL NFR BLD: 2 % (ref 0–8)
ERYTHROCYTE [DISTWIDTH] IN BLOOD BY AUTOMATED COUNT: 15.9 % (ref 11.5–14.5)
EST. GFR  (NO RACE VARIABLE): >60 ML/MIN/1.73 M^2
GLUCOSE SERPL-MCNC: 110 MG/DL (ref 70–110)
HCT VFR BLD AUTO: 35.9 % (ref 37–48.5)
HGB BLD-MCNC: 11.4 G/DL (ref 12–16)
IMM GRANULOCYTES # BLD AUTO: 0.04 K/UL (ref 0–0.04)
IMM GRANULOCYTES NFR BLD AUTO: 0.4 % (ref 0–0.5)
LYMPHOCYTES # BLD AUTO: 1.2 K/UL (ref 1–4.8)
LYMPHOCYTES NFR BLD: 12.5 % (ref 18–48)
MCH RBC QN AUTO: 30.6 PG (ref 27–31)
MCHC RBC AUTO-ENTMCNC: 31.8 G/DL (ref 32–36)
MCV RBC AUTO: 97 FL (ref 82–98)
MONOCYTES # BLD AUTO: 0.6 K/UL (ref 0.3–1)
MONOCYTES NFR BLD: 6.3 % (ref 4–15)
NEUTROPHILS # BLD AUTO: 7.8 K/UL (ref 1.8–7.7)
NEUTROPHILS NFR BLD: 78.5 % (ref 38–73)
NRBC BLD-RTO: 0 /100 WBC
PLATELET # BLD AUTO: 272 K/UL (ref 150–450)
PMV BLD AUTO: 9.7 FL (ref 9.2–12.9)
POTASSIUM SERPL-SCNC: 3.9 MMOL/L (ref 3.5–5.1)
PROT SERPL-MCNC: 7.9 G/DL (ref 6–8.4)
RBC # BLD AUTO: 3.72 M/UL (ref 4–5.4)
SODIUM SERPL-SCNC: 140 MMOL/L (ref 136–145)
WBC # BLD AUTO: 9.95 K/UL (ref 3.9–12.7)

## 2023-03-02 PROCEDURE — 36415 COLL VENOUS BLD VENIPUNCTURE: CPT | Performed by: ORTHOPAEDIC SURGERY

## 2023-03-02 PROCEDURE — 85025 COMPLETE CBC W/AUTO DIFF WBC: CPT | Performed by: ORTHOPAEDIC SURGERY

## 2023-03-02 PROCEDURE — 80053 COMPREHEN METABOLIC PANEL: CPT | Performed by: ORTHOPAEDIC SURGERY

## 2023-03-02 NOTE — DISCHARGE INSTRUCTIONS
To confirm, Your doctor has instructed you that surgery is scheduled for: March 8     Pre-Op will call the afternoon prior to surgery between 4:00 and 6:00 PM with the final arrival time.       1 Person can come with you the day of surgery.    Please park in the Garage Parking and come through front entrance.      GO TO REGISTRATION     After registration, proceed past gift shop and through glass door ( Outpatient [place]) Check in at the nurses station to the left.   Do not eat or drink anything after midnight the night before your surgery - THIS INCLUDES  WATER, GUM, MINTS AND CANDY.  YOU MAY BRUSH YOUR TEETH BUT DO NOT SWALLOW     TAKE ONLY THESE MEDICATIONS WITH A SMALL SIP OF WATER THE MORNING OF YOUR PROCEDURE: See List     Stop aspirin and plavix         PLEASE NOTE:  The surgery schedule has many variables which may affect the time of your surgery case.  Family members should be available if your surgery time changes.  Plan to be here the day of your procedure between 4-6 hours.      DO NOT TAKE THESE MEDICATIONS 5-7 DAYS PRIOR to your procedure or per your surgeon's request: ASPIRIN, ALEVE, ADVIL, IBUPROFEN,  SHAMA SELTZER, BC , FISH OIL , VITAMIN E, HERBALS  (May take Tylenol)      ONLY if you are prescribed any types of blood thinners such as:  Aspirin, Coumadin, Plavix, Pradaxa, Xarelto, Aggrenox, Effient, Eliquis, Savasya, Brilinta, or any other, ask your surgeon whether you should stop taking them and how long before surgery you should stop.  You may also need to verify with the prescribing physician if it is ok to stop your medication.                                                        IMPORTANT INSTRUCTIONS      Do not smoke, vape or drink alcoholic beverages 24 hours prior to your procedure.  Shower the night before AND the morning of your procedure with a Chlorhexidine wash such as Hibiclens or Dial antibacterial soap from the neck down.   No lotions, powder or oils on your skin after you  shower. DO NOT WEAR DEODORANT   Do not get it on your face or in your eyes.  You may use your own shampoo and face wash. This helps your skin to be as bacteria free as possible.    DO NOT remove hair from the surgery site.  Do not shave the incision site unless you are given specific instructions to do so.    Sleep in a bed with clean sheets.    Do not sleep with a pet in the bed.   If you wear contact lenses, dentures, hearing aids or glasses, bring a container to put them in during surgery and give to a family member for safe keeping.    Please leave all jewelry, piercing's and valuables at home.   Wear rubber soled shoes (no flip flops).  If your doctor has scheduled you for an overnight stay, bring a small overnight bag with any personal items you need.    Make arrangements in advance for transportation home by a responsible adult.      You must make arrangements for transportation, TAXI'S, UBER'S OR LYFTS ARE NOT ALLOWED.        If you have any questions about these instructions, call (Monday - Friday) Pre-Op Admit  Nursing  at [phone] or the Pre-Op Day Surgery Unit at [phone].

## 2023-03-02 NOTE — PRE-PROCEDURE INSTRUCTIONS
Pre op instructions given after review of history and medications.  NPO after midnight Tuesday. States last day of aspirin and plavix is today.  Advised which meds to take am of OR - see AVS - noted hematoma to left top of hand.  Uses O2 at night - no cpap currently.  Questions answered and verbalized understanding.

## 2023-03-02 NOTE — TELEPHONE ENCOUNTER
----- Message from Caroline Salamanca sent at 3/2/2023 12:00 PM CST -----  [phone]-please call her about her meds, she is having surgery

## 2023-03-06 ENCOUNTER — OFFICE VISIT (OUTPATIENT)
Dept: ORTHOPEDICS | Facility: CLINIC | Age: 73
End: 2023-03-06
Payer: MEDICARE

## 2023-03-06 ENCOUNTER — TELEPHONE (OUTPATIENT)
Dept: ORTHOPEDICS | Facility: CLINIC | Age: 73
End: 2023-03-06

## 2023-03-06 VITALS — WEIGHT: 185 LBS | BODY MASS INDEX: 30.82 KG/M2 | HEIGHT: 65 IN

## 2023-03-06 DIAGNOSIS — T14.8XXA HEMATOMA: ICD-10-CM

## 2023-03-06 DIAGNOSIS — R22.32 MASS OF LEFT HAND: Primary | ICD-10-CM

## 2023-03-06 PROCEDURE — 99213 PR OFFICE/OUTPT VISIT, EST, LEVL III, 20-29 MIN: ICD-10-PCS | Mod: S$GLB,,, | Performed by: PHYSICIAN ASSISTANT

## 2023-03-06 PROCEDURE — 1125F PR PAIN SEVERITY QUANTIFIED, PAIN PRESENT: ICD-10-PCS | Mod: CPTII,S$GLB,, | Performed by: PHYSICIAN ASSISTANT

## 2023-03-06 PROCEDURE — 3008F BODY MASS INDEX DOCD: CPT | Mod: CPTII,S$GLB,, | Performed by: PHYSICIAN ASSISTANT

## 2023-03-06 PROCEDURE — 1101F PT FALLS ASSESS-DOCD LE1/YR: CPT | Mod: CPTII,S$GLB,, | Performed by: PHYSICIAN ASSISTANT

## 2023-03-06 PROCEDURE — 3288F PR FALLS RISK ASSESSMENT DOCUMENTED: ICD-10-PCS | Mod: CPTII,S$GLB,, | Performed by: PHYSICIAN ASSISTANT

## 2023-03-06 PROCEDURE — 1125F AMNT PAIN NOTED PAIN PRSNT: CPT | Mod: CPTII,S$GLB,, | Performed by: PHYSICIAN ASSISTANT

## 2023-03-06 PROCEDURE — 1160F RVW MEDS BY RX/DR IN RCRD: CPT | Mod: CPTII,S$GLB,, | Performed by: PHYSICIAN ASSISTANT

## 2023-03-06 PROCEDURE — 1160F PR REVIEW ALL MEDS BY PRESCRIBER/CLIN PHARMACIST DOCUMENTED: ICD-10-PCS | Mod: CPTII,S$GLB,, | Performed by: PHYSICIAN ASSISTANT

## 2023-03-06 PROCEDURE — 1159F MED LIST DOCD IN RCRD: CPT | Mod: CPTII,S$GLB,, | Performed by: PHYSICIAN ASSISTANT

## 2023-03-06 PROCEDURE — 1159F PR MEDICATION LIST DOCUMENTED IN MEDICAL RECORD: ICD-10-PCS | Mod: CPTII,S$GLB,, | Performed by: PHYSICIAN ASSISTANT

## 2023-03-06 PROCEDURE — 1101F PR PT FALLS ASSESS DOC 0-1 FALLS W/OUT INJ PAST YR: ICD-10-PCS | Mod: CPTII,S$GLB,, | Performed by: PHYSICIAN ASSISTANT

## 2023-03-06 PROCEDURE — 99213 OFFICE O/P EST LOW 20 MIN: CPT | Mod: S$GLB,,, | Performed by: PHYSICIAN ASSISTANT

## 2023-03-06 PROCEDURE — 3008F PR BODY MASS INDEX (BMI) DOCUMENTED: ICD-10-PCS | Mod: CPTII,S$GLB,, | Performed by: PHYSICIAN ASSISTANT

## 2023-03-06 PROCEDURE — 3288F FALL RISK ASSESSMENT DOCD: CPT | Mod: CPTII,S$GLB,, | Performed by: PHYSICIAN ASSISTANT

## 2023-03-06 NOTE — PROGRESS NOTES
[facility]  [address]  [place] [address]  Phone: [phone]   [phone]    Patient's PCP: OLIVA Oquendo  Referring Provider: No ref. provider found    Subjective:      Chief Complaint:   Chief Complaint   Patient presents with    Left Hand - Pain     Left hand hematoma. Patient is scheduled for surgery on Wednesday and wants to see if she still needs surgery       Past Medical History:   Diagnosis Date    Arthritis     Atrial fibrillation     Bell's palsy     Boil of buttock     burst 12/19/22    COPD (chronic obstructive pulmonary disease)     use O2  3l/m NC at night    GI bleed 2011    transfusion    Hard of hearing     Heart murmur     Hematoma 02/2023    left hand    Heterozygous alpha 1-antitrypsin deficiency     History of home oxygen therapy     3L NC at night    Hypertension     Lung disease     copd    MONSERRAT (obstructive sleep apnea)     no cpap currently    Pneumonia     Pulmonary edema     Skin cancer        Past Surgical History:   Procedure Laterality Date    CARDIAC ELECTROPHYSIOLOGY STUDY AND ABLATION      CAROTID ENDARTERECTOMY Left 12/22/2022    Procedure: ENDARTERECTOMY-CAROTID;  Surgeon: Maximilian Connolly MD;  Location: [facility] OR;  Service: Peripheral Vascular;  Laterality: Left;    CARPAL TUNNEL RELEASE Left     CHOLECYSTECTOMY      EYE SURGERY Bilateral     cataract    HYSTERECTOMY      INSERT / REPLACE / REMOVE PACEMAKER      KNEE ARTHROSCOPY Left     REPLACEMENT OF PACEMAKER GENERATOR Left 01/20/2022    Procedure: REPLACEMENT, PACEMAKER GENERATOR;  Surgeon: Raymond Pérez MD;  Location: [facility] CATH/EP LAB;  Service: Cardiology;  Laterality: Left;    SKIN CANCER EXCISION         Current Outpatient Medications   Medication Sig    acetaminophen (TYLENOL ARTHRITIS ORAL) Take 2 tablets by mouth daily as needed.    acetaminophen (TYLENOL) 325 MG tablet Take 325 mg by mouth every 6 (six) hours as needed for Pain.    albuterol (ACCUNEB) 1.25 mg/3 mL Nebu  INHALE THE CONTENTS OF 1 VIAL VIA NEBULIZER EVERY 6 HOURS AS NEEDED FOR RESCUE    aspirin (ECOTRIN) 81 MG EC tablet Take 81 mg by mouth once daily.    clopidogreL (PLAVIX) 75 mg tablet TAKE 1 TABLET (75 MG TOTAL) BY MOUTH ONCE DAILY.    digoxin (LANOXIN) 250 mcg tablet TAKE 1 TABLET EVERY DAY (Patient taking differently: Take 250 mcg by mouth once daily.)    diltiaZEM (CARDIZEM CD) 120 MG Cp24 Take 1 capsule (120 mg total) by mouth once daily.    fexofenadine (ALLEGRA) 60 MG tablet Take 60 mg by mouth daily as needed. Pt takes everyother day    FLUAD QUAD 2022-23,65Y UP,,PF, 60 mcg (15 mcg x 4)/0.5 mL Syrg     fluticasone propionate (FLONASE) 50 mcg/actuation nasal spray USE 2 SPRAYS (100 MCG TOTAL) IN EACH NOSTRIL ONCE DAILY. (Patient taking differently: 1 spray by Each Nostril route Daily.)    fluticasone-umeclidin-vilanter (TRELEGY ELLIPTA) 100-62.5-25 mcg DsDv Inhale 1 puff into the lungs once daily.    furosemide (LASIX) 20 MG tablet Take 1 tablet (20 mg total) by mouth once daily.    hydrocodone-chlorpheniramine (TUSSIONEX) 10-8 mg/5 mL suspension Take 5 ml by mouth every 12 hours as needed for cough (Patient not taking: Reported on 3/2/2023)    magnesium oxide (MAG-OX) 400 mg (241.3 mg magnesium) tablet Take 400 mg by mouth once daily.    metoprolol succinate (TOPROL-XL) 100 MG 24 hr tablet Take 1 tablet (100 mg total) by mouth once daily.    pantoprazole (PROTONIX) 40 MG tablet TAKE 1 TABLET EVERY DAY (Patient taking differently: Take 40 mg by mouth once daily.)    roflumilast (DALIRESP) 500 mcg Tab Take 1 tablet (500 mcg total) by mouth once daily.    rOPINIRole (REQUIP) 0.5 MG tablet TAKE 1 TABLET EVERY EVENING (Patient taking differently: Take 0.5 mg by mouth every evening.)    spironolactone (ALDACTONE) 25 MG tablet TAKE 1 TABLET (25 MG TOTAL) BY MOUTH ONCE DAILY.    TRELEGY ELLIPTA 100-62.5-25 mcg DsDv INHALE 1 PUFF ONE TIME DAILY     No current facility-administered medications for this visit.        Review of patient's allergies indicates:   Allergen Reactions    Sulfa (sulfonamide antibiotics) Itching       Family History   Problem Relation Age of Onset    Kidney failure Mother     Cancer Father     Cancer Brother        Social History     Socioeconomic History    Marital status:     Number of children: 1   Occupational History    Occupation: RETIRED     Comment: [employer]   Tobacco Use    Smoking status: Former     Packs/day: 2.00     Years: 30.00     Pack years: 60.00     Types: Cigarettes     Start date: 1971     Quit date: 2011     Years since quittin.0     Passive exposure: Past    Smokeless tobacco: Never   Substance and Sexual Activity    Alcohol use: Not Currently     Alcohol/week: 1.0 standard drink     Types: 1 Shots of liquor per week     Comment: occasional    Drug use: No    Sexual activity: Never       History of present illness:  Ms. Gonzalez comes in today for follow-up for hematoma mass on the dorsum of her left hand.  This has been present for approximally 1 month.  This started after some blunt trauma to the dorsum of the hand.  Unfortunately she is on Plavix and that exacerbated the bleeding/swelling subcutaneously.  She was scheduled for a hematoma evacuation surgical procedure for later this week.  She was not improving initially with warm wet compresses.  However, she is made great improvements over the past several days with these warm wet compresses and she feels the mass is much smaller and she is concerned that she may not actually have to proceed with surgical intervention.  She comes in today to have this evaluated and to have these questions answered.    Review of Systems:    Constitutional: Negative for chills, fever and weight loss.   HENT: Negative for congestion.    Eyes: Negative for discharge and redness.   Respiratory: Negative for cough and shortness of breath.    Cardiovascular: Negative for chest pain.   Gastrointestinal: Negative  VSS  General: Well appearing female in no acute distress  HEENT: Normocephalic, atraumatic. Moist mucous membranes. Oropharynx clear. No lymphadenopathy.  Eyes: No scleral icterus. EOMI. GABRIEL.  Neck:. Soft and supple. Full ROM without pain. No midline tenderness  Cardiac: Regular rate and regular rhythm. No murmurs, rubs, gallops. Peripheral pulses 2+ and symmetric. No LE edema.  Resp: Lungs CTAB. Speaking in full sentences. No wheezes, rales or rhonchi.  Abd: Soft, non-tender, non-distended. No guarding or rebound. No scars, masses, or lesions.  Back: Spine midline and non-tender. No CVA tenderness.    Skin: No rashes, abrasions, or lacerations.  Neuro: AO x 3. Moves all extremities symmetrically. Motor strength and sensation grossly intact. for nausea and vomiting.   Musculoskeletal: See HPI.   Skin: Negative for rash.   Neurological: Negative for headaches.   Endo/Heme/Allergies: Does not bruise/bleed easily.   Psychiatric/Behavioral: The patient is not nervous/anxious.    All other systems reviewed and are negative.       Objective:      Physical Examination:    Vital Signs:  There were no vitals filed for this visit.    Body mass index is 30.79 kg/m².    This a well-developed, well nourished patient in no acute distress.  They are alert and oriented and cooperative to examination.     Left hand exam:  Her left hand exam is similar to where she was on her last visit. Skin to her left hand is clean dry and intact.  There is no erythema.  She has diffuse ecchymosis about the dorsum of the left hand extending into all digits.  She continues to have hematoma in the dorsum of her hand that is definitely smaller and less ecchymotic than previous visits.  It is not warm.  There are no signs or symptoms of infection.  She is neurovascularly intact throughout the left upper extremity.  She can oppose her left thumb to all digits in her left hand.  She can generally close her left hand into a fist and fully open the hand.  Capillary refill is brisk and less than 2 seconds to all digits in the left hand.    Pertinent New Results:        XRAY Report / Interpretation:   No new radiographs were taken on today's clinic visit.      Assessment:       1. Mass of left hand    2. Hematoma      Plan:     Mass of left hand    Hematoma        Follow up if symptoms worsen or fail to improve.    Considering she is making improvements with her conservative treatment measures of warm wet compresses, she will continue in that manner with conservative treatment and cancel her surgery scheduled for later this week.  I believe this is completely acceptable course of action.  Did explain to her she regresses or worsens in any way, she should follow up with us as soon as she can.  I  do believe that this will continue to improve and ultimately resolve with conservative treatment and a tincture of time.        Fabian Lane, CHAVEZ, PA-C    This note was created using [employer] voice recognition software that occasionally misinterprets words or phrases.   26.8

## 2023-03-06 NOTE — TELEPHONE ENCOUNTER
Spoke with the patient. She states the hematoma has decreased in size. She will come to office for us to look at it and decide if surgery is still necessary on 3/8.

## 2023-03-06 NOTE — TELEPHONE ENCOUNTER
----- Message from Caroline Salamanca sent at 3/6/2023  9:06 AM CST -----  [phone]-Please call her about her surgery, she may want to r/s

## 2023-03-23 ENCOUNTER — TELEPHONE (OUTPATIENT)
Dept: PULMONOLOGY | Facility: CLINIC | Age: 73
End: 2023-03-23

## 2023-03-23 ENCOUNTER — HOSPITAL ENCOUNTER (OUTPATIENT)
Dept: RADIOLOGY | Facility: HOSPITAL | Age: 73
Discharge: HOME | End: 2023-03-23
Attending: NURSE PRACTITIONER
Payer: MEDICARE

## 2023-03-23 ENCOUNTER — OFFICE VISIT (OUTPATIENT)
Dept: PULMONOLOGY | Facility: CLINIC | Age: 73
End: 2023-03-23
Payer: MEDICARE

## 2023-03-23 ENCOUNTER — PATIENT MESSAGE (OUTPATIENT)
Dept: PULMONOLOGY | Facility: CLINIC | Age: 73
End: 2023-03-23

## 2023-03-23 VITALS
DIASTOLIC BLOOD PRESSURE: 80 MMHG | HEIGHT: 65 IN | OXYGEN SATURATION: 86 % | WEIGHT: 187 LBS | BODY MASS INDEX: 31.16 KG/M2 | SYSTOLIC BLOOD PRESSURE: 145 MMHG | TEMPERATURE: 98 F | HEART RATE: 60 BPM

## 2023-03-23 DIAGNOSIS — R05.9 COUGH, UNSPECIFIED TYPE: ICD-10-CM

## 2023-03-23 DIAGNOSIS — R05.9 COUGH, UNSPECIFIED TYPE: Primary | ICD-10-CM

## 2023-03-23 PROCEDURE — 3079F DIAST BP 80-89 MM HG: CPT | Mod: CPTII,S$GLB,, | Performed by: NURSE PRACTITIONER

## 2023-03-23 PROCEDURE — 71046 X-RAY EXAM CHEST 2 VIEWS: CPT | Mod: TC

## 2023-03-23 PROCEDURE — 1159F PR MEDICATION LIST DOCUMENTED IN MEDICAL RECORD: ICD-10-PCS | Mod: CPTII,S$GLB,, | Performed by: NURSE PRACTITIONER

## 2023-03-23 PROCEDURE — 3077F SYST BP >= 140 MM HG: CPT | Mod: CPTII,S$GLB,, | Performed by: NURSE PRACTITIONER

## 2023-03-23 PROCEDURE — 3079F PR MOST RECENT DIASTOLIC BLOOD PRESSURE 80-89 MM HG: ICD-10-PCS | Mod: CPTII,S$GLB,, | Performed by: NURSE PRACTITIONER

## 2023-03-23 PROCEDURE — 3008F BODY MASS INDEX DOCD: CPT | Mod: CPTII,S$GLB,, | Performed by: NURSE PRACTITIONER

## 2023-03-23 PROCEDURE — 3077F PR MOST RECENT SYSTOLIC BLOOD PRESSURE >= 140 MM HG: ICD-10-PCS | Mod: CPTII,S$GLB,, | Performed by: NURSE PRACTITIONER

## 2023-03-23 PROCEDURE — 1125F AMNT PAIN NOTED PAIN PRSNT: CPT | Mod: CPTII,S$GLB,, | Performed by: NURSE PRACTITIONER

## 2023-03-23 PROCEDURE — 99214 PR OFFICE/OUTPT VISIT, EST, LEVL IV, 30-39 MIN: ICD-10-PCS | Mod: S$GLB,,, | Performed by: NURSE PRACTITIONER

## 2023-03-23 PROCEDURE — 99214 OFFICE O/P EST MOD 30 MIN: CPT | Mod: S$GLB,,, | Performed by: NURSE PRACTITIONER

## 2023-03-23 PROCEDURE — 1159F MED LIST DOCD IN RCRD: CPT | Mod: CPTII,S$GLB,, | Performed by: NURSE PRACTITIONER

## 2023-03-23 PROCEDURE — 1125F PR PAIN SEVERITY QUANTIFIED, PAIN PRESENT: ICD-10-PCS | Mod: CPTII,S$GLB,, | Performed by: NURSE PRACTITIONER

## 2023-03-23 PROCEDURE — 3008F PR BODY MASS INDEX (BMI) DOCUMENTED: ICD-10-PCS | Mod: CPTII,S$GLB,, | Performed by: NURSE PRACTITIONER

## 2023-03-23 RX ORDER — MULTIVITAMIN
1 TABLET ORAL DAILY
COMMUNITY

## 2023-03-23 RX ORDER — DOXYCYCLINE HYCLATE 100 MG
100 TABLET ORAL 2 TIMES DAILY
Qty: 14 TABLET | Refills: 0 | Status: SHIPPED | OUTPATIENT
Start: 2023-03-23 | End: 2023-07-25

## 2023-03-23 RX ORDER — PREDNISONE 10 MG/1
TABLET ORAL
Qty: 20 TABLET | Refills: 0 | Status: SHIPPED | OUTPATIENT
Start: 2023-03-23 | End: 2023-07-12

## 2023-03-23 NOTE — TELEPHONE ENCOUNTER
Chest xray  IMPRESSION:     1.  Hazy opacification obscuring the costophrenic angle and portions of the right hemidiaphragm is mildly increased from previous exam felt to reflect chronic pleural effusion with overlying passive atelectasis, scarring or consolidation.  2.  Blunting of the left costophrenic angle likely reflects a small pleural effusion.  3.  Mild bilateral interstitial thickening is similar to previous exam.  4.  Enlarged cardiomediastinal silhouette.

## 2023-03-23 NOTE — PATIENT INSTRUCTIONS
Continue the Trelegy daily, rinse after you use it  Chest xray   Prednisone taper   Doxycycline 100mg by mouth twice a day for a week   Sputum culture  Mucinex 1200mg twice a day.   Keep sleeping on your oxygen  Move slow, stop if you get short of breath

## 2023-03-23 NOTE — PROGRESS NOTES
SUBJECTIVE:    Patient ID: Lisa Smith is a 72 y.o. female.    Chief Complaint: Cough, Shortness of Breath, and Sputum Production    Patient here today having COPD exacerbation.  She states it started Sunday with a sore throat then has gotten progressively worse. She has been expectorating yellow, brown, green, some blood tinged occasional mucous. She has bronchiectasis, a smart vest was ordered a while ago but the patient could not afford the co pay for the device.  Prolastin was also ordered in past but cost was an issue as well.  She is an MZ.  She is taking Mucinex twice a day.  She has an acapella.  She is using Trelegy and taking Daliresp. She is sleeping on oxygen.  Her sats when she first walked in were 86%, after she sat she marjorie to 93 on room air.  She would like to hold off on ordering portable oxygen as she states she has been fine and checks her sats.   Past Medical History:   Diagnosis Date    Arthritis     Atrial fibrillation     Bell's palsy     Boil of buttock     burst 12/19/22    COPD (chronic obstructive pulmonary disease)     use O2  3l/m NC at night    GI bleed 2011    transfusion    Hard of hearing     Heart murmur     Hematoma 02/2023    left hand    Heterozygous alpha 1-antitrypsin deficiency     History of home oxygen therapy     3L NC at night    Hypertension     Lung disease     copd    MONSERRAT (obstructive sleep apnea)     no cpap currently    Pneumonia     Pulmonary edema     Skin cancer      Past Surgical History:   Procedure Laterality Date    CARDIAC ELECTROPHYSIOLOGY STUDY AND ABLATION      CAROTID ENDARTERECTOMY Left 12/22/2022    Procedure: ENDARTERECTOMY-CAROTID;  Surgeon: Maximilian Connolly MD;  Location: [facility];  Service: Peripheral Vascular;  Laterality: Left;    CARPAL TUNNEL RELEASE Left     CHOLECYSTECTOMY      EYE SURGERY Bilateral     cataract    HYSTERECTOMY      INSERT / REPLACE / REMOVE PACEMAKER      KNEE ARTHROSCOPY Left     REPLACEMENT OF PACEMAKER GENERATOR  Left 01/20/2022    Procedure: REPLACEMENT, PACEMAKER GENERATOR;  Surgeon: Raymond Pérez MD;  Location: [facility] CATH/EP LAB;  Service: Cardiology;  Laterality: Left;    SKIN CANCER EXCISION       Family History   Problem Relation Age of Onset    Kidney failure Mother     Cancer Father     Cancer Brother         Social History:   Marital Status:   Occupation: housewife  Alcohol History:  reports that she does not currently use alcohol after a past usage of about 1.0 standard drink per week.  Tobacco History:  reports that she quit smoking about 12 years ago. Her smoking use included cigarettes. She started smoking about 52 years ago. She has a 60.00 pack-year smoking history. She has been exposed to tobacco smoke. She has never used smokeless tobacco.  Drug History:  reports no history of drug use.    Review of patient's allergies indicates:   Allergen Reactions    Sulfa (sulfonamide antibiotics) Itching       Current Outpatient Medications   Medication Sig Dispense Refill    acetaminophen (TYLENOL ARTHRITIS ORAL) Take 2 tablets by mouth daily as needed.      acetaminophen (TYLENOL) 325 MG tablet Take 325 mg by mouth every 6 (six) hours as needed for Pain.      albuterol (ACCUNEB) 1.25 mg/3 mL Nebu INHALE THE CONTENTS OF 1 VIAL VIA NEBULIZER EVERY 6 HOURS AS NEEDED FOR RESCUE 360 mL 5    aspirin (ECOTRIN) 81 MG EC tablet Take 81 mg by mouth once daily.      clopidogreL (PLAVIX) 75 mg tablet TAKE 1 TABLET (75 MG TOTAL) BY MOUTH ONCE DAILY. 90 tablet 3    digoxin (LANOXIN) 250 mcg tablet TAKE 1 TABLET EVERY DAY (Patient taking differently: Take 250 mcg by mouth once daily.) 90 tablet 3    diltiaZEM (CARDIZEM CD) 120 MG Cp24 Take 1 capsule (120 mg total) by mouth once daily. 90 capsule 3    fexofenadine (ALLEGRA) 60 MG tablet Take 60 mg by mouth daily as needed. Pt takes everyother day      FLUAD QUAD 2022-23,65Y UP,,PF, 60 mcg (15 mcg x 4)/0.5 mL Syrg       fluticasone propionate (FLONASE) 50 mcg/actuation nasal  spray USE 2 SPRAYS (100 MCG TOTAL) IN EACH NOSTRIL ONCE DAILY. (Patient taking differently: 1 spray by Each Nostril route Daily.) 48 g 5    fluticasone-umeclidin-vilanter (TRELEGY ELLIPTA) 100-62.5-25 mcg DsDv Inhale 1 puff into the lungs once daily. 3 each 6    furosemide (LASIX) 20 MG tablet Take 1 tablet (20 mg total) by mouth once daily. 90 tablet 3    hydrocodone-chlorpheniramine (TUSSIONEX) 10-8 mg/5 mL suspension Take 5 ml by mouth every 12 hours as needed for cough 115 mL 0    magnesium oxide (MAG-OX) 400 mg (241.3 mg magnesium) tablet Take 400 mg by mouth once daily.      metoprolol succinate (TOPROL-XL) 100 MG 24 hr tablet Take 1 tablet (100 mg total) by mouth once daily. 90 tablet 3    multivitamin (THERAGRAN) per tablet Take 1 tablet by mouth once daily.      pantoprazole (PROTONIX) 40 MG tablet TAKE 1 TABLET EVERY DAY (Patient taking differently: Take 40 mg by mouth once daily.) 90 tablet 3    roflumilast (DALIRESP) 500 mcg Tab Take 1 tablet (500 mcg total) by mouth once daily. 90 tablet 6    rOPINIRole (REQUIP) 0.5 MG tablet TAKE 1 TABLET EVERY EVENING (Patient taking differently: Take 0.5 mg by mouth every evening.) 90 tablet 0    spironolactone (ALDACTONE) 25 MG tablet TAKE 1 TABLET (25 MG TOTAL) BY MOUTH ONCE DAILY. 90 tablet 3    TRELEGY ELLIPTA 100-62.5-25 mcg DsDv INHALE 1 PUFF ONE TIME DAILY 3 each 6     No current facility-administered medications for this visit.       Alpha-1 Antitrypsin: MZ, patient refused prolastin treatment because of cost    Last PFT:10/2020 moderate obstruction with good response, air trapping  moderate diffusion defect    IMPRESSION:11/22  1. No suspicious pulmonary nodules or masses.  2. Stable scattered right lung airspace opacities suggesting chronic atelectasis and scarring, or perhaps organizing pneumonia.  3. Additional scattered subsegmental atelectasis, subpleural scarring and findings of small airways inflammation, with localized bronchiectasis and mucous  "plugging in the left lower lobe.  4. Stable trace right pleural effusion.  5. Cardiomegaly, with aortic and coronary arterial calcifications, and enlarged central pulmonary arteries suggesting pulmonary arterial hypertension.Ct chest       General: does not feel well.   Eyes: Vision is good.  ENT:  No current issues   Heart:: no chest pain, no palpitations   Lungs: increased dyspnea, especially at night, expectorating lots more mucous  GI: No Nausea, vomiting, constipation, diarrhea, or reflux.  : No dysuria, hesitancy, or nocturia.  Musculoskeletal:  back pain   Skin: No lesions or rashes.  Neuro: headaches, neuropathy   Lymph: No edema or adenopathy.  Psych: No anxiety or depression.  Endo: weight gain     OBJECTIVE:      BP (!) 145/80 (BP Location: Right arm, Patient Position: Sitting, BP Method: Medium (Manual))   Pulse 60   Temp 98.4 °F (36.9 °C)   Ht 5' 5" (1.651 m)   Wt 84.8 kg (187 lb)   LMP  (LMP Unknown)   SpO2 (!) 86% Comment: pt put on 2LPM sats went up to 95  BMI 31.12 kg/m²      Rechecked on room air at rest 93    Physical Exam  GENERAL: Older patient in no distress.  HEENT: Pupils equal and reactive. Extraocular movements intact. Nose intact.      Pharynx moist.  NECK: Supple.   HEART: Regular rate and rhythm.  murmur   LUNGS: inspiratory squeak and wheeze, with rhonchi, yellow, brown mucous expectorated   ABDOMEN: Bowel sounds present. Non-tender, no masses palpated.  EXTREMITIES: Normal muscle tone and joint movement, no cyanosis or clubbing.   LYMPHATICS: No adenopathy palpated, no edema.  SKIN: Dry, intact, no lesions.   NEURO: Cranial nerves II-XII intact. Motor strength 5/5 bilaterally, upper and lower extremities.  PSYCH: Appropriate affect.    Assessment:     COPD exacerbation  Bronchiectasis  Nocturnal hypoxemia   Plan:         Continue the Trelegy daily, rinse after you use it  Use your acapella.   Chest xray   Prednisone taper   Doxycycline 100mg by mouth twice a day for a week "   Sputum culture  Mucinex 1200mg twice a day.   Keep sleeping on your oxygen  Move slow, stop if you get short of breath   If you get worse go to the ER

## 2023-03-24 ENCOUNTER — PATIENT MESSAGE (OUTPATIENT)
Dept: PULMONOLOGY | Facility: CLINIC | Age: 73
End: 2023-03-24

## 2023-03-24 NOTE — TELEPHONE ENCOUNTER
Will repeat chest xray in 2 weeks.  Patient favors her right side at night. Patient aware. She will finish the antibiotics.  She did submit a sputum culture as well.  She would benefit so much from a percussion vest but states she can not afford the copay.

## 2023-04-04 ENCOUNTER — TELEPHONE (OUTPATIENT)
Dept: PULMONOLOGY | Facility: CLINIC | Age: 73
End: 2023-04-04

## 2023-04-04 ENCOUNTER — PATIENT MESSAGE (OUTPATIENT)
Dept: PULMONOLOGY | Facility: CLINIC | Age: 73
End: 2023-04-04

## 2023-04-04 ENCOUNTER — HOSPITAL ENCOUNTER (OUTPATIENT)
Dept: RADIOLOGY | Facility: HOSPITAL | Age: 73
Discharge: HOME | End: 2023-04-04
Attending: NURSE PRACTITIONER
Payer: MEDICARE

## 2023-04-04 DIAGNOSIS — J47.9 BRONCHIECTASIS WITHOUT COMPLICATION: Primary | ICD-10-CM

## 2023-04-04 DIAGNOSIS — J18.9 PNEUMONIA DUE TO INFECTIOUS ORGANISM, UNSPECIFIED LATERALITY, UNSPECIFIED PART OF LUNG: ICD-10-CM

## 2023-04-04 DIAGNOSIS — J18.9 PNEUMONIA DUE TO INFECTIOUS ORGANISM, UNSPECIFIED LATERALITY, UNSPECIFIED PART OF LUNG: Primary | ICD-10-CM

## 2023-04-04 PROCEDURE — 71046 X-RAY EXAM CHEST 2 VIEWS: CPT | Mod: TC

## 2023-04-04 NOTE — TELEPHONE ENCOUNTER
Spoke with the patient she is feeling better but still coughing up lots and lots of mucous. She is doing acapalla. She can not do postural drainage because of her back.  She can not afford percussion vest.     Will do 3 first morning sputums for [employer]

## 2023-04-04 NOTE — TELEPHONE ENCOUNTER
Chest xray  IMPRESSION: Cardiomegaly with slight improvement of the interstitial and groundglass opacity in the right mid and lower lung with tiny right pleural effusion differential includes resolving edema or pneumonia

## 2023-04-06 ENCOUNTER — TELEPHONE (OUTPATIENT)
Dept: PULMONOLOGY | Facility: CLINIC | Age: 73
End: 2023-04-06

## 2023-04-06 ENCOUNTER — TELEPHONE (OUTPATIENT)
Dept: PULMONOLOGY | Facility: HOSPITAL | Age: 73
End: 2023-04-06

## 2023-04-06 DIAGNOSIS — J47.9 BRONCHIECTASIS WITHOUT COMPLICATION: Primary | ICD-10-CM

## 2023-04-06 RX ORDER — PREDNISONE 10 MG/1
TABLET ORAL
Qty: 20 TABLET | Refills: 0 | Status: SHIPPED | OUTPATIENT
Start: 2023-04-06 | End: 2023-07-12

## 2023-04-06 RX ORDER — BENZONATATE 200 MG/1
200 CAPSULE ORAL 3 TIMES DAILY PRN
Qty: 90 CAPSULE | Refills: 11 | Status: SHIPPED | OUTPATIENT
Start: 2023-04-06 | End: 2023-04-16

## 2023-04-06 RX ORDER — AMOXICILLIN AND CLAVULANATE POTASSIUM 875; 125 MG/1; MG/1
1 TABLET, FILM COATED ORAL 2 TIMES DAILY
Qty: 14 TABLET | Refills: 0 | Status: SHIPPED | OUTPATIENT
Start: 2023-04-06 | End: 2023-06-16

## 2023-04-06 NOTE — TELEPHONE ENCOUNTER
Patient coughing a lot especially at night.  No orthopnea, no increase in edema. Sputum is milky with some blood tinging.  She feels exhausted.  Will send in another quick prednisone taper, augmentin, and tessalon    CXR looks more like fluid and atelectasis than pneumonia. She will weigh daily.

## 2023-04-06 NOTE — TELEPHONE ENCOUNTER
PT CALLED REQUESTING MORE ANTIBIOTICS AND PREDNISONE CALLED INTO HER PHARMACY BECAUSE SHES STILL COUGHING AND SOME SOB.  NOT AS BAD AS IT WAS BUT ITS STILL THERE.  SHE HAS NOT DONE SPUTUM CULTURES.

## 2023-04-10 ENCOUNTER — LAB VISIT (OUTPATIENT)
Dept: LAB | Facility: HOSPITAL | Age: 73
End: 2023-04-10
Attending: NURSE PRACTITIONER
Payer: MEDICARE

## 2023-04-10 DIAGNOSIS — J47.9 BRONCHIECTASIS WITHOUT COMPLICATION: ICD-10-CM

## 2023-04-10 PROCEDURE — 87206 SMEAR FLUORESCENT/ACID STAI: CPT | Mod: 91 | Performed by: NURSE PRACTITIONER

## 2023-04-10 PROCEDURE — 87118 MYCOBACTERIC IDENTIFICATION: CPT | Mod: 91 | Performed by: NURSE PRACTITIONER

## 2023-04-10 PROCEDURE — 87116 MYCOBACTERIA CULTURE: CPT | Mod: 59 | Performed by: NURSE PRACTITIONER

## 2023-04-19 ENCOUNTER — OFFICE VISIT (OUTPATIENT)
Dept: PULMONOLOGY | Facility: CLINIC | Age: 73
End: 2023-04-19
Payer: MEDICARE

## 2023-04-19 VITALS
HEART RATE: 63 BPM | OXYGEN SATURATION: 95 % | BODY MASS INDEX: 30.29 KG/M2 | SYSTOLIC BLOOD PRESSURE: 130 MMHG | WEIGHT: 182 LBS | DIASTOLIC BLOOD PRESSURE: 68 MMHG

## 2023-04-19 DIAGNOSIS — J44.9 CHRONIC OBSTRUCTIVE PULMONARY DISEASE, UNSPECIFIED COPD TYPE: Primary | Chronic | ICD-10-CM

## 2023-04-19 DIAGNOSIS — G47.34 NOCTURNAL HYPOXEMIA: ICD-10-CM

## 2023-04-19 DIAGNOSIS — J47.9 BRONCHIECTASIS WITHOUT COMPLICATION: ICD-10-CM

## 2023-04-19 PROCEDURE — 99213 PR OFFICE/OUTPT VISIT, EST, LEVL III, 20-29 MIN: ICD-10-PCS | Mod: S$GLB,,, | Performed by: NURSE PRACTITIONER

## 2023-04-19 PROCEDURE — 1126F AMNT PAIN NOTED NONE PRSNT: CPT | Mod: CPTII,S$GLB,, | Performed by: NURSE PRACTITIONER

## 2023-04-19 PROCEDURE — 3075F PR MOST RECENT SYSTOLIC BLOOD PRESS GE 130-139MM HG: ICD-10-PCS | Mod: CPTII,S$GLB,, | Performed by: NURSE PRACTITIONER

## 2023-04-19 PROCEDURE — 1159F PR MEDICATION LIST DOCUMENTED IN MEDICAL RECORD: ICD-10-PCS | Mod: CPTII,S$GLB,, | Performed by: NURSE PRACTITIONER

## 2023-04-19 PROCEDURE — 3008F BODY MASS INDEX DOCD: CPT | Mod: CPTII,S$GLB,, | Performed by: NURSE PRACTITIONER

## 2023-04-19 PROCEDURE — 3078F DIAST BP <80 MM HG: CPT | Mod: CPTII,S$GLB,, | Performed by: NURSE PRACTITIONER

## 2023-04-19 PROCEDURE — 1126F PR PAIN SEVERITY QUANTIFIED, NO PAIN PRESENT: ICD-10-PCS | Mod: CPTII,S$GLB,, | Performed by: NURSE PRACTITIONER

## 2023-04-19 PROCEDURE — 3078F PR MOST RECENT DIASTOLIC BLOOD PRESSURE < 80 MM HG: ICD-10-PCS | Mod: CPTII,S$GLB,, | Performed by: NURSE PRACTITIONER

## 2023-04-19 PROCEDURE — 3075F SYST BP GE 130 - 139MM HG: CPT | Mod: CPTII,S$GLB,, | Performed by: NURSE PRACTITIONER

## 2023-04-19 PROCEDURE — 99213 OFFICE O/P EST LOW 20 MIN: CPT | Mod: S$GLB,,, | Performed by: NURSE PRACTITIONER

## 2023-04-19 PROCEDURE — 3008F PR BODY MASS INDEX (BMI) DOCUMENTED: ICD-10-PCS | Mod: CPTII,S$GLB,, | Performed by: NURSE PRACTITIONER

## 2023-04-19 PROCEDURE — 1159F MED LIST DOCD IN RCRD: CPT | Mod: CPTII,S$GLB,, | Performed by: NURSE PRACTITIONER

## 2023-04-19 NOTE — PATIENT INSTRUCTIONS
Continue the Trelegy daily, rinse after you use it  Use your acapella.   Elevate head of bed  No eating or drinking at least 1.5 hours before bed   Keep sleeping on your oxygen

## 2023-04-19 NOTE — PROGRESS NOTES
SUBJECTIVE:    Patient ID: Lisa Smith is a 72 y.o. female.    Chief Complaint: Follow-up, Cough (Follow up cough/Cough is better), and COPD    Patient here today feeling much better compared to her previous visit. She states she is back to baseline.  The sputums for [employer] are pending but negative so far.  She is using her Acapella which does help her expectorate the mucous. The percussion vest copay was too high for her so she could not get when it was ordered last year.  She is using Trelegy.  She is sleeping on her oxygen. She coughs more at night when she lays down. She is taking her reflux medication but does occasionally have heartburn.    Past Medical History:   Diagnosis Date    Arthritis     Atrial fibrillation     Bell's palsy     Boil of buttock     burst 12/19/22    COPD (chronic obstructive pulmonary disease)     use O2  3l/m NC at night    GI bleed 2011    transfusion    Hard of hearing     Heart murmur     Hematoma 02/2023    left hand    Heterozygous alpha 1-antitrypsin deficiency     History of home oxygen therapy     3L NC at night    Hypertension     Lung disease     copd    MONSERRAT (obstructive sleep apnea)     no cpap currently    Pneumonia     Pulmonary edema     Skin cancer      Past Surgical History:   Procedure Laterality Date    CARDIAC ELECTROPHYSIOLOGY STUDY AND ABLATION      CAROTID ENDARTERECTOMY Left 12/22/2022    Procedure: ENDARTERECTOMY-CAROTID;  Surgeon: Maximilian Connolly MD;  Location: [facility] OR;  Service: Peripheral Vascular;  Laterality: Left;    CARPAL TUNNEL RELEASE Left     CHOLECYSTECTOMY      EYE SURGERY Bilateral     cataract    HYSTERECTOMY      INSERT / REPLACE / REMOVE PACEMAKER      KNEE ARTHROSCOPY Left     REPLACEMENT OF PACEMAKER GENERATOR Left 01/20/2022    Procedure: REPLACEMENT, PACEMAKER GENERATOR;  Surgeon: Raymond Pérez MD;  Location: [facility] CATH/EP LAB;  Service: Cardiology;  Laterality: Left;    SKIN CANCER EXCISION       Family History   Problem Relation  Age of Onset    Kidney failure Mother     Cancer Father     Cancer Brother         Social History:   Marital Status:   Occupation: housewife  Alcohol History:  reports that she does not currently use alcohol after a past usage of about 1.0 standard drink per week.  Tobacco History:  reports that she quit smoking about 12 years ago. Her smoking use included cigarettes. She started smoking about 52 years ago. She has a 60.00 pack-year smoking history. She has been exposed to tobacco smoke. She has never used smokeless tobacco.  Drug History:  reports no history of drug use.    Review of patient's allergies indicates:   Allergen Reactions    Sulfa (sulfonamide antibiotics) Itching       Current Outpatient Medications   Medication Sig Dispense Refill    acetaminophen (TYLENOL ARTHRITIS ORAL) Take 2 tablets by mouth daily as needed.      acetaminophen (TYLENOL) 325 MG tablet Take 325 mg by mouth every 6 (six) hours as needed for Pain.      albuterol (ACCUNEB) 1.25 mg/3 mL Nebu INHALE THE CONTENTS OF 1 VIAL VIA NEBULIZER EVERY 6 HOURS AS NEEDED FOR RESCUE 360 mL 5    aspirin (ECOTRIN) 81 MG EC tablet Take 81 mg by mouth once daily.      clopidogreL (PLAVIX) 75 mg tablet TAKE 1 TABLET (75 MG TOTAL) BY MOUTH ONCE DAILY. 90 tablet 3    digoxin (LANOXIN) 250 mcg tablet TAKE 1 TABLET EVERY DAY (Patient taking differently: Take 250 mcg by mouth once daily.) 90 tablet 3    diltiaZEM (CARDIZEM CD) 120 MG Cp24 Take 1 capsule (120 mg total) by mouth once daily. 90 capsule 3    fexofenadine (ALLEGRA) 60 MG tablet Take 60 mg by mouth daily as needed. Pt takes everyother day      fluticasone-umeclidin-vilanter (TRELEGY ELLIPTA) 100-62.5-25 mcg DsDv Inhale 1 puff into the lungs once daily. 3 each 6    furosemide (LASIX) 20 MG tablet Take 1 tablet (20 mg total) by mouth once daily. 90 tablet 3    magnesium oxide (MAG-OX) 400 mg (241.3 mg magnesium) tablet Take 400 mg by mouth once daily.      metoprolol succinate (TOPROL-XL) 100  MG 24 hr tablet Take 1 tablet (100 mg total) by mouth once daily. 90 tablet 3    multivitamin (THERAGRAN) per tablet Take 1 tablet by mouth once daily.      pantoprazole (PROTONIX) 40 MG tablet TAKE 1 TABLET EVERY DAY (Patient taking differently: Take 40 mg by mouth once daily.) 90 tablet 3    roflumilast (DALIRESP) 500 mcg Tab Take 1 tablet (500 mcg total) by mouth once daily. 90 tablet 6    rOPINIRole (REQUIP) 0.5 MG tablet TAKE 1 TABLET EVERY EVENING (Patient taking differently: Take 0.5 mg by mouth every evening.) 90 tablet 0    spironolactone (ALDACTONE) 25 MG tablet TAKE 1 TABLET (25 MG TOTAL) BY MOUTH ONCE DAILY. 90 tablet 3    TRELEGY ELLIPTA 100-62.5-25 mcg DsDv INHALE 1 PUFF ONE TIME DAILY 3 each 6    amoxicillin-clavulanate 875-125mg (AUGMENTIN) 875-125 mg per tablet Take 1 tablet by mouth 2 (two) times daily. (Patient not taking: Reported on 4/19/2023) 14 tablet 0    doxycycline (VIBRA-TABS) 100 MG tablet Take 1 tablet (100 mg total) by mouth 2 (two) times daily. (Patient not taking: Reported on 4/19/2023) 14 tablet 0    FLUAD QUAD 2022-23,65Y UP,,PF, 60 mcg (15 mcg x 4)/0.5 mL Syrg       hydrocodone-chlorpheniramine (TUSSIONEX) 10-8 mg/5 mL suspension Take 5 ml by mouth every 12 hours as needed for cough (Patient not taking: Reported on 4/19/2023) 115 mL 0    predniSONE (DELTASONE) 10 MG tablet Take 4 tabs x 2 days, then take 3 tabs x 2 days, then take 2 tabs x 2 days, then take 1 tab x 2 days. (Patient not taking: Reported on 4/19/2023) 20 tablet 0    predniSONE (DELTASONE) 10 MG tablet Take 4 tabs x 2 days, then take 3 tabs x 2 days, then take 2 tabs x 2 days, then take 1 tab x 2 days. (Patient not taking: Reported on 4/19/2023) 20 tablet 0     No current facility-administered medications for this visit.       Alpha-1 Antitrypsin: MZ, patient refused prolastin treatment because of cost    Last PFT:10/2020 moderate obstruction with good response, air trapping  moderate diffusion  defect    IMPRESSION:11/22  1. No suspicious pulmonary nodules or masses.  2. Stable scattered right lung airspace opacities suggesting chronic atelectasis and scarring, or perhaps organizing pneumonia.  3. Additional scattered subsegmental atelectasis, subpleural scarring and findings of small airways inflammation, with localized bronchiectasis and mucous plugging in the left lower lobe.  4. Stable trace right pleural effusion.  5. Cardiomegaly, with aortic and coronary arterial calcifications, and enlarged central pulmonary arteries suggesting pulmonary arterial hypertension.Ct chest       General: does not feel well.   Eyes: Vision is good.  ENT:  No current issues   Heart:: no chest pain, no palpitations   Lungs: increased dyspnea, especially at night, expectorating lots more mucous  GI: No Nausea, vomiting, constipation, diarrhea, or reflux.  : No dysuria, hesitancy, or nocturia.  Musculoskeletal:  back pain   Skin: No lesions or rashes.  Neuro: headaches, neuropathy   Lymph: No edema or adenopathy.  Psych: No anxiety or depression.  Endo: weight gain     OBJECTIVE:      /68 (BP Location: Right arm, Patient Position: Sitting, BP Method: Medium (Manual))   Pulse 63   Wt 82.6 kg (182 lb)   LMP  (LMP Unknown)   SpO2 95%   BMI 30.29 kg/m²          Physical Exam  GENERAL: Older patient in no distress.  HEENT: Pupils equal and reactive. Extraocular movements intact. Nose intact.      Pharynx moist.  NECK: Supple.   HEART: Regular rate and rhythm.  murmur   LUNGS: clear   ABDOMEN: Bowel sounds present. Non-tender, no masses palpated.  EXTREMITIES: Normal muscle tone and joint movement, no cyanosis or clubbing.   LYMPHATICS: No adenopathy palpated, no edema.  SKIN: Dry, intact, no lesions.   NEURO: Cranial nerves II-XII intact. Motor strength 5/5 bilaterally, upper and lower extremities.  PSYCH: Appropriate affect.    Assessment:     COPD   Bronchiectasis  Nocturnal hypoxemia   Plan:         Continue the  Trelegy daily, rinse after you use it  Use your acapella.   Elevate head of bed  No eating or drinking at least 1.5 hours before bed   Keep sleeping on your oxygen

## 2023-04-20 DIAGNOSIS — J47.9 BRONCHIECTASIS WITHOUT COMPLICATION: ICD-10-CM

## 2023-04-20 DIAGNOSIS — J44.1 COPD EXACERBATION: ICD-10-CM

## 2023-04-20 DIAGNOSIS — J96.11 CHRONIC HYPOXEMIC RESPIRATORY FAILURE: Chronic | ICD-10-CM

## 2023-04-20 DIAGNOSIS — J44.9 CHRONIC OBSTRUCTIVE PULMONARY DISEASE, UNSPECIFIED COPD TYPE: Primary | ICD-10-CM

## 2023-04-20 DIAGNOSIS — R05.9 COUGH, UNSPECIFIED TYPE: ICD-10-CM

## 2023-04-20 DIAGNOSIS — J96.21 ACUTE ON CHRONIC RESPIRATORY FAILURE WITH HYPOXIA: ICD-10-CM

## 2023-04-20 RX ORDER — BUDESONIDE, GLYCOPYRROLATE, AND FORMOTEROL FUMARATE 160; 9; 4.8 UG/1; UG/1; UG/1
2 AEROSOL, METERED RESPIRATORY (INHALATION) 2 TIMES DAILY
Qty: 3 G | Refills: 4 | Status: SHIPPED | OUTPATIENT
Start: 2023-04-20 | End: 2023-07-19

## 2023-04-20 NOTE — TELEPHONE ENCOUNTER
Pt said you wanted her to try Breztri and she said she feels it has been working and needs [facility] please.

## 2023-05-04 ENCOUNTER — PATIENT MESSAGE (OUTPATIENT)
Dept: PULMONOLOGY | Facility: CLINIC | Age: 73
End: 2023-05-04

## 2023-05-04 RX ORDER — FLUTICASONE FUROATE, UMECLIDINIUM BROMIDE AND VILANTEROL TRIFENATATE 100; 62.5; 25 UG/1; UG/1; UG/1
POWDER RESPIRATORY (INHALATION)
Qty: 3 EACH | Refills: 6 | Status: SHIPPED | OUTPATIENT
Start: 2023-05-04

## 2023-05-08 ENCOUNTER — TELEPHONE (OUTPATIENT)
Dept: PULMONOLOGY | Facility: CLINIC | Age: 73
End: 2023-05-08

## 2023-05-08 NOTE — TELEPHONE ENCOUNTER
Pt called to let you know she has stopped the Breztri and wants to get back on her trelegy.  She had some Trelegy left and she has been using that but will need rx sent into her pharm.  She said Beckietri was giving her HA, making her heart race,  shaky, sob, light headed at times, and just not feeling good.

## 2023-05-15 ENCOUNTER — TELEPHONE (OUTPATIENT)
Dept: PULMONOLOGY | Facility: CLINIC | Age: 73
End: 2023-05-15

## 2023-05-15 DIAGNOSIS — J47.9 BRONCHIECTASIS WITHOUT COMPLICATION: Primary | ICD-10-CM

## 2023-05-22 ENCOUNTER — PATIENT MESSAGE (OUTPATIENT)
Dept: PULMONOLOGY | Facility: CLINIC | Age: 73
End: 2023-05-22

## 2023-05-22 RX ORDER — PANTOPRAZOLE SODIUM 40 MG/1
40 TABLET, DELAYED RELEASE ORAL DAILY
Qty: 90 TABLET | Refills: 3 | Status: SHIPPED | OUTPATIENT
Start: 2023-05-22

## 2023-05-25 LAB
ACID FAST MOD KINY STN SPEC: NORMAL
MYCOBACTERIUM SPEC QL CULT: NORMAL

## 2023-06-06 ENCOUNTER — PATIENT MESSAGE (OUTPATIENT)
Dept: PULMONOLOGY | Facility: CLINIC | Age: 73
End: 2023-06-06

## 2023-06-06 NOTE — TELEPHONE ENCOUNTER
Spoke with Dr. Haro. This is typically a contaminate. I spoke with the patient who is feeling better. She is still coughing up lots of mucous and does have bronchiectasis. Will repeat 3 first morning sputums.

## 2023-06-12 ENCOUNTER — LAB VISIT (OUTPATIENT)
Dept: LAB | Facility: HOSPITAL | Age: 73
End: 2023-06-12
Attending: NURSE PRACTITIONER
Payer: MEDICARE

## 2023-06-12 DIAGNOSIS — J47.9 BRONCHIECTASIS WITHOUT COMPLICATION: ICD-10-CM

## 2023-06-12 PROCEDURE — 87206 SMEAR FLUORESCENT/ACID STAI: CPT | Mod: 91 | Performed by: NURSE PRACTITIONER

## 2023-06-12 PROCEDURE — 87116 MYCOBACTERIA CULTURE: CPT | Mod: 91 | Performed by: NURSE PRACTITIONER

## 2023-06-12 PROCEDURE — 87147 CULTURE TYPE IMMUNOLOGIC: CPT | Performed by: NURSE PRACTITIONER

## 2023-06-12 PROCEDURE — 87070 CULTURE OTHR SPECIMN AEROBIC: CPT | Performed by: NURSE PRACTITIONER

## 2023-06-12 PROCEDURE — 87205 SMEAR GRAM STAIN: CPT | Performed by: NURSE PRACTITIONER

## 2023-06-12 PROCEDURE — 87186 SC STD MICRODIL/AGAR DIL: CPT | Performed by: NURSE PRACTITIONER

## 2023-06-12 PROCEDURE — 87118 MYCOBACTERIC IDENTIFICATION: CPT | Performed by: NURSE PRACTITIONER

## 2023-06-12 PROCEDURE — 87077 CULTURE AEROBIC IDENTIFY: CPT | Performed by: NURSE PRACTITIONER

## 2023-06-15 ENCOUNTER — TELEPHONE (OUTPATIENT)
Dept: PULMONOLOGY | Facility: CLINIC | Age: 73
End: 2023-06-15

## 2023-06-15 ENCOUNTER — PATIENT MESSAGE (OUTPATIENT)
Dept: PULMONOLOGY | Facility: CLINIC | Age: 73
End: 2023-06-15

## 2023-06-15 LAB
BACTERIA SPEC AEROBE CULT: ABNORMAL
GRAM STN SPEC: ABNORMAL

## 2023-06-15 NOTE — TELEPHONE ENCOUNTER
Sputum with MSSA, resistant to PCN, she is allergic to bactrim.  She is bringing up more mucous and more short of breath.   Reinforced the need to continue to use the acapella. She would really benefit from percussion vest but patient can not afford.  Will send Keflex 1gram TID

## 2023-06-16 RX ORDER — CEPHALEXIN 500 MG/1
1000 CAPSULE ORAL 3 TIMES DAILY
Qty: 42 CAPSULE | Refills: 0 | Status: SHIPPED | OUTPATIENT
Start: 2023-06-16 | End: 2023-06-23

## 2023-06-28 LAB
ACID FAST MOD KINY STN SPEC: ABNORMAL
MYCOBACTERIUM SPEC QL CULT: ABNORMAL

## 2023-07-05 ENCOUNTER — TELEPHONE (OUTPATIENT)
Dept: CARDIOLOGY | Facility: CLINIC | Age: 73
End: 2023-07-05
Payer: MEDICARE

## 2023-07-12 ENCOUNTER — HOSPITAL ENCOUNTER (OUTPATIENT)
Dept: RADIOLOGY | Facility: HOSPITAL | Age: 73
Discharge: HOME | End: 2023-07-12
Attending: NURSE PRACTITIONER
Payer: MEDICARE

## 2023-07-12 ENCOUNTER — OFFICE VISIT (OUTPATIENT)
Dept: CARDIOLOGY | Facility: CLINIC | Age: 73
End: 2023-07-12
Payer: MEDICARE

## 2023-07-12 DIAGNOSIS — I65.29 STENOSIS OF CAROTID ARTERY, UNSPECIFIED LATERALITY: ICD-10-CM

## 2023-07-12 DIAGNOSIS — I35.0 NONRHEUMATIC AORTIC VALVE STENOSIS: ICD-10-CM

## 2023-07-12 DIAGNOSIS — R00.2 PALPITATIONS: ICD-10-CM

## 2023-07-12 DIAGNOSIS — Z98.890 S/P CAROTID ENDARTERECTOMY: ICD-10-CM

## 2023-07-12 DIAGNOSIS — I49.5 SSS (SICK SINUS SYNDROME): ICD-10-CM

## 2023-07-12 DIAGNOSIS — I48.91 ATRIAL FIBRILLATION, UNSPECIFIED TYPE: ICD-10-CM

## 2023-07-12 DIAGNOSIS — Z95.0 S/P PLACEMENT OF CARDIAC PACEMAKER: Primary | Chronic | ICD-10-CM

## 2023-07-12 DIAGNOSIS — Z95.0 S/P PLACEMENT OF CARDIAC PACEMAKER: Chronic | ICD-10-CM

## 2023-07-12 PROCEDURE — 3288F PR FALLS RISK ASSESSMENT DOCUMENTED: ICD-10-PCS | Mod: CPTII,S$GLB,, | Performed by: NURSE PRACTITIONER

## 2023-07-12 PROCEDURE — 93000 EKG 12-LEAD: ICD-10-PCS | Mod: S$GLB,,, | Performed by: INTERNAL MEDICINE

## 2023-07-12 PROCEDURE — 1159F PR MEDICATION LIST DOCUMENTED IN MEDICAL RECORD: ICD-10-PCS | Mod: CPTII,S$GLB,, | Performed by: NURSE PRACTITIONER

## 2023-07-12 PROCEDURE — 71046 X-RAY EXAM CHEST 2 VIEWS: CPT | Mod: TC

## 2023-07-12 PROCEDURE — 93000 ELECTROCARDIOGRAM COMPLETE: CPT | Mod: S$GLB,,, | Performed by: INTERNAL MEDICINE

## 2023-07-12 PROCEDURE — 3288F FALL RISK ASSESSMENT DOCD: CPT | Mod: CPTII,S$GLB,, | Performed by: NURSE PRACTITIONER

## 2023-07-12 PROCEDURE — 99999 PR PBB SHADOW E&M-EST. PATIENT-LVL III: CPT | Mod: PBBFAC,,, | Performed by: NURSE PRACTITIONER

## 2023-07-12 PROCEDURE — 99214 PR OFFICE/OUTPT VISIT, EST, LEVL IV, 30-39 MIN: ICD-10-PCS | Mod: S$GLB,,, | Performed by: NURSE PRACTITIONER

## 2023-07-12 PROCEDURE — 1159F MED LIST DOCD IN RCRD: CPT | Mod: CPTII,S$GLB,, | Performed by: NURSE PRACTITIONER

## 2023-07-12 PROCEDURE — 99214 OFFICE O/P EST MOD 30 MIN: CPT | Mod: S$GLB,,, | Performed by: NURSE PRACTITIONER

## 2023-07-12 PROCEDURE — 99999 PR PBB SHADOW E&M-EST. PATIENT-LVL III: ICD-10-PCS | Mod: PBBFAC,,, | Performed by: NURSE PRACTITIONER

## 2023-07-12 PROCEDURE — 1101F PR PT FALLS ASSESS DOC 0-1 FALLS W/OUT INJ PAST YR: ICD-10-PCS | Mod: CPTII,S$GLB,, | Performed by: NURSE PRACTITIONER

## 2023-07-12 PROCEDURE — 1101F PT FALLS ASSESS-DOCD LE1/YR: CPT | Mod: CPTII,S$GLB,, | Performed by: NURSE PRACTITIONER

## 2023-07-12 RX ORDER — EZETIMIBE 10 MG/1
10 TABLET ORAL DAILY
Qty: 90 TABLET | Refills: 3 | Status: SHIPPED | OUTPATIENT
Start: 2023-07-12 | End: 2024-07-11

## 2023-07-12 NOTE — ASSESSMENT & PLAN NOTE
Continue ASA and Plavix  Not on statin  Last   Consider starting zetia at least daily  Check Lipid and CMP

## 2023-07-12 NOTE — ASSESSMENT & PLAN NOTE
Chronic AFIB  Patient is on Plavix and ASA  She has a history of Bleeding ulcers and could not afford eliquis she tells me. Rates are stable.    Would also have her consider Watchman device

## 2023-07-12 NOTE — PROGRESS NOTES
[facility] [place] [facility]    Subjective:     Patient ID:  Lisa Smith is a 72 y.o. female patient here for evaluation Palpitations      HPI        Lisa Smith is here for follow-up visit. Denies chest pain. She has SOB. She has palpitations. Denies recent fever cough chills or congestion. Denies blood in the urine or blood in the stool. Denies myalgias. Denies orthopnea or peripheral edema. Denies nausea vomiting or dyspepsia. No recent arm neck or jaw pain.       Review of Systems   Constitutional:  Positive for malaise/fatigue.   Respiratory:  Positive for shortness of breath and wheezing.    Cardiovascular:  Positive for palpitations.      Past Medical History:   Diagnosis Date    Arthritis     Atrial fibrillation     Bell's palsy     Boil of buttock     burst 12/19/22    COPD (chronic obstructive pulmonary disease)     use O2  3l/m NC at night    GI bleed 2011    transfusion    Hard of hearing     Heart murmur     Hematoma 02/2023    left hand    Heterozygous alpha 1-antitrypsin deficiency     History of home oxygen therapy     3L NC at night    Hypertension     Lung disease     copd    MONSERRAT (obstructive sleep apnea)     no cpap currently    Pneumonia     Pulmonary edema     Skin cancer        Past Surgical History:   Procedure Laterality Date    CARDIAC ELECTROPHYSIOLOGY STUDY AND ABLATION      CAROTID ENDARTERECTOMY Left 12/22/2022    Procedure: ENDARTERECTOMY-CAROTID;  Surgeon: Maximilian Connolly MD;  Location: [facility] OR;  Service: Peripheral Vascular;  Laterality: Left;    CARPAL TUNNEL RELEASE Left     CHOLECYSTECTOMY      EYE SURGERY Bilateral     cataract    HYSTERECTOMY      INSERT / REPLACE / REMOVE PACEMAKER      KNEE ARTHROSCOPY Left     REPLACEMENT OF PACEMAKER GENERATOR Left 01/20/2022    Procedure: REPLACEMENT, PACEMAKER GENERATOR;  Surgeon: Raymond Pérez MD;  Location: [facility] CATH/EP LAB;  Service: Cardiology;  Laterality: Left;    SKIN  CANCER EXCISION         Family History   Problem Relation Age of Onset    Kidney failure Mother     Cancer Father     Cancer Brother        Social History     Socioeconomic History    Marital status:     Number of children: 1   Occupational History    Occupation: RETIRED     Comment: [employer]   Tobacco Use    Smoking status: Former     Packs/day: 2.00     Years: 30.00     Pack years: 60.00     Types: Cigarettes     Start date: 1971     Quit date: 2011     Years since quittin.4     Passive exposure: Past    Smokeless tobacco: Never    Tobacco comments:          Quit in    Substance and Sexual Activity    Alcohol use: Not Currently     Alcohol/week: 1.0 standard drink     Types: 1 Shots of liquor per week     Comment: occasional    Drug use: No    Sexual activity: Never       Current Outpatient Medications   Medication Sig Dispense Refill    acetaminophen (TYLENOL ARTHRITIS ORAL) Take 2 tablets by mouth daily as needed.      acetaminophen (TYLENOL) 325 MG tablet Take 325 mg by mouth every 6 (six) hours as needed for Pain.      albuterol (ACCUNEB) 1.25 mg/3 mL Nebu INHALE THE CONTENTS OF 1 VIAL VIA NEBULIZER EVERY 6 HOURS AS NEEDED FOR RESCUE 360 mL 5    aspirin (ECOTRIN) 81 MG EC tablet Take 81 mg by mouth once daily.      clopidogreL (PLAVIX) 75 mg tablet TAKE 1 TABLET (75 MG TOTAL) BY MOUTH ONCE DAILY. 90 tablet 3    digoxin (LANOXIN) 250 mcg tablet TAKE 1 TABLET EVERY DAY (Patient taking differently: Take 250 mcg by mouth once daily.) 90 tablet 3    diltiaZEM (CARDIZEM CD) 120 MG Cp24 Take 1 capsule (120 mg total) by mouth once daily. 90 capsule 3    fexofenadine (ALLEGRA) 60 MG tablet Take 60 mg by mouth daily as needed. Pt takes everyother day      FLUAD QUAD -,65Y UP,,PF, 60 mcg (15 mcg x 4)/0.5 mL Syrg       fluticasone-umeclidin-vilanter (TRELEGY ELLIPTA) 100-62.5-25 mcg DsDv INHALE 1 PUFF INTO THE LUNGS ONCE DAILY. 3 each 6    furosemide (LASIX) 20 MG tablet Take 1  tablet (20 mg total) by mouth once daily. 90 tablet 3    magnesium oxide (MAG-OX) 400 mg (241.3 mg magnesium) tablet Take 400 mg by mouth once daily.      metoprolol succinate (TOPROL-XL) 100 MG 24 hr tablet Take 1 tablet (100 mg total) by mouth once daily. 90 tablet 3    multivitamin (THERAGRAN) per tablet Take 1 tablet by mouth once daily.      pantoprazole (PROTONIX) 40 MG tablet Take 1 tablet (40 mg total) by mouth once daily. 90 tablet 3    rOPINIRole (REQUIP) 0.5 MG tablet TAKE 1 TABLET EVERY EVENING (Patient taking differently: Take 0.5 mg by mouth every evening.) 90 tablet 0    spironolactone (ALDACTONE) 25 MG tablet TAKE 1 TABLET (25 MG TOTAL) BY MOUTH ONCE DAILY. 90 tablet 3    budesonide-glycopyr-formoterol (BREZTRI AEROSPHERE) 160-9-4.8 mcg/actuation HFAA Inhale 2 Doses into the lungs 2 (two) times daily. INHALE TWO INHALATIONS INTO LUNGS BID (90 DAY SUPPLY) (Patient not taking: Reported on 7/12/2023) 3 g 4    doxycycline (VIBRA-TABS) 100 MG tablet Take 1 tablet (100 mg total) by mouth 2 (two) times daily. (Patient not taking: Reported on 4/19/2023) 14 tablet 0    ezetimibe (ZETIA) 10 mg tablet Take 1 tablet (10 mg total) by mouth once daily. 90 tablet 3    hydrocodone-chlorpheniramine (TUSSIONEX) 10-8 mg/5 mL suspension Take 5 ml by mouth every 12 hours as needed for cough (Patient not taking: Reported on 4/19/2023) 115 mL 0     No current facility-administered medications for this visit.       Review of patient's allergies indicates:   Allergen Reactions    Sulfa (sulfonamide antibiotics) Itching         Objective:        There were no vitals filed for this visit.    Physical Exam  Constitutional:       Appearance: Normal appearance. She is normal weight.   HENT:      Head: Normocephalic.      Nose: Nose normal.   Eyes:      Pupils: Pupils are equal, round, and reactive to light.   Cardiovascular:      Rate and Rhythm: Normal rate.      Heart sounds: Murmur heard.   Pulmonary:      Effort: Pulmonary  effort is normal.      Breath sounds: Wheezing present.   Abdominal:      Palpations: Abdomen is soft.   Musculoskeletal:         General: No swelling or tenderness.   Neurological:      Mental Status: She is alert and oriented to person, place, and time.   Psychiatric:         Mood and Affect: Mood normal.         Behavior: Behavior normal.         Thought Content: Thought content normal.         Judgment: Judgment normal.       LIPIDS - LAST 2   Lab Results   Component Value Date    CHOL 199 05/10/2022    CHOL 190 02/17/2020    HDL 59 05/10/2022    HDL 68 02/17/2020    LDLCALC 112.0 05/10/2022    LDLCALC 106.0 02/17/2020    TRIG 140 05/10/2022    TRIG 80 02/17/2020    CHOLHDL 29.6 05/10/2022    CHOLHDL 35.8 02/17/2020       CBC - LAST 2  Lab Results   Component Value Date    WBC 9.95 03/02/2023    WBC 12.19 12/20/2022    RBC 3.72 (L) 03/02/2023    RBC 3.71 (L) 12/20/2022    HGB 11.4 (L) 03/02/2023    HGB 10.2 (L) 12/23/2022    HCT 35.9 (L) 03/02/2023    HCT 32.7 (L) 12/22/2022    MCV 97 03/02/2023    MCV 98 12/20/2022    MCH 30.6 03/02/2023    MCH 31.3 (H) 12/20/2022    MCHC 31.8 (L) 03/02/2023    MCHC 32.0 12/20/2022    RDW 15.9 (H) 03/02/2023    RDW 14.4 12/20/2022     03/02/2023     12/20/2022    MPV 9.7 03/02/2023    MPV 9.3 12/20/2022    GRAN 7.8 (H) 03/02/2023    GRAN 78.5 (H) 03/02/2023    LYMPH 1.2 03/02/2023    LYMPH 12.5 (L) 03/02/2023    MONO 0.6 03/02/2023    MONO 6.3 03/02/2023    BASO 0.03 03/02/2023    BASO 0.04 12/20/2022    NRBC 0 03/02/2023    NRBC 0 12/20/2022       CHEMISTRY & LIVER FUNCTION - LAST 2  Lab Results   Component Value Date     03/02/2023     (L) 12/23/2022    K 3.9 03/02/2023    K 4.7 12/23/2022     03/02/2023    CL 99 12/23/2022    CO2 31 (H) 03/02/2023    CO2 27 12/23/2022    ANIONGAP 8 03/02/2023    ANIONGAP 6 (L) 12/23/2022    BUN 15 03/02/2023    BUN 23 12/23/2022    CREATININE 0.6 03/02/2023    CREATININE 0.7 12/23/2022     03/02/2023      (H) 12/23/2022    CALCIUM 9.3 03/02/2023    CALCIUM 8.5 (L) 12/23/2022    MG 1.9 11/29/2019    MG 2.0 11/28/2019    ALBUMIN 4.2 03/02/2023    ALBUMIN 4.4 05/10/2022    PROT 7.9 03/02/2023    PROT 8.1 05/10/2022    ALKPHOS 84 03/02/2023    ALKPHOS 72 05/10/2022    ALT 13 03/02/2023    ALT 14 05/10/2022    AST 20 03/02/2023    AST 25 05/10/2022    BILITOT 0.5 03/02/2023    BILITOT 0.6 05/10/2022        CARDIAC PROFILE - LAST 2  Lab Results   Component Value Date     (H) 07/12/2023     (H) 11/25/2019    TROPONINI <0.030 11/25/2019        COAGULATION - LAST 2  Lab Results   Component Value Date    LABPT 13.2 01/19/2022    LABPT 12.6 12/05/2019    INR 1.1 01/19/2022    INR 1.0 12/05/2019    APTT 25.5 01/19/2022    APTT 28.1 12/05/2019       ENDOCRINE & PSA - LAST 2  Lab Results   Component Value Date    HGBA1C 5.2 02/17/2020    TSH 1.040 05/10/2022    TSH 0.630 02/17/2020    PROCAL 0.25 11/27/2019    PROCAL 0.40 11/25/2019        ECHOCARDIOGRAM RESULTS  Results for orders placed during the hospital encounter of 11/21/22    Echo    Interpretation Summary  · The left ventricle is normal in size with concentric remodeling and mildly decreased systolic function.  · The estimated ejection fraction is 45%.  · Grade III left ventricular diastolic dysfunction.  · There are segmental left ventricular wall motion abnormalities.  · There is abnormal septal wall motion consistent with right ventricular pacemaker.  · Normal right ventricular size with normal right ventricular systolic function.  · Mild left atrial enlargement.  · There is severe aortic valve stenosis.  · Aortic valve area is 0.84 cm2; peak velocity is 3.46 m/s; mean gradient is 30 mmHg.  · Elevated central venous pressure (15 mmHg).  · The estimated PA systolic pressure is 49 mmHg.    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 Rate : 061 BPM     Atrial Rate : 052 BPM     P-R Int : 000 ms          QRS Dur : 182 ms      QT Int : 444 ms       P-R-T Axes : 000 -77 094 degrees     QTc Int : 446 ms    Ventricular-paced rhythm  Abnormal ECG  When compared with ECG of 20-DEC-2022 12:55,  No significant change was found    Referred By:             Confirmed By:              Assessment:       1. S/P placement of cardiac pacemaker    2. Palpitations    3. Nonrheumatic aortic valve stenosis    4. Atrial fibrillation, unspecified type    5. SSS (sick sinus syndrome)    6. Stenosis of carotid artery, unspecified laterality    7. S/P carotid endarterectomy               Problem List Items Addressed This Visit          Cardiac/Vascular    S/P placement of cardiac pacemaker - Primary (Chronic)    Nonrheumatic aortic valve stenosis    Current Assessment & Plan     Check ECHO   Last ECHO shows severe, but not yet ready for replacement now with worsening sob         Stenosis of carotid artery    S/P carotid endarterectomy    Current Assessment & Plan     Continue ASA and Plavix  Not on statin  Last   Consider starting zetia at least daily  Check Lipid and CMP         Palpitations    SSS (sick sinus syndrome)    Current Assessment & Plan     Functioning device in situ         Atrial fibrillation    Current Assessment & Plan     Chronic AFIB  Patient is on Plavix and ASA  She has a history of Bleeding ulcers and could not afford eliquis she tells me. Rates are stable.    Would also have her consider Watchman device               [facility] [place]  Aimee

## 2023-07-14 ENCOUNTER — CLINICAL SUPPORT (OUTPATIENT)
Dept: CARDIOLOGY | Facility: HOSPITAL | Age: 73
End: 2023-07-14
Attending: NURSE PRACTITIONER
Payer: MEDICARE

## 2023-07-14 VITALS — BODY MASS INDEX: 30.32 KG/M2 | HEIGHT: 65 IN | WEIGHT: 182 LBS

## 2023-07-14 DIAGNOSIS — I35.0 NONRHEUMATIC AORTIC VALVE STENOSIS: ICD-10-CM

## 2023-07-14 PROCEDURE — 93306 TTE W/DOPPLER COMPLETE: CPT | Mod: 26,,, | Performed by: INTERNAL MEDICINE

## 2023-07-14 PROCEDURE — 93306 ECHO (CUPID ONLY): ICD-10-PCS | Mod: 26,,, | Performed by: INTERNAL MEDICINE

## 2023-07-14 PROCEDURE — 93306 TTE W/DOPPLER COMPLETE: CPT

## 2023-07-15 LAB
AORTIC ROOT ANNULUS: 3.6 CM
AORTIC VALVE CUSP SEPERATION: 1 CM
ASCENDING AORTA: 3 CM
AV INDEX (PROSTH): 0.25
AV MEAN GRADIENT: 33 MMHG
AV PEAK GRADIENT: 58 MMHG
AV VALVE AREA: 0.78 CM2
AV VELOCITY RATIO: 0.26
BSA FOR ECHO PROCEDURE: 1.95 M2
CV ECHO LV RWT: 0.6 CM
DOP CALC AO PEAK VEL: 3.82 M/S
DOP CALC AO VTI: 83 CM
DOP CALC LVOT AREA: 3.1 CM2
DOP CALC LVOT DIAMETER: 2 CM
DOP CALC LVOT PEAK VEL: 0.98 M/S
DOP CALC LVOT STROKE VOLUME: 64.68 CM3
DOP CALC MV VTI: 44.3 CM
DOP CALCLVOT PEAK VEL VTI: 20.6 CM
E WAVE DECELERATION TIME: 153 MSEC
E/A RATIO: 2.61
E/E' RATIO: 14.38 M/S
ECHO LV POSTERIOR WALL: 1.49 CM (ref 0.6–1.1)
EJECTION FRACTION: 45 %
FRACTIONAL SHORTENING: 13 % (ref 28–44)
INTERVENTRICULAR SEPTUM: 1.47 CM (ref 0.6–1.1)
IVC DIAMETER: 2.91 CM
LEFT ATRIUM VOLUME INDEX MOD: 50.5 ML/M2
LEFT ATRIUM VOLUME MOD: 95.9 CM3
LEFT INTERNAL DIMENSION IN SYSTOLE: 4.33 CM (ref 2.1–4)
LEFT VENTRICLE DIASTOLIC VOLUME INDEX: 95.79 ML/M2
LEFT VENTRICLE DIASTOLIC VOLUME: 182 ML
LEFT VENTRICLE MASS INDEX: 166 G/M2
LEFT VENTRICLE SYSTOLIC VOLUME INDEX: 51.7 ML/M2
LEFT VENTRICLE SYSTOLIC VOLUME: 98.3 ML
LEFT VENTRICULAR INTERNAL DIMENSION IN DIASTOLE: 5 CM (ref 3.5–6)
LEFT VENTRICULAR MASS: 316.23 G
LV LATERAL E/E' RATIO: 12.78 M/S
LV SEPTAL E/E' RATIO: 16.43 M/S
LVOT MG: 2 MMHG
LVOT MV: 0.61 CM/S
MR PISA EROA: 0.08 CM2
MV MEAN GRADIENT: 3 MMHG
MV PEAK A VEL: 0.44 M/S
MV PEAK E VEL: 1.15 M/S
MV PEAK GRADIENT: 9 MMHG
MV STENOSIS PRESSURE HALF TIME: 117 MS
MV VALVE AREA BY CONTINUITY EQUATION: 1.46 CM2
MV VALVE AREA P 1/2 METHOD: 1.88 CM2
OHS LV EJECTION FRACTION SIMPSONS BIPLANE MOD: 5 %
PISA MRMAX VEL: 5.55 M/S
PISA RADIUS: 0.5 CM
PISA TR MAX VEL: 3.05 M/S
PISA VN NYQUIST MS: 0.28 M/S
PISA VN NYQUIST: 0.28 M/S
PV MV: 1.07 M/S
PV PEAK VELOCITY: 1.6 CM/S
RV TISSUE DOPPLER FREE WALL SYSTOLIC VELOCITY 1 (APICAL 4 CHAMBER VIEW): 11.3 CM/S
SINUS: 2.69 CM
STJ: 2.39 CM
TDI LATERAL: 0.09 M/S
TDI SEPTAL: 0.07 M/S
TDI: 0.08 M/S
TR MAX PG: 37 MMHG
TRICUSPID ANNULAR PLANE SYSTOLIC EXCURSION: 1.83 CM

## 2023-07-19 ENCOUNTER — TELEPHONE (OUTPATIENT)
Dept: CARDIOLOGY | Facility: CLINIC | Age: 73
End: 2023-07-19
Payer: MEDICARE

## 2023-07-19 DIAGNOSIS — I35.0 NONRHEUMATIC AORTIC VALVE STENOSIS: Primary | ICD-10-CM

## 2023-07-19 NOTE — TELEPHONE ENCOUNTER
----- Message from Jcarlos Soto sent at 7/19/2023 12:07 PM CDT -----  Type: Need Medical Advice   Who Called:Patient   Best callback number: [phone]  Additional Information: Patient called stating she received the following medication in the mail and would like a phone call as to why she has to take it and what it is for .She was not aware of receiving a new medication ezetimibe (ZETIA) 10 mg tablet  Please call to further assist with scheduling, Thanks

## 2023-07-20 ENCOUNTER — TELEPHONE (OUTPATIENT)
Dept: PULMONOLOGY | Facility: CLINIC | Age: 73
End: 2023-07-20

## 2023-07-22 DIAGNOSIS — G25.81 RESTLESS LEGS: ICD-10-CM

## 2023-07-24 ENCOUNTER — PATIENT MESSAGE (OUTPATIENT)
Dept: FAMILY MEDICINE | Facility: CLINIC | Age: 73
End: 2023-07-24

## 2023-07-24 ENCOUNTER — PATIENT MESSAGE (OUTPATIENT)
Dept: CARDIOLOGY | Facility: CLINIC | Age: 73
End: 2023-07-24
Payer: MEDICARE

## 2023-07-24 RX ORDER — ROPINIROLE 0.5 MG/1
TABLET, FILM COATED ORAL
Qty: 90 TABLET | Refills: 1 | Status: SHIPPED | OUTPATIENT
Start: 2023-07-24

## 2023-07-25 ENCOUNTER — OFFICE VISIT (OUTPATIENT)
Dept: FAMILY MEDICINE | Facility: CLINIC | Age: 73
End: 2023-07-25
Payer: MEDICARE

## 2023-07-25 VITALS
HEIGHT: 65 IN | WEIGHT: 184.88 LBS | BODY MASS INDEX: 30.8 KG/M2 | RESPIRATION RATE: 22 BRPM | DIASTOLIC BLOOD PRESSURE: 61 MMHG | SYSTOLIC BLOOD PRESSURE: 106 MMHG | OXYGEN SATURATION: 93 % | HEART RATE: 62 BPM | TEMPERATURE: 99 F

## 2023-07-25 DIAGNOSIS — L05.91 CHRONIC RECURRENT PILONIDAL CYST: Primary | ICD-10-CM

## 2023-07-25 PROCEDURE — 1101F PT FALLS ASSESS-DOCD LE1/YR: CPT | Mod: CPTII,S$GLB,, | Performed by: NURSE PRACTITIONER

## 2023-07-25 PROCEDURE — 3008F BODY MASS INDEX DOCD: CPT | Mod: CPTII,S$GLB,, | Performed by: NURSE PRACTITIONER

## 2023-07-25 PROCEDURE — 3008F PR BODY MASS INDEX (BMI) DOCUMENTED: ICD-10-PCS | Mod: CPTII,S$GLB,, | Performed by: NURSE PRACTITIONER

## 2023-07-25 PROCEDURE — 1125F AMNT PAIN NOTED PAIN PRSNT: CPT | Mod: CPTII,S$GLB,, | Performed by: NURSE PRACTITIONER

## 2023-07-25 PROCEDURE — 3074F SYST BP LT 130 MM HG: CPT | Mod: CPTII,S$GLB,, | Performed by: NURSE PRACTITIONER

## 2023-07-25 PROCEDURE — 1125F PR PAIN SEVERITY QUANTIFIED, PAIN PRESENT: ICD-10-PCS | Mod: CPTII,S$GLB,, | Performed by: NURSE PRACTITIONER

## 2023-07-25 PROCEDURE — 1159F PR MEDICATION LIST DOCUMENTED IN MEDICAL RECORD: ICD-10-PCS | Mod: CPTII,S$GLB,, | Performed by: NURSE PRACTITIONER

## 2023-07-25 PROCEDURE — 3288F PR FALLS RISK ASSESSMENT DOCUMENTED: ICD-10-PCS | Mod: CPTII,S$GLB,, | Performed by: NURSE PRACTITIONER

## 2023-07-25 PROCEDURE — 3078F PR MOST RECENT DIASTOLIC BLOOD PRESSURE < 80 MM HG: ICD-10-PCS | Mod: CPTII,S$GLB,, | Performed by: NURSE PRACTITIONER

## 2023-07-25 PROCEDURE — 1101F PR PT FALLS ASSESS DOC 0-1 FALLS W/OUT INJ PAST YR: ICD-10-PCS | Mod: CPTII,S$GLB,, | Performed by: NURSE PRACTITIONER

## 2023-07-25 PROCEDURE — 99214 OFFICE O/P EST MOD 30 MIN: CPT | Mod: S$GLB,,, | Performed by: NURSE PRACTITIONER

## 2023-07-25 PROCEDURE — 3074F PR MOST RECENT SYSTOLIC BLOOD PRESSURE < 130 MM HG: ICD-10-PCS | Mod: CPTII,S$GLB,, | Performed by: NURSE PRACTITIONER

## 2023-07-25 PROCEDURE — 3078F DIAST BP <80 MM HG: CPT | Mod: CPTII,S$GLB,, | Performed by: NURSE PRACTITIONER

## 2023-07-25 PROCEDURE — 99214 PR OFFICE/OUTPT VISIT, EST, LEVL IV, 30-39 MIN: ICD-10-PCS | Mod: S$GLB,,, | Performed by: NURSE PRACTITIONER

## 2023-07-25 PROCEDURE — 3288F FALL RISK ASSESSMENT DOCD: CPT | Mod: CPTII,S$GLB,, | Performed by: NURSE PRACTITIONER

## 2023-07-25 PROCEDURE — 1159F MED LIST DOCD IN RCRD: CPT | Mod: CPTII,S$GLB,, | Performed by: NURSE PRACTITIONER

## 2023-07-25 RX ORDER — DOXYCYCLINE HYCLATE 100 MG
100 TABLET ORAL 2 TIMES DAILY
Qty: 14 TABLET | Refills: 0 | Status: SHIPPED | OUTPATIENT
Start: 2023-07-25 | End: 2023-08-01

## 2023-07-25 RX ORDER — AMOXICILLIN 875 MG/1
875 TABLET, FILM COATED ORAL 2 TIMES DAILY
Qty: 14 TABLET | Refills: 0 | Status: SHIPPED | OUTPATIENT
Start: 2023-07-25 | End: 2023-08-01

## 2023-07-25 NOTE — PROGRESS NOTES
SUBJECTIVE:      Patient ID: Lisa Smith is a 72 y.o. female.    Chief Complaint: Referral    Lisa is here today for complaints of painful swelling near her tailbone.  She also reports difficulty sitting and yesterday felt some drainage running down her backside.  She denies any fever or chills.  This has been occurring intermittently since December.     Outstanding HM reviewed- declines recommended DEXA, mammo due- refused at this time; denies breast complaints      Family History   Problem Relation Age of Onset    Kidney failure Mother     Cancer Father     Cancer Brother       Social History     Socioeconomic History    Marital status:     Number of children: 1   Occupational History    Occupation: RETIRED     Comment: [employer]   Tobacco Use    Smoking status: Former     Packs/day: 2.00     Years: 30.00     Pack years: 60.00     Types: Cigarettes     Start date: 1971     Quit date: 2011     Years since quittin.4     Passive exposure: Past    Smokeless tobacco: Never    Tobacco comments:          Quit in    Substance and Sexual Activity    Alcohol use: Not Currently     Alcohol/week: 1.0 standard drink     Types: 1 Shots of liquor per week     Comment: occasional    Drug use: No    Sexual activity: Never     Current Outpatient Medications   Medication Sig Dispense Refill    acetaminophen (TYLENOL ARTHRITIS ORAL) Take 2 tablets by mouth daily as needed.      acetaminophen (TYLENOL) 325 MG tablet Take 325 mg by mouth every 6 (six) hours as needed for Pain.      albuterol (ACCUNEB) 1.25 mg/3 mL Nebu INHALE THE CONTENTS OF 1 VIAL VIA NEBULIZER EVERY 6 HOURS AS NEEDED FOR RESCUE 360 mL 5    aspirin (ECOTRIN) 81 MG EC tablet Take 81 mg by mouth once daily.      clopidogreL (PLAVIX) 75 mg tablet TAKE 1 TABLET (75 MG TOTAL) BY MOUTH ONCE DAILY. 90 tablet 3    digoxin (LANOXIN) 250 mcg tablet TAKE 1 TABLET EVERY DAY (Patient taking differently: Take 250 mcg by mouth once  daily.) 90 tablet 3    diltiaZEM (CARDIZEM CD) 120 MG Cp24 Take 1 capsule (120 mg total) by mouth once daily. 90 capsule 3    ezetimibe (ZETIA) 10 mg tablet Take 1 tablet (10 mg total) by mouth once daily. 90 tablet 3    fexofenadine (ALLEGRA) 60 MG tablet Take 60 mg by mouth daily as needed. Pt takes everyother day      fluticasone-umeclidin-vilanter (TRELEGY ELLIPTA) 100-62.5-25 mcg DsDv INHALE 1 PUFF INTO THE LUNGS ONCE DAILY. 3 each 6    furosemide (LASIX) 20 MG tablet Take 1 tablet (20 mg total) by mouth once daily. 90 tablet 3    magnesium oxide (MAG-OX) 400 mg (241.3 mg magnesium) tablet Take 400 mg by mouth once daily.      metoprolol succinate (TOPROL-XL) 100 MG 24 hr tablet Take 1 tablet (100 mg total) by mouth once daily. 90 tablet 3    multivitamin (THERAGRAN) per tablet Take 1 tablet by mouth once daily.      pantoprazole (PROTONIX) 40 MG tablet Take 1 tablet (40 mg total) by mouth once daily. 90 tablet 3    rOPINIRole (REQUIP) 0.5 MG tablet TAKE 1 TABLET EVERY EVENING 90 tablet 1    spironolactone (ALDACTONE) 25 MG tablet TAKE 1 TABLET (25 MG TOTAL) BY MOUTH ONCE DAILY. 90 tablet 3    amoxicillin (AMOXIL) 875 MG tablet Take 1 tablet (875 mg total) by mouth 2 (two) times daily. for 7 days 14 tablet 0    doxycycline (VIBRA-TABS) 100 MG tablet Take 1 tablet (100 mg total) by mouth 2 (two) times daily. for 7 days 14 tablet 0    FLUAD QUAD 2022-23,65Y UP,,PF, 60 mcg (15 mcg x 4)/0.5 mL Syrg        No current facility-administered medications for this visit.     Review of patient's allergies indicates:   Allergen Reactions    Sulfa (sulfonamide antibiotics) Itching      Past Medical History:   Diagnosis Date    Arthritis     Atrial fibrillation     Bell's palsy     Boil of buttock     burst 12/19/22    COPD (chronic obstructive pulmonary disease)     use O2  3l/m NC at night    GI bleed 2011    transfusion    Hard of hearing     Heart murmur     Hematoma 02/2023    left hand    Heterozygous alpha  1-antitrypsin deficiency     History of home oxygen therapy     3L NC at night    Hypertension     Lung disease     copd    MONSERRAT (obstructive sleep apnea)     no cpap currently    Pneumonia     Pulmonary edema     Skin cancer      Past Surgical History:   Procedure Laterality Date    CARDIAC ELECTROPHYSIOLOGY STUDY AND ABLATION      CAROTID ENDARTERECTOMY Left 12/22/2022    Procedure: ENDARTERECTOMY-CAROTID;  Surgeon: Maximilian Connolly MD;  Location: [facility] OR;  Service: Peripheral Vascular;  Laterality: Left;    CARPAL TUNNEL RELEASE Left     CHOLECYSTECTOMY      EYE SURGERY Bilateral     cataract    HYSTERECTOMY      INSERT / REPLACE / REMOVE PACEMAKER      KNEE ARTHROSCOPY Left     REPLACEMENT OF PACEMAKER GENERATOR Left 01/20/2022    Procedure: REPLACEMENT, PACEMAKER GENERATOR;  Surgeon: Raymond Pérez MD;  Location: [facility] CATH/EP LAB;  Service: Cardiology;  Laterality: Left;    SKIN CANCER EXCISION         Review of Systems   Constitutional:  Negative for activity change, appetite change, chills, fatigue, fever and unexpected weight change.   HENT:  Negative for congestion, hearing loss, rhinorrhea and trouble swallowing.    Eyes:  Negative for discharge and visual disturbance.   Respiratory:  Negative for chest tightness and shortness of breath.    Cardiovascular:  Negative for chest pain.   Gastrointestinal:  Positive for constipation. Negative for abdominal pain, blood in stool, diarrhea, nausea and vomiting.   Endocrine: Negative for polydipsia and polyuria.   Genitourinary:  Negative for difficulty urinating, dysuria, frequency, hematuria, menstrual problem, vaginal bleeding and vaginal discharge.   Musculoskeletal:  Positive for arthralgias. Negative for joint swelling and neck pain.   Skin:  Negative for color change and wound.   Neurological:  Negative for headaches.   Hematological:  Negative for adenopathy.   Psychiatric/Behavioral:  Negative for confusion and dysphoric mood.     OBJECTIVE:     "  Vitals:    07/25/23 0832 07/25/23 0836   BP: 106/61    BP Location: Left arm    Patient Position: Sitting    BP Method: Large (Automatic)    Pulse: 62    Resp: (!) 22    Temp: 98.8 °F (37.1 °C)    TempSrc: Oral    SpO2: (!) 90% (!) 93%   Weight: 83.9 kg (184 lb 14.4 oz)    Height: 5' 5" (1.651 m)      Physical Exam  Vitals and nursing note reviewed.   Constitutional:       General: She is not in acute distress.     Appearance: Normal appearance. She is obese. She is not ill-appearing.   HENT:      Head: Normocephalic.   Eyes:      Pupils: Pupils are equal, round, and reactive to light.   Cardiovascular:      Rate and Rhythm: Normal rate and regular rhythm.   Pulmonary:      Effort: Pulmonary effort is normal.      Breath sounds: Normal breath sounds.   Musculoskeletal:      Cervical back: Normal range of motion and neck supple.   Lymphadenopathy:      Cervical: No cervical adenopathy.   Skin:     General: Skin is warm and dry.      Coloration: Skin is not jaundiced or pale.      Findings: No bruising or erythema.             Comments: Pilonidal cyst- no erythema or discharge, +tenderness and swelling   Neurological:      Mental Status: She is alert and oriented to person, place, and time.   Psychiatric:         Mood and Affect: Mood normal.         Behavior: Behavior normal.      Assessment:       1. Chronic recurrent pilonidal cyst        Plan:       Chronic recurrent pilonidal cyst  -     Ambulatory referral/consult to General Surgery; Future; Expected date: 08/01/2023  -     doxycycline (VIBRA-TABS) 100 MG tablet; Take 1 tablet (100 mg total) by mouth 2 (two) times daily. for 7 days  Dispense: 14 tablet; Refill: 0  -     amoxicillin (AMOXIL) 875 MG tablet; Take 1 tablet (875 mg total) by mouth 2 (two) times daily. for 7 days  Dispense: 14 tablet; Refill: 0   -Reviewed UTD resource- recommended coverage for staph and strep with doxy plus Amoxil d/t sulfa allergy; see gen surgery for further eval and tx; pt " advised on ER precautions if needed, take antibiotics with food until completed; avoid sunlight and stay hydrated    I spent a total of 35 minutes on the day of the visit.This includes face to face time and non-face to face time preparing to see the patient (eg, review of tests), obtaining and/or reviewing separately obtained history, documenting clinical information in the electronic or other health record, independently interpreting results and communicating results to the patient/family/caregiver, or care coordinator.     Follow up in about 6 months (around 1/25/2024).      7/25/2023 DIAN Oquendo, FNP    This note was created using [employer] voice recognition software that occasionally misinterprets phrases or words.

## 2023-07-26 LAB
ACID FAST MOD KINY STN SPEC: ABNORMAL
ACID FAST MOD KINY STN SPEC: NORMAL
MYCOBACTERIUM SPEC QL CULT: ABNORMAL
MYCOBACTERIUM SPEC QL CULT: NORMAL

## 2023-07-27 LAB
ACID FAST MOD KINY STN SPEC: ABNORMAL
MYCOBACTERIUM SPEC QL CULT: ABNORMAL

## 2023-07-27 NOTE — TELEPHONE ENCOUNTER
MYCOBACTERIUM GORDONAE   Susceptibility testing will not be performed as this organism is   almost always considered nonpathogenic

## 2023-07-31 ENCOUNTER — PATIENT MESSAGE (OUTPATIENT)
Dept: FAMILY MEDICINE | Facility: CLINIC | Age: 73
End: 2023-07-31

## 2023-08-11 ENCOUNTER — TELEPHONE (OUTPATIENT)
Dept: SURGERY | Facility: CLINIC | Age: 73
End: 2023-08-11
Payer: MEDICARE
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
## 2023-08-12 NOTE — PATIENT INSTRUCTIONS
COPD Flare    You have had a flare-up of your COPD.  COPD, or chronic obstructive pulmonary disease, is a common lung disease. It causes your airways to become irritated and narrower. This makes it harder for you to breathe. Emphysema and chronic bronchitis are both types of COPD. This is a chronic condition, which means you always have it. Sometimes it gets worse. When this happens, it is called a flare-up.  Symptoms of COPD  People with COPD may have symptoms most of the time. In a flare-up, your symptoms get worse. These symptoms may mean you are having a flare-up:  · Shortness of breath, shallow or rapid breathing, or wheezing that gets worse  · Lung infection  · Cough that gets worse  · More mucus, thicker mucus or mucus of a different color  · Tiredness, decreased energy, or trouble doing your usual activities  · Fever  · Chest tightness  · Your symptoms dont get better even when you use your usual medicines, inhalers, and nebulizer  · Trouble talking  · You feel confused  Causes of flare-ups  Unfortunately, a flare-up can happen even though you did everything right, and you followed your doctors instructions. Some causes of flare-ups are:  · Smoking or secondhand smoke  · Colds, the flu, or respiratory infections  · Air pollution  · Sudden change in the weather  · Dust, irritating chemicals, or strong fumes  · Not taking your medicines as prescribed  Home care  Here are some things you can do at home to treat a flare-up:  · Try not to panic. This makes it harder to breathe, and keeps you from doing the right things.  · Dont smoke or be around others who are smoking.  · Try to drink more fluids than usual during a flare-up, unless your doctor has told you not to because of heart and kidney problems. More fluids can help loosen the mucus.  · Use your inhalers and nebulizer, if you have one, as you have been told to.  · If you were given antibiotics, take them until they are used up or your doctor tells you  to stop. Its important to finish the antibiotics, even though you feel better. This will make sure the infection has cleared.  · If you were given prednisone or another steroid, finish it even if you feel better.  Preventing a flare-up  Even though flare-ups happen, the best way to treat one is to prevent it before it starts. Here are some pointers:  · Dont smoke or be around others who are smoking.  · Take your medicines as you have been told.  · Talk with your doctor about getting a flu shot every year. Also find out if you need a pneumonia shot.  · If there is a weather advisory warning to stay indoors, try to stay inside when possible.  · Try to eat healthy and get plenty of sleep.  · Try to avoid things that usually set you off, like dust, chemical fumes, hairsprays, or strong perfumes.  Follow-up care  Follow up with your healthcare provider, or as advised.  If a culture was done, you will be told if your treatment needs to be changed. You can call as directed for the results.  If X-rays were done, you will be notified of any new findings that may affect your care.  Call 911  Call 911 if any of these occur:  · You have trouble breathing  · You feel confused or its difficult to wake you up  · You faint or lose consciousness  · You have a rapid heart rate  · You have new pain in your chest, arm, shoulder, neck or upper back  When to seek medical advice  Call your healthcare provider right away if any of these occur:  · Wheezing or shortness of breath gets worse  · You need to use your inhalers more often than usual without relief  · Fever of 100.4°F (38ºC) or higher, or as directed by your healthcare provider  · Coughing up lots of dark-colored or bloody mucus (sputum)  · Chest pain with each breath  · You do not start to get better within 24 hours  · Swelling of your ankles gets worse  · Dizziness or weakness  Date Last Reviewed: 9/1/2016  © 8811-0519 [employer]. [address] [facility],  [place] [address]. All rights reserved. This information is not intended as a substitute for professional medical care. Always follow your healthcare professional's instructions.      Refill albuterol for nebulizer  Mucinex 1200mg twice a day   Try Trelegy 1 puff daily, rinse after you use it  Continue the Daliresp  Do not use the Symbicort or Tudorza while using the Trelegy  Call me in 2 weeks with an update, if like trelegy better will send to pharmacy and do [employer] paperwork  Prednisone short taper  Follow up in 3 months      No

## 2023-09-25 ENCOUNTER — PATIENT MESSAGE (OUTPATIENT)
Dept: PULMONOLOGY | Facility: CLINIC | Age: 73
End: 2023-09-25

## 2023-09-25 ENCOUNTER — OFFICE VISIT (OUTPATIENT)
Dept: PULMONOLOGY | Facility: CLINIC | Age: 73
End: 2023-09-25
Payer: MEDICARE

## 2023-09-25 VITALS
HEART RATE: 65 BPM | DIASTOLIC BLOOD PRESSURE: 80 MMHG | HEIGHT: 65 IN | OXYGEN SATURATION: 86 % | WEIGHT: 179 LBS | BODY MASS INDEX: 29.82 KG/M2 | SYSTOLIC BLOOD PRESSURE: 130 MMHG

## 2023-09-25 DIAGNOSIS — J44.9 CHRONIC OBSTRUCTIVE PULMONARY DISEASE, UNSPECIFIED COPD TYPE: ICD-10-CM

## 2023-09-25 DIAGNOSIS — J96.11 CHRONIC HYPOXEMIC RESPIRATORY FAILURE: Primary | ICD-10-CM

## 2023-09-25 DIAGNOSIS — J47.9 BRONCHIECTASIS WITHOUT COMPLICATION: ICD-10-CM

## 2023-09-25 PROCEDURE — 3075F PR MOST RECENT SYSTOLIC BLOOD PRESS GE 130-139MM HG: ICD-10-PCS | Mod: CPTII,S$GLB,, | Performed by: NURSE PRACTITIONER

## 2023-09-25 PROCEDURE — 3079F DIAST BP 80-89 MM HG: CPT | Mod: CPTII,S$GLB,, | Performed by: NURSE PRACTITIONER

## 2023-09-25 PROCEDURE — 3008F BODY MASS INDEX DOCD: CPT | Mod: CPTII,S$GLB,, | Performed by: NURSE PRACTITIONER

## 2023-09-25 PROCEDURE — 3075F SYST BP GE 130 - 139MM HG: CPT | Mod: CPTII,S$GLB,, | Performed by: NURSE PRACTITIONER

## 2023-09-25 PROCEDURE — 3079F PR MOST RECENT DIASTOLIC BLOOD PRESSURE 80-89 MM HG: ICD-10-PCS | Mod: CPTII,S$GLB,, | Performed by: NURSE PRACTITIONER

## 2023-09-25 PROCEDURE — 99214 PR OFFICE/OUTPT VISIT, EST, LEVL IV, 30-39 MIN: ICD-10-PCS | Mod: S$GLB,,, | Performed by: NURSE PRACTITIONER

## 2023-09-25 PROCEDURE — 1125F PR PAIN SEVERITY QUANTIFIED, PAIN PRESENT: ICD-10-PCS | Mod: CPTII,S$GLB,, | Performed by: NURSE PRACTITIONER

## 2023-09-25 PROCEDURE — 1125F AMNT PAIN NOTED PAIN PRSNT: CPT | Mod: CPTII,S$GLB,, | Performed by: NURSE PRACTITIONER

## 2023-09-25 PROCEDURE — 1159F PR MEDICATION LIST DOCUMENTED IN MEDICAL RECORD: ICD-10-PCS | Mod: CPTII,S$GLB,, | Performed by: NURSE PRACTITIONER

## 2023-09-25 PROCEDURE — 99214 OFFICE O/P EST MOD 30 MIN: CPT | Mod: S$GLB,,, | Performed by: NURSE PRACTITIONER

## 2023-09-25 PROCEDURE — 1159F MED LIST DOCD IN RCRD: CPT | Mod: CPTII,S$GLB,, | Performed by: NURSE PRACTITIONER

## 2023-09-25 PROCEDURE — 3008F PR BODY MASS INDEX (BMI) DOCUMENTED: ICD-10-PCS | Mod: CPTII,S$GLB,, | Performed by: NURSE PRACTITIONER

## 2023-09-25 RX ORDER — ALBUTEROL SULFATE 90 UG/1
2 AEROSOL, METERED RESPIRATORY (INHALATION) EVERY 6 HOURS PRN
Qty: 18 G | Refills: 6 | Status: SHIPPED | OUTPATIENT
Start: 2023-09-25

## 2023-09-25 RX ORDER — FLUTICASONE PROPIONATE 50 MCG
SPRAY, SUSPENSION (ML) NASAL DAILY PRN
COMMUNITY
Start: 2023-09-14

## 2023-09-25 NOTE — PROGRESS NOTES
SUBJECTIVE:    Patient ID: Lisa Smith is a 72 y.o. female.    Chief Complaint: COPD     Patient here today feeling well.  Her room air sats were 86% when she sat down, needing 2 liters to get her sats up in the 90's. She has her oxygen that she sleeps on at home. She has been checking her sats at home and states they have been going up and down at times.  She is using either Trelegy or Breztri at home whichever is cheaper or can get samples of. She is taking Daliresp daily, but states the [employer] company will no longer be providing it. She is not sure how long she will be able to continue it because of cost. She is doing the acapella most days. The smart vest copay was too expensive. She has a poor cough effort at times and does not expectorate the mucous well at times.  Her last sputum culture grew  an MSSA.  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  She states she may need to have heart valve surgery at some point in the future, she has an appointment month.    Past Medical History:   Diagnosis Date    Arthritis     Atrial fibrillation     Bell's palsy     Boil of buttock     burst 12/19/22    COPD (chronic obstructive pulmonary disease)     use O2  3l/m NC at night    GI bleed 2011    transfusion    Hard of hearing     Heart murmur     Hematoma 02/2023    left hand    Heterozygous alpha 1-antitrypsin deficiency     History of home oxygen therapy     3L NC at night    Hypertension     Lung disease     copd    MONSERRAT (obstructive sleep apnea)     no cpap currently    Pneumonia     Pulmonary edema     Skin cancer      Past Surgical History:   Procedure Laterality Date    CARDIAC ELECTROPHYSIOLOGY STUDY AND ABLATION      CAROTID ENDARTERECTOMY Left 12/22/2022    Procedure: ENDARTERECTOMY-CAROTID;  Surgeon: Maximilian Connolly MD;  Location: [facility];  Service: Peripheral Vascular;  Laterality: Left;    CARPAL TUNNEL RELEASE Left     CHOLECYSTECTOMY      EYE SURGERY  Bilateral     cataract    HYSTERECTOMY      INSERT / REPLACE / REMOVE PACEMAKER      KNEE ARTHROSCOPY Left     REPLACEMENT OF PACEMAKER GENERATOR Left 01/20/2022    Procedure: REPLACEMENT, PACEMAKER GENERATOR;  Surgeon: Raymond Pérez MD;  Location: [facility] CATH/EP LAB;  Service: Cardiology;  Laterality: Left;    SKIN CANCER EXCISION       Family History   Problem Relation Age of Onset    Kidney failure Mother     Cancer Father     Cancer Brother         Social History:   Marital Status:   Occupation: housewife  Alcohol History:  reports that she does not currently use alcohol after a past usage of about 1.0 standard drink of alcohol per week.  Tobacco History:  reports that she quit smoking about 12 years ago. Her smoking use included cigarettes. She started smoking about 52 years ago. She has a 80.0 pack-year smoking history. She has been exposed to tobacco smoke. She has never used smokeless tobacco.  Drug History:  reports no history of drug use.    Review of patient's allergies indicates:   Allergen Reactions    Sulfa (sulfonamide antibiotics) Itching       Current Outpatient Medications   Medication Sig Dispense Refill    acetaminophen (TYLENOL ARTHRITIS ORAL) Take 2 tablets by mouth daily as needed.      acetaminophen (TYLENOL) 325 MG tablet Take 325 mg by mouth every 6 (six) hours as needed for Pain.      albuterol (ACCUNEB) 1.25 mg/3 mL Nebu INHALE THE CONTENTS OF 1 VIAL VIA NEBULIZER EVERY 6 HOURS AS NEEDED FOR RESCUE 360 mL 5    aspirin (ECOTRIN) 81 MG EC tablet Take 81 mg by mouth once daily.      clopidogreL (PLAVIX) 75 mg tablet TAKE 1 TABLET (75 MG TOTAL) BY MOUTH ONCE DAILY. 90 tablet 3    digoxin (LANOXIN) 250 mcg tablet TAKE 1 TABLET EVERY DAY (Patient taking differently: Take 250 mcg by mouth once daily.) 90 tablet 3    ezetimibe (ZETIA) 10 mg tablet Take 1 tablet (10 mg total) by mouth once daily. 90 tablet 3    fexofenadine (ALLEGRA) 60 MG tablet Take 60 mg by mouth daily as needed. Pt  takes everyother day      fluticasone propionate (FLONASE) 50 mcg/actuation nasal spray by Each Nostril route.      fluticasone-umeclidin-vilanter (TRELEGY ELLIPTA) 100-62.5-25 mcg DsDv INHALE 1 PUFF INTO THE LUNGS ONCE DAILY. 3 each 6    furosemide (LASIX) 20 MG tablet Take 1 tablet (20 mg total) by mouth once daily. 90 tablet 3    magnesium oxide (MAG-OX) 400 mg (241.3 mg magnesium) tablet Take 400 mg by mouth once daily.      metoprolol succinate (TOPROL-XL) 100 MG 24 hr tablet Take 1 tablet (100 mg total) by mouth once daily. 90 tablet 3    multivitamin (THERAGRAN) per tablet Take 1 tablet by mouth once daily.      pantoprazole (PROTONIX) 40 MG tablet Take 1 tablet (40 mg total) by mouth once daily. 90 tablet 3    rOPINIRole (REQUIP) 0.5 MG tablet TAKE 1 TABLET EVERY EVENING 90 tablet 1    spironolactone (ALDACTONE) 25 MG tablet TAKE 1 TABLET (25 MG TOTAL) BY MOUTH ONCE DAILY. 90 tablet 3    albuterol (PROVENTIL/VENTOLIN HFA) 90 mcg/actuation inhaler Inhale 2 puffs into the lungs every 6 (six) hours as needed for Wheezing. Rescue 18 g 6    diltiaZEM (CARDIZEM CD) 120 MG Cp24 Take 1 capsule (120 mg total) by mouth once daily. 90 capsule 3    FLUAD QUAD 2022-23,63Y UP,,PF, 60 mcg (15 mcg x 4)/0.5 mL Syrg        No current facility-administered medications for this visit.       Alpha-1 Antitrypsin: MZ, patient refused prolastin treatment because of cost    Last PFT:10/2020 moderate obstruction with good response, air trapping  moderate diffusion defect    IMPRESSION:11/22  1. No suspicious pulmonary nodules or masses.  2. Stable scattered right lung airspace opacities suggesting chronic atelectasis and scarring, or perhaps organizing pneumonia.  3. Additional scattered subsegmental atelectasis, subpleural scarring and findings of small airways inflammation, with localized bronchiectasis and mucous plugging in the left lower lobe.  4. Stable trace right pleural effusion.  5. Cardiomegaly, with aortic and coronary  "arterial calcifications, and enlarged central pulmonary arteries suggesting pulmonary arterial hypertension.Ct chest       General: does not feel well.   Eyes: Vision is good.  ENT:  No current issues   Heart:: no chest pain, no palpitations   Lungs: increased dyspnea, especially at night, expectorating lots more mucous  GI: No Nausea, vomiting, constipation, diarrhea, or reflux.  : No dysuria, hesitancy, or nocturia.  Musculoskeletal:  back pain   Skin: No lesions or rashes.  Neuro: headaches, neuropathy   Lymph: No edema or adenopathy.  Psych: No anxiety or depression.  Endo: weight stable    OBJECTIVE:      /80 (BP Location: Left arm, Patient Position: Sitting, BP Method: Medium (Manual))   Pulse 65   Ht 5' 5" (1.651 m)   Wt 81.2 kg (179 lb)   LMP  (LMP Unknown)   SpO2 (!) 86% Comment: was put on 3LPM o2 went up to 94%  BMI 29.79 kg/m²          Physical Exam  GENERAL: Older patient in no distress.  HEENT: Pupils equal and reactive. Extraocular movements intact. Nose intact.      Pharynx moist.  NECK: Supple.   HEART: Regular rate and rhythm.  murmur   LUNGS: clear   ABDOMEN: Bowel sounds present. Non-tender, no masses palpated.  EXTREMITIES: Normal muscle tone and joint movement, no cyanosis or clubbing.   LYMPHATICS: No adenopathy palpated, no edema.  SKIN: Dry, intact, no lesions.   NEURO: Cranial nerves II-XII intact. Motor strength 5/5 bilaterally, upper and lower extremities.  PSYCH: Appropriate affect.    Assessment:     COPD   Bronchiectasis  Chronic hypoxemic respiratory failure   Plan:         Needs portable concentrator 2 liters   Continue the Trelegy daily, rinse after you use it  Use your acapella.   Elevate head of bed  No eating or drinking at least 1.5 hours before bed   Keep sleeping on your oxygen   Pft and walk                         "

## 2023-09-25 NOTE — PATIENT INSTRUCTIONS
Needs portable concentrator 2 liters   Continue the Trelegy daily, rinse after you use it  Use your acapella.   Elevate head of bed  No eating or drinking at least 1.5 hours before bed   Keep sleeping on your oxygen    HPI:  Brief Hospital Course:  Pt is a 53yo F with PMH of HTN and DM who was transferred from [facility] with pontine hemorrhage after presenting there on  c/o lethargy and weakness, CT imaging c/w ICH. At time of presentation at [facility], pt was hypertensive to 250's. Patient was intubated and had follow up imaging. Palliative care consulted, no surgical  intervention was performed. BP was controlled with nifedipine PO after being maintained on cardene gtt while in MICU. Pt then transferred to [facility] on  due to family's request for further management. While at [facility] she was continued on ceftriaxone (started at [place]) for a 5d course as treatment of proteus UTI. Pancultured on admission (), sputum clx grew pseudomonas and s.aureus.  CTH showed stable pontine hemorrhage w/o acute change. Pt extubated upon coughing while on MRI table (), emergently reintubated by anesthesia. 9/3 surgery performed trach and PEG. Pt failed CPAP trial on  and placed back on VC/AC. Pt with intermittent fevers throughout stay, blood cultures consistently with NG, sputum clx consistently growing pseudomonas and acinetobacter baumannii. After febrile on , started on zosyn (until ). On  febrile while on zosyn, recultured, switched to cefepime per ID recs on  (for 14d course, to finish ). Most recent blood clx  w/ NGx3 days, sputum clx  growing pseudomonas and acinetobacter baumannii.     Subjective:  This morning patient able to express that she has abdominal pain. Denies chest pain or SOB. 12 pt ROS is otherwise negative.    Allergies    No Known Allergies    Intolerances    MEDICATIONS  (STANDING):  amLODIPine   Tablet 10 milliGRAM(s) Oral daily  artificial  tears Solution 1 Drop(s) Both EYES four times a day  atorvastatin 40 milliGRAM(s) Oral at bedtime  cefepime   IVPB      cefepime   IVPB 2000 milliGRAM(s) IV Intermittent every 8 hours  dextrose 5%. 1000 milliLiter(s) (50 mL/Hr) IV Continuous <Continuous>  dextrose 50% Injectable 12.5 Gram(s) IV Push once  dextrose 50% Injectable 25 Gram(s) IV Push once  dextrose 50% Injectable 25 Gram(s) IV Push once  doxazosin 2 milliGRAM(s) Oral at bedtime  enoxaparin Injectable 40 milliGRAM(s) SubCutaneous every 24 hours  famotidine   Suspension 20 milliGRAM(s) Enteral Tube every 12 hours  insulin glargine Injectable (LANTUS) 34 Unit(s) SubCutaneous every morning  insulin lispro (HumaLOG) corrective regimen sliding scale   SubCutaneous every 6 hours  insulin lispro Injectable (HumaLOG) 9 Unit(s) SubCutaneous every 6 hours  lactobacillus acidophilus 1 Tablet(s) Oral daily  lidocaine   Patch 2 Patch Transdermal once  lisinopril 40 milliGRAM(s) Oral daily    MEDICATIONS  (PRN):  acetaminophen    Suspension .. 650 milliGRAM(s) Oral every 6 hours PRN Temp greater or equal to 38C (100.4F), Mild Pain (1 - 3)  dextrose 40% Gel 15 Gram(s) Oral once PRN Blood Glucose LESS THAN 70 milliGRAM(s)/deciliter  glucagon  Injectable 1 milliGRAM(s) IntraMuscular once PRN Glucose LESS THAN 70 milligrams/deciliter    ICU Vital Signs Last 24 Hrs  T(C): 36.8 (16 Sep 2019 06:48), Max: 37.1 (15 Sep 2019 18:03)  T(F): 98.2 (16 Sep 2019 06:48), Max: 98.7 (15 Sep 2019 18:03)  HR: 76 (16 Sep 2019 08:35) (64 - 84)  BP: 119/70 (16 Sep 2019 08:35) (96/56 - 125/76)  BP(mean): 90 (16 Sep 2019 08:35) (69 - 96)  ABP: --  ABP(mean): --  RR: 14 (16 Sep 2019 08:35) (14 - 171)  SpO2: 98% (16 Sep 2019 08:35) (96% - 98%)      Drug Dosing Weight  Height (cm): 160 (27 Aug 2019 15:50)  Weight (kg): 58.7 (29 Aug 2019 07:26)  BMI (kg/m2): 22.9 (29 Aug 2019 07:26)  BSA (m2): 1.61 (29 Aug 2019 07:26)    PAST MEDICAL & SURGICAL HISTORY:  Hypertension  No significant past surgical history      FAMILY HISTORY:  No pertinent family history in first degree relatives      SOCIAL HISTORY:    PHYSICAL EXAM:    Constitutional: trached patient, with close eyes, NAD  Eyes: PERRLA  ENMT: dry oral mucosa  Neck: supple  Back: midline  Respiratory: CTA b/l, decreased BS at bases  Cardiovascular: s1s2, no m/r/g  Gastrointestinal: soft, obese abdomen, + BS, PEG tube (site clean), mild TTP, distended  Genitourinary: primafit in place  Extremities: warm  Vascular: + 2 pulses radial, R sided edema on both RUE and RLE  Neurological: pt able to follow commands, strength 4/5 on LUE, 1/5 RUE/RLE  Skin: no ulcer, no rash  Lymph Nodes: no LAD  Musculoskeletal: no joint swelling  Psychiatric: unable to assess properly     LABS:                        11.9   8.55  )-----------( 305      ( 16 Sep 2019 07:03 )             36.3   -    136  |  101  |  26<H>  ----------------------------<  193<H>  4.4   |  26  |  0.49<L>    Ca    9.2      16 Sep 2019 07:03  Phos  4.4       Mg     1.9           EK2018: T wave inversions in lateral leads, prolonged QtC    ECHO, US:  TTE 2018:  There is mild asymmetric septal ventricular hypertrophy. The left ventricular wall motion is normal. The left ventricular ejection fraction is estimated to be 65-70%The left atrial size is normal. The mitral inflow pattern is   consistent with impaired left ventricular relaxation with mildly elevated left atrial pressure (8-14mmHg).  Right atrial size is normal. The right ventricle is normal in size and function. There is trace mitral regurgitation. There   was  insufficient TR detected from which to calculate pulmonary artery systolic pressure.  No aortic root dilatation. The inferior vena cava is normal in size (<2.1 cm) with normal inspiratory collapse (>50%) consistent with normal   right atrial pressure.  There is no pericardial effusion.    RADIOLOGY:  MRI brain 9/10:   3 cm area of late subacute hemorrhage in the marimar and left brachium pontis with associated mild mass effect. No hydrocephalus. These findings may be related to hypertensive hemorrhage or cavernous malformation.   Evaluation for an underlying mass is limited due to the presence of blood products and short interval MRI brain with and without IV contrast can be obtained as clinically warranted.    MRA brain:   No high grade arterial stenosis or occlusion. No evidence of aneurysm or high flow vascular malformation. HPI:  Brief Hospital Course:  Pt is a 51yo F with PMH of HTN and DM who was transferred from [facility] with pontine hemorrhage after presenting there on  c/o lethargy and weakness, CT imaging c/w ICH. At time of presentation at [facility], pt was hypertensive to 250's. Patient was intubated and had follow up imaging. Palliative care consulted, no surgical  intervention was performed. BP was controlled with nifedipine PO after being maintained on cardene gtt while in MICU. Pt then transferred to [facility] on  due to family's request for further management. While at [facility] she was continued on ceftriaxone (started at [place]) for a 5d course as treatment of proteus UTI. Pancultured on admission (), sputum clx grew pseudomonas and s.aureus.  CTH showed stable pontine hemorrhage w/o acute change. Pt extubated upon coughing while on MRI table (), emergently reintubated by anesthesia. 9/3 surgery performed trach and PEG. Pt failed CPAP trial on  and placed back on VC/AC. Pt with intermittent fevers throughout stay, blood cultures consistently with NG, sputum clx consistently growing pseudomonas and acinetobacter baumannii. After febrile on , started on zosyn (until ). On  febrile while on zosyn, recultured, switched to cefepime per ID recs on  (for 14d course, to finish ). Most recent blood clx  w/ NGx3 days, sputum clx  growing pseudomonas and acinetobacter baumannii.     Subjective:  This morning patient able to express that she has some back pain. Denies chest pain or SOB. 12 pt ROS is otherwise negative.    Allergies    No Known Allergies    Intolerances    MEDICATIONS  (STANDING):  amLODIPine   Tablet 10 milliGRAM(s) Oral daily  artificial  tears Solution 1 Drop(s) Both EYES four times a day  atorvastatin 40 milliGRAM(s) Oral at bedtime  cefepime   IVPB      cefepime   IVPB 2000 milliGRAM(s) IV Intermittent every 8 hours  dextrose 5%. 1000 milliLiter(s) (50 mL/Hr) IV Continuous <Continuous>  dextrose 50% Injectable 12.5 Gram(s) IV Push once  dextrose 50% Injectable 25 Gram(s) IV Push once  dextrose 50% Injectable 25 Gram(s) IV Push once  doxazosin 2 milliGRAM(s) Oral at bedtime  enoxaparin Injectable 40 milliGRAM(s) SubCutaneous every 24 hours  famotidine   Suspension 20 milliGRAM(s) Enteral Tube every 12 hours  insulin glargine Injectable (LANTUS) 34 Unit(s) SubCutaneous every morning  insulin lispro (HumaLOG) corrective regimen sliding scale   SubCutaneous every 6 hours  insulin lispro Injectable (HumaLOG) 9 Unit(s) SubCutaneous every 6 hours  lactobacillus acidophilus 1 Tablet(s) Oral daily  lidocaine   Patch 2 Patch Transdermal once  lisinopril 40 milliGRAM(s) Oral daily    MEDICATIONS  (PRN):  acetaminophen    Suspension .. 650 milliGRAM(s) Oral every 6 hours PRN Temp greater or equal to 38C (100.4F), Mild Pain (1 - 3)  dextrose 40% Gel 15 Gram(s) Oral once PRN Blood Glucose LESS THAN 70 milliGRAM(s)/deciliter  glucagon  Injectable 1 milliGRAM(s) IntraMuscular once PRN Glucose LESS THAN 70 milligrams/deciliter    ICU Vital Signs Last 24 Hrs  T(C): 36.8 (16 Sep 2019 06:48), Max: 37.1 (15 Sep 2019 18:03)  T(F): 98.2 (16 Sep 2019 06:48), Max: 98.7 (15 Sep 2019 18:03)  HR: 76 (16 Sep 2019 08:35) (64 - 84)  BP: 119/70 (16 Sep 2019 08:35) (96/56 - 125/76)  BP(mean): 90 (16 Sep 2019 08:35) (69 - 96)  ABP: --  ABP(mean): --  RR: 14 (16 Sep 2019 08:35) (14 - 171)  SpO2: 98% (16 Sep 2019 08:35) (96% - 98%)      Drug Dosing Weight  Height (cm): 160 (27 Aug 2019 15:50)  Weight (kg): 58.7 (29 Aug 2019 07:26)  BMI (kg/m2): 22.9 (29 Aug 2019 07:26)  BSA (m2): 1.61 (29 Aug 2019 07:26)    PAST MEDICAL & SURGICAL HISTORY:  Hypertension  No significant past surgical history      FAMILY HISTORY:  No pertinent family history in first degree relatives      SOCIAL HISTORY:    PHYSICAL EXAM:    Constitutional: trached patient, with close eyes, NAD  Eyes: PERRLA  ENMT: dry oral mucosa  Neck: supple  Back: midline  Respiratory: CTA b/l, decreased BS at bases  Cardiovascular: s1s2, no m/r/g  Gastrointestinal: soft, obese abdomen, + BS, PEG tube (site clean), mild TTP, distended  Genitourinary: primafit in place  Extremities: warm  Vascular: + 2 pulses radial, R sided edema on both RUE and RLE  Neurological: pt able to follow commands, strength 4/5 on LUE, 1/5 RUE/RLE  Skin: no ulcer, no rash  Lymph Nodes: no LAD  Musculoskeletal: no joint swelling  Psychiatric: unable to assess properly     LABS:                        11.9   8.55  )-----------( 305      ( 16 Sep 2019 07:03 )             36.3   -    136  |  101  |  26<H>  ----------------------------<  193<H>  4.4   |  26  |  0.49<L>    Ca    9.2      16 Sep 2019 07:03  Phos  4.4       Mg     1.9           EK2018: T wave inversions in lateral leads, prolonged QtC    ECHO, US:  TTE 2018:  There is mild asymmetric septal ventricular hypertrophy. The left ventricular wall motion is normal. The left ventricular ejection fraction is estimated to be 65-70%The left atrial size is normal. The mitral inflow pattern is   consistent with impaired left ventricular relaxation with mildly elevated left atrial pressure (8-14mmHg).  Right atrial size is normal. The right ventricle is normal in size and function. There is trace mitral regurgitation. There   was  insufficient TR detected from which to calculate pulmonary artery systolic pressure.  No aortic root dilatation. The inferior vena cava is normal in size (<2.1 cm) with normal inspiratory collapse (>50%) consistent with normal   right atrial pressure.  There is no pericardial effusion.    RADIOLOGY:  MRI brain 9/10:   3 cm area of late subacute hemorrhage in the marimar and left brachium pontis with associated mild mass effect. No hydrocephalus. These findings may be related to hypertensive hemorrhage or cavernous malformation.   Evaluation for an underlying mass is limited due to the presence of blood products and short interval MRI brain with and without IV contrast can be obtained as clinically warranted.    MRA brain:   No high grade arterial stenosis or occlusion. No evidence of aneurysm or high flow vascular malformation.

## 2023-10-04 ENCOUNTER — PATIENT OUTREACH (OUTPATIENT)
Dept: ADMINISTRATIVE | Facility: HOSPITAL | Age: 73
End: 2023-10-04
Payer: MEDICARE

## 2023-10-04 NOTE — PROGRESS NOTES
Population Health Chart Review & Patient Outreach Details:     Reason for Outreach Encounter:     []  Non-Compliant Report   [x]  Payor Report ([employer], PHN, BCBS, MSSP, MCIP, UHC, etc.)   []  Pre-Visit Chart Review     Updates Requested / Reviewed:     [x]  Care Everywhere    [x]     []  External Sources ([facility], [facility], DIS, etc.)   []  Care Team Updated    Patient Outreach Method:    []  Telephone Outreach Completed   [] Successful   [] Left Voicemail   [] Unable to Contact (wrong number, no voicemail)  []  [employer] Outreach Sent  []  Letter Outreach Mailed  []  Fax Sent for External Records  []  External Records Upload    Health Maintenance Topics Addressed and Outreach Outcomes / Actions Taken:        []      Breast Cancer Screening []  Mammo Scheduled      []  External Records Requested     []  Added Reminder to Complete to Upcoming Primary Care Appt Notes     []  Patient Declined     []  Patient Will Call Back to Schedule     []  Patient Will Schedule with External Provider / Order Routed if Applicable             []       Cervical Cancer Screening []  Pap Scheduled      []  External Records Requested     []  Added Reminder to Complete to Upcoming Primary Care Appt Notes     []  Patient Declined     []  Patient Will Call Back to Schedule     []  Patient Will Schedule with External Provider               []          Colorectal Cancer Screening []  Colonoscopy Case Request or Referral Placed     []  External Records Requested     []  Added Reminder to Complete to Upcoming Primary Care Appt Notes     []  Patient Declined     []  Patient Will Call Back to Schedule     []  Patient Will Schedule with External Provider     []  Fit Kit Mailed (add the SmartPhrase under additional notes)     []  Reminded Patient to Complete Home Test             []      Diabetic Eye Exam []  Eye Camera Scheduled or Optometry Referral Placed     []  External Records Requested     []  Added Reminder to Complete to  Upcoming Primary Care Appt Notes     []  Patient Declined     []  Patient Will Call Back to Schedule     []  Patient Will Schedule with External Provider             []      Blood Pressure Control []  Primary Care Follow Up Visit Scheduled     []  Remote Blood Pressure Reading Captured     []  Added Reminder to Complete to Upcoming Primary Care Appt Notes     []  Patient Declined     []  Patient Will Call Back / Patient Will Send Portal Message with Reading     []  Patient Will Call Back to Schedule Provider Visit             []       HbA1c & Other Labs []  Lab Appt Scheduled for Due Labs     []  Primary Care Follow Up Visit Scheduled      []  Reminded Patient to Complete Home Test     []  Added Reminder to Complete to Upcoming Primary Care Appt Notes     []  Patient Declined     []  Patient Will Call Back to Schedule     []  Patient Will Schedule with External Provider / Order Routed if Applicable           []    Schedule Primary Care Appt []  Primary Care Appt Scheduled     []  Patient Declined     []  Patient Will Call Back to Schedule     []  Pt Established with External Provider & Updated Care Team             [x]      Medication Adherence []  Primary Care Appointment Scheduled     []  Added Reminder to Upcoming Primary Care Appt Notes     []  Patient Reminded to  Prescription     []  Patient Declined, Provider Notified if Needed     [x]  Sent Cardiogy Provider Message to Review and/or Add Exclusion to Problem List             []      Osteoporosis Screening []  DXA Appointment Scheduled     []  External Records Requested     []  Added Reminder to Complete to Upcoming Primary Care Appt Notes     []  Patient Declined     []  Patient Will Call Back to Schedule     []  Patient Will Schedule with External Provider / Order Routed if Applicable     Additional Care Coordinator Notes:         Further Action Needed If Patient Returns Outreach:

## 2023-10-10 DIAGNOSIS — J44.9 CHRONIC OBSTRUCTIVE PULMONARY DISEASE, UNSPECIFIED COPD TYPE: ICD-10-CM

## 2023-10-10 RX ORDER — ALBUTEROL SULFATE 1.25 MG/3ML
SOLUTION RESPIRATORY (INHALATION)
Qty: 360 ML | Refills: 5 | Status: SHIPPED | OUTPATIENT
Start: 2023-10-10

## 2023-10-25 RX ORDER — DILTIAZEM HYDROCHLORIDE 120 MG/1
120 CAPSULE, COATED, EXTENDED RELEASE ORAL
Qty: 90 CAPSULE | Refills: 3 | Status: SHIPPED | OUTPATIENT
Start: 2023-10-25 | End: 2023-10-26

## 2023-10-30 ENCOUNTER — PATIENT MESSAGE (OUTPATIENT)
Dept: CARDIOLOGY | Facility: CLINIC | Age: 73
End: 2023-10-30
Payer: MEDICARE

## 2023-10-30 RX ORDER — METOPROLOL SUCCINATE 100 MG/1
100 TABLET, EXTENDED RELEASE ORAL
Qty: 90 TABLET | Refills: 10 | Status: ON HOLD | OUTPATIENT
Start: 2023-10-30 | End: 2023-12-07 | Stop reason: HOSPADM

## 2023-10-30 RX ORDER — METOPROLOL SUCCINATE 100 MG/1
100 TABLET, EXTENDED RELEASE ORAL DAILY
Qty: 90 TABLET | Refills: 3 | Status: ON HOLD | OUTPATIENT
Start: 2023-10-30 | End: 2023-12-07 | Stop reason: CLARIF

## 2023-11-22 RX ORDER — FUROSEMIDE 20 MG/1
20 TABLET ORAL
Qty: 90 TABLET | Refills: 1 | Status: SHIPPED | OUTPATIENT
Start: 2023-11-22

## 2023-11-29 ENCOUNTER — OFFICE VISIT (OUTPATIENT)
Dept: VASCULAR SURGERY | Facility: CLINIC | Age: 73
End: 2023-11-29
Payer: MEDICARE

## 2023-11-29 VITALS
SYSTOLIC BLOOD PRESSURE: 148 MMHG | BODY MASS INDEX: 28.58 KG/M2 | DIASTOLIC BLOOD PRESSURE: 80 MMHG | HEART RATE: 65 BPM | HEIGHT: 65 IN

## 2023-11-29 DIAGNOSIS — I35.0 NONRHEUMATIC AORTIC VALVE STENOSIS: Primary | ICD-10-CM

## 2023-11-29 PROCEDURE — 1159F PR MEDICATION LIST DOCUMENTED IN MEDICAL RECORD: ICD-10-PCS | Mod: CPTII,S$GLB,, | Performed by: THORACIC SURGERY (CARDIOTHORACIC VASCULAR SURGERY)

## 2023-11-29 PROCEDURE — 99999 PR PBB SHADOW E&M-EST. PATIENT-LVL III: ICD-10-PCS | Mod: PBBFAC,,, | Performed by: THORACIC SURGERY (CARDIOTHORACIC VASCULAR SURGERY)

## 2023-11-29 PROCEDURE — 3077F PR MOST RECENT SYSTOLIC BLOOD PRESSURE >= 140 MM HG: ICD-10-PCS | Mod: CPTII,S$GLB,, | Performed by: THORACIC SURGERY (CARDIOTHORACIC VASCULAR SURGERY)

## 2023-11-29 PROCEDURE — 1125F AMNT PAIN NOTED PAIN PRSNT: CPT | Mod: CPTII,S$GLB,, | Performed by: THORACIC SURGERY (CARDIOTHORACIC VASCULAR SURGERY)

## 2023-11-29 PROCEDURE — 3288F FALL RISK ASSESSMENT DOCD: CPT | Mod: CPTII,S$GLB,, | Performed by: THORACIC SURGERY (CARDIOTHORACIC VASCULAR SURGERY)

## 2023-11-29 PROCEDURE — 3079F PR MOST RECENT DIASTOLIC BLOOD PRESSURE 80-89 MM HG: ICD-10-PCS | Mod: CPTII,S$GLB,, | Performed by: THORACIC SURGERY (CARDIOTHORACIC VASCULAR SURGERY)

## 2023-11-29 PROCEDURE — 1101F PT FALLS ASSESS-DOCD LE1/YR: CPT | Mod: CPTII,S$GLB,, | Performed by: THORACIC SURGERY (CARDIOTHORACIC VASCULAR SURGERY)

## 2023-11-29 PROCEDURE — 99214 PR OFFICE/OUTPT VISIT, EST, LEVL IV, 30-39 MIN: ICD-10-PCS | Mod: S$GLB,,, | Performed by: THORACIC SURGERY (CARDIOTHORACIC VASCULAR SURGERY)

## 2023-11-29 PROCEDURE — 1125F PR PAIN SEVERITY QUANTIFIED, PAIN PRESENT: ICD-10-PCS | Mod: CPTII,S$GLB,, | Performed by: THORACIC SURGERY (CARDIOTHORACIC VASCULAR SURGERY)

## 2023-11-29 PROCEDURE — 99999 PR PBB SHADOW E&M-EST. PATIENT-LVL III: CPT | Mod: PBBFAC,,, | Performed by: THORACIC SURGERY (CARDIOTHORACIC VASCULAR SURGERY)

## 2023-11-29 PROCEDURE — 1160F RVW MEDS BY RX/DR IN RCRD: CPT | Mod: CPTII,S$GLB,, | Performed by: THORACIC SURGERY (CARDIOTHORACIC VASCULAR SURGERY)

## 2023-11-29 PROCEDURE — 3077F SYST BP >= 140 MM HG: CPT | Mod: CPTII,S$GLB,, | Performed by: THORACIC SURGERY (CARDIOTHORACIC VASCULAR SURGERY)

## 2023-11-29 PROCEDURE — 1160F PR REVIEW ALL MEDS BY PRESCRIBER/CLIN PHARMACIST DOCUMENTED: ICD-10-PCS | Mod: CPTII,S$GLB,, | Performed by: THORACIC SURGERY (CARDIOTHORACIC VASCULAR SURGERY)

## 2023-11-29 PROCEDURE — 3079F DIAST BP 80-89 MM HG: CPT | Mod: CPTII,S$GLB,, | Performed by: THORACIC SURGERY (CARDIOTHORACIC VASCULAR SURGERY)

## 2023-11-29 PROCEDURE — 99214 OFFICE O/P EST MOD 30 MIN: CPT | Mod: S$GLB,,, | Performed by: THORACIC SURGERY (CARDIOTHORACIC VASCULAR SURGERY)

## 2023-11-29 PROCEDURE — 1159F MED LIST DOCD IN RCRD: CPT | Mod: CPTII,S$GLB,, | Performed by: THORACIC SURGERY (CARDIOTHORACIC VASCULAR SURGERY)

## 2023-11-29 PROCEDURE — 1101F PR PT FALLS ASSESS DOC 0-1 FALLS W/OUT INJ PAST YR: ICD-10-PCS | Mod: CPTII,S$GLB,, | Performed by: THORACIC SURGERY (CARDIOTHORACIC VASCULAR SURGERY)

## 2023-11-29 PROCEDURE — 3288F PR FALLS RISK ASSESSMENT DOCUMENTED: ICD-10-PCS | Mod: CPTII,S$GLB,, | Performed by: THORACIC SURGERY (CARDIOTHORACIC VASCULAR SURGERY)

## 2023-11-29 NOTE — PROGRESS NOTES
This patient has symptomatic aortic stenosis.  She was referred for consideration of transaortic valvular replacement.  She hasa past history of hypertension and smoking.  Medicines are noted and are part of the epic record.    She had a carotid endarterectomy approximately a year ago.  She would a recent heart catheterization showing moderate coronary artery disease.    On exam vital signs are stable.  Pupils are equal and round reactive to light.  Neck is supple.  Chest is equal breath sounds.  Heart is in a regular rate and rhythm.  Abdomen is benign.  Perfusion to the legs and feet seems to be satisfactory.    The patient appears to have symptomatic aortic stenosis.  Recommendation is for transaortic valvular replacement.  Risks and potential complications were discussed.  The patient seems to be understanding and agreeable.

## 2023-12-05 PROBLEM — R06.09 DOE (DYSPNEA ON EXERTION): Status: ACTIVE | Noted: 2023-12-05

## 2023-12-05 PROBLEM — I50.20 NYHA CLASS 3 HEART FAILURE WITH REDUCED EJECTION FRACTION: Status: ACTIVE | Noted: 2023-12-05

## 2023-12-05 PROBLEM — Z99.81 CHRONIC RESPIRATORY FAILURE WITH HYPOXIA, ON HOME O2 THERAPY: Status: ACTIVE | Noted: 2018-12-05

## 2023-12-05 PROBLEM — R53.83 FATIGUE: Status: ACTIVE | Noted: 2023-12-05

## 2023-12-07 PROBLEM — I48.91 ATRIAL FIBRILLATION, UNSPECIFIED TYPE: Status: ACTIVE | Noted: 2023-12-07

## 2023-12-13 ENCOUNTER — TELEPHONE (OUTPATIENT)
Dept: CARDIOLOGY | Facility: CLINIC | Age: 73
End: 2023-12-13
Payer: MEDICARE

## 2023-12-13 NOTE — TELEPHONE ENCOUNTER
----- Message from Elliot Segal sent at 12/13/2023  8:27 AM CST -----  Contact: Self  Type:  Sooner Appointment Request    Caller is requesting a sooner appointment.  Caller declined first available appointment listed below.  Caller will not accept being placed on the waitlist and is requesting a message be sent to doctor.    Name of Caller:  Patient  When is the first available appointment? 2/5  Symptoms:  r/s  Would the patient rather a call back or a response via [facility]? Call  Best Call Back Number:  [phone]   Additional Information:

## 2023-12-26 ENCOUNTER — EXTERNAL HOME HEALTH (OUTPATIENT)
Dept: HOME HEALTH SERVICES | Facility: HOSPITAL | Age: 73
End: 2023-12-26
Payer: MEDICARE

## 2024-01-02 ENCOUNTER — TELEPHONE (OUTPATIENT)
Dept: CARDIOLOGY | Facility: CLINIC | Age: 74
End: 2024-01-02

## 2024-01-02 ENCOUNTER — TELEPHONE (OUTPATIENT)
Dept: VASCULAR SURGERY | Facility: CLINIC | Age: 74
End: 2024-01-02
Payer: MEDICARE

## 2024-01-02 NOTE — TELEPHONE ENCOUNTER
----- Message from Chana Amado sent at 1/2/2024  8:43 AM CST -----  Type: Needs Medical Advice  Who Called:  pt  Symptoms (please be specific):  pt said she need to speak to the office about her appt today--said she rather see Madhuri today--she never seen dr GERMAN Pérez and she need to see if Madhuri can see her today--said she already paid for the visit through the pre check and she need to know if the provider can be changed--please call and advise  Best Call Back Number: [phone] (home)     Additional Information: thank you--said she need to know before her appt today 1:40

## 2024-01-02 NOTE — TELEPHONE ENCOUNTER
Pt wished to see Dr Connolly in [place] rather than [place]. Pt r/s to 1/17 at [place] location.

## 2024-01-02 NOTE — TELEPHONE ENCOUNTER
----- Message from Jcarlos Soto sent at 1/2/2024 10:07 AM CST -----  Type: Need Medical Advice  Who Called:Patient  Best callback number: [phone]  Additional Information: Patient ask for a callback to discuss today's appointment  Please call to further assist, Thanks.

## 2024-01-25 ENCOUNTER — OFFICE VISIT (OUTPATIENT)
Dept: PULMONOLOGY | Facility: CLINIC | Age: 74
End: 2024-01-25
Payer: MEDICARE

## 2024-01-25 VITALS
HEIGHT: 65 IN | SYSTOLIC BLOOD PRESSURE: 125 MMHG | WEIGHT: 177 LBS | DIASTOLIC BLOOD PRESSURE: 70 MMHG | HEART RATE: 67 BPM | OXYGEN SATURATION: 76 % | BODY MASS INDEX: 29.49 KG/M2

## 2024-01-25 DIAGNOSIS — J47.9 BRONCHIECTASIS WITHOUT COMPLICATION: ICD-10-CM

## 2024-01-25 DIAGNOSIS — J44.9 CHRONIC OBSTRUCTIVE PULMONARY DISEASE, UNSPECIFIED COPD TYPE: ICD-10-CM

## 2024-01-25 DIAGNOSIS — J96.11 CHRONIC HYPOXEMIC RESPIRATORY FAILURE: Primary | ICD-10-CM

## 2024-01-25 PROCEDURE — 3078F DIAST BP <80 MM HG: CPT | Mod: CPTII,S$GLB,, | Performed by: NURSE PRACTITIONER

## 2024-01-25 PROCEDURE — 1126F AMNT PAIN NOTED NONE PRSNT: CPT | Mod: CPTII,S$GLB,, | Performed by: NURSE PRACTITIONER

## 2024-01-25 PROCEDURE — 1159F MED LIST DOCD IN RCRD: CPT | Mod: CPTII,S$GLB,, | Performed by: NURSE PRACTITIONER

## 2024-01-25 PROCEDURE — 3074F SYST BP LT 130 MM HG: CPT | Mod: CPTII,S$GLB,, | Performed by: NURSE PRACTITIONER

## 2024-01-25 PROCEDURE — 99214 OFFICE O/P EST MOD 30 MIN: CPT | Mod: S$GLB,,, | Performed by: NURSE PRACTITIONER

## 2024-01-25 NOTE — PROGRESS NOTES
SUBJECTIVE:    Patient ID: Lisa Smith is a 73 y.o. female.    Chief Complaint: COPD    Patient here today feeling well. Since her last visit she has had a TAVR. She states she needs to have carotid surgery again. She is wearing her oxygen all of the time again.  Her room air sat when she walked into the office was 79%, placed her on 3 liters of oxygen in clinic and her sats stayed at 96%.  She states since having her TAVR she is not having orthopnea or difficulty sleeping and she is not short of breath like she was prior.  She is taking Diuretics daily now.  She is using Trelegy daily. She is also not coughing like she used to.  She did not get smart vest in past due to out of pocket cost.   Past Medical History:   Diagnosis Date    Anticoagulant long-term use     Arthritis     Atrial fibrillation     Bell's palsy     Boil of buttock     burst 12/19/22    CHF (congestive heart failure)     Chronic cough     COPD (chronic obstructive pulmonary disease)     use O2  3l/m NC at night; also taking during day currently 12/4/23    Dizziness     Encounter for blood transfusion     GI bleed 2011    transfusion    H/O diverticulitis of colon     Hard of hearing     Heart murmur     Hematoma 02/2023    left hand    Heterozygous alpha 1-antitrypsin deficiency     History of home oxygen therapy     3L NC at night    Hypertension     Lung disease     copd    MONSERRAT (obstructive sleep apnea)     resolved with wt loss 131#    Pacemaker     Pneumonia     last episode 2019    Pulmonary edema     Skin cancer     Unspecified visual disturbance     episode of vision disturbance and dizziness...occasional recurrences     Past Surgical History:   Procedure Laterality Date    CARDIAC ELECTROPHYSIOLOGY STUDY AND ABLATION      CAROTID ENDARTERECTOMY Left 12/22/2022    Procedure: ENDARTERECTOMY-CAROTID;  Surgeon: Maximilian Connolly MD;  Location: [facility];  Service: Peripheral Vascular;  Laterality: Left;    CARPAL TUNNEL RELEASE Left      CHOLECYSTECTOMY      EYE SURGERY Bilateral     cataract    HYSTERECTOMY      INSERT / REPLACE / REMOVE PACEMAKER      KNEE ARTHROSCOPY Left     LEFT HEART CATHETERIZATION  11/1/2023    Procedure: Left heart cath;  Surgeon: Puma Shelton MD;  Location: [facility] CATH;  Service: Cardiology;;    REPLACEMENT OF PACEMAKER GENERATOR Left 01/20/2022    Procedure: REPLACEMENT, PACEMAKER GENERATOR;  Surgeon: Raymond Pérez MD;  Location: [facility] CATH/EP LAB;  Service: Cardiology;  Laterality: Left;    SKIN CANCER EXCISION      TRANSCATHETER AORTIC VALVE REPLACEMENT (TAVR) Bilateral 12/6/2023    Procedure: (TAVR);  Surgeon: Puma Shelton MD;  Location: [facility] CATH;  Service: Cardiology;  Laterality: Bilateral;    TRANSCATHETER AORTIC VALVE REPLACEMENT (TAVR) Bilateral 12/6/2023    Procedure: (TAVR) - Surgeon;  Surgeon: Maximilian Connolly MD;  Location: [facility] CATH;  Service: Peripheral Vascular;  Laterality: Bilateral;     Family History   Problem Relation Age of Onset    Kidney failure Mother     Cancer Father     Cancer Brother         Social History:   Marital Status:   Occupation: housewife  Alcohol History:  reports that she does not currently use alcohol after a past usage of about 8.0 standard drinks of alcohol per week.  Tobacco History:  reports that she quit smoking about 12 years ago. Her smoking use included cigarettes. She started smoking about 53 years ago. She has a 80.0 pack-year smoking history. She has been exposed to tobacco smoke. She has never used smokeless tobacco.  Drug History:  reports no history of drug use.    Review of patient's allergies indicates:   Allergen Reactions    Sulfa (sulfonamide antibiotics) Itching       Current Outpatient Medications   Medication Sig Dispense Refill    acetaminophen (TYLENOL ARTHRITIS ORAL) Take 2 tablets by mouth daily as needed.      acetaminophen (TYLENOL) 325 MG tablet Take 325 mg by mouth every 6 (six) hours as needed for Pain.      albuterol (ACCUNEB)  1.25 mg/3 mL Nebu INHALE THE CONTENTS OF 1 VIAL VIA NEBULIZER EVERY 6 HOURS AS NEEDED FOR RESCUE 360 mL 5    albuterol (PROVENTIL/VENTOLIN HFA) 90 mcg/actuation inhaler Inhale 2 puffs into the lungs every 6 (six) hours as needed for Wheezing. Rescue 18 g 6    amoxicillin (AMOXIL) 500 MG Tab Take 1 tablet (500 mg total) by mouth as needed (take one hour prior dental work or any high risk procedures.). 4 tablet 10    aspirin (ECOTRIN) 81 MG EC tablet One tablet p.o. q.day with food 90 tablet 3    digoxin (LANOXIN) 250 mcg tablet TAKE 1 TABLET EVERY DAY 90 tablet 3    ezetimibe (ZETIA) 10 mg tablet Take 1 tablet (10 mg total) by mouth once daily. 90 tablet 3    fexofenadine (ALLEGRA) 60 MG tablet Take 60 mg by mouth daily as needed. Pt takes everyother day      FLUAD QUAD 2022-23,65Y UP,,PF, 60 mcg (15 mcg x 4)/0.5 mL Syrg       fluticasone propionate (FLONASE) 50 mcg/actuation nasal spray by Each Nostril route daily as needed.      fluticasone-umeclidin-vilanter (TRELEGY ELLIPTA) 100-62.5-25 mcg DsDv INHALE 1 PUFF INTO THE LUNGS ONCE DAILY. 3 each 6    furosemide (LASIX) 20 MG tablet TAKE 1 TABLET EVERY DAY 90 tablet 1    losartan (COZAAR) 25 MG tablet Take 1 tablet (25 mg total) by mouth once daily. 90 tablet 3    magnesium oxide (MAG-OX) 400 mg (241.3 mg magnesium) tablet Take 400 mg by mouth once daily.      metoprolol succinate (TOPROL-XL) 50 MG 24 hr tablet Take 1 tablet (50 mg total) by mouth once daily. 30 tablet 11    multivitamin (THERAGRAN) per tablet Take 1 tablet by mouth once daily.      pantoprazole (PROTONIX) 40 MG tablet Take 1 tablet (40 mg total) by mouth once daily. 90 tablet 3    rOPINIRole (REQUIP) 0.5 MG tablet TAKE 1 TABLET EVERY EVENING 90 tablet 1    rosuvastatin (CRESTOR) 20 MG tablet Take 1 tablet (20 mg total) by mouth once daily. 90 tablet 3    spironolactone (ALDACTONE) 25 MG tablet TAKE 1 TABLET (25 MG TOTAL) BY MOUTH ONCE DAILY. 90 tablet 3    warfarin (COUMADIN) 3 MG tablet Take 1  tablet (3 mg total) by mouth Daily. 90 tablet 3     No current facility-administered medications for this visit.     Echo 01/2024  Left Ventricle: The left ventricle is normal in size. Mildly increased wall thickness. There is mildly reduced systolic function with a visually estimated ejection fraction of 45 - 50%. Unable to assess diastolic function due to atrial fibrillation.    Right Ventricle: Normal right ventricular cavity size. Wall thickness is normal. Right ventricle wall motion  is normal. Systolic function is normal.    Left Atrium: Left atrium is mildly dilated.    Right Atrium: Right atrium is mildly dilated.    Aortic Valve: There is a transcatheter valve replacement in the aortic position that is appropriately positioned. Mild paravalvular regurgitation.    Mitral Valve: There is bileaflet sclerosis. There is moderate mitral annular calcification present. There is no stenosis. The mean pressure gradient across the mitral valve is 2 mmHg at a heart rate of  bpm. There is moderate regurgitation.    Tricuspid Valve: There is mild regurgitation.    IVC/SVC: Normal venous pressure at 3 mmHg.    Alpha-1 Antitrypsin: MZ, patient refused prolastin treatment because of cost    Last PFT:10/2020 moderate obstruction with good response, air trapping  moderate diffusion defect    CTA chest 11/2023    mpression:     1. Small right effusion with patchy opacities in the superior aspect of the right lower lobe medially and the medial left lung base.  Findings may be due to atelectasis or atypical infection.  2. Emphysema.  3. Diverticulosis without pericolonic inflammation.  General: feels well.   Eyes: Vision is good.  ENT:  No current issues   Heart:: no chest pain, no palpitations   Lungs:breathing has improved less cough since TAVR  GI: No Nausea, vomiting, constipation, diarrhea, or reflux.  : No dysuria, hesitancy, or nocturia.  Musculoskeletal:  back pain   Skin: No lesions or rashes.  Neuro: headaches,  "neuropathy   Lymph: No edema or adenopathy.  Psych: No anxiety or depression.  Endo: weight stable    OBJECTIVE:      /70 (BP Location: Right arm, Patient Position: Sitting, BP Method: Medium (Manual))   Pulse 67   Ht 5' 5" (1.651 m)   Wt 80.3 kg (177 lb)   LMP  (LMP Unknown)   SpO2 (!) 76% Comment: pt ran out of o2.  BMI 29.45 kg/m²          Physical Exam  GENERAL: Older patient in no distress.  HEENT: Pupils equal and reactive. Extraocular movements intact. Nose intact.      Pharynx moist.  NECK: Supple.   HEART: Regular rate and rhythm.  murmur   LUNGS: clear with faint squeak to the left posteriorly but clears after a deep breath  ABDOMEN: Bowel sounds present. Non-tender, no masses palpated.  EXTREMITIES: Normal muscle tone and joint movement, no cyanosis or clubbing.   LYMPHATICS: No adenopathy palpated, no edema.  SKIN: Dry, intact, no lesions.   NEURO: Cranial nerves II-XII intact. Motor strength 5/5 bilaterally, upper and lower extremities.  PSYCH: Appropriate affect.    Assessment:     COPD   Bronchiectasis  Chronic hypoxemic respiratory failure   Plan:         Needs portable concentrator 2 liters   Continue the Trelegy daily, rinse after you use it  Use your acapella.   Elevate head of bed  No eating or drinking at least 1.5 hours before bed   Keep sleeping on your oxygen   Pft and walk                         "

## 2024-01-25 NOTE — PROGRESS NOTES
Subjective:    Patient ID:  Lisa Smith is a 73 y.o. female who presents for evaluation of Dyspnea.      HPI:    Ms. Smith is here for her follow up visit. She is post TAVR. Was doing well for about 1 week then started noticing the need for more oxygen. She denies CP. She has SOB with out her oxygen and drops as soon as she takes it off. Prior to the procedure she had to use oxygen only at night time.  Now it is 24/7. She had an US of neck that showed Left carotid 70% stenosis and is concerned as she had a CEA a little over one year ago to that side. Her orthopnea and GAN has improved overall however now she is very concerned about the drop in EF. She is following with Dr. Haro. She is on coumadin for her PAFIB and would like to come off of it. She is very interested in watchman.  She has been taking her diuretics daily. She feels palpitations when oxygen drops.     Review of patient's allergies indicates:   Allergen Reactions    Sulfa (sulfonamide antibiotics) Itching       Past Medical History:   Diagnosis Date    Anticoagulant long-term use     Arthritis     Atrial fibrillation     Bell's palsy     Boil of buttock     burst 12/19/22    CHF (congestive heart failure)     Chronic cough     COPD (chronic obstructive pulmonary disease)     use O2  3l/m NC at night; also taking during day currently 12/4/23    Dizziness     Encounter for blood transfusion     GI bleed 2011    transfusion    H/O diverticulitis of colon     Hard of hearing     Heart murmur     Hematoma 02/2023    left hand    Heterozygous alpha 1-antitrypsin deficiency     History of home oxygen therapy     3L NC at night    Hypertension     Lung disease     copd    MONSERRAT (obstructive sleep apnea)     resolved with wt loss 131#    Pacemaker     Pneumonia     last episode 2019    Pulmonary edema     Skin cancer     Unspecified visual disturbance     episode of vision disturbance and dizziness...occasional recurrences     Past Surgical History:    Procedure Laterality Date    CARDIAC ELECTROPHYSIOLOGY STUDY AND ABLATION      CAROTID ENDARTERECTOMY Left 2022    Procedure: ENDARTERECTOMY-CAROTID;  Surgeon: Maximilian Connolly MD;  Location: [facility] OR;  Service: Peripheral Vascular;  Laterality: Left;    CARPAL TUNNEL RELEASE Left     CHOLECYSTECTOMY      EYE SURGERY Bilateral     cataract    HYSTERECTOMY      INSERT / REPLACE / REMOVE PACEMAKER      KNEE ARTHROSCOPY Left     LEFT HEART CATHETERIZATION  2023    Procedure: Left heart cath;  Surgeon: Puma Shelton MD;  Location: [facility] CATH;  Service: Cardiology;;    REPLACEMENT OF PACEMAKER GENERATOR Left 2022    Procedure: REPLACEMENT, PACEMAKER GENERATOR;  Surgeon: Raymond Pérez MD;  Location: [facility] CATH/EP LAB;  Service: Cardiology;  Laterality: Left;    SKIN CANCER EXCISION      TRANSCATHETER AORTIC VALVE REPLACEMENT (TAVR) Bilateral 2023    Procedure: (TAVR);  Surgeon: Puma Shelton MD;  Location: [facility] CATH;  Service: Cardiology;  Laterality: Bilateral;    TRANSCATHETER AORTIC VALVE REPLACEMENT (TAVR) Bilateral 2023    Procedure: (TAVR) - Surgeon;  Surgeon: Maximilian Connolly MD;  Location: [facility] CATH;  Service: Peripheral Vascular;  Laterality: Bilateral;     Social History     Tobacco Use    Smoking status: Former     Current packs/day: 0.00     Average packs/day: 2.0 packs/day for 40.0 years (80.0 ttl pk-yrs)     Types: Cigarettes     Start date: 1971     Quit date: 2011     Years since quittin.9     Passive exposure: Past    Smokeless tobacco: Never    Tobacco comments:          Quit in    Substance Use Topics    Alcohol use: Not Currently     Alcohol/week: 8.0 standard drinks of alcohol     Types: 8 Glasses of wine per week    Drug use: No     Family History   Problem Relation Age of Onset    Kidney failure Mother     Cancer Father     Cancer Brother         Review of Systems:   Constitution: Negative for diaphoresis and fever.   HEENT: Negative for  nosebleeds.    Cardiovascular: Negative for chest pain       + dyspnea        No leg swelling        No palpitations  Respiratory: +SOB  Hematologic/Lymphatic: Negative for bleeding problem. Does not bruise/bleed easily.   Skin: Negative for color change and rash.   Musculoskeletal: Negative for falls and myalgias.   Gastrointestinal: Negative for hematemesis and hematochezia.   Genitourinary: Negative for hematuria.   Neurological: Negative for dizziness and light-headedness.   Psychiatric/Behavioral: +mental status changes when not wearing oxygen         Objective:        Vitals:    01/26/24 0843   BP: 122/76   Pulse: 76       Lab Results   Component Value Date    WBC 8.40 01/23/2024    WBC 8.40 01/23/2024    HGB 11.1 (L) 01/23/2024    HGB 11.1 (L) 01/23/2024    HCT 34.2 (L) 01/23/2024    HCT 34.2 (L) 01/23/2024     01/23/2024     01/23/2024    CHOL 199 05/10/2022    TRIG 140 05/10/2022    HDL 59 05/10/2022    ALT 22 01/23/2024    ALT 22 01/23/2024    AST 51 (H) 01/23/2024    AST 51 (H) 01/23/2024     01/23/2024     01/23/2024    K 4.1 01/23/2024    K 4.1 01/23/2024    CL 96 01/23/2024    CL 96 01/23/2024    CREATININE 0.60 01/23/2024    CREATININE 0.60 01/23/2024    BUN 23 (H) 01/23/2024    BUN 23 (H) 01/23/2024    CO2 32 (H) 01/23/2024    CO2 32 (H) 01/23/2024    TSH 1.040 05/10/2022    INR 2.0 01/24/2024    HGBA1C 5.2 02/17/2020        ECHOCARDIOGRAM RESULTS  Results for orders placed during the hospital encounter of 01/23/24    Echo    Interpretation Summary    Left Ventricle: The left ventricle is normal in size. Mildly increased wall thickness. There is mildly reduced systolic function with a visually estimated ejection fraction of 45 - 50%. Unable to assess diastolic function due to atrial fibrillation.    Right Ventricle: Normal right ventricular cavity size. Wall thickness is normal. Right ventricle wall motion  is normal. Systolic function is normal.    Left Atrium: Left atrium  is mildly dilated.    Right Atrium: Right atrium is mildly dilated.    Aortic Valve: There is a transcatheter valve replacement in the aortic position that is appropriately positioned. Mild paravalvular regurgitation.    Mitral Valve: There is bileaflet sclerosis. There is moderate mitral annular calcification present. There is no stenosis. The mean pressure gradient across the mitral valve is 2 mmHg at a heart rate of  bpm. There is moderate regurgitation.    Tricuspid Valve: There is mild regurgitation.    IVC/SVC: Normal venous pressure at 3 mmHg.        CURRENT/PREVIOUS VISIT EKG  Results for orders placed or performed during the hospital encounter of 01/23/24   EKG 12-lead    Collection Time: 01/23/24 12:07 PM    Narrative    Test Reason : Z95.2,    Vent. Rate : 064 BPM     Atrial Rate : 041 BPM     P-R Int : 000 ms          QRS Dur : 188 ms      QT Int : 452 ms       P-R-T Axes : 000 -76 098 degrees     QTc Int : 466 ms    Electronic ventricular pacemaker  When compared with ECG of 07-DEC-2023 10:55,  Vent. rate has increased BY   4 BPM  Confirmed by María Elena SARKAR MD, Raz KING ([id number]) on 1/25/2024 6:10:09 AM    Referred By: ROGER ZAMORANO           Confirmed By:Raz Ring III, MD           Physical Exam:    CONSTITUTIONAL: Seems tired.  HEENT: Normocephalic, atraumatic,pupils reactive to light                 NECK:  No JVD +bruit  CVS: S1S2+, RRR,+ murmur  LUNGS: Scattered rhonchi  ABDOMEN: Soft, NT, BS+  EXTREMITIES: No cyanosis, edema  : No hanson catheter  NEURO: AAO X 3  PSY: Normal affect      Medication List with Changes/Refills   Current Medications    ACETAMINOPHEN (TYLENOL ARTHRITIS ORAL)    Take 2 tablets by mouth daily as needed.    ACETAMINOPHEN (TYLENOL) 325 MG TABLET    Take 325 mg by mouth every 6 (six) hours as needed for Pain.    ALBUTEROL (ACCUNEB) 1.25 MG/3 ML NEBU    INHALE THE CONTENTS OF 1 VIAL VIA NEBULIZER EVERY 6 HOURS AS NEEDED FOR RESCUE    ALBUTEROL (PROVENTIL/VENTOLIN HFA) 90  MCG/ACTUATION INHALER    Inhale 2 puffs into the lungs every 6 (six) hours as needed for Wheezing. Rescue    AMOXICILLIN (AMOXIL) 500 MG TAB    Take 1 tablet (500 mg total) by mouth as needed (take one hour prior dental work or any high risk procedures.).    ASPIRIN (ECOTRIN) 81 MG EC TABLET    One tablet p.o. q.day with food    DIGOXIN (LANOXIN) 250 MCG TABLET    TAKE 1 TABLET EVERY DAY    EZETIMIBE (ZETIA) 10 MG TABLET    Take 1 tablet (10 mg total) by mouth once daily.    FEXOFENADINE (ALLEGRA) 60 MG TABLET    Take 60 mg by mouth daily as needed. Pt takes everyother day    FLUAD QUAD 2022-23,65Y UP,,PF, 60 MCG (15 MCG X 4)/0.5 ML SYRG        FLUTICASONE PROPIONATE (FLONASE) 50 MCG/ACTUATION NASAL SPRAY    by Each Nostril route daily as needed.    FLUTICASONE-UMECLIDIN-VILANTER (TRELEGY ELLIPTA) 100-62.5-25 MCG DSDV    INHALE 1 PUFF INTO THE LUNGS ONCE DAILY.    FUROSEMIDE (LASIX) 20 MG TABLET    TAKE 1 TABLET EVERY DAY    LOSARTAN (COZAAR) 25 MG TABLET    Take 1 tablet (25 mg total) by mouth once daily.    MAGNESIUM OXIDE (MAG-OX) 400 MG (241.3 MG MAGNESIUM) TABLET    Take 400 mg by mouth once daily.    METOPROLOL SUCCINATE (TOPROL-XL) 50 MG 24 HR TABLET    Take 1 tablet (50 mg total) by mouth once daily.    MULTIVITAMIN (THERAGRAN) PER TABLET    Take 1 tablet by mouth once daily.    PANTOPRAZOLE (PROTONIX) 40 MG TABLET    Take 1 tablet (40 mg total) by mouth once daily.    ROPINIROLE (REQUIP) 0.5 MG TABLET    TAKE 1 TABLET EVERY EVENING    SPIRONOLACTONE (ALDACTONE) 25 MG TABLET    TAKE 1 TABLET (25 MG TOTAL) BY MOUTH ONCE DAILY.    WARFARIN (COUMADIN) 3 MG TABLET    Take 1 tablet (3 mg total) by mouth Daily.   Changed and/or Refilled Medications    Modified Medication Previous Medication    ROSUVASTATIN (CRESTOR) 40 MG TAB rosuvastatin (CRESTOR) 20 MG tablet       Take 1 tablet (40 mg total) by mouth every evening.    Take 1 tablet (20 mg total) by mouth once daily.             Assessment:       1. Coronary  artery disease involving native coronary artery of native heart without angina pectoris    2. S/P placement of cardiac pacemaker    3. Palpitations    4. SSS (sick sinus syndrome)    5. Atrial fibrillation, unspecified type    6. S/P TAVR (transcatheter aortic valve replacement)    7. Occlusion and stenosis of unspecified carotid artery    8. Chronic obstructive pulmonary disease, unspecified COPD type         Plan:       Hypoxemia- acute on chornic    CXR  D DIMER  CT CHEST      COPD  Per pulmonary however the above as well.     Problem List Items Addressed This Visit          Pulmonary    COPD (chronic obstructive pulmonary disease) (Chronic)    Overview     Dr. Haro pulmonologist            Cardiac/Vascular    CAD (coronary artery disease) - Primary (Chronic)    Overview     Dr. Pérez cardiologist         Relevant Orders    Ambulatory referral/consult to Interventional Cardiology    D-DIMER, QUANTITATIVE    X-Ray Chest PA And Lateral (Completed)    S/P placement of cardiac pacemaker (Chronic)    Occlusion and stenosis of unspecified carotid artery    Relevant Orders    CTA Neck    Palpitations    SSS (sick sinus syndrome)    Atrial fibrillation    Current Assessment & Plan     Continue coumadin and Digoxin.  She lives alone is on chronic oxygen uses a walker   She would benefit from Watchman    [id number] score: 5  HAS-BLED score: 4    If you answer NO to any of the four criteria below, the patient does not meet the WATCHMAN implant eligibility requirements.     Patient has non-valvular atrial fibrillation: Yes  Patient has an increased risk for stroke and is recommended for oral anticoagulation (OAC): Yes  Patient is suitable for short-term anticoagulation therapy but deemed unable to take long-term OAC: Yes  Patient has an appropriate rationale to seek a non-pharmacological alternative to OACs. Specific factors include: History of bleeding or increased bleeding risk, History of risk of falls, Documented  poor compliance with OAC therapy, Inability or difficulty maintaining therapeutic INR range, Increased bleeding risk not reflected by HAS-BLED score, Occupation/lifestyle that puts patient at an increased bleeding risk, Severe renal failure, Avoidance of triple therapy after PCI or TAVR, Other situations for which OAC is inappropriate, and Drug or medication regimen not compatible with oral anticoagulation therapy    Will refer to Dr. Cantu for further evaluation and consideration of LAAO device.            Relevant Orders    Ambulatory referral/consult to Interventional Cardiology    S/P TAVR (transcatheter aortic valve replacement)    Relevant Orders    Ambulatory referral/consult to Interventional Cardiology    D-DIMER, QUANTITATIVE    X-Ray Chest PA And Lateral (Completed)       No follow-ups on file.

## 2024-01-26 ENCOUNTER — HOSPITAL ENCOUNTER (OUTPATIENT)
Dept: RADIOLOGY | Facility: HOSPITAL | Age: 74
Discharge: HOME | End: 2024-01-26
Attending: NURSE PRACTITIONER
Payer: MEDICARE

## 2024-01-26 ENCOUNTER — OFFICE VISIT (OUTPATIENT)
Dept: CARDIOLOGY | Facility: CLINIC | Age: 74
End: 2024-01-26
Payer: MEDICARE

## 2024-01-26 ENCOUNTER — TELEPHONE (OUTPATIENT)
Dept: CARDIOLOGY | Facility: CLINIC | Age: 74
End: 2024-01-26

## 2024-01-26 VITALS
OXYGEN SATURATION: 92 % | BODY MASS INDEX: 29.49 KG/M2 | HEIGHT: 65 IN | WEIGHT: 177 LBS | SYSTOLIC BLOOD PRESSURE: 122 MMHG | HEART RATE: 76 BPM | DIASTOLIC BLOOD PRESSURE: 76 MMHG

## 2024-01-26 DIAGNOSIS — I25.10 CORONARY ARTERY DISEASE INVOLVING NATIVE CORONARY ARTERY OF NATIVE HEART WITHOUT ANGINA PECTORIS: Chronic | ICD-10-CM

## 2024-01-26 DIAGNOSIS — I25.10 CORONARY ARTERY DISEASE INVOLVING NATIVE CORONARY ARTERY OF NATIVE HEART WITHOUT ANGINA PECTORIS: Primary | Chronic | ICD-10-CM

## 2024-01-26 DIAGNOSIS — I65.29 OCCLUSION AND STENOSIS OF UNSPECIFIED CAROTID ARTERY: ICD-10-CM

## 2024-01-26 DIAGNOSIS — I48.91 ATRIAL FIBRILLATION, UNSPECIFIED TYPE: ICD-10-CM

## 2024-01-26 DIAGNOSIS — Z95.2 S/P TAVR (TRANSCATHETER AORTIC VALVE REPLACEMENT): ICD-10-CM

## 2024-01-26 DIAGNOSIS — Z95.0 S/P PLACEMENT OF CARDIAC PACEMAKER: Chronic | ICD-10-CM

## 2024-01-26 DIAGNOSIS — J44.9 CHRONIC OBSTRUCTIVE PULMONARY DISEASE, UNSPECIFIED COPD TYPE: ICD-10-CM

## 2024-01-26 DIAGNOSIS — R00.2 PALPITATIONS: ICD-10-CM

## 2024-01-26 DIAGNOSIS — R06.02 SOB (SHORTNESS OF BREATH): Primary | ICD-10-CM

## 2024-01-26 DIAGNOSIS — I49.5 SSS (SICK SINUS SYNDROME): ICD-10-CM

## 2024-01-26 PROBLEM — Z95.3 S/P TAVR (TRANSCATHETER AORTIC VALVE REPLACEMENT): Status: ACTIVE | Noted: 2024-01-26

## 2024-01-26 PROCEDURE — 99999 PR PBB SHADOW E&M-EST. PATIENT-LVL V: CPT | Mod: PBBFAC,HCNC,, | Performed by: NURSE PRACTITIONER

## 2024-01-26 PROCEDURE — 99215 OFFICE O/P EST HI 40 MIN: CPT | Mod: HCNC,S$GLB,, | Performed by: NURSE PRACTITIONER

## 2024-01-26 PROCEDURE — 1159F MED LIST DOCD IN RCRD: CPT | Mod: HCNC,CPTII,S$GLB, | Performed by: NURSE PRACTITIONER

## 2024-01-26 PROCEDURE — 3074F SYST BP LT 130 MM HG: CPT | Mod: HCNC,CPTII,S$GLB, | Performed by: NURSE PRACTITIONER

## 2024-01-26 PROCEDURE — 71046 X-RAY EXAM CHEST 2 VIEWS: CPT | Mod: TC

## 2024-01-26 PROCEDURE — 1126F AMNT PAIN NOTED NONE PRSNT: CPT | Mod: HCNC,CPTII,S$GLB, | Performed by: NURSE PRACTITIONER

## 2024-01-26 PROCEDURE — 3078F DIAST BP <80 MM HG: CPT | Mod: HCNC,CPTII,S$GLB, | Performed by: NURSE PRACTITIONER

## 2024-01-26 PROCEDURE — 1101F PT FALLS ASSESS-DOCD LE1/YR: CPT | Mod: HCNC,CPTII,S$GLB, | Performed by: NURSE PRACTITIONER

## 2024-01-26 PROCEDURE — 3288F FALL RISK ASSESSMENT DOCD: CPT | Mod: HCNC,CPTII,S$GLB, | Performed by: NURSE PRACTITIONER

## 2024-01-26 RX ORDER — ROSUVASTATIN CALCIUM 40 MG/1
40 TABLET, COATED ORAL NIGHTLY
Qty: 90 TABLET | Refills: 3 | Status: SHIPPED | OUTPATIENT
Start: 2024-01-26 | End: 2025-01-25

## 2024-01-26 NOTE — TELEPHONE ENCOUNTER
Spoke with patient relayed information from Madhuri told about Stat order for chest CTA pt advised will be here in the morning at registration on first floor.

## 2024-01-26 NOTE — TELEPHONE ENCOUNTER
----- Message from Madhuri Hunter NP sent at 1/26/2024 12:11 PM CST -----  Stat CTa chest   Order is in

## 2024-01-26 NOTE — ASSESSMENT & PLAN NOTE
Continue coumadin and Digoxin.  She lives alone is on chronic oxygen uses a walker   She would benefit from Watchman    [id number] score: 5  HAS-BLED score: 4    If you answer NO to any of the four criteria below, the patient does not meet the WATCHMAN implant eligibility requirements.     Patient has non-valvular atrial fibrillation: Yes  Patient has an increased risk for stroke and is recommended for oral anticoagulation (OAC): Yes  Patient is suitable for short-term anticoagulation therapy but deemed unable to take long-term OAC: Yes  Patient has an appropriate rationale to seek a non-pharmacological alternative to OACs. Specific factors include: History of bleeding or increased bleeding risk, History of risk of falls, Documented poor compliance with OAC therapy, Inability or difficulty maintaining therapeutic INR range, Increased bleeding risk not reflected by HAS-BLED score, Occupation/lifestyle that puts patient at an increased bleeding risk, Severe renal failure, Avoidance of triple therapy after PCI or TAVR, Other situations for which OAC is inappropriate, and Drug or medication regimen not compatible with oral anticoagulation therapy    Will refer to Dr. Cantu for further evaluation and consideration of LAAO device.

## 2024-01-29 ENCOUNTER — HOSPITAL ENCOUNTER (OUTPATIENT)
Dept: RADIOLOGY | Facility: HOSPITAL | Age: 74
Discharge: HOME | End: 2024-01-29
Attending: NURSE PRACTITIONER
Payer: MEDICARE

## 2024-01-29 ENCOUNTER — TELEPHONE (OUTPATIENT)
Dept: CARDIOLOGY | Facility: CLINIC | Age: 74
End: 2024-01-29
Payer: MEDICARE

## 2024-01-29 DIAGNOSIS — I65.29 OCCLUSION AND STENOSIS OF UNSPECIFIED CAROTID ARTERY: ICD-10-CM

## 2024-01-29 DIAGNOSIS — R06.02 SOB (SHORTNESS OF BREATH): ICD-10-CM

## 2024-01-29 PROCEDURE — 71275 CT ANGIOGRAPHY CHEST: CPT | Mod: TC

## 2024-01-29 PROCEDURE — 25500020 PHARM REV CODE 255: Performed by: NURSE PRACTITIONER

## 2024-01-29 RX ADMIN — IOHEXOL 100 ML: 350 INJECTION, SOLUTION INTRAVENOUS at 04:01

## 2024-01-30 ENCOUNTER — TELEPHONE (OUTPATIENT)
Dept: PULMONOLOGY | Facility: CLINIC | Age: 74
End: 2024-01-30

## 2024-01-30 ENCOUNTER — TELEPHONE (OUTPATIENT)
Dept: CARDIOLOGY | Facility: CLINIC | Age: 74
End: 2024-01-30
Payer: MEDICARE

## 2024-01-30 DIAGNOSIS — J96.11 CHRONIC HYPOXEMIC RESPIRATORY FAILURE: ICD-10-CM

## 2024-01-30 DIAGNOSIS — J44.9 CHRONIC OBSTRUCTIVE PULMONARY DISEASE, UNSPECIFIED COPD TYPE: Primary | ICD-10-CM

## 2024-01-30 NOTE — TELEPHONE ENCOUNTER
----- Message from Madhuri Hunter NP sent at 1/30/2024 12:09 AM CST -----  Call patient no PE  PH seen and some chronic changes continue with ct chest

## 2024-01-30 NOTE — TELEPHONE ENCOUNTER
Spoke to the patient this am after reviewing CTA cardiology ordered.  PFT and walk has been ordered.  Patient does feel she is less short of breath now then what she was before her TAVR.  She was off oxygen during the day for a long time after significant weight loss and now is requiring oxygen 24.7. .  CTA showed no PE, scattered bronchiectasis, mucous plugging and scarring noted.  Small right effusion vs scarring that appears chronic and pleural thickening.   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  She is hoping to get a watchman because she hates being on Coumadin.  She does her acapella and does expectorate mucous but not as much as she used to. She denies night sweats, denies fever.

## 2024-01-31 ENCOUNTER — HOSPITAL ENCOUNTER (OUTPATIENT)
Dept: RADIOLOGY | Facility: HOSPITAL | Age: 74
Discharge: HOME | End: 2024-01-31
Attending: NURSE PRACTITIONER
Payer: MEDICARE

## 2024-01-31 ENCOUNTER — PATIENT MESSAGE (OUTPATIENT)
Dept: CARDIOLOGY | Facility: CLINIC | Age: 74
End: 2024-01-31
Payer: MEDICARE

## 2024-01-31 DIAGNOSIS — I65.29 OCCLUSION AND STENOSIS OF UNSPECIFIED CAROTID ARTERY: Primary | ICD-10-CM

## 2024-01-31 PROCEDURE — 25500020 PHARM REV CODE 255: Performed by: NURSE PRACTITIONER

## 2024-01-31 PROCEDURE — 70498 CT ANGIOGRAPHY NECK: CPT | Mod: TC

## 2024-01-31 RX ADMIN — IOHEXOL 100 ML: 350 INJECTION, SOLUTION INTRAVENOUS at 09:01

## 2024-02-01 ENCOUNTER — TELEPHONE (OUTPATIENT)
Dept: PHARMACY | Facility: CLINIC | Age: 74
End: 2024-02-01
Payer: MEDICARE

## 2024-02-01 ENCOUNTER — OFFICE VISIT (OUTPATIENT)
Dept: CARDIOLOGY | Facility: CLINIC | Age: 74
End: 2024-02-01
Payer: MEDICARE

## 2024-02-01 VITALS
HEART RATE: 81 BPM | HEIGHT: 65 IN | DIASTOLIC BLOOD PRESSURE: 74 MMHG | BODY MASS INDEX: 28.82 KG/M2 | RESPIRATION RATE: 16 BRPM | OXYGEN SATURATION: 93 % | WEIGHT: 173 LBS | SYSTOLIC BLOOD PRESSURE: 116 MMHG

## 2024-02-01 DIAGNOSIS — I50.43 ACUTE ON CHRONIC COMBINED SYSTOLIC AND DIASTOLIC CHF, NYHA CLASS 3: Primary | ICD-10-CM

## 2024-02-01 DIAGNOSIS — R06.02 SHORTNESS OF BREATH: Primary | ICD-10-CM

## 2024-02-01 DIAGNOSIS — I50.43 ACUTE ON CHRONIC COMBINED SYSTOLIC AND DIASTOLIC CHF, NYHA CLASS 3: ICD-10-CM

## 2024-02-01 DIAGNOSIS — I50.20 NYHA CLASS 3 HEART FAILURE WITH REDUCED EJECTION FRACTION: ICD-10-CM

## 2024-02-01 DIAGNOSIS — I48.91 ATRIAL FIBRILLATION, UNSPECIFIED TYPE: ICD-10-CM

## 2024-02-01 PROCEDURE — 3074F SYST BP LT 130 MM HG: CPT | Mod: HCNC,CPTII,S$GLB, | Performed by: INTERNAL MEDICINE

## 2024-02-01 PROCEDURE — 99215 OFFICE O/P EST HI 40 MIN: CPT | Mod: HCNC,S$GLB,, | Performed by: INTERNAL MEDICINE

## 2024-02-01 PROCEDURE — 99999 PR PBB SHADOW E&M-EST. PATIENT-LVL IV: CPT | Mod: PBBFAC,HCNC,, | Performed by: INTERNAL MEDICINE

## 2024-02-01 PROCEDURE — 1126F AMNT PAIN NOTED NONE PRSNT: CPT | Mod: HCNC,CPTII,S$GLB, | Performed by: INTERNAL MEDICINE

## 2024-02-01 PROCEDURE — 3288F FALL RISK ASSESSMENT DOCD: CPT | Mod: HCNC,CPTII,S$GLB, | Performed by: INTERNAL MEDICINE

## 2024-02-01 PROCEDURE — 1101F PT FALLS ASSESS-DOCD LE1/YR: CPT | Mod: HCNC,CPTII,S$GLB, | Performed by: INTERNAL MEDICINE

## 2024-02-01 PROCEDURE — 1159F MED LIST DOCD IN RCRD: CPT | Mod: HCNC,CPTII,S$GLB, | Performed by: INTERNAL MEDICINE

## 2024-02-01 PROCEDURE — 1160F RVW MEDS BY RX/DR IN RCRD: CPT | Mod: HCNC,CPTII,S$GLB, | Performed by: INTERNAL MEDICINE

## 2024-02-01 PROCEDURE — 3078F DIAST BP <80 MM HG: CPT | Mod: HCNC,CPTII,S$GLB, | Performed by: INTERNAL MEDICINE

## 2024-02-01 RX ORDER — DOXYCYCLINE 100 MG/1
100 CAPSULE ORAL EVERY 12 HOURS
Qty: 20 CAPSULE | Refills: 0 | Status: SHIPPED | OUTPATIENT
Start: 2024-02-01 | End: 2024-02-11

## 2024-02-01 RX ORDER — METHYLPREDNISOLONE 4 MG/1
TABLET ORAL
Qty: 21 EACH | Refills: 0 | Status: SHIPPED | OUTPATIENT
Start: 2024-02-01 | End: 2024-02-22

## 2024-02-01 RX ORDER — SACUBITRIL AND VALSARTAN 24; 26 MG/1; MG/1
1 TABLET, FILM COATED ORAL 2 TIMES DAILY
Qty: 180 TABLET | Refills: 3 | Status: SHIPPED | OUTPATIENT
Start: 2024-02-01 | End: 2024-02-22 | Stop reason: SDUPTHER

## 2024-02-01 NOTE — TELEPHONE ENCOUNTER
I have spoken with Lisa Smith and informed her of the [employer] application process for Entresto and what's required to apply.  Lisa Smith will provide the following documents: Proof of household Income( such as social security statement, 1099 form, pension statement or 3 consecutive pay stubs, Copy of all Insurance cards( front and back), and Signed and dated HIPAA /Patient Information Forms        I will follow up with the patient in 5 business days.

## 2024-02-01 NOTE — PROGRESS NOTES
Subjective:    Patient ID:  Lisa Smith is a 73 y.o. female patient here for evaluation Follow-up (She is doing much better, she is having some dizziness. On 2 liter of o2) and Leg Problem (She is having cramping in her legs at night)      History of Present Illness:   Is a 73-year-old lady with history of complex medical problems including coronary artery disease, aortic valve stenosis status post TAVR in December of 2023, chronic congestive heart failure, severe COPD, bilateral carotid artery disease with previous left carotid endarterectomy seeking follow-up evaluation here.  Not seen her in quite some time.  But she is seen multiple other providers over the course of time reportedly patient has been having progressive shortness of breath and this has required oxygen supplements noted to have severe desaturation.  She is up to 3 L of O2 continuous supplements.  She has periods of confusion apparently in 1 occasion she was driving however she was not using oxygen and she did not know how to initiate start the car.    No no known symptoms of stroke or TIAs has been noted   She has chronic cough and yellowish sputum production noted at this time.  Without any fevers or chills  Previous angiogram had shown total occlusion of distal right coronary artery and no significant occlusive disease in the LAD and circumflex artery noted by angiogram done in November  Patient had repeated carotid ultrasound done the left carotid artery has restenosis of greater than 70% in underwent CTA which showed high-grade lesion in the left internal carotid artery with total occlusion of right internal artery and moderately significant disease noted in the left subclavian artery and right vertebral artery as well.  In more recent echocardiogram showed an ejection fraction of 45%   Review of patient's allergies indicates:   Allergen Reactions    Sulfa (sulfonamide antibiotics) Itching       Past Medical History:   Diagnosis Date     Anticoagulant long-term use     Arthritis     Atrial fibrillation     Bell's palsy     Boil of buttock     burst 12/19/22    CHF (congestive heart failure)     Chronic cough     COPD (chronic obstructive pulmonary disease)     use O2  3l/m NC at night; also taking during day currently 12/4/23    Dizziness     Encounter for blood transfusion     GI bleed 2011    transfusion    H/O diverticulitis of colon     Hard of hearing     Heart murmur     Hematoma 02/2023    left hand    Heterozygous alpha 1-antitrypsin deficiency     History of home oxygen therapy     3L NC at night    Hypertension     Lung disease     copd    MONSERRAT (obstructive sleep apnea)     resolved with wt loss 131#    Pacemaker     Pneumonia     last episode 2019    Pulmonary edema     Skin cancer     Unspecified visual disturbance     episode of vision disturbance and dizziness...occasional recurrences     Past Surgical History:   Procedure Laterality Date    CARDIAC ELECTROPHYSIOLOGY STUDY AND ABLATION      CAROTID ENDARTERECTOMY Left 12/22/2022    Procedure: ENDARTERECTOMY-CAROTID;  Surgeon: Maximilian Connolly MD;  Location: [facility] OR;  Service: Peripheral Vascular;  Laterality: Left;    CARPAL TUNNEL RELEASE Left     CHOLECYSTECTOMY      EYE SURGERY Bilateral     cataract    HYSTERECTOMY      INSERT / REPLACE / REMOVE PACEMAKER      KNEE ARTHROSCOPY Left     LEFT HEART CATHETERIZATION  11/1/2023    Procedure: Left heart cath;  Surgeon: Puma Shelton MD;  Location: [facility] CATH;  Service: Cardiology;;    REPLACEMENT OF PACEMAKER GENERATOR Left 01/20/2022    Procedure: REPLACEMENT, PACEMAKER GENERATOR;  Surgeon: Raymond Pérez MD;  Location: [facility] CATH/EP LAB;  Service: Cardiology;  Laterality: Left;    SKIN CANCER EXCISION      TRANSCATHETER AORTIC VALVE REPLACEMENT (TAVR) Bilateral 12/6/2023    Procedure: (TAVR);  Surgeon: Puma Shelton MD;  Location: [facility] CATH;  Service: Cardiology;  Laterality: Bilateral;    TRANSCATHETER AORTIC VALVE REPLACEMENT  (TAVR) Bilateral 2023    Procedure: (TAVR) - Surgeon;  Surgeon: Maximilian Connolly MD;  [facility];  Service: Peripheral Vascular;  Laterality: Bilateral;     Social History     Tobacco Use    Smoking status: Former     Current packs/day: 0.00     Average packs/day: 2.0 packs/day for 40.0 years (80.0 ttl pk-yrs)     Types: Cigarettes     Start date: 1971     Quit date: 2011     Years since quittin.9     Passive exposure: Past    Smokeless tobacco: Never    Tobacco comments:          Quit in    Substance Use Topics    Alcohol use: Not Currently     Alcohol/week: 8.0 standard drinks of alcohol     Types: 8 Glasses of wine per week    Drug use: No        Review of Systems:    As noted in HPI in addition      REVIEW OF SYSTEMS  CARDIOVASCULAR: No recent chest pain, palpitations, arm, neck, or jaw pain  RESPIRATORY:  Shortness breath cough and congestion  : No blood in the urine  GI: No Nausea, vomiting, constipation, diarrhea, blood, or reflux.  MUSCULOSKELETAL: No myalgias  NEURO: No lightheadedness or dizziness  EYES: No Double vision, blurry, vision or headache              Objective        Vitals:    24 1306   BP: 116/74   Pulse: 81   Resp: 16       LIPIDS - LAST 2   Lab Results   Component Value Date    CHOL 199 05/10/2022    CHOL 190 2020    HDL 59 05/10/2022    HDL 68 2020    LDLCALC 112.0 05/10/2022    LDLCALC 106.0 2020    TRIG 140 05/10/2022    TRIG 80 2020    CHOLHDL 29.6 05/10/2022    CHOLHDL 35.8 2020       CBC - LAST 2  Lab Results   Component Value Date    WBC 8.40 2024    WBC 8.40 2024    RBC 3.61 (L) 2024    RBC 3.61 (L) 2024    HGB 11.1 (L) 2024    HGB 11.1 (L) 2024    HCT 34.2 (L) 2024    HCT 34.2 (L) 2024    MCV 95 2024    MCV 95 2024    MCH 30.7 2024    MCH 30.7 2024    MCHC 32.5 2024    MCHC 32.5 2024    RDW 16.0 (H) 2024    RDW 16.0 (H)  01/23/2024     01/23/2024     01/23/2024    MPV 9.8 01/23/2024    MPV 9.8 01/23/2024    GRAN 7.0 01/23/2024    GRAN 83.0 (H) 01/23/2024    GRAN 7.0 01/23/2024    GRAN 83.0 (H) 01/23/2024    LYMPH 0.7 (L) 01/23/2024    LYMPH 8.1 (L) 01/23/2024    LYMPH 0.7 (L) 01/23/2024    LYMPH 8.1 (L) 01/23/2024    MONO 0.6 01/23/2024    MONO 6.9 01/23/2024    MONO 0.6 01/23/2024    MONO 6.9 01/23/2024    BASO 0.02 01/23/2024    BASO 0.02 01/23/2024    NRBC 0 01/23/2024    NRBC 0 01/23/2024       CHEMISTRY & LIVER FUNCTION - LAST 2  Lab Results   Component Value Date     01/23/2024     01/23/2024    K 4.1 01/23/2024    K 4.1 01/23/2024    CL 96 01/23/2024    CL 96 01/23/2024    CO2 32 (H) 01/23/2024    CO2 32 (H) 01/23/2024    ANIONGAP 11 01/23/2024    ANIONGAP 11 01/23/2024    BUN 23 (H) 01/23/2024    BUN 23 (H) 01/23/2024    CREATININE 0.60 01/23/2024    CREATININE 0.60 01/23/2024     (H) 01/23/2024     (H) 01/23/2024    CALCIUM 10.2 01/23/2024    CALCIUM 10.2 01/23/2024    MG 1.6 12/07/2023    MG 1.9 11/29/2019    ALBUMIN 4.9 01/23/2024    ALBUMIN 4.9 01/23/2024    PROT 8.3 01/23/2024    PROT 8.3 01/23/2024    ALKPHOS 130 01/23/2024    ALKPHOS 130 01/23/2024    ALT 22 01/23/2024    ALT 22 01/23/2024    AST 51 (H) 01/23/2024    AST 51 (H) 01/23/2024    BILITOT 0.9 01/23/2024    BILITOT 0.9 01/23/2024        CARDIAC PROFILE - LAST 2  Lab Results   Component Value Date     (H) 07/12/2023     (H) 11/25/2019    TROPONINI <0.030 11/25/2019        COAGULATION - LAST 2  Lab Results   Component Value Date    LABPT 23.0 (H) 01/23/2024    LABPT 23.1 (H) 01/23/2024    INR 2.0 01/24/2024    INR 2.0 01/23/2024    INR 2.0 01/23/2024    APTT 30.9 12/05/2023    APTT 25.5 01/19/2022       ENDOCRINE & PSA - LAST 2  Lab Results   Component Value Date    HGBA1C 5.2 02/17/2020    TSH 1.040 05/10/2022    TSH 0.630 02/17/2020    PROCAL 0.25 11/27/2019    PROCAL 0.40 11/25/2019        ECHOCARDIOGRAM  RESULTS  Results for orders placed during the hospital encounter of 01/23/24    Echo    Interpretation Summary    Left Ventricle: The left ventricle is normal in size. Mildly increased wall thickness. There is mildly reduced systolic function with a visually estimated ejection fraction of 45 - 50%. Unable to assess diastolic function due to atrial fibrillation.    Right Ventricle: Normal right ventricular cavity size. Wall thickness is normal. Right ventricle wall motion  is normal. Systolic function is normal.    Left Atrium: Left atrium is mildly dilated.    Right Atrium: Right atrium is mildly dilated.    Aortic Valve: There is a transcatheter valve replacement in the aortic position that is appropriately positioned. Mild paravalvular regurgitation.    Mitral Valve: There is bileaflet sclerosis. There is moderate mitral annular calcification present. There is no stenosis. The mean pressure gradient across the mitral valve is 2 mmHg at a heart rate of  bpm. There is moderate regurgitation.    Tricuspid Valve: There is mild regurgitation.    IVC/SVC: Normal venous pressure at 3 mmHg.      CURRENT/PREVIOUS VISIT EKG  Results for orders placed or performed during the hospital encounter of 01/23/24   EKG 12-lead    Collection Time: 01/23/24 12:07 PM    Narrative    Test Reason : Z95.2,    Vent. Rate : 064 BPM     Atrial Rate : 041 BPM     P-R Int : 000 ms          QRS Dur : 188 ms      QT Int : 452 ms       P-R-T Axes : 000 -76 098 degrees     QTc Int : 466 ms    Electronic ventricular pacemaker  When compared with ECG of 07-DEC-2023 10:55,  Vent. rate has increased BY   4 BPM  Confirmed by María Elena SARKAR MD, Raz KING ([id number]) on 1/25/2024 6:10:09 AM    Referred By: ROGER ZAMORANO           Confirmed By:Raz Ring III, MD     No valid procedures specified.   No results found for this or any previous visit.  The estimated blood loss was none.     Procedures performed:  Coronary angiogram      Angiographic  findings:  Left main coronary artery-medium size vessel mild calcific disease less than 20%.    Left anterior descending-medium size vessel diffuse mild disease all less than 30% with 2 diagonals with minimal disease less than 20%.    Circumflex-medium sized vessel with 30% ostial stenosis gives rise to 1 large branching obtuse marginal and 2nd trivial obtuse marginal.  Obtuse marginal normal appearance of patient's age.    Ramus intermedius-small vessel normal in appearance.    Right coronary artery-heavily calcified appears to be subtotally occluded proximally totally occluded mid distal right coronary artery filling via collaterals from the LAD and circumflex.       Impression:   Mild to moderate disease LAD and circumflex as above.    Chronic total occlusion right coronary artery   Severe aortic stenosis.       Plan:  Workup for TAVR preferably Jauregui for possible  of the RCA in the future if needed.    Continue current medications.         PHYSICAL EXAM  CONSTITUTIONAL: Well built, well nourished in no apparent distress  NECK: no carotid bruit, no JVD  LUNGS:  Diminished breath sounds bilateral rhonchi in the bases   CHEST WALL: no tenderness  HEART: regular rate and rhythm, normal S1 diminished S2 soft systolic murmur no significant AI appreciated   ABDOMEN: soft, non-tender; bowel sounds normal; no masses,  no organomegaly  EXTREMITIES: Extremities normal, no edema, no calf tenderness noted  NEURO: AAO X 3    I HAVE REVIEWED :    The vital signs, nurses notes, and all the pertinent radiology and labs.        Current Outpatient Medications   Medication Instructions    acetaminophen (TYLENOL ARTHRITIS ORAL) 2 tablets, Oral, Daily PRN    acetaminophen (TYLENOL) 325 mg, Oral, Every 6 hours PRN    albuterol (ACCUNEB) 1.25 mg/3 mL Nebu INHALE THE CONTENTS OF 1 VIAL VIA NEBULIZER EVERY 6 HOURS AS NEEDED FOR RESCUE    albuterol (PROVENTIL/VENTOLIN HFA) 90 mcg/actuation inhaler 2 puffs, Inhalation, Every 6 hours  PRN, Rescue    amoxicillin (AMOXIL) 500 mg, Oral, As needed (PRN)    aspirin (ECOTRIN) 81 MG EC tablet One tablet p.o. q.day with food    digoxin (LANOXIN) 250 mcg tablet TAKE 1 TABLET EVERY DAY    ezetimibe (ZETIA) 10 mg, Oral, Daily    fexofenadine (ALLEGRA) 60 mg, Oral, Daily PRN, Pt takes everyother day    FLUAD QUAD 2022-23,65Y UP,,PF, 60 mcg (15 mcg x 4)/0.5 mL Syrg No dose, route, or frequency recorded.    fluticasone propionate (FLONASE) 50 mcg/actuation nasal spray Each Nostril, Daily PRN    fluticasone-umeclidin-vilanter (TRELEGY ELLIPTA) 100-62.5-25 mcg DsDv INHALE 1 PUFF INTO THE LUNGS ONCE DAILY.    furosemide (LASIX) 20 mg, Oral    losartan (COZAAR) 25 mg, Oral, Daily    magnesium oxide (MAG-OX) 400 mg, Oral, Daily    metoprolol succinate (TOPROL-XL) 50 mg, Oral, Daily    multivitamin (THERAGRAN) per tablet 1 tablet, Oral, Daily    pantoprazole (PROTONIX) 40 mg, Oral, Daily    rOPINIRole (REQUIP) 0.5 MG tablet TAKE 1 TABLET EVERY EVENING    rosuvastatin (CRESTOR) 40 mg, Oral, Nightly    spironolactone (ALDACTONE) 25 mg, Oral, Daily    warfarin (COUMADIN) 3 mg, Oral, Daily          Assessment & Plan     Shortness of breath  Patient actually has persistent shortness of breath although she felt better after TAVR replacement.  She would significant shortness of breath last week and apparently this week she is feeling better but not resolved.  Oxygen does help her in order to optimize pulmonary status recommend to do the following   1. Cut down metoprolol to 25 mg daily   2. Initiate a course of steroid therapy Medrol Dosepak for a week and need a longer therapy.  3. If sputum turns more yellow consider doxycycline 100 mg p.o. b.i.d. 7-10 days  4. Continue on Lasix 20 mg daily and Aldactone 25 mg a day.  She was recommend indications for management of heart failure      NYHA class 3 heart failure with reduced ejection fraction  Patient has reduced ejection fraction complicating her therapy at this time.     Recommend the following   1.  Discontinue losartan   2. About 36-48 hours later start her on Entresto 24/26 g daily    3. Continue on low doses of beta-blocker at 25 mg metoprolol succinate.    4. Continue on low-dose Lasix and Aldactone 25 mg a day  Patient may benefit from addition of Jardiance at a later stage.  5. Recheck BNP and be BMP  in about 10 days after starting the  Her last blood test done on January 23rd is as   Latest Reference Range & Units 01/23/24 12:02   Sodium 136 - 145 mmol/L  136 - 145 mmol/L 139  139   Potassium 3.5 - 5.1 mmol/L  3.5 - 5.1 mmol/L 4.1  4.1   Chloride 95 - 110 mmol/L  95 - 110 mmol/L 96  96   CO2 22 - 31 mmol/L  22 - 31 mmol/L 32 (H)  32 (H)   Anion Gap 5 - 12 mmol/L  5 - 12 mmol/L 11  11   BUN 7 - 18 mg/dL  7 - 18 mg/dL 23 (H)  23 (H)   Creatinine 0.50 - 1.40 mg/dL  0.50 - 1.40 mg/dL 0.60  0.60   eGFR >60 mL/min/1.73 m^2  >60 mL/min/1.73 m^2 >60  >60   Glucose 70 - 110 mg/dL  70 - 110 mg/dL 129 (H)  129 (H)   Calcium 8.4 - 10.2 mg/dL  8.4 - 10.2 mg/dL 10.2  10.2   ALP 38 - 145 U/L  38 - 145 U/L 130  130   PROTEIN TOTAL 6.0 - 8.4 g/dL  6.0 - 8.4 g/dL 8.3  8.3   Albumin 3.5 - 5.2 g/dL  3.5 - 5.2 g/dL 4.9  4.9   BILIRUBIN TOTAL 0.2 - 1.3 mg/dL  0.2 - 1.3 mg/dL 0.9  0.9   AST 14 - 36 U/L  14 - 36 U/L 51 (H)  51 (H)   ALT 0 - 35 U/L  0 - 35 U/L 22  22   NT-proBNP 5 - 900 pg/mL  5 - 900 pg/mL 907 (H)  907 (H)   (H): Data is abnormally high    Atrial fibrillation, unspecified type  Continue Coumadin at this point ideally she needs to be on Eliquis 5 mg p.o. b.i.d.    Occlusion and stenosis of unspecified carotid artery  She was significant restenosis of the left carotid artery.  She she has a chronic total occlusion of the right carotid artery  Recommend re-evaluation with vascular surgery for further intervention    IMPRESSION:     1. Complete occlusion of right ICA beginning near its origin and continuing through supraclinoid right ICA.  2. 50-70% stenosis suggested at origin of  right vertebral artery.  3. Short segment greater than 90% stenosis of proximal left ICA.  4. 50-70% stenosis at origin and proximal left subclavian artery.     Electronically signed by:  Koko Mays MD  01/31/2024 11:02 AM CST Workstation: [id number]           Specimen Collected: 01/31/24 08:59 CST Last Resulted: 01/31/24 11:02 CST                         No follow-ups on file.

## 2024-02-01 NOTE — ASSESSMENT & PLAN NOTE
She was significant restenosis of the left carotid artery.  She she has a chronic total occlusion of the right carotid artery  Recommend re-evaluation with vascular surgery for further intervention    IMPRESSION:     1. Complete occlusion of right ICA beginning near its origin and continuing through supraclinoid right ICA.  2. 50-70% stenosis suggested at origin of right vertebral artery.  3. Short segment greater than 90% stenosis of proximal left ICA.  4. 50-70% stenosis at origin and proximal left subclavian artery.     Electronically signed by:  Koko Mays MD  01/31/2024 11:02 AM CST Workstation: [id number]           Specimen Collected: 01/31/24 08:59 CST Last Resulted: 01/31/24 11:02 CST

## 2024-02-01 NOTE — LETTER
February 1, 2024    Lisa W Luis  [address] [facility] [address]             [facility]  [address]  [facility] [address]  Fax: [phone] Dear MsDennys Lisa Smith     It was a pleasure speaking with you. To follow up on our conversation on 2/1/2024, the Pharmacy Patient Assistance Program needs more information from you before we can submit your Entresto application to the [employer] Program. Please return the following documents to the Pharmacy Patient Assistance office ASAP.  Fax requested documentation to [phone] or email [email or web address]. Documentation can also be mailed to address listed below       Proof of household Income( such as social security statement, 1099 form, pension statement or 3 consecutive pay stubs  Copy of all Insurance cards( front and back)  Signed and dated HIPAA /Patient Information Forms          Whats Next:     Once I receive your documentation and authorization from your Provider, your application will be submitted to the [employer] Program for review. Please be advised it will take 2 to 4 weeks for your application to be processed so you may have to purchase a month's supply of medication from your pharmacy to hold you over during the waiting period. You will be notified of approval or denial by The Program(mail) or myself.      If you have any questions or concerns, please give me a call         Sincerely   Gely Montoya   Pharmacy Patient Assistance  [address] [facility] [address]  [place] [address]  Phone: [phone]  Fax: [phone]  Email: [email or web address]

## 2024-02-01 NOTE — ASSESSMENT & PLAN NOTE
Patient actually has persistent shortness of breath although she felt better after TAVR replacement.  She would significant shortness of breath last week and apparently this week she is feeling better but not resolved.  Oxygen does help her in order to optimize pulmonary status recommend to do the following   1. Cut down metoprolol to 25 mg daily   2. Initiate a course of steroid therapy Medrol Dosepak for a week and need a longer therapy.  3. If sputum turns more yellow consider doxycycline 100 mg p.o. b.i.d. 7-10 days  4. Continue on Lasix 20 mg daily and Aldactone 25 mg a day.  She was recommend indications for management of heart failure

## 2024-02-01 NOTE — ASSESSMENT & PLAN NOTE
Patient has reduced ejection fraction complicating her therapy at this time.    Recommend the following   1.  Discontinue losartan   2. About 36-48 hours later start her on Entresto 24/26 g daily    3. Continue on low doses of beta-blocker at 25 mg metoprolol succinate.    4. Continue on low-dose Lasix and Aldactone 25 mg a day  Patient may benefit from addition of Jardiance at a later stage.  5. Recheck BNP and be BMP  in about 10 days after starting the  Her last blood test done on January 23rd is as   Latest Reference Range & Units 01/23/24 12:02   Sodium 136 - 145 mmol/L  136 - 145 mmol/L 139  139   Potassium 3.5 - 5.1 mmol/L  3.5 - 5.1 mmol/L 4.1  4.1   Chloride 95 - 110 mmol/L  95 - 110 mmol/L 96  96   CO2 22 - 31 mmol/L  22 - 31 mmol/L 32 (H)  32 (H)   Anion Gap 5 - 12 mmol/L  5 - 12 mmol/L 11  11   BUN 7 - 18 mg/dL  7 - 18 mg/dL 23 (H)  23 (H)   Creatinine 0.50 - 1.40 mg/dL  0.50 - 1.40 mg/dL 0.60  0.60   eGFR >60 mL/min/1.73 m^2  >60 mL/min/1.73 m^2 >60  >60   Glucose 70 - 110 mg/dL  70 - 110 mg/dL 129 (H)  129 (H)   Calcium 8.4 - 10.2 mg/dL  8.4 - 10.2 mg/dL 10.2  10.2   ALP 38 - 145 U/L  38 - 145 U/L 130  130   PROTEIN TOTAL 6.0 - 8.4 g/dL  6.0 - 8.4 g/dL 8.3  8.3   Albumin 3.5 - 5.2 g/dL  3.5 - 5.2 g/dL 4.9  4.9   BILIRUBIN TOTAL 0.2 - 1.3 mg/dL  0.2 - 1.3 mg/dL 0.9  0.9   AST 14 - 36 U/L  14 - 36 U/L 51 (H)  51 (H)   ALT 0 - 35 U/L  0 - 35 U/L 22  22   NT-proBNP 5 - 900 pg/mL  5 - 900 pg/mL 907 (H)  907 (H)   (H): Data is abnormally high

## 2024-02-05 ENCOUNTER — TELEPHONE (OUTPATIENT)
Dept: VASCULAR SURGERY | Facility: CLINIC | Age: 74
End: 2024-02-05
Payer: MEDICARE

## 2024-02-05 NOTE — TELEPHONE ENCOUNTER
Contacted pt's daughter Jordana in response to message. Appointment scheduled, Jordana verified. Appointment letter refused.  ----- Message from Steph Reeves sent at 2/5/2024 12:18 PM CST -----  Regarding: appt access  Contact: [phone]  Lisa Almanzarr daughter Jordana calling regarding Appointment Access  (message) for #Carotid Artery Disease (history of).  1st available appt is 03/05/24.  Pt have a referral in [facility].

## 2024-02-07 RX ORDER — DIGOXIN 250 MCG
TABLET ORAL
Qty: 90 TABLET | Refills: 3 | Status: SHIPPED | OUTPATIENT
Start: 2024-02-07

## 2024-02-12 ENCOUNTER — TELEPHONE (OUTPATIENT)
Dept: CARDIOLOGY | Facility: CLINIC | Age: 74
End: 2024-02-12
Payer: MEDICARE

## 2024-02-12 NOTE — TELEPHONE ENCOUNTER
132/70's currently, 60 heart rate    86/40 this am, she was having some dizziness over the weekend. She had some syncope symptoms. Entresto she takes in the am    Called Dr. Pérez and he wants her to take it at night. Instructed to hold if 90 or less.    Called and spoke with Lore and she will give her the instructions.

## 2024-02-12 NOTE — TELEPHONE ENCOUNTER
----- Message from Chun Charles sent at 2/12/2024 12:32 PM CST -----  Regarding: return call  Contact: Fawn with [facility]  Type:  Patient Returning Call    Who Called:[employer] health/   Jose E  Who Left Message for Patient:office nurse  Does the patient know what this is regarding?:blood pressure is low/ new medication  Would the patient rather a call back or a response via [facility]? Please advise  Best Call Back Number:[phone]  Additional Information:

## 2024-02-15 ENCOUNTER — TELEPHONE (OUTPATIENT)
Dept: CARDIOLOGY | Facility: CLINIC | Age: 74
End: 2024-02-15
Payer: MEDICARE

## 2024-02-15 NOTE — TELEPHONE ENCOUNTER
----- Message from Jennifer Oneal sent at 2/15/2024  3:05 PM CST -----  Patient daughter dropped off paper work to be filled out for medication. Please call patient daughter Jordana when this is taken care of  [phone].

## 2024-02-19 ENCOUNTER — TELEPHONE (OUTPATIENT)
Dept: CARDIOLOGY | Facility: CLINIC | Age: 74
End: 2024-02-19
Payer: MEDICARE

## 2024-02-19 NOTE — TELEPHONE ENCOUNTER
----- Message from Madhuri Hunter NP sent at 1/31/2024 11:44 AM CST -----  Send this to Dr. Dakota HARRIS  She is established with him  ----- Message -----  From: Interface, Rad Results In  Sent: 1/31/2024  11:04 AM CST  To: Madhuri Hunter NP

## 2024-02-19 NOTE — TELEPHONE ENCOUNTER
----- Message from Ila Kaur sent at 2/19/2024  9:42 AM CST -----  Contact: Charles [phone]  Type: Needs Medical Advice    Who Called:  [facility] Pt assistance     Best Call Back Number: [phone]  fax [phone]    Additional Information: Pt was approved or rx assistance they are requesting a stand alone rx for sacubitriL-valsartan (ENTRESTO) 24-26 mg per tablet To be faxed in to them. Thank you!

## 2024-02-20 ENCOUNTER — INITIAL CONSULT (OUTPATIENT)
Dept: VASCULAR SURGERY | Facility: CLINIC | Age: 74
End: 2024-02-20
Payer: MEDICARE

## 2024-02-20 VITALS
HEART RATE: 64 BPM | SYSTOLIC BLOOD PRESSURE: 131 MMHG | BODY MASS INDEX: 29.24 KG/M2 | WEIGHT: 175.69 LBS | DIASTOLIC BLOOD PRESSURE: 63 MMHG

## 2024-02-20 DIAGNOSIS — Z01.818 PRE-OP EVALUATION: ICD-10-CM

## 2024-02-20 DIAGNOSIS — I65.22 SYMPTOMATIC STENOSIS OF LEFT CAROTID ARTERY: Primary | ICD-10-CM

## 2024-02-20 DIAGNOSIS — I65.29 OCCLUSION AND STENOSIS OF UNSPECIFIED CAROTID ARTERY: ICD-10-CM

## 2024-02-20 DIAGNOSIS — I65.22 STENOSIS OF LEFT CAROTID ARTERY: Primary | ICD-10-CM

## 2024-02-20 PROCEDURE — 99205 OFFICE O/P NEW HI 60 MIN: CPT | Mod: HCNC,S$GLB,, | Performed by: SURGERY

## 2024-02-20 PROCEDURE — 99999 PR PBB SHADOW E&M-EST. PATIENT-LVL V: CPT | Mod: PBBFAC,HCNC,, | Performed by: SURGERY

## 2024-02-20 NOTE — PROGRESS NOTES
VASCULAR SURGERY SERVICE    REFERRING DOCTOR: Madhuri Hunter NP    CHIEF COMPLAINT:  Carotid stenosis    HISTORY OF PRESENT ILLNESS: Lisa Smith is a 73 y.o. female status post TAVR 12/20/2023, status post left carotid endarterectomy 12/20/2022, sent for evaluation of 95+% recurrent left carotid stenosis with known right ICA occlusion.  In the last 6 weeks she has had several episodes of dizziness bilateral blurred vision and transient bilateral arm weakness.  She denies any lateralizing symptoms.    She is oxygen-dependent COPD using oxygen since 20/11.  She says that her breathing has improved substantially since the TAVR in December 2023.  She still however can not lay flat for extended periods.    She is on AFib and on Coumadin which was evidently started after the TAVR.  Per the daughter who is a nurse she is being evaluated for a Watchman.    Past Medical History:   Diagnosis Date    Anticoagulant long-term use     Arthritis     Atrial fibrillation     Bell's palsy     Boil of buttock     burst 12/19/22    CHF (congestive heart failure)     Chronic cough     COPD (chronic obstructive pulmonary disease)     use O2  3l/m NC at night; also taking during day currently 12/4/23    Dizziness     Encounter for blood transfusion     GI bleed 2011    transfusion    H/O diverticulitis of colon     Hard of hearing     Heart murmur     Hematoma 02/2023    left hand    Heterozygous alpha 1-antitrypsin deficiency     History of home oxygen therapy     3L NC at night    Hypertension     Lung disease     copd    MONSERRAT (obstructive sleep apnea)     resolved with wt loss 131#    Pacemaker     Pneumonia     last episode 2019    Pulmonary edema     Skin cancer     Unspecified visual disturbance     episode of vision disturbance and dizziness...occasional recurrences       Past Surgical History:   Procedure Laterality Date    CARDIAC ELECTROPHYSIOLOGY STUDY AND ABLATION      CAROTID ENDARTERECTOMY Left 12/22/2022     Procedure: ENDARTERECTOMY-CAROTID;  Surgeon: Maximilian Connolly MD;  Location: [facility] OR;  Service: Peripheral Vascular;  Laterality: Left;    CARPAL TUNNEL RELEASE Left     CHOLECYSTECTOMY      EYE SURGERY Bilateral     cataract    HYSTERECTOMY      INSERT / REPLACE / REMOVE PACEMAKER      KNEE ARTHROSCOPY Left     LEFT HEART CATHETERIZATION  11/1/2023    Procedure: Left heart cath;  Surgeon: Puma Shelton MD;  Location: [facility] CATH;  Service: Cardiology;;    REPLACEMENT OF PACEMAKER GENERATOR Left 01/20/2022    Procedure: REPLACEMENT, PACEMAKER GENERATOR;  Surgeon: Raymond Pérez MD;  Location: [facility] CATH/EP LAB;  Service: Cardiology;  Laterality: Left;    SKIN CANCER EXCISION      TRANSCATHETER AORTIC VALVE REPLACEMENT (TAVR) Bilateral 12/6/2023    Procedure: (TAVR);  Surgeon: Puma Shelton MD;  Location: [facility] CATH;  Service: Cardiology;  Laterality: Bilateral;    TRANSCATHETER AORTIC VALVE REPLACEMENT (TAVR) Bilateral 12/6/2023    Procedure: (TAVR) - Surgeon;  Surgeon: Maximilian Connolly MD;  Location: [facility] CATH;  Service: Peripheral Vascular;  Laterality: Bilateral;         Current Outpatient Medications:     acetaminophen (TYLENOL ARTHRITIS ORAL), Take 2 tablets by mouth daily as needed., Disp: , Rfl:     acetaminophen (TYLENOL) 325 MG tablet, Take 325 mg by mouth every 6 (six) hours as needed for Pain., Disp: , Rfl:     albuterol (ACCUNEB) 1.25 mg/3 mL Nebu, INHALE THE CONTENTS OF 1 VIAL VIA NEBULIZER EVERY 6 HOURS AS NEEDED FOR RESCUE, Disp: 360 mL, Rfl: 5    albuterol (PROVENTIL/VENTOLIN HFA) 90 mcg/actuation inhaler, Inhale 2 puffs into the lungs every 6 (six) hours as needed for Wheezing. Rescue, Disp: 18 g, Rfl: 6    aspirin (ECOTRIN) 81 MG EC tablet, One tablet p.o. q.day with food, Disp: 90 tablet, Rfl: 3    digoxin (LANOXIN) 250 mcg tablet, TAKE 1 TABLET EVERY DAY, Disp: 90 tablet, Rfl: 3    ezetimibe (ZETIA) 10 mg tablet, Take 1 tablet (10 mg total) by mouth once daily., Disp: 90 tablet, Rfl:  3    fexofenadine (ALLEGRA) 60 MG tablet, Take 60 mg by mouth daily as needed. Pt takes everyother day, Disp: , Rfl:     FLUAD QUAD 2022-23,65Y UP,,PF, 60 mcg (15 mcg x 4)/0.5 mL Syrg, , Disp: , Rfl:     fluticasone propionate (FLONASE) 50 mcg/actuation nasal spray, by Each Nostril route daily as needed., Disp: , Rfl:     fluticasone-umeclidin-vilanter (TRELEGY ELLIPTA) 100-62.5-25 mcg DsDv, INHALE 1 PUFF INTO THE LUNGS ONCE DAILY., Disp: 3 each, Rfl: 6    furosemide (LASIX) 20 MG tablet, TAKE 1 TABLET EVERY DAY, Disp: 90 tablet, Rfl: 1    magnesium oxide (MAG-OX) 400 mg (241.3 mg magnesium) tablet, Take 400 mg by mouth once daily., Disp: , Rfl:     metoprolol succinate (TOPROL-XL) 50 MG 24 hr tablet, Take 1 tablet (50 mg total) by mouth once daily., Disp: 30 tablet, Rfl: 11    multivitamin (THERAGRAN) per tablet, Take 1 tablet by mouth once daily., Disp: , Rfl:     pantoprazole (PROTONIX) 40 MG tablet, Take 1 tablet (40 mg total) by mouth once daily., Disp: 90 tablet, Rfl: 3    rOPINIRole (REQUIP) 0.5 MG tablet, TAKE 1 TABLET EVERY EVENING, Disp: 90 tablet, Rfl: 1    rosuvastatin (CRESTOR) 40 MG Tab, Take 1 tablet (40 mg total) by mouth every evening., Disp: 90 tablet, Rfl: 3    sacubitriL-valsartan (ENTRESTO) 24-26 mg per tablet, Take 1 tablet by mouth 2 (two) times daily., Disp: 180 tablet, Rfl: 3    spironolactone (ALDACTONE) 25 MG tablet, TAKE 1 TABLET (25 MG TOTAL) BY MOUTH ONCE DAILY., Disp: 90 tablet, Rfl: 3    warfarin (COUMADIN) 3 MG tablet, Take 1 tablet (3 mg total) by mouth Daily., Disp: 90 tablet, Rfl: 3    amoxicillin (AMOXIL) 500 MG Tab, Take 1 tablet (500 mg total) by mouth as needed (take one hour prior dental work or any high risk procedures.). (Patient not taking: Reported on 2/20/2024), Disp: 4 tablet, Rfl: 10    methylPREDNISolone (MEDROL DOSEPACK) 4 mg tablet, use as directed (Patient not taking: Reported on 2/20/2024), Disp: 21 each, Rfl: 0    Review of patient's allergies indicates:    Allergen Reactions    Sulfa (sulfonamide antibiotics) Itching       Family History   Problem Relation Age of Onset    Kidney failure Mother     Cancer Father     Cancer Brother        Social History     Tobacco Use    Smoking status: Former     Current packs/day: 0.00     Average packs/day: 2.0 packs/day for 40.0 years (80.0 ttl pk-yrs)     Types: Cigarettes     Start date: 1971     Quit date: 2011     Years since quittin.0     Passive exposure: Past    Smokeless tobacco: Never    Tobacco comments:          Quit in    Substance Use Topics    Alcohol use: Not Currently     Alcohol/week: 8.0 standard drinks of alcohol     Types: 8 Glasses of wine per week    Drug use: No       REVIEW OF SYSTEMS:  General: No chills, fever, malaise, changes in weight  HEENT: No visual changes, difficulty hearing  Cardiovascular: No chest pain, palpitations, claudication  Pulmonary: No dyspnea, cough, wheezing  Gastrointestinal: No nausea, vomiting, diarrhea, constipation  Genitourinary: No dysuria, low urine output, hematuria  Endocrine: No polydipsia, polyphagia  Hematologic: No fatigue with exertion, pica, pallor  Musculoskeletal: No extremity or joint pain, no back pain, no difficulty with ambulation  Neurologic: no seizures, no headaches, no weakness  Psychiatric: no mood disturbance      PHYSICAL EXAM:   /63   Pulse 64   Wt 79.7 kg (175 lb 11.3 oz)   LMP  (LMP Unknown)   BMI 29.24 kg/m²   Constitutional:  Alert,   Well-appearing  In no distress.  Traveling by wheelchair.  She was able to get up to the examining table   Neurological: Normal speech  no focal findings  CN II - XII grossly intact.  Neurologic completely intact   Psychiatric: Mood and affect appropriate and symmetric.   HEENT: Normocephalic / atraumatic  PERRLA  Midline trachea  No scars across the neck   Cardiac: Regular rate and rhythm.   Pulmonary: Normal pulmonary effort.   Abdomen: Soft, not distended.     Skin: Warm and well perfused.     Vascular:  1+ right femoral 2+ left femoral pulses   Extremities/  Musculoskeletal: No edema.        IMAGING:  Carotid CTA 1/31/2024 was personally reviewed, demonstrates a fairly calcific aortic arch, however no calcification or stenosis at the left common carotid takeoff.  There was significant left-to-right tortuosity.  There was a greater than 95% stenosis of the ICA on the left proximally 1 cm from the bulb.    Right ICA is occluded    Carotid duplex December 5, 2023 demonstrated a right ICA occlusion and a very high-grade left ICA stenosis with a peak systolic velocity of 535    IMPRESSION:      Greater than 95% left carotid stenosis, recurrent from carotid endarterectomy, with contralateral occlusion.   Almost certainly her episodes of dizziness bilateral blurred vision and bilateral arm weakness are due to global hypoperfusion making this asymptomatic lesion  Ten weeks status post TAVR, on Coumadin.    This procedure was performed with general anesthesia and well tolerated per the patient and daughter (who was a nurse)  Severe COPD on home oxygen since 2011.  Really can not lay flat    Definitive treatment of this very high-grade left carotid stenosis is clearly indicated.   This should be done with a stent given the recurrent nature of her stenosis from prior endarterectomy as well as her severe medical co-morbidities.    Her length of common carotid artery from the clavicle to the bifurcation is only marginally adequate for TCAR.  This will require further review assess her candidacy    PLAN: L carotid stent, either transfemoral or TCAR, Thursday 02/29/2024  GETA, even if we would proceed with a transfemoral stent as do not believe that she will be able to lay flat for this procedure    Restart Plavix now, continue aspirin 81 mg daily  Hold Coumadin 5 days prior to this procedure  Will ask her cardiologist, Dr. Shelton, whether she requires a Lovenox bridge    Repeat carotid duplex on the [place] prior  to this procedure to confirm continued patency of the left ICA    I have explained the risks, benefits and alternatives for this procedure in detail.  The patient voices understanding and all questions have be answered, and agrees to proceed with the procedure.     CHAPIS Valdivia III, MD, FACS  Professor and Chief, Vascular and Endovascular Surgery      CC: Dr Shelton

## 2024-02-20 NOTE — LETTER
[facility] - Vascular Surg 5th Fl  [address]  [facility] [address]  Phone: [phone]  Fax: [phone] February 28, 2024        Madhuri Hunter, JOHAN  [address] [facility] [address]  [place] [address]    Patient: Lisa Smith   MR Number: [id number]   YOB: 1950   Date of Visit: 2/20/2024     Dear Ms. Hunter:    Thank you for referring Lisa Smith to me for evaluation. Attached are the relevant portions of my assessment and plan of care.    If you have questions, please do not hesitate to call me. I look forward to following Lisa along with you.    Sincerely,    CIRILO Valdivia III, MD   Professor and Chief  Vascular and Endovascular Surgery  Vice-Chair, Department of Surgery  [facility]     WCS/hcr

## 2024-02-20 NOTE — H&P (VIEW-ONLY)
VASCULAR SURGERY SERVICE    REFERRING DOCTOR: Madhuri Hunter NP    CHIEF COMPLAINT:  Carotid stenosis    HISTORY OF PRESENT ILLNESS: Lisa Smith is a 73 y.o. female status post TAVR 12/20/2023, status post left carotid endarterectomy 12/20/2022, sent for evaluation of 95+% recurrent left carotid stenosis with known right ICA occlusion.  In the last 6 weeks she has had several episodes of dizziness bilateral blurred vision and transient bilateral arm weakness.  She denies any lateralizing symptoms.    She is oxygen-dependent COPD using oxygen since 20/11.  She says that her breathing has improved substantially since the TAVR in December 2023.  She still however can not lay flat for extended periods.    She is on AFib and on Coumadin which was evidently started after the TAVR.  Per the daughter who is a nurse she is being evaluated for a Watchman.    Past Medical History:   Diagnosis Date    Anticoagulant long-term use     Arthritis     Atrial fibrillation     Bell's palsy     Boil of buttock     burst 12/19/22    CHF (congestive heart failure)     Chronic cough     COPD (chronic obstructive pulmonary disease)     use O2  3l/m NC at night; also taking during day currently 12/4/23    Dizziness     Encounter for blood transfusion     GI bleed 2011    transfusion    H/O diverticulitis of colon     Hard of hearing     Heart murmur     Hematoma 02/2023    left hand    Heterozygous alpha 1-antitrypsin deficiency     History of home oxygen therapy     3L NC at night    Hypertension     Lung disease     copd    MONSERRAT (obstructive sleep apnea)     resolved with wt loss 131#    Pacemaker     Pneumonia     last episode 2019    Pulmonary edema     Skin cancer     Unspecified visual disturbance     episode of vision disturbance and dizziness...occasional recurrences       Past Surgical History:   Procedure Laterality Date    CARDIAC ELECTROPHYSIOLOGY STUDY AND ABLATION      CAROTID ENDARTERECTOMY Left 12/22/2022     Procedure: ENDARTERECTOMY-CAROTID;  Surgeon: Maximilian Connolly MD;  Location: [facility] OR;  Service: Peripheral Vascular;  Laterality: Left;    CARPAL TUNNEL RELEASE Left     CHOLECYSTECTOMY      EYE SURGERY Bilateral     cataract    HYSTERECTOMY      INSERT / REPLACE / REMOVE PACEMAKER      KNEE ARTHROSCOPY Left     LEFT HEART CATHETERIZATION  11/1/2023    Procedure: Left heart cath;  Surgeon: Puma Shelton MD;  Location: [facility] CATH;  Service: Cardiology;;    REPLACEMENT OF PACEMAKER GENERATOR Left 01/20/2022    Procedure: REPLACEMENT, PACEMAKER GENERATOR;  Surgeon: Raymond Pérez MD;  Location: [facility] CATH/EP LAB;  Service: Cardiology;  Laterality: Left;    SKIN CANCER EXCISION      TRANSCATHETER AORTIC VALVE REPLACEMENT (TAVR) Bilateral 12/6/2023    Procedure: (TAVR);  Surgeon: Puma Shelton MD;  Location: [facility] CATH;  Service: Cardiology;  Laterality: Bilateral;    TRANSCATHETER AORTIC VALVE REPLACEMENT (TAVR) Bilateral 12/6/2023    Procedure: (TAVR) - Surgeon;  Surgeon: Maximilian Connolly MD;  Location: [facility] CATH;  Service: Peripheral Vascular;  Laterality: Bilateral;         Current Outpatient Medications:     acetaminophen (TYLENOL ARTHRITIS ORAL), Take 2 tablets by mouth daily as needed., Disp: , Rfl:     acetaminophen (TYLENOL) 325 MG tablet, Take 325 mg by mouth every 6 (six) hours as needed for Pain., Disp: , Rfl:     albuterol (ACCUNEB) 1.25 mg/3 mL Nebu, INHALE THE CONTENTS OF 1 VIAL VIA NEBULIZER EVERY 6 HOURS AS NEEDED FOR RESCUE, Disp: 360 mL, Rfl: 5    albuterol (PROVENTIL/VENTOLIN HFA) 90 mcg/actuation inhaler, Inhale 2 puffs into the lungs every 6 (six) hours as needed for Wheezing. Rescue, Disp: 18 g, Rfl: 6    aspirin (ECOTRIN) 81 MG EC tablet, One tablet p.o. q.day with food, Disp: 90 tablet, Rfl: 3    digoxin (LANOXIN) 250 mcg tablet, TAKE 1 TABLET EVERY DAY, Disp: 90 tablet, Rfl: 3    ezetimibe (ZETIA) 10 mg tablet, Take 1 tablet (10 mg total) by mouth once daily., Disp: 90 tablet, Rfl:  3    fexofenadine (ALLEGRA) 60 MG tablet, Take 60 mg by mouth daily as needed. Pt takes everyother day, Disp: , Rfl:     FLUAD QUAD 2022-23,65Y UP,,PF, 60 mcg (15 mcg x 4)/0.5 mL Syrg, , Disp: , Rfl:     fluticasone propionate (FLONASE) 50 mcg/actuation nasal spray, by Each Nostril route daily as needed., Disp: , Rfl:     fluticasone-umeclidin-vilanter (TRELEGY ELLIPTA) 100-62.5-25 mcg DsDv, INHALE 1 PUFF INTO THE LUNGS ONCE DAILY., Disp: 3 each, Rfl: 6    furosemide (LASIX) 20 MG tablet, TAKE 1 TABLET EVERY DAY, Disp: 90 tablet, Rfl: 1    magnesium oxide (MAG-OX) 400 mg (241.3 mg magnesium) tablet, Take 400 mg by mouth once daily., Disp: , Rfl:     metoprolol succinate (TOPROL-XL) 50 MG 24 hr tablet, Take 1 tablet (50 mg total) by mouth once daily., Disp: 30 tablet, Rfl: 11    multivitamin (THERAGRAN) per tablet, Take 1 tablet by mouth once daily., Disp: , Rfl:     pantoprazole (PROTONIX) 40 MG tablet, Take 1 tablet (40 mg total) by mouth once daily., Disp: 90 tablet, Rfl: 3    rOPINIRole (REQUIP) 0.5 MG tablet, TAKE 1 TABLET EVERY EVENING, Disp: 90 tablet, Rfl: 1    rosuvastatin (CRESTOR) 40 MG Tab, Take 1 tablet (40 mg total) by mouth every evening., Disp: 90 tablet, Rfl: 3    sacubitriL-valsartan (ENTRESTO) 24-26 mg per tablet, Take 1 tablet by mouth 2 (two) times daily., Disp: 180 tablet, Rfl: 3    spironolactone (ALDACTONE) 25 MG tablet, TAKE 1 TABLET (25 MG TOTAL) BY MOUTH ONCE DAILY., Disp: 90 tablet, Rfl: 3    warfarin (COUMADIN) 3 MG tablet, Take 1 tablet (3 mg total) by mouth Daily., Disp: 90 tablet, Rfl: 3    amoxicillin (AMOXIL) 500 MG Tab, Take 1 tablet (500 mg total) by mouth as needed (take one hour prior dental work or any high risk procedures.). (Patient not taking: Reported on 2/20/2024), Disp: 4 tablet, Rfl: 10    methylPREDNISolone (MEDROL DOSEPACK) 4 mg tablet, use as directed (Patient not taking: Reported on 2/20/2024), Disp: 21 each, Rfl: 0    Review of patient's allergies indicates:    Allergen Reactions    Sulfa (sulfonamide antibiotics) Itching       Family History   Problem Relation Age of Onset    Kidney failure Mother     Cancer Father     Cancer Brother        Social History     Tobacco Use    Smoking status: Former     Current packs/day: 0.00     Average packs/day: 2.0 packs/day for 40.0 years (80.0 ttl pk-yrs)     Types: Cigarettes     Start date: 1971     Quit date: 2011     Years since quittin.0     Passive exposure: Past    Smokeless tobacco: Never    Tobacco comments:          Quit in    Substance Use Topics    Alcohol use: Not Currently     Alcohol/week: 8.0 standard drinks of alcohol     Types: 8 Glasses of wine per week    Drug use: No       REVIEW OF SYSTEMS:  General: No chills, fever, malaise, changes in weight  HEENT: No visual changes, difficulty hearing  Cardiovascular: No chest pain, palpitations, claudication  Pulmonary: No dyspnea, cough, wheezing  Gastrointestinal: No nausea, vomiting, diarrhea, constipation  Genitourinary: No dysuria, low urine output, hematuria  Endocrine: No polydipsia, polyphagia  Hematologic: No fatigue with exertion, pica, pallor  Musculoskeletal: No extremity or joint pain, no back pain, no difficulty with ambulation  Neurologic: no seizures, no headaches, no weakness  Psychiatric: no mood disturbance      PHYSICAL EXAM:   /63   Pulse 64   Wt 79.7 kg (175 lb 11.3 oz)   LMP  (LMP Unknown)   BMI 29.24 kg/m²   Constitutional:  Alert,   Well-appearing  In no distress.  Traveling by wheelchair.  She was able to get up to the examining table   Neurological: Normal speech  no focal findings  CN II - XII grossly intact.  Neurologic completely intact   Psychiatric: Mood and affect appropriate and symmetric.   HEENT: Normocephalic / atraumatic  PERRLA  Midline trachea  No scars across the neck   Cardiac: Regular rate and rhythm.   Pulmonary: Normal pulmonary effort.   Abdomen: Soft, not distended.     Skin: Warm and well perfused.     Vascular:  1+ right femoral 2+ left femoral pulses   Extremities/  Musculoskeletal: No edema.        IMAGING:  Carotid CTA 1/31/2024 was personally reviewed, demonstrates a fairly calcific aortic arch, however no calcification or stenosis at the left common carotid takeoff.  There was significant left-to-right tortuosity.  There was a greater than 95% stenosis of the ICA on the left proximally 1 cm from the bulb.    Right ICA is occluded    Carotid duplex December 5, 2023 demonstrated a right ICA occlusion and a very high-grade left ICA stenosis with a peak systolic velocity of 535    IMPRESSION:      Greater than 95% left carotid stenosis, recurrent from carotid endarterectomy, with contralateral occlusion.   Almost certainly her episodes of dizziness bilateral blurred vision and bilateral arm weakness are due to global hypoperfusion making this asymptomatic lesion  Ten weeks status post TAVR, on Coumadin.    This procedure was performed with general anesthesia and well tolerated per the patient and daughter (who was a nurse)  Severe COPD on home oxygen since 2011.  Really can not lay flat    Definitive treatment of this very high-grade left carotid stenosis is clearly indicated.   This should be done with a stent given the recurrent nature of her stenosis from prior endarterectomy as well as her severe medical co-morbidities.    Her length of common carotid artery from the clavicle to the bifurcation is only marginally adequate for TCAR.  This will require further review assess her candidacy    PLAN: L carotid stent, either transfemoral or TCAR, Thursday 02/29/2024  GETA, even if we would proceed with a transfemoral stent as do not believe that she will be able to lay flat for this procedure    Restart Plavix now, continue aspirin 81 mg daily  Hold Coumadin 5 days prior to this procedure  Will ask her cardiologist, Dr. Shelton, whether she requires a Lovenox bridge    Repeat carotid duplex on the [place] prior  to this procedure to confirm continued patency of the left ICA    I have explained the risks, benefits and alternatives for this procedure in detail.  The patient voices understanding and all questions have be answered, and agrees to proceed with the procedure.     CHAPIS Valdivia III, MD, FACS  Professor and Chief, Vascular and Endovascular Surgery      CC: Dr Shelton

## 2024-02-21 ENCOUNTER — HOSPITAL ENCOUNTER (OUTPATIENT)
Dept: RADIOLOGY | Facility: HOSPITAL | Age: 74
Discharge: HOME | End: 2024-02-21
Attending: SURGERY
Payer: MEDICARE

## 2024-02-21 DIAGNOSIS — I65.22 STENOSIS OF LEFT CAROTID ARTERY: ICD-10-CM

## 2024-02-21 PROCEDURE — 93880 EXTRACRANIAL BILAT STUDY: CPT | Mod: TC,PO

## 2024-02-21 NOTE — TELEPHONE ENCOUNTER
Hello,       A Patient Assistance Application for Lsia Smith - MRN  [id number] was faxed to your office @[phone]. Please have Dr. Pérez review the application to ensure the prescription is correct. If correct, sign and fax the application back to the Pharmacy Patient Assistance Team @[phone].     Please do not write any corrections on this application.  If changes are needed on this application, please inform the PAP team, and the corrected application will be faxed back to your office for approval and signature.            Pharmacy Patient Assistance  [address] [place] [address]  [address] [place] [address]  Fax: [phone]  Email: [email or web address]

## 2024-02-22 DIAGNOSIS — I50.43 ACUTE ON CHRONIC COMBINED SYSTOLIC AND DIASTOLIC CHF, NYHA CLASS 3: ICD-10-CM

## 2024-02-22 NOTE — TELEPHONE ENCOUNTER
----- Message from Elliot Segal sent at 2/22/2024  8:43 AM CST -----  Contact: Rx Dov  Type:  Pharmacy Calling to Clarify an RX    Name of Caller: Rx Dov  Pharmacy Name:      [facility] [place], [place] - [address]  [address]  [facility] [address]  Phone: [phone] Fax: [phone]          Prescription Name:  sacubitriL-valsartan (ENTRESTO) 24-26 mg per tablet  What do they need to clarify?:  Directions, frequency  [phone] opt 3

## 2024-02-23 RX ORDER — SACUBITRIL AND VALSARTAN 24; 26 MG/1; MG/1
1 TABLET, FILM COATED ORAL DAILY
Qty: 90 TABLET | Refills: 3 | Status: SHIPPED | OUTPATIENT
Start: 2024-02-23 | End: 2024-02-28 | Stop reason: SDUPTHER

## 2024-02-26 ENCOUNTER — TELEPHONE (OUTPATIENT)
Dept: CARDIOLOGY | Facility: CLINIC | Age: 74
End: 2024-02-26
Payer: MEDICARE

## 2024-02-26 NOTE — TELEPHONE ENCOUNTER
----- Message from Chun Charles sent at 2/26/2024  2:46 PM CST -----  Regarding: return call  Contact: patient  Type:  Patient Returning Call    Who Called:patient  Who Left Message for Patient:office nurse  Does the patient know what this is regarding?:Entresto  Would the patient rather a call back or a response via [facility]? Please call  Best Call Back Number:[phone]  Additional Information: please call [phone]

## 2024-02-27 ENCOUNTER — DOCUMENTATION ONLY (OUTPATIENT)
Dept: ELECTROPHYSIOLOGY | Facility: CLINIC | Age: 74
End: 2024-02-27
Payer: MEDICARE

## 2024-02-27 DIAGNOSIS — Z00.00 ENCOUNTER FOR MEDICARE ANNUAL WELLNESS EXAM: ICD-10-CM

## 2024-02-27 NOTE — PROGRESS NOTES
Patient has been identified as having an implanted cardiac rhythm device (CRD); the implanted device is a [employer]onic pacemaker.      Pacer dependency has been noted.         PACEMAKERS/DEPENDENT ABOVE WAIST     The reported surgical procedure is above the umbilicus.  Per protocol, apply a magnet directly over the implanted device during entire surgical procedure if electrocautery will be used.    For additional questions, please contact the Arrhythmia Department at Ext [phone].

## 2024-02-27 NOTE — PRE-PROCEDURE INSTRUCTIONS
PREOP INSTRUCTIONS:  No food,milk or milk products for 8 hours before surgery.  Clear liquids like water,gatorade,apple juice are allowed up until 2 hours before surgery.  Instructed to follow the surgeon's instructions if they differ from these.  Shower instructions as well as directions to the Surgery Center were given.  Encouraged to wear loose fitting,comfortable clothing.  Medication instructions for pm prior to and am of procedure reviewed.  Instructed to avoid taking vitamins,supplements and ibuprofen the morning of surgery.    PATIENT HAS A [employer] PACEMAKER.ANDREI LIN IN THE [facility] NOTIFIED AT 1120 ON 2/27/2024 AND REPLIED A NOTE WILL BE PLACED IN THE CHART    Patient denies any side effects or issues with anesthesia or sedation.     Patient does not know arrival time.Explained that this information comes from the surgeon's office and if they haven't heard from them by 2 or 3 pm 2/28/2024 to call the office.Patient stated an understanding.

## 2024-02-28 ENCOUNTER — TELEPHONE (OUTPATIENT)
Dept: VASCULAR SURGERY | Facility: CLINIC | Age: 74
End: 2024-02-28
Payer: MEDICARE

## 2024-02-28 DIAGNOSIS — I50.43 ACUTE ON CHRONIC COMBINED SYSTOLIC AND DIASTOLIC CHF, NYHA CLASS 3: ICD-10-CM

## 2024-02-28 RX ORDER — SACUBITRIL AND VALSARTAN 24; 26 MG/1; MG/1
1 TABLET, FILM COATED ORAL DAILY
Qty: 90 TABLET | Refills: 3 | Status: SHIPPED | OUTPATIENT
Start: 2024-02-28 | End: 2024-03-14 | Stop reason: SDUPTHER

## 2024-02-28 NOTE — TELEPHONE ENCOUNTER
Pt confirms she is is currently taking Plavix and that last dose of Coumadin was Friday 02/23/24 in preparation for surgery tomorrow with Dr. Valdivia.

## 2024-02-29 ENCOUNTER — HOSPITAL ENCOUNTER (INPATIENT)
Facility: HOSPITAL | Age: 74
LOS: 1 days | Discharge: HOME HEALTH | DRG: 035 | End: 2024-03-01
Attending: SURGERY | Admitting: SURGERY
Payer: MEDICARE

## 2024-02-29 ENCOUNTER — ANESTHESIA (OUTPATIENT)
Dept: SURGERY | Facility: HOSPITAL | Age: 74
DRG: 035 | End: 2024-02-29
Payer: MEDICARE

## 2024-02-29 ENCOUNTER — ANESTHESIA EVENT (OUTPATIENT)
Dept: SURGERY | Facility: HOSPITAL | Age: 74
DRG: 035 | End: 2024-02-29
Payer: MEDICARE

## 2024-02-29 ENCOUNTER — DOCUMENTATION ONLY (OUTPATIENT)
Dept: ELECTROPHYSIOLOGY | Facility: CLINIC | Age: 74
End: 2024-02-29
Payer: MEDICARE

## 2024-02-29 DIAGNOSIS — I65.29 CAROTID STENOSIS: ICD-10-CM

## 2024-02-29 PROBLEM — I65.22 SYMPTOMATIC STENOSIS OF LEFT CAROTID ARTERY: Status: RESOLVED | Noted: 2024-02-20 | Resolved: 2024-02-29

## 2024-02-29 LAB
ABO + RH BLD: NORMAL
ALBUMIN SERPL BCP-MCNC: 3.2 G/DL (ref 3.5–5.2)
ALP SERPL-CCNC: 87 U/L (ref 55–135)
ALT SERPL W/O P-5'-P-CCNC: 16 U/L (ref 10–44)
ANION GAP SERPL CALC-SCNC: 5 MMOL/L (ref 8–16)
ANION GAP SERPL CALC-SCNC: 9 MMOL/L (ref 8–16)
APTT PPP: 24.3 SEC (ref 21–32)
AST SERPL-CCNC: 38 U/L (ref 10–40)
BASOPHILS # BLD AUTO: 0.03 K/UL (ref 0–0.2)
BASOPHILS NFR BLD: 0.3 % (ref 0–1.9)
BASOPHILS NFR BLD: 0.3 % (ref 0–1.9)
BASOPHILS NFR BLD: 0.4 % (ref 0–1.9)
BILIRUB SERPL-MCNC: 0.6 MG/DL (ref 0.1–1)
BLD GP AB SCN CELLS X3 SERPL QL: NORMAL
BUN SERPL-MCNC: 20 MG/DL (ref 8–23)
BUN SERPL-MCNC: 21 MG/DL (ref 8–23)
CALCIUM SERPL-MCNC: 10 MG/DL (ref 8.7–10.5)
CALCIUM SERPL-MCNC: 8.5 MG/DL (ref 8.7–10.5)
CHLORIDE SERPL-SCNC: 100 MMOL/L (ref 95–110)
CHLORIDE SERPL-SCNC: 106 MMOL/L (ref 95–110)
CO2 SERPL-SCNC: 29 MMOL/L (ref 23–29)
CO2 SERPL-SCNC: 30 MMOL/L (ref 23–29)
CREAT SERPL-MCNC: 0.6 MG/DL (ref 0.5–1.4)
CREAT SERPL-MCNC: 0.7 MG/DL (ref 0.5–1.4)
DIFFERENTIAL METHOD BLD: ABNORMAL
EOSINOPHIL # BLD AUTO: 0.1 K/UL (ref 0–0.5)
EOSINOPHIL # BLD AUTO: 0.2 K/UL (ref 0–0.5)
EOSINOPHIL # BLD AUTO: 0.2 K/UL (ref 0–0.5)
EOSINOPHIL NFR BLD: 1.2 % (ref 0–8)
EOSINOPHIL NFR BLD: 1.6 % (ref 0–8)
EOSINOPHIL NFR BLD: 1.6 % (ref 0–8)
ERYTHROCYTE [DISTWIDTH] IN BLOOD BY AUTOMATED COUNT: 16 % (ref 11.5–14.5)
ERYTHROCYTE [DISTWIDTH] IN BLOOD BY AUTOMATED COUNT: 16.1 % (ref 11.5–14.5)
ERYTHROCYTE [DISTWIDTH] IN BLOOD BY AUTOMATED COUNT: 16.1 % (ref 11.5–14.5)
EST. GFR  (NO RACE VARIABLE): >60 ML/MIN/1.73 M^2
EST. GFR  (NO RACE VARIABLE): >60 ML/MIN/1.73 M^2
GLUCOSE SERPL-MCNC: 102 MG/DL (ref 70–110)
GLUCOSE SERPL-MCNC: 164 MG/DL (ref 70–110)
HCT VFR BLD AUTO: 28.2 % (ref 37–48.5)
HCT VFR BLD AUTO: 28.2 % (ref 37–48.5)
HCT VFR BLD AUTO: 31.6 % (ref 37–48.5)
HGB BLD-MCNC: 10.2 G/DL (ref 12–16)
HGB BLD-MCNC: 9.3 G/DL (ref 12–16)
HGB BLD-MCNC: 9.3 G/DL (ref 12–16)
IMM GRANULOCYTES # BLD AUTO: 0.04 K/UL (ref 0–0.04)
IMM GRANULOCYTES # BLD AUTO: 0.09 K/UL (ref 0–0.04)
IMM GRANULOCYTES # BLD AUTO: 0.09 K/UL (ref 0–0.04)
IMM GRANULOCYTES NFR BLD AUTO: 0.6 % (ref 0–0.5)
IMM GRANULOCYTES NFR BLD AUTO: 0.9 % (ref 0–0.5)
IMM GRANULOCYTES NFR BLD AUTO: 0.9 % (ref 0–0.5)
INR PPP: 1 (ref 0.8–1.2)
INR PPP: 1.1 (ref 0.8–1.2)
LYMPHOCYTES # BLD AUTO: 0.6 K/UL (ref 1–4.8)
LYMPHOCYTES # BLD AUTO: 0.6 K/UL (ref 1–4.8)
LYMPHOCYTES # BLD AUTO: 0.7 K/UL (ref 1–4.8)
LYMPHOCYTES NFR BLD: 10 % (ref 18–48)
LYMPHOCYTES NFR BLD: 6 % (ref 18–48)
LYMPHOCYTES NFR BLD: 6 % (ref 18–48)
MAGNESIUM SERPL-MCNC: 1.7 MG/DL (ref 1.6–2.6)
MCH RBC QN AUTO: 31.9 PG (ref 27–31)
MCH RBC QN AUTO: 32.2 PG (ref 27–31)
MCH RBC QN AUTO: 32.2 PG (ref 27–31)
MCHC RBC AUTO-ENTMCNC: 32.3 G/DL (ref 32–36)
MCHC RBC AUTO-ENTMCNC: 33 G/DL (ref 32–36)
MCHC RBC AUTO-ENTMCNC: 33 G/DL (ref 32–36)
MCV RBC AUTO: 98 FL (ref 82–98)
MCV RBC AUTO: 98 FL (ref 82–98)
MCV RBC AUTO: 99 FL (ref 82–98)
MONOCYTES # BLD AUTO: 0.4 K/UL (ref 0.3–1)
MONOCYTES # BLD AUTO: 0.5 K/UL (ref 0.3–1)
MONOCYTES # BLD AUTO: 0.5 K/UL (ref 0.3–1)
MONOCYTES NFR BLD: 5.7 % (ref 4–15)
MONOCYTES NFR BLD: 5.7 % (ref 4–15)
MONOCYTES NFR BLD: 6.3 % (ref 4–15)
NEUTROPHILS # BLD AUTO: 5.4 K/UL (ref 1.8–7.7)
NEUTROPHILS # BLD AUTO: 8.1 K/UL (ref 1.8–7.7)
NEUTROPHILS # BLD AUTO: 8.1 K/UL (ref 1.8–7.7)
NEUTROPHILS NFR BLD: 81.5 % (ref 38–73)
NEUTROPHILS NFR BLD: 85.5 % (ref 38–73)
NEUTROPHILS NFR BLD: 85.5 % (ref 38–73)
NRBC BLD-RTO: 0 /100 WBC
PHOSPHATE SERPL-MCNC: 4.3 MG/DL (ref 2.7–4.5)
PLATELET # BLD AUTO: 211 K/UL (ref 150–450)
PLATELET # BLD AUTO: 211 K/UL (ref 150–450)
PLATELET # BLD AUTO: 223 K/UL (ref 150–450)
PLATELET RESPONSE PLAVIX: 217 PRU (ref 194–418)
PMV BLD AUTO: 9.7 FL (ref 9.2–12.9)
POCT GLUCOSE: 134 MG/DL (ref 70–110)
POTASSIUM SERPL-SCNC: 3.9 MMOL/L (ref 3.5–5.1)
POTASSIUM SERPL-SCNC: 4.1 MMOL/L (ref 3.5–5.1)
PROT SERPL-MCNC: 6.4 G/DL (ref 6–8.4)
PROTHROMBIN TIME: 10.9 SEC (ref 9–12.5)
PROTHROMBIN TIME: 11.5 SEC (ref 9–12.5)
RBC # BLD AUTO: 2.89 M/UL (ref 4–5.4)
RBC # BLD AUTO: 2.89 M/UL (ref 4–5.4)
RBC # BLD AUTO: 3.2 M/UL (ref 4–5.4)
SODIUM SERPL-SCNC: 139 MMOL/L (ref 136–145)
SODIUM SERPL-SCNC: 140 MMOL/L (ref 136–145)
SPECIMEN OUTDATE: NORMAL
WBC # BLD AUTO: 6.67 K/UL (ref 3.9–12.7)
WBC # BLD AUTO: 9.48 K/UL (ref 3.9–12.7)
WBC # BLD AUTO: 9.48 K/UL (ref 3.9–12.7)

## 2024-02-29 PROCEDURE — 37000009 HC ANESTHESIA EA ADD 15 MINS: Performed by: SURGERY

## 2024-02-29 PROCEDURE — 86901 BLOOD TYPING SEROLOGIC RH(D): CPT | Performed by: STUDENT IN AN ORGANIZED HEALTH CARE EDUCATION/TRAINING PROGRAM

## 2024-02-29 PROCEDURE — 63600175 PHARM REV CODE 636 W HCPCS: Performed by: NURSE ANESTHETIST, CERTIFIED REGISTERED

## 2024-02-29 PROCEDURE — 85576 BLOOD PLATELET AGGREGATION: CPT | Performed by: STUDENT IN AN ORGANIZED HEALTH CARE EDUCATION/TRAINING PROGRAM

## 2024-02-29 PROCEDURE — C1769 GUIDE WIRE: HCPCS | Performed by: SURGERY

## 2024-02-29 PROCEDURE — 85610 PROTHROMBIN TIME: CPT | Mod: 91

## 2024-02-29 PROCEDURE — 36000707: Performed by: SURGERY

## 2024-02-29 PROCEDURE — 99499 UNLISTED E&M SERVICE: CPT | Mod: ,,, | Performed by: ANESTHESIOLOGY

## 2024-02-29 PROCEDURE — 85025 COMPLETE CBC W/AUTO DIFF WBC: CPT | Mod: 91 | Performed by: STUDENT IN AN ORGANIZED HEALTH CARE EDUCATION/TRAINING PROGRAM

## 2024-02-29 PROCEDURE — 63600175 PHARM REV CODE 636 W HCPCS: Performed by: SURGERY

## 2024-02-29 PROCEDURE — 80048 BASIC METABOLIC PNL TOTAL CA: CPT | Mod: XB | Performed by: STUDENT IN AN ORGANIZED HEALTH CARE EDUCATION/TRAINING PROGRAM

## 2024-02-29 PROCEDURE — 99499 UNLISTED E&M SERVICE: CPT | Mod: ,,, | Performed by: SURGERY

## 2024-02-29 PROCEDURE — C1876 STENT, NON-COA/NON-COV W/DEL: HCPCS | Performed by: SURGERY

## 2024-02-29 PROCEDURE — 20000000 HC ICU ROOM

## 2024-02-29 PROCEDURE — 25000003 PHARM REV CODE 250

## 2024-02-29 PROCEDURE — 36000706: Performed by: SURGERY

## 2024-02-29 PROCEDURE — 63600175 PHARM REV CODE 636 W HCPCS

## 2024-02-29 PROCEDURE — 25000003 PHARM REV CODE 250: Performed by: STUDENT IN AN ORGANIZED HEALTH CARE EDUCATION/TRAINING PROGRAM

## 2024-02-29 PROCEDURE — 85610 PROTHROMBIN TIME: CPT | Performed by: STUDENT IN AN ORGANIZED HEALTH CARE EDUCATION/TRAINING PROGRAM

## 2024-02-29 PROCEDURE — X2AJ336 CEREBRAL EMBOLIC FILTRATION, EXTRACORPOREAL FLOW REVERSAL CIRCUIT FROM LEFT COMMON CAROTID ARTERY, PERCUTANEOUS APPROACH, NEW TECHNOLOGY GROUP 6: ICD-10-PCS | Performed by: SURGERY

## 2024-02-29 PROCEDURE — 80053 COMPREHEN METABOLIC PANEL: CPT

## 2024-02-29 PROCEDURE — 94761 N-INVAS EAR/PLS OXIMETRY MLT: CPT

## 2024-02-29 PROCEDURE — 037L3DZ DILATION OF LEFT INTERNAL CAROTID ARTERY WITH INTRALUMINAL DEVICE, PERCUTANEOUS APPROACH: ICD-10-PCS | Performed by: SURGERY

## 2024-02-29 PROCEDURE — 25000003 PHARM REV CODE 250: Performed by: NURSE ANESTHETIST, CERTIFIED REGISTERED

## 2024-02-29 PROCEDURE — 63600175 PHARM REV CODE 636 W HCPCS: Performed by: STUDENT IN AN ORGANIZED HEALTH CARE EDUCATION/TRAINING PROGRAM

## 2024-02-29 PROCEDURE — 85730 THROMBOPLASTIN TIME PARTIAL: CPT

## 2024-02-29 PROCEDURE — D9220A PRA ANESTHESIA: Mod: ANES,,, | Performed by: ANESTHESIOLOGY

## 2024-02-29 PROCEDURE — C1725 CATH, TRANSLUMIN NON-LASER: HCPCS | Performed by: SURGERY

## 2024-02-29 PROCEDURE — 84100 ASSAY OF PHOSPHORUS: CPT

## 2024-02-29 PROCEDURE — 83735 ASSAY OF MAGNESIUM: CPT

## 2024-02-29 PROCEDURE — C1884 EMBOLIZATION PROTECT SYST: HCPCS | Performed by: SURGERY

## 2024-02-29 PROCEDURE — 25500020 PHARM REV CODE 255: Performed by: SURGERY

## 2024-02-29 PROCEDURE — C1894 INTRO/SHEATH, NON-LASER: HCPCS | Performed by: SURGERY

## 2024-02-29 PROCEDURE — 27201423 OPTIME MED/SURG SUP & DEVICES STERILE SUPPLY: Performed by: SURGERY

## 2024-02-29 PROCEDURE — B317YZZ FLUOROSCOPY OF LEFT INTERNAL CAROTID ARTERY USING OTHER CONTRAST: ICD-10-PCS | Performed by: SURGERY

## 2024-02-29 PROCEDURE — 37215 TRANSCATH STENT CCA W/EPS: CPT | Mod: LT,,, | Performed by: SURGERY

## 2024-02-29 PROCEDURE — 27201037 HC PRESSURE MONITORING SET UP

## 2024-02-29 PROCEDURE — D9220A PRA ANESTHESIA: Mod: CRNA,,, | Performed by: NURSE ANESTHETIST, CERTIFIED REGISTERED

## 2024-02-29 PROCEDURE — 37000008 HC ANESTHESIA 1ST 15 MINUTES: Performed by: SURGERY

## 2024-02-29 DEVICE — IMPLANTABLE DEVICE: Type: IMPLANTABLE DEVICE | Site: NECK | Status: FUNCTIONAL

## 2024-02-29 RX ORDER — IODIXANOL 320 MG/ML
INJECTION, SOLUTION INTRAVASCULAR
Status: DISCONTINUED | OUTPATIENT
Start: 2024-02-29 | End: 2024-02-29 | Stop reason: HOSPADM

## 2024-02-29 RX ORDER — SODIUM CHLORIDE, SODIUM LACTATE, POTASSIUM CHLORIDE, CALCIUM CHLORIDE 600; 310; 30; 20 MG/100ML; MG/100ML; MG/100ML; MG/100ML
INJECTION, SOLUTION INTRAVENOUS CONTINUOUS PRN
Status: DISCONTINUED | OUTPATIENT
Start: 2024-02-29 | End: 2024-02-29

## 2024-02-29 RX ORDER — HYDROMORPHONE HYDROCHLORIDE 1 MG/ML
0.2 INJECTION, SOLUTION INTRAMUSCULAR; INTRAVENOUS; SUBCUTANEOUS EVERY 5 MIN PRN
Status: CANCELLED | OUTPATIENT
Start: 2024-02-29

## 2024-02-29 RX ORDER — MUPIROCIN 20 MG/G
OINTMENT TOPICAL 2 TIMES DAILY
Status: DISCONTINUED | OUTPATIENT
Start: 2024-02-29 | End: 2024-03-01 | Stop reason: HOSPADM

## 2024-02-29 RX ORDER — DEXAMETHASONE SODIUM PHOSPHATE 4 MG/ML
INJECTION, SOLUTION INTRA-ARTICULAR; INTRALESIONAL; INTRAMUSCULAR; INTRAVENOUS; SOFT TISSUE
Status: DISCONTINUED | OUTPATIENT
Start: 2024-02-29 | End: 2024-02-29

## 2024-02-29 RX ORDER — GLUCAGON 1 MG
1 KIT INJECTION
Status: DISCONTINUED | OUTPATIENT
Start: 2024-02-29 | End: 2024-03-01 | Stop reason: HOSPADM

## 2024-02-29 RX ORDER — HEPARIN SODIUM 1000 [USP'U]/ML
INJECTION, SOLUTION INTRAVENOUS; SUBCUTANEOUS
Status: DISCONTINUED | OUTPATIENT
Start: 2024-02-29 | End: 2024-02-29

## 2024-02-29 RX ORDER — ATORVASTATIN CALCIUM 10 MG/1
40 TABLET, FILM COATED ORAL DAILY
Status: DISCONTINUED | OUTPATIENT
Start: 2024-02-29 | End: 2024-03-01 | Stop reason: HOSPADM

## 2024-02-29 RX ORDER — INSULIN ASPART 100 [IU]/ML
0-5 INJECTION, SOLUTION INTRAVENOUS; SUBCUTANEOUS
Status: DISCONTINUED | OUTPATIENT
Start: 2024-02-29 | End: 2024-03-01 | Stop reason: HOSPADM

## 2024-02-29 RX ORDER — ASPIRIN 81 MG/1
81 TABLET ORAL DAILY
Status: DISCONTINUED | OUTPATIENT
Start: 2024-02-29 | End: 2024-03-01 | Stop reason: HOSPADM

## 2024-02-29 RX ORDER — HEPARIN SODIUM 1000 [USP'U]/ML
INJECTION, SOLUTION INTRAVENOUS; SUBCUTANEOUS
Status: DISCONTINUED | OUTPATIENT
Start: 2024-02-29 | End: 2024-02-29 | Stop reason: HOSPADM

## 2024-02-29 RX ORDER — IBUPROFEN 200 MG
16 TABLET ORAL
Status: DISCONTINUED | OUTPATIENT
Start: 2024-02-29 | End: 2024-03-01 | Stop reason: HOSPADM

## 2024-02-29 RX ORDER — ETOMIDATE 2 MG/ML
INJECTION INTRAVENOUS
Status: DISCONTINUED | OUTPATIENT
Start: 2024-02-29 | End: 2024-02-29

## 2024-02-29 RX ORDER — ONDANSETRON 8 MG/1
8 TABLET, ORALLY DISINTEGRATING ORAL EVERY 8 HOURS PRN
Status: DISCONTINUED | OUTPATIENT
Start: 2024-02-29 | End: 2024-02-29

## 2024-02-29 RX ORDER — IBUPROFEN 200 MG
24 TABLET ORAL
Status: DISCONTINUED | OUTPATIENT
Start: 2024-02-29 | End: 2024-03-01 | Stop reason: HOSPADM

## 2024-02-29 RX ORDER — LORAZEPAM 2 MG/ML
0.25 INJECTION INTRAMUSCULAR ONCE AS NEEDED
Status: CANCELLED | OUTPATIENT
Start: 2024-02-29 | End: 2035-07-27

## 2024-02-29 RX ORDER — HYDROMORPHONE HYDROCHLORIDE 1 MG/ML
0.2 INJECTION, SOLUTION INTRAMUSCULAR; INTRAVENOUS; SUBCUTANEOUS EVERY 6 HOURS PRN
Status: DISCONTINUED | OUTPATIENT
Start: 2024-02-29 | End: 2024-03-01

## 2024-02-29 RX ORDER — VASOPRESSIN 20 [USP'U]/ML
INJECTION, SOLUTION INTRAMUSCULAR; SUBCUTANEOUS
Status: DISCONTINUED | OUTPATIENT
Start: 2024-02-29 | End: 2024-02-29

## 2024-02-29 RX ORDER — MAGNESIUM SULFATE HEPTAHYDRATE 40 MG/ML
2 INJECTION, SOLUTION INTRAVENOUS ONCE
Status: COMPLETED | OUTPATIENT
Start: 2024-02-29 | End: 2024-02-29

## 2024-02-29 RX ORDER — SODIUM CHLORIDE, SODIUM LACTATE, POTASSIUM CHLORIDE, CALCIUM CHLORIDE 600; 310; 30; 20 MG/100ML; MG/100ML; MG/100ML; MG/100ML
INJECTION, SOLUTION INTRAVENOUS CONTINUOUS
Status: DISCONTINUED | OUTPATIENT
Start: 2024-02-29 | End: 2024-03-01

## 2024-02-29 RX ORDER — FENTANYL CITRATE 50 UG/ML
INJECTION, SOLUTION INTRAMUSCULAR; INTRAVENOUS
Status: DISCONTINUED | OUTPATIENT
Start: 2024-02-29 | End: 2024-02-29

## 2024-02-29 RX ORDER — LIDOCAINE HYDROCHLORIDE 10 MG/ML
1 INJECTION, SOLUTION EPIDURAL; INFILTRATION; INTRACAUDAL; PERINEURAL ONCE AS NEEDED
Status: DISCONTINUED | OUTPATIENT
Start: 2024-02-29 | End: 2024-02-29 | Stop reason: HOSPADM

## 2024-02-29 RX ORDER — HEPARIN SODIUM 5000 [USP'U]/ML
5000 INJECTION, SOLUTION INTRAVENOUS; SUBCUTANEOUS EVERY 8 HOURS
Status: DISCONTINUED | OUTPATIENT
Start: 2024-03-01 | End: 2024-03-01 | Stop reason: HOSPADM

## 2024-02-29 RX ORDER — ACETAMINOPHEN 325 MG/1
650 TABLET ORAL EVERY 6 HOURS
Status: DISCONTINUED | OUTPATIENT
Start: 2024-02-29 | End: 2024-03-01 | Stop reason: HOSPADM

## 2024-02-29 RX ORDER — VECURONIUM BROMIDE FOR INJECTION 1 MG/ML
INJECTION, POWDER, LYOPHILIZED, FOR SOLUTION INTRAVENOUS
Status: DISCONTINUED | OUTPATIENT
Start: 2024-02-29 | End: 2024-02-29

## 2024-02-29 RX ORDER — ALBUTEROL SULFATE 90 UG/1
2 AEROSOL, METERED RESPIRATORY (INHALATION) EVERY 6 HOURS PRN
Status: DISCONTINUED | OUTPATIENT
Start: 2024-02-29 | End: 2024-03-01 | Stop reason: HOSPADM

## 2024-02-29 RX ORDER — PROPOFOL 10 MG/ML
VIAL (ML) INTRAVENOUS
Status: DISCONTINUED | OUTPATIENT
Start: 2024-02-29 | End: 2024-02-29

## 2024-02-29 RX ORDER — ONDANSETRON HYDROCHLORIDE 2 MG/ML
INJECTION, SOLUTION INTRAVENOUS
Status: DISCONTINUED | OUTPATIENT
Start: 2024-02-29 | End: 2024-02-29

## 2024-02-29 RX ORDER — CLOPIDOGREL BISULFATE 75 MG/1
75 TABLET ORAL DAILY
Status: DISCONTINUED | OUTPATIENT
Start: 2024-02-29 | End: 2024-03-01 | Stop reason: HOSPADM

## 2024-02-29 RX ORDER — PROTAMINE SULFATE 10 MG/ML
INJECTION, SOLUTION INTRAVENOUS
Status: DISCONTINUED | OUTPATIENT
Start: 2024-02-29 | End: 2024-02-29

## 2024-02-29 RX ORDER — FLUTICASONE FUROATE AND VILANTEROL 200; 25 UG/1; UG/1
1 POWDER RESPIRATORY (INHALATION) DAILY
Status: DISCONTINUED | OUTPATIENT
Start: 2024-03-01 | End: 2024-03-01 | Stop reason: HOSPADM

## 2024-02-29 RX ORDER — SODIUM CHLORIDE 9 MG/ML
INJECTION, SOLUTION INTRAVENOUS CONTINUOUS
Status: DISCONTINUED | OUTPATIENT
Start: 2024-02-29 | End: 2024-02-29

## 2024-02-29 RX ORDER — ONDANSETRON HYDROCHLORIDE 2 MG/ML
4 INJECTION, SOLUTION INTRAVENOUS DAILY PRN
Status: CANCELLED | OUTPATIENT
Start: 2024-02-29

## 2024-02-29 RX ORDER — SODIUM CHLORIDE 9 MG/ML
INJECTION, SOLUTION INTRAVENOUS CONTINUOUS
Status: CANCELLED | OUTPATIENT
Start: 2024-02-29

## 2024-02-29 RX ORDER — LIDOCAINE HYDROCHLORIDE 20 MG/ML
INJECTION INTRAVENOUS
Status: DISCONTINUED | OUTPATIENT
Start: 2024-02-29 | End: 2024-02-29

## 2024-02-29 RX ORDER — PANTOPRAZOLE SODIUM 40 MG/1
40 TABLET, DELAYED RELEASE ORAL DAILY
Status: DISCONTINUED | OUTPATIENT
Start: 2024-03-01 | End: 2024-03-01 | Stop reason: HOSPADM

## 2024-02-29 RX ORDER — ROCURONIUM BROMIDE 10 MG/ML
INJECTION, SOLUTION INTRAVENOUS
Status: DISCONTINUED | OUTPATIENT
Start: 2024-02-29 | End: 2024-02-29

## 2024-02-29 RX ORDER — HYDROCODONE BITARTRATE AND ACETAMINOPHEN 5; 325 MG/1; MG/1
1 TABLET ORAL EVERY 4 HOURS PRN
Status: DISCONTINUED | OUTPATIENT
Start: 2024-02-29 | End: 2024-02-29

## 2024-02-29 RX ORDER — CEFAZOLIN SODIUM 1 G/3ML
INJECTION, POWDER, FOR SOLUTION INTRAMUSCULAR; INTRAVENOUS
Status: DISCONTINUED | OUTPATIENT
Start: 2024-02-29 | End: 2024-02-29

## 2024-02-29 RX ORDER — MEPERIDINE HYDROCHLORIDE 50 MG/ML
12.5 INJECTION INTRAMUSCULAR; INTRAVENOUS; SUBCUTANEOUS ONCE AS NEEDED
Status: CANCELLED | OUTPATIENT
Start: 2024-02-29 | End: 2024-03-01

## 2024-02-29 RX ORDER — OXYCODONE HYDROCHLORIDE 5 MG/1
5 TABLET ORAL EVERY 6 HOURS PRN
Status: DISCONTINUED | OUTPATIENT
Start: 2024-02-29 | End: 2024-03-01

## 2024-02-29 RX ORDER — MIDAZOLAM HYDROCHLORIDE 1 MG/ML
INJECTION INTRAMUSCULAR; INTRAVENOUS
Status: DISCONTINUED | OUTPATIENT
Start: 2024-02-29 | End: 2024-02-29

## 2024-02-29 RX ORDER — PANTOPRAZOLE SODIUM 40 MG/1
40 TABLET, DELAYED RELEASE ORAL DAILY
Status: DISCONTINUED | OUTPATIENT
Start: 2024-02-29 | End: 2024-02-29

## 2024-02-29 RX ADMIN — CLOPIDOGREL BISULFATE 75 MG: 75 TABLET ORAL at 01:02

## 2024-02-29 RX ADMIN — DEXAMETHASONE SODIUM PHOSPHATE 4 MG: 4 INJECTION, SOLUTION INTRAMUSCULAR; INTRAVENOUS at 11:02

## 2024-02-29 RX ADMIN — ACETAMINOPHEN 650 MG: 325 TABLET ORAL at 06:02

## 2024-02-29 RX ADMIN — LIDOCAINE HYDROCHLORIDE 50 MG: 20 INJECTION INTRAVENOUS at 10:02

## 2024-02-29 RX ADMIN — SODIUM CHLORIDE, POTASSIUM CHLORIDE, SODIUM LACTATE AND CALCIUM CHLORIDE 500 ML: 600; 310; 30; 20 INJECTION, SOLUTION INTRAVENOUS at 01:02

## 2024-02-29 RX ADMIN — MUPIROCIN: 20 OINTMENT TOPICAL at 08:02

## 2024-02-29 RX ADMIN — ATORVASTATIN CALCIUM 40 MG: 10 TABLET, FILM COATED ORAL at 01:02

## 2024-02-29 RX ADMIN — ASPIRIN 81 MG: 81 TABLET, COATED ORAL at 01:02

## 2024-02-29 RX ADMIN — ONDANSETRON 4 MG: 2 INJECTION INTRAMUSCULAR; INTRAVENOUS at 11:02

## 2024-02-29 RX ADMIN — SODIUM CHLORIDE, POTASSIUM CHLORIDE, SODIUM LACTATE AND CALCIUM CHLORIDE: 600; 310; 30; 20 INJECTION, SOLUTION INTRAVENOUS at 01:02

## 2024-02-29 RX ADMIN — VASOPRESSIN 2 UNITS: 20 INJECTION INTRAVENOUS at 11:02

## 2024-02-29 RX ADMIN — HEPARIN SODIUM 10000 UNITS: 1000 INJECTION, SOLUTION INTRAVENOUS; SUBCUTANEOUS at 11:02

## 2024-02-29 RX ADMIN — MIDAZOLAM HYDROCHLORIDE 2 MG: 1 INJECTION, SOLUTION INTRAMUSCULAR; INTRAVENOUS at 10:02

## 2024-02-29 RX ADMIN — CEFAZOLIN 2 G: 330 INJECTION, POWDER, FOR SOLUTION INTRAMUSCULAR; INTRAVENOUS at 10:02

## 2024-02-29 RX ADMIN — SODIUM CHLORIDE: 0.9 INJECTION, SOLUTION INTRAVENOUS at 10:02

## 2024-02-29 RX ADMIN — VASOPRESSIN 4 UNITS: 20 INJECTION INTRAVENOUS at 12:02

## 2024-02-29 RX ADMIN — VASOPRESSIN 3 UNITS: 20 INJECTION INTRAVENOUS at 11:02

## 2024-02-29 RX ADMIN — CEFAZOLIN 2 G: 2 INJECTION, POWDER, FOR SOLUTION INTRAMUSCULAR; INTRAVENOUS at 06:02

## 2024-02-29 RX ADMIN — ROCURONIUM BROMIDE 40 MG: 10 INJECTION, SOLUTION INTRAVENOUS at 10:02

## 2024-02-29 RX ADMIN — FENTANYL CITRATE 25 MCG: 50 INJECTION, SOLUTION INTRAMUSCULAR; INTRAVENOUS at 10:02

## 2024-02-29 RX ADMIN — PROPOFOL 80 MG: 10 INJECTION, EMULSION INTRAVENOUS at 10:02

## 2024-02-29 RX ADMIN — SODIUM CHLORIDE 0.5 MCG/KG/MIN: 9 INJECTION, SOLUTION INTRAVENOUS at 10:02

## 2024-02-29 RX ADMIN — ETOMIDATE 10 MG: 2 INJECTION, SOLUTION INTRAVENOUS at 10:02

## 2024-02-29 RX ADMIN — ACETAMINOPHEN 650 MG: 325 TABLET ORAL at 11:02

## 2024-02-29 RX ADMIN — PROTAMINE SULFATE 80 MG: 10 INJECTION, SOLUTION INTRAVENOUS at 11:02

## 2024-02-29 RX ADMIN — OXYCODONE 5 MG: 5 TABLET ORAL at 01:02

## 2024-02-29 RX ADMIN — HYDROMORPHONE HYDROCHLORIDE 0.2 MG: 0.5 INJECTION, SOLUTION INTRAMUSCULAR; INTRAVENOUS; SUBCUTANEOUS at 01:02

## 2024-02-29 RX ADMIN — MAGNESIUM SULFATE HEPTAHYDRATE 2 G: 40 INJECTION, SOLUTION INTRAVENOUS at 04:02

## 2024-02-29 RX ADMIN — SODIUM CHLORIDE, SODIUM LACTATE, POTASSIUM CHLORIDE, AND CALCIUM CHLORIDE: 600; 310; 30; 20 INJECTION, SOLUTION INTRAVENOUS at 10:02

## 2024-02-29 RX ADMIN — HYDROMORPHONE HYDROCHLORIDE 0.2 MG: 0.5 INJECTION, SOLUTION INTRAMUSCULAR; INTRAVENOUS; SUBCUTANEOUS at 08:02

## 2024-02-29 RX ADMIN — VASOPRESSIN 3 UNITS: 20 INJECTION INTRAVENOUS at 10:02

## 2024-02-29 RX ADMIN — OXYCODONE 5 MG: 5 TABLET ORAL at 11:02

## 2024-02-29 RX ADMIN — SUGAMMADEX 200 MG: 100 INJECTION, SOLUTION INTRAVENOUS at 12:02

## 2024-02-29 RX ADMIN — VECURONIUM BROMIDE 3 MG: 10 INJECTION, POWDER, LYOPHILIZED, FOR SOLUTION INTRAVENOUS at 10:02

## 2024-02-29 NOTE — H&P
[facility]  Critical Care - Surgery  History & Physical    Patient Name: Lisa Smith  MRN: [id number]  Admission Date: 2/29/2024  Code Status: Full Code  Attending Physician: CIRILO Valdivia III, MD   Primary Care Provider: Hope Tang FNP   Principal Problem: Symptomatic stenosis of left carotid artery    Subjective:     HPI:  Lisa Smith is a 73 y.o. female status post TAVR 12/20/2023, status post left carotid endarterectomy 12/20/2022, COPD on home oxygen and afib on acoumadin with 95+% recurrent left carotid stenosis with known right ICA occlusion now s/p L TECAR on 2/29.     She arrives to the SICU extubated and HDS. She is satting 98% on 10L O2. She received 1.5L crystalloid and required some vasopressor support intra-op. She does not have a hanson, she has a left radial artery that is not correlating with the cuff.     Hospital/ICU Course:  No notes on file    Follow-up For: Procedure(s) (LRB):  INSERTION, STENT, ARTERY, CAROTID (Left)    Post-Operative Day: Day of Surgery     Past Medical History:   Diagnosis Date    Anticoagulant long-term use     Arthritis     Atrial fibrillation     Bell's palsy     Boil of buttock     burst 12/19/22    CHF (congestive heart failure)     Chronic cough     COPD (chronic obstructive pulmonary disease)     use O2  3l/m NC at night; also taking during day currently 12/4/23    Dizziness     Encounter for blood transfusion     GI bleed 2011    transfusion    H/O diverticulitis of colon     Hard of hearing     Heart murmur     Hematoma 02/2023    left hand    Heterozygous alpha 1-antitrypsin deficiency     History of home oxygen therapy     3L NC at night    Hypertension     Lung disease     copd    MONSERRAT (obstructive sleep apnea)     resolved with wt loss 131#    Pacemaker     Pneumonia     last episode 2019    Pulmonary edema     Skin cancer     Unspecified visual disturbance     episode of vision disturbance and dizziness...occasional  recurrences       Past Surgical History:   Procedure Laterality Date    CARDIAC ELECTROPHYSIOLOGY STUDY AND ABLATION      CAROTID ENDARTERECTOMY Left 2022    Procedure: ENDARTERECTOMY-CAROTID;  Surgeon: Maximilian Connolly MD;  Location: [facility] OR;  Service: Peripheral Vascular;  Laterality: Left;    CARPAL TUNNEL RELEASE Left     CHOLECYSTECTOMY      EYE SURGERY Bilateral     cataract    HYSTERECTOMY      INSERT / REPLACE / REMOVE PACEMAKER      KNEE ARTHROSCOPY Left     LEFT HEART CATHETERIZATION  2023    Procedure: Left heart cath;  Surgeon: Puma Shelton MD;  Location: [facility] CATH;  Service: Cardiology;;    REPLACEMENT OF PACEMAKER GENERATOR Left 2022    Procedure: REPLACEMENT, PACEMAKER GENERATOR;  Surgeon: Raymond Pérez MD;  Location: [facility] CATH/EP LAB;  Service: Cardiology;  Laterality: Left;    SKIN CANCER EXCISION      TRANSCATHETER AORTIC VALVE REPLACEMENT (TAVR) Bilateral 2023    Procedure: (TAVR);  Surgeon: Puma Shelton MD;  Location: [facility] CATH;  Service: Cardiology;  Laterality: Bilateral;    TRANSCATHETER AORTIC VALVE REPLACEMENT (TAVR) Bilateral 2023    Procedure: (TAVR) - Surgeon;  Surgeon: Maximilian Connolly MD;  Location: [facility] CATH;  Service: Peripheral Vascular;  Laterality: Bilateral;       Review of patient's allergies indicates:   Allergen Reactions    Sulfa (sulfonamide antibiotics) Itching       Family History       Problem Relation (Age of Onset)    Cancer Father, Brother    Kidney failure Mother          Tobacco Use    Smoking status: Former     Current packs/day: 0.00     Average packs/day: 2.0 packs/day for 40.0 years (80.0 ttl pk-yrs)     Types: Cigarettes     Start date: 1971     Quit date: 2011     Years since quittin.0     Passive exposure: Past    Smokeless tobacco: Never    Tobacco comments:          Quit in    Substance and Sexual Activity    Alcohol use: Not Currently     Alcohol/week: 8.0 standard drinks of alcohol     Types: 8  Glasses of wine per week    Drug use: No    Sexual activity: Never      Review of Systems   Reason unable to perform ROS: Sedated.     Objective:     Vital Signs (Most Recent):  Temp: 97.7 °F (36.5 °C) (02/29/24 0830)  Pulse: 62 (02/29/24 0830)  Resp: 20 (O2 @ 3L NC) (02/29/24 0830)  BP: (!) 152/63 (02/29/24 0830)  SpO2: 100 % (02/29/24 0830) Vital Signs (24h Range):  Temp:  [97.7 °F (36.5 °C)] 97.7 °F (36.5 °C)  Pulse:  [62] 62  Resp:  [20] 20  SpO2:  [100 %] 100 %  BP: (152)/(63) 152/63     Weight: 78.5 kg (173 lb)  Body mass index is 28.79 kg/m².    No intake or output data in the 24 hours ending 02/29/24 1301       Physical Exam  Constitutional:       Appearance: Normal appearance. She is not ill-appearing.   Neck:      Comments: Left neck dressing without swelling or bleeding.  Cardiovascular:      Rate and Rhythm: Normal rate and regular rhythm.      Pulses: Normal pulses.      Heart sounds: Normal heart sounds.   Pulmonary:      Effort: Pulmonary effort is normal.      Breath sounds: Normal breath sounds.   Abdominal:      General: Abdomen is flat. Bowel sounds are normal.      Palpations: Abdomen is soft.   Musculoskeletal:      Comments: L radial a-line   Skin:     General: Skin is warm.      Capillary Refill: Capillary refill takes less than 2 seconds.      Comments: Right groin puncture without dressing. No swelling or strike through.    Neurological:      General: No focal deficit present.            Vents:       Lines/Drains/Airways       Peripheral Intravenous Line  Duration                  Peripheral IV - Single Lumen 02/29/24 0845 18 G Posterior;Right Forearm <1 day                    Significant Labs:    CBC/Anemia Profile:  Recent Labs   Lab 02/29/24 0830   WBC 6.67   HGB 10.2*   HCT 31.6*      MCV 99*   RDW 16.0*        Chemistries:  Recent Labs   Lab 02/29/24 0830      K 4.1      CO2 30*   BUN 21   CREATININE 0.7   CALCIUM 10.0       All pertinent labs within the past 24  hours have been reviewed.    Significant Imaging: I have reviewed all pertinent imaging results/findings within the past 24 hours.  Assessment/Plan:     Cardiac/Vascular  * Symptomatic stenosis of left carotid artery    Neuro/Psych:   -- Sedation: none   -- Pain: Tylenol, oxycodone, dilaudid PRN  #S/P TCAR  -- Q1H NV checks  -- Call Vascular for any neurological changes or deficits.              Cards:   -- HDS  -- ECHO 12/23: Ejection fraction of 40 - 45%.   #L TCAR  -- -140 (use cuff)  -- ASA/Plavix/Statin  -- Bed rest 1 hour  #H/O afib  -- Hold coumadin / BB      Pulm:   -- Goal O2 sat > 88%  -- Wean as able  #COPD  -- on home oxygen     Renal:  -- No hanson intraop  -- BUN/Cr 20/0.6      FEN / GI:   -- mIVF @ 75cc  -- Replace lytes as needed  -- Nutrition: Regular diet      ID:   -- Tm: afebrile; WBC 9.48  -- Abx Ancef      Heme/Onc:   -- H/H stable 9.3/28.2  -- Daily CBC  -- Restart DVT ppx tomorrow      Endo:   -- Gluc goal 140-180  -- SSI      PPx:   Feeding: Regular diet  Analgesia/Sedation: See above  Thromboembolic prevention: SCD  HOB >30: Yes  Stress Ulcer ppx: PPI home med  Glucose control: Critical care goal 140-180 g/dl, ISS    Lines/Drains/Airway: L radial a-line, pIV      Dispo/Code Status/Palliative:   -- SICU / Full Code      Yessy Aponte MD  Critical Care - Surgery  [facility] - [facility]

## 2024-02-29 NOTE — ANESTHESIA POSTPROCEDURE EVALUATION
Anesthesia Post Evaluation    Patient: Lisa Smith    Procedure(s) Performed: Procedure(s) (LRB):  INSERTION, STENT, ARTERY, CAROTID (Left)    Final Anesthesia Type: general      Patient location during evaluation: ICU  Patient participation: Yes- Able to Participate  Level of consciousness: awake  Post-procedure vital signs: reviewed and stable  Pain management: adequate  Airway patency: patent    PONV status at discharge: No PONV  Anesthetic complications: no      Cardiovascular status: blood pressure returned to baseline  Respiratory status: spontaneous ventilation and room air  Hydration status: euvolemic  Follow-up not needed.              Vitals Value Taken Time   /64 02/29/24 1531   Pulse 60 02/29/24 1533   Resp 17 02/29/24 1533   SpO2 100 % 02/29/24 1533   Vitals shown include unvalidated device data.      No case tracking events are documented in the log.      Pain/Vazquez Score: Pain Rating Prior to Med Admin: 6 (2/29/2024  1:32 PM)  Pain Rating Post Med Admin: 2 (2/29/2024  2:32 PM)

## 2024-02-29 NOTE — BRIEF OP NOTE
[facility] - Surgery (2nd Fl)  Brief Operative Note    SUMMARY     Surgery Date: 2/29/2024     Surgeon(s) and Role:     * CIRILO Valdivia III, MD - Primary     * Chang Brown MD - Fellow     * Raghavendra Hunt MD    Assisting Surgeon: None    Pre-op Diagnosis:    symptomatic >95% L ICA stenosis with contralateral occlusion  O2 dependent COPD    Post-op Diagnosis:  same    PROCEDURES:   TCAR (trans-carotid artery revascularization), L ICA    Anesthesia: General    Implants:  Implant Name Type Inv. Item Serial No.  Lot No. LRB No. Used Action   STENT ENROUTE TRNSCRTD 8X40MM - [id number]  STENT ENROUTE TRNSCRTD 8X40MM  [facility] [id number] Left 1 Implanted       Operative Findings: 99% L ICA stenosis, < 10% after  8x40 Enroute carotid stent    Estimated Blood Loss: 20cc             Specimens:   Specimen (24h ago, onward)      None            [id number]

## 2024-02-29 NOTE — ANESTHESIA PREPROCEDURE EVALUATION
02/29/2024  Lisa Smith is a 73 y.o., female.      Pre-op Assessment          Review of Systems  Cardiovascular:     Hypertension   CAD       CHF                                 Pulmonary:  Pneumonia COPD Asthma  Shortness of breath  Sleep Apnea                Hepatic/GI:      Liver Disease,            Neurological:    Neuromuscular Disease,                                       Physical Exam  General: Well nourished    Airway:  Mallampati: II   Mouth Opening: Normal  TM Distance: Normal  Tongue: Normal  Neck ROM: Normal ROM    Dental:  Intact        Anesthesia Plan  Type of Anesthesia, risks & benefits discussed:    Anesthesia Type: Gen Natural Airway, Gen ETT  Intra-op Monitoring Plan: Standard ASA Monitors  Post Op Pain Control Plan: multimodal analgesia  Induction:  IV  Airway Plan: Direct  Informed Consent: Informed consent signed with the Patient and all parties understand the risks and agree with anesthesia plan.  All questions answered. Patient consented to blood products? No  ASA Score: 4  Day of Surgery Review of History & Physical: H&P Update referred to the surgeon/provider.    Ready For Surgery From Anesthesia Perspective.     .

## 2024-02-29 NOTE — ANESTHESIA PROCEDURE NOTES
Intubation    Date/Time: 2/29/2024 10:19 AM    Performed by: Anival Eldridge CRNA  Authorized by: David Ferrera Jr., MD    Intubation:     Induction:  Intravenous    Intubated:  Postinduction    Mask Ventilation:  Easy mask    Attempts:  1    Attempted By:  CRNA    Method of Intubation:  Direct    Blade:  Cantor 3    Laryngeal View Grade: Grade I - full view of cords      Difficult Airway Encountered?: No      Complications:  None    Airway Device:  Oral endotracheal tube    Airway Device Size:  7.0    Style/Cuff Inflation:  Cuffed    Inflation Amount (mL):  6    Tube secured:  23    Secured at:  The lips    Placement Verified By:  Capnometry    Complicating Factors:  None    Findings Post-Intubation:  BS equal bilateral

## 2024-02-29 NOTE — PLAN OF CARE
Pacemaker clinic notified this AM of pt arrival, surgical procedure, and presence of pacemaker, all per protocol. Accordingly, see pacer clinic note this date.

## 2024-02-29 NOTE — OP NOTE
[facility] - [facility] (2nd Fl)  Operative Note     SUMMARY      Surgery Date: 2/29/2024      Surgeon(s) and Role:     * CIRILO Valdivia III, MD - Primary     * Chang Brown MD - Fellow     * Raghavendra Hunt MD     Assisting Surgeon: None     Pre-op Diagnosis:    symptomatic >95% L ICA stenosis with contralateral occlusion  O2 dependent COPD     Post-op Diagnosis:  same     PROCEDURES:   TCAR (trans-carotid artery revascularization), L ICA     Anesthesia: General     Implants:  Implant Name Type Inv. Item Serial No.  Lot No. LRB No. Used Action   STENT ENROUTE TRNSCRTD 8X40MM - [id number]   STENT ENROUTE TRNSCRTD 8X40MM   [facility] [id number] Left 1 Implanted         Operative Findings: 99% L ICA stenosis, < 10% after  8x40 Enroute carotid stent     Estimated Blood Loss: 20cc               Specimens:   Specimen (24h ago, onward)        None                [id number]    Indications: 73 year old woman with right occluded ICA and >95% stenosis of left ICA and symptomatic global hypoperfusion. She has a history of previous left carotid endarterectomy and CTA scan appeared to be a focal lesion likely the distal end f her endarterectomy. She was consented for endovascular revascularization via TCAR     Procedure Details:  After obtaining signed consent for the above procedure, the patient was brought to he operating theater and placed in supine position. General endotracheal anesthesia was initiated, preoperative antibiotics were administered. Bilateral groin and the neck was prepped and draped in the usual sterile fashion. A surgical timeout was performed confirming patient identity, laterality, and procedure. The right groin was accessed under ultrasound guidance with micropuncture needle followed by 0.018 in wire and microcatheter. A 0.035 in glidewire was advanced and the micro catheter exchanged for the TCAR venous sheath.     Next, a transverse incision was made over the left clavicle  between the heads of the sternocleidomastoid. This was deepened with electrocautery. The internal jugular vein was identified and retracted lateral, the vagus nerve identified and protected. The common carotid was exposed circumferentially and a umbilical placed around the proximal CCA.. A purse string suture was placed anteriorly on the CCA with 5-0 prolene. The patient was systemically heparinized and an ACT was checked and confirmed to be >250 sec. The artery was accessed with micropuncture needle followed by 0.018 in wire and microcatheter. A carotid angiogram was performed confirming anatomy. The wire was reinserted and advanced into the external carotid artery. The catheter was advanced accordingly into the ECA. The 0.035 in guidewire was advanced and the catheter replaced by the [employer] reversal flow sheath. The sheath was sutured into place and tubing connected to the arterial and venous ports for flow reversal. Passive flow was checked with heparin flush in the line. The BP was increased to goal 140-160, HR >60, and ACT confirmed therapeutic. The vascular clamp was applied proximally and active flow reversal was confirmed. A carotid angiogram was performed, the 0.018 wire was advanced into the internal carotid artery across the high grade lesion. The lesion was pretreated with 5x30 Viatrac Balloon and a 8x40mm [employer] stent was deployed. We then waited for 3 minutes for clearance of the carotid debris. A completion angiogram was performed confirming stent patency.     Heparin was reversed. The sheath was removed from CCA, pursestring suture tightened. The wound was made hemostatic, platysma closed with running 3-0 vicryl suture and skin closed with 4-0 monocryl. The venous sheath was pulled and pressure held for 10 minutes until hemostatic. Sterile dressings were applied. Instrument and sponge count were correct x2. The patient tolerated the procedure, awakened without neurologic deficits, and was  transported to the ICU  in good condition.     Dr. Valdivia was present for the entire procedure.     Chang Brown  Vascular Surgery Fellow, PGY-7  [phone]

## 2024-02-29 NOTE — ASSESSMENT & PLAN NOTE
Neuro/Psych:   -- Sedation: none   -- Pain: Tylenol, oxycodone, dilaudid PRN  #S/P TCAR  -- Q1H NV checks  -- Call Vascular for any neurological changes or deficits.              Cards:   -- HDS  -- ECHO 12/23: Ejection fraction of 40 - 45%.   #L TCAR  -- -140 (use cuff)  -- ASA/Plavix/Statin  -- Bed rest 1 hour  #H/O afib  -- Hold coumadin / BB      Pulm:   -- Goal O2 sat > 88%  -- Wean as able  #COPD  -- on home oxygen     Renal:  -- No hanson intraop  -- BUN/Cr       FEN / GI:   -- mIVF  -- Replace lytes as needed  -- Nutrition: Regular diet      ID:   -- Tm: afebrile; WBC   -- Abx Ancef      Heme/Onc:   -- H/H stable   -- Daily CBC  -- Restart DVT ppx tomorrow      Endo:   -- Gluc goal 140-180  -- SSI      PPx:   Feeding: Regular diet  Analgesia/Sedation: See above  Thromboembolic prevention: SCD  HOB >30: Yes  Stress Ulcer ppx: PPI home med  Glucose control: Critical care goal 140-180 g/dl, ISS    Lines/Drains/Airway: L radial a-line, pIV      Dispo/Code Status/Palliative:   -- SICU / Full Code

## 2024-02-29 NOTE — OP NOTE
[facility] - [facility] (2nd Fl)  Operative Note     SUMMARY      Surgery Date: 2/29/2024      Surgeon(s) and Role:     * CIRILO Valdivia III, MD - Primary     * Chang Brown MD - Fellow     * Raghavendra Hunt MD     Assisting Surgeon: None     Pre-op Diagnosis:    symptomatic >95% L ICA stenosis with contralateral occlusion  O2 dependent COPD     Post-op Diagnosis:  same     PROCEDURES:   TCAR (trans-carotid artery revascularization), L ICA     Anesthesia: General     Implants:  Implant Name Type Inv. Item Serial No.  Lot No. LRB No. Used Action   STENT ENROUTE TRNSCRTD 8X40MM - [id number]   STENT ENROUTE TRNSCRTD 8X40MM   [facility] [id number] Left 1 Implanted         Operative Findings: 99% L ICA stenosis, < 10% after  8x40 Enroute carotid stent     Estimated Blood Loss: 20cc               Specimens:   Specimen (24h ago, onward)        None                [id number]    Indications: 73 year old woman with right occluded ICA and >95% stenosis of left ICA and symptomatic global hypoperfusion. She has a history of previous left carotid endarterectomy and CTA scan appeared to be a focal lesion likely the distal end f her endarterectomy. She was consented for endovascular revascularization via TCAR     Procedure Details:  After obtaining signed consent for the above procedure, the patient was brought to he operating theater and placed in supine position. General endotracheal anesthesia was initiated, preoperative antibiotics were administered. Bilateral groin and the neck was prepped and draped in the usual sterile fashion. A surgical timeout was performed confirming patient identity, laterality, and procedure. The right groin was accessed under ultrasound guidance with micropuncture needle followed by 0.018 in wire and microcatheter. A 0.035 in glidewire was advanced and the micro catheter exchanged for the TCAR venous sheath.     Next, a transverse incision was made over the left clavicle  between the heads of the sternocleidomastoid. This was deepened with electrocautery. The internal jugular vein was identified and retracted lateral, the vagus nerve identified and protected. The common carotid was exposed circumferentially and a umbilical placed around the proximal CCA.. A purse string suture was placed anteriorly on the CCA with 5-0 prolene. The patient was systemically heparinized and an ACT was checked and confirmed to be >250 sec. The artery was accessed with micropuncture needle followed by 0.018 in wire and microcatheter. A carotid angiogram was performed confirming anatomy. The wire was reinserted and advanced into the external carotid artery. The catheter was advanced accordingly into the ECA. The 0.035 in guidewire was advanced and the catheter replaced by the [employer] reversal flow sheath. The sheath was sutured into place and tubing connected to the arterial and venous ports for flow reversal. Passive flow was checked with heparin flush in the line. The BP was increased to goal 140-160, HR >60, and ACT confirmed therapeutic. The vascular clamp was applied proximally and active flow reversal was confirmed. A carotid angiogram was performed, the 0.018 wire was advanced into the internal carotid artery across the high grade lesion. The lesion was pretreated with 5x30 Viatrac Balloon and a 8x40mm Silk[employer] stent was deployed. We then waited for 3 minutes for clearance of the carotid debris. A completion angiogram was performed confirming stent patency.     Heparin was reversed. The sheath was removed from CCA, pursestring suture tightened. The wound was made hemostatic, platysma closed with running 3-0 vicryl suture and skin closed with 4-0 monocryl. The venous sheath was pulled and pressure held for 10 minutes until hemostatic. Sterile dressings were applied. Instrument and sponge count were correct x2. The patient tolerated the procedure, awakened without neurologic deficits, and was  transported to the ICU  in good condition.     Dr. Hunt was present for the entire procedure.     Chang Brown  Vascular Surgery Fellow, PGY-7  [phone]

## 2024-02-29 NOTE — SUBJECTIVE & OBJECTIVE
Follow-up For: Procedure(s) (LRB):  INSERTION, STENT, ARTERY, CAROTID (Left)    Post-Operative Day: Day of Surgery     Past Medical History:   Diagnosis Date    Anticoagulant long-term use     Arthritis     Atrial fibrillation     Bell's palsy     Boil of buttock     burst 12/19/22    CHF (congestive heart failure)     Chronic cough     COPD (chronic obstructive pulmonary disease)     use O2  3l/m NC at night; also taking during day currently 12/4/23    Dizziness     Encounter for blood transfusion     GI bleed 2011    transfusion    H/O diverticulitis of colon     Hard of hearing     Heart murmur     Hematoma 02/2023    left hand    Heterozygous alpha 1-antitrypsin deficiency     History of home oxygen therapy     3L NC at night    Hypertension     Lung disease     copd    MONSERRAT (obstructive sleep apnea)     resolved with wt loss 131#    Pacemaker     Pneumonia     last episode 2019    Pulmonary edema     Skin cancer     Unspecified visual disturbance     episode of vision disturbance and dizziness...occasional recurrences       Past Surgical History:   Procedure Laterality Date    CARDIAC ELECTROPHYSIOLOGY STUDY AND ABLATION      CAROTID ENDARTERECTOMY Left 12/22/2022    Procedure: ENDARTERECTOMY-CAROTID;  Surgeon: Maximilian Connolly MD;  Location: [facility] OR;  Service: Peripheral Vascular;  Laterality: Left;    CARPAL TUNNEL RELEASE Left     CHOLECYSTECTOMY      EYE SURGERY Bilateral     cataract    HYSTERECTOMY      INSERT / REPLACE / REMOVE PACEMAKER      KNEE ARTHROSCOPY Left     LEFT HEART CATHETERIZATION  11/1/2023    Procedure: Left heart cath;  Surgeon: Puma Shelton MD;  Location: [facility] CATH;  Service: Cardiology;;    REPLACEMENT OF PACEMAKER GENERATOR Left 01/20/2022    Procedure: REPLACEMENT, PACEMAKER GENERATOR;  Surgeon: Raymond Pérez MD;  Location: [facility] CATH/EP LAB;  Service: Cardiology;  Laterality: Left;    SKIN CANCER EXCISION      TRANSCATHETER AORTIC VALVE REPLACEMENT (TAVR) Bilateral 12/6/2023     Procedure: (TAVR);  Surgeon: Puma Shelton MD;  Location: [facility] CATH;  Service: Cardiology;  Laterality: Bilateral;    TRANSCATHETER AORTIC VALVE REPLACEMENT (TAVR) Bilateral 2023    Procedure: (TAVR) - Surgeon;  Surgeon: Maximilian Connolly MD;  Location: [facility] CATH;  Service: Peripheral Vascular;  Laterality: Bilateral;       Review of patient's allergies indicates:   Allergen Reactions    Sulfa (sulfonamide antibiotics) Itching       Family History       Problem Relation (Age of Onset)    Cancer Father, Brother    Kidney failure Mother          Tobacco Use    Smoking status: Former     Current packs/day: 0.00     Average packs/day: 2.0 packs/day for 40.0 years (80.0 ttl pk-yrs)     Types: Cigarettes     Start date: 1971     Quit date: 2011     Years since quittin.0     Passive exposure: Past    Smokeless tobacco: Never    Tobacco comments:          Quit in    Substance and Sexual Activity    Alcohol use: Not Currently     Alcohol/week: 8.0 standard drinks of alcohol     Types: 8 Glasses of wine per week    Drug use: No    Sexual activity: Never      Review of Systems   Reason unable to perform ROS: Sedated.     Objective:     Vital Signs (Most Recent):  Temp: 97.7 °F (36.5 °C) (24 0830)  Pulse: 62 (24 08)  Resp: 20 (O2 @ 3L NC) (24 08)  BP: (!) 152/63 (24 0830)  SpO2: 100 % (24 08) Vital Signs (24h Range):  Temp:  [97.7 °F (36.5 °C)] 97.7 °F (36.5 °C)  Pulse:  [62] 62  Resp:  [20] 20  SpO2:  [100 %] 100 %  BP: (152)/(63) 152/63     Weight: 78.5 kg (173 lb)  Body mass index is 28.79 kg/m².    No intake or output data in the 24 hours ending 24 1301       Physical Exam  Constitutional:       Appearance: Normal appearance. She is not ill-appearing.   Neck:      Comments: Left neck dressing without swelling or bleeding.  Cardiovascular:      Rate and Rhythm: Normal rate and regular rhythm.      Pulses: Normal pulses.      Heart sounds: Normal heart  sounds.   Pulmonary:      Effort: Pulmonary effort is normal.      Breath sounds: Normal breath sounds.   Abdominal:      General: Abdomen is flat. Bowel sounds are normal.      Palpations: Abdomen is soft.   Musculoskeletal:      Comments: L radial a-line   Skin:     General: Skin is warm.      Capillary Refill: Capillary refill takes less than 2 seconds.      Comments: Right groin puncture without dressing. No swelling or strike through.    Neurological:      General: No focal deficit present.            Vents:       Lines/Drains/Airways       Peripheral Intravenous Line  Duration                  Peripheral IV - Single Lumen 02/29/24 0845 18 G Posterior;Right Forearm <1 day                    Significant Labs:    CBC/Anemia Profile:  Recent Labs   Lab 02/29/24  0830   WBC 6.67   HGB 10.2*   HCT 31.6*      MCV 99*   RDW 16.0*        Chemistries:  Recent Labs   Lab 02/29/24  0830      K 4.1      CO2 30*   BUN 21   CREATININE 0.7   CALCIUM 10.0       All pertinent labs within the past 24 hours have been reviewed.    Significant Imaging: I have reviewed all pertinent imaging results/findings within the past 24 hours.

## 2024-02-29 NOTE — PROGRESS NOTES
Pt admitted from OR. Pt drowsy but oriented and following commands. Pupils equal/reactive. Pt denies numbness/weakness in extremities. Complaining of pain in neck. PRN medications administered. MD order -160. Pt systolic pressure becoming < 100. MD notified. LR bolus being administered. Pt bedrest for 1hr. See assessments for further details. Pt daughter at bedside.

## 2024-02-29 NOTE — HPI
Lisa Smith is a 73 y.o. female status post TAVR 12/20/2023, status post left carotid endarterectomy 12/20/2022, COPD on home oxygen and afib on acoumadin with 95+% recurrent left carotid stenosis with known right ICA occlusion now s/p L TECAR on 2/29.     She arrives to the SICU extubated and HDS. She is satting 98% on 10L O2. She received 1.5L crystalloid and required some vasopressor support intra-op. She does not have a hanson, she has a left radial artery that is not correlating with the cuff.

## 2024-02-29 NOTE — PROGRESS NOTES
Patient has been identified as having an implanted cardiac rhythm device (CRD); the implanted device is a [employer] pacemaker.      Planned procedure:  Insertion of Carotid Artery Stent.    Pacer dependency has been noted.       PACEMAKERS/DEPENDENT ABOVE WAIST     The reported surgical procedure is above the umbilicus.  Per protocol, apply a magnet directly over the implanted device during entire surgical procedure if electrocautery will be used.    For additional questions, please contact the Arrhythmia Department at Ext [phone].

## 2024-02-29 NOTE — TRANSFER OF CARE
"Anesthesia Transfer of Care Note    Patient: Lisa Smith    Procedure(s) Performed: Procedure(s) (LRB):  INSERTION, STENT, ARTERY, CAROTID (Left)    Patient location: ICU    Anesthesia Type: general    Transport from OR: Transported from OR on 6-10 L/min O2 by face mask with adequate spontaneous ventilation. Continuous ECG monitoring in transport. Continuous SpO2 monitoring in transport. Continuos invasive BP monitoring in transport    Post pain: adequate analgesia    Post assessment: no apparent anesthetic complications    Post vital signs: stable    Level of consciousness: awake, alert and oriented    Nausea/Vomiting: no nausea/vomiting    Complications: none    Transfer of care protocol was followed      Last vitals: Visit Vitals  BP (!) 152/63 (BP Location: Right arm, Patient Position: Lying)   Pulse 62   Temp 36.5 °C (97.7 °F) (Oral)   Resp (!) 27   Ht 5' 5" (1.651 m)   Wt 78.5 kg (173 lb)   LMP  (LMP Unknown)   SpO2 100%   Breastfeeding No   BMI 28.79 kg/m²     "

## 2024-03-01 ENCOUNTER — DOCUMENTATION ONLY (OUTPATIENT)
Dept: CARDIOLOGY | Facility: CLINIC | Age: 74
End: 2024-03-01
Payer: MEDICARE

## 2024-03-01 ENCOUNTER — EPISODE CHANGES (OUTPATIENT)
Dept: CARDIOLOGY | Facility: CLINIC | Age: 74
End: 2024-03-01

## 2024-03-01 VITALS
SYSTOLIC BLOOD PRESSURE: 148 MMHG | OXYGEN SATURATION: 100 % | RESPIRATION RATE: 27 BRPM | TEMPERATURE: 98 F | HEART RATE: 61 BPM | WEIGHT: 173 LBS | BODY MASS INDEX: 28.82 KG/M2 | DIASTOLIC BLOOD PRESSURE: 62 MMHG | HEIGHT: 65 IN

## 2024-03-01 LAB
ALBUMIN SERPL BCP-MCNC: 2.9 G/DL (ref 3.5–5.2)
ALP SERPL-CCNC: 80 U/L (ref 55–135)
ALT SERPL W/O P-5'-P-CCNC: 15 U/L (ref 10–44)
ANION GAP SERPL CALC-SCNC: 9 MMOL/L (ref 8–16)
AST SERPL-CCNC: 34 U/L (ref 10–40)
BASOPHILS # BLD AUTO: 0.02 K/UL (ref 0–0.2)
BASOPHILS NFR BLD: 0.2 % (ref 0–1.9)
BILIRUB SERPL-MCNC: 0.4 MG/DL (ref 0.1–1)
BUN SERPL-MCNC: 18 MG/DL (ref 8–23)
CALCIUM SERPL-MCNC: 8.7 MG/DL (ref 8.7–10.5)
CHLORIDE SERPL-SCNC: 102 MMOL/L (ref 95–110)
CO2 SERPL-SCNC: 28 MMOL/L (ref 23–29)
CREAT SERPL-MCNC: 0.6 MG/DL (ref 0.5–1.4)
DIFFERENTIAL METHOD BLD: ABNORMAL
EOSINOPHIL # BLD AUTO: 0 K/UL (ref 0–0.5)
EOSINOPHIL NFR BLD: 0.3 % (ref 0–8)
ERYTHROCYTE [DISTWIDTH] IN BLOOD BY AUTOMATED COUNT: 16.1 % (ref 11.5–14.5)
EST. GFR  (NO RACE VARIABLE): >60 ML/MIN/1.73 M^2
GLUCOSE SERPL-MCNC: 104 MG/DL (ref 70–110)
HCT VFR BLD AUTO: 25.5 % (ref 37–48.5)
HGB BLD-MCNC: 8.3 G/DL (ref 12–16)
IMM GRANULOCYTES # BLD AUTO: 0.06 K/UL (ref 0–0.04)
IMM GRANULOCYTES NFR BLD AUTO: 0.6 % (ref 0–0.5)
LYMPHOCYTES # BLD AUTO: 0.6 K/UL (ref 1–4.8)
LYMPHOCYTES NFR BLD: 5.8 % (ref 18–48)
MAGNESIUM SERPL-MCNC: 1.9 MG/DL (ref 1.6–2.6)
MCH RBC QN AUTO: 32 PG (ref 27–31)
MCHC RBC AUTO-ENTMCNC: 32.5 G/DL (ref 32–36)
MCV RBC AUTO: 99 FL (ref 82–98)
MONOCYTES # BLD AUTO: 0.8 K/UL (ref 0.3–1)
MONOCYTES NFR BLD: 8 % (ref 4–15)
NEUTROPHILS # BLD AUTO: 8.2 K/UL (ref 1.8–7.7)
NEUTROPHILS NFR BLD: 85.1 % (ref 38–73)
NRBC BLD-RTO: 0 /100 WBC
PHOSPHATE SERPL-MCNC: 3.4 MG/DL (ref 2.7–4.5)
PLATELET # BLD AUTO: 201 K/UL (ref 150–450)
PMV BLD AUTO: 9.9 FL (ref 9.2–12.9)
POC ACTIVATED CLOTTING TIME K: 212 SEC (ref 74–137)
POC ACTIVATED CLOTTING TIME K: 261 SEC (ref 74–137)
POTASSIUM SERPL-SCNC: 4.5 MMOL/L (ref 3.5–5.1)
PROT SERPL-MCNC: 5.9 G/DL (ref 6–8.4)
RBC # BLD AUTO: 2.59 M/UL (ref 4–5.4)
SAMPLE: ABNORMAL
SAMPLE: ABNORMAL
SODIUM SERPL-SCNC: 139 MMOL/L (ref 136–145)
WBC # BLD AUTO: 9.61 K/UL (ref 3.9–12.7)

## 2024-03-01 PROCEDURE — 83735 ASSAY OF MAGNESIUM: CPT

## 2024-03-01 PROCEDURE — 25000242 PHARM REV CODE 250 ALT 637 W/ HCPCS

## 2024-03-01 PROCEDURE — 27000221 HC OXYGEN, UP TO 24 HOURS

## 2024-03-01 PROCEDURE — 63600175 PHARM REV CODE 636 W HCPCS: Performed by: STUDENT IN AN ORGANIZED HEALTH CARE EDUCATION/TRAINING PROGRAM

## 2024-03-01 PROCEDURE — 63600175 PHARM REV CODE 636 W HCPCS

## 2024-03-01 PROCEDURE — 80053 COMPREHEN METABOLIC PANEL: CPT

## 2024-03-01 PROCEDURE — 85025 COMPLETE CBC W/AUTO DIFF WBC: CPT

## 2024-03-01 PROCEDURE — 99900035 HC TECH TIME PER 15 MIN (STAT)

## 2024-03-01 PROCEDURE — 99499 UNLISTED E&M SERVICE: CPT | Mod: GC,,, | Performed by: ANESTHESIOLOGY

## 2024-03-01 PROCEDURE — 94640 AIRWAY INHALATION TREATMENT: CPT

## 2024-03-01 PROCEDURE — 94761 N-INVAS EAR/PLS OXIMETRY MLT: CPT

## 2024-03-01 PROCEDURE — 25000003 PHARM REV CODE 250

## 2024-03-01 PROCEDURE — 25000003 PHARM REV CODE 250: Performed by: STUDENT IN AN ORGANIZED HEALTH CARE EDUCATION/TRAINING PROGRAM

## 2024-03-01 PROCEDURE — 84100 ASSAY OF PHOSPHORUS: CPT

## 2024-03-01 RX ORDER — POLYETHYLENE GLYCOL 3350 17 G/17G
17 POWDER, FOR SOLUTION ORAL DAILY
Status: DISCONTINUED | OUTPATIENT
Start: 2024-03-02 | End: 2024-03-01 | Stop reason: HOSPADM

## 2024-03-01 RX ORDER — CLOPIDOGREL BISULFATE 75 MG/1
75 TABLET ORAL DAILY
Qty: 30 TABLET | Refills: 11 | Status: SHIPPED | OUTPATIENT
Start: 2024-03-02 | End: 2024-04-05

## 2024-03-01 RX ORDER — POLYETHYLENE GLYCOL 3350 17 G/17G
17 POWDER, FOR SOLUTION ORAL DAILY
Refills: 0 | COMMUNITY
Start: 2024-03-02 | End: 2024-05-13

## 2024-03-01 RX ORDER — OXYCODONE HYDROCHLORIDE 5 MG/1
5 TABLET ORAL EVERY 6 HOURS PRN
Qty: 12 TABLET | Refills: 0 | Status: SHIPPED | OUTPATIENT
Start: 2024-03-01 | End: 2024-05-13

## 2024-03-01 RX ORDER — OXYCODONE HYDROCHLORIDE 5 MG/1
5 TABLET ORAL EVERY 6 HOURS PRN
Status: DISCONTINUED | OUTPATIENT
Start: 2024-03-01 | End: 2024-03-01 | Stop reason: HOSPADM

## 2024-03-01 RX ORDER — HYDROMORPHONE HYDROCHLORIDE 1 MG/ML
0.2 INJECTION, SOLUTION INTRAMUSCULAR; INTRAVENOUS; SUBCUTANEOUS EVERY 6 HOURS PRN
Status: DISCONTINUED | OUTPATIENT
Start: 2024-03-01 | End: 2024-03-01 | Stop reason: HOSPADM

## 2024-03-01 RX ADMIN — ATORVASTATIN CALCIUM 40 MG: 10 TABLET, FILM COATED ORAL at 09:03

## 2024-03-01 RX ADMIN — TIOTROPIUM BROMIDE INHALATION SPRAY 2 PUFF: 3.12 SPRAY, METERED RESPIRATORY (INHALATION) at 11:03

## 2024-03-01 RX ADMIN — HEPARIN SODIUM 5000 UNITS: 5000 INJECTION INTRAVENOUS; SUBCUTANEOUS at 01:03

## 2024-03-01 RX ADMIN — FLUTICASONE FUROATE AND VILANTEROL TRIFENATATE 1 PUFF: 200; 25 POWDER RESPIRATORY (INHALATION) at 11:03

## 2024-03-01 RX ADMIN — MUPIROCIN: 20 OINTMENT TOPICAL at 09:03

## 2024-03-01 RX ADMIN — SODIUM CHLORIDE, POTASSIUM CHLORIDE, SODIUM LACTATE AND CALCIUM CHLORIDE: 600; 310; 30; 20 INJECTION, SOLUTION INTRAVENOUS at 03:03

## 2024-03-01 RX ADMIN — ASPIRIN 81 MG: 81 TABLET, COATED ORAL at 09:03

## 2024-03-01 RX ADMIN — CEFAZOLIN 2 G: 2 INJECTION, POWDER, FOR SOLUTION INTRAMUSCULAR; INTRAVENOUS at 04:03

## 2024-03-01 RX ADMIN — ACETAMINOPHEN 650 MG: 325 TABLET ORAL at 01:03

## 2024-03-01 RX ADMIN — PANTOPRAZOLE SODIUM 40 MG: 40 TABLET, DELAYED RELEASE ORAL at 09:03

## 2024-03-01 RX ADMIN — ACETAMINOPHEN 650 MG: 325 TABLET ORAL at 06:03

## 2024-03-01 RX ADMIN — CLOPIDOGREL BISULFATE 75 MG: 75 TABLET ORAL at 09:03

## 2024-03-01 RX ADMIN — HEPARIN SODIUM 5000 UNITS: 5000 INJECTION INTRAVENOUS; SUBCUTANEOUS at 06:03

## 2024-03-01 NOTE — SUBJECTIVE & OBJECTIVE
Interval History/Significant Events: No acute events overnight, patient hemodynamically stable.     Follow-up For: Procedure(s) (LRB):  INSERTION, STENT, ARTERY, CAROTID (Left)    Post-Operative Day: 1 Day Post-Op    Objective:     Vital Signs (Most Recent):  Temp: 97.8 °F (36.6 °C) (03/01/24 0000)  Pulse: 62 (03/01/24 0436)  Resp: 18 (03/01/24 0000)  BP: 130/60 (03/01/24 0000)  SpO2: 99 % (03/01/24 0000) Vital Signs (24h Range):  Temp:  [97.2 °F (36.2 °C)-97.8 °F (36.6 °C)] 97.8 °F (36.6 °C)  Pulse:  [60-71] 62  Resp:  [12-37] 18  SpO2:  [88 %-100 %] 99 %  BP: ()/(40-64) 130/60  Arterial Line BP: ()/(23-51) 90/51     Weight: 78.5 kg (173 lb)  Body mass index is 28.79 kg/m².    No intake or output data in the 24 hours ending 03/01/24 0551       Physical Exam  Constitutional:       Appearance: Normal appearance. She is not ill-appearing.   Neck:      Comments: Left neck dressing without swelling.  Small hematoma at surg site.  Cardiovascular:      Rate and Rhythm: Normal rate and regular rhythm.      Pulses: Normal pulses.      Heart sounds: Normal heart sounds.   Pulmonary:      Effort: Pulmonary effort is normal.      Breath sounds: Normal breath sounds.   Abdominal:      General: Abdomen is flat. Bowel sounds are normal.      Palpations: Abdomen is soft.   Musculoskeletal:      Comments: L radial a-line   Skin:     General: Skin is warm.      Capillary Refill: Capillary refill takes less than 2 seconds.      Comments: Right groin puncture without dressing. No swelling or strike through.    Neurological:      General: No focal deficit present.            Vents:       Lines/Drains/Airways       Arterial Line  Duration             Arterial Line 02/29/24 1200 Left Radial <1 day              Peripheral Intravenous Line  Duration                  Peripheral IV - Single Lumen 02/29/24 1200 18 G Anterior;Left Foot <1 day                    Significant Labs:    CBC/Anemia Profile:  Recent Labs   Lab  02/29/24  0830 02/29/24  1303 03/01/24  0339   WBC 6.67 9.48  9.48 9.61   HGB 10.2* 9.3*  9.3* 8.3*   HCT 31.6* 28.2*  28.2* 25.5*    211  211 201   MCV 99* 98  98 99*   RDW 16.0* 16.1*  16.1* 16.1*        Chemistries:  Recent Labs   Lab 02/29/24  0830 02/29/24  1303 03/01/24  0339    140 139   K 4.1 3.9 4.5    106 102   CO2 30* 29 28   BUN 21 20 18   CREATININE 0.7 0.6 0.6   CALCIUM 10.0 8.5* 8.7   ALBUMIN  --  3.2* 2.9*   PROT  --  6.4 5.9*   BILITOT  --  0.6 0.4   ALKPHOS  --  87 80   ALT  --  16 15   AST  --  38 34   MG  --  1.7 1.9   PHOS  --  4.3 3.4       All pertinent labs within the past 24 hours have been reviewed.    Significant Imaging:  I have reviewed all pertinent imaging results/findings within the past 24 hours.

## 2024-03-01 NOTE — PROGRESS NOTES
Heart Failure Transitional Care Clinic (HFTCC) Team notified of pt referral via Ambulatory Referral to Heart Failure Transitional Care ([id number]).    Patient screened today 3-1-2024 by provider and RN for enrollment to program.      Pt was deemed not a candidate for enrollment at this time related to patient primary presenting complaint would not benefit from Heart Failure Transitional Care Program.  R/O PP Carotid Stent    Pt will require additional follow up planning per primary team.     If pt status, diagnosis, or treatment plan changes , please place AMB referral to Heart Failure Transitional Care Clinic ([id number]) for HFTCC enrollment re-evalution.

## 2024-03-01 NOTE — PLAN OF CARE
[facility]  Discharge Final Note    Primary Care Provider: No primary care provider on file.    Expected Discharge Date: 3/1/2024    Final Discharge Note (most recent)       Final Note - 03/01/24 1556          Final Note    Assessment Type Final Discharge Note     Anticipated Discharge Disposition Home-Health Care Svc [facility]    Hospital Resources/Appts/Education Provided Patient refused appointment set-up        Post-Acute Status    Post-Acute Authorization Home Health     Home Health Status Set-up Complete/Auth obtained                     Important Message from Medicare  Important Message from Medicare regarding Discharge Appeal Rights: Given to patient/caregiver, Signed/date by patient/caregiver, Explained to patient/caregiver     Date IMM was signed: 03/01/24  Time IMM was signed: 1400    The patient was discharged with home health- [facility].       Cb Lopez LMSW  Case Management [facility]

## 2024-03-01 NOTE — PLAN OF CARE
[facility]      HOME HEALTH ORDERS  FACE TO FACE ENCOUNTER    Patient Name: Lisa Smith  YOB: 1950    PCP: No primary care provider on file.   PCP Address: No primary physician on file.  PCP Phone Number: None  PCP Fax: None    Encounter Date: 2/20/24    Admit to Home Health    Diagnoses:  Active Hospital Problems    Diagnosis  POA    *Symptomatic stenosis of left carotid artery [I65.22]  Yes    CAD (coronary artery disease) [I25.10]  Yes     Chronic     Dr. Pérez cardiologist      COPD (chronic obstructive pulmonary disease) [J44.9]  Yes     Chronic     Dr. Haro pulmonologist      Chronic respiratory failure with hypoxia, on home O2 therapy [J96.11, Z99.81]  Not Applicable     3 L at home        Resolved Hospital Problems   No resolved problems to display.       Follow Up Appointments:  Future Appointments   Date Time Provider Department Center   3/14/2024 10:45 AM Raymond Pérez MD [facility] CARDIO O at [facility]OB   7/22/2024  1:30 PM Gin Elliott FNP [id number]   12/3/2024 10:00 AM [facility] OPP ECHO ROOM [facility] OP CARD [facility]   12/3/2024 11:00 AM Rochelle Worthy NP [facility] PROC [facility] - OPP       Allergies:  Review of patient's allergies indicates:   Allergen Reactions    Sulfa (sulfonamide antibiotics) Itching       Medications: Review discharge medications with patient and family and provide education.    Current Facility-Administered Medications   Medication Dose Route Frequency Provider Last Rate Last Admin    acetaminophen tablet 650 mg  650 mg Oral Q6H Yessy Aponte MD   650 mg at 03/01/24 1300    albuterol inhaler 2 puff  2 puff Inhalation Q6H PRN Yessy Aponte MD        aspirin EC tablet 81 mg  81 mg Oral Daily Chang Brown MD   81 mg at 03/01/24 0906    atorvastatin tablet 40 mg  40 mg Oral Daily Chang Brown MD   40 mg at 03/01/24 0906    clopidogreL tablet 75 mg  75 mg Oral Daily Chang Brown MD   75 mg at  03/01/24 0906    dextrose 10% bolus 125 mL 125 mL  12.5 g Intravenous PRN CIRILO Valdivia III, MD        dextrose 10% bolus 250 mL 250 mL  25 g Intravenous PRN CIRILO Valdivia III, MD        fluticasone furoate-vilanteroL 200-25 mcg/dose diskus inhaler 1 puff  1 puff Inhalation Daily Yessy Aponte MD   1 puff at 03/01/24 1135    glucagon (human recombinant) injection 1 mg  1 mg Intramuscular PRN Yessy Aponte MD        glucose chewable tablet 16 g  16 g Oral PRN Yessy Aponte MD        glucose chewable tablet 24 g  24 g Oral PRN Yessy Aponte MD        heparin (porcine) injection 5,000 Units  5,000 Units Subcutaneous Q8H Mesfin Milligan MD   5,000 Units at 03/01/24 1323    HYDROmorphone injection 0.2 mg  0.2 mg Intravenous Q6H PRN Yessy Aponte MD        insulin aspart U-100 pen 0-5 Units  0-5 Units Subcutaneous QID (AC + HS) PRN Yessy Aponte MD        mupirocin 2 % ointment   Nasal BID Chang Brown MD   Given at 03/01/24 0906    oxyCODONE immediate release tablet 5 mg  5 mg Oral Q6H PRN Yessy Aponte MD        pantoprazole EC tablet 40 mg  40 mg Oral Daily Yessy Aponte MD   40 mg at 03/01/24 0906    [START ON 3/2/2024] polyethylene glycol packet 17 g  17 g Oral Daily Yessy Aponte MD        tiotropium bromide 2.5 mcg/actuation inhaler 2 puff  2 puff Inhalation Daily Yessy Aponte MD   2 puff at 03/01/24 1134     Current Discharge Medication List        START taking these medications    Details   clopidogreL (PLAVIX) 75 mg tablet Take 1 tablet (75 mg total) by mouth once daily.  Qty: 30 tablet, Refills: 11      oxyCODONE (ROXICODONE) 5 MG immediate release tablet Take 1 tablet (5 mg total) by mouth every 6 (six) hours as needed for Pain.  Qty: 12 tablet, Refills: 0    Comments: Quantity prescribed more than 7 day supply? No      polyethylene glycol (GLYCOLAX) 17 gram PwPk Take 17 g by mouth once daily.  Refills: 0           CONTINUE these medications which have NOT CHANGED    Details    acetaminophen (TYLENOL ARTHRITIS ORAL) Take 2 tablets by mouth daily as needed.      albuterol (ACCUNEB) 1.25 mg/3 mL Nebu INHALE THE CONTENTS OF 1 VIAL VIA NEBULIZER EVERY 6 HOURS AS NEEDED FOR RESCUE  Qty: 360 mL, Refills: 5    Comments: J44.9 COPD  Associated Diagnoses: Chronic obstructive pulmonary disease, unspecified COPD type      albuterol (PROVENTIL/VENTOLIN HFA) 90 mcg/actuation inhaler Inhale 2 puffs into the lungs every 6 (six) hours as needed for Wheezing. Rescue  Qty: 18 g, Refills: 6      aspirin (ECOTRIN) 81 MG EC tablet One tablet p.o. q.day with food  Qty: 90 tablet, Refills: 3      digoxin (LANOXIN) 250 mcg tablet TAKE 1 TABLET EVERY DAY  Qty: 90 tablet, Refills: 3      ezetimibe (ZETIA) 10 mg tablet Take 1 tablet (10 mg total) by mouth once daily.  Qty: 90 tablet, Refills: 3      fexofenadine (ALLEGRA) 60 MG tablet Take 60 mg by mouth daily as needed. Pt takes everyother day      fluticasone propionate (FLONASE) 50 mcg/actuation nasal spray by Each Nostril route daily as needed.      fluticasone-umeclidin-vilanter (TRELEGY ELLIPTA) 100-62.5-25 mcg DsDv INHALE 1 PUFF INTO THE LUNGS ONCE DAILY.  Qty: 3 each, Refills: 6      furosemide (LASIX) 20 MG tablet TAKE 1 TABLET EVERY DAY  Qty: 90 tablet, Refills: 1      magnesium oxide (MAG-OX) 400 mg (241.3 mg magnesium) tablet Take 400 mg by mouth once daily.      metoprolol succinate (TOPROL-XL) 50 MG 24 hr tablet Take 1 tablet (50 mg total) by mouth once daily.  Qty: 30 tablet, Refills: 11    Comments: .      multivitamin (THERAGRAN) per tablet Take 1 tablet by mouth once daily.      pantoprazole (PROTONIX) 40 MG tablet Take 1 tablet (40 mg total) by mouth once daily.  Qty: 90 tablet, Refills: 3      rOPINIRole (REQUIP) 0.5 MG tablet TAKE 1 TABLET EVERY EVENING  Qty: 90 tablet, Refills: 1    Comments: Needs appt for further refills  Associated Diagnoses: Restless legs      sacubitriL-valsartan (ENTRESTO) 24-26 mg per tablet Take 1 tablet by mouth once  daily.  Qty: 90 tablet, Refills: 3    Associated Diagnoses: Acute on chronic combined systolic and diastolic CHF, NYHA class 3      spironolactone (ALDACTONE) 25 MG tablet TAKE 1 TABLET (25 MG TOTAL) BY MOUTH ONCE DAILY.  Qty: 90 tablet, Refills: 3      warfarin (COUMADIN) 3 MG tablet Take 1 tablet (3 mg total) by mouth Daily.  Qty: 90 tablet, Refills: 3      acetaminophen (TYLENOL) 325 MG tablet Take 325 mg by mouth every 6 (six) hours as needed for Pain.      amoxicillin (AMOXIL) 500 MG Tab Take 1 tablet (500 mg total) by mouth as needed (take one hour prior dental work or any high risk procedures.).  Qty: 4 tablet, Refills: 10      FLUAD QUAD 2022-23,65Y UP,,PF, 60 mcg (15 mcg x 4)/0.5 mL Syrg       rosuvastatin (CRESTOR) 40 MG Tab Take 1 tablet (40 mg total) by mouth every evening.  Qty: 90 tablet, Refills: 3               I have seen and examined this patient within the last 30 days. My clinical findings that support the need for the home health skilled services and home bound status are the following:no   Patient with medication mismanagement issues requiring home bound status as evidenced by  Poor understanding of medication regimen/dosage.     Diet:   regular diet    Labs:  SN to perform labs:  INR: Biweekly; 12 month(s)        Nursing:   Agency to admit patient within 24 hours of hospital discharge unless specified on physician order or at patient request    SN to complete comprehensive assessment including routine vital signs. Instruct on disease process and s/s of complications to report to MD. Review/verify medication list sent home with the patient at time of discharge  and instruct patient/caregiver as needed. Frequency may be adjusted depending on start of care date.     Skilled nurse to perform up to 3 visits PRN for symptoms related to diagnosis    Notify MD if SBP > 160 or < 90; DBP > 90 or < 50; HR > 120 or < 50; Temp > 101; O2 < 88%; Other:       Ok to schedule additional visits based on staff  availability and patient request on consecutive days within the home health episode.    When multiple disciplines ordered:    Start of Care occurs on Sunday - Wednesday schedule remaining discipline evaluations as ordered on separate consecutive days following the start of care.    Thursday SOC -schedule subsequent evaluations Friday and Monday the following week.     Friday - Saturday SOC - schedule subsequent discipline evaluations on consecutive days starting Monday of the following week.    For all post-discharge communication and subsequent orders please contact patient's primary care physician. If unable to reach primary care physician or do not receive response within 30 minutes, [facility] for clinical staff order clarification        I certify that this patient is confined to her home and needs intermittent skilled nursing care.

## 2024-03-01 NOTE — HOSPITAL COURSE
ARABELLA DANIEL W 73 y.o.female underwent: Procedure(s) (LRB):  INSERTION, STENT, ARTERY, CAROTID (Left). The patient tolerated the procedure well, was transferred to SICU for continuation of medical care. The patient's clinical condition progressively improved. By the time of discharge, she was tolerating a diet without nausea or vomiting, pain was well controlled with oral medications, and she was ambulating without difficulty. On POD 1 the patient was discharged to home. On discharge, the patient's incisions were c/d/i and the surgical site was soft and appropriately tender to palpation.

## 2024-03-01 NOTE — ASSESSMENT & PLAN NOTE
Neuro/Psych:   -- Sedation: none   -- Pain: Tylenol, oxycodone, dilaudid PRN  #S/P TCAR  -- Call Vascular for any neurological changes or deficits.              Cards:   -- HDS  -- ECHO 12/23: Ejection fraction of 40 - 45%.   #L TCAR  -- -140 (use cuff)  -- ASA/Plavix/Statin   #H/O afib  -- Hold coumadin / BB  -- Restart coumadin on Monday per primary      Pulm:   -- Goal O2 sat > 88%  #COPD  -- Now on home O2 of 3L NC     Renal:  -- No hanson intraop  -- BUN/Cr       FEN / GI:   -- mIVF  -- Replace lytes as needed  -- Nutrition: Regular diet      ID:   -- Tm: afebrile; WBC   -- Abx Ancef      Heme/Onc:   -- H/H stable   -- Daily CBC  -- DVT ppx Heparin subq 5000 q8h      Endo:   -- Gluc goal 140-180  -- SSI      PPx:   Feeding: Regular diet  Analgesia/Sedation: See above  Thromboembolic prevention: Heparin 5000 subq q8h  HOB >30: Yes  Stress Ulcer ppx: PPI home med  Glucose control: Critical care goal 140-180 g/dl, ISS    Lines/Drains/Airway: L radial a-line, pIV      Dispo/Code Status/Palliative:   -- SICU / Full Code  -- Step down today

## 2024-03-01 NOTE — PLAN OF CARE
CHW met with patient/family at bedside. Patient experience rounding completed and reviewed the following.     Do you know your discharge plan? Yes,Home Health     Have you discussed your needs and preferences with your SW/CM? Yes     If you are discharging home, do you have help at home? Yes     Do you think you will need help additional at home at discharge? Yes     Do you currently have difficulty keeping up with bills, affording medicine or buying food? Yes     Assigned SW/CM notified of any patient/family needs or concerns. Appropriate resources provided to address patient's needs.      Mr Jacky Rogers W  Case Management

## 2024-03-01 NOTE — SUBJECTIVE & OBJECTIVE
Medications:  Continuous Infusions:   lactated ringers 75 mL/hr at 03/01/24 0800     Scheduled Meds:   acetaminophen  650 mg Oral Q6H    aspirin  81 mg Oral Daily    atorvastatin  40 mg Oral Daily    clopidogreL  75 mg Oral Daily    fluticasone furoate-vilanteroL  1 puff Inhalation Daily    heparin (porcine)  5,000 Units Subcutaneous Q8H    mupirocin   Nasal BID    pantoprazole  40 mg Oral Daily    tiotropium bromide  2 puff Inhalation Daily     PRN Meds:albuterol, dextrose 10%, dextrose 10%, glucagon (human recombinant), glucose, glucose, HYDROmorphone, insulin aspart U-100, oxyCODONE     Objective:     Vital Signs (Most Recent):  Temp: 97.7 °F (36.5 °C) (03/01/24 0715)  Pulse: 60 (03/01/24 0815)  Resp: (!) 33 (03/01/24 0815)  BP: (!) 117/53 (03/01/24 0800)  SpO2: 100 % (03/01/24 0815) Vital Signs (24h Range):  Temp:  [97.2 °F (36.2 °C)-98 °F (36.7 °C)] 97.7 °F (36.5 °C)  Pulse:  [60-71] 60  Resp:  [4-45] 33  SpO2:  [88 %-100 %] 100 %  BP: ()/(40-69) 117/53  Arterial Line BP: ()/(23-82) 68/34     Date 03/01/24 0700 - 03/02/24 0659   Shift 6487-5668 7887-9167 2913-5521 24 Hour Total   INTAKE   I.V.(mL/kg) 1442.3(18.4)   1442.3(18.4)   IV Piggyback 85   85   Shift Total(mL/kg) 1527.3(19.5)   1527.3(19.5)   OUTPUT   Shift Total(mL/kg)       Weight (kg) 78.5 78.5 78.5 78.5        Physical Exam  Neck:      Comments: Incision CDI  Some small hematoma   Cardiovascular:      Rate and Rhythm: Normal rate and regular rhythm.   Pulmonary:      Effort: Pulmonary effort is normal.      Breath sounds: Normal breath sounds.      Comments: On NC  Abdominal:      General: Abdomen is flat.      Palpations: Abdomen is soft.   Musculoskeletal:      Cervical back: Normal range of motion.   Neurological:      General: No focal deficit present.      Mental Status: She is alert and oriented to person, place, and time.          Significant Labs:  All pertinent labs from the last 24 hours have been reviewed.    Significant  Diagnostics:  I have reviewed all pertinent imaging results/findings within the past 24 hours.

## 2024-03-01 NOTE — PROGRESS NOTES
[facility]  Vascular Surgery  Progress Note    Patient Name: Lisa Smith  MRN: [id number]  Admission Date: 2/29/2024  Primary Care Provider: Hope Tang FNP    Subjective:     Interval History: No acute events overnight. POD1 from TCAR Pt seen in ICU. Exam stable. Small hematoma from coughing overnight. Nursing will hold pressure. Will watch today due to frailty/COPD      Post-Op Info:  Procedure(s) (LRB):  INSERTION, STENT, ARTERY, CAROTID (Left)   1 Day Post-Op     Medications:  Continuous Infusions:   lactated ringers 75 mL/hr at 03/01/24 0800     Scheduled Meds:   acetaminophen  650 mg Oral Q6H    aspirin  81 mg Oral Daily    atorvastatin  40 mg Oral Daily    clopidogreL  75 mg Oral Daily    fluticasone furoate-vilanteroL  1 puff Inhalation Daily    heparin (porcine)  5,000 Units Subcutaneous Q8H    mupirocin   Nasal BID    pantoprazole  40 mg Oral Daily    tiotropium bromide  2 puff Inhalation Daily     PRN Meds:albuterol, dextrose 10%, dextrose 10%, glucagon (human recombinant), glucose, glucose, HYDROmorphone, insulin aspart U-100, oxyCODONE     Objective:     Vital Signs (Most Recent):  Temp: 97.7 °F (36.5 °C) (03/01/24 0715)  Pulse: 60 (03/01/24 0815)  Resp: (!) 33 (03/01/24 0815)  BP: (!) 117/53 (03/01/24 0800)  SpO2: 100 % (03/01/24 0815) Vital Signs (24h Range):  Temp:  [97.2 °F (36.2 °C)-98 °F (36.7 °C)] 97.7 °F (36.5 °C)  Pulse:  [60-71] 60  Resp:  [4-45] 33  SpO2:  [88 %-100 %] 100 %  BP: ()/(40-69) 117/53  Arterial Line BP: ()/(23-82) 68/34     Date 03/01/24 0700 - 03/02/24 0659   Shift 0562-7562 7192-0346 4901-7772 24 Hour Total   INTAKE   I.V.(mL/kg) 1442.3(18.4)   1442.3(18.4)   IV Piggyback 85   85   Shift Total(mL/kg) 1527.3(19.5)   1527.3(19.5)   OUTPUT   Shift Total(mL/kg)       Weight (kg) 78.5 78.5 78.5 78.5        Physical Exam  Neck:      Comments: Incision CDI  Some small hematoma   Cardiovascular:      Rate and Rhythm: Normal rate and regular  rhythm.   Pulmonary:      Effort: Pulmonary effort is normal.      Breath sounds: Normal breath sounds.      Comments: On NC  Abdominal:      General: Abdomen is flat.      Palpations: Abdomen is soft.   Musculoskeletal:      Cervical back: Normal range of motion.   Neurological:      General: No focal deficit present.      Mental Status: She is alert and oriented to person, place, and time.          Significant Labs:  All pertinent labs from the last 24 hours have been reviewed.    Significant Diagnostics:  I have reviewed all pertinent imaging results/findings within the past 24 hours.  Assessment/Plan:     * Symptomatic stenosis of left carotid artery  73 y.o. female status post TAVR 12/20/2023, status post left carotid endarterectomy 12/20/2022, COPD on home oxygen and afib on acoumadin with 95+% recurrent left carotid stenosis with known right ICA occlusion now s/p L ROSSY on 2/29     Admitted to SICU  Will continue to monitor  Nursing holding pressure on hematoma  Restart coumadin on Monday  Continue asprin for 1 week postop  Continue Plavix          Jas Benton MD  Vascular Surgery  [facility]

## 2024-03-01 NOTE — ASSESSMENT & PLAN NOTE
73 y.o. female status post TAVR 12/20/2023, status post left carotid endarterectomy 12/20/2022, COPD on home oxygen and afib on acoumadin with 95+% recurrent left carotid stenosis with known right ICA occlusion now s/p L ROSSY on 2/29     Admitted to SICU  Nursing holding pressure on hematoma  Discharge later today  Restart coumadin on Monday  Continue asprin for 1 week  Continue Plavix  1 month follow up with [facility]

## 2024-03-01 NOTE — PROGRESS NOTES
[facility]  Critical Care - Surgery  Progress Note    Patient Name: Lisa Smith  MRN: [id number]  Admission Date: 2/29/2024  Hospital Length of Stay: 1 days  Code Status: Full Code  Attending Provider: CIRILO Valdivia III, MD  Primary Care Provider: Hope Tang FNP   Principal Problem: Symptomatic stenosis of left carotid artery    Subjective:     Interval History/Significant Events: No acute events overnight, patient hemodynamically stable.     Follow-up For: Procedure(s) (LRB):  INSERTION, STENT, ARTERY, CAROTID (Left)    Post-Operative Day: 1 Day Post-Op    Objective:     Vital Signs (Most Recent):  Temp: 97.8 °F (36.6 °C) (03/01/24 0000)  Pulse: 62 (03/01/24 0436)  Resp: 18 (03/01/24 0000)  BP: 130/60 (03/01/24 0000)  SpO2: 99 % (03/01/24 0000) Vital Signs (24h Range):  Temp:  [97.2 °F (36.2 °C)-97.8 °F (36.6 °C)] 97.8 °F (36.6 °C)  Pulse:  [60-71] 62  Resp:  [12-37] 18  SpO2:  [88 %-100 %] 99 %  BP: ()/(40-64) 130/60  Arterial Line BP: ()/(23-51) 90/51     Weight: 78.5 kg (173 lb)  Body mass index is 28.79 kg/m².    No intake or output data in the 24 hours ending 03/01/24 0551       Physical Exam  Constitutional:       Appearance: Normal appearance. She is not ill-appearing.   Neck:      Comments: Left neck dressing without swelling.  Small hematoma at surg site.  Cardiovascular:      Rate and Rhythm: Normal rate and regular rhythm.      Pulses: Normal pulses.      Heart sounds: Normal heart sounds.   Pulmonary:      Effort: Pulmonary effort is normal.      Breath sounds: Normal breath sounds.   Abdominal:      General: Abdomen is flat. Bowel sounds are normal.      Palpations: Abdomen is soft.   Musculoskeletal:      Comments: L radial a-line   Skin:     General: Skin is warm.      Capillary Refill: Capillary refill takes less than 2 seconds.      Comments: Right groin puncture without dressing. No swelling or strike through.    Neurological:      General: No  focal deficit present.            Vents:       Lines/Drains/Airways       Arterial Line  Duration             Arterial Line 02/29/24 1200 Left Radial <1 day              Peripheral Intravenous Line  Duration                  Peripheral IV - Single Lumen 02/29/24 1200 18 G Anterior;Left Foot <1 day                    Significant Labs:    CBC/Anemia Profile:  Recent Labs   Lab 02/29/24  0830 02/29/24  1303 03/01/24  0339   WBC 6.67 9.48  9.48 9.61   HGB 10.2* 9.3*  9.3* 8.3*   HCT 31.6* 28.2*  28.2* 25.5*    211  211 201   MCV 99* 98  98 99*   RDW 16.0* 16.1*  16.1* 16.1*        Chemistries:  Recent Labs   Lab 02/29/24  0830 02/29/24  1303 03/01/24  0339    140 139   K 4.1 3.9 4.5    106 102   CO2 30* 29 28   BUN 21 20 18   CREATININE 0.7 0.6 0.6   CALCIUM 10.0 8.5* 8.7   ALBUMIN  --  3.2* 2.9*   PROT  --  6.4 5.9*   BILITOT  --  0.6 0.4   ALKPHOS  --  87 80   ALT  --  16 15   AST  --  38 34   MG  --  1.7 1.9   PHOS  --  4.3 3.4       All pertinent labs within the past 24 hours have been reviewed.    Significant Imaging:  I have reviewed all pertinent imaging results/findings within the past 24 hours.  Assessment/Plan:     Cardiac/Vascular  * Symptomatic stenosis of left carotid artery    Neuro/Psych:   -- Sedation: none   -- Pain: Tylenol, oxycodone, dilaudid PRN  #S/P TCAR  -- Call Vascular for any neurological changes or deficits.              Cards:   -- HDS  -- ECHO 12/23: Ejection fraction of 40 - 45%.   #L TCAR  -- -140 (use cuff)  -- ASA/Plavix/Statin   #H/O afib  -- Hold coumadin / BB  -- Restart coumadin on Monday per primary      Pulm:   -- Goal O2 sat > 88%  #COPD  -- Now on home O2 of 3L NC     Renal:  -- No hanson intraop  -- BUN/Cr       FEN / GI:   -- mIVF  -- Replace lytes as needed  -- Nutrition: Regular diet      ID:   -- Tm: afebrile; WBC   -- Abx Ancef      Heme/Onc:   -- H/H stable   -- Daily CBC  -- DVT ppx Heparin subq 5000 q8h      Endo:   -- Gluc goal  140-180  -- SSI      PPx:   Feeding: Regular diet  Analgesia/Sedation: See above  Thromboembolic prevention: Heparin 5000 subq q8h  HOB >30: Yes  Stress Ulcer ppx: PPI home med  Glucose control: Critical care goal 140-180 g/dl, ISS    Lines/Drains/Airway: L radial a-line, pIV      Dispo/Code Status/Palliative:   -- SICU / Full Code  -- Step down today        Critical secondary to Patient has a condition that poses threat to life and bodily function: post op neuro checks     Critical care was time spent personally by me on the following activities: development of treatment plan with patient or surrogate and bedside caregivers, discussions with consultants, evaluation of patient's response to treatment, examination of patient, ordering and performing treatments and interventions, ordering and review of laboratory studies, ordering and review of radiographic studies, pulse oximetry, re-evaluation of patient's condition.  This critical care time did not overlap with that of any other provider or involve time for any procedures.     Guillermo Waldron, DO  Critical Care - Surgery  [facility]

## 2024-03-01 NOTE — NURSING
[place] [facility] at bedside for rounds. MD noted hematoma underneath L neck sx site. Pressure held for 15 min. per MD order.

## 2024-03-01 NOTE — PLAN OF CARE
[facility]  Initial Discharge Assessment       Primary Care Provider: No primary care provider on file.    Admission Diagnosis: Stenosis of left carotid artery [I65.22]  Carotid stenosis [I65.29]    Admission Date: 2/29/2024  Expected Discharge Date:     Transition of Care Barriers: None    Payor: HU[employer] MANAGED MEDICARE / Plan: HU[employer]NA MEDICARE HMO / Product Type: Capitation /     Extended Emergency Contact Information  Primary Emergency Contact: Jordana Marsh  Address: [address] [place], [place]  Home Phone: [phone]  Mobile Phone: [phone]  Relation: Daughter    Discharge Plan A: Home Health  Discharge Plan B: Home Health      [facility] [address] [place], [place] [address] [address] [address]  Phone: [phone] Fax: [phone]    [facility] [address]  [address] [facility] [address]  Phone: [phone] Fax: [phone]    [facility] [place], [place] - [address]  [address]  [facility] [address]  Phone: [phone] Fax: [phone]    [facility] - [address] [facility]  [address]  [address]  [facility] [address]  Phone: [phone] Fax: [phone]    CM at bedside to discuss discharge planning assessment.   Patient lives alone in a one-story home.   Independent with ADL and uses a rollator for mobility.   [facility] was current with patient to draw labwork for Coumadin.   Explained CM services during patient's stay in hospital.   My Health packet discussed and left with patient.    Initial Assessment (most recent)       Adult Discharge Assessment - 03/01/24 1124          Discharge Assessment    Assessment Type Discharge Planning Assessment     Confirmed/corrected address, phone number and insurance Yes     Confirmed Demographics Correct  on Facesheet     Source of Information patient     Communicated PILI with patient/caregiver Date not available/Unable to determine     Reason For Admission stenosis of left carotid artery     People in Home child(kiya), adult     Facility Arrived From: home     Do you expect to return to your current living situation? Yes     Prior to hospitilization cognitive status: Alert/Oriented     Current cognitive status: Alert/Oriented     Walking or Climbing Stairs Difficulty yes     Walking or Climbing Stairs ambulation difficulty, requires equipment     Mobility Management walker     Dressing/Bathing Difficulty yes     Home Layout Able to live on 1st floor     Equipment Currently Used at Home nebulizer;oxygen;rollator     Readmission within 30 days? Yes     Patient currently being followed by outpatient case management? No     Do you currently have service(s) that help you manage your care at home? Yes     Name and Contact number of agency [facility]     Is the pt/caregiver preference to resume services with current agency Yes     Do you take prescription medications? Yes     Do you have prescription coverage? Yes     Coverage HU[employer] MANAGED MEDICARE - HU[employer]NA MEDICARE HMO - CAPITATED     Do you have any problems affording any of your prescribed medications? No     Is the patient taking medications as prescribed? yes     How do you get to doctors appointments? family or friend will provide     Are you on dialysis? No     Do you take coumadin? Yes     Discharge Plan A Home Health     Discharge Plan B Home Health     DME Needed Upon Discharge  --   TBD    Discharge Plan discussed with: Patient     Transition of Care Barriers None        Physical Activity    On average, how many days per week do you engage in moderate to strenuous exercise (like a brisk walk)? 4 days     On average, how many minutes do you engage in exercise at this level? 10 min        Financial Resource Strain    How hard is it for you to pay for the  very basics like food, housing, medical care, and heating? Somewhat hard        Housing Stability    In the last 12 months, was there a time when you were not able to pay the mortgage or rent on time? Patient declined     In the last 12 months, how many places have you lived? 1     In the last 12 months, was there a time when you did not have a steady place to sleep or slept in a shelter (including now)? No        Transportation Needs    In the past 12 months, has lack of transportation kept you from medical appointments or from getting medications? Patient declined     In the past 12 months, has lack of transportation kept you from meetings, work, or from getting things needed for daily living? Patient declined        Food Insecurity    Within the past 12 months, you worried that your food would run out before you got the money to buy more. Patient declined     Within the past 12 months, the food you bought just didn't last and you didn't have money to get more. Patient declined        Stress    Do you feel stress - tense, restless, nervous, or anxious, or unable to sleep at night because your mind is troubled all the time - these days? Only a little        Social Connections    In a typical week, how many times do you talk on the phone with family, friends, or neighbors? More than three times a week     How often do you get together with friends or relatives? Twice a week     Do you belong to any clubs or organizations such as Bahai groups, unions, fraternal or athletic groups, or school groups? No     How often do you attend meetings of the clubs or organizations you belong to? Never     Are you , , , , never , or living with a partner?         Alcohol Use    Q1: How often do you have a drink containing alcohol? 2-4 times a month     Q2: How many drinks containing alcohol do you have on a typical day when you are drinking? Patient declined     Q3: How often do you  have six or more drinks on one occasion? Patient declined                     Readmission Assessment (most recent)       Readmission Assessment - 03/01/24 1132          Readmission    Why were you hospitalized in the last 30 days? severe aortic valve stenosis     Why were you readmitted? Related to previous admission     When you left the hospital where did you go? Home Alone     Did patient/caregiver refused recommended DC plan? No     Did you try to manage your symptoms your self? No     Did you have  a follow-up appointment on discharge? Yes     Was this a planned readmission? No

## 2024-03-01 NOTE — DISCHARGE SUMMARY
[facility] - Surgical Intensive Care  Vascular Surgery  Discharge Summary      Patient Name: Arabella Daniel  MRN: [id number]  Admission Date: 2/29/2024  Hospital Length of Stay: 1 days  Discharge Date and Time:  03/01/2024 1:38 PM  Attending Physician: CIRILO Valdivia III, MD   Discharging Provider: Clover Morales MD  Primary Care Provider: No primary care provider on file.    HPI:   No notes on file    Procedure(s) (LRB):  INSERTION, STENT, ARTERY, CAROTID (Left)     Hospital Course: ARABELLA DANIEL 73 y.o.female underwent: Procedure(s) (LRB):  INSERTION, STENT, ARTERY, CAROTID (Left). The patient tolerated the procedure well, was transferred to SICU for continuation of medical care. The patient's clinical condition progressively improved. By the time of discharge, she was tolerating a diet without nausea or vomiting, pain was well controlled with oral medications, and she was ambulating without difficulty. On POD 1 the patient was discharged to home. On discharge, the patient's incisions were c/d/i and the surgical site was soft and appropriately tender to palpation.     Goals of Care Treatment Preferences:  Code Status: Full Code      Consults:     Significant Diagnostic Studies: Labs: CMP   Recent Labs   Lab 02/29/24  0830 02/29/24  1303 03/01/24  0339    140 139   K 4.1 3.9 4.5    106 102   CO2 30* 29 28    164* 104   BUN 21 20 18   CREATININE 0.7 0.6 0.6   CALCIUM 10.0 8.5* 8.7   PROT  --  6.4 5.9*   ALBUMIN  --  3.2* 2.9*   BILITOT  --  0.6 0.4   ALKPHOS  --  87 80   AST  --  38 34   ALT  --  16 15   ANIONGAP 9 5* 9    and CBC   Recent Labs   Lab 02/29/24  0830 02/29/24  1303 03/01/24  0339   WBC 6.67 9.48  9.48 9.61   HGB 10.2* 9.3*  9.3* 8.3*   HCT 31.6* 28.2*  28.2* 25.5*    211  211 201       Pending Diagnostic Studies:       None          Final Active Diagnoses:    Diagnosis Date Noted POA    PRINCIPAL PROBLEM:  Symptomatic stenosis of left carotid artery [I65.22]  02/20/2024 Yes    CAD (coronary artery disease) [I25.10] 11/25/2019 Yes     Chronic    COPD (chronic obstructive pulmonary disease) [J44.9] 10/01/2019 Yes     Chronic    Chronic respiratory failure with hypoxia, on home O2 therapy [J96.11, Z99.81] 12/05/2018 Not Applicable      Problems Resolved During this Admission:      Discharged Condition: good    Disposition: Home or Self Care    Follow Up:      Patient Instructions:      ACCEPT - Ambulatory referral/consult to [facility]   Standing Status: Future   Referral Priority: Routine Referral Type: Consultation   Referral Reason: Specialty Services Required   Requested Specialty: Cardiology   Number of Visits Requested: 1     Diet Adult Regular     Notify your health care provider if you experience any of the following:  persistent dizziness, light-headedness, or visual disturbances     Notify your health care provider if you experience any of the following:  increased confusion or weakness     Notify your health care provider if you experience any of the following:  redness, tenderness, or signs of infection (pain, swelling, redness, odor or green/yellow discharge around incision site)     Notify your health care provider if you experience any of the following:  severe uncontrolled pain     Remove dressing in 24 hours     Activity as tolerated   Order Comments: Postoperative General Instructions    What to Expect:  It is normal to experience pain and swelling at the surgical site.  The pain usually decreases significantly after the first week but may last for many weeks.  Each day, the pain should be similar or better to the previous day. If it worsens, call the doctor's office.     Activity:  You should walk beginning on the day of surgery and increase activity slowly over the next two to four weeks.  Do not drive while taking prescription pain medication.  You may return to work when you feel ready.       Wound Care:  You may shower. Let  soap and water run over the incisions and pat dry. Do not submerge in a bathtub or pool.  If you have an open wound, you should change the dressing twice daily with moist gauze.     Diet:  You may resume your normal diet postoperatively.  Take a stool softener or laxative to avoid constipation.     Medication:  Pain Control  Take acetaminophen (Tylenol) 650 mg 4 times daily as needed for pain.  Take the prescribed oxycodone as needed if you have pain that is not controlled by acetaminophe.  Bowel Regularity  Take an over-the-counter stool softener/laxative (Colace, Miralax, or Milk of Magnesia) to avoid constipation.    Home Medications:   Coumadin- restart on Monday.   You will be taking aspirin, plavix and coumadin starting Monday. Continue these medications for one week, then drop the aspirin. You will then continue plavix and coumadin.     Call Your Doctor's Office If You Experience:  Fevers greater than 101.3°F.  Vomiting.  Spreading redness or drainage from the incisions.  Opening of the incisions.  Worsening pain not controlled by medication.  Chest pain or shortness of breath.    Follow-Up:  Follow-up with your surgeon as scheduled in 1 month.  If no follow-up appointment has been scheduled, call to schedule an appointment within 1-2 weeks of discharge.     Contact Information:  During normal business hours call the clinic with any questions or concerns. On weekends and evenings call [phone] to have the operators page the surgery resident on call after hours with questions or concerns.     Medications:  Reconciled Home Medications:      Medication List        START taking these medications      clopidogreL 75 mg tablet  Commonly known as: PLAVIX  Take 1 tablet (75 mg total) by mouth once daily.  Start taking on: March 2, 2024     oxyCODONE 5 MG immediate release tablet  Commonly known as: ROXICODONE  Take 1 tablet (5 mg total) by mouth every 6 (six) hours as needed for Pain.     polyethylene glycol 17  gram Pwpk  Commonly known as: GLYCOLAX  Take 17 g by mouth once daily.  Start taking on: March 2, 2024            CHANGE how you take these medications      digoxin 250 mcg tablet  Commonly known as: LANOXIN  TAKE 1 TABLET EVERY DAY  What changed: when to take this     metoprolol succinate 50 MG 24 hr tablet  Commonly known as: TOPROL-XL  Take 1 tablet (50 mg total) by mouth once daily.  What changed: when to take this     pantoprazole 40 MG tablet  Commonly known as: PROTONIX  Take 1 tablet (40 mg total) by mouth once daily.  What changed: when to take this     spironolactone 25 MG tablet  Commonly known as: ALDACTONE  TAKE 1 TABLET (25 MG TOTAL) BY MOUTH ONCE DAILY.  What changed: when to take this            CONTINUE taking these medications      * acetaminophen 325 MG tablet  Commonly known as: TYLENOL  Take 325 mg by mouth every 6 (six) hours as needed for Pain.     * TYLENOL ARTHRITIS ORAL  Take 2 tablets by mouth daily as needed.     * albuterol 90 mcg/actuation inhaler  Commonly known as: PROVENTIL/VENTOLIN HFA  Inhale 2 puffs into the lungs every 6 (six) hours as needed for Wheezing. Rescue     * albuterol 1.25 mg/3 mL Nebu  Commonly known as: ACCUNEB  INHALE THE CONTENTS OF 1 VIAL VIA NEBULIZER EVERY 6 HOURS AS NEEDED FOR RESCUE     aspirin 81 MG EC tablet  Commonly known as: ECOTRIN  One tablet p.o. q.day with food     ENTRESTO 24-26 mg per tablet  Generic drug: sacubitriL-valsartan  Take 1 tablet by mouth once daily.     ezetimibe 10 mg tablet  Commonly known as: ZETIA  Take 1 tablet (10 mg total) by mouth once daily.     fexofenadine 60 MG tablet  Commonly known as: ALLEGRA  Take 60 mg by mouth daily as needed. Pt takes everyother day     FLUAD QUAD 2022-23(65Y UP)(PF) 60 mcg (15 mcg x 4)/0.5 mL Syrg  Generic drug: flu vac 2022 [id number]-[id number](PF)     fluticasone propionate 50 mcg/actuation nasal spray  Commonly known as: FLONASE  by Each Nostril route daily as needed.     furosemide 20 MG  tablet  Commonly known as: LASIX  TAKE 1 TABLET EVERY DAY     magnesium oxide 400 mg (241.3 mg magnesium) tablet  Commonly known as: MAG-OX  Take 400 mg by mouth once daily.     multivitamin per tablet  Commonly known as: THERAGRAN  Take 1 tablet by mouth once daily.     rOPINIRole 0.5 MG tablet  Commonly known as: REQUIP  TAKE 1 TABLET EVERY EVENING     rosuvastatin 40 MG Tab  Commonly known as: CRESTOR  Take 1 tablet (40 mg total) by mouth every evening.     TRELEGY ELLIPTA 100-62.5-25 mcg Dsdv  Generic drug: fluticasone-umeclidin-vilanter  INHALE 1 PUFF INTO THE LUNGS ONCE DAILY.     warfarin 3 MG tablet  Commonly known as: COUMADIN  Take 1 tablet (3 mg total) by mouth Daily.           * This list has 4 medication(s) that are the same as other medications prescribed for you. Read the directions carefully, and ask your doctor or other care provider to review them with you.                ASK your doctor about these medications      amoxicillin 500 MG Tab  Commonly known as: AMOXIL  Take 1 tablet (500 mg total) by mouth as needed (take one hour prior dental work or any high risk procedures.).              Clover Morales MD  Vascular Surgery  [facility] - Surgical Intensive Care

## 2024-03-01 NOTE — DISCHARGE INSTRUCTIONS
Postoperative General Instructions     What to Expect:   It is normal to experience pain and swelling at the surgical site.   The pain usually decreases significantly after the first week but may last for many weeks.   Each day, the pain should be similar or better to the previous day. If it worsens, call the doctor's office.      Activity:   You should walk beginning on the day of surgery and increase activity slowly over the next two to four weeks.   Do not drive while taking prescription pain medication.   You may return to work when you feel ready.        Wound Care:   You may shower. Let soap and water run over the incisions and pat dry. Do not submerge in a bathtub or pool.   If you have an open wound, you should change the dressing twice daily with moist gauze.   Remove dressing in 24 hours      Diet:   You may resume your normal diet postoperatively.   Take a stool softener or laxative to avoid constipation.      Medication:   Pain Control   Take acetaminophen (Tylenol) 650 mg 4 times daily as needed for pain.   Take the prescribed oxycodone as needed if you have pain that is not controlled by acetaminophe.   Bowel Regularity   Take an over-the-counter stool softener/laxative (Colace, Miralax, or Milk of Magnesia) to avoid constipation.     Home Medications:   Coumadin- restart on Monday.   You will be taking aspirin, plavix and coumadin starting Monday. Continue these medications for one week, then drop the aspirin. You will then continue plavix and coumadin.     Call Your Doctor's Office If You Experience:   Fevers greater than 101.3°F.   Vomiting.   Spreading redness or drainage from the incisions.   Opening of the incisions.   Worsening pain not controlled by medication.   Chest pain or shortness of breath.   Notify your health care provider if you experience any of the following:  persistent dizziness, light-headedness, or visual disturbances   Notify your health care provider if you experience any of  the following:  increased confusion or weakness   Notify your health care provider if you experience any of the following:  redness, tenderness, or signs of infection (pain, swelling, redness, odor or green/yellow discharge around incision site)   Notify your health care provider if you experience any of the following:  severe uncontrolled pain     Follow-Up:   Follow-up with your surgeon as scheduled in 1 month.   If no follow-up appointment has been scheduled, call to schedule an appointment within 1-2 weeks of discharge.      Contact Information:   During normal business hours call the clinic with any questions or concerns. On weekends and evenings call [phone] to have the operators page the surgery resident on call after hours with questions or concerns.

## 2024-03-01 NOTE — PLAN OF CARE
03/01/24 1457   Post-Acute Status   Post-Acute Authorization Home Health   Home Health Status Referrals Sent   Coverage Payor: [facility] MEDICARE - [employer] MEDICARE HMO - CAPITATED   Discharge Delays None known at this time   Discharge Plan   Discharge Plan A Home Health     Patient already an established patient with [facility] and asked to resume care with them if possible. VANNESA sent ANDRESSA orders and DC summary to [facility] via [employer]. VANNESA to follow.     JUN Lopez  [facility]  [phone]    3:10 PM  [facility] accepted patient. Plan of care to start on Monday 3/4/24.    JUN Lopez  [facility]  [phone]

## 2024-03-01 NOTE — PLAN OF CARE
Problem: Adult Inpatient Plan of Care  Goal: Plan of Care Review  Outcome: Met   [facility] SICU  Nursing Discharge Note    Admit Date: 2/29/2024    Discharge Date and Time:  03/01/2024  afternoon     Discharge Attending Physician: CIRILO Valdivia III, MD     Final Diagnoses: Symptomatic stenosis of left carotid artery     Discharged Condition: good, IV/IVs removed, belongings returned, telemetry discontinued, d/c instructions reviewed and pt expressed understanding.     Disposition: Home or Self Care    Most Recent Set of Vitals:  Pulse Readings from Last 1 Encounters:   03/01/24 60      BP Readings from Last 1 Encounters:   03/01/24 (!) 148/62      SpO2 Readings from Last 1 Encounters:   03/01/24 95%      Resp Readings from Last 1 Encounters:   03/01/24 24      Temp Readings from Last 1 Encounters:   03/01/24 98.4 °F (36.9 °C) (Oral)     Follow up/Patient Instructions:    Medications:  Reconciled Home Medications:   START taking these medications:     clopidogreL 75 mg tablet  Commonly known as: PLAVIX  Take 1 tablet (75 mg total) by mouth once daily.  Start taking on: March 2, 2024     oxyCODONE 5 MG immediate release tablet  Commonly known as: ROXICODONE  Take 1 tablet (5 mg total) by mouth every 6 (six) hours as needed for Pain.     polyethylene glycol 17 gram Pwpk  Commonly known as: GLYCOLAX  Take 17 g by mouth once daily.  Start taking on: March 2, 2024         Diet Adult Regular     Notify your health care provider if you experience any of the following:  persistent dizziness, light-headedness, or visual disturbances     Notify your health care provider if you experience any of the following:  increased confusion or weakness     Notify your health care provider if you experience any of the following:  redness, tenderness, or signs of infection (pain, swelling, redness, odor or green/yellow discharge around incision site)     Notify your health care provider if you experience any of the following:   severe uncontrolled pain     Remove dressing in 24 hours     Activity as tolerated     Postoperative General Instructions:    What to Expect:  It is normal to experience pain and swelling at the surgical site.  The pain usually decreases significantly after the first week but may last for many weeks.  Each day, the pain should be similar or better to the previous day. If it worsens, call the doctor's office.     Activity:  You should walk beginning on the day of surgery and increase activity slowly over the next two to four weeks.  Do not drive while taking prescription pain medication.  You may return to work when you feel ready.       Wound Care:  You may shower. Let soap and water run over the incisions and pat dry. Do not submerge in a bathtub or pool.  If you have an open wound, you should change the dressing twice daily with moist gauze.     Diet:  You may resume your normal diet postoperatively.  Take a stool softener or laxative to avoid constipation.     Medication:  Pain Control  Take acetaminophen (Tylenol) 650 mg 4 times daily as needed for pain.  Take the prescribed oxycodone as needed if you have pain that is not controlled by acetaminophe.  Bowel Regularity  Take an over-the-counter stool softener/laxative (Colace, Miralax, or Milk of Magnesia) to avoid constipation.    Home Medications:   Coumadin- restart on Monday.   You will be taking aspirin, plavix and coumadin starting Monday. Continue these medications for one week, then drop the aspirin. You will then continue plavix and coumadin.     Call Your Doctor's Office If You Experience:  Fevers greater than 101.3°F.  Vomiting.  Spreading redness or drainage from the incisions.  Opening of the incisions.  Worsening pain not controlled by medication.  Chest pain or shortness of breath.    Follow-Up:  Follow-up with your surgeon as scheduled in 1 month.  If no follow-up appointment has been scheduled, call to schedule an appointment within 1-2 weeks of  discharge.     Contact Information:  During normal business hours call the clinic with any questions or concerns. On weekends and evenings call [phone] to have the operators page the surgery resident on call after hours with questions or concerns.

## 2024-03-04 ENCOUNTER — PATIENT OUTREACH (OUTPATIENT)
Dept: ADMINISTRATIVE | Facility: CLINIC | Age: 74
End: 2024-03-04
Payer: MEDICARE

## 2024-03-04 NOTE — PROGRESS NOTES
C3 nurse spoke with Lisa Smith for a TCC post hospital discharge follow up call. Pt states she is still having some dizziness but that it is not as bad as it was before the procedure. She denies any new symptoms and confirms she was given the [facility] number to call for any new or worsening symptoms.     The patient does not have a scheduled HOSFU appointment with a PCP within the first 5-7 days of discharge date 3/1/24. Pt has no [facility] PCP at the moment and denies needing a [facility]. [facility] Scheduling number provided and pt states she will call to obtain an [facility] PCP and followup appointment. No messages routed at this time.

## 2024-03-06 ENCOUNTER — TELEPHONE (OUTPATIENT)
Dept: CARDIOLOGY | Facility: CLINIC | Age: 74
End: 2024-03-06
Payer: MEDICARE

## 2024-03-06 NOTE — TELEPHONE ENCOUNTER
She is aware medication is ready to be shipped, clarification on directions entresto has been done.

## 2024-03-06 NOTE — TELEPHONE ENCOUNTER
----- Message from Chun Charles sent at 3/6/2024  9:26 AM CST -----  Regarding: advice  Contact: patient  Type:  Patient Returning Call    Who Called:patient  Who Left Message for Patient:office nurse  Does the patient know what this is regarding?:sacubitriL-valsartan (ENTRESTO) 24-26 mg per tablet//   pharmacy will not release until they speak with office  Would the patient rather a call back or a response via [facility]? Please call: [facility] patient assistance [employer]: # [phone]   Best Call Back Number:[phone]  Additional Information: [facility] has prescription/ please call and give patient information and speak with pharmacy / attention Ermelinda office nurse

## 2024-03-14 ENCOUNTER — OFFICE VISIT (OUTPATIENT)
Dept: CARDIOLOGY | Facility: CLINIC | Age: 74
End: 2024-03-14
Payer: MEDICARE

## 2024-03-14 ENCOUNTER — TELEPHONE (OUTPATIENT)
Dept: CARDIOLOGY | Facility: CLINIC | Age: 74
End: 2024-03-14

## 2024-03-14 VITALS
OXYGEN SATURATION: 97 % | SYSTOLIC BLOOD PRESSURE: 124 MMHG | HEIGHT: 65 IN | BODY MASS INDEX: 28.49 KG/M2 | RESPIRATION RATE: 16 BRPM | WEIGHT: 171 LBS | HEART RATE: 60 BPM | DIASTOLIC BLOOD PRESSURE: 64 MMHG

## 2024-03-14 DIAGNOSIS — R09.02 HYPOXEMIA: ICD-10-CM

## 2024-03-14 DIAGNOSIS — I49.5 SSS (SICK SINUS SYNDROME): ICD-10-CM

## 2024-03-14 DIAGNOSIS — Z95.2 S/P TAVR (TRANSCATHETER AORTIC VALVE REPLACEMENT): ICD-10-CM

## 2024-03-14 DIAGNOSIS — I50.43 ACUTE ON CHRONIC COMBINED SYSTOLIC AND DIASTOLIC CHF, NYHA CLASS 3: ICD-10-CM

## 2024-03-14 DIAGNOSIS — I70.0 AORTIC ATHEROSCLEROSIS: Primary | ICD-10-CM

## 2024-03-14 DIAGNOSIS — I51.9 LV DYSFUNCTION: ICD-10-CM

## 2024-03-14 DIAGNOSIS — R06.02 SHORTNESS OF BREATH: ICD-10-CM

## 2024-03-14 DIAGNOSIS — R53.83 FATIGUE, UNSPECIFIED TYPE: ICD-10-CM

## 2024-03-14 DIAGNOSIS — Z98.62 H/O TRANSCAROTID ARTERY REVASCULARIZATION (TCAR): ICD-10-CM

## 2024-03-14 PROBLEM — D64.9 ANEMIA: Status: ACTIVE | Noted: 2024-03-14

## 2024-03-14 PROCEDURE — 99215 OFFICE O/P EST HI 40 MIN: CPT | Mod: HCNC,S$GLB,, | Performed by: INTERNAL MEDICINE

## 2024-03-14 PROCEDURE — 3074F SYST BP LT 130 MM HG: CPT | Mod: HCNC,CPTII,S$GLB, | Performed by: INTERNAL MEDICINE

## 2024-03-14 PROCEDURE — 1160F RVW MEDS BY RX/DR IN RCRD: CPT | Mod: HCNC,CPTII,S$GLB, | Performed by: INTERNAL MEDICINE

## 2024-03-14 PROCEDURE — 99999 PR PBB SHADOW E&M-EST. PATIENT-LVL V: CPT | Mod: PBBFAC,HCNC,, | Performed by: INTERNAL MEDICINE

## 2024-03-14 PROCEDURE — 93000 ELECTROCARDIOGRAM COMPLETE: CPT | Mod: HCNC,S$GLB,, | Performed by: INTERNAL MEDICINE

## 2024-03-14 PROCEDURE — 1111F DSCHRG MED/CURRENT MED MERGE: CPT | Mod: HCNC,CPTII,S$GLB, | Performed by: INTERNAL MEDICINE

## 2024-03-14 PROCEDURE — 1159F MED LIST DOCD IN RCRD: CPT | Mod: HCNC,CPTII,S$GLB, | Performed by: INTERNAL MEDICINE

## 2024-03-14 PROCEDURE — 3078F DIAST BP <80 MM HG: CPT | Mod: HCNC,CPTII,S$GLB, | Performed by: INTERNAL MEDICINE

## 2024-03-14 PROCEDURE — 1126F AMNT PAIN NOTED NONE PRSNT: CPT | Mod: HCNC,CPTII,S$GLB, | Performed by: INTERNAL MEDICINE

## 2024-03-14 RX ORDER — SACUBITRIL AND VALSARTAN 24; 26 MG/1; MG/1
1 TABLET, FILM COATED ORAL 2 TIMES DAILY
Qty: 180 TABLET | Refills: 3 | Status: SHIPPED | OUTPATIENT
Start: 2024-03-14 | End: 2025-03-14

## 2024-03-14 RX ORDER — METOPROLOL SUCCINATE 25 MG/1
25 TABLET, EXTENDED RELEASE ORAL EVERY MORNING
Qty: 90 TABLET | Refills: 3 | Status: SHIPPED | OUTPATIENT
Start: 2024-03-14 | End: 2024-03-14 | Stop reason: SDUPTHER

## 2024-03-14 RX ORDER — METOPROLOL SUCCINATE 25 MG/1
25 TABLET, EXTENDED RELEASE ORAL EVERY MORNING
Qty: 90 TABLET | Refills: 3 | Status: SHIPPED | OUTPATIENT
Start: 2024-03-14 | End: 2025-03-14

## 2024-03-14 NOTE — ASSESSMENT & PLAN NOTE
Sinus node dysfunction status post AV node ablation has a pacemaker in Situ in VVI mode and functioning well on 100% capture

## 2024-03-14 NOTE — TELEPHONE ENCOUNTER
Called and spoke with Sabino with patient assistance and corrected her entresto dose. Called the correction to the pharmacist, gretchen

## 2024-03-14 NOTE — PROGRESS NOTES
Subjective:    Patient ID:  Lisa Smith is a 73 y.o. female patient here for evaluation Hospital Follow Up (Had procedure with Dr. Valdivia)      History of Present Illness:   73-year-old lady with history of complex medical problems severe COPD, home oxygen dependency, including high-grade critical left carotid artery stenosis and contralateral occlusion arm had successful TCAR performed at the [facility] successfully on 02/29/2024.  She was recovering very well feels stronger at this time no neurologic deficits are noted.  Patient has no occurrence of any angina arm neck or jaw pain and no significant palpitations.  No swelling in the lower extremities patient has prior history of chronic atrial fibrillation pacemaker in Situ after AV fátima ablation, has TAVR performed successfully in his recovering very well after her latest procedure.  She does have some anemia persisting with the last hemoglobin of 8.3.  Patient denies having any nausea no blood in the stools or black stools.  Occasional minor headaches.  And no focal neurologic deficits are noted.  Details of recent surgery is as follows        [facility] - Surgery ([facility])  Operative Note     SUMMARY      Surgery Date: 2/29/2024      Surgeon(s) and Role:     * CIRILO Valdivia III, MD - Primary     * Chang Brown MD - Fellow     * Raghavendra Hunt MD     Assisting Surgeon: None     Pre-op Diagnosis:    symptomatic >95% L ICA stenosis with contralateral occlusion  O2 dependent COPD     Post-op Diagnosis:  same     PROCEDURES:   TCAR (trans-carotid artery revascularization), L ICA     Anesthesia: General     Implants:  Implant Name Type Inv. Item Serial No.  Lot No. LRB No. Used Action   STENT ENROUTE TRNSCRTD 8X40MM - [id number]   STENT ENROUTE TRNSCRTD 8X40MM   [facility] [id number] Left 1 Implanted         Operative Findings: 99% L ICA stenosis, < 10% after  8x40 Enroute carotid stent            Review of patient's  allergies indicates:   Allergen Reactions    Sulfa (sulfonamide antibiotics) Itching       Past Medical History:   Diagnosis Date    Anticoagulant long-term use     Arthritis     Atrial fibrillation     Bell's palsy     Boil of buttock     burst 12/19/22    CHF (congestive heart failure)     Chronic cough     COPD (chronic obstructive pulmonary disease)     use O2  3l/m NC at night; also taking during day currently 12/4/23    Dizziness     Encounter for blood transfusion     GI bleed 2011    transfusion    H/O diverticulitis of colon     Hard of hearing     Heart murmur     Hematoma 02/2023    left hand    Heterozygous alpha 1-antitrypsin deficiency     History of home oxygen therapy     3L NC at night    Hypertension     Lung disease     copd    MONSERRAT (obstructive sleep apnea)     resolved with wt loss 131#    Pacemaker     Pneumonia     last episode 2019    Pulmonary edema     Skin cancer     Unspecified visual disturbance     episode of vision disturbance and dizziness...occasional recurrences     Past Surgical History:   Procedure Laterality Date    CARDIAC ELECTROPHYSIOLOGY STUDY AND ABLATION      CAROTID ENDARTERECTOMY Left 12/22/2022    Procedure: ENDARTERECTOMY-CAROTID;  Surgeon: Maximilian Connolly MD;  Location[facility] OR;  Service: Peripheral Vascular;  Laterality: Left;    CAROTID STENT Left 2/29/2024    Procedure: INSERTION, STENT, ARTERY, CAROTID;  Surgeon: CIRILO Valdivia III, MD;  Location: [address];  Service: Vascular;  Laterality: Left;  left carotid artery stent placement  mgy  146.29   gymc2  8.0730  fluro time  4.8min    CARPAL TUNNEL RELEASE Left     CHOLECYSTECTOMY      EYE SURGERY Bilateral     cataract    HYSTERECTOMY      INSERT / REPLACE / REMOVE PACEMAKER      KNEE ARTHROSCOPY Left     LEFT HEART CATHETERIZATION  11/1/2023    Procedure: Left heart cath;  Surgeon: Puma Shelton MD;  Location: [facility] CATH;  Service: Cardiology;;    REPLACEMENT OF PACEMAKER GENERATOR Left 01/20/2022     Procedure: REPLACEMENT, PACEMAKER GENERATOR;  Surgeon: Raymond Pérez MD;  Location: [facility] CATH/EP LAB;  Service: Cardiology;  Laterality: Left;    SKIN CANCER EXCISION      TRANSCATHETER AORTIC VALVE REPLACEMENT (TAVR) Bilateral 2023    Procedure: (TAVR);  Surgeon: Puma Shelton MD;  Location: [facility] CATH;  Service: Cardiology;  Laterality: Bilateral;    TRANSCATHETER AORTIC VALVE REPLACEMENT (TAVR) Bilateral 2023    Procedure: (TAVR) - Surgeon;  Surgeon: Maximilian Connolly MD;  Location: [facility] CATH;  Service: Peripheral Vascular;  Laterality: Bilateral;     Social History     Tobacco Use    Smoking status: Former     Current packs/day: 0.00     Average packs/day: 2.0 packs/day for 40.0 years (80.0 ttl pk-yrs)     Types: Cigarettes     Start date: 1971     Quit date: 2011     Years since quittin.1     Passive exposure: Past    Smokeless tobacco: Never    Tobacco comments:          Quit in    Substance Use Topics    Alcohol use: Not Currently     Alcohol/week: 8.0 standard drinks of alcohol     Types: 8 Glasses of wine per week    Drug use: No        Review of Systems:    As noted in HPI in addition      REVIEW OF SYSTEMS  Some generalized fatigue is present but improving  CARDIOVASCULAR: No recent chest pain, palpitations, arm, neck, or jaw pain  RESPIRATORY: No recent fever, cough chills, some shortness of breath with moderate effort continues to use oxygen   : No blood in the urine  GI: No Nausea, vomiting, constipation, diarrhea, blood, or reflux.  MUSCULOSKELETAL: No myalgias  NEURO: No lightheadedness or dizziness occasional transient headache  EYES: No Double vision, blurry, vision or headache              Objective        Vitals:    24 1019   BP: 124/64   Pulse: 60   Resp: 16       LIPIDS - LAST 2   Lab Results   Component Value Date    CHOL 199 05/10/2022    CHOL 190 2020    HDL 59 05/10/2022    HDL 68 2020    LDLCALC 112.0 05/10/2022    LDLCALC 106.0  02/17/2020    TRIG 140 05/10/2022    TRIG 80 02/17/2020    CHOLHDL 29.6 05/10/2022    CHOLHDL 35.8 02/17/2020       CBC - LAST 2  Lab Results   Component Value Date    WBC 9.61 03/01/2024    WBC 9.48 02/29/2024    WBC 9.48 02/29/2024    RBC 2.59 (L) 03/01/2024    RBC 2.89 (L) 02/29/2024    RBC 2.89 (L) 02/29/2024    HGB 8.3 (L) 03/01/2024    HGB 9.3 (L) 02/29/2024    HGB 9.3 (L) 02/29/2024    HCT 25.5 (L) 03/01/2024    HCT 28.2 (L) 02/29/2024    HCT 28.2 (L) 02/29/2024    MCV 99 (H) 03/01/2024    MCV 98 02/29/2024    MCV 98 02/29/2024    MCH 32.0 (H) 03/01/2024    MCH 32.2 (H) 02/29/2024    MCH 32.2 (H) 02/29/2024    MCHC 32.5 03/01/2024    MCHC 33.0 02/29/2024    MCHC 33.0 02/29/2024    RDW 16.1 (H) 03/01/2024    RDW 16.1 (H) 02/29/2024    RDW 16.1 (H) 02/29/2024     03/01/2024     02/29/2024     02/29/2024    MPV 9.9 03/01/2024    MPV 9.7 02/29/2024    MPV 9.7 02/29/2024    GRAN 8.2 (H) 03/01/2024    GRAN 85.1 (H) 03/01/2024    LYMPH 0.6 (L) 03/01/2024    LYMPH 5.8 (L) 03/01/2024    MONO 0.8 03/01/2024    MONO 8.0 03/01/2024    BASO 0.02 03/01/2024    BASO 0.03 02/29/2024    BASO 0.03 02/29/2024    NRBC 0 03/01/2024    NRBC 0 02/29/2024    NRBC 0 02/29/2024       CHEMISTRY & LIVER FUNCTION - LAST 2  Lab Results   Component Value Date     03/01/2024     02/29/2024    K 4.5 03/01/2024    K 3.9 02/29/2024     03/01/2024     02/29/2024    CO2 28 03/01/2024    CO2 29 02/29/2024    ANIONGAP 9 03/01/2024    ANIONGAP 5 (L) 02/29/2024    BUN 18 03/01/2024    BUN 20 02/29/2024    CREATININE 0.6 03/01/2024    CREATININE 0.6 02/29/2024     03/01/2024     (H) 02/29/2024    CALCIUM 8.7 03/01/2024    CALCIUM 8.5 (L) 02/29/2024    MG 1.9 03/01/2024    MG 1.7 02/29/2024    ALBUMIN 2.9 (L) 03/01/2024    ALBUMIN 3.2 (L) 02/29/2024    PROT 5.9 (L) 03/01/2024    PROT 6.4 02/29/2024    ALKPHOS 80 03/01/2024    ALKPHOS 87 02/29/2024    ALT 15 03/01/2024    ALT 16 02/29/2024     AST 34 03/01/2024    AST 38 02/29/2024    BILITOT 0.4 03/01/2024    BILITOT 0.6 02/29/2024        CARDIAC PROFILE - LAST 2  Lab Results   Component Value Date     (H) 07/12/2023     (H) 11/25/2019    TROPONINI <0.030 11/25/2019        COAGULATION - LAST 2  Lab Results   Component Value Date    LABPT 23.0 (H) 01/23/2024    LABPT 23.1 (H) 01/23/2024    INR 1.9 03/11/2024    INR 1.4 03/07/2024    APTT 24.3 02/29/2024    APTT 30.9 12/05/2023       ENDOCRINE & PSA - LAST 2  Lab Results   Component Value Date    HGBA1C 5.2 02/17/2020    TSH 1.040 05/10/2022    TSH 0.630 02/17/2020    PROCAL 0.25 11/27/2019    PROCAL 0.40 11/25/2019        ECHOCARDIOGRAM RESULTS  Results for orders placed during the hospital encounter of 01/23/24    Echo    Interpretation Summary    Left Ventricle: The left ventricle is normal in size. Mildly increased wall thickness. There is mildly reduced systolic function with a visually estimated ejection fraction of 45 - 50%. Unable to assess diastolic function due to atrial fibrillation.    Right Ventricle: Normal right ventricular cavity size. Wall thickness is normal. Right ventricle wall motion  is normal. Systolic function is normal.    Left Atrium: Left atrium is mildly dilated.    Right Atrium: Right atrium is mildly dilated.    Aortic Valve: There is a transcatheter valve replacement in the aortic position that is appropriately positioned. Mild paravalvular regurgitation.    Mitral Valve: There is bileaflet sclerosis. There is moderate mitral annular calcification present. There is no stenosis. The mean pressure gradient across the mitral valve is 2 mmHg at a heart rate of  bpm. There is moderate regurgitation.    Tricuspid Valve: There is mild regurgitation.    IVC/SVC: Normal venous pressure at 3 mmHg.      CURRENT/PREVIOUS VISIT EKG  Results for orders placed or performed during the hospital encounter of 01/23/24   EKG 12-lead    Collection Time: 01/23/24 12:07 PM     Narrative    Test Reason : Z95.2,    Vent. Rate : 064 BPM     Atrial Rate : 041 BPM     P-R Int : 000 ms          QRS Dur : 188 ms      QT Int : 452 ms       P-R-T Axes : 000 -76 098 degrees     QTc Int : 466 ms    Electronic ventricular pacemaker  When compared with ECG of 07-DEC-2023 10:55,  Vent. rate has increased BY   4 BPM  Confirmed by María Elena SARKAR MD, Paul F. ([id number]) on 1/25/2024 6:10:09 AM    Referred By: ROGER ZAMORANO           Confirmed By:Raz Ring III, MD     No valid procedures specified.   No results found for this or any previous visit.    No valid procedures specified.    PHYSICAL EXAM  CONSTITUTIONAL: Well built, well nourished in no apparent distress  NECK:  The surgery site is healing very well are.  Bruit has improved significantly no JVD noted    LUNGS:  Diminished breath sounds bilaterally with scattered rhonchi no active wheezes at this time   CHEST WALL: no tenderness  HEART: regular rate and rhythm, S1, S2 normal, systolic murmur present   ABDOMEN: soft, non-tender; bowel sounds normal; no masses,  no organomegaly  EXTREMITIES: Extremities normal, no edema, no calf tenderness noted  NEURO: AAO X 3    I HAVE REVIEWED :    The vital signs, nurses notes, and all the pertinent radiology and labs.        Current Outpatient Medications   Medication Instructions    acetaminophen (TYLENOL ARTHRITIS ORAL) 2 tablets, Oral, Daily PRN    acetaminophen (TYLENOL) 325 mg, Oral, Every 6 hours PRN    albuterol (ACCUNEB) 1.25 mg/3 mL Nebu INHALE THE CONTENTS OF 1 VIAL VIA NEBULIZER EVERY 6 HOURS AS NEEDED FOR RESCUE    albuterol (PROVENTIL/VENTOLIN HFA) 90 mcg/actuation inhaler 2 puffs, Inhalation, Every 6 hours PRN, Rescue    amoxicillin (AMOXIL) 500 mg, Oral, As needed (PRN)    clopidogreL (PLAVIX) 75 mg, Oral, Daily    digoxin (LANOXIN) 250 mcg tablet TAKE 1 TABLET EVERY DAY    ezetimibe (ZETIA) 10 mg, Oral, Daily    fexofenadine (ALLEGRA) 60 mg, Oral, Daily PRN, Pt takes everyother day    FLUAD  QUAD 2022-23,65Y UP,,PF, 60 mcg (15 mcg x 4)/0.5 mL Syrg No dose, route, or frequency recorded.    fluticasone propionate (FLONASE) 50 mcg/actuation nasal spray Each Nostril, Daily PRN    fluticasone-umeclidin-vilanter (TRELEGY ELLIPTA) 100-62.5-25 mcg DsDv INHALE 1 PUFF INTO THE LUNGS ONCE DAILY.    furosemide (LASIX) 20 mg, Oral    magnesium oxide (MAG-OX) 400 mg, Oral, Daily    metoprolol succinate (TOPROL-XL) 25 mg, Oral, Every morning    multivitamin (THERAGRAN) per tablet 1 tablet, Oral, Daily    oxyCODONE (ROXICODONE) 5 mg, Oral, Every 6 hours PRN    pantoprazole (PROTONIX) 40 mg, Oral, Daily    polyethylene glycol (GLYCOLAX) 17 g, Oral, Daily    rOPINIRole (REQUIP) 0.5 MG tablet TAKE 1 TABLET EVERY EVENING    rosuvastatin (CRESTOR) 40 mg, Oral, Nightly    sacubitriL-valsartan (ENTRESTO) 24-26 mg per tablet 1 tablet, Oral, 2 times daily    spironolactone (ALDACTONE) 25 mg, Oral, Daily    warfarin (COUMADIN) 3 mg, Oral, Daily          Assessment & Plan     Shortness of breath  Clinically stable and improving.  Encouraged her to continue on present therapy to include Ventolin nebulizer treatments and inhaler therapies    Hypoxemia  She was currently on 3 L of oxygen continuously and she was trying to avoid using during the day encouraged to maintain as needed and monitored by pulse oximetry    SSS (sick sinus syndrome)  Sinus node dysfunction status post AV node ablation has a pacemaker in Situ in VVI mode and functioning well on 100% capture    S/P TAVR (transcatheter aortic valve replacement)  Status post TAVR for severe aortic valve stenosis and successful and doing well at this time    H/O transcarotid artery revascularization (TCAR)  Patient has a successful TCAR on the left carotid artery she is doing remarkably well healing well and no further neurologic deficits noted.  She is maintaining on Plavix at this time and she is off aspirin now also maintaining on Coumadin for her atrial fibrillation as well  encouraged her to maintain the same    Fatigue  Generalized fatigue is significantly improving at this time.  Encouraged her to continue the same therapy    LV dysfunction  She was history of mild-to-moderate LV dysfunction.  Continue on present therapy to include Lasix 20 mg daily, Aldactone 25 mg daily, magnesium supplements, continue on Toprol-XL 25 mg daily, maintain on Entresto 24/26 mg out encourage her to increase it to twice a day regimen.  And continue on present regimen.  Salt restriction increased physical activity as tolerated and recheck labs in the near    Anemia  She was anemia probably due to postoperative state recheck her hemoglobin hematocrit as well          Follow up in about 4 months (around 7/14/2024).

## 2024-03-14 NOTE — ASSESSMENT & PLAN NOTE
She was currently on 3 L of oxygen continuously and she was trying to avoid using during the day encouraged to maintain as needed and monitored by pulse oximetry

## 2024-03-14 NOTE — ASSESSMENT & PLAN NOTE
Generalized fatigue is significantly improving at this time.  Encouraged her to continue the same therapy

## 2024-03-14 NOTE — ASSESSMENT & PLAN NOTE
Clinically stable and improving.  Encouraged her to continue on present therapy to include Ventolin nebulizer treatments and inhaler therapies

## 2024-03-14 NOTE — ASSESSMENT & PLAN NOTE
Patient has a successful TCAR on the left carotid artery she is doing remarkably well healing well and no further neurologic deficits noted.  She is maintaining on Plavix at this time and she is off aspirin now also maintaining on Coumadin for her atrial fibrillation as well encouraged her to maintain the same

## 2024-03-14 NOTE — ASSESSMENT & PLAN NOTE
She was history of mild-to-moderate LV dysfunction.  Continue on present therapy to include Lasix 20 mg daily, Aldactone 25 mg daily, magnesium supplements, continue on Toprol-XL 25 mg daily, maintain on Entresto 24/26 mg out encourage her to increase it to twice a day regimen.  And continue on present regimen.  Salt restriction increased physical activity as tolerated and recheck labs in the near

## 2024-03-18 ENCOUNTER — TELEPHONE (OUTPATIENT)
Dept: VASCULAR SURGERY | Facility: CLINIC | Age: 74
End: 2024-03-18
Payer: MEDICARE

## 2024-03-18 NOTE — TELEPHONE ENCOUNTER
Contacted pt to schedule appts. Appointment scheduled, pt verified. Appointment letter placed in mail.   ----- Message from Clover Morales MD sent at 3/1/2024  1:28 PM CST -----  Hi!    This patient will need a 4 week post op visit with Dr. Valdivia with carotid ultrasound.    Thank you!

## 2024-03-21 ENCOUNTER — TELEPHONE (OUTPATIENT)
Dept: VASCULAR SURGERY | Facility: CLINIC | Age: 74
End: 2024-03-21
Payer: MEDICARE

## 2024-03-21 NOTE — TELEPHONE ENCOUNTER
Contacted pt in response to message. States she would like to keep appts as scheduled and will  and request wheelchair and transporter.----- Message from Solange Berman sent at 3/21/2024  9:09 AM CDT -----  Regarding: Appt Advise  Contact: [phone]  ARABELLA DANIEL calling regarding Appointment Access  (message) for # pt is calling to speak with someone about getting wheelchair access and needs someone to wheel her up to her appt on 4/5/24 she needs help to her lab appt Ethel DANIEL calling regarding Appointment Access  (message) for # pt is calling to speak with someone about getting wheelchair access and needs someone to wheel her up to her appt on 4/5/24 she needs help to her lab appt then to her appt with provider    pt also said disregard message she sent yesterday about r/s she is able to make it as long as there is assistance with wheeling her to the appt and back to the car n to her appt with provider

## 2024-03-25 RX ORDER — SPIRONOLACTONE 25 MG/1
25 TABLET ORAL EVERY MORNING
Qty: 90 TABLET | Refills: 3 | Status: SHIPPED | OUTPATIENT
Start: 2024-03-25 | End: 2025-03-25

## 2024-04-05 ENCOUNTER — HOSPITAL ENCOUNTER (OUTPATIENT)
Dept: VASCULAR SURGERY | Facility: CLINIC | Age: 74
Discharge: HOME | End: 2024-04-05
Attending: SURGERY
Payer: MEDICARE

## 2024-04-05 ENCOUNTER — OFFICE VISIT (OUTPATIENT)
Dept: VASCULAR SURGERY | Facility: CLINIC | Age: 74
End: 2024-04-05
Attending: SURGERY
Payer: MEDICARE

## 2024-04-05 ENCOUNTER — LAB VISIT (OUTPATIENT)
Dept: LAB | Facility: HOSPITAL | Age: 74
End: 2024-04-05
Attending: SURGERY
Payer: MEDICARE

## 2024-04-05 VITALS
WEIGHT: 169.75 LBS | HEIGHT: 65 IN | HEART RATE: 60 BPM | DIASTOLIC BLOOD PRESSURE: 60 MMHG | TEMPERATURE: 98 F | SYSTOLIC BLOOD PRESSURE: 133 MMHG | BODY MASS INDEX: 28.28 KG/M2

## 2024-04-05 DIAGNOSIS — I65.22 STENOSIS OF LEFT CAROTID ARTERY: ICD-10-CM

## 2024-04-05 DIAGNOSIS — I65.22 CAROTID STENOSIS, LEFT: ICD-10-CM

## 2024-04-05 DIAGNOSIS — I65.22 STENOSIS OF LEFT CAROTID ARTERY: Primary | ICD-10-CM

## 2024-04-05 DIAGNOSIS — I65.22 CAROTID STENOSIS, LEFT: Primary | ICD-10-CM

## 2024-04-05 LAB
CREAT SERPL-MCNC: 0.7 MG/DL (ref 0.5–1.4)
EST. GFR  (NO RACE VARIABLE): >60 ML/MIN/1.73 M^2

## 2024-04-05 PROCEDURE — 3075F SYST BP GE 130 - 139MM HG: CPT | Mod: CPTII,S$GLB,, | Performed by: SURGERY

## 2024-04-05 PROCEDURE — 1101F PT FALLS ASSESS-DOCD LE1/YR: CPT | Mod: CPTII,S$GLB,, | Performed by: SURGERY

## 2024-04-05 PROCEDURE — 93880 EXTRACRANIAL BILAT STUDY: CPT | Mod: S$GLB,,, | Performed by: SURGERY

## 2024-04-05 PROCEDURE — 36415 COLL VENOUS BLD VENIPUNCTURE: CPT | Performed by: SURGERY

## 2024-04-05 PROCEDURE — 99024 POSTOP FOLLOW-UP VISIT: CPT | Mod: S$GLB,,, | Performed by: SURGERY

## 2024-04-05 PROCEDURE — 1126F AMNT PAIN NOTED NONE PRSNT: CPT | Mod: CPTII,S$GLB,, | Performed by: SURGERY

## 2024-04-05 PROCEDURE — 82565 ASSAY OF CREATININE: CPT | Performed by: SURGERY

## 2024-04-05 PROCEDURE — 1159F MED LIST DOCD IN RCRD: CPT | Mod: CPTII,S$GLB,, | Performed by: SURGERY

## 2024-04-05 PROCEDURE — 1160F RVW MEDS BY RX/DR IN RCRD: CPT | Mod: CPTII,S$GLB,, | Performed by: SURGERY

## 2024-04-05 PROCEDURE — 3288F FALL RISK ASSESSMENT DOCD: CPT | Mod: CPTII,S$GLB,, | Performed by: SURGERY

## 2024-04-05 PROCEDURE — 99999 PR PBB SHADOW E&M-EST. PATIENT-LVL IV: CPT | Mod: PBBFAC,,, | Performed by: SURGERY

## 2024-04-05 PROCEDURE — 3078F DIAST BP <80 MM HG: CPT | Mod: CPTII,S$GLB,, | Performed by: SURGERY

## 2024-04-05 NOTE — PROGRESS NOTES
VASCULAR SURGERY SERVICE    REFERRING DOCTOR: Madhuri Hunter NP    CHIEF COMPLAINT:  Carotid stenosis    HISTORY OF PRESENT ILLNESS: Lisa Smith is a 73 y.o. female status post TAVR 12/20/2023, status post left carotid endarterectomy 12/20/2022, sent for f/u of 95+% recurrent left carotid stenosis with known right ICA occlusion.  In the last 6 weeks she has had several episodes of dizziness bilateral blurred vision and transient bilateral arm weakness.  She denies any lateralizing symptoms.    She is oxygen-dependent COPD using oxygen since 20/11.  She says that her breathing has improved substantially since the TAVR in December 2023.  She still however can not lay flat for extended periods.    She is on AFib and on Coumadin which was evidently started after the TAVR.  Per the daughter who is a nurse she is being evaluated for a Watchman.    4/5/2024:  This is her initial follow-up after left TCAR 02/29/2024.  This was performed for a greater than 95% left carotid stenosis with contralateral occlusion.  She is very well from this procedure and was discharged on the 1st postoperative day.  However she describes on 03/26/2024 an episode of bilateral blurred vision which resolved within 30 minutes, and then on the next day 03/27/2020 for similar episode which also included 30-60 minutes of right hand weakness that then resolved.  She has had no issues since then.  She states compliance with both her Coumadin and Plavix    Past Medical History:   Diagnosis Date    Anticoagulant long-term use     Arthritis     Atrial fibrillation     Bell's palsy     Boil of buttock     burst 12/19/22    CHF (congestive heart failure)     Chronic cough     COPD (chronic obstructive pulmonary disease)     use O2  3l/m NC at night; also taking during day currently 12/4/23    Dizziness     Encounter for blood transfusion     GI bleed 2011    transfusion    H/O diverticulitis of colon     Hard of hearing     Heart murmur     Hematoma  02/2023    left hand    Heterozygous alpha 1-antitrypsin deficiency     History of home oxygen therapy     3L NC at night    Hypertension     Lung disease     copd    MONSERRAT (obstructive sleep apnea)     resolved with wt loss 131#    Pacemaker     Pneumonia     last episode 2019    Pulmonary edema     Skin cancer     Unspecified visual disturbance     episode of vision disturbance and dizziness...occasional recurrences       Past Surgical History:   Procedure Laterality Date    CARDIAC ELECTROPHYSIOLOGY STUDY AND ABLATION      CAROTID ENDARTERECTOMY Left 12/22/2022    Procedure: ENDARTERECTOMY-CAROTID;  Surgeon: Maximilian Connolly MD;  Location: [facility] OR;  Service: Peripheral Vascular;  Laterality: Left;    CAROTID STENT Left 2/29/2024    Procedure: INSERTION, STENT, ARTERY, CAROTID;  Surgeon: CIRILO Valdivia III, MD;  Location: [facility] OR [address] [place] [address];  Service: Vascular;  Laterality: Left;  left carotid artery stent placement  mgy  146.29   gymc2  8.0730  fluro time  4.8min    CARPAL TUNNEL RELEASE Left     CHOLECYSTECTOMY      EYE SURGERY Bilateral     cataract    HYSTERECTOMY      INSERT / REPLACE / REMOVE PACEMAKER      KNEE ARTHROSCOPY Left     LEFT HEART CATHETERIZATION  11/1/2023    Procedure: Left heart cath;  Surgeon: Puma Shelton MD;  Location[facility] C[facility];  Service: Cardiology;;    REPLACEMENT OF PACEMAKER GENERATOR Left 01/20/2022    Procedure: REPLACEMENT, PACEMAKER GENERATOR;  Surgeon: Raymond Pérez MD;  Location: [facility] CATH/EP LAB;  Service: Cardiology;  Laterality: Left;    SKIN CANCER EXCISION      TRANSCATHETER AORTIC VALVE REPLACEMENT (TAVR) Bilateral 12/6/2023    Procedure: (TAVR);  Surgeon: Puma Shelton MD;  Location[facility] CATH;  Service: Cardiology;  Laterality: Bilateral;    TRANSCATHETER AORTIC VALVE REPLACEMENT (TAVR) Bilateral 12/6/2023    Procedure: (TAVR) - Surgeon;  Surgeon: Maximilian Connolly MD;  Location: [facility] CATH;  Service: Peripheral Vascular;  Laterality: Bilateral;          Current Outpatient Medications:     acetaminophen (TYLENOL ARTHRITIS ORAL), Take 2 tablets by mouth daily as needed., Disp: , Rfl:     acetaminophen (TYLENOL) 325 MG tablet, Take 325 mg by mouth every 6 (six) hours as needed for Pain., Disp: , Rfl:     albuterol (ACCUNEB) 1.25 mg/3 mL Nebu, INHALE THE CONTENTS OF 1 VIAL VIA NEBULIZER EVERY 6 HOURS AS NEEDED FOR RESCUE, Disp: 360 mL, Rfl: 5    albuterol (PROVENTIL/VENTOLIN HFA) 90 mcg/actuation inhaler, Inhale 2 puffs into the lungs every 6 (six) hours as needed for Wheezing. Rescue, Disp: 18 g, Rfl: 6    amoxicillin (AMOXIL) 500 MG Tab, Take 1 tablet (500 mg total) by mouth as needed (take one hour prior dental work or any high risk procedures.)., Disp: 4 tablet, Rfl: 10    digoxin (LANOXIN) 250 mcg tablet, TAKE 1 TABLET EVERY DAY (Patient taking differently: Take 250 mcg by mouth every morning.), Disp: 90 tablet, Rfl: 3    ezetimibe (ZETIA) 10 mg tablet, Take 1 tablet (10 mg total) by mouth once daily., Disp: 90 tablet, Rfl: 3    fexofenadine (ALLEGRA) 60 MG tablet, Take 60 mg by mouth daily as needed. Pt takes everyother day, Disp: , Rfl:     FLUAD QUAD 2022-23,65Y UP,,PF, 60 mcg (15 mcg x 4)/0.5 mL Syrg, , Disp: , Rfl:     fluticasone propionate (FLONASE) 50 mcg/actuation nasal spray, by Each Nostril route daily as needed., Disp: , Rfl:     fluticasone-umeclidin-vilanter (TRELEGY ELLIPTA) 100-62.5-25 mcg DsDv, INHALE 1 PUFF INTO THE LUNGS ONCE DAILY., Disp: 3 each, Rfl: 6    furosemide (LASIX) 20 MG tablet, TAKE 1 TABLET EVERY DAY (Patient taking differently: Take 20 mg by mouth every morning.), Disp: 90 tablet, Rfl: 1    magnesium oxide (MAG-OX) 400 mg (241.3 mg magnesium) tablet, Take 400 mg by mouth once daily., Disp: , Rfl:     metoprolol succinate (TOPROL-XL) 25 MG 24 hr tablet, Take 1 tablet (25 mg total) by mouth every morning., Disp: 90 tablet, Rfl: 3    multivitamin (THERAGRAN) per tablet, Take 1 tablet by mouth once daily., Disp: , Rfl:      oxyCODONE (ROXICODONE) 5 MG immediate release tablet, Take 1 tablet (5 mg total) by mouth every 6 (six) hours as needed for Pain., Disp: 12 tablet, Rfl: 0    pantoprazole (PROTONIX) 40 MG tablet, Take 1 tablet (40 mg total) by mouth once daily. (Patient taking differently: Take 40 mg by mouth every morning.), Disp: 90 tablet, Rfl: 3    polyethylene glycol (GLYCOLAX) 17 gram PwPk, Take 17 g by mouth once daily., Disp: , Rfl: 0    rOPINIRole (REQUIP) 0.5 MG tablet, TAKE 1 TABLET EVERY EVENING, Disp: 90 tablet, Rfl: 1    rosuvastatin (CRESTOR) 40 MG Tab, Take 1 tablet (40 mg total) by mouth every evening., Disp: 90 tablet, Rfl: 3    sacubitriL-valsartan (ENTRESTO) 24-26 mg per tablet, Take 1 tablet by mouth 2 (two) times daily., Disp: 180 tablet, Rfl: 3    spironolactone (ALDACTONE) 25 MG tablet, Take 1 tablet (25 mg total) by mouth every morning., Disp: 90 tablet, Rfl: 3    warfarin (COUMADIN) 3 MG tablet, Take 1.5-2 pills by mouth daily or as directed per the [facility]; #140 pills = 90 day supply, Disp: 140 tablet, Rfl: 1    ticagrelor (BRILINTA) 90 mg tablet, Take 1 tablet (90 mg total) by mouth 2 (two) times daily., Disp: 60 tablet, Rfl: 11  No current facility-administered medications for this visit.    Review of patient's allergies indicates:   Allergen Reactions    Sulfa (sulfonamide antibiotics) Itching       Family History   Problem Relation Age of Onset    Kidney failure Mother     Cancer Father     Cancer Brother        Social History     Tobacco Use    Smoking status: Former     Current packs/day: 0.00     Average packs/day: 2.0 packs/day for 40.0 years (80.0 ttl pk-yrs)     Types: Cigarettes     Start date: 1971     Quit date: 2011     Years since quittin.1     Passive exposure: Past    Smokeless tobacco: Never    Tobacco comments:          Quit in    Substance Use Topics    Alcohol use: Not Currently     Alcohol/week: 8.0 standard drinks of alcohol     Types: 8 Glasses of wine per  "week    Drug use: No     PHYSICAL EXAM:   /60 (BP Location: Right arm, Patient Position: Sitting, BP Method: Medium (Automatic))   Pulse 60   Temp 98.1 °F (36.7 °C) (Oral)   Ht 5' 5" (1.651 m)   Wt 77 kg (169 lb 12.1 oz)   LMP  (LMP Unknown)   BMI 28.25 kg/m²   Constitutional:  Alert,   Well-appearing  In no distress.  Traveling by wheelchair.  She was able to get up to the examining table.  She is using supplemental oxygen   Neurological: Normal speech  no focal findings  CN II - XII grossly intact.  Neurologic completely intact   Psychiatric: Mood and affect appropriate and symmetric.   HEENT: Normocephalic / atraumatic  PERRLA  Midline trachea  Small transverse incision in the base of the left neck is well healed no erythema or drainage   Cardiac: Regular rate and rhythm.   Pulmonary: Normal pulmonary effort.   Abdomen: Soft, not distended.     Skin: Warm and well perfused.    Vascular:  1+ right femoral 2+ left femoral pulses   Extremities/  Musculoskeletal: No edema.        IMAGING:  Carotid duplex today reveals a chronic right ICA occlusion, the left carotid stent is widely patent without stenosis      Carotid CTA 1/31/2024 was personally reviewed, demonstrates a fairly calcific aortic arch, however no calcification or stenosis at the left common carotid takeoff.  There was significant left-to-right tortuosity.  There was a greater than 95% stenosis of the ICA on the left proximally 1 cm from the bulb.    Right ICA is occluded    Carotid duplex December 5, 2023 demonstrated a right ICA occlusion and a very high-grade left ICA stenosis with a peak systolic velocity of 535    IMPRESSION:      Five weeks status post left TCAR for symptomatic 99% stenosis and contrast occlusion.   She describes what sounds consistent with a left hemispheric TIA proximally 10 days ago, none since.  Carotid duplex suggests this stent to be widely patent without stenosis   status post TAVR, on Coumadin.    This procedure " was performed with general anesthesia and well tolerated per the patient and daughter (who was a nurse)  Yenni 4 months re COPD on home oxygen since 2011.  Really can not lay flat      PLAN:  Intensify antiplatelet therapy, begin Brilinta, stop Plavix, continue Coumadin  CTA carotid to be done on the [place], to rule out any issues not seen with duplex    Because of difficulty in travel I will call her with these results    CHAPIS Valdivia III, MD, FACS  Professor and Chief, Vascular and Endovascular Surgery      CC: Dr Shelton

## 2024-04-08 ENCOUNTER — TELEPHONE (OUTPATIENT)
Dept: VASCULAR SURGERY | Facility: CLINIC | Age: 74
End: 2024-04-08
Payer: MEDICARE

## 2024-04-08 NOTE — TELEPHONE ENCOUNTER
Attempted to contact patient in response to message. Voice message left for patient requesting return call.   ----- Message from Nichole Vizcarra sent at 4/8/2024 11:35 AM CDT -----  Regarding: Returning a missed call  Contact: [phone]  Patient is returning a missed call no notation in chart. Please call to further discuss.

## 2024-04-08 NOTE — TELEPHONE ENCOUNTER
After message was sent Dr. Valdivia notified nurse via telephone that patient was not prescribed ASA as she is currently taking warfarin and adding ASA to her orders would be unsafe. Voice message left for pt with this information and requesting return call for questions or concerns.  ----- Message from CIRILO Valdivia III, MD sent at 4/8/2024 11:22 AM CDT -----  Regarding: RE: medication questions  Yes, she was supposed to be taking ASA 81 mg the entire time.  It was on her D/C instructions (I checked)  ----- Message -----  From: Radha Reagan, RN  Sent: 4/8/2024  11:06 AM CDT  To: CIRILO Valdivia III, MD  Subject: FW: medication questions                         Please see the patient's message below regarding insert stating Brillinta should be taken with ASA and patient stating she is not taking ASA. Would you like to add in ASA 81 mg daily or no?  ----- Message -----  From: Rema Hay  Sent: 4/8/2024  10:56 AM CDT  To: Skip JIM III Staff  Subject: medication questions                             PATIENT CALL    Pt called regarding ticagrelor (BRILINTA) 90 mg tablet. After picking up from the pharmacy, she read the pamphlet which suggested she needs to take it in conjunction w/ aspirin. She is not taking any aspirin at the moment and would like to know how to proceed. Please call back at [phone] to advise, she continued taking Plavix through the weekend but would like to start the Brilinta as soon as possible.

## 2024-04-15 ENCOUNTER — HOSPITAL ENCOUNTER (OUTPATIENT)
Dept: RADIOLOGY | Facility: HOSPITAL | Age: 74
Discharge: HOME | End: 2024-04-15
Attending: SURGERY
Payer: MEDICARE

## 2024-04-15 DIAGNOSIS — I65.22 CAROTID STENOSIS, LEFT: ICD-10-CM

## 2024-04-15 PROCEDURE — 25500020 PHARM REV CODE 255: Mod: PO | Performed by: SURGERY

## 2024-04-15 PROCEDURE — 70498 CT ANGIOGRAPHY NECK: CPT | Mod: 26,,, | Performed by: RADIOLOGY

## 2024-04-15 PROCEDURE — 70496 CT ANGIOGRAPHY HEAD: CPT | Mod: 26,,, | Performed by: RADIOLOGY

## 2024-04-15 PROCEDURE — 70496 CT ANGIOGRAPHY HEAD: CPT | Mod: TC,PO

## 2024-04-15 RX ADMIN — IOHEXOL 100 ML: 350 INJECTION, SOLUTION INTRAVENOUS at 03:04

## 2024-04-16 ENCOUNTER — TELEPHONE (OUTPATIENT)
Dept: VASCULAR SURGERY | Facility: CLINIC | Age: 74
End: 2024-04-16
Payer: MEDICARE

## 2024-04-16 NOTE — TELEPHONE ENCOUNTER
Per Dr. Valdivia's request nurse contacted pt to notify her that CTA has been reviewed and Dr Valdivia states it looks perfect. Pt verbalized understanding. Recall reviewed.

## 2024-04-25 ENCOUNTER — TELEPHONE (OUTPATIENT)
Dept: CARDIOLOGY | Facility: CLINIC | Age: 74
End: 2024-04-25
Payer: MEDICARE

## 2024-04-29 RX ORDER — EZETIMIBE 10 MG/1
10 TABLET ORAL
Qty: 90 TABLET | Refills: 3 | Status: SHIPPED | OUTPATIENT
Start: 2024-04-29

## 2024-05-07 NOTE — TELEPHONE ENCOUNTER
We are pleased to inform you Lisa Smith has been approved in the [employer] Patient Assistance Program.  [employer] has shared this information with your patient.   Approval Details  Eligibility start Date: 02/19/2024  Eligibility end date: 12/31/2024 (Updated proof of eligibility required to re-enroll for 2025 calendar year).  Approved Medication: Entresto  Day supply: 90 day supply  Allotted Refills: 3 refills (Please be advised Program allows only 3 refills per eligibility period regardless of changes in strength).   Estimated Shipping: 10-14 business days or longer depending on availability and/or projected refill date  Shipping Location: Patient's Home (You and your patient will receive [facility] once Medication is received)                                            Important Note  It is imperative that any changes to strength or discontinuation of this medication be referred to the Pharmacy Patient Assistance Team Pool Via [employer] to prevent Gaps in therapy.

## 2024-05-09 ENCOUNTER — TELEPHONE (OUTPATIENT)
Dept: CARDIOLOGY | Facility: CLINIC | Age: 74
End: 2024-05-09
Payer: MEDICARE

## 2024-05-09 NOTE — TELEPHONE ENCOUNTER
----- Message from Donna Rico sent at 5/9/2024 10:58 AM CDT -----  Contact: self  Type:  Needs Medical Advice    Who Called: [facility]  Best Call Back Number: [phone]  Additional Information: Nurse states pt has crackling in lungs, legs swelling, weight went up by 1 lb...   High sodium food on yesterday... Nurse would like to know if she should increase diuretic for a day or two? Please call to advise... Thank you...

## 2024-05-13 ENCOUNTER — LAB VISIT (OUTPATIENT)
Dept: LAB | Facility: HOSPITAL | Age: 74
End: 2024-05-13
Attending: NURSE PRACTITIONER
Payer: MEDICARE

## 2024-05-13 ENCOUNTER — OFFICE VISIT (OUTPATIENT)
Dept: FAMILY MEDICINE | Facility: CLINIC | Age: 74
End: 2024-05-13
Payer: MEDICARE

## 2024-05-13 VITALS
WEIGHT: 165.38 LBS | OXYGEN SATURATION: 94 % | BODY MASS INDEX: 27.56 KG/M2 | HEART RATE: 61 BPM | HEIGHT: 65 IN | TEMPERATURE: 98 F | RESPIRATION RATE: 18 BRPM | SYSTOLIC BLOOD PRESSURE: 118 MMHG | DIASTOLIC BLOOD PRESSURE: 58 MMHG

## 2024-05-13 DIAGNOSIS — G25.81 RESTLESS LEGS: ICD-10-CM

## 2024-05-13 DIAGNOSIS — G25.81 RESTLESS LEGS: Primary | ICD-10-CM

## 2024-05-13 DIAGNOSIS — D64.9 ANEMIA, UNSPECIFIED TYPE: ICD-10-CM

## 2024-05-13 LAB
BASOPHILS # BLD AUTO: 0.04 K/UL (ref 0–0.2)
BASOPHILS NFR BLD: 0.4 % (ref 0–1.9)
DIFFERENTIAL METHOD BLD: ABNORMAL
EOSINOPHIL # BLD AUTO: 0.1 K/UL (ref 0–0.5)
EOSINOPHIL NFR BLD: 1.3 % (ref 0–8)
ERYTHROCYTE [DISTWIDTH] IN BLOOD BY AUTOMATED COUNT: 15.7 % (ref 11.5–14.5)
FERRITIN SERPL-MCNC: 218 NG/ML (ref 20–300)
HCT VFR BLD AUTO: 29.7 % (ref 37–48.5)
HGB BLD-MCNC: 9.6 G/DL (ref 12–16)
IMM GRANULOCYTES # BLD AUTO: 0.05 K/UL (ref 0–0.04)
IMM GRANULOCYTES NFR BLD AUTO: 0.5 % (ref 0–0.5)
IRON SERPL-MCNC: 72 UG/DL (ref 30–160)
LYMPHOCYTES # BLD AUTO: 0.9 K/UL (ref 1–4.8)
LYMPHOCYTES NFR BLD: 9.4 % (ref 18–48)
MCH RBC QN AUTO: 32.3 PG (ref 27–31)
MCHC RBC AUTO-ENTMCNC: 32.3 G/DL (ref 32–36)
MCV RBC AUTO: 100 FL (ref 82–98)
MONOCYTES # BLD AUTO: 0.7 K/UL (ref 0.3–1)
MONOCYTES NFR BLD: 6.6 % (ref 4–15)
NEUTROPHILS # BLD AUTO: 8.1 K/UL (ref 1.8–7.7)
NEUTROPHILS NFR BLD: 81.8 % (ref 38–73)
NRBC BLD-RTO: 0 /100 WBC
PLATELET # BLD AUTO: 237 K/UL (ref 150–450)
PMV BLD AUTO: 10.6 FL (ref 9.2–12.9)
RBC # BLD AUTO: 2.97 M/UL (ref 4–5.4)
SATURATED IRON: 19 % (ref 20–50)
TOTAL IRON BINDING CAPACITY: 380 UG/DL (ref 250–450)
TRANSFERRIN SERPL-MCNC: 257 MG/DL (ref 200–375)
WBC # BLD AUTO: 9.9 K/UL (ref 3.9–12.7)

## 2024-05-13 PROCEDURE — 82728 ASSAY OF FERRITIN: CPT | Mod: HCNC | Performed by: NURSE PRACTITIONER

## 2024-05-13 PROCEDURE — 3074F SYST BP LT 130 MM HG: CPT | Mod: HCNC,CPTII,S$GLB, | Performed by: NURSE PRACTITIONER

## 2024-05-13 PROCEDURE — 36415 COLL VENOUS BLD VENIPUNCTURE: CPT | Mod: HCNC | Performed by: NURSE PRACTITIONER

## 2024-05-13 PROCEDURE — 1101F PT FALLS ASSESS-DOCD LE1/YR: CPT | Mod: HCNC,CPTII,S$GLB, | Performed by: NURSE PRACTITIONER

## 2024-05-13 PROCEDURE — 1126F AMNT PAIN NOTED NONE PRSNT: CPT | Mod: HCNC,CPTII,S$GLB, | Performed by: NURSE PRACTITIONER

## 2024-05-13 PROCEDURE — 99999 PR PBB SHADOW E&M-EST. PATIENT-LVL V: CPT | Mod: PBBFAC,HCNC,, | Performed by: NURSE PRACTITIONER

## 2024-05-13 PROCEDURE — 83540 ASSAY OF IRON: CPT | Mod: HCNC | Performed by: NURSE PRACTITIONER

## 2024-05-13 PROCEDURE — 3288F FALL RISK ASSESSMENT DOCD: CPT | Mod: HCNC,CPTII,S$GLB, | Performed by: NURSE PRACTITIONER

## 2024-05-13 PROCEDURE — 1159F MED LIST DOCD IN RCRD: CPT | Mod: HCNC,CPTII,S$GLB, | Performed by: NURSE PRACTITIONER

## 2024-05-13 PROCEDURE — 85025 COMPLETE CBC W/AUTO DIFF WBC: CPT | Mod: HCNC | Performed by: NURSE PRACTITIONER

## 2024-05-13 PROCEDURE — 3078F DIAST BP <80 MM HG: CPT | Mod: HCNC,CPTII,S$GLB, | Performed by: NURSE PRACTITIONER

## 2024-05-13 PROCEDURE — 1160F RVW MEDS BY RX/DR IN RCRD: CPT | Mod: HCNC,CPTII,S$GLB, | Performed by: NURSE PRACTITIONER

## 2024-05-13 PROCEDURE — 99204 OFFICE O/P NEW MOD 45 MIN: CPT | Mod: HCNC,S$GLB,, | Performed by: NURSE PRACTITIONER

## 2024-05-13 RX ORDER — ROPINIROLE 0.5 MG/1
0.5 TABLET, FILM COATED ORAL NIGHTLY
Qty: 90 TABLET | Refills: 1 | Status: SHIPPED | OUTPATIENT
Start: 2024-05-13

## 2024-05-13 NOTE — PROGRESS NOTES
SUBJECTIVE:      Patient ID: Lisa Smith is a 73 y.o. female.    Chief Complaint: No chief complaint on file.    Lisa is here to reestablish care and follow-up on restless leg syndrome.  Since last visit, she has had multiple surgeries-nose notably status post TAVR and carotid artery stenting.  She is currently following with Pulmonary, Cardiology, and vascular surgery.  She has been off of restless leg medication, and patient reports initially her symptoms improved and are not as bad as previously.  But patient does still have symptoms at night and would like to restart Requip.  She denied any previous side effects from the medication.  Patient has not had any recent blood work to check her blood counts or iron levels.  Patient is agreeable to doing this today.  She is feeling well overall.    Pulm-Ponstein/Tahir  Cardio- Cat, [facility]- [facility]         Family History   Problem Relation Name Age of Onset    Kidney failure Mother      Cancer Father      Cancer Brother        Social History     Socioeconomic History    Marital status:     Number of children: 1   Occupational History    Occupation: RETIRED     Comment: [employer]   Tobacco Use    Smoking status: Former     Current packs/day: 0.00     Average packs/day: 2.0 packs/day for 40.0 years (80.0 ttl pk-yrs)     Types: Cigarettes     Start date: 1971     Quit date: 2011     Years since quittin.2     Passive exposure: Past    Smokeless tobacco: Never    Tobacco comments:          Quit in    Substance and Sexual Activity    Alcohol use: Not Currently     Alcohol/week: 8.0 standard drinks of alcohol     Types: 8 Glasses of wine per week    Drug use: No    Sexual activity: Never     Social Determinants of Health     Financial Resource Strain: Medium Risk (3/1/2024)    Overall Financial Resource Strain (CARDIA)     Difficulty of Paying Living Expenses: Somewhat hard   Food Insecurity: Patient Declined  (3/1/2024)    Hunger Vital Sign     Worried About Running Out of Food in the Last Year: Patient declined     Ran Out of Food in the Last Year: Patient declined   Recent Concern: Food Insecurity - Food Insecurity Present (12/26/2023)    Hunger Vital Sign     Worried About Running Out of Food in the Last Year: Sometimes true     Ran Out of Food in the Last Year: Never true   Transportation Needs: Patient Declined (3/1/2024)    PRAPARE - Transportation     Lack of Transportation (Medical): Patient declined     Lack of Transportation (Non-Medical): Patient declined   Physical Activity: Insufficiently Active (3/1/2024)    Exercise Vital Sign     Days of Exercise per Week: 4 days     Minutes of Exercise per Session: 10 min   Stress: No Stress Concern Present (3/1/2024)    [employer] [place] [employer] - Occupational Stress Questionnaire     Feeling of Stress : Only a little   Housing Stability: Unknown (3/1/2024)    Housing Stability Vital Sign     Unable to Pay for Housing in the Last Year: Patient declined     Number of Places Lived in the Last Year: 1     Unstable Housing in the Last Year: No     Current Outpatient Medications   Medication Sig Dispense Refill    acetaminophen (TYLENOL ARTHRITIS ORAL) Take 2 tablets by mouth daily as needed.      acetaminophen (TYLENOL) 325 MG tablet Take 325 mg by mouth every 6 (six) hours as needed for Pain.      albuterol (ACCUNEB) 1.25 mg/3 mL Nebu INHALE THE CONTENTS OF 1 VIAL VIA NEBULIZER EVERY 6 HOURS AS NEEDED FOR RESCUE 360 mL 5    albuterol (PROVENTIL/VENTOLIN HFA) 90 mcg/actuation inhaler Inhale 2 puffs into the lungs every 6 (six) hours as needed for Wheezing. Rescue 18 g 6    digoxin (LANOXIN) 250 mcg tablet TAKE 1 TABLET EVERY DAY (Patient taking differently: Take 250 mcg by mouth every morning.) 90 tablet 3    ezetimibe (ZETIA) 10 mg tablet TAKE 1 TABLET ONE TIME DAILY 90 tablet 3    fluticasone propionate (FLONASE) 50 mcg/actuation nasal spray by Each Nostril  route daily as needed.      fluticasone-umeclidin-vilanter (TRELEGY ELLIPTA) 100-62.5-25 mcg DsDv INHALE 1 PUFF INTO THE LUNGS ONCE DAILY. 3 each 6    furosemide (LASIX) 20 MG tablet TAKE 1 TABLET EVERY DAY (Patient taking differently: Take 20 mg by mouth every morning.) 90 tablet 1    metoprolol succinate (TOPROL-XL) 25 MG 24 hr tablet Take 1 tablet (25 mg total) by mouth every morning. 90 tablet 3    pantoprazole (PROTONIX) 40 MG tablet Take 1 tablet (40 mg total) by mouth once daily. (Patient taking differently: Take 40 mg by mouth every morning.) 90 tablet 3    rosuvastatin (CRESTOR) 40 MG Tab Take 1 tablet (40 mg total) by mouth every evening. 90 tablet 3    sacubitriL-valsartan (ENTRESTO) 24-26 mg per tablet Take 1 tablet by mouth 2 (two) times daily. 180 tablet 3    spironolactone (ALDACTONE) 25 MG tablet Take 1 tablet (25 mg total) by mouth every morning. 90 tablet 3    ticagrelor (BRILINTA) 90 mg tablet Take 1 tablet (90 mg total) by mouth 2 (two) times daily. 60 tablet 11    warfarin (COUMADIN) 3 MG tablet Take 1.5-2 pills by mouth daily or as directed per the [facility]; #140 pills = 90 day supply 140 tablet 1    rOPINIRole (REQUIP) 0.5 MG tablet Take 1 tablet (0.5 mg total) by mouth every evening. 90 tablet 1     No current facility-administered medications for this visit.     Review of patient's allergies indicates:   Allergen Reactions    Sulfa (sulfonamide antibiotics) Itching      Past Medical History:   Diagnosis Date    Anticoagulant long-term use     Arthritis     Atrial fibrillation     Bell's palsy     Boil of buttock     burst 12/19/22    CHF (congestive heart failure)     Chronic cough     COPD (chronic obstructive pulmonary disease)     use O2  3l/m NC at night; also taking during day currently 12/4/23    Dizziness     Encounter for blood transfusion     GI bleed 2011    transfusion    H/O diverticulitis of colon     Hard of hearing     Heart murmur     Hematoma 02/2023    left hand     Heterozygous alpha 1-antitrypsin deficiency     History of home oxygen therapy     3L NC at night    Hypertension     Lung disease     copd    MONSERRAT (obstructive sleep apnea)     resolved with wt loss 131#    Pacemaker     Pneumonia     last episode 2019    Pulmonary edema     Skin cancer     Unspecified visual disturbance     episode of vision disturbance and dizziness...occasional recurrences     Past Surgical History:   Procedure Laterality Date    CARDIAC ELECTROPHYSIOLOGY STUDY AND ABLATION      CAROTID ENDARTERECTOMY Left 12/22/2022    Procedure: ENDARTERECTOMY-CAROTID;  Surgeon: Maximilian Connolly MD;  Location: [facility] OR;  Service: Peripheral Vascular;  Laterality: Left;    CAROTID STENT Left 2/29/2024    Procedure: INSERTION, STENT, ARTERY, CAROTID;  Surgeon: CIRILO Valdivia III, MD;  Location: [facility] OR [facility] FLR;  Service: Vascular;  Laterality: Left;  left carotid artery stent placement  mgy  146.29   gymc2  8.0730  fluro time  4.8min    CARPAL TUNNEL RELEASE Left     CHOLECYSTECTOMY      EYE SURGERY Bilateral     cataract    HYSTERECTOMY      INSERT / REPLACE / REMOVE PACEMAKER      KNEE ARTHROSCOPY Left     LEFT HEART CATHETERIZATION  11/1/2023    Procedure: Left heart cath;  Surgeon: Puma Shelton MD;  Location: [facility] CATH;  Service: Cardiology;;    REPLACEMENT OF PACEMAKER GENERATOR Left 01/20/2022    Procedure: REPLACEMENT, PACEMAKER GENERATOR;  Surgeon: Raymond Pérez MD;  Location: [facility] CATH/EP LAB;  Service: Cardiology;  Laterality: Left;    SKIN CANCER EXCISION      TRANSCATHETER AORTIC VALVE REPLACEMENT (TAVR) Bilateral 12/6/2023    Procedure: (TAVR);  Surgeon: Puma Shelton MD;  Location: [facility] CATH;  Service: Cardiology;  Laterality: Bilateral;    TRANSCATHETER AORTIC VALVE REPLACEMENT (TAVR) Bilateral 12/6/2023    Procedure: (TAVR) - Surgeon;  Surgeon: Maximilian Connolly MD;  Location: [facility] CATH;  Service: Peripheral Vascular;  Laterality: Bilateral;       Review of Systems  "  Constitutional:  Negative for activity change, appetite change, chills, fatigue, fever and unexpected weight change.   HENT:  Positive for hearing loss. Negative for congestion, rhinorrhea and trouble swallowing.    Eyes:  Negative for discharge and visual disturbance.   Respiratory:  Negative for cough, chest tightness, shortness of breath and wheezing.    Cardiovascular:  Positive for palpitations. Negative for chest pain and leg swelling.   Gastrointestinal:  Negative for abdominal pain, blood in stool, constipation, diarrhea, nausea and vomiting.   Endocrine: Negative for polydipsia and polyuria.   Genitourinary:  Negative for difficulty urinating, dysuria, frequency, hematuria and menstrual problem.   Musculoskeletal:  Positive for arthralgias, joint swelling and myalgias. Negative for back pain and neck pain.   Skin:  Negative for color change, pallor, rash and wound.   Allergic/Immunologic: Positive for environmental allergies.   Neurological:  Negative for dizziness, weakness, numbness and headaches.   Hematological:  Negative for adenopathy. Bruises/bleeds easily.   Psychiatric/Behavioral:  Negative for confusion, dysphoric mood and sleep disturbance. The patient is not nervous/anxious.       OBJECTIVE:      Vitals:    05/13/24 0936   BP: (!) 118/58   BP Location: Right arm   Patient Position: Sitting   BP Method: Large (Manual)   Pulse: 61   Resp: 18   Temp: 98.2 °F (36.8 °C)   TempSrc: Oral   SpO2: (!) 94%   Weight: 75 kg (165 lb 6.4 oz)   Height: 5' 5" (1.651 m)     Physical Exam  Vitals and nursing note reviewed.   Constitutional:       General: She is not in acute distress.     Appearance: Normal appearance. She is well-developed. She is obese. She is not ill-appearing.   HENT:      Head: Normocephalic.      Mouth/Throat:      Mouth: Mucous membranes are moist.   Eyes:      Pupils: Pupils are equal, round, and reactive to light.   Neck:      Thyroid: No thyroid mass, thyromegaly or thyroid " tenderness.   Cardiovascular:      Rate and Rhythm: Normal rate and regular rhythm.      Pulses:           Dorsalis pedis pulses are 1+ on the right side and 1+ on the left side.        Posterior tibial pulses are 1+ on the right side and 1+ on the left side.      Heart sounds: Murmur heard.      Systolic murmur is present with a grade of 3/6.   Pulmonary:      Effort: Pulmonary effort is normal. No respiratory distress.      Breath sounds: Normal breath sounds. No wheezing, rhonchi or rales.   Abdominal:      General: Bowel sounds are normal.      Palpations: Abdomen is soft.   Musculoskeletal:         General: Normal range of motion.      Cervical back: Normal range of motion and neck supple.      Right lower leg: No edema.      Left lower leg: No edema.   Lymphadenopathy:      Cervical: No cervical adenopathy.   Skin:     General: Skin is warm and dry.      Coloration: Skin is not jaundiced or pale.   Neurological:      Mental Status: She is alert and oriented to person, place, and time.   Psychiatric:         Mood and Affect: Mood normal.         Behavior: Behavior normal.         Thought Content: Thought content normal.         Judgment: Judgment normal.        Assessment:       1. Restless legs    2. Anemia, unspecified type        Plan:       Restless legs  -     rOPINIRole (REQUIP) 0.5 MG tablet; Take 1 tablet (0.5 mg total) by mouth every evening.  Dispense: 90 tablet; Refill: 1  -     Ferritin; Future; Expected date: 05/13/2024  -     CBC Auto Differential; Future; Expected date: 05/13/2024  -     IRON AND TIBC; Future; Expected date: 05/13/2024  -may restart restless leg medication at 0.25 mg or half a tablet x1 week, then increase to 0.5 mg nightly if tolerating well without side effects; discussed proper use and possible side effects that may occur, patient voiced understanding; check labs to rule out anemia and iron deficiency    Anemia, unspecified type  -     Ferritin; Future; Expected date:  05/13/2024  -     CBC Auto Differential; Future; Expected date: 05/13/2024  -     IRON AND TIBC; Future; Expected date: 05/13/2024        Follow up in about 6 months (around 11/13/2024) for f/u RLS .      5/15/2024 DIAN Oquendo, FNP    This note was created using [employer] voice recognition software that occasionally misinterprets phrases or words.

## 2024-05-14 ENCOUNTER — TELEPHONE (OUTPATIENT)
Dept: FAMILY MEDICINE | Facility: CLINIC | Age: 74
End: 2024-05-14
Payer: MEDICARE

## 2024-05-14 DIAGNOSIS — D53.9 MACROCYTIC ANEMIA: Primary | ICD-10-CM

## 2024-05-14 NOTE — TELEPHONE ENCOUNTER
----- Message from Hope Tang, OLIVA sent at 5/14/2024 11:06 AM CDT -----  Please notify patient that results are abnormal - her blood work shows anemia, her iron is normal; I will need to order a B12 and folic acid level which Medicare will now cover since her labs show she is anemic; please have her come in for a lab appointment for additional nonfasting labs to get these done and to rule out B12 or folic acid deficiencies

## 2024-05-14 NOTE — TELEPHONE ENCOUNTER
Called patient with lab results received voicemail. Left message and call back number for patient.

## 2024-05-14 NOTE — PROGRESS NOTES
Please notify patient that results are abnormal - her blood work shows anemia, her iron is normal; I will need to order a B12 and folic acid level which Medicare will now cover since her labs show she is anemic; please have her come in for a lab appointment for additional nonfasting labs to get these done and to rule out B12 or folic acid deficiencies

## 2024-05-21 LAB
OHS QRS DURATION: 180 MS
OHS QTC CALCULATION: 444 MS

## 2024-05-22 RX ORDER — PANTOPRAZOLE SODIUM 40 MG/1
40 TABLET, DELAYED RELEASE ORAL EVERY MORNING
Qty: 90 TABLET | Refills: 3 | Status: SHIPPED | OUTPATIENT
Start: 2024-05-22 | End: 2025-05-22

## 2024-05-23 ENCOUNTER — LAB VISIT (OUTPATIENT)
Dept: LAB | Facility: HOSPITAL | Age: 74
End: 2024-05-23
Attending: NURSE PRACTITIONER
Payer: MEDICARE

## 2024-05-23 DIAGNOSIS — D53.9 SIMPLE CHRONIC ANEMIA: Primary | ICD-10-CM

## 2024-05-23 LAB
FOLATE SERPL-MCNC: 16.9 NG/ML (ref 4–24)
VIT B12 SERPL-MCNC: 523 PG/ML (ref 210–950)

## 2024-05-23 PROCEDURE — 82746 ASSAY OF FOLIC ACID SERUM: CPT | Performed by: NURSE PRACTITIONER

## 2024-05-23 PROCEDURE — 82607 VITAMIN B-12: CPT | Performed by: NURSE PRACTITIONER

## 2024-05-23 PROCEDURE — 36415 COLL VENOUS BLD VENIPUNCTURE: CPT | Performed by: NURSE PRACTITIONER

## 2024-05-24 ENCOUNTER — TELEPHONE (OUTPATIENT)
Dept: FAMILY MEDICINE | Facility: CLINIC | Age: 74
End: 2024-05-24
Payer: MEDICARE

## 2024-05-24 NOTE — TELEPHONE ENCOUNTER
----- Message from OLIVA Wheeler sent at 5/24/2024  7:45 AM CDT -----  Results show no abnormalities and do not require a change in treatment

## 2024-05-29 ENCOUNTER — TELEPHONE (OUTPATIENT)
Dept: VASCULAR SURGERY | Facility: CLINIC | Age: 74
End: 2024-05-29
Payer: MEDICARE

## 2024-05-29 DIAGNOSIS — Z98.890 S/P CAROTID ENDARTERECTOMY: Primary | ICD-10-CM

## 2024-05-29 RX ORDER — CLOPIDOGREL BISULFATE 75 MG/1
75 TABLET ORAL DAILY
Qty: 30 TABLET | Refills: 11 | Status: SHIPPED | OUTPATIENT
Start: 2024-05-29 | End: 2025-05-29

## 2024-05-29 NOTE — TELEPHONE ENCOUNTER
Contacted pt in response to message. Explained Dr. Valdivia's order to discontinue Brilinta and start ASA 81 mg PO daily and Plavix 75 mg PO daily. Pt verbalized understanding and states she will  these medications from her pharmacy.----- Message from Rose Curtis sent at 5/29/2024  3:18 PM CDT -----  Regarding: pt advice  Contact: [phone]  Pt returning  miss call from Radha . Pls call

## 2024-05-29 NOTE — TELEPHONE ENCOUNTER
Attempted to contact pt with Dr. Valdivia's response. Voice message left for pt requesting return call to discuss.----- Message from CIRILO Valdivia III, MD sent at 5/28/2024  9:43 PM CDT -----  Regarding: RE: Rx request  Contact: [phone]  OK to stop brillinta, restart plavix and add ASA 81 mg  ----- Message -----  From: Radha Reagan, FAM  Sent: 5/28/2024   4:44 PM CDT  To: CIRILO Valdivia III, MD  Subject: FW: Rx request                                   Last one. Mrs. Smith is on Coumadin and Brilinta and can no longer afford Brilinta. When you saw her in clinic on 4/5/24 you had her stop taking Plavix and begin Brilinta. She's requesting to resume Plavix now as it's less expensive. What would you like to do?  ----- Message -----  From: Nichole Vizcarra  Sent: 5/28/2024  12:52 PM CDT  To: Skip [facility] III Staff  Subject: Rx request                                       Good afternoon the pt is requesting to stop  ticagrelor (BRILINTA) 90 mg tablet due to the cost of the medication. Pt insurance advised her the Plavix is less expensive and would like to be prescribed this medication. Please contact pt to further discuss thank you.

## 2024-05-30 ENCOUNTER — TELEPHONE (OUTPATIENT)
Dept: VASCULAR SURGERY | Facility: CLINIC | Age: 74
End: 2024-05-30
Payer: MEDICARE

## 2024-05-30 RX ORDER — FLUTICASONE FUROATE, UMECLIDINIUM BROMIDE AND VILANTEROL TRIFENATATE 100; 62.5; 25 UG/1; UG/1; UG/1
POWDER RESPIRATORY (INHALATION)
Qty: 90 EACH | Refills: 3 | Status: SHIPPED | OUTPATIENT
Start: 2024-05-30

## 2024-05-30 NOTE — TELEPHONE ENCOUNTER
Contacted pt in response to message. Explained to pt she should take both Plavix and ASA once daily and that it doesn't matter what time of day medications are taken as long as they are taken at the same time each day consistently. Pt verbalized understanding. Instructed pt to contact clinic to report signs/symptoms of abnormal bleeding such as excessive bruising, prolonged bleeding, bleeding gums, etc and to report to nearest ED for bloody stool or urine or for bleeding that won't stop after holding pressure. Pt verbalized understanding.  ----- Message from Rose Curtis sent at 5/30/2024  8:11 AM CDT -----  Contact: [phone]  Pt needing to know does pt take the meds one time a day and what time? Needing to know because pt was on 2 times a day. Needing a sonja from [facility] call

## 2024-06-03 PROBLEM — J96.11 CHRONIC RESPIRATORY FAILURE WITH HYPOXIA, ON HOME O2 THERAPY: Status: RESOLVED | Noted: 2018-12-05 | Resolved: 2024-06-03

## 2024-06-03 PROBLEM — Z99.81 CHRONIC RESPIRATORY FAILURE WITH HYPOXIA, ON HOME O2 THERAPY: Status: RESOLVED | Noted: 2018-12-05 | Resolved: 2024-06-03

## 2024-06-06 PROBLEM — Z79.01 LONG TERM (CURRENT) USE OF ANTICOAGULANTS: Status: ACTIVE | Noted: 2024-06-06

## 2024-06-20 ENCOUNTER — LAB VISIT (OUTPATIENT)
Dept: LAB | Facility: HOSPITAL | Age: 74
End: 2024-06-20
Attending: NURSE PRACTITIONER
Payer: MEDICARE

## 2024-06-20 DIAGNOSIS — I48.91 ATRIAL FIBRILLATION, UNSPECIFIED TYPE: ICD-10-CM

## 2024-06-20 DIAGNOSIS — Z79.01 LONG TERM (CURRENT) USE OF ANTICOAGULANTS: ICD-10-CM

## 2024-06-20 LAB
INR PPP: 1.8 (ref 0.8–1.2)
PROTHROMBIN TIME: 18.9 SEC (ref 9–12.5)

## 2024-06-20 PROCEDURE — 36415 COLL VENOUS BLD VENIPUNCTURE: CPT | Performed by: INTERNAL MEDICINE

## 2024-06-20 PROCEDURE — 85610 PROTHROMBIN TIME: CPT | Performed by: INTERNAL MEDICINE

## 2024-06-27 ENCOUNTER — LAB VISIT (OUTPATIENT)
Dept: LAB | Facility: HOSPITAL | Age: 74
End: 2024-06-27
Attending: INTERNAL MEDICINE
Payer: MEDICARE

## 2024-06-27 DIAGNOSIS — I48.91 ATRIAL FIBRILLATION, UNSPECIFIED TYPE: ICD-10-CM

## 2024-06-27 DIAGNOSIS — Z79.01 LONG TERM (CURRENT) USE OF ANTICOAGULANTS: ICD-10-CM

## 2024-06-27 LAB
INR PPP: 1.7 (ref 0.8–1.2)
PROTHROMBIN TIME: 18.2 SEC (ref 9–12.5)

## 2024-06-27 PROCEDURE — 85610 PROTHROMBIN TIME: CPT | Performed by: INTERNAL MEDICINE

## 2024-06-27 PROCEDURE — 36415 COLL VENOUS BLD VENIPUNCTURE: CPT | Performed by: INTERNAL MEDICINE

## 2024-07-03 ENCOUNTER — OFFICE VISIT (OUTPATIENT)
Dept: PULMONOLOGY | Facility: CLINIC | Age: 74
End: 2024-07-03
Payer: MEDICARE

## 2024-07-03 ENCOUNTER — LAB VISIT (OUTPATIENT)
Dept: LAB | Facility: HOSPITAL | Age: 74
End: 2024-07-03
Attending: INTERNAL MEDICINE
Payer: MEDICARE

## 2024-07-03 VITALS
HEIGHT: 65 IN | WEIGHT: 161 LBS | BODY MASS INDEX: 26.82 KG/M2 | DIASTOLIC BLOOD PRESSURE: 58 MMHG | SYSTOLIC BLOOD PRESSURE: 110 MMHG | OXYGEN SATURATION: 91 % | HEART RATE: 66 BPM

## 2024-07-03 DIAGNOSIS — J47.9 BRONCHIECTASIS WITHOUT COMPLICATION: ICD-10-CM

## 2024-07-03 DIAGNOSIS — Z79.01 LONG TERM (CURRENT) USE OF ANTICOAGULANTS: ICD-10-CM

## 2024-07-03 DIAGNOSIS — J96.11 CHRONIC HYPOXEMIC RESPIRATORY FAILURE: ICD-10-CM

## 2024-07-03 DIAGNOSIS — J44.9 CHRONIC OBSTRUCTIVE PULMONARY DISEASE, UNSPECIFIED COPD TYPE: Primary | ICD-10-CM

## 2024-07-03 DIAGNOSIS — I48.91 ATRIAL FIBRILLATION, UNSPECIFIED TYPE: ICD-10-CM

## 2024-07-03 LAB
INR PPP: 2.2 (ref 0.8–1.2)
PROTHROMBIN TIME: 23 SEC (ref 9–12.5)

## 2024-07-03 PROCEDURE — 85610 PROTHROMBIN TIME: CPT | Performed by: INTERNAL MEDICINE

## 2024-07-03 PROCEDURE — 36415 COLL VENOUS BLD VENIPUNCTURE: CPT | Performed by: INTERNAL MEDICINE

## 2024-07-03 PROCEDURE — 99999 PR PBB SHADOW E&M-EST. PATIENT-LVL IV: CPT | Mod: PBBFAC,HCNC,, | Performed by: NURSE PRACTITIONER

## 2024-07-03 RX ORDER — LOSARTAN POTASSIUM 25 MG/1
25 TABLET ORAL
COMMUNITY
Start: 2024-03-02

## 2024-07-03 NOTE — PATIENT INSTRUCTIONS
Keep wearing your oxygen   Continue the Trelegy daily, rinse after you use it  Use your acapella.

## 2024-07-03 NOTE — PROGRESS NOTES
SUBJECTIVE:    Patient ID: Lisa Smith is a 73 y.o. female.    Chief Complaint: Follow-up    Patient here today feeling well. . She is wearing her oxygen all of the time again.    She is taking Diuretics daily.  She is using Trelegy daily. She has started exercising on her bike again. She has lost more weight.  She is on Coumadin now so has to watch diet closely.  She states her breathing improved so much after her valve and carotid surgeries.    Past Medical History:   Diagnosis Date    Anticoagulant long-term use     Arthritis     Atrial fibrillation     Bell's palsy     Boil of buttock     burst 12/19/22    CHF (congestive heart failure)     Chronic cough     COPD (chronic obstructive pulmonary disease)     use O2  3l/m NC at night; also taking during day currently 12/4/23    Dizziness     Encounter for blood transfusion     GI bleed 2011    transfusion    H/O diverticulitis of colon     Hard of hearing     Heart murmur     Hematoma 02/2023    left hand    Heterozygous alpha 1-antitrypsin deficiency     History of home oxygen therapy     3L NC at night    Hypertension     Lung disease     copd    MONSERRAT (obstructive sleep apnea)     resolved with wt loss 131#    Pacemaker     Pneumonia     last episode 2019    Pulmonary edema     Skin cancer     Unspecified visual disturbance     episode of vision disturbance and dizziness...occasional recurrences     Past Surgical History:   Procedure Laterality Date    CARDIAC ELECTROPHYSIOLOGY STUDY AND ABLATION      CAROTID ENDARTERECTOMY Left 12/22/2022    Procedure: ENDARTERECTOMY-CAROTID;  Surgeon: Maximilian Connolly MD;  Location: [facility];  Service: Peripheral Vascular;  Laterality: Left;    CAROTID STENT Left 2/29/2024    Procedure: INSERTION, STENT, ARTERY, CAROTID;  Surgeon: CIRILO Valdivia III, MD;  Location: [address];  Service: Vascular;  Laterality: Left;  left carotid artery stent placement  mgy  146.29   gymc2  8.0730  fluro time  4.8min    CARPAL  TUNNEL RELEASE Left     CHOLECYSTECTOMY      EYE SURGERY Bilateral     cataract    HYSTERECTOMY      INSERT / REPLACE / REMOVE PACEMAKER      KNEE ARTHROSCOPY Left     LEFT HEART CATHETERIZATION  11/1/2023    Procedure: Left heart cath;  Surgeon: Puma Shelton MD;  Location: [facility] CATH;  Service: Cardiology;;    REPLACEMENT OF PACEMAKER GENERATOR Left 01/20/2022    Procedure: REPLACEMENT, PACEMAKER GENERATOR;  Surgeon: Raymond Pérez MD;  Location: [facility] CATH/EP LAB;  Service: Cardiology;  Laterality: Left;    SKIN CANCER EXCISION      TRANSCATHETER AORTIC VALVE REPLACEMENT (TAVR) Bilateral 12/6/2023    Procedure: (TAVR);  Surgeon: Puma Shelton MD;  Location: [facility] CATH;  Service: Cardiology;  Laterality: Bilateral;    TRANSCATHETER AORTIC VALVE REPLACEMENT (TAVR) Bilateral 12/6/2023    Procedure: (TAVR) - Surgeon;  Surgeon: Maximilian Connolly MD;  Location: [facility] CATH;  Service: Peripheral Vascular;  Laterality: Bilateral;     Family History   Problem Relation Name Age of Onset    Kidney failure Mother      Cancer Father      Cancer Brother          Social History:   Marital Status:   Occupation: housewife  Alcohol History:  reports that she does not currently use alcohol after a past usage of about 8.0 standard drinks of alcohol per week.  Tobacco History:  reports that she quit smoking about 13 years ago. Her smoking use included cigarettes. She started smoking about 53 years ago. She has a 80 pack-year smoking history. She has been exposed to tobacco smoke. She has never used smokeless tobacco.  Drug History:  reports no history of drug use.    Review of patient's allergies indicates:   Allergen Reactions    Sulfa (sulfonamide antibiotics) Itching       Current Outpatient Medications   Medication Sig Dispense Refill    albuterol (ACCUNEB) 1.25 mg/3 mL Nebu INHALE THE CONTENTS OF 1 VIAL VIA NEBULIZER EVERY 6 HOURS AS NEEDED FOR RESCUE 360 mL 5    albuterol (PROVENTIL/VENTOLIN HFA) 90 mcg/actuation  inhaler Inhale 2 puffs into the lungs every 6 (six) hours as needed for Wheezing. Rescue 18 g 6    clopidogreL (PLAVIX) 75 mg tablet Take 1 tablet (75 mg total) by mouth once daily. 30 tablet 11    digoxin (LANOXIN) 250 mcg tablet TAKE 1 TABLET EVERY DAY (Patient taking differently: Take 250 mcg by mouth every morning.) 90 tablet 3    ezetimibe (ZETIA) 10 mg tablet TAKE 1 TABLET ONE TIME DAILY 90 tablet 3    fluticasone propionate (FLONASE) 50 mcg/actuation nasal spray by Each Nostril route daily as needed.      furosemide (LASIX) 20 MG tablet TAKE 1 TABLET EVERY DAY (Patient taking differently: Take 20 mg by mouth every morning.) 90 tablet 1    losartan (COZAAR) 25 MG tablet Take 25 mg by mouth.      metoprolol succinate (TOPROL-XL) 25 MG 24 hr tablet Take 1 tablet (25 mg total) by mouth every morning. 90 tablet 3    pantoprazole (PROTONIX) 40 MG tablet Take 1 tablet (40 mg total) by mouth every morning. 90 tablet 3    rOPINIRole (REQUIP) 0.5 MG tablet Take 1 tablet (0.5 mg total) by mouth every evening. 90 tablet 1    rosuvastatin (CRESTOR) 40 MG Tab Take 1 tablet (40 mg total) by mouth every evening. 90 tablet 3    sacubitriL-valsartan (ENTRESTO) 24-26 mg per tablet Take 1 tablet by mouth 2 (two) times daily. 180 tablet 3    spironolactone (ALDACTONE) 25 MG tablet Take 1 tablet (25 mg total) by mouth every morning. 90 tablet 3    TRELEGY ELLIPTA 100-62.5-25 mcg DsDv INHALE 1 PUFF INTO THE LUNGS ONCE DAILY. 90 each 3    warfarin (COUMADIN) 3 MG tablet Take 1.5-2 pills by mouth daily or as directed per the [facility]; #140 pills = 90 day supply 140 tablet 1    acetaminophen (TYLENOL ARTHRITIS ORAL) Take 2 tablets by mouth daily as needed.      acetaminophen (TYLENOL) 325 MG tablet Take 325 mg by mouth every 6 (six) hours as needed for Pain.       No current facility-administered medications for this visit.     Echo 01/2024  Left Ventricle: The left ventricle is normal in size. Mildly increased wall thickness.  There is mildly reduced systolic function with a visually estimated ejection fraction of 45 - 50%. Unable to assess diastolic function due to atrial fibrillation.    Right Ventricle: Normal right ventricular cavity size. Wall thickness is normal. Right ventricle wall motion  is normal. Systolic function is normal.    Left Atrium: Left atrium is mildly dilated.    Right Atrium: Right atrium is mildly dilated.    Aortic Valve: There is a transcatheter valve replacement in the aortic position that is appropriately positioned. Mild paravalvular regurgitation.    Mitral Valve: There is bileaflet sclerosis. There is moderate mitral annular calcification present. There is no stenosis. The mean pressure gradient across the mitral valve is 2 mmHg at a heart rate of  bpm. There is moderate regurgitation.    Tricuspid Valve: There is mild regurgitation.    IVC/SVC: Normal venous pressure at 3 mmHg.    Alpha-1 Antitrypsin: MZ, patient refused prolastin treatment because of cost    Last PFT:10/2020 moderate obstruction with good response, air trapping  moderate diffusion defect    CTA chest 01/2024    IMPRESSION:     1. No evidence of pulmonary embolus.  2. Dilated main pulmonary artery likely secondary to pulmonary hypertension as noted on previous exam.  3. Scattered bilateral bronchiectasis, scarring, and chronic-appearing pulmonary infiltrates as noted on previous exam and most notably in the right upper lobe.      General: feels well.   Eyes: Vision is good.  ENT:  No current issues   Heart:: no chest pain, no palpitations   Lungs:breathing has improved since TAVR  GI: No Nausea, vomiting, constipation, diarrhea, or reflux.  : No dysuria, hesitancy, or nocturia.  Musculoskeletal:  back pain   Skin: No lesions or rashes.  Neuro: headaches, neuropathy   Lymph: No edema or adenopathy.  Psych: No anxiety or depression.  Endo: weight loss     OBJECTIVE:      BP (!) 110/58 (BP Location: Right arm, Patient Position: Sitting, BP  "Method: Medium (Manual))   Pulse 66   Ht 5' 5" (1.651 m)   Wt 73 kg (161 lb)   LMP  (LMP Unknown)   SpO2 (!) 91% Comment: 2LPM PD  BMI 26.79 kg/m²          Physical Exam  GENERAL: Older patient in no distress.  HEENT: Pupils equal and reactive. Extraocular movements intact. Nose intact.      Pharynx moist.  NECK: Supple.   HEART: Regular rate and rhythm.     LUNGS: clear   ABDOMEN: Bowel sounds present. Non-tender, no masses palpated.  EXTREMITIES: Normal muscle tone and joint movement, no cyanosis or clubbing.   LYMPHATICS: No adenopathy palpated, no edema.  SKIN: Dry, intact, no lesions.   NEURO: Cranial nerves II-XII intact. Motor strength 5/5 bilaterally, upper and lower extremities.  PSYCH: Appropriate affect.    Assessment:     COPD   Bronchiectasis  Chronic hypoxemic respiratory failure   Plan:         Keep wearing your oxygen   Continue the Trelegy daily, rinse after you use it  Use your acapella.                                 "

## 2024-08-05 ENCOUNTER — OFFICE VISIT (OUTPATIENT)
Dept: CARDIOLOGY | Facility: CLINIC | Age: 74
End: 2024-08-05
Payer: MEDICARE

## 2024-08-05 VITALS
SYSTOLIC BLOOD PRESSURE: 136 MMHG | BODY MASS INDEX: 26.66 KG/M2 | HEART RATE: 61 BPM | HEIGHT: 65 IN | DIASTOLIC BLOOD PRESSURE: 68 MMHG | OXYGEN SATURATION: 96 % | RESPIRATION RATE: 16 BRPM | WEIGHT: 160 LBS

## 2024-08-05 DIAGNOSIS — I48.91 ATRIAL FIBRILLATION, UNSPECIFIED TYPE: ICD-10-CM

## 2024-08-05 DIAGNOSIS — J42 CHRONIC BRONCHITIS, UNSPECIFIED CHRONIC BRONCHITIS TYPE: Chronic | ICD-10-CM

## 2024-08-05 DIAGNOSIS — I50.20 NYHA CLASS 3 HEART FAILURE WITH REDUCED EJECTION FRACTION: ICD-10-CM

## 2024-08-05 DIAGNOSIS — I70.0 AORTIC ATHEROSCLEROSIS: ICD-10-CM

## 2024-08-05 DIAGNOSIS — Z95.2 S/P TAVR (TRANSCATHETER AORTIC VALVE REPLACEMENT): ICD-10-CM

## 2024-08-05 DIAGNOSIS — Z95.0 S/P PLACEMENT OF CARDIAC PACEMAKER: Chronic | ICD-10-CM

## 2024-08-05 DIAGNOSIS — R53.83 OTHER FATIGUE: ICD-10-CM

## 2024-08-05 DIAGNOSIS — R06.02 SHORTNESS OF BREATH: Primary | ICD-10-CM

## 2024-08-05 PROCEDURE — 1160F RVW MEDS BY RX/DR IN RCRD: CPT | Mod: HCNC,CPTII,S$GLB, | Performed by: INTERNAL MEDICINE

## 2024-08-05 PROCEDURE — 3078F DIAST BP <80 MM HG: CPT | Mod: HCNC,CPTII,S$GLB, | Performed by: INTERNAL MEDICINE

## 2024-08-05 PROCEDURE — 99999 PR PBB SHADOW E&M-EST. PATIENT-LVL IV: CPT | Mod: PBBFAC,HCNC,, | Performed by: INTERNAL MEDICINE

## 2024-08-05 PROCEDURE — 99214 OFFICE O/P EST MOD 30 MIN: CPT | Mod: HCNC,S$GLB,, | Performed by: INTERNAL MEDICINE

## 2024-08-05 PROCEDURE — 3288F FALL RISK ASSESSMENT DOCD: CPT | Mod: HCNC,CPTII,S$GLB, | Performed by: INTERNAL MEDICINE

## 2024-08-05 PROCEDURE — 3075F SYST BP GE 130 - 139MM HG: CPT | Mod: HCNC,CPTII,S$GLB, | Performed by: INTERNAL MEDICINE

## 2024-08-05 PROCEDURE — 1126F AMNT PAIN NOTED NONE PRSNT: CPT | Mod: HCNC,CPTII,S$GLB, | Performed by: INTERNAL MEDICINE

## 2024-08-05 PROCEDURE — 1159F MED LIST DOCD IN RCRD: CPT | Mod: HCNC,CPTII,S$GLB, | Performed by: INTERNAL MEDICINE

## 2024-08-05 PROCEDURE — 1101F PT FALLS ASSESS-DOCD LE1/YR: CPT | Mod: HCNC,CPTII,S$GLB, | Performed by: INTERNAL MEDICINE

## 2024-08-08 RX ORDER — FUROSEMIDE 20 MG/1
20 TABLET ORAL EVERY MORNING
Qty: 90 TABLET | Refills: 3 | Status: SHIPPED | OUTPATIENT
Start: 2024-08-08 | End: 2025-08-08

## 2024-09-25 RX ORDER — DIGOXIN 250 MCG
TABLET ORAL
Qty: 90 TABLET | Refills: 3 | Status: SHIPPED | OUTPATIENT
Start: 2024-09-25

## 2024-10-07 DIAGNOSIS — G25.81 RESTLESS LEGS: ICD-10-CM

## 2024-10-07 RX ORDER — ROPINIROLE 0.5 MG/1
TABLET, FILM COATED ORAL
Qty: 90 TABLET | Refills: 1 | Status: SHIPPED | OUTPATIENT
Start: 2024-10-07

## 2024-10-30 ENCOUNTER — TELEPHONE (OUTPATIENT)
Dept: CARDIOLOGY | Facility: CLINIC | Age: 74
End: 2024-10-30
Payer: MEDICARE

## 2024-10-30 NOTE — TELEPHONE ENCOUNTER
----- Message from Jennifer sent at 10/30/2024  9:46 AM CDT -----  Patients daughter is dropping off medication application  please call when it is ready to be faxed.. Jordana (daughter) [phone]. She would also like a copy.

## 2024-11-13 ENCOUNTER — TELEPHONE (OUTPATIENT)
Dept: CARDIOLOGY | Facility: CLINIC | Age: 74
End: 2024-11-13
Payer: MEDICARE

## 2024-11-13 ENCOUNTER — LAB VISIT (OUTPATIENT)
Dept: LAB | Facility: HOSPITAL | Age: 74
End: 2024-11-13
Attending: NURSE PRACTITIONER
Payer: MEDICARE

## 2024-11-13 ENCOUNTER — OFFICE VISIT (OUTPATIENT)
Dept: FAMILY MEDICINE | Facility: CLINIC | Age: 74
End: 2024-11-13
Payer: MEDICARE

## 2024-11-13 VITALS
WEIGHT: 164.69 LBS | TEMPERATURE: 98 F | SYSTOLIC BLOOD PRESSURE: 149 MMHG | RESPIRATION RATE: 20 BRPM | HEART RATE: 62 BPM | HEIGHT: 65 IN | DIASTOLIC BLOOD PRESSURE: 54 MMHG | OXYGEN SATURATION: 96 % | BODY MASS INDEX: 27.44 KG/M2

## 2024-11-13 DIAGNOSIS — G25.81 RESTLESS LEGS: Primary | ICD-10-CM

## 2024-11-13 DIAGNOSIS — R20.2 PARESTHESIA OF BILATERAL LEGS: ICD-10-CM

## 2024-11-13 DIAGNOSIS — G25.81 RESTLESS LEGS: ICD-10-CM

## 2024-11-13 DIAGNOSIS — D64.9 ANEMIA, UNSPECIFIED TYPE: ICD-10-CM

## 2024-11-13 DIAGNOSIS — D53.9 NUTRITIONAL ANEMIA, UNSPECIFIED: ICD-10-CM

## 2024-11-13 LAB
ALBUMIN SERPL BCP-MCNC: 4 G/DL (ref 3.5–5.2)
ALP SERPL-CCNC: 76 U/L (ref 40–150)
ALT SERPL W/O P-5'-P-CCNC: 11 U/L (ref 10–44)
ANION GAP SERPL CALC-SCNC: 8 MMOL/L (ref 8–16)
AST SERPL-CCNC: 39 U/L (ref 10–40)
BASOPHILS # BLD AUTO: 0.02 K/UL (ref 0–0.2)
BASOPHILS NFR BLD: 0.3 % (ref 0–1.9)
BILIRUB SERPL-MCNC: 0.5 MG/DL (ref 0.1–1)
BUN SERPL-MCNC: 23 MG/DL (ref 8–23)
CALCIUM SERPL-MCNC: 9.7 MG/DL (ref 8.7–10.5)
CHLORIDE SERPL-SCNC: 103 MMOL/L (ref 95–110)
CO2 SERPL-SCNC: 29 MMOL/L (ref 23–29)
CREAT SERPL-MCNC: 0.8 MG/DL (ref 0.5–1.4)
DIFFERENTIAL METHOD BLD: ABNORMAL
EOSINOPHIL # BLD AUTO: 0.2 K/UL (ref 0–0.5)
EOSINOPHIL NFR BLD: 2.4 % (ref 0–8)
ERYTHROCYTE [DISTWIDTH] IN BLOOD BY AUTOMATED COUNT: 16.2 % (ref 11.5–14.5)
EST. GFR  (NO RACE VARIABLE): >60 ML/MIN/1.73 M^2
FERRITIN SERPL-MCNC: 55 NG/ML (ref 20–300)
GLUCOSE SERPL-MCNC: 105 MG/DL (ref 70–110)
HCT VFR BLD AUTO: 26.6 % (ref 37–48.5)
HGB BLD-MCNC: 7.9 G/DL (ref 12–16)
IMM GRANULOCYTES # BLD AUTO: 0.02 K/UL (ref 0–0.04)
IMM GRANULOCYTES NFR BLD AUTO: 0.3 % (ref 0–0.5)
LYMPHOCYTES # BLD AUTO: 0.9 K/UL (ref 1–4.8)
LYMPHOCYTES NFR BLD: 13.3 % (ref 18–48)
MCH RBC QN AUTO: 30.3 PG (ref 27–31)
MCHC RBC AUTO-ENTMCNC: 29.7 G/DL (ref 32–36)
MCV RBC AUTO: 102 FL (ref 82–98)
MONOCYTES # BLD AUTO: 0.5 K/UL (ref 0.3–1)
MONOCYTES NFR BLD: 7.2 % (ref 4–15)
NEUTROPHILS # BLD AUTO: 5.1 K/UL (ref 1.8–7.7)
NEUTROPHILS NFR BLD: 76.5 % (ref 38–73)
NRBC BLD-RTO: 0 /100 WBC
PLATELET # BLD AUTO: 225 K/UL (ref 150–450)
PMV BLD AUTO: 11.3 FL (ref 9.2–12.9)
POTASSIUM SERPL-SCNC: 5.1 MMOL/L (ref 3.5–5.1)
PROT SERPL-MCNC: 7.3 G/DL (ref 6–8.4)
RBC # BLD AUTO: 2.61 M/UL (ref 4–5.4)
SODIUM SERPL-SCNC: 140 MMOL/L (ref 136–145)
VIT B12 SERPL-MCNC: 548 PG/ML (ref 210–950)
WBC # BLD AUTO: 6.64 K/UL (ref 3.9–12.7)

## 2024-11-13 PROCEDURE — 1126F AMNT PAIN NOTED NONE PRSNT: CPT | Mod: HCNC,CPTII,S$GLB, | Performed by: NURSE PRACTITIONER

## 2024-11-13 PROCEDURE — 3077F SYST BP >= 140 MM HG: CPT | Mod: HCNC,CPTII,S$GLB, | Performed by: NURSE PRACTITIONER

## 2024-11-13 PROCEDURE — 3288F FALL RISK ASSESSMENT DOCD: CPT | Mod: HCNC,CPTII,S$GLB, | Performed by: NURSE PRACTITIONER

## 2024-11-13 PROCEDURE — 1101F PT FALLS ASSESS-DOCD LE1/YR: CPT | Mod: HCNC,CPTII,S$GLB, | Performed by: NURSE PRACTITIONER

## 2024-11-13 PROCEDURE — 99214 OFFICE O/P EST MOD 30 MIN: CPT | Mod: HCNC,S$GLB,, | Performed by: NURSE PRACTITIONER

## 2024-11-13 PROCEDURE — 85025 COMPLETE CBC W/AUTO DIFF WBC: CPT | Mod: HCNC | Performed by: NURSE PRACTITIONER

## 2024-11-13 PROCEDURE — 82607 VITAMIN B-12: CPT | Mod: HCNC | Performed by: NURSE PRACTITIONER

## 2024-11-13 PROCEDURE — 1160F RVW MEDS BY RX/DR IN RCRD: CPT | Mod: HCNC,CPTII,S$GLB, | Performed by: NURSE PRACTITIONER

## 2024-11-13 PROCEDURE — 99999 PR PBB SHADOW E&M-EST. PATIENT-LVL V: CPT | Mod: PBBFAC,HCNC,, | Performed by: NURSE PRACTITIONER

## 2024-11-13 PROCEDURE — 3078F DIAST BP <80 MM HG: CPT | Mod: HCNC,CPTII,S$GLB, | Performed by: NURSE PRACTITIONER

## 2024-11-13 PROCEDURE — 80053 COMPREHEN METABOLIC PANEL: CPT | Mod: HCNC | Performed by: NURSE PRACTITIONER

## 2024-11-13 PROCEDURE — 82728 ASSAY OF FERRITIN: CPT | Mod: HCNC | Performed by: NURSE PRACTITIONER

## 2024-11-13 PROCEDURE — 36415 COLL VENOUS BLD VENIPUNCTURE: CPT | Mod: HCNC | Performed by: NURSE PRACTITIONER

## 2024-11-13 PROCEDURE — 1159F MED LIST DOCD IN RCRD: CPT | Mod: HCNC,CPTII,S$GLB, | Performed by: NURSE PRACTITIONER

## 2024-11-13 RX ORDER — ROPINIROLE 1 MG/1
1 TABLET, FILM COATED ORAL NIGHTLY
Qty: 90 TABLET | Refills: 1 | Status: SHIPPED | OUTPATIENT
Start: 2024-11-13

## 2024-11-13 NOTE — PROGRESS NOTES
"    SUBJECTIVE:      Patient ID: Lisa Smith is a 74 y.o. female.    Chief Complaint: Restless Legs    Lisa is here to reestablish care and follow-up on restless leg syndrome. She is currently following with Pulmonary, Cardiology, and vascular surgery.  She has been taking Requip 0.5 mg nightly for restless leg sx and patient reports sx have improved but still has some bad nights. Also reports numbness in feet only for the last few months. She denies any previous side effects from the medication.  She is feeling well overall. Denies back pain, but reports her legs get "tired" when she tries to walk too long or far. Denies swelling, color change or hx of DM. Did smoke cigarettes for a long time, admits to social etoh use only.     Pulm-Ponstein/Tahir  Cardio- Cat, Beto  [facility]         Family History   Problem Relation Name Age of Onset    Kidney failure Mother      Cancer Father      Cancer Brother        Social History     Socioeconomic History    Marital status:     Number of children: 1   Occupational History    Occupation: RETIRED     Comment: [employer]   Tobacco Use    Smoking status: Former     Current packs/day: 0.00     Average packs/day: 2.0 packs/day for 40.0 years (80.0 ttl pk-yrs)     Types: Cigarettes     Start date: 1971     Quit date: 2011     Years since quittin.7     Passive exposure: Past    Smokeless tobacco: Never    Tobacco comments:          Quit in    Substance and Sexual Activity    Alcohol use: Not Currently     Alcohol/week: 8.0 standard drinks of alcohol     Types: 8 Glasses of wine per week    Drug use: No    Sexual activity: Never     Social Drivers of Health     Financial Resource Strain: Medium Risk (3/1/2024)    Overall Financial Resource Strain (CARDIA)     Difficulty of Paying Living Expenses: Somewhat hard   Food Insecurity: Patient Declined (3/1/2024)    Hunger Vital Sign     Worried About Running Out of Food in the " Last Year: Patient declined     Ran Out of Food in the Last Year: Patient declined   Recent Concern: Food Insecurity - Food Insecurity Present (12/26/2023)    Hunger Vital Sign     Worried About Running Out of Food in the Last Year: Sometimes true     Ran Out of Food in the Last Year: Never true   Transportation Needs: Patient Declined (3/1/2024)    PRAPARE - Transportation     Lack of Transportation (Medical): Patient declined     Lack of Transportation (Non-Medical): Patient declined   Physical Activity: Insufficiently Active (3/1/2024)    Exercise Vital Sign     Days of Exercise per Week: 4 days     Minutes of Exercise per Session: 10 min   Stress: No Stress Concern Present (3/1/2024)    [employer] [place] [employer] - Occupational Stress Questionnaire     Feeling of Stress : Only a little   Housing Stability: Unknown (3/1/2024)    Housing Stability Vital Sign     Unable to Pay for Housing in the Last Year: Patient declined     Number of Places Lived in the Last Year: 1     Unstable Housing in the Last Year: No     Current Outpatient Medications   Medication Sig Dispense Refill    acetaminophen (TYLENOL) 325 MG tablet Take 325 mg by mouth every 6 (six) hours as needed for Pain.      albuterol (ACCUNEB) 1.25 mg/3 mL Nebu INHALE THE CONTENTS OF 1 VIAL VIA NEBULIZER EVERY 6 HOURS AS NEEDED FOR RESCUE 360 mL 5    albuterol (PROVENTIL/VENTOLIN HFA) 90 mcg/actuation inhaler Inhale 2 puffs into the lungs every 6 (six) hours as needed for Wheezing. Rescue 18 g 6    clopidogreL (PLAVIX) 75 mg tablet Take 1 tablet (75 mg total) by mouth once daily. 30 tablet 11    digoxin (LANOXIN) 250 mcg tablet TAKE 1 TABLET EVERY DAY 90 tablet 3    ezetimibe (ZETIA) 10 mg tablet TAKE 1 TABLET ONE TIME DAILY 90 tablet 3    fluticasone propionate (FLONASE) 50 mcg/actuation nasal spray by Each Nostril route daily as needed.      furosemide (LASIX) 20 MG tablet Take 1 tablet (20 mg total) by mouth every morning. 90 tablet 3     losartan (COZAAR) 25 MG tablet Take 25 mg by mouth.      metoprolol succinate (TOPROL-XL) 25 MG 24 hr tablet Take 1 tablet (25 mg total) by mouth every morning. 90 tablet 3    pantoprazole (PROTONIX) 40 MG tablet Take 1 tablet (40 mg total) by mouth every morning. 90 tablet 3    rosuvastatin (CRESTOR) 40 MG Tab Take 1 tablet (40 mg total) by mouth every evening. 90 tablet 3    sacubitriL-valsartan (ENTRESTO) 24-26 mg per tablet Take 1 tablet by mouth 2 (two) times daily. 180 tablet 3    spironolactone (ALDACTONE) 25 MG tablet Take 1 tablet (25 mg total) by mouth every morning. 90 tablet 3    TRELEGY ELLIPTA 100-62.5-25 mcg DsDv INHALE 1 PUFF INTO THE LUNGS ONCE DAILY. 90 each 3    warfarin (COUMADIN) 3 MG tablet Take 1.5 pills by mouth daily or as directed per the [facility]; #145 pills = 90 day supply 145 tablet 2    acetaminophen (TYLENOL ARTHRITIS ORAL) Take 2 tablets by mouth daily as needed.      rOPINIRole (REQUIP) 1 MG tablet Take 1 tablet (1 mg total) by mouth every evening. 90 tablet 1     No current facility-administered medications for this visit.     Review of patient's allergies indicates:   Allergen Reactions    Sulfa (sulfonamide antibiotics) Itching      Past Medical History:   Diagnosis Date    Anticoagulant long-term use     Arthritis     Atrial fibrillation     Bell's palsy     Boil of buttock     burst 12/19/22    CHF (congestive heart failure)     Chronic cough     COPD (chronic obstructive pulmonary disease)     use O2  3l/m NC at night; also taking during day currently 12/4/23    Dizziness     Encounter for blood transfusion     GI bleed 2011    transfusion    H/O diverticulitis of colon     Hard of hearing     Heart murmur     Hematoma 02/2023    left hand    Heterozygous alpha 1-antitrypsin deficiency     History of home oxygen therapy     3L NC at night    Hypertension     Lung disease     copd    MONSERRAT (obstructive sleep apnea)     resolved with wt loss 131#    Pacemaker     Pneumonia      last episode 2019    Pulmonary edema     Skin cancer     Unspecified visual disturbance     episode of vision disturbance and dizziness...occasional recurrences     Past Surgical History:   Procedure Laterality Date    CARDIAC ELECTROPHYSIOLOGY STUDY AND ABLATION      CAROTID ENDARTERECTOMY Left 12/22/2022    Procedure: ENDARTERECTOMY-CAROTID;  Surgeon: Maximilian Connolly MD;  Location: [facility] OR;  Service: Peripheral Vascular;  Laterality: Left;    CAROTID STENT Left 2/29/2024    Procedure: INSERTION, STENT, ARTERY, CAROTID;  Surgeon: CIRILO Valdivia III, MD;  Location: [address];  Service: Vascular;  Laterality: Left;  left carotid artery stent placement  mgy  146.29   gymc2  8.0730  fluro time  4.8min    CARPAL TUNNEL RELEASE Left     CHOLECYSTECTOMY      EYE SURGERY Bilateral     cataract    HYSTERECTOMY      INSERT / REPLACE / REMOVE PACEMAKER      KNEE ARTHROSCOPY Left     LEFT HEART CATHETERIZATION  11/1/2023    Procedure: Left heart cath;  Surgeon: Puma Shelton MD;  Location: [facility] CATH;  Service: Cardiology;;    REPLACEMENT OF PACEMAKER GENERATOR Left 01/20/2022    Procedure: REPLACEMENT, PACEMAKER GENERATOR;  Surgeon: Raymond Pérez MD;  Location: [facility] CATH/EP LAB;  Service: Cardiology;  Laterality: Left;    SKIN CANCER EXCISION      TRANSCATHETER AORTIC VALVE REPLACEMENT (TAVR) Bilateral 12/6/2023    Procedure: (TAVR);  Surgeon: Puma Shelton MD;  Location: [facility] CATH;  Service: Cardiology;  Laterality: Bilateral;    TRANSCATHETER AORTIC VALVE REPLACEMENT (TAVR) Bilateral 12/6/2023    Procedure: (TAVR) - Surgeon;  Surgeon: Maximilian Connolly MD;  Location: [facility] CATH;  Service: Peripheral Vascular;  Laterality: Bilateral;       Review of Systems   Constitutional:  Negative for activity change, appetite change, chills, fatigue, fever and unexpected weight change.   HENT:  Negative for congestion and trouble swallowing.    Eyes:  Negative for discharge.   Respiratory:  Negative for cough,  "chest tightness, shortness of breath and wheezing.    Cardiovascular:  Negative for chest pain and leg swelling.   Gastrointestinal:  Negative for abdominal pain, blood in stool, constipation, diarrhea, nausea and vomiting.   Endocrine: Negative for polydipsia and polyuria.   Genitourinary:  Negative for difficulty urinating, dysuria, frequency, hematuria and menstrual problem.   Musculoskeletal:  Positive for arthralgias and myalgias. Negative for back pain, joint swelling and neck pain.   Skin:  Negative for color change, pallor, rash and wound.   Allergic/Immunologic: Negative for environmental allergies.   Neurological:  Positive for weakness. Negative for dizziness, numbness and headaches.   Hematological:  Negative for adenopathy. Bruises/bleeds easily.   Psychiatric/Behavioral:  Negative for confusion, dysphoric mood and sleep disturbance. The patient is not nervous/anxious.       OBJECTIVE:      Vitals:    11/13/24 0935 11/13/24 0938   BP: (!) 143/53 (!) 149/54   BP Location: Right arm Right arm   Patient Position: Sitting Sitting   Pulse: 62    Resp: 20    Temp: 98 °F (36.7 °C)    TempSrc: Oral    SpO2: 96%    Weight: 74.7 kg (164 lb 11.2 oz)    Height: 5' 5" (1.651 m)      Physical Exam  Vitals and nursing note reviewed.   Constitutional:       General: She is not in acute distress.     Appearance: Normal appearance. She is well-developed. She is obese. She is not ill-appearing.   HENT:      Head: Normocephalic.      Mouth/Throat:      Mouth: Mucous membranes are moist.   Eyes:      Pupils: Pupils are equal, round, and reactive to light.   Neck:      Thyroid: No thyroid mass, thyromegaly or thyroid tenderness.   Cardiovascular:      Rate and Rhythm: Normal rate and regular rhythm.      Pulses:           Dorsalis pedis pulses are 1+ on the right side and 1+ on the left side.        Posterior tibial pulses are 1+ on the right side and 1+ on the left side.      Heart sounds: Murmur heard.      Systolic murmur " is present with a grade of 3/6.   Pulmonary:      Effort: Pulmonary effort is normal. No respiratory distress.      Breath sounds: Normal breath sounds. No wheezing, rhonchi or rales.   Abdominal:      General: Bowel sounds are normal.      Palpations: Abdomen is soft.   Musculoskeletal:         General: No tenderness. Normal range of motion.      Cervical back: Normal range of motion and neck supple.      Right lower leg: No edema.      Left lower leg: No edema.   Lymphadenopathy:      Cervical: No cervical adenopathy.   Skin:     General: Skin is warm and dry.      Coloration: Skin is not jaundiced or pale.   Neurological:      Mental Status: She is alert and oriented to person, place, and time.   Psychiatric:         Mood and Affect: Mood normal.         Behavior: Behavior normal.         Thought Content: Thought content normal.         Judgment: Judgment normal.        Assessment:       1. Restless legs    2. Paresthesia of bilateral legs    3. Anemia, unspecified type    4. Nutritional anemia, unspecified        Plan:       Restless legs  -     rOPINIRole (REQUIP) 1 MG tablet; Take 1 tablet (1 mg total) by mouth every evening.  Dispense: 90 tablet; Refill: 1  -     CBC Auto Differential; Future; Expected date: 11/13/2024  -     COMPREHENSIVE METABOLIC PANEL; Future; Expected date: 11/13/2024  -     VITAMIN B12; Future; Expected date: 11/13/2024  -     FERRITIN; Future; Expected date: 11/13/2024  -     Ambulatory referral/consult to Neurology; Future; Expected date: 11/20/2024  -may increase to Requip 1 mg; check labs; see neuro for EMG to check for other causes     Paresthesia of bilateral legs  -     Ambulatory referral/consult to Neurology; Future; Expected date: 11/20/2024    Anemia, unspecified type  -     VITAMIN B12; Future; Expected date: 11/13/2024  -     FERRITIN; Future; Expected date: 11/13/2024    Nutritional anemia, unspecified  -     VITAMIN B12; Future; Expected date: 11/13/2024        Follow up  in about 6 months (around 5/13/2025) for f/u RLS .      11/13/2024 DIAN Oquendo, FNP    This note was created using [email or web address] voice recognition software that occasionally misinterprets phrases or words.

## 2024-11-13 NOTE — TELEPHONE ENCOUNTER
----- Message from Esperanza sent at 11/13/2024  2:09 PM CST -----  Regarding: advise  Contact: patient  Type: Needs Medical Advice  Who Called:  patient   Symptoms (please be specific):    How long has patient had these symptoms:    Pharmacy name and phone #:    Best Call Back Number: [phone]    Additional Information: entresto application was faxed call with update if it has been completed call to advise

## 2024-11-14 ENCOUNTER — TELEPHONE (OUTPATIENT)
Dept: FAMILY MEDICINE | Facility: CLINIC | Age: 74
End: 2024-11-14
Payer: MEDICARE

## 2024-11-14 ENCOUNTER — PATIENT MESSAGE (OUTPATIENT)
Dept: CARDIOLOGY | Facility: CLINIC | Age: 74
End: 2024-11-14
Payer: MEDICARE

## 2024-11-14 DIAGNOSIS — D64.9 ANEMIA, UNSPECIFIED TYPE: Primary | ICD-10-CM

## 2024-11-14 NOTE — PROGRESS NOTES
Please notify patient that results show worsened anemia- iron and B12 normal; kidney and liver function normal; I am going to order an occult blood stool for her to do as we need to look for other causes of the anemia; please have her come  the order kit from the lab today or tomorrow; if that is negative, we will need to send her to Hematology

## 2024-11-14 NOTE — TELEPHONE ENCOUNTER
----- Message from OLIVA Wilcox sent at 11/14/2024  8:55 AM CST -----  Please notify patient that results show worsened anemia- iron and B12 normal; kidney and liver function normal; I am going to order an occult blood stool for her to do as we need to look for other causes of the anemia; please have her come  the order kit from the lab today or tomorrow; if that is negative, we will need to send her to Hematology

## 2024-11-19 ENCOUNTER — LAB VISIT (OUTPATIENT)
Dept: LAB | Facility: HOSPITAL | Age: 74
End: 2024-11-19
Attending: NURSE PRACTITIONER
Payer: MEDICARE

## 2024-11-19 ENCOUNTER — TELEPHONE (OUTPATIENT)
Facility: CLINIC | Age: 74
End: 2024-11-19
Payer: MEDICARE

## 2024-11-19 DIAGNOSIS — D64.9 ANEMIA, UNSPECIFIED TYPE: ICD-10-CM

## 2024-11-19 DIAGNOSIS — D64.9 ANEMIA, UNSPECIFIED TYPE: Primary | ICD-10-CM

## 2024-11-19 LAB — OB PNL STL: NEGATIVE

## 2024-11-19 PROCEDURE — 82272 OCCULT BLD FECES 1-3 TESTS: CPT | Performed by: NURSE PRACTITIONER

## 2024-11-19 NOTE — PROGRESS NOTES
Please notify patient that results of occult blood sample are negative; recommend referral to hematology for further evaluation

## 2024-11-19 NOTE — NURSING
This RN Nurse Navigator called the pt to schedule a new pt appt with Rosaura Villatoro NP for the dx of anemia. Left voicemail for the pt to call back direct line [phone] to schedule

## 2024-11-20 ENCOUNTER — TELEPHONE (OUTPATIENT)
Facility: CLINIC | Age: 74
End: 2024-11-20
Payer: MEDICARE

## 2024-11-20 NOTE — NURSING
Pt called back. Appt scheduled. Date, time and location confirmed with the pt. No additional questions at this time

## 2024-11-20 NOTE — NURSING
Voicemail #2 left for the pt in attempt to schedule new pt appt with Hematology for Anemia. Pt was given direct # [phone] to call back to schedule her appt

## 2024-11-26 ENCOUNTER — TELEPHONE (OUTPATIENT)
Dept: CARDIOLOGY | Facility: CLINIC | Age: 74
End: 2024-11-26
Payer: MEDICARE

## 2024-11-26 ENCOUNTER — OFFICE VISIT (OUTPATIENT)
Facility: CLINIC | Age: 74
End: 2024-11-26
Payer: MEDICARE

## 2024-11-26 VITALS
BODY MASS INDEX: 27.38 KG/M2 | SYSTOLIC BLOOD PRESSURE: 108 MMHG | DIASTOLIC BLOOD PRESSURE: 64 MMHG | TEMPERATURE: 97 F | HEIGHT: 65 IN | HEART RATE: 60 BPM | WEIGHT: 164.31 LBS | RESPIRATION RATE: 18 BRPM

## 2024-11-26 DIAGNOSIS — I48.91 ATRIAL FIBRILLATION, UNSPECIFIED TYPE: ICD-10-CM

## 2024-11-26 DIAGNOSIS — J44.9 CHRONIC OBSTRUCTIVE PULMONARY DISEASE, UNSPECIFIED COPD TYPE: Chronic | ICD-10-CM

## 2024-11-26 DIAGNOSIS — Z79.01 LONG TERM (CURRENT) USE OF ANTICOAGULANTS: ICD-10-CM

## 2024-11-26 DIAGNOSIS — D64.9 ANEMIA, UNSPECIFIED TYPE: Primary | ICD-10-CM

## 2024-11-26 DIAGNOSIS — I65.29 OCCLUSION AND STENOSIS OF UNSPECIFIED CAROTID ARTERY: ICD-10-CM

## 2024-11-26 DIAGNOSIS — Z95.2 S/P TAVR (TRANSCATHETER AORTIC VALVE REPLACEMENT): ICD-10-CM

## 2024-11-26 PROBLEM — E88.01 HETEROZYGOUS ALPHA 1-ANTITRYPSIN DEFICIENCY: Chronic | Status: RESOLVED | Noted: 2018-11-05 | Resolved: 2024-11-26

## 2024-11-26 PROCEDURE — 99999 PR PBB SHADOW E&M-EST. PATIENT-LVL V: CPT | Mod: PBBFAC,HCNC,, | Performed by: NURSE PRACTITIONER

## 2024-11-26 NOTE — PATIENT INSTRUCTIONS
Lab work within 1 week and then repeat in 4 weeks at any [facility] or [facility] lab   Bone marrow biopsy ordered   Gastro referral for Upper and Lower GI   Talk with Dr. Shelton about changing to eliquis and have them fill out the financial assistance

## 2024-11-26 NOTE — TELEPHONE ENCOUNTER
----- Message from Everest sent at 11/26/2024  2:16 PM CST -----  Contact: self  Type:  Sooner Appointment Request    Caller is requesting a sooner appointment.  Caller declined first available appointment listed below.  Caller will not accept being placed on the waitlist and is requesting a message be sent to doctor.    Name of Caller:  Patient  When is the first available appointment?  N/a  Symptoms:  Yearly   Would the patient rather a call back or a response via [facility]? Call back    Best Call Back Number:  [phone]    Additional Information:  Pt would like to pau.Please call back   Anemia is likely due to Iron deficiency. Most recent hemoglobin and hematocrit are listed below.  Recent Labs     11/19/24  0447 11/20/24  0732 11/21/24  0348   HGB 7.7* 8.7* 8.2*   HCT 24.2* 26.0* 26.1*       Plan  - Monitor serial CBC: Daily  - Transfuse PRBC if patient becomes hemodynamically unstable, symptomatic or H/H drops below 7/21.  - Patient has not received any PRBC transfusions to date  - Patient's anemia is currently stable

## 2024-11-26 NOTE — PROGRESS NOTES
[facility]  History & Physical    Subjective:      Patient ID:   NAME: Lisa Smith : 1950     74 y.o. female    Referring Doc: Hope Tang FNP  Other Physicians:Raymond Pérez Parker Janine        Chief Complaint: Anemia       HPI:  74 y.o. female with diagnosis of Anemia who has been referred by Hope Tang FNP for evaluation by medical hematology/oncology.     She has had a decreased hemoglobin since 2024.   She did have labs with PCP recently that showed the hemoglobin to be 7.9    She states she does feel very tired and fatigued.      She is on coumadin post TAVR done in 2023.     She states she cannot afford eliquis.     She has never had a colonoscopy. Denies any dark stools.           ROS:   Review of Systems   Constitutional:  Positive for fatigue and unexpected weight change. Negative for appetite change.   HENT:  Negative for mouth sores.    Eyes:  Negative for visual disturbance.   Respiratory:  Positive for cough and shortness of breath.    Cardiovascular:  Negative for chest pain.   Gastrointestinal:  Negative for abdominal pain and diarrhea.   Genitourinary:  Positive for frequency.   Musculoskeletal:  Positive for back pain.   Skin:  Negative for rash.   Neurological:  Positive for numbness. Negative for headaches.   Hematological:  Negative for adenopathy. Bruises/bleeds easily.   Psychiatric/Behavioral:  The patient is not nervous/anxious.             Past Medical/Surgical History:  Past Medical History:   Diagnosis Date    Anticoagulant long-term use     Arthritis     Atrial fibrillation     Bell's palsy     Boil of buttock     burst 22    CHF (congestive heart failure)     Chronic cough     COPD (chronic obstructive pulmonary disease)     use O2  3l/m NC at night; also taking during day currently 23    Dizziness     Encounter for blood transfusion     GI bleed     transfusion    H/O diverticulitis of colon     Hard of hearing      Heart murmur     Hematoma 02/2023    left hand    Heterozygous alpha 1-antitrypsin deficiency     History of home oxygen therapy     3L NC at night    Hypertension     Lung disease     copd    MONSERRAT (obstructive sleep apnea)     resolved with wt loss 131#    Pacemaker     Pneumonia     last episode 2019    Pulmonary edema     Skin cancer     Unspecified visual disturbance     episode of vision disturbance and dizziness...occasional recurrences     Past Surgical History:   Procedure Laterality Date    CARDIAC ELECTROPHYSIOLOGY STUDY AND ABLATION      CAROTID ENDARTERECTOMY Left 12/22/2022    Procedure: ENDARTERECTOMY-CAROTID;  Surgeon: Maximilian Connolly MD;  Location: [facility] OR;  Service: Peripheral Vascular;  Laterality: Left;    CAROTID STENT Left 2/29/2024    Procedure: INSERTION, STENT, ARTERY, CAROTID;  Surgeon: CIRILO Valdivia III, MD;  Location: [facility] OR [address] [place] [address];  Service: Vascular;  Laterality: Left;  left carotid artery stent placement  mgy  146.29   gymc2  8.0730  fluro time  4.8min    CARPAL TUNNEL RELEASE Left     CHOLECYSTECTOMY      EYE SURGERY Bilateral     cataract    HYSTERECTOMY      INSERT / REPLACE / REMOVE PACEMAKER      KNEE ARTHROSCOPY Left     LEFT HEART CATHETERIZATION  11/1/2023    Procedure: Left heart cath;  Surgeon: Puma Shelton MD;  Location: [facility] CATH;  Service: Cardiology;;    REPLACEMENT OF PACEMAKER GENERATOR Left 01/20/2022    Procedure: REPLACEMENT, PACEMAKER GENERATOR;  Surgeon: Raymond Pérez MD;  Location: [facility] CATH/EP LAB;  Service: Cardiology;  Laterality: Left;    SKIN CANCER EXCISION      TRANSCATHETER AORTIC VALVE REPLACEMENT (TAVR) Bilateral 12/6/2023    Procedure: (TAVR);  Surgeon: Puma Shelton MD;  Location: [facility] CATH;  Service: Cardiology;  Laterality: Bilateral;    TRANSCATHETER AORTIC VALVE REPLACEMENT (TAVR) Bilateral 12/6/2023    Procedure: (TAVR) - Surgeon;  Surgeon: Maximilian Connolly MD;  Location: [facility] CATH;  Service: Peripheral Vascular;   Laterality: Bilateral;         Allergies:  Review of patient's allergies indicates:   Allergen Reactions    Sulfa (sulfonamide antibiotics) Itching       Social/Family History:  Social History     Socioeconomic History    Marital status:     Number of children: 1   Occupational History    Occupation: RETIRED     Comment: [employer]   Tobacco Use    Smoking status: Former     Current packs/day: 0.00     Average packs/day: 2.0 packs/day for 40.0 years (80.0 ttl pk-yrs)     Types: Cigarettes     Start date: 1971     Quit date: 2011     Years since quittin.8     Passive exposure: Past    Smokeless tobacco: Never    Tobacco comments:          Quit in    Substance and Sexual Activity    Alcohol use: Not Currently     Alcohol/week: 8.0 standard drinks of alcohol     Types: 8 Glasses of wine per week    Drug use: No    Sexual activity: Never     Social Drivers of Health     Financial Resource Strain: High Risk (2024)    Overall Financial Resource Strain (CARDIA)     Difficulty of Paying Living Expenses: Hard   Food Insecurity: Food Insecurity Present (2024)    Hunger Vital Sign     Worried About Running Out of Food in the Last Year: Sometimes true     Ran Out of Food in the Last Year: Never true   Transportation Needs: Patient Declined (3/1/2024)    PRAPARE - Transportation     Lack of Transportation (Medical): Patient declined     Lack of Transportation (Non-Medical): Patient declined   Physical Activity: Insufficiently Active (2024)    Exercise Vital Sign     Days of Exercise per Week: 1 day     Minutes of Exercise per Session: 10 min   Stress: No Stress Concern Present (2024)    [employer] - Occupational Stress Questionnaire     Feeling of Stress : Only a little   Housing Stability: Unknown (2024)    Housing Stability Vital Sign     Unable to Pay for Housing in the Last Year: No     Family History   Problem Relation Name Age  of Onset    Kidney failure Mother      Cancer Father      Cancer Brother           Medications:    Current Outpatient Medications:     acetaminophen (TYLENOL ARTHRITIS ORAL), Take 2 tablets by mouth daily as needed., Disp: , Rfl:     acetaminophen (TYLENOL) 325 MG tablet, Take 325 mg by mouth every 6 (six) hours as needed for Pain., Disp: , Rfl:     albuterol (ACCUNEB) 1.25 mg/3 mL Nebu, INHALE THE CONTENTS OF 1 VIAL VIA NEBULIZER EVERY 6 HOURS AS NEEDED FOR RESCUE, Disp: 360 mL, Rfl: 5    albuterol (PROVENTIL/VENTOLIN HFA) 90 mcg/actuation inhaler, Inhale 2 puffs into the lungs every 6 (six) hours as needed for Wheezing. Rescue, Disp: 18 g, Rfl: 6    clopidogreL (PLAVIX) 75 mg tablet, Take 1 tablet (75 mg total) by mouth once daily., Disp: 30 tablet, Rfl: 11    digoxin (LANOXIN) 250 mcg tablet, TAKE 1 TABLET EVERY DAY, Disp: 90 tablet, Rfl: 3    ezetimibe (ZETIA) 10 mg tablet, TAKE 1 TABLET ONE TIME DAILY, Disp: 90 tablet, Rfl: 3    fluticasone propionate (FLONASE) 50 mcg/actuation nasal spray, by Each Nostril route daily as needed., Disp: , Rfl:     furosemide (LASIX) 20 MG tablet, Take 1 tablet (20 mg total) by mouth every morning., Disp: 90 tablet, Rfl: 3    losartan (COZAAR) 25 MG tablet, Take 25 mg by mouth., Disp: , Rfl:     metoprolol succinate (TOPROL-XL) 25 MG 24 hr tablet, Take 1 tablet (25 mg total) by mouth every morning., Disp: 90 tablet, Rfl: 3    pantoprazole (PROTONIX) 40 MG tablet, Take 1 tablet (40 mg total) by mouth every morning., Disp: 90 tablet, Rfl: 3    rOPINIRole (REQUIP) 1 MG tablet, Take 1 tablet (1 mg total) by mouth every evening., Disp: 90 tablet, Rfl: 1    rosuvastatin (CRESTOR) 40 MG Tab, Take 1 tablet (40 mg total) by mouth every evening., Disp: 90 tablet, Rfl: 3    sacubitriL-valsartan (ENTRESTO) 24-26 mg per tablet, Take 1 tablet by mouth 2 (two) times daily., Disp: 180 tablet, Rfl: 3    spironolactone (ALDACTONE) 25 MG tablet, Take 1 tablet (25 mg total) by mouth every morning.,  "Disp: 90 tablet, Rfl: 3    TRELEGY ELLIPTA 100-62.5-25 mcg DsDv, INHALE 1 PUFF INTO THE LUNGS ONCE DAILY., Disp: 90 each, Rfl: 3    warfarin (COUMADIN) 3 MG tablet, Take 1.5 pills by mouth daily or as directed per the [facility]; #145 pills = 90 day supply, Disp: 145 tablet, Rfl: 2      Pathology:   Cancer Staging   No matching staging information was found for the patient.        Objective:   Vitals:  Blood pressure 108/64, pulse 60, temperature 97.3 °F (36.3 °C), temperature source Temporal, resp. rate 18, height 5' 5" (1.651 m), weight 74.5 kg (164 lb 4.8 oz).    Physical Examination:   Physical Exam  Constitutional:       Comments: Elderly     HENT:      Head: Normocephalic and atraumatic.      Mouth/Throat:      Mouth: Mucous membranes are moist.   Cardiovascular:      Rate and Rhythm: Normal rate and regular rhythm.      Pulses: Normal pulses.      Heart sounds: Murmur heard.   Pulmonary:      Effort: Pulmonary effort is normal. No respiratory distress.      Breath sounds: Normal breath sounds. No wheezing.   Abdominal:      General: There is no distension.      Palpations: Abdomen is soft. There is no mass.      Tenderness: There is no abdominal tenderness.   Musculoskeletal:         General: No swelling. Normal range of motion.      Right lower leg: No edema.      Left lower leg: No edema.      Comments: Walker     Skin:     General: Skin is warm and dry.      Capillary Refill: Capillary refill takes 2 to 3 seconds.      Findings: Bruising present. No rash.   Neurological:      Mental Status: She is alert and oriented to person, place, and time. Mental status is at baseline.      Motor: No weakness.   Psychiatric:         Mood and Affect: Mood normal.         Behavior: Behavior normal.            Labs:   Lab Results   Component Value Date    WBC 6.64 11/13/2024    HGB 7.9 (L) 11/13/2024    HCT 26.6 (L) 11/13/2024     (H) 11/13/2024     11/13/2024    CMP  Sodium   Date Value Ref Range " Status   11/13/2024 140 136 - 145 mmol/L Final     Potassium   Date Value Ref Range Status   11/13/2024 5.1 3.5 - 5.1 mmol/L Final     Chloride   Date Value Ref Range Status   11/13/2024 103 95 - 110 mmol/L Final     CO2   Date Value Ref Range Status   11/13/2024 29 23 - 29 mmol/L Final     Glucose   Date Value Ref Range Status   11/13/2024 105 70 - 110 mg/dL Final     BUN   Date Value Ref Range Status   11/13/2024 23 8 - 23 mg/dL Final     Creatinine   Date Value Ref Range Status   11/13/2024 0.8 0.5 - 1.4 mg/dL Final     Calcium   Date Value Ref Range Status   11/13/2024 9.7 8.7 - 10.5 mg/dL Final     Total Protein   Date Value Ref Range Status   11/13/2024 7.3 6.0 - 8.4 g/dL Final     Albumin   Date Value Ref Range Status   11/13/2024 4.0 3.5 - 5.2 g/dL Final     Total Bilirubin   Date Value Ref Range Status   11/13/2024 0.5 0.1 - 1.0 mg/dL Final     Comment:     For infants and newborns, interpretation of results should be based  on gestational age, weight and in agreement with clinical  observations.    Premature Infant recommended reference ranges:  Up to 24 hours.............<8.0 mg/dL  Up to 48 hours............<12.0 mg/dL  3-5 days..................<15.0 mg/dL  6-29 days.................<15.0 mg/dL       Alkaline Phosphatase   Date Value Ref Range Status   11/13/2024 76 40 - 150 U/L Final     AST   Date Value Ref Range Status   11/13/2024 39 10 - 40 U/L Final     ALT   Date Value Ref Range Status   11/13/2024 11 10 - 44 U/L Final     Anion Gap   Date Value Ref Range Status   11/13/2024 8 8 - 16 mmol/L Final     eGFR if    Date Value Ref Range Status   05/10/2022 >60.0 >60 mL/min/1.73 m^2 Final     eGFR if non    Date Value Ref Range Status   05/10/2022 >60.0 >60 mL/min/1.73 m^2 Final     Comment:     Calculation used to obtain the estimated glomerular filtration  rate (eGFR) is the CKD-EPI equation.        Lab Results   Component Value Date    FERRITIN 55 11/13/2024     Lab  Results   Component Value Date    IRON 72 05/13/2024    TRANSFERRIN 257 05/13/2024    TIBC 380 05/13/2024    FESATURATED 19 (L) 05/13/2024      Lab Results   Component Value Date    EHMIKHOB46 548 11/13/2024         Radiology/Diagnostic Studies:  mpression:     1. Interval placement of left internal carotid artery stent proximally, with stent being widely patent and resolution of prior proximal left ICA stenosis.  2. Unchanged occlusion of right ICA as described.  3. Newly evident hypodensity near origin of left vertebral artery which may be artifactual in nature and related to beam hardening artifact from venous contrast.  Possibility of interval development of hemodynamically significant left vertebral artery origin stenosis, although conceivable, is less likely.  To differentiate such, digital subtraction angiography would likely be needed.  4. Unchanged proximal left subclavian artery stenosis of 70%.  5. 50% right brachiocephalic artery stenosis.  6. 50-70% stenosis at origin of right vertebral artery, unchanged.        Electronically signed by:Koko Mays  Date:                                            04/16/2024  Time:                                           08:42        All lab results and imaging results have been reviewed and discussed with the patient    Assessment:   (1) 74 y.o. female with diagnosis of anemia who was referred to our clinic by PCP Vani BAPTISTE. Patient had labs drawn during a follow up appointment and was found to have a hemoglobin of 7.9. Her iron panel was normal, bilirubin was normal, cmp normal, b 12 normal.   - one negative occult stool   - case discussed with Dr. Addison   - repeat cbc, hemoglobin electrophoresis, LDH, and thalessemia panel   - bone marrow biopsy ordered   - referral to GI for scopes, patient has never had a colonscopy     (2) post TAVR in 2023  - follows with Dr. Pérez and Dr. Shelton   - on coumadin per their direction   - sees them next week and is asking  about switching to eliquis     (3) Carotid Stenosis   - follows with Dr. Pérez   - has had a stent and endardectomy in the past       (4)COPD   - on oxygen at home   - sees Dr. Kathryn Haro     (5) history of afib   - on coumadin   - has pace maker       VISIT DIAGNOSES:              Anemia, unspecified type  -     Ambulatory referral/consult to Hematology / Oncology  -     Thalasseima and Hemoglobinopathy Eval; Future; Expected date: 11/26/2024  -     Hemoglobin Electrophoresis Evaluation, Blood; Future; Expected date: 11/26/2024  -     Hemoglobin S Quantitation by Electrophoresis; Future; Expected date: 11/26/2024  -     Lactate Dehydrogenase; Future; Expected date: 11/26/2024  -     CBC W/ AUTO DIFFERENTIAL; Standing  -     CMP; Standing  -     Bone marrow; Future; Expected date: 11/26/2024  -     Leukemia/Lymphoma Screen - Bone Marrow Left Posterior Iliac Crest; Future; Expected date: 11/26/2024  -     Specimen to Pathology, Bone Marrow Aspiration/Biopsy; Future; Expected date: 11/26/2024  -     CT Biopsy Bone Deep (xpd); Future; Expected date: 11/26/2024  -     Ambulatory referral/consult to Gastroenterology; Future; Expected date: 12/03/2024    Chronic obstructive pulmonary disease, unspecified COPD type    Occlusion and stenosis of unspecified carotid artery    Atrial fibrillation, unspecified type    S/P TAVR (transcatheter aortic valve replacement)    Long term (current) use of anticoagulants            Plan:     PLAN:  Lab work to include cbc, hemoglobin electrophoresis, thalessemia   2. Bone marrow biopsy   3. Referral to GI for scopes, patient has never had colonoscopy   4. Continue seeing Cardiology - on coumadin  5. Follow up PCP     RTC in  6 weeks  with Dr. Addison     Fax note to Hope Tang FNP        I have explained and the patient understands all of  the current recommendation(s). I have answered all of their questions to the best of my ability and to their complete satisfaction.              Thank you for allowing me to participate in this patient's care. Please call with any questions or concerns.    Electronically signed Rosaura Correia NP

## 2024-11-27 ENCOUNTER — TELEPHONE (OUTPATIENT)
Dept: RADIOLOGY | Facility: HOSPITAL | Age: 74
End: 2024-11-27

## 2024-11-27 RX ORDER — ALBUTEROL SULFATE 90 UG/1
INHALANT RESPIRATORY (INHALATION)
Qty: 18 G | Refills: 6 | Status: SHIPPED | OUTPATIENT
Start: 2024-11-27

## 2024-11-27 NOTE — ADDENDUM NOTE
Addended by: CONNIE RESTREPO [facility] on: 11/27/2024 08:48 AM     Modules accepted: Orders

## 2024-11-27 NOTE — NURSING
Pt scheduled for bone marrow biopsy Monday 12/16/2024 at 0900 with an arrival time of 0700, medications reviewed, will remain NPO after midnight the and have a  with her for procedure, all questions answered, pt verbalizes understanding of all.

## 2024-11-29 ENCOUNTER — LAB VISIT (OUTPATIENT)
Dept: LAB | Facility: HOSPITAL | Age: 74
End: 2024-11-29
Attending: NURSE PRACTITIONER
Payer: MEDICARE

## 2024-11-29 DIAGNOSIS — D64.9 ANEMIA, UNSPECIFIED TYPE: ICD-10-CM

## 2024-11-29 LAB
ALBUMIN SERPL BCP-MCNC: 4.3 G/DL (ref 3.5–5.2)
ALP SERPL-CCNC: 69 U/L (ref 55–135)
ALT SERPL W/O P-5'-P-CCNC: 10 U/L (ref 10–44)
ANION GAP SERPL CALC-SCNC: 4 MMOL/L (ref 8–16)
AST SERPL-CCNC: 35 U/L (ref 10–40)
BASOPHILS # BLD AUTO: 0.03 K/UL (ref 0–0.2)
BASOPHILS NFR BLD: 0.4 % (ref 0–1.9)
BILIRUB SERPL-MCNC: 0.6 MG/DL (ref 0.1–1)
BUN SERPL-MCNC: 26 MG/DL (ref 8–23)
CALCIUM SERPL-MCNC: 9.3 MG/DL (ref 8.7–10.5)
CHLORIDE SERPL-SCNC: 100 MMOL/L (ref 95–110)
CO2 SERPL-SCNC: 34 MMOL/L (ref 23–29)
CREAT SERPL-MCNC: 0.6 MG/DL (ref 0.5–1.4)
DIFFERENTIAL METHOD BLD: ABNORMAL
EOSINOPHIL # BLD AUTO: 0.3 K/UL (ref 0–0.5)
EOSINOPHIL NFR BLD: 3.4 % (ref 0–8)
ERYTHROCYTE [DISTWIDTH] IN BLOOD BY AUTOMATED COUNT: 16.4 % (ref 11.5–14.5)
EST. GFR  (NO RACE VARIABLE): >60 ML/MIN/1.73 M^2
GLUCOSE SERPL-MCNC: 105 MG/DL (ref 70–110)
HCT VFR BLD AUTO: 27.3 % (ref 37–48.5)
HGB BLD-MCNC: 8.4 G/DL (ref 12–16)
IMM GRANULOCYTES # BLD AUTO: 0.03 K/UL (ref 0–0.04)
IMM GRANULOCYTES NFR BLD AUTO: 0.4 % (ref 0–0.5)
LYMPHOCYTES # BLD AUTO: 1.3 K/UL (ref 1–4.8)
LYMPHOCYTES NFR BLD: 15.6 % (ref 18–48)
MCH RBC QN AUTO: 31 PG (ref 27–31)
MCHC RBC AUTO-ENTMCNC: 30.8 G/DL (ref 32–36)
MCV RBC AUTO: 101 FL (ref 82–98)
MONOCYTES # BLD AUTO: 0.7 K/UL (ref 0.3–1)
MONOCYTES NFR BLD: 8.2 % (ref 4–15)
NEUTROPHILS # BLD AUTO: 6 K/UL (ref 1.8–7.7)
NEUTROPHILS NFR BLD: 72 % (ref 38–73)
NRBC BLD-RTO: 0 /100 WBC
PLATELET # BLD AUTO: 236 K/UL (ref 150–450)
PMV BLD AUTO: 10.6 FL (ref 9.2–12.9)
POTASSIUM SERPL-SCNC: 4.7 MMOL/L (ref 3.5–5.1)
PROT SERPL-MCNC: 7.5 G/DL (ref 6–8.4)
RBC # BLD AUTO: 2.71 M/UL (ref 4–5.4)
SODIUM SERPL-SCNC: 138 MMOL/L (ref 136–145)
WBC # BLD AUTO: 8.34 K/UL (ref 3.9–12.7)

## 2024-11-29 PROCEDURE — 85025 COMPLETE CBC W/AUTO DIFF WBC: CPT | Performed by: NURSE PRACTITIONER

## 2024-11-29 PROCEDURE — 80053 COMPREHEN METABOLIC PANEL: CPT | Performed by: NURSE PRACTITIONER

## 2024-11-29 PROCEDURE — 36415 COLL VENOUS BLD VENIPUNCTURE: CPT | Performed by: NURSE PRACTITIONER

## 2024-12-02 ENCOUNTER — TELEPHONE (OUTPATIENT)
Facility: CLINIC | Age: 74
End: 2024-12-02
Payer: MEDICARE

## 2024-12-02 ENCOUNTER — LAB VISIT (OUTPATIENT)
Dept: LAB | Facility: HOSPITAL | Age: 74
End: 2024-12-02
Attending: NURSE PRACTITIONER
Payer: MEDICARE

## 2024-12-02 DIAGNOSIS — D64.9 SYMPTOMATIC ANEMIA: ICD-10-CM

## 2024-12-02 DIAGNOSIS — D64.9 ANEMIA, UNSPECIFIED TYPE: Primary | ICD-10-CM

## 2024-12-02 DIAGNOSIS — R04.0 BLEEDING FROM THE NOSE: ICD-10-CM

## 2024-12-02 DIAGNOSIS — D64.9 ANEMIA, UNSPECIFIED TYPE: ICD-10-CM

## 2024-12-02 LAB
ALBUMIN SERPL BCP-MCNC: 4 G/DL (ref 3.5–5.2)
ALP SERPL-CCNC: 74 U/L (ref 55–135)
ALT SERPL W/O P-5'-P-CCNC: 10 U/L (ref 10–44)
ANION GAP SERPL CALC-SCNC: 6 MMOL/L (ref 8–16)
AST SERPL-CCNC: 27 U/L (ref 10–40)
BASOPHILS # BLD AUTO: 0.02 K/UL (ref 0–0.2)
BASOPHILS NFR BLD: 0.2 % (ref 0–1.9)
BILIRUB SERPL-MCNC: 0.6 MG/DL (ref 0.1–1)
BUN SERPL-MCNC: 39 MG/DL (ref 8–23)
CALCIUM SERPL-MCNC: 8.9 MG/DL (ref 8.7–10.5)
CHLORIDE SERPL-SCNC: 99 MMOL/L (ref 95–110)
CO2 SERPL-SCNC: 32 MMOL/L (ref 23–29)
CREAT SERPL-MCNC: 0.8 MG/DL (ref 0.5–1.4)
DIFFERENTIAL METHOD BLD: ABNORMAL
EOSINOPHIL # BLD AUTO: 0.1 K/UL (ref 0–0.5)
EOSINOPHIL NFR BLD: 0.5 % (ref 0–8)
ERYTHROCYTE [DISTWIDTH] IN BLOOD BY AUTOMATED COUNT: 16.5 % (ref 11.5–14.5)
EST. GFR  (NO RACE VARIABLE): >60 ML/MIN/1.73 M^2
GLUCOSE SERPL-MCNC: 113 MG/DL (ref 70–110)
HCT VFR BLD AUTO: 22.8 % (ref 37–48.5)
HGB BLD-MCNC: 7 G/DL (ref 12–16)
IMM GRANULOCYTES # BLD AUTO: 0.06 K/UL (ref 0–0.04)
IMM GRANULOCYTES NFR BLD AUTO: 0.6 % (ref 0–0.5)
LYMPHOCYTES # BLD AUTO: 0.8 K/UL (ref 1–4.8)
LYMPHOCYTES NFR BLD: 8.6 % (ref 18–48)
MCH RBC QN AUTO: 31.1 PG (ref 27–31)
MCHC RBC AUTO-ENTMCNC: 30.7 G/DL (ref 32–36)
MCV RBC AUTO: 101 FL (ref 82–98)
MONOCYTES # BLD AUTO: 0.8 K/UL (ref 0.3–1)
MONOCYTES NFR BLD: 8.3 % (ref 4–15)
NEUTROPHILS # BLD AUTO: 7.7 K/UL (ref 1.8–7.7)
NEUTROPHILS NFR BLD: 81.8 % (ref 38–73)
NRBC BLD-RTO: 0 /100 WBC
PLATELET # BLD AUTO: 199 K/UL (ref 150–450)
PMV BLD AUTO: 10.7 FL (ref 9.2–12.9)
POTASSIUM SERPL-SCNC: 4.7 MMOL/L (ref 3.5–5.1)
PROT SERPL-MCNC: 7 G/DL (ref 6–8.4)
RBC # BLD AUTO: 2.25 M/UL (ref 4–5.4)
SODIUM SERPL-SCNC: 137 MMOL/L (ref 136–145)
WBC # BLD AUTO: 9.47 K/UL (ref 3.9–12.7)

## 2024-12-02 PROCEDURE — 80053 COMPREHEN METABOLIC PANEL: CPT | Performed by: NURSE PRACTITIONER

## 2024-12-02 PROCEDURE — 85025 COMPLETE CBC W/AUTO DIFF WBC: CPT | Performed by: NURSE PRACTITIONER

## 2024-12-02 PROCEDURE — 36415 COLL VENOUS BLD VENIPUNCTURE: CPT | Performed by: NURSE PRACTITIONER

## 2024-12-02 RX ORDER — HYDROCODONE BITARTRATE AND ACETAMINOPHEN 500; 5 MG/1; MG/1
TABLET ORAL ONCE
Status: CANCELLED | OUTPATIENT
Start: 2024-12-02 | End: 2024-12-02

## 2024-12-02 RX ORDER — FUROSEMIDE 10 MG/ML
20 INJECTION INTRAMUSCULAR; INTRAVENOUS ONCE
Status: CANCELLED | OUTPATIENT
Start: 2024-12-02

## 2024-12-02 RX ORDER — DIPHENHYDRAMINE HYDROCHLORIDE 50 MG/ML
25 INJECTION INTRAMUSCULAR; INTRAVENOUS ONCE
Status: CANCELLED | OUTPATIENT
Start: 2024-12-02

## 2024-12-02 RX ORDER — ACETAMINOPHEN 325 MG/1
650 TABLET ORAL ONCE
Status: CANCELLED | OUTPATIENT
Start: 2024-12-02

## 2024-12-02 NOTE — TELEPHONE ENCOUNTER
Spoke to patient who confirmed she had noseblood on Saturday. She also states that she failed to mention at last appt that since starting coumadin she has had several nosebloods. She states she is enrolled with coumadin clinic and had some labs done for them this AM and her INR 4.4. I instructed that gabbie would like her to have labs redrawn to reassess blood levels to see if in need of blood at this time, she states she had labs done on Friday, I informed her that we are aware and again confirmed that she had nose blood on Saturday which she confirmed to be true. Informed that blood levels may have dropped since than and again requested she have CBC drawn to assess, she states she will try to get to the labs today to have this done.

## 2024-12-02 NOTE — TELEPHONE ENCOUNTER
----- Message from Rosaura Urbano NP sent at 12/2/2024 10:45 AM CST -----  Yes we can do a repeat cbc  ----- Message -----  From: Jamilah Rodney, RN  Sent: 12/2/2024  10:38 AM CST  To: Rosaura Correia NP    You saw her as new patient on 11/26, hgb was in 8's at that time. Do you want me to have her get labs redrawn or any other recs concerning this?  ----- Message -----  From: Jennifer Jacobo  Sent: 12/2/2024   8:53 AM CST  To: Cyn Nunez Staff    Pt is asking for a call back concerning nosebleed that started around 2 pm on Sat did not stop until 8:00 last night. She concerned about RBC's being low.      [phone]

## 2024-12-02 NOTE — TELEPHONE ENCOUNTER
Hgb 7.0. Reviewed with OMAR Llanes's and per her verbal order, patient to get 2 units of blood. Patient made aware of above and verbalized understanding. [facility] infusion made aware of need for blood transfusion.

## 2024-12-03 ENCOUNTER — LAB VISIT (OUTPATIENT)
Dept: LAB | Facility: HOSPITAL | Age: 74
End: 2024-12-03
Attending: NURSE PRACTITIONER
Payer: MEDICARE

## 2024-12-03 DIAGNOSIS — D64.9 SYMPTOMATIC ANEMIA: ICD-10-CM

## 2024-12-03 PROBLEM — I77.1 STENOSIS OF LEFT SUBCLAVIAN ARTERY: Status: ACTIVE | Noted: 2024-12-03

## 2024-12-03 PROBLEM — Z91.89 AT RISK FOR BLEEDING ASSOCIATED WITH ANTICOAGULANTS: Status: ACTIVE | Noted: 2024-12-03

## 2024-12-03 PROBLEM — I70.208: Status: ACTIVE | Noted: 2024-12-03

## 2024-12-03 LAB
ABO + RH BLD: NORMAL
BLD GP AB SCN CELLS X3 SERPL QL: NORMAL
SPECIMEN OUTDATE: NORMAL

## 2024-12-03 PROCEDURE — 86900 BLOOD TYPING SEROLOGIC ABO: CPT | Performed by: NURSE PRACTITIONER

## 2024-12-03 PROCEDURE — 86920 COMPATIBILITY TEST SPIN: CPT | Performed by: NURSE PRACTITIONER

## 2024-12-03 PROCEDURE — 36415 COLL VENOUS BLD VENIPUNCTURE: CPT | Performed by: NURSE PRACTITIONER

## 2024-12-04 ENCOUNTER — INFUSION (OUTPATIENT)
Dept: INFUSION THERAPY | Facility: HOSPITAL | Age: 74
End: 2024-12-04
Attending: NURSE PRACTITIONER
Payer: MEDICARE

## 2024-12-04 ENCOUNTER — PATIENT MESSAGE (OUTPATIENT)
Dept: PULMONOLOGY | Facility: CLINIC | Age: 74
End: 2024-12-04
Payer: MEDICARE

## 2024-12-04 ENCOUNTER — OFFICE VISIT (OUTPATIENT)
Dept: CARDIOLOGY | Facility: CLINIC | Age: 74
End: 2024-12-04
Payer: MEDICARE

## 2024-12-04 ENCOUNTER — TELEPHONE (OUTPATIENT)
Dept: CARDIOLOGY | Facility: CLINIC | Age: 74
End: 2024-12-04
Payer: MEDICARE

## 2024-12-04 VITALS
TEMPERATURE: 99 F | SYSTOLIC BLOOD PRESSURE: 136 MMHG | BODY MASS INDEX: 28.16 KG/M2 | OXYGEN SATURATION: 98 % | HEIGHT: 65 IN | HEART RATE: 60 BPM | WEIGHT: 169 LBS | DIASTOLIC BLOOD PRESSURE: 61 MMHG

## 2024-12-04 DIAGNOSIS — Z95.0 S/P PLACEMENT OF CARDIAC PACEMAKER: ICD-10-CM

## 2024-12-04 DIAGNOSIS — Z79.01 LONG TERM (CURRENT) USE OF ANTICOAGULANTS: ICD-10-CM

## 2024-12-04 DIAGNOSIS — Z98.62 H/O TRANSCAROTID ARTERY REVASCULARIZATION (TCAR): ICD-10-CM

## 2024-12-04 DIAGNOSIS — I48.21 PERMANENT ATRIAL FIBRILLATION: ICD-10-CM

## 2024-12-04 DIAGNOSIS — Z95.2 S/P TAVR (TRANSCATHETER AORTIC VALVE REPLACEMENT): ICD-10-CM

## 2024-12-04 DIAGNOSIS — Z91.89 AT HIGH RISK FOR BLEEDING: ICD-10-CM

## 2024-12-04 DIAGNOSIS — D64.9 ANEMIA REQUIRING TRANSFUSIONS: Primary | ICD-10-CM

## 2024-12-04 DIAGNOSIS — D64.9 SYMPTOMATIC ANEMIA: ICD-10-CM

## 2024-12-04 DIAGNOSIS — I50.32 HEART FAILURE WITH IMPROVED EJECTION FRACTION (HFIMPEF): ICD-10-CM

## 2024-12-04 DIAGNOSIS — I25.10 CORONARY ARTERY DISEASE INVOLVING NATIVE CORONARY ARTERY OF NATIVE HEART WITHOUT ANGINA PECTORIS: ICD-10-CM

## 2024-12-04 LAB
BLD PROD TYP BPU: NORMAL
BLD PROD TYP BPU: NORMAL
BLOOD UNIT EXPIRATION DATE: NORMAL
BLOOD UNIT EXPIRATION DATE: NORMAL
BLOOD UNIT TYPE CODE: 5100
BLOOD UNIT TYPE CODE: 5100
BLOOD UNIT TYPE: NORMAL
BLOOD UNIT TYPE: NORMAL
CODING SYSTEM: NORMAL
CODING SYSTEM: NORMAL
CROSSMATCH INTERPRETATION: NORMAL
CROSSMATCH INTERPRETATION: NORMAL
DISPENSE STATUS: NORMAL
DISPENSE STATUS: NORMAL
NUM UNITS TRANS PACKED RBC: NORMAL
NUM UNITS TRANS PACKED RBC: NORMAL

## 2024-12-04 PROCEDURE — 99215 OFFICE O/P EST HI 40 MIN: CPT | Mod: HCNC,95,, | Performed by: INTERNAL MEDICINE

## 2024-12-04 PROCEDURE — 63600175 PHARM REV CODE 636 W HCPCS: Performed by: NURSE PRACTITIONER

## 2024-12-04 PROCEDURE — 96374 THER/PROPH/DIAG INJ IV PUSH: CPT

## 2024-12-04 PROCEDURE — 36430 TRANSFUSION BLD/BLD COMPNT: CPT

## 2024-12-04 PROCEDURE — 25000003 PHARM REV CODE 250: Performed by: NURSE PRACTITIONER

## 2024-12-04 PROCEDURE — P9016 RBC LEUKOCYTES REDUCED: HCPCS | Performed by: NURSE PRACTITIONER

## 2024-12-04 RX ORDER — FUROSEMIDE 10 MG/ML
20 INJECTION INTRAMUSCULAR; INTRAVENOUS ONCE
Status: ACTIVE | OUTPATIENT
Start: 2024-12-04

## 2024-12-04 RX ORDER — PROMETHAZINE HYDROCHLORIDE AND DEXTROMETHORPHAN HYDROBROMIDE 6.25; 15 MG/5ML; MG/5ML
5 SYRUP ORAL EVERY 6 HOURS PRN
Qty: 118 ML | Refills: 0 | Status: ON HOLD | OUTPATIENT
Start: 2024-12-04 | End: 2024-12-14

## 2024-12-04 RX ORDER — HYDROCODONE BITARTRATE AND ACETAMINOPHEN 500; 5 MG/1; MG/1
TABLET ORAL ONCE
Status: COMPLETED | OUTPATIENT
Start: 2024-12-04 | End: 2024-12-04

## 2024-12-04 RX ORDER — DIPHENHYDRAMINE HYDROCHLORIDE 50 MG/ML
25 INJECTION INTRAMUSCULAR; INTRAVENOUS ONCE
Status: COMPLETED | OUTPATIENT
Start: 2024-12-04 | End: 2024-12-04

## 2024-12-04 RX ORDER — ACETAMINOPHEN 325 MG/1
650 TABLET ORAL ONCE
Status: COMPLETED | OUTPATIENT
Start: 2024-12-04 | End: 2024-12-04

## 2024-12-04 RX ADMIN — SODIUM CHLORIDE: 9 INJECTION, SOLUTION INTRAVENOUS at 07:12

## 2024-12-04 RX ADMIN — DIPHENHYDRAMINE HYDROCHLORIDE 25 MG: 50 INJECTION INTRAMUSCULAR; INTRAVENOUS at 07:12

## 2024-12-04 RX ADMIN — ACETAMINOPHEN 650 MG: 325 TABLET ORAL at 07:12

## 2024-12-04 NOTE — PROGRESS NOTES
Structural Cardiology Clinic Note  [facility]  Date: 12/4/24    Patient: Lisa Smith, 1950, [id number]  Primary Care Provider: Hope Tang FNP     Chief Complaint/Reason for Referral: SHARON     Subjective:       Lisa Smith is a 74 y.o. female who presents for consult. They are not accompanied. They were referred by Raymond Pérez MD.    Active problems includes:  Stroke/TIA: no  Intracranial hemorrhage No  GI bleed No - FOBT negative 11/2024. Bleeding ulcers in 2011   bleed No  Blood transfusion Yes - 12/2024.   Significant falls in past 12 months No  Poor balance Yes - with walker. Also impaired walking because of back pain  Acute coronary syndrome/revascularization in past 12 months No  Dysphagia/odynophagia No  History of esophageal/gastric procedures No  History of DVT/PE requiring chronic anticoagulation No    Additionally they report no chest discomfort,dyspnea, edema, orthopnea, PND, palpitations, syncope.     Was on clopidogrel alone before LULU and didn't have any anemia issues. She has been having frequent epistaxis as well.     Other active problems includes:  Severe anemia requiring transfusion  Severe AS s/p LULU 12/2023 (Dr. Shelton)  HFimpEF (TTE 12/2024 reported EF 55-60%, normal RV. Bilateral atrial enlargement. TAV MG 28 and DI 0.43 with mild to moderate PVL). Moderate MR. PASP 45  Severe COPD on home O2  Left CEA and stent, right ICA occluded   Permanent AF   Single lead PPM  CAD with  of RCA  PAD with bilateral subclavian stenosis L>R    Current Facility-Administered Medications   Medication    furosemide injection 20 mg     Current Outpatient Medications   Medication Sig    acetaminophen (TYLENOL ARTHRITIS ORAL) Take 2 tablets by mouth daily as needed.    acetaminophen (TYLENOL) 325 MG tablet Take 325 mg by mouth every 6 (six) hours as needed for Pain.    albuterol (ACCUNEB) 1.25 mg/3 mL Nebu INHALE THE CONTENTS OF 1 VIAL VIA NEBULIZER EVERY 6 HOURS AS  NEEDED FOR RESCUE    albuterol (PROVENTIL/VENTOLIN HFA) 90 mcg/actuation inhaler INHALE 2 PUFFS EVERY 6 HOURS AS NEEDED FOR WHEEZING (RESCUE)    clopidogreL (PLAVIX) 75 mg tablet Take 1 tablet (75 mg total) by mouth once daily.    digoxin (LANOXIN) 250 mcg tablet TAKE 1 TABLET EVERY DAY    ezetimibe (ZETIA) 10 mg tablet TAKE 1 TABLET ONE TIME DAILY    fluticasone propionate (FLONASE) 50 mcg/actuation nasal spray by Each Nostril route daily as needed.    furosemide (LASIX) 20 MG tablet Take 1 tablet (20 mg total) by mouth every morning.    metoprolol succinate (TOPROL-XL) 25 MG 24 hr tablet Take 1 tablet (25 mg total) by mouth every morning.    pantoprazole (PROTONIX) 40 MG tablet Take 1 tablet (40 mg total) by mouth every morning.    promethazine-dextromethorphan (PROMETHAZINE-DM) 6.25-15 mg/5 mL Syrp Take 5 mLs by mouth every 6 (six) hours as needed (cough).    rOPINIRole (REQUIP) 1 MG tablet Take 1 tablet (1 mg total) by mouth every evening.    rosuvastatin (CRESTOR) 40 MG Tab Take 1 tablet (40 mg total) by mouth every evening.    sacubitriL-valsartan (ENTRESTO) 24-26 mg per tablet Take 1 tablet by mouth 2 (two) times daily.    spironolactone (ALDACTONE) 25 MG tablet Take 1 tablet (25 mg total) by mouth every morning.    TRELEGY ELLIPTA 100-62.5-25 mcg DsDv INHALE 1 PUFF INTO THE LUNGS ONCE DAILY.    warfarin (COUMADIN) 3 MG tablet Take 1.5 pills by mouth daily or as directed per the [facility]; #145 pills = 90 day supply     Facility-Administered Medications Ordered in Other Visits   Medication    albuterol-ipratropium 2.5 mg-0.5 mg/3 mL nebulizer solution 3 mL    methylPREDNISolone sodium succinate injection 125 mg            Objective       Review of Systems  Constitutional: negative for fevers, night sweats, and weight loss  Eyes: negative for visual disturbance, diplopia  Respiratory: negative for cough, hemoptysis, sputum, and wheezing  Cardiovascular: see HPI  Gastrointestinal: negative for abdominal  pain, bright red blood per rectum, change in bowel habits, dysphagia, melena, and reflux symptoms  Genitourinary:negative for dysuria, frequency, and hematuria  Hematologic/lymphatic: negative for bleeding, easy bruising, and lymphadenopathy  Musculoskeletal:negative for arthralgias, back pain, and myalgias  Neurological: negative for gait problems, paresthesia, speech problems, vertigo, and weakness  Behavioral/Psych: negative for excessive alcohol consumption, illegal drug usage, and sleep disturbance    -------------------------------------    Anticoagulant long-term use    Arthritis    Atrial fibrillation    Bell's palsy    Boil of buttock    burst 12/19/22    CHF (congestive heart failure)    Chronic cough    COPD (chronic obstructive pulmonary disease)    use O2  3l/m NC at night; also taking during day currently 12/4/23    Dizziness    Encounter for blood transfusion    GI bleed    transfusion    H/O diverticulitis of colon    Hard of hearing    Heart murmur    Hematoma    left hand    Heterozygous alpha 1-antitrypsin deficiency    History of home oxygen therapy    3L NC at night    Hypertension    Lung disease    copd    MONSERRAT (obstructive sleep apnea)    resolved with wt loss 131#    Pacemaker    Pneumonia    last episode 2019    Pulmonary edema    Skin cancer    Unspecified visual disturbance    episode of vision disturbance and dizziness...occasional recurrences     ----------------------------    Cardiac electrophysiology study and ablation    Carotid endarterectomy    Procedure: ENDARTERECTOMY-CAROTID;  Surgeon: Maximilian Connolly MD;  Location: [facility] OR;  Service: Peripheral Vascular;  Laterality: Left;    Carotid stent    Procedure: INSERTION, STENT, ARTERY, CAROTID;  Surgeon: CIRILO Valdivia III, MD;  Location: [address];  Service: Vascular;  Laterality: Left;  left carotid artery stent placement  mgy  146.29   gymc2  8.0730  fluro time  4.8min    Carpal tunnel release    Cholecystectomy    Eye  surgery    cataract    Hysterectomy    Insert / replace / remove pacemaker    Knee arthroscopy    Left heart catheterization    Procedure: Left heart cath;  Surgeon: Puma Shelton MD;  Location: [facility] CATH;  Service: Cardiology;;    Replacement of pacemaker generator    Procedure: REPLACEMENT, PACEMAKER GENERATOR;  Surgeon: Raymond Pérez MD;  Location: [facility] CATH/EP LAB;  Service: Cardiology;  Laterality: Left;    Skin cancer excision    Transcatheter aortic valve replacement (tavr)    Procedure: (TAVR);  Surgeon: Puma Shelton MD;  Location: [facility] CATH;  Service: Cardiology;  Laterality: Bilateral;    Transcatheter aortic valve replacement (tavr)    Procedure: (TAVR) - Surgeon;  Surgeon: Maximilian Connolly MD;  Location: [facility] CATH;  Service: Peripheral Vascular;  Laterality: Bilateral;        Family History   Problem Relation Name Age of Onset    Kidney failure Mother      Cancer Father      Cancer Brother       Social History     Tobacco Use    Smoking status: Former     Current packs/day: 0.00     Average packs/day: 2.0 packs/day for 40.0 years (80.0 ttl pk-yrs)     Types: Cigarettes     Start date: 1971     Quit date: 2011     Years since quittin.8     Passive exposure: Past    Smokeless tobacco: Never    Tobacco comments:          Quit in    Substance Use Topics    Alcohol use: Not Currently     Alcohol/week: 8.0 standard drinks of alcohol     Types: 8 Glasses of wine per week    Drug use: No       Physical Exam    Deferred in virtual visit      Lab Review   Lab Results   Component Value Date    WBC 9.47 2024    HGB 7.0 (L) 2024    HCT 22.8 (L) 2024     (H) 2024     2024         BMP  Lab Results   Component Value Date     2024    K 4.7 2024    CL 99 2024    CO2 32 (H) 2024    BUN 39 (H) 2024    CREATININE 0.8 2024    CALCIUM 8.9 2024    ANIONGAP 6 (L) 2024    ESTGFRAFRICA >60.0 05/10/2022     EGFRNONAA >60.0 05/10/2022       Lab Results   Component Value Date    LABPROT 23.0 (H) 07/03/2024    ALBUMIN 4.0 12/02/2024       Lab Results   Component Value Date    ALT 10 12/02/2024    AST 27 12/02/2024    ALKPHOS 74 12/02/2024    BILITOT 0.6 12/02/2024       Lab Results   Component Value Date    TSH 1.040 05/10/2022       Lab Results   Component Value Date    CHOL 199 05/10/2022    CHOL 190 02/17/2020    CHOL 178 03/23/2004     Lab Results   Component Value Date    HDL 59 05/10/2022    HDL 68 02/17/2020    HDL 95.0 (H) 03/23/2004     Lab Results   Component Value Date    LDLCALC 112.0 05/10/2022    LDLCALC 106.0 02/17/2020    LDLCALC 68.4 03/23/2004     Lab Results   Component Value Date    TRIG 140 05/10/2022    TRIG 80 02/17/2020    TRIG 73 03/23/2004     Lab Results   Component Value Date    CHOLHDL 29.6 05/10/2022    CHOLHDL 35.8 02/17/2020    CHOLHDL 53.4 (H) 03/23/2004       GMW6WG2-BNLu score   Sex: 1  Female (1 point)   Male (0 points)    Age: 2   <=64 years old (0 points)   65 to 74 years old (1 point)   >=75 years old (2 points)    Comorbidities: 1+1+0+0+1   Heart failure (1 point)   Hypertension (1 point)   Diabetes mellitus (1 point)   History of stroke, TIA, or thromboembolism (2 points)   Vascular disease (history of MI, PAD, or aortic atherosclerosis) (1 point)    Total points: 6    Unadjusted stroke rate: [Veronica L, Antoine M, Bruce GY. Evaluation of risk stratification schemes for ischaemic stroke and bleeding in [id number] patients with atrial fibrillation: the [place] Atrial Fibrillation cohort study. Eur Heart J 2012; 33:1500.]  0 points: 0.2% per year  1 point:  0.6% per year  2 points: 2.2% per year  3 points: 3.2% per year  4 points: 4.8% per year  5 points: 7.2% per year  6 points: 9.7% per year  7 points: 11.1% per year  8 points: 11% per year  9 points: 12.2% per year    HAS-BLED score:  Hypertension (1 point) : 1  Abnormal liver function (cirrhosis, bilirubin >2xULN or ALT>3xULN) (1  point): 0  Abnormal renal function (dialysis, renal transplant or Cr>2.26) (1 point): 0  Stroke (1 point): 0  Bleeding tendency or predisposition (1 point): 1  Labile INRs in patients taking warfarin (<60%TTR or high/labile INR) (1 point): 1  Elderly: age greater than 65 years (1 point): 1  Drugs: concomitant antiplatelet agents (eg, aspirin, clopidogrel, ticlopidine, nonsteroidal antiinflammatory agents) (1 point): 1  Drugs: concomitant excess alcohol use (1 point): 0    Total score: 5    0 points: 1.13 bleeds per 100 patient-years  1 point:  1.02 bleeds per 100 patient-years  2 points: 1.88 bleeds per 100 patient-years  3 points: 3.74 bleeds per 100 patient-years  4 points: 8.70 bleeds per 100 patient-years  5 to 9 points: Insufficient data    [Lip GY. Implications of the JEREMY(2)DS(2)-VASc and HAS-BLED Scores for thromboprophylaxis in atrial fibrillation. Am J Med 2011; 124:111.]             Assessment & Plan:     1. Anemia requiring transfusions        2. S/P placement of cardiac pacemaker        3. Long term (current) use of anticoagulants        4. Permanent atrial fibrillation        5. S/P TAVR (transcatheter aortic valve replacement)        6. At high risk for bleeding        7. H/O transcarotid artery revascularization (TCAR)        8. Coronary artery disease involving native coronary artery of native heart without angina pectoris        9. Heart failure with improved ejection fraction (HFimpEF)                This is a 74 y.o. white female. They have permanent atrial fibrillation. Their UXB5WG4-AHCs score is 6 and HAS-BLED score is 5.  She has severe anemia requiring transfusion since adding warfarin to clopidogrel after her LULU 12 months ago.     We discussed the rationale, risks, benefits, and alternatives for seeking left atrial appendage closure with the Watchman device.  Shared decision making using the ACC/HRS algorithm and shared decision-making tool was used to help guide the discussion.  The  benefits of RUY closure include risk reduction for ischemic stroke similar to that of chronic OAC without need for chronic OAC.  The risks include a <1% risk of death, tamponade, need for emergency heart surgery, device embolization, stroke, bleeding, vascular injury, and a 2-3% longterm risk of device related thrombus.  The patient is a candidate for short-term OAC but certainly not long-term as detailed above . We addressed the alternatives of oral anticoagulation or no OAC with the high risk of stroke.  I believe it reasonable to proceed with RUY closure. They would like to proceed with left atrial appendage closure and shared decision making discussed with Dr. Pérez.     I am concerned about the precipitous rise in her transcatheter aortic valve gradient, despite being on clopidogrel and warfarin, which raises the suspicion of structural valve degeneration and/or valve thrombosis.  It is critically important to identify the mechanism both from a TAV durability standpoint and also because left atrial appendage occlusion would be a moot point if she needs long-term anticoagulation for valve thrombosis  Gated cardiac CTA to rule out valve thrombosis and evaluate leaflet mobility and valve expansion.  Communicated to her TAVR team  Plan to transition to DAPT immediately post LAAO   Hematology evaluation ongoing including a scheduled BMBx  Will also check a hemolysis panel (LDH, PFH, haptoglobin) due to presence of PVL.   Emphasized the importance of modifying lifestyle related risk factors including tobacco absinence, limiting alcohol intake, exercise, and Mediterranean diet.    Thank you, Dr. Pérez, for the opportunity to participate in Lisa Smith 's care today.    Please follow up with me in 3 months.      Roxana Cantu MD, FACC  Interventional Cardiology/Structural Heart Disease  [facility]  Office: [phone]     Parts of this note were completed using voice  recognition software. Please excuse any misspellings or syntax errors and reach out to me with questions.    The patient location is: Home   The chief complaint leading to consultation is:  Please see problem list above  Visit type: Virtual visit with synchronous audio and video  Total time spent with patient: 25 minutes   Each patient to whom he or she provides medical services by telemedicine is:  (1) informed of the relationship between the physician and patient and the respective role of any other health care provider with respect to management of the patient; and (2) notified that he or she may decline to receive medical services by telemedicine and may withdraw from such care at any time.    Notes: See above

## 2024-12-04 NOTE — PLAN OF CARE
Problem: Adult Inpatient Plan of Care  Goal: Optimal Comfort and Wellbeing  Outcome: Progressing  Intervention: Provide Person-Centered Care  Flowsheets (Taken 12/4/2024 1676)  Trust Relationship/Rapport:   care explained   choices provided   emotional support provided   empathic listening provided   questions answered   questions encouraged   reassurance provided   thoughts/feelings acknowledged

## 2024-12-04 NOTE — TELEPHONE ENCOUNTER
Spoke with patient and virtual visit appointment made with Dr. Cantu today at 1 pm to discuss Watchman procedure.

## 2024-12-05 ENCOUNTER — HOSPITAL ENCOUNTER (INPATIENT)
Facility: HOSPITAL | Age: 74
LOS: 5 days | Discharge: HOME HEALTH | DRG: 190 | End: 2024-12-10
Attending: EMERGENCY MEDICINE | Admitting: INTERNAL MEDICINE
Payer: MEDICARE

## 2024-12-05 DIAGNOSIS — J44.1 COPD EXACERBATION: ICD-10-CM

## 2024-12-05 DIAGNOSIS — D64.9 ANEMIA, UNSPECIFIED TYPE: ICD-10-CM

## 2024-12-05 DIAGNOSIS — R07.9 CHEST PAIN: ICD-10-CM

## 2024-12-05 DIAGNOSIS — J18.9 PNEUMONIA DUE TO INFECTIOUS ORGANISM, UNSPECIFIED LATERALITY, UNSPECIFIED PART OF LUNG: Primary | ICD-10-CM

## 2024-12-05 DIAGNOSIS — I50.32 HEART FAILURE WITH IMPROVED EJECTION FRACTION (HFIMPEF): ICD-10-CM

## 2024-12-05 DIAGNOSIS — I50.33 ACUTE ON CHRONIC HEART FAILURE WITH PRESERVED EJECTION FRACTION (HFPEF): ICD-10-CM

## 2024-12-05 DIAGNOSIS — I49.9 IRREGULAR CARDIAC RHYTHM: ICD-10-CM

## 2024-12-05 DIAGNOSIS — R04.0 EPISTAXIS: ICD-10-CM

## 2024-12-05 LAB
ALBUMIN SERPL BCP-MCNC: 4.3 G/DL (ref 3.5–5.2)
ALLENS TEST: ABNORMAL
ALP SERPL-CCNC: 91 U/L (ref 55–135)
ALT SERPL W/O P-5'-P-CCNC: 14 U/L (ref 10–44)
ANION GAP SERPL CALC-SCNC: 2 MMOL/L (ref 8–16)
AST SERPL-CCNC: 48 U/L (ref 10–40)
BACTERIA #/AREA URNS HPF: ABNORMAL /HPF
BASOPHILS # BLD AUTO: 0.03 K/UL (ref 0–0.2)
BASOPHILS NFR BLD: 0.3 % (ref 0–1.9)
BILIRUB SERPL-MCNC: 1.1 MG/DL (ref 0.1–1)
BILIRUB UR QL STRIP: NEGATIVE
BNP SERPL-MCNC: 301 PG/ML (ref 0–99)
BUN SERPL-MCNC: 28 MG/DL (ref 8–23)
CALCIUM SERPL-MCNC: 9.3 MG/DL (ref 8.7–10.5)
CHLORIDE SERPL-SCNC: 102 MMOL/L (ref 95–110)
CLARITY UR: ABNORMAL
CO2 SERPL-SCNC: 32 MMOL/L (ref 23–29)
COLOR UR: YELLOW
CREAT SERPL-MCNC: 0.7 MG/DL (ref 0.5–1.4)
DELSYS: ABNORMAL
DIFFERENTIAL METHOD BLD: ABNORMAL
EOSINOPHIL # BLD AUTO: 0.1 K/UL (ref 0–0.5)
EOSINOPHIL NFR BLD: 0.8 % (ref 0–8)
ERYTHROCYTE [DISTWIDTH] IN BLOOD BY AUTOMATED COUNT: 19.3 % (ref 11.5–14.5)
ERYTHROCYTE [DISTWIDTH] IN BLOOD BY AUTOMATED COUNT: 19.6 % (ref 11.5–14.5)
EST. GFR  (NO RACE VARIABLE): >60 ML/MIN/1.73 M^2
FLOW: 3
GLUCOSE SERPL-MCNC: 100 MG/DL (ref 70–110)
GLUCOSE SERPL-MCNC: 114 MG/DL (ref 70–110)
GLUCOSE UR QL STRIP: NEGATIVE
GRAN CASTS #/AREA URNS LPF: 4 /LPF
HCO3 UR-SCNC: 27.6 MMOL/L (ref 24–28)
HCT VFR BLD AUTO: 25.6 % (ref 37–48.5)
HCT VFR BLD AUTO: 26.8 % (ref 37–48.5)
HCT VFR BLD CALC: 26 %PCV (ref 36–54)
HCV AB SERPL QL IA: NEGATIVE
HGB BLD-MCNC: 8.4 G/DL (ref 12–16)
HGB BLD-MCNC: 8.4 G/DL (ref 12–16)
HGB UR QL STRIP: ABNORMAL
HIV 1+2 AB+HIV1 P24 AG SERPL QL IA: NEGATIVE
HYALINE CASTS #/AREA URNS LPF: 0 /LPF
IMM GRANULOCYTES # BLD AUTO: 0.05 K/UL (ref 0–0.04)
IMM GRANULOCYTES NFR BLD AUTO: 0.5 % (ref 0–0.5)
INFLUENZA A, MOLECULAR: NEGATIVE
INFLUENZA B, MOLECULAR: NEGATIVE
INR PPP: 1.6 (ref 0.8–1.2)
KETONES UR QL STRIP: NEGATIVE
LDH SERPL L TO P-CCNC: 0.74 MMOL/L (ref 0.5–2.2)
LEUKOCYTE ESTERASE UR QL STRIP: ABNORMAL
LYMPHOCYTES # BLD AUTO: 0.6 K/UL (ref 1–4.8)
LYMPHOCYTES NFR BLD: 5.2 % (ref 18–48)
MCH RBC QN AUTO: 29.1 PG (ref 27–31)
MCH RBC QN AUTO: 30.5 PG (ref 27–31)
MCHC RBC AUTO-ENTMCNC: 31.3 G/DL (ref 32–36)
MCHC RBC AUTO-ENTMCNC: 32.8 G/DL (ref 32–36)
MCV RBC AUTO: 93 FL (ref 82–98)
MCV RBC AUTO: 93 FL (ref 82–98)
MICROSCOPIC COMMENT: ABNORMAL
MODE: ABNORMAL
MONOCYTES # BLD AUTO: 1.2 K/UL (ref 0.3–1)
MONOCYTES NFR BLD: 11 % (ref 4–15)
NEUTROPHILS # BLD AUTO: 8.9 K/UL (ref 1.8–7.7)
NEUTROPHILS NFR BLD: 82.2 % (ref 38–73)
NITRITE UR QL STRIP: NEGATIVE
NRBC BLD-RTO: 0 /100 WBC
PCO2 BLDA: 44.4 MMHG (ref 35–45)
PH SMN: 7.4 [PH] (ref 7.35–7.45)
PH UR STRIP: 6 [PH] (ref 5–8)
PLATELET # BLD AUTO: 250 K/UL (ref 150–450)
PLATELET # BLD AUTO: 299 K/UL (ref 150–450)
PMV BLD AUTO: 10.3 FL (ref 9.2–12.9)
PMV BLD AUTO: 10.8 FL (ref 9.2–12.9)
PO2 BLDA: 77 MMHG (ref 80–100)
POC BE: 3 MMOL/L
POC IONIZED CALCIUM: 1.23 MMOL/L (ref 1.06–1.42)
POC SATURATED O2: 95 % (ref 95–100)
POC TCO2: 29 MMOL/L (ref 23–27)
POTASSIUM BLD-SCNC: 4.6 MMOL/L (ref 3.5–5.1)
POTASSIUM SERPL-SCNC: 5.1 MMOL/L (ref 3.5–5.1)
PROT SERPL-MCNC: 8 G/DL (ref 6–8.4)
PROT UR QL STRIP: ABNORMAL
PROTHROMBIN TIME: 17.6 SEC (ref 9–12.5)
RBC # BLD AUTO: 2.75 M/UL (ref 4–5.4)
RBC # BLD AUTO: 2.89 M/UL (ref 4–5.4)
RBC #/AREA URNS HPF: 1 /HPF (ref 0–4)
SAMPLE: ABNORMAL
SAMPLE: NORMAL
SARS-COV-2 RDRP RESP QL NAA+PROBE: NEGATIVE
SITE: ABNORMAL
SODIUM BLD-SCNC: 138 MMOL/L (ref 136–145)
SODIUM SERPL-SCNC: 136 MMOL/L (ref 136–145)
SP GR UR STRIP: 1.02 (ref 1–1.03)
SP02: 94
SPECIMEN SOURCE: NORMAL
SQUAMOUS #/AREA URNS HPF: 0 /HPF
URN SPEC COLLECT METH UR: ABNORMAL
UROBILINOGEN UR STRIP-ACNC: NEGATIVE EU/DL
WBC # BLD AUTO: 10.68 K/UL (ref 3.9–12.7)
WBC # BLD AUTO: 10.88 K/UL (ref 3.9–12.7)
WBC #/AREA URNS HPF: 5 /HPF (ref 0–5)

## 2024-12-05 PROCEDURE — 99900031 HC PATIENT EDUCATION (STAT)

## 2024-12-05 PROCEDURE — 82803 BLOOD GASES ANY COMBINATION: CPT

## 2024-12-05 PROCEDURE — 99900035 HC TECH TIME PER 15 MIN (STAT)

## 2024-12-05 PROCEDURE — 94640 AIRWAY INHALATION TREATMENT: CPT

## 2024-12-05 PROCEDURE — 63600175 PHARM REV CODE 636 W HCPCS: Performed by: EMERGENCY MEDICINE

## 2024-12-05 PROCEDURE — 85027 COMPLETE CBC AUTOMATED: CPT | Performed by: NURSE PRACTITIONER

## 2024-12-05 PROCEDURE — 96365 THER/PROPH/DIAG IV INF INIT: CPT

## 2024-12-05 PROCEDURE — 25000003 PHARM REV CODE 250: Performed by: NURSE PRACTITIONER

## 2024-12-05 PROCEDURE — 87635 SARS-COV-2 COVID-19 AMP PRB: CPT | Performed by: EMERGENCY MEDICINE

## 2024-12-05 PROCEDURE — 27000221 HC OXYGEN, UP TO 24 HOURS

## 2024-12-05 PROCEDURE — 94761 N-INVAS EAR/PLS OXIMETRY MLT: CPT | Mod: XB

## 2024-12-05 PROCEDURE — 84295 ASSAY OF SERUM SODIUM: CPT

## 2024-12-05 PROCEDURE — 25000003 PHARM REV CODE 250: Performed by: EMERGENCY MEDICINE

## 2024-12-05 PROCEDURE — 83880 ASSAY OF NATRIURETIC PEPTIDE: CPT | Performed by: EMERGENCY MEDICINE

## 2024-12-05 PROCEDURE — 82330 ASSAY OF CALCIUM: CPT

## 2024-12-05 PROCEDURE — 85025 COMPLETE CBC W/AUTO DIFF WBC: CPT | Performed by: EMERGENCY MEDICINE

## 2024-12-05 PROCEDURE — 99223 1ST HOSP IP/OBS HIGH 75: CPT | Mod: ,,, | Performed by: INTERNAL MEDICINE

## 2024-12-05 PROCEDURE — 25000242 PHARM REV CODE 250 ALT 637 W/ HCPCS: Performed by: INTERNAL MEDICINE

## 2024-12-05 PROCEDURE — 25000242 PHARM REV CODE 250 ALT 637 W/ HCPCS: Performed by: NURSE PRACTITIONER

## 2024-12-05 PROCEDURE — 85610 PROTHROMBIN TIME: CPT | Performed by: EMERGENCY MEDICINE

## 2024-12-05 PROCEDURE — 94799 UNLISTED PULMONARY SVC/PX: CPT

## 2024-12-05 PROCEDURE — 85014 HEMATOCRIT: CPT

## 2024-12-05 PROCEDURE — 93010 ELECTROCARDIOGRAM REPORT: CPT | Mod: ,,, | Performed by: INTERNAL MEDICINE

## 2024-12-05 PROCEDURE — 99285 EMERGENCY DEPT VISIT HI MDM: CPT | Mod: 25

## 2024-12-05 PROCEDURE — 96375 TX/PRO/DX INJ NEW DRUG ADDON: CPT

## 2024-12-05 PROCEDURE — 2Y41X5Z PACKING OF NASAL REGION USING PACKING MATERIAL: ICD-10-PCS | Performed by: EMERGENCY MEDICINE

## 2024-12-05 PROCEDURE — 63600175 PHARM REV CODE 636 W HCPCS: Performed by: NURSE PRACTITIONER

## 2024-12-05 PROCEDURE — 87502 INFLUENZA DNA AMP PROBE: CPT | Performed by: EMERGENCY MEDICINE

## 2024-12-05 PROCEDURE — 87389 HIV-1 AG W/HIV-1&-2 AB AG IA: CPT | Performed by: EMERGENCY MEDICINE

## 2024-12-05 PROCEDURE — 93005 ELECTROCARDIOGRAM TRACING: CPT | Performed by: INTERNAL MEDICINE

## 2024-12-05 PROCEDURE — 87040 BLOOD CULTURE FOR BACTERIA: CPT | Performed by: EMERGENCY MEDICINE

## 2024-12-05 PROCEDURE — 86803 HEPATITIS C AB TEST: CPT | Performed by: EMERGENCY MEDICINE

## 2024-12-05 PROCEDURE — 81001 URINALYSIS AUTO W/SCOPE: CPT | Performed by: EMERGENCY MEDICINE

## 2024-12-05 PROCEDURE — 25000242 PHARM REV CODE 250 ALT 637 W/ HCPCS: Performed by: EMERGENCY MEDICINE

## 2024-12-05 PROCEDURE — 21400001 HC TELEMETRY ROOM

## 2024-12-05 PROCEDURE — 36415 COLL VENOUS BLD VENIPUNCTURE: CPT | Performed by: EMERGENCY MEDICINE

## 2024-12-05 PROCEDURE — 84132 ASSAY OF SERUM POTASSIUM: CPT

## 2024-12-05 PROCEDURE — 36600 WITHDRAWAL OF ARTERIAL BLOOD: CPT

## 2024-12-05 PROCEDURE — 80053 COMPREHEN METABOLIC PANEL: CPT | Performed by: EMERGENCY MEDICINE

## 2024-12-05 RX ORDER — SODIUM CHLORIDE 0.9 % (FLUSH) 0.9 %
10 SYRINGE (ML) INJECTION EVERY 12 HOURS PRN
Status: DISCONTINUED | OUTPATIENT
Start: 2024-12-05 | End: 2024-12-10 | Stop reason: HOSPADM

## 2024-12-05 RX ORDER — LANOLIN ALCOHOL/MO/W.PET/CERES
800 CREAM (GRAM) TOPICAL
Status: DISCONTINUED | OUTPATIENT
Start: 2024-12-05 | End: 2024-12-10 | Stop reason: HOSPADM

## 2024-12-05 RX ORDER — AZITHROMYCIN 250 MG/1
500 TABLET, FILM COATED ORAL DAILY
Status: DISCONTINUED | OUTPATIENT
Start: 2024-12-06 | End: 2024-12-10 | Stop reason: HOSPADM

## 2024-12-05 RX ORDER — ASPIRIN 81 MG/1
81 TABLET ORAL DAILY
Status: ON HOLD | COMMUNITY
End: 2024-12-10 | Stop reason: HOSPADM

## 2024-12-05 RX ORDER — IBUPROFEN 200 MG
16 TABLET ORAL
Status: DISCONTINUED | OUTPATIENT
Start: 2024-12-05 | End: 2024-12-10 | Stop reason: HOSPADM

## 2024-12-05 RX ORDER — CEFEPIME HYDROCHLORIDE 2 G/1
2 INJECTION, POWDER, FOR SOLUTION INTRAVENOUS
Status: COMPLETED | OUTPATIENT
Start: 2024-12-05 | End: 2024-12-05

## 2024-12-05 RX ORDER — SODIUM,POTASSIUM PHOSPHATES 280-250MG
2 POWDER IN PACKET (EA) ORAL
Status: DISCONTINUED | OUTPATIENT
Start: 2024-12-05 | End: 2024-12-10 | Stop reason: HOSPADM

## 2024-12-05 RX ORDER — PANTOPRAZOLE SODIUM 40 MG/1
40 TABLET, DELAYED RELEASE ORAL EVERY MORNING
Status: DISCONTINUED | OUTPATIENT
Start: 2024-12-06 | End: 2024-12-08

## 2024-12-05 RX ORDER — METHYLPREDNISOLONE SOD SUCC 125 MG
125 VIAL (EA) INJECTION
Status: DISCONTINUED | OUTPATIENT
Start: 2024-12-05 | End: 2024-12-05

## 2024-12-05 RX ORDER — METOPROLOL SUCCINATE 25 MG/1
25 TABLET, EXTENDED RELEASE ORAL EVERY MORNING
Status: DISCONTINUED | OUTPATIENT
Start: 2024-12-06 | End: 2024-12-10 | Stop reason: HOSPADM

## 2024-12-05 RX ORDER — FUROSEMIDE 20 MG/1
20 TABLET ORAL EVERY MORNING
Status: DISCONTINUED | OUTPATIENT
Start: 2024-12-05 | End: 2024-12-05

## 2024-12-05 RX ORDER — HYDROCODONE BITARTRATE AND ACETAMINOPHEN 5; 325 MG/1; MG/1
1 TABLET ORAL EVERY 6 HOURS PRN
Status: DISCONTINUED | OUTPATIENT
Start: 2024-12-05 | End: 2024-12-10 | Stop reason: HOSPADM

## 2024-12-05 RX ORDER — TALC
9 POWDER (GRAM) TOPICAL NIGHTLY PRN
Status: DISCONTINUED | OUTPATIENT
Start: 2024-12-05 | End: 2024-12-10 | Stop reason: HOSPADM

## 2024-12-05 RX ORDER — OXYMETAZOLINE HCL 0.05 %
2 SPRAY, NON-AEROSOL (ML) NASAL 2 TIMES DAILY
Status: DISPENSED | OUTPATIENT
Start: 2024-12-05 | End: 2024-12-08

## 2024-12-05 RX ORDER — PREDNISONE 20 MG/1
40 TABLET ORAL DAILY
Status: DISCONTINUED | OUTPATIENT
Start: 2024-12-05 | End: 2024-12-07

## 2024-12-05 RX ORDER — NALOXONE HCL 0.4 MG/ML
0.02 VIAL (ML) INJECTION
Status: DISCONTINUED | OUTPATIENT
Start: 2024-12-05 | End: 2024-12-10 | Stop reason: HOSPADM

## 2024-12-05 RX ORDER — IPRATROPIUM BROMIDE AND ALBUTEROL SULFATE 2.5; .5 MG/3ML; MG/3ML
3 SOLUTION RESPIRATORY (INHALATION)
Status: COMPLETED | OUTPATIENT
Start: 2024-12-05 | End: 2024-12-05

## 2024-12-05 RX ORDER — ACETAMINOPHEN 325 MG/1
650 TABLET ORAL EVERY 8 HOURS PRN
Status: DISCONTINUED | OUTPATIENT
Start: 2024-12-05 | End: 2024-12-10 | Stop reason: HOSPADM

## 2024-12-05 RX ORDER — IPRATROPIUM BROMIDE AND ALBUTEROL SULFATE 2.5; .5 MG/3ML; MG/3ML
3 SOLUTION RESPIRATORY (INHALATION) EVERY 6 HOURS PRN
Status: DISCONTINUED | OUTPATIENT
Start: 2024-12-05 | End: 2024-12-10 | Stop reason: HOSPADM

## 2024-12-05 RX ORDER — IBUPROFEN 200 MG
24 TABLET ORAL
Status: DISCONTINUED | OUTPATIENT
Start: 2024-12-05 | End: 2024-12-10 | Stop reason: HOSPADM

## 2024-12-05 RX ORDER — ARFORMOTEROL TARTRATE 15 UG/2ML
15 SOLUTION RESPIRATORY (INHALATION) 2 TIMES DAILY
Status: DISCONTINUED | OUTPATIENT
Start: 2024-12-05 | End: 2024-12-10 | Stop reason: HOSPADM

## 2024-12-05 RX ORDER — AMOXICILLIN 250 MG
1 CAPSULE ORAL 2 TIMES DAILY PRN
Status: DISCONTINUED | OUTPATIENT
Start: 2024-12-05 | End: 2024-12-10 | Stop reason: HOSPADM

## 2024-12-05 RX ORDER — SODIUM CHLORIDE 0.9 % (FLUSH) 0.9 %
3 SYRINGE (ML) INJECTION
Status: DISCONTINUED | OUTPATIENT
Start: 2024-12-05 | End: 2024-12-10 | Stop reason: HOSPADM

## 2024-12-05 RX ORDER — FUROSEMIDE 10 MG/ML
20 INJECTION INTRAMUSCULAR; INTRAVENOUS DAILY
Status: DISCONTINUED | OUTPATIENT
Start: 2024-12-06 | End: 2024-12-06

## 2024-12-05 RX ORDER — ONDANSETRON HYDROCHLORIDE 2 MG/ML
4 INJECTION, SOLUTION INTRAVENOUS EVERY 12 HOURS PRN
Status: DISCONTINUED | OUTPATIENT
Start: 2024-12-05 | End: 2024-12-10 | Stop reason: HOSPADM

## 2024-12-05 RX ORDER — EZETIMIBE 10 MG/1
10 TABLET ORAL NIGHTLY
Status: DISCONTINUED | OUTPATIENT
Start: 2024-12-05 | End: 2024-12-10 | Stop reason: HOSPADM

## 2024-12-05 RX ORDER — ROPINIROLE 1 MG/1
1 TABLET, FILM COATED ORAL NIGHTLY
Status: DISCONTINUED | OUTPATIENT
Start: 2024-12-05 | End: 2024-12-10 | Stop reason: HOSPADM

## 2024-12-05 RX ORDER — ACETAMINOPHEN 325 MG/1
650 TABLET ORAL EVERY 4 HOURS PRN
Status: DISCONTINUED | OUTPATIENT
Start: 2024-12-05 | End: 2024-12-10 | Stop reason: HOSPADM

## 2024-12-05 RX ORDER — DIGOXIN 125 MCG
0.25 TABLET ORAL DAILY
Status: DISCONTINUED | OUTPATIENT
Start: 2024-12-06 | End: 2024-12-10 | Stop reason: HOSPADM

## 2024-12-05 RX ORDER — METHYLPREDNISOLONE SOD SUCC 125 MG
125 VIAL (EA) INJECTION
Status: COMPLETED | OUTPATIENT
Start: 2024-12-05 | End: 2024-12-05

## 2024-12-05 RX ORDER — GLUCAGON 1 MG
1 KIT INJECTION
Status: DISCONTINUED | OUTPATIENT
Start: 2024-12-05 | End: 2024-12-10 | Stop reason: HOSPADM

## 2024-12-05 RX ORDER — IPRATROPIUM BROMIDE AND ALBUTEROL SULFATE 2.5; .5 MG/3ML; MG/3ML
3 SOLUTION RESPIRATORY (INHALATION)
Status: DISCONTINUED | OUTPATIENT
Start: 2024-12-05 | End: 2024-12-07

## 2024-12-05 RX ORDER — BUDESONIDE 0.5 MG/2ML
0.5 INHALANT ORAL EVERY 12 HOURS
Status: DISCONTINUED | OUTPATIENT
Start: 2024-12-05 | End: 2024-12-10 | Stop reason: HOSPADM

## 2024-12-05 RX ORDER — CEFEPIME HYDROCHLORIDE 2 G/1
2 INJECTION, POWDER, FOR SOLUTION INTRAVENOUS
Status: DISCONTINUED | OUTPATIENT
Start: 2024-12-05 | End: 2024-12-05

## 2024-12-05 RX ADMIN — IPRATROPIUM BROMIDE AND ALBUTEROL SULFATE 3 ML: .5; 3 SOLUTION RESPIRATORY (INHALATION) at 07:12

## 2024-12-05 RX ADMIN — IPRATROPIUM BROMIDE AND ALBUTEROL SULFATE 3 ML: .5; 3 SOLUTION RESPIRATORY (INHALATION) at 08:12

## 2024-12-05 RX ADMIN — PHENYLEPHRINE HYDROCHLORIDE 2 SPRAY: 0.5 SPRAY NASAL at 09:12

## 2024-12-05 RX ADMIN — CEFEPIME 2 G: 2 INJECTION, POWDER, FOR SOLUTION INTRAVENOUS at 09:12

## 2024-12-05 RX ADMIN — EZETIMIBE 10 MG: 10 TABLET ORAL at 08:12

## 2024-12-05 RX ADMIN — ARFORMOTEROL TARTRATE 15 MCG: 15 SOLUTION RESPIRATORY (INHALATION) at 07:12

## 2024-12-05 RX ADMIN — PREDNISONE 40 MG: 20 TABLET ORAL at 01:12

## 2024-12-05 RX ADMIN — ROPINIROLE HYDROCHLORIDE 1 MG: 1 TABLET, FILM COATED ORAL at 08:12

## 2024-12-05 RX ADMIN — Medication 9 MG: at 09:12

## 2024-12-05 RX ADMIN — IPRATROPIUM BROMIDE AND ALBUTEROL SULFATE 3 ML: .5; 3 SOLUTION RESPIRATORY (INHALATION) at 01:12

## 2024-12-05 RX ADMIN — CEFEPIME 2 G: 2 INJECTION, POWDER, FOR SOLUTION INTRAVENOUS at 04:12

## 2024-12-05 RX ADMIN — METHYLPREDNISOLONE SODIUM SUCCINATE 125 MG: 125 INJECTION, POWDER, FOR SOLUTION INTRAMUSCULAR; INTRAVENOUS at 08:12

## 2024-12-05 RX ADMIN — SODIUM CHLORIDE 2000 MG: 9 INJECTION, SOLUTION INTRAVENOUS at 10:12

## 2024-12-05 RX ADMIN — BUDESONIDE INHALATION 0.5 MG: 0.5 SUSPENSION RESPIRATORY (INHALATION) at 07:12

## 2024-12-05 RX ADMIN — FUROSEMIDE 20 MG: 20 TABLET ORAL at 01:12

## 2024-12-05 RX ADMIN — Medication 2 SPRAY: at 08:12

## 2024-12-05 NOTE — CONSULTS
Pulmonary/Critical Care Consult      PATIENT NAME: Lisa Smith  MRN: [id number]  TODAY'S DATE: 2024  4:31 PM  ADMIT DATE: 2024  AGE: 74 y.o. : 1950    CONSULT REQUESTED BY: Madan Cavazos MD    REASON FOR CONSULT:   Increased shortness of breath    HPI:  The patient is a 74-year-old female with COPD and bronchiectasis and chronic respiratory failure who began feeling poorly around .  She has also been very anemic with Coumadin which she has taking since she has had her TAVR.  She was transfused 2 units packed red blood cells on Tuesday.  Overnight she became so short of breath she asked to come into the hospital.  The patient has moderate obstruction on her PFTs she has PFTs pending at this time.    The patient's chest x-ray is basically stable.  However, she has multi phonic wheezing with a prolonged expiratory phase consistent with a COPD exacerbation.    REVIEW OF SYSTEMS  GENERAL: Feeling terrible  EYES: Vision is good.  She coughs so hard she got a subconjunctival hemorrhage  ENT:  She has rhinitis and epistaxis  HEART: No chest pain or palpitations.  LUNGS:  She is coughing a great deal.  She is more short of breath than usual.  She is wheezing.  GI: No Nausea, vomiting, constipation, diarrhea, or reflux.  : No dysuria, hesitancy, or nocturia.  SKIN: No lesions or rashes.  MUSCULOSKELETAL: No joint pain or myalgias.  NEURO: No headaches or neuropathy.  LYMPH: No edema or adenopathy.  PSYCH: No anxiety or depression.  ENDO: No weight change.    ALLERGIES  Review of patient's allergies indicates:   Allergen Reactions    Sulfa (sulfonamide antibiotics) Itching       INPATIENT SCHEDULED MEDICATIONS   albuterol-ipratropium  3 mL Nebulization Q6H WAKE    budesonide  0.5 mg Nebulization Q12H    ceFEPime IV (PEDS and ADULTS)  2 g Intravenous Q8H    [START ON 2024] digoxin  0.25 mg Oral Daily    ezetimibe  10 mg Oral QHS    furosemide (LASIX) injection  20 mg Intravenous Once     [START ON 12/6/2024] furosemide (LASIX) injection  20 mg Intravenous Daily    [START ON 12/6/2024] metoprolol succinate  25 mg Oral QAM    oxymetazoline  2 spray Topical (Top) BID    [START ON 12/6/2024] pantoprazole  40 mg Oral QAM    predniSONE  40 mg Oral Daily    rOPINIRole  1 mg Oral QHS    vancomycin (VANCOCIN) IV (PEDS and ADULTS)  1,000 mg Intravenous Q12H         MEDICAL AND SURGICAL HISTORY  Past Medical History:   Diagnosis Date    Anticoagulant long-term use     Arthritis     Atrial fibrillation     Bell's palsy     Boil of buttock     burst 12/19/22    CHF (congestive heart failure)     Chronic cough     COPD (chronic obstructive pulmonary disease)     use O2  3l/m NC at night; also taking during day currently 12/4/23    Dizziness     Encounter for blood transfusion     GI bleed 2011    transfusion    H/O diverticulitis of colon     Hard of hearing     Heart murmur     Hematoma 02/2023    left hand    Heterozygous alpha 1-antitrypsin deficiency     History of home oxygen therapy     3L NC at night    Hypertension     Lung disease     copd    MONSERRAT (obstructive sleep apnea)     resolved with wt loss 131#    Pacemaker     Pneumonia     last episode 2019    Pulmonary edema     Skin cancer     Unspecified visual disturbance     episode of vision disturbance and dizziness...occasional recurrences     Past Surgical History:   Procedure Laterality Date    CARDIAC ELECTROPHYSIOLOGY STUDY AND ABLATION      CAROTID ENDARTERECTOMY Left 12/22/2022    Procedure: ENDARTERECTOMY-CAROTID;  Surgeon: Maximilian Connolly MD;  Location: [facility];  Service: Peripheral Vascular;  Laterality: Left;    CAROTID STENT Left 2/29/2024    Procedure: INSERTION, STENT, ARTERY, CAROTID;  Surgeon: CIRILO Valdivia III, MD;  Location: [address];  Service: Vascular;  Laterality: Left;  left carotid artery stent placement  mgy  146.29   gymc2  8.0730  fluro time  4.8min    CARPAL TUNNEL RELEASE Left     CHOLECYSTECTOMY      EYE  SURGERY Bilateral     cataract    HYSTERECTOMY      INSERT / REPLACE / REMOVE PACEMAKER      KNEE ARTHROSCOPY Left     LEFT HEART CATHETERIZATION  2023    Procedure: Left heart cath;  Surgeon: Puma Shelton MD;  Location: [facility] CATH;  Service: Cardiology;;    REPLACEMENT OF PACEMAKER GENERATOR Left 2022    Procedure: REPLACEMENT, PACEMAKER GENERATOR;  Surgeon: Raymond Pérez MD;  Location: [facility] CATH/EP LAB;  Service: Cardiology;  Laterality: Left;    SKIN CANCER EXCISION      TRANSCATHETER AORTIC VALVE REPLACEMENT (TAVR) Bilateral 2023    Procedure: (TAVR);  Surgeon: Puma Shelton MD;  Location: [facility] CATH;  Service: Cardiology;  Laterality: Bilateral;    TRANSCATHETER AORTIC VALVE REPLACEMENT (TAVR) Bilateral 2023    Procedure: (TAVR) - Surgeon;  Surgeon: Maximilian Connolly MD;  Location: [facility] CATH;  Service: Peripheral Vascular;  Laterality: Bilateral;       ALCOHOL, TOBACCO AND DRUG USE  Social History     Tobacco Use   Smoking Status Former    Current packs/day: 0.00    Average packs/day: 2.0 packs/day for 40.0 years (80.0 ttl pk-yrs)    Types: Cigarettes    Start date: 1971    Quit date: 2011    Years since quittin.8    Passive exposure: Past   Smokeless Tobacco Never   Tobacco Comments        Quit in      Social History     Substance and Sexual Activity   Alcohol Use Not Currently    Alcohol/week: 8.0 standard drinks of alcohol    Types: 8 Glasses of wine per week     Social History     Substance and Sexual Activity   Drug Use No       FAMILY HISTORY  Family History   Problem Relation Name Age of Onset    Kidney failure Mother      Cancer Father      Cancer Brother         VITAL SIGNS (MOST RECENT)  Temp: 99.4 °F (37.4 °C) (24 0739)  Pulse: 60 (24 1353)  Resp: (!) 26 (24 1353)  BP: (!) 143/63 (24 1330)  SpO2: (!) 93 % (24 1353)    INTAKE AND OUTPUT (LAST 24 HOURS):  Intake/Output Summary (Last 24 hours) at 2024 1631  Last data  filed at 12/5/2024 0938  Gross per 24 hour   Intake 32 ml   Output --   Net 32 ml       WEIGHT  Wt Readings from Last 1 Encounters:   12/05/24 76.7 kg (169 lb)       PHYSICAL EXAM  GENERAL: Older patient in no distress.  HEENT: Pupils equal and reactive. Extraocular movements intact. Nose has a rhino rocket on the left. Pharynx moist.  NECK: Supple.   HEART: Regular rate and rhythm. No murmur or gallop auscultated.  LUNGS:  There are expiratory prolonged multifocal multi phonic wheezes. Lung excursion symmetrical. No change in fremitus.   ABDOMEN: Bowel sounds present. Non-tender, no masses palpated.  : Normal anatomy.  EXTREMITIES: Normal muscle tone and joint movement, no cyanosis or clubbing.   LYMPHATICS: No adenopathy palpated, no edema.  SKIN: Dry, intact, no lesions.   NEURO: Cranial nerves II-XII intact. Motor strength 5/5 bilaterally, upper and lower extremities.  PSYCH: Appropriate affect      CBC LAST (LAST 24 HOURS)  Recent Labs   Lab 12/05/24  0816 12/05/24  1101   WBC 10.88 10.68   RBC 2.75* 2.89*   HGB 8.4* 8.4*   HCT 25.6* 26.8*   MCV 93 93   MCH 30.5 29.1   MCHC 32.8 31.3*   RDW 19.6* 19.3*    250   MPV 10.8 10.3   GRAN 82.2*  8.9*  --    LYMPH 5.2*  0.6*  --    MONO 11.0  1.2*  --    BASO 0.03  --    NRBC 0  --        CHEMISTRY LAST (LAST 24 HOURS)  Recent Labs   Lab 12/05/24  0807 12/05/24  0816   NA  --  136   K  --  5.1   CL  --  102   CO2  --  32*   ANIONGAP  --  2*   BUN  --  28*   CREATININE  --  0.7   GLU  --  100   CALCIUM  --  9.3   PH 7.402  --    ALBUMIN  --  4.3   PROT  --  8.0   ALKPHOS  --  91   ALT  --  14   AST  --  48*   BILITOT  --  1.1*       COAGULATION LAST (LAST 24 HOURS)  Recent Labs   Lab 12/05/24  0816   INR 1.6*       CARDIAC PROFILE (LAST 24 HOURS)  Recent Labs   Lab 12/05/24  0816   *       LAST 7 DAYS MICROBIOLOGY   Microbiology Results (last 7 days)       Procedure Component Value Units Date/Time    Blood culture x two cultures. Draw prior to  antibiotics. [[id number]] Collected: 12/05/24 0813    Order Status: Completed Specimen: Blood from Peripheral, Antecubital, Right Updated: 12/05/24 1517     Blood Culture, Routine No Growth to date    Narrative:      Aerobic and anaerobic  Collection has been rescheduled by [facility] at 12/05/2024 08:23 Reason:   DONE.   Collection has been rescheduled by [facility] at 12/05/2024 08:23 Reason:   DONE.     Blood culture x two cultures. Draw prior to antibiotics. [[id number]] Collected: 12/05/24 0823    Order Status: Completed Specimen: Blood from Peripheral, Antecubital, Right Updated: 12/05/24 1517     Blood Culture, Routine No Growth to date    Narrative:      Aerobic and anaerobic            MOST RECENT IMAGING  X-Ray Chest AP Portable  Narrative: EXAMINATION:  XR CHEST AP PORTABLE    CLINICAL HISTORY:  Sepsis;    FINDINGS:  Portable chest with comparison chest x-ray 01/26/2024.  Enlarged cardiomediastinal silhouette with left single lead cardiac pacemaker again noted.Bilateral diffuse interstitial opacities noted, worse in the right lung.  There is also hazy opacification of the right lung base with blunting of the costophrenic angle, similar to previous exam.  Blunting of the left costophrenic angle.  Pulmonary vasculature is normal. No acute osseous abnormality.  Impression: 1. Unchanged enlarged cardiomediastinal silhouette.  2. Bilateral diffuse interstitial opacities again noted, worst in the right lung.  This likely reflects chronic interstitial lung disease without being able to exclude a superimposed acute process.  3. Hazy opacities of the lung bases with blunting of the costophrenic angles likely reflects scarring, atelectasis or pleural effusions.    Electronically signed by: Dima Christina  Date:    12/05/2024  Time:    08:35      CURRENT VISIT EKG  Results for orders placed or performed during the hospital encounter of 12/05/24   EKG 12-lead   Result Value Ref Range    QRS Duration 152 ms    [facility] QTC Calculation  416 ms    Narrative    Test Reason : R07.9,    Vent. Rate :  61 BPM     Atrial Rate : 357 BPM     P-R Int :    ms          QRS Dur : 152 ms      QT Int : 414 ms       P-R-T Axes :    -72  98 degrees    QTcB Int : 416 ms    Ventricular-paced rhythm  Abnormal ECG  When compared with ECG of 14-Mar-2024 10:33,  No significant change was found    Referred By: AAAREFERRAL SELF           Confirmed By:        ECHOCARDIOGRAM RESULTS  Results for orders placed during the hospital encounter of 12/03/24    Echo    Interpretation Summary    Left Ventricle: The left ventricle is normal in size. Normal wall thickness. There is normal systolic function with a visually estimated ejection fraction of 55 - 60%. Unable to assess diastolic function due to atrial fibrillation.    Right Ventricle: Normal right ventricular cavity size. Wall thickness is normal. Systolic function is normal. Pacemaker lead present in the ventricle.    Left Atrium: Left atrium is mildly dilated.    Right Atrium: Right atrium is mildly dilated.    Aortic Valve: There is a transcatheter valve replacement in the aortic position. It is reported to be a 26 mm Jauregui valve. There is mild stenosis. Aortic valve area by VTI is 1.5 cm². Aortic valve peak velocity is 3.6 m/s. Mean gradient is 28.0 mmHg. The dimensionless index is 0.43. There is mild to moderate aortic regurgitation with an eccentrically directed jet.    Mitral Valve: There is bileaflet sclerosis. There is mitral annular calcification present. There is mild regurgitation.    Tricuspid Valve: There is mild to moderate regurgitation.    IVC/SVC: Normal venous pressure at 3 mmHg.        VENTILATOR INFORMATION              LAST ARTERIAL BLOOD GAS  ABG  Recent Labs   Lab 12/05/24  0807   PH 7.402   PO2 77*   PCO2 44.4   HCO3 27.6   BE 3*       IMPRESSION AND PLAN  COPD exacerbation  - maximum bronchodilators  - IV Solu-Medrol  - azithromycin for its anti-inflammatory properties  Chronic hypoxic respiratory  failure  - on 3 L here and at home at night  Epistaxis  - patient is had great difficulty with Coumadin  Anemia  - becoming transfusion dependent with  Coumadin      Will follow with you  Kathryn Haro MD  Date of Service: 12/05/2024  4:31 PM

## 2024-12-05 NOTE — CARE UPDATE
12/05/24 0800   Patient Assessment/Suction   Level of Consciousness (AVPU) alert   Respiratory Effort Unlabored   Expansion/Accessory Muscles/Retractions expansion symmetric   All Lung Fields Breath Sounds Anterior:;Lateral:;wheezes, expiratory   Rhythm/Pattern, Respiratory unlabored   Cough Frequency frequent   Cough Type congested;loose   Skin Integrity   $ Wound Care Tech Time 15 min   Area Observed Left;Right;Behind ear;Cheek;Upper lip;Nares   Skin Appearance without discoloration   PRE-TX-O2   Device (Oxygen Therapy) nasal cannula   $ Is the patient on Low Flow Oxygen? Yes   Flow (L/min) (Oxygen Therapy) 3   SpO2 95 %   Pulse Oximetry Type Continuous   $ Pulse Oximetry - Multiple Charge Pulse Oximetry - Multiple   Pulse (!) 59   Resp (!) 22   Aerosol Therapy   $ Aerosol Therapy Charges Aerosol Treatment   Daily Review of Necessity (SVN) completed   Respiratory Treatment Status (SVN) given   Treatment Route (SVN) mask   Patient Position HOB elevated   Post Treatment Assessment (SVN) increased aeration   Signs of Intolerance (SVN) none   Breath Sounds Post-Respiratory Treatment   Throughout All Fields Post-Treatment All Fields   Throughout All Fields Post-Treatment aeration increased   Post-treatment Heart Rate (beats/min) 62   Post-treatment Resp Rate (breaths/min) 22   Education   $ Education Bronchodilator;15 min   Blood Gas Puncture   Blood Gas Type arterial   Arterial Site radial artery;right   Collateral Circulation Verified Enrique's Test   Site Preparation alcohol   Pressure Held yes   Distal Pulse Present yes   Hematoma Present no   Sample Obtained/Sent to Lab yes   Oxygen Amount LPM (specify)  (3)   Number of Attempts for ABG? 2   Unsuccessful ABG Attempts  1   Attempted By? d necaise/g hectorade   Labs   $ Was an ABG obtained? Arterial Puncture;POCT - Blood gas;POCT - Calcium;POCT - Hematocrit;POCT - PH, Blood;POCT - Potassium;POCT - Sodium   $ Labs Tech Time 15 min   Tobacco Cessation Intervention   Do  you use any type of tobacco product? No   Respiratory Evaluation   $ Care Plan Tech Time 15 min   $ Respiratory Evaluation Complete   Evaluation For New Orders   Admitting Diagnosis sob/cough   Cardiac Diagnosis hx of cad   Pulmonary Diagnosis copd   Current Surgeries n/a   Home Oxygen   Has Home Oxygen? Yes   Liter Flow 3   Duration continuous   Route nasal cannula   Mode continuous   Device home concentrator with portable unit   Home Aerosol, MDI, DPI, and Other Treatments/Therapies   Home Respiratory Therapy Per Patient/Review of Chart Yes   Aerosol Home Meds/Freq albuterol   MDI Home Meds/Freq albuterol   DPI Home Meds/Freq trelegy   Oxygen Care Plan   Oxygen Care Plan Per Protocol   SPO2 Goal (%) MD order   Rationale Maintain Home oxygen   Bronchodilator Care Plan   Bronchodilator Care Plan Aerosol   Aerosol Meds w/ frequency Ventolin/Proventil(Albuterol Sulfate) 2.5mg PRN;Atrovent(Ipratropium Bromide) 500mcg Q 6Hr;Pulmicort(Budenoside) 0.25mg Q 12Hr  (brovana (arformoterol 15 mcg) BID)   Rationale Maintain home respiratory medicine   Atelectasis Care Plan   Rationale No Rational Found   Airway Clearance Care Plan   Rationale No rationale found

## 2024-12-05 NOTE — CONSULTS
Former[facility] - Emergency Dept  Department of Cardiology  Consult Note      PATIENT NAME: Lisa Smith  MRN: [id number]  TODAY'S DATE: 12/05/2024  ADMIT DATE: 12/5/2024                          CONSULT REQUESTED BY: Madan Cavazos MD    SUBJECTIVE     PRINCIPAL PROBLEM: <principal problem not specified>      REASON FOR CONSULT:  CAD  AFIB        HPI:    Ms Smith is a 74 year old female patient known to us. She has a PMH significant for  COPD (maintained on 3 L nasal cannula at home), paroxysmal AFib on Coumadin, status post TAVR, heart failure with preserved ejection fraction, anemia who presents to the emergency department for the evaluation of epistaxis, productive cough and shortness of breath. She was previously tried on eliquis however could not afford this. We have set her up with Dr. Cantu for possible watchman. Patient had to get 2 units of PRBCs yesterday and nose bleeding started again and would not stop so came to ER.        EKG                    CXR            FROM H AND P                 Shortness of Breath     Cough         HPI: The patient is a 74-year-old female with a past medical history of COPD (maintained on 3 L nasal cannula at home), paroxysmal AFib on Coumadin, status post TAVR, heart failure with preserved ejection fraction, anemia who presents to the emergency department for the evaluation of epistaxis, productive cough and shortness of breath.  Patient reports that over the past few days she has been having some epistaxis as well as upper respiratory infection symptoms with productive cough, subjective fever and chills, and increased dyspnea.  She denies recent sick contacts or recent travel.  The patient reports that she was recently given a blood transfusion for anemia that has been followed outpatient by her PCP.  Patient reports that since she has been on Coumadin she has been suffering with anemia.  She is scheduled to have a GI workup for upper and lower scopes to  evaluate for causes of bleeding as well as bone marrow biopsy per heme Onc as well as a workup for possible Watchman device to transition off Coumadin.  Patient was evaluated in the emergency department.  Hemoglobin 8.4 (was 7 prior to blood transfusion on 12/03/2024), white blood cell count 10.8, renal function within normal limits.  ABG revealed pH of 7.4, PO2 of 77, CO2 of 44 and bicarb of 27.  Flu and COVID negative.  Chest x-ray with bilateral diffuse interstitial opacities worse in the right lung consistent with possible superimposed pneumonia.  Lactic acid within normal limits, BNP mildly elevated 309.  PT 17.6 INR 1.6.  Afrin was placed to sterile gauze to left Landeros with some improvement in hemostasis.  Patient was initiated on cefepime and vancomycin per ER MD.  Patient admitted to the hospital medicine service with pulmonary and cardiology consultation for further evaluation.      Review of patient's allergies indicates:   Allergen Reactions    Sulfa (sulfonamide antibiotics) Itching       Past Medical History:   Diagnosis Date    Anticoagulant long-term use     Arthritis     Atrial fibrillation     Bell's palsy     Boil of buttock     burst 12/19/22    CHF (congestive heart failure)     Chronic cough     COPD (chronic obstructive pulmonary disease)     use O2  3l/m NC at night; also taking during day currently 12/4/23    Dizziness     Encounter for blood transfusion     GI bleed 2011    transfusion    H/O diverticulitis of colon     Hard of hearing     Heart murmur     Hematoma 02/2023    left hand    Heterozygous alpha 1-antitrypsin deficiency     History of home oxygen therapy     3L NC at night    Hypertension     Lung disease     copd    MONSERRAT (obstructive sleep apnea)     resolved with wt loss 131#    Pacemaker     Pneumonia     last episode 2019    Pulmonary edema     Skin cancer     Unspecified visual disturbance     episode of vision disturbance and dizziness...occasional recurrences     Past  Oriented - self; Oriented - place; Oriented - time Surgical History:   Procedure Laterality Date    CARDIAC ELECTROPHYSIOLOGY STUDY AND ABLATION      CAROTID ENDARTERECTOMY Left 2022    Procedure: ENDARTERECTOMY-CAROTID;  Surgeon: Maximilian Connolly MD;  Location: [facility] OR;  Service: Peripheral Vascular;  Laterality: Left;    CAROTID STENT Left 2024    Procedure: INSERTION, STENT, ARTERY, CAROTID;  Surgeon: CIRILO Valdivia III, MD;  Location: [facility] OR [address] [place] [address];  Service: Vascular;  Laterality: Left;  left carotid artery stent placement  mgy  146.29   gymc2  8.0730  fluro time  4.8min    CARPAL TUNNEL RELEASE Left     CHOLECYSTECTOMY      EYE SURGERY Bilateral     cataract    HYSTERECTOMY      INSERT / REPLACE / REMOVE PACEMAKER      KNEE ARTHROSCOPY Left     LEFT HEART CATHETERIZATION  2023    Procedure: Left heart cath;  Surgeon: Puma Shelton MD;  Location: [facility] CATH;  Service: Cardiology;;    REPLACEMENT OF PACEMAKER GENERATOR Left 2022    Procedure: REPLACEMENT, PACEMAKER GENERATOR;  Surgeon: Raymond Pérez MD;  Location: [facility] CATH/EP LAB;  Service: Cardiology;  Laterality: Left;    SKIN CANCER EXCISION      TRANSCATHETER AORTIC VALVE REPLACEMENT (TAVR) Bilateral 2023    Procedure: (TAVR);  Surgeon: Puma Shelton MD;  Location: [facility] CATH;  Service: Cardiology;  Laterality: Bilateral;    TRANSCATHETER AORTIC VALVE REPLACEMENT (TAVR) Bilateral 2023    Procedure: (TAVR) - Surgeon;  Surgeon: Maximilian Connolly MD;  Location: [facility] CATH;  Service: Peripheral Vascular;  Laterality: Bilateral;     Social History     Tobacco Use    Smoking status: Former     Current packs/day: 0.00     Average packs/day: 2.0 packs/day for 40.0 years (80.0 ttl pk-yrs)     Types: Cigarettes     Start date: 1971     Quit date: 2011     Years since quittin.8     Passive exposure: Past    Smokeless tobacco: Never    Tobacco comments:          Quit in    Substance Use Topics    Alcohol use: Not Currently     Alcohol/week: 8.0 standard  drinks of alcohol     Types: 8 Glasses of wine per week    Drug use: No        REVIEW OF SYSTEMS    As mentioned in HPI    OBJECTIVE     VITAL SIGNS (Most Recent)  Temp: 99.4 °F (37.4 °C) (12/05/24 0739)  Pulse: 60 (12/05/24 1353)  Resp: (!) 26 (12/05/24 1353)  BP: (!) 143/63 (12/05/24 1330)  SpO2: (!) 93 % (12/05/24 1353)    VENTILATION STATUS  Resp: (!) 26 (12/05/24 1353)  SpO2: (!) 93 % (12/05/24 1353)           I & O (Last 24H):  Intake/Output Summary (Last 24 hours) at 12/5/2024 1446  Last data filed at 12/5/2024 0938  Gross per 24 hour   Intake 32 ml   Output --   Net 32 ml       WEIGHTS  Wt Readings from Last 3 Encounters:   12/05/24 0739 76.7 kg (169 lb)   12/04/24 0730 76.7 kg (169 lb)   12/03/24 1032 74.4 kg (164 lb)       PHYSICAL EXAM    CONSTITUTIONAL: NAD  HEENT: Normocephalic. + pallor-,nose bleeding with nasal packing  NECK: no JVD  LUNGS: wheezing rhonchi  HEART: + systolic murmur  ABDOMEN: soft, non-tender, bowel sounds normal  EXTREMITIES: mild edema  SKIN: No rash  NEURO: AAO X 3  PSYCH: normal affect      HOME MEDICATIONS:  Current Facility-Administered Medications on File Prior to Encounter   Medication Dose Route Frequency Provider Last Rate Last Admin    furosemide injection 20 mg  20 mg Intravenous Once Rosaura Urbano Mary, NP         Current Outpatient Medications on File Prior to Encounter   Medication Sig Dispense Refill    acetaminophen (TYLENOL ARTHRITIS ORAL) Take 2 tablets by mouth daily as needed.      albuterol (ACCUNEB) 1.25 mg/3 mL Nebu INHALE THE CONTENTS OF 1 VIAL VIA NEBULIZER EVERY 6 HOURS AS NEEDED FOR RESCUE (Patient taking differently: Take 1.25 mg by nebulization every 6 (six) hours as needed. INHALE THE CONTENTS OF 1 VIAL VIA NEBULIZER EVERY 6 HOURS AS NEEDED FOR RESCUE) 360 mL 5    albuterol (PROVENTIL/VENTOLIN HFA) 90 mcg/actuation inhaler INHALE 2 PUFFS EVERY 6 HOURS AS NEEDED FOR WHEEZING (RESCUE) (Patient taking differently: Inhale 2 puffs into the lungs every  6 (six) hours as needed for Wheezing.) 18 g 6    aspirin (ECOTRIN) 81 MG EC tablet Take 81 mg by mouth once daily.      clopidogreL (PLAVIX) 75 mg tablet Take 1 tablet (75 mg total) by mouth once daily. 30 tablet 11    digoxin (LANOXIN) 250 mcg tablet TAKE 1 TABLET EVERY DAY (Patient taking differently: Take 250 mcg by mouth once daily.) 90 tablet 3    ezetimibe (ZETIA) 10 mg tablet TAKE 1 TABLET ONE TIME DAILY (Patient taking differently: Take 10 mg by mouth once daily.) 90 tablet 3    fluticasone propionate (FLONASE) 50 mcg/actuation nasal spray 1 spray by Each Nostril route daily as needed for Rhinitis or Allergies.      furosemide (LASIX) 20 MG tablet Take 1 tablet (20 mg total) by mouth every morning. 90 tablet 3    metoprolol succinate (TOPROL-XL) 25 MG 24 hr tablet Take 1 tablet (25 mg total) by mouth every morning. 90 tablet 3    pantoprazole (PROTONIX) 40 MG tablet Take 1 tablet (40 mg total) by mouth every morning. 90 tablet 3    promethazine-dextromethorphan (PROMETHAZINE-DM) 6.25-15 mg/5 mL Syrp Take 5 mLs by mouth every 6 (six) hours as needed (cough). 118 mL 0    rOPINIRole (REQUIP) 1 MG tablet Take 1 tablet (1 mg total) by mouth every evening. 90 tablet 1    rosuvastatin (CRESTOR) 40 MG Tab Take 1 tablet (40 mg total) by mouth every evening. (Patient taking differently: Take 40 mg by mouth once daily.) 90 tablet 3    sacubitriL-valsartan (ENTRESTO) 24-26 mg per tablet Take 1 tablet by mouth 2 (two) times daily. 180 tablet 3    spironolactone (ALDACTONE) 25 MG tablet Take 1 tablet (25 mg total) by mouth every morning. 90 tablet 3    TRELEGY ELLIPTA 100-62.5-25 mcg DsDv INHALE 1 PUFF INTO THE LUNGS ONCE DAILY. (Patient taking differently: Inhale 1 puff into the lungs once daily.) 90 each 3    warfarin (COUMADIN) 3 MG tablet Take 1.5 pills by mouth daily or as directed per the [facility]; #145 pills = 90 day supply (Patient taking differently: Take 4.5 mg by mouth every evening. Take 1.5 pills by  mouth daily or as directed per the [facility]; #145 pills = 90 day supply  Except 3 mg on Monday) 145 tablet 2    [DISCONTINUED] acetaminophen (TYLENOL) 325 MG tablet Take 325 mg by mouth every 6 (six) hours as needed for Pain.         SCHEDULED MEDS:   albuterol-ipratropium  3 mL Nebulization Q6H WAKE    budesonide  0.5 mg Nebulization Q12H    ceFEPime IV (PEDS and ADULTS)  2 g Intravenous Q8H    [START ON 12/6/2024] digoxin  0.25 mg Oral Daily    ezetimibe  10 mg Oral QHS    furosemide (LASIX) injection  20 mg Intravenous Once    furosemide  20 mg Oral QAM    [START ON 12/6/2024] metoprolol succinate  25 mg Oral QAM    oxymetazoline  2 spray Topical (Top) BID    [START ON 12/6/2024] pantoprazole  40 mg Oral QAM    predniSONE  40 mg Oral Daily    rOPINIRole  1 mg Oral QHS    vancomycin (VANCOCIN) IV (PEDS and ADULTS)  1,000 mg Intravenous Q12H       CONTINUOUS INFUSIONS:    PRN MEDS:  Current Facility-Administered Medications:     acetaminophen, 650 mg, Oral, Q8H PRN    acetaminophen, 650 mg, Oral, Q4H PRN    albuterol-ipratropium, 3 mL, Nebulization, Q6H PRN    dextrose 50%, 12.5 g, Intravenous, PRN    dextrose 50%, 25 g, Intravenous, PRN    glucagon (human recombinant), 1 mg, Intramuscular, PRN    glucose, 16 g, Oral, PRN    glucose, 24 g, Oral, PRN    HYDROcodone-acetaminophen, 1 tablet, Oral, Q6H PRN    magnesium oxide, 800 mg, Oral, PRN    magnesium oxide, 800 mg, Oral, PRN    melatonin, 9 mg, Oral, Nightly PRN    naloxone, 0.02 mg, Intravenous, PRN    ondansetron, 4 mg, Intravenous, Q12H PRN    potassium bicarbonate, 35 mEq, Oral, PRN    potassium bicarbonate, 50 mEq, Oral, PRN    potassium bicarbonate, 60 mEq, Oral, PRN    potassium, sodium phosphates, 2 packet, Oral, PRN    potassium, sodium phosphates, 2 packet, Oral, PRN    potassium, sodium phosphates, 2 packet, Oral, PRN    senna-docusate 8.6-50 mg, 1 tablet, Oral, BID PRN    sodium chloride 0.9%, 10 mL, Intravenous, Q12H PRN    sodium chloride  "0.9%, 3 mL, Intravenous, PRN    Pharmacy to dose Vancomycin consult, , , Once **AND** vancomycin - pharmacy to dose, , Intravenous, pharmacy to manage frequency    LABS AND DIAGNOSTICS     CBC LAST 3 DAYS  Recent Labs   Lab 11/29/24  0630 12/02/24  1300 12/05/24  0807 12/05/24  0816 12/05/24  1101   WBC 8.34 9.47  --  10.88 10.68   RBC 2.71* 2.25*  --  2.75* 2.89*   HGB 8.4* 7.0*  --  8.4* 8.4*   HCT 27.3* 22.8* 26* 25.6* 26.8*   * 101*  --  93 93   MCH 31.0 31.1*  --  30.5 29.1   MCHC 30.8* 30.7*  --  32.8 31.3*   RDW 16.4* 16.5*  --  19.6* 19.3*    199  --  299 250   MPV 10.6 10.7  --  10.8 10.3   GRAN 72.0  6.0 81.8*  7.7  --  82.2*  8.9*  --    LYMPH 15.6*  1.3 8.6*  0.8*  --  5.2*  0.6*  --    MONO 8.2  0.7 8.3  0.8  --  11.0  1.2*  --    BASO 0.03 0.02  --  0.03  --    NRBC 0 0  --  0  --        COAGULATION LAST 3 DAYS  Recent Labs   Lab 12/02/24  0000 12/04/24  0000 12/05/24  0816   INR 4.4 2.4 1.6*       CHEMISTRY LAST 3 DAYS  Recent Labs   Lab 11/29/24  0630 12/02/24  1300 12/05/24  0807 12/05/24  0816    137  --  136   K 4.7 4.7  --  5.1    99  --  102   CO2 34* 32*  --  32*   ANIONGAP 4* 6*  --  2*   BUN 26* 39*  --  28*   CREATININE 0.6 0.8  --  0.7    113*  --  100   CALCIUM 9.3 8.9  --  9.3   PH  --   --  7.402  --    ALBUMIN 4.3 4.0  --  4.3   PROT 7.5 7.0  --  8.0   ALKPHOS 69 74  --  91   ALT 10 10  --  14   AST 35 27  --  48*   BILITOT 0.6 0.6  --  1.1*       CARDIAC PROFILE LAST 3 DAYS  Recent Labs   Lab 12/05/24  0816   *       ENDOCRINE LAST 3 DAYS  No results for input(s): "TSH", "PROCAL" in the last 168 hours.    LAST ARTERIAL BLOOD GAS  ABG  Recent Labs   Lab 12/05/24  0807   PH 7.402   PO2 77*   PCO2 44.4   HCO3 27.6   BE 3*       LAST 7 DAYS MICROBIOLOGY   Microbiology Results (last 7 days)       Procedure Component Value Units Date/Time    Blood culture x two cultures. Draw prior to antibiotics. [[id number]] Collected: 12/05/24 0813    Order " Status: Sent Specimen: Blood from Peripheral, Antecubital, Right Updated: 12/05/24 0836    Narrative:      Collection has been rescheduled by [facility] at 12/05/2024 08:23 Reason:   DONE.   Collection has been rescheduled by [facility] at 12/05/2024 08:23 Reason:   DONE.     Blood culture x two cultures. Draw prior to antibiotics. [[id number]] Collected: 12/05/24 0823    Order Status: Sent Specimen: Blood from Peripheral, Antecubital, Right Updated: 12/05/24 0836            MOST RECENT IMAGING  X-Ray Chest AP Portable  Narrative: EXAMINATION:  XR CHEST AP PORTABLE    CLINICAL HISTORY:  Sepsis;    FINDINGS:  Portable chest with comparison chest x-ray 01/26/2024.  Enlarged cardiomediastinal silhouette with left single lead cardiac pacemaker again noted.Bilateral diffuse interstitial opacities noted, worse in the right lung.  There is also hazy opacification of the right lung base with blunting of the costophrenic angle, similar to previous exam.  Blunting of the left costophrenic angle.  Pulmonary vasculature is normal. No acute osseous abnormality.  Impression: 1. Unchanged enlarged cardiomediastinal silhouette.  2. Bilateral diffuse interstitial opacities again noted, worst in the right lung.  This likely reflects chronic interstitial lung disease without being able to exclude a superimposed acute process.  3. Hazy opacities of the lung bases with blunting of the costophrenic angles likely reflects scarring, atelectasis or pleural effusions.    Electronically signed by: Dima Christina  Date:    12/05/2024  Time:    08:35      ECHOCARDIOGRAM RESULTS (last 5)  Results for orders placed during the hospital encounter of 12/03/24    Echo    Interpretation Summary    Left Ventricle: The left ventricle is normal in size. Normal wall thickness. There is normal systolic function with a visually estimated ejection fraction of 55 - 60%. Unable to assess diastolic function due to atrial fibrillation.    Right Ventricle: Normal right  ventricular cavity size. Wall thickness is normal. Systolic function is normal. Pacemaker lead present in the ventricle.    Left Atrium: Left atrium is mildly dilated.    Right Atrium: Right atrium is mildly dilated.    Aortic Valve: There is a transcatheter valve replacement in the aortic position. It is reported to be a 26 mm Jauregui valve. There is mild stenosis. Aortic valve area by VTI is 1.5 cm². Aortic valve peak velocity is 3.6 m/s. Mean gradient is 28.0 mmHg. The dimensionless index is 0.43. There is mild to moderate aortic regurgitation with an eccentrically directed jet.    Mitral Valve: There is bileaflet sclerosis. There is mitral annular calcification present. There is mild regurgitation.    Tricuspid Valve: There is mild to moderate regurgitation.    IVC/SVC: Normal venous pressure at 3 mmHg.      Results for orders placed during the hospital encounter of 01/23/24    Echo    Interpretation Summary    Left Ventricle: The left ventricle is normal in size. Mildly increased wall thickness. There is mildly reduced systolic function with a visually estimated ejection fraction of 45 - 50%. Unable to assess diastolic function due to atrial fibrillation.    Right Ventricle: Normal right ventricular cavity size. Wall thickness is normal. Right ventricle wall motion  is normal. Systolic function is normal.    Left Atrium: Left atrium is mildly dilated.    Right Atrium: Right atrium is mildly dilated.    Aortic Valve: There is a transcatheter valve replacement in the aortic position that is appropriately positioned. Mild paravalvular regurgitation.    Mitral Valve: There is bileaflet sclerosis. There is moderate mitral annular calcification present. There is no stenosis. The mean pressure gradient across the mitral valve is 2 mmHg at a heart rate of  bpm. There is moderate regurgitation.    Tricuspid Valve: There is mild regurgitation.    IVC/SVC: Normal venous pressure at 3 mmHg.      Results for orders placed  during the hospital encounter of 12/06/23    Echo Saline Bubble? No    Interpretation Summary    Left Ventricle: The left ventricle is moderately dilated. There is mildly reduced systolic function with a visually estimated ejection fraction of 40 - 45%. Unable to assess diastolic function due to atrial fibrillation.    Right Ventricle: Mild right ventricular enlargement. Systolic function is mildly reduced.    Left Atrium: Left atrium is mildly dilated.    Right Atrium: Right atrium is moderately dilated.    Aortic Valve: There is a transcatheter valve replacement in the aortic position. It is reported to be a Jauregui valve.    Mitral Valve: There is moderate bileaflet sclerosis. There is no stenosis. The mean pressure gradient across the mitral valve is 3 mmHg at a heart rate of  bpm. There is mild regurgitation.    Tricuspid Valve: There is mild regurgitation.    IVC/SVC: Normal venous pressure at 3 mmHg.      Echo Saline Bubble? No    Interpretation Summary    Limited 2D echocardiogram with color-flow Doppler done intraoperatively doing TAVR procedure    Well-positioned balloon expandable valve with no perivalvular regurgitation    No pericardial effusion      Results for orders placed during the hospital encounter of 10/23/23    Stress Echo Which stress agent will be used? Pharmacological; Color Flow Doppler? No    Interpretation Summary  Dobutamine aortic valve study.  Baseline left ventricular function proximally 25% with abnormal septal motion.  Aortic valve poorly seen questionable bioprosthetic aortic valve.  Baseline peak aortic velocity 3.51 m/sec with a max/mean gradient of 49/27 mm Hg  With dobutamine 10 mcg ejection fraction improved to approximately 35%.  With a peak aortic velocity of 4.02 m/sec max/mean gradient of 67/36 mm Hg.  Aortic valve area 0.  Nine 0.88    Study consistent with viable left ventricular function and severe aortic stenosis.      CURRENT/PREVIOUS VISIT EKG  Results for orders  placed or performed during the hospital encounter of 12/05/24   EKG 12-lead    Collection Time: 12/05/24  7:46 AM   Result Value Ref Range    QRS Duration 152 ms    [facility] QTC Calculation 416 ms    Narrative    Test Reason : R07.9,    Vent. Rate :  61 BPM     Atrial Rate : 357 BPM     P-R Int :    ms          QRS Dur : 152 ms      QT Int : 414 ms       P-R-T Axes :    -72  98 degrees    QTcB Int : 416 ms    Ventricular-paced rhythm  Abnormal ECG  When compared with ECG of 14-Mar-2024 10:33,  No significant change was found    Referred By: AAAREFERRAL SELF           Confirmed By:            ASSESSMENT/PLAN:     Active Hospital Problems    Diagnosis    Anemia    Epistaxis    Heart failure with improved ejection fraction (HFimpEF)    Atrial fibrillation    Pneumonia    COPD (chronic obstructive pulmonary disease)     Dr. Haro pulmonologist         ASSESSMENT & PLAN:       Epistaxis  Anemia s/p 2 units PRBC yesterday  Chronic AF  S/P TAVR  Pneumonia  COPD  CHFpEF  Chronic coumadin therapy    RECOMMENDATIONS:    Patient can not tolerate coumadin  INR subtherapeutic  Patient did tolerate plavix prior to TAVR  She is V Paced underlying afib  She is going to try to get on Eliquis program for affordability  She is also being evaluated for watchman device  For now can do lasix IV daily 20 mg  Continue ABX, Steroids  Will follow        Madhuri Hunter NP  Department of Cardiology  Date of Service: 12/05/2024      Patient with persistent chronic atrial fibrillation had ablation done in chronic ventricular pacemaker with 100% capture previous TAVR admitted with a persistent nosebleed on Coumadin therapy.  She was sent for evaluation for Watchman device still varying definitive intervention  Patient maintain on Coumadin therapy because of lack of affordability for Eliquis therapy.  Patient noted to have significant anemia with hemoglobin of 7.0 requiring 1 unit transfusion as an outpatient and continued to have bleeding and  presented to the emergency room for evaluation.  Currently the nose is packed with some control of bleeding in the left naris  Patient denies having chest discomfort arm neck or jaw  I have personally interviewed and examined the patient, I have reviewed the Nurse Practitioner's history and physical, assessment, and plan. I agree with the findings and plan.  Patient is to be re-evaluated for Watchman device implantation in an effort to discontinue anticoagulation therapy for multiple reasons    Raymond Préez M.D.  Department of Cardiology  Date of Service: 12/05/2024  2:46 PM

## 2024-12-05 NOTE — ASSESSMENT & PLAN NOTE
Patient's COPD is with exacerbation noted by worsening of baseline hypoxia currently.  Patient is currently on COPD Pathway. Continue scheduled inhalers Antibiotics and Supplemental oxygen and monitor respiratory status closely.  Consult placed to patient's pulmonologist Dr. Haro

## 2024-12-05 NOTE — H&P
[facility] - Emergency Dept  Hospital Medicine  History & Physical    Patient Name: Lisa Smith  MRN: [id number]  Patient Class: Emergency  Admission Date: 12/5/2024  Attending Physician: Madan Cavazos MD   Primary Care Provider: Hope Tang FNP         Patient information was obtained from patient, relative(s), past medical records, and ER records.     Subjective:     Principal Problem:<principal problem not specified>    Chief Complaint:   Chief Complaint   Patient presents with    Shortness of Breath    Cough        HPI: The patient is a 74-year-old female with a past medical history of COPD (maintained on 3 L nasal cannula at home), paroxysmal AFib on Coumadin, status post TAVR, heart failure with preserved ejection fraction, anemia who presents to the emergency department for the evaluation of epistaxis, productive cough and shortness of breath.  Patient reports that over the past few days she has been having some epistaxis as well as upper respiratory infection symptoms with productive cough, subjective fever and chills, and increased dyspnea.  She denies recent sick contacts or recent travel.  The patient reports that she was recently given a blood transfusion for anemia that has been followed outpatient by her PCP.  Patient reports that since she has been on Coumadin she has been suffering with anemia.  She is scheduled to have a GI workup for upper and lower scopes to evaluate for causes of bleeding as well as bone marrow biopsy per heme Onc as well as a workup for possible Watchman device to transition off Coumadin.  Patient was evaluated in the emergency department.  Hemoglobin 8.4 (was 7 prior to blood transfusion on 12/03/2024), white blood cell count 10.8, renal function within normal limits.  ABG revealed pH of 7.4, PO2 of 77, CO2 of 44 and bicarb of 27.  Flu and COVID negative.  Chest x-ray with bilateral diffuse interstitial opacities worse in the right lung consistent  with possible superimposed pneumonia.  Lactic acid within normal limits, BNP mildly elevated 309.  PT 17.6 INR 1.6.  Afrin was placed to sterile gauze to left Landeros with some improvement in hemostasis.  Patient was initiated on cefepime and vancomycin per ER MD.  Patient admitted to the hospital medicine service with pulmonary and cardiology consultation for further evaluation.    Past Medical History:   Diagnosis Date    Anticoagulant long-term use     Arthritis     Atrial fibrillation     Bell's palsy     Boil of buttock     burst 12/19/22    CHF (congestive heart failure)     Chronic cough     COPD (chronic obstructive pulmonary disease)     use O2  3l/m NC at night; also taking during day currently 12/4/23    Dizziness     Encounter for blood transfusion     GI bleed 2011    transfusion    H/O diverticulitis of colon     Hard of hearing     Heart murmur     Hematoma 02/2023    left hand    Heterozygous alpha 1-antitrypsin deficiency     History of home oxygen therapy     3L NC at night    Hypertension     Lung disease     copd    MONSERRAT (obstructive sleep apnea)     resolved with wt loss 131#    Pacemaker     Pneumonia     last episode 2019    Pulmonary edema     Skin cancer     Unspecified visual disturbance     episode of vision disturbance and dizziness...occasional recurrences       Past Surgical History:   Procedure Laterality Date    CARDIAC ELECTROPHYSIOLOGY STUDY AND ABLATION      CAROTID ENDARTERECTOMY Left 12/22/2022    Procedure: ENDARTERECTOMY-CAROTID;  Surgeon: Maximilian Connolly MD;  Location: [facility] OR;  Service: Peripheral Vascular;  Laterality: Left;    CAROTID STENT Left 2/29/2024    Procedure: INSERTION, STENT, ARTERY, CAROTID;  Surgeon: CIRILO Valdivia III, MD;  Location: [address];  Service: Vascular;  Laterality: Left;  left carotid artery stent placement  mgy  146.29   gymc2  8.0730  fluro time  4.8min    CARPAL TUNNEL RELEASE Left     CHOLECYSTECTOMY      EYE SURGERY Bilateral      cataract    HYSTERECTOMY      INSERT / REPLACE / REMOVE PACEMAKER      KNEE ARTHROSCOPY Left     LEFT HEART CATHETERIZATION  11/1/2023    Procedure: Left heart cath;  Surgeon: Puma Shelton MD;  Location: [facility] CATH;  Service: Cardiology;;    REPLACEMENT OF PACEMAKER GENERATOR Left 01/20/2022    Procedure: REPLACEMENT, PACEMAKER GENERATOR;  Surgeon: Raymond Pérez MD;  Location: [facility] CATH/EP LAB;  Service: Cardiology;  Laterality: Left;    SKIN CANCER EXCISION      TRANSCATHETER AORTIC VALVE REPLACEMENT (TAVR) Bilateral 12/6/2023    Procedure: (TAVR);  Surgeon: Puma Shelton MD;  Location: [facility] CATH;  Service: Cardiology;  Laterality: Bilateral;    TRANSCATHETER AORTIC VALVE REPLACEMENT (TAVR) Bilateral 12/6/2023    Procedure: (TAVR) - Surgeon;  Surgeon: Maximilian Connolly MD;  Location: [facility] CATH;  Service: Peripheral Vascular;  Laterality: Bilateral;       Review of patient's allergies indicates:   Allergen Reactions    Sulfa (sulfonamide antibiotics) Itching       Current Facility-Administered Medications on File Prior to Encounter   Medication    [COMPLETED] 0.9%  NaCl infusion (for blood administration)    furosemide injection 20 mg     Current Outpatient Medications on File Prior to Encounter   Medication Sig    acetaminophen (TYLENOL ARTHRITIS ORAL) Take 2 tablets by mouth daily as needed.    albuterol (ACCUNEB) 1.25 mg/3 mL Nebu INHALE THE CONTENTS OF 1 VIAL VIA NEBULIZER EVERY 6 HOURS AS NEEDED FOR RESCUE    albuterol (PROVENTIL/VENTOLIN HFA) 90 mcg/actuation inhaler INHALE 2 PUFFS EVERY 6 HOURS AS NEEDED FOR WHEEZING (RESCUE)    clopidogreL (PLAVIX) 75 mg tablet Take 1 tablet (75 mg total) by mouth once daily.    digoxin (LANOXIN) 250 mcg tablet TAKE 1 TABLET EVERY DAY    ezetimibe (ZETIA) 10 mg tablet TAKE 1 TABLET ONE TIME DAILY    fluticasone propionate (FLONASE) 50 mcg/actuation nasal spray by Each Nostril route daily as needed.    furosemide (LASIX) 20 MG tablet Take 1 tablet (20 mg total) by  mouth every morning.    metoprolol succinate (TOPROL-XL) 25 MG 24 hr tablet Take 1 tablet (25 mg total) by mouth every morning.    pantoprazole (PROTONIX) 40 MG tablet Take 1 tablet (40 mg total) by mouth every morning.    promethazine-dextromethorphan (PROMETHAZINE-DM) 6.25-15 mg/5 mL Syrp Take 5 mLs by mouth every 6 (six) hours as needed (cough).    rOPINIRole (REQUIP) 1 MG tablet Take 1 tablet (1 mg total) by mouth every evening.    rosuvastatin (CRESTOR) 40 MG Tab Take 1 tablet (40 mg total) by mouth every evening.    sacubitriL-valsartan (ENTRESTO) 24-26 mg per tablet Take 1 tablet by mouth 2 (two) times daily.    spironolactone (ALDACTONE) 25 MG tablet Take 1 tablet (25 mg total) by mouth every morning.    TRELEGY ELLIPTA 100-62.5-25 mcg DsDv INHALE 1 PUFF INTO THE LUNGS ONCE DAILY.    warfarin (COUMADIN) 3 MG tablet Take 1.5 pills by mouth daily or as directed per the [facility]; #145 pills = 90 day supply    [DISCONTINUED] acetaminophen (TYLENOL) 325 MG tablet Take 325 mg by mouth every 6 (six) hours as needed for Pain.     Family History       Problem Relation (Age of Onset)    Cancer Father, Brother    Kidney failure Mother          Tobacco Use    Smoking status: Former     Current packs/day: 0.00     Average packs/day: 2.0 packs/day for 40.0 years (80.0 ttl pk-yrs)     Types: Cigarettes     Start date: 1971     Quit date: 2011     Years since quittin.8     Passive exposure: Past    Smokeless tobacco: Never    Tobacco comments:          Quit in    Substance and Sexual Activity    Alcohol use: Not Currently     Alcohol/week: 8.0 standard drinks of alcohol     Types: 8 Glasses of wine per week    Drug use: No    Sexual activity: Never     Review of Systems   Constitutional:  Positive for activity change, chills and fever. Negative for appetite change.   HENT:  Positive for nosebleeds.    Respiratory:  Positive for cough and shortness of breath.    Cardiovascular:  Positive for leg  swelling. Negative for chest pain and palpitations.   Gastrointestinal:  Negative for diarrhea, nausea and vomiting.   Hematological:  Bruises/bleeds easily.     Objective:     Vital Signs (Most Recent):  Temp: 99.4 °F (37.4 °C) (12/05/24 0739)  Pulse: 60 (12/05/24 0939)  Resp: (!) 24 (12/05/24 0939)  BP: (!) 106/58 (12/05/24 0939)  SpO2: 100 % (12/05/24 0939) Vital Signs (24h Range):  Temp:  [97.1 °F (36.2 °C)-99.4 °F (37.4 °C)] 99.4 °F (37.4 °C)  Pulse:  [59-61] 60  Resp:  [22-24] 24  SpO2:  [72 %-100 %] 100 %  BP: (106-191)/(56-78) 106/58     Weight: 76.7 kg (169 lb)  Body mass index is 28.12 kg/m².     Physical Exam  Constitutional:       General: She is not in acute distress.     Appearance: She is not toxic-appearing.   HENT:      Head: Normocephalic.      Nose:      Comments: Epistaxis noted to left nare     Mouth/Throat:      Mouth: Mucous membranes are moist.   Eyes:      Pupils: Pupils are equal, round, and reactive to light.      Comments: Redness noted to right conjunctiva   Cardiovascular:      Rate and Rhythm: Normal rate and regular rhythm.      Pulses: Normal pulses.      Heart sounds: Murmur heard.   Pulmonary:      Effort: Pulmonary effort is normal.      Breath sounds: Wheezing and rhonchi present.   Abdominal:      General: There is no distension.      Palpations: Abdomen is soft.      Tenderness: There is no abdominal tenderness. There is no guarding.   Musculoskeletal:      Right lower leg: Edema (trace) present.      Left lower leg: Edema (trace) present.   Skin:     General: Skin is warm.      Capillary Refill: Capillary refill takes less than 2 seconds.   Neurological:      General: No focal deficit present.      Mental Status: She is alert and oriented to person, place, and time.   Psychiatric:         Mood and Affect: Mood normal.              CRANIAL NERVES     CN III, IV, VI   Pupils are equal, round, and reactive to light.       Significant Labs: All pertinent labs within the past 24  hours have been reviewed.  Recent Lab Results         12/05/24  0816   12/05/24  0813   12/05/24  0807        Influenza A, Molecular Negative           Influenza B, Molecular Negative           Albumin 4.3           ALP 91           Allens Test     Pass       ALT 14           Anion Gap 2           AST 48           Baso # 0.03           Basophil % 0.3           BILIRUBIN TOTAL 1.1  Comment: For infants and newborns, interpretation of results should be based  on gestational age, weight and in agreement with clinical  observations.    Premature Infant recommended reference ranges:  Up to 24 hours.............<8.0 mg/dL  Up to 48 hours............<12.0 mg/dL  3-5 days..................<15.0 mg/dL  6-29 days.................<15.0 mg/dL               Comment: Values of less than 100 pg/ml are consistent with non-CHF populations.           Site     RR       BUN 28           Calcium 9.3           Chloride 102           CO2 32           Creatinine 0.7           DelSys     Nasal Can       Differential Method Automated           eGFR >60.0           Eos # 0.1           Eos % 0.8           Flow     3       Flu A & B Source Nasal swab           Glucose 100           Gran # (ANC) 8.9           Gran % 82.2           Hematocrit 25.6           Hemoglobin 8.4           Immature Grans (Abs) 0.05  Comment: Mild elevation in immature granulocytes is non specific and   can be seen in a variety of conditions including stress response,   acute inflammation, trauma and pregnancy. Correlation with other   laboratory and clinical findings is essential.             Immature Granulocytes 0.5           INR 1.6  Comment: Coumadin Therapy:  2.0 - 3.0 for INR for all indicators except mechanical heart valves  and antiphospholipid syndromes which should use 2.5 - 3.5.             Lymph # 0.6           Lymph % 5.2           MCH 30.5           MCHC 32.8           MCV 93  Comment: Delta check review           Mode     SPONT       Mono # 1.2            Mono % 11.0           MPV 10.8           nRBC 0           Platelet Count 299  Comment: Delta check review           POC BE     3       POC Glucose     114       POC HCO3     27.6       POC Hematocrit     26       POC Ionized Calcium     1.23       POC Lactate   0.74         POC PCO2     44.4       POC PH     7.402       POC PO2     77       POC Potassium     4.6       POC SATURATED O2     95       POC Sodium     138       POC TCO2     29       Potassium 5.1           PROTEIN TOTAL 8.0           PT 17.6           RBC 2.75           RDW 19.6           Sample   VENOUS   ARTERIAL       SARS-CoV-2 RNA, Amplification, Qual Negative  Comment: This test utilizes isothermal nucleic acid amplification technology   to   detect the SARS-CoV-2 RdRp nucleic acid segment. The analytical   sensitivity   (limit of detection) is 500 copies/swab.     A POSITIVE result is indicative of the presence of SARS-CoV-2 RNA;   clinical   correlation with patient history and other diagnostic information is   necessary to determine patient infection status.    A NEGATIVE result means that SARS-CoV-2 nucleic acids are not present   above   the limit of detection. A NEGATIVE result should be treated as   presumptive.   It does not rule out the possibility of COVID-19 and should not be   the sole   basis for treatment decisions. If COVID-19 is strongly suspected   based on   clinical and exposure history, re-testing using an alternate   molecular assay   should be considered.     This test is FDA approved.Performance characteristics of this test   has been   independently verified by [facility] in conjunction   with   [facility] Department of Pathology and Laboratory   Medicine.             Sodium 136           Sp02     94       WBC 10.88                   Significant Imaging: I have reviewed all pertinent imaging results/findings within the past 24 hours.  Assessment/Plan:     Epistaxis  Patient presented with  recent history of epistaxis  No trauma  Coumadin held at this time  Coags noted, PT 17.6 INR 1.6  Afrin placed to nares with gauze  Rhino rocket placed by ER physician  If epistaxis not controlled with above measures, consider transfer for ENT evaluation        Anemia  Anemia is likely due to acute blood loss which was from epistaxis . Most recent hemoglobin and hematocrit are listed below.  Recent Labs     12/02/24  1300 12/05/24  0807 12/05/24  0816   HGB 7.0*  --  8.4*   HCT 22.8* 26* 25.6*     Plan  - Monitor serial CBC: Every 12 hours  - Transfuse PRBC if patient becomes hemodynamically unstable, symptomatic or H/H drops below 7/21.  - Patient has received 2 units of PRBCs on 12/3/24  - Patient's anemia is currently stable  - continue to trend and monitor blood count, transfuse as needed, holding anticoagulation at this time in setting of epistaxis  - patient to have outpatient upper and lower scope for evaluation with GI as well as workup with Hematology Oncology outpatient    Heart failure with improved ejection fraction (HFimpEF)    Patient is identified as having Diastolic (HFpEF) heart failure that is Chronic. CHF is currently controlled. Latest ECHO performed and demonstrates- Results for orders placed during the hospital encounter of 12/03/24    Echo    Interpretation Summary    Left Ventricle: The left ventricle is normal in size. Normal wall thickness. There is normal systolic function with a visually estimated ejection fraction of 55 - 60%. Unable to assess diastolic function due to atrial fibrillation.    Right Ventricle: Normal right ventricular cavity size. Wall thickness is normal. Systolic function is normal. Pacemaker lead present in the ventricle.    Left Atrium: Left atrium is mildly dilated.    Right Atrium: Right atrium is mildly dilated.    Aortic Valve: There is a transcatheter valve replacement in the aortic position. It is reported to be a 26 mm Jauregui valve. There is mild stenosis.  Aortic valve area by VTI is 1.5 cm². Aortic valve peak velocity is 3.6 m/s. Mean gradient is 28.0 mmHg. The dimensionless index is 0.43. There is mild to moderate aortic regurgitation with an eccentrically directed jet.    Mitral Valve: There is bileaflet sclerosis. There is mitral annular calcification present. There is mild regurgitation.    Tricuspid Valve: There is mild to moderate regurgitation.    IVC/SVC: Normal venous pressure at 3 mmHg.  . Continue Beta Blocker Furosemide Entresto and monitor clinical status closely. Monitor on telemetry. Patient is on CHF pathway.  Monitor strict Is&Os and daily weights.  Place on fluid restriction of 1.5 L. Cardiology is consulted. Continue to stress to patient importance of self efficacy and  on diet for CHF. Last BNP reviewed- and noted below   Recent Labs   Lab 12/05/24  0816   *   .  Recent echo December 3, 2024 with EF 55-60%, mild AS with TAVR, mild MR, moderate TR       Atrial fibrillation  Patient has paroxysmal (<7 days) atrial fibrillation. Patient is currently in sinus rhythm. [id number] Score: 4. The patients heart rate in the last 24 hours is as follows:  Pulse  Min: 59  Max: 60     Antiarrhythmics       Anticoagulants       Plan  - Replete lytes with a goal of K>4, Mg >2  - Patient is anticoagulated, see medications listed above.  - Patient's afib is currently controlled  - holding Coumadin at this time due to epistaxis and anemia        Pneumonia  Patient has a diagnosis of pneumonia. The cause of the pneumonia is suspected to be bacterial in etiology but organism is not known. The pneumonia is stable. The patient has the following signs/symptoms of pneumonia: persistent hypoxia , cough, sputum production, and shortness of breath. The patient does have a current oxygen requirement and the patient does have a home oxygen requirement. I have reviewed the pertinent imaging. The following cultures have been collected: Blood cultures The culture  "results are listed below.     Current antimicrobial regimen consists of the antibiotics listed below. Will monitor patient closely and Adjust treatment plan as follows      Antibiotics (From admission, onward)      Start     Stop Route Frequency Ordered    12/05/24 1730  ceFEPIme injection 2 g         -- IV Every 8 hours (non-standard times) 12/05/24 1026    12/05/24 1030  vancomycin (VANCOCIN) 2,000 mg in 0.9% NaCl 500 mL IVPB (admixture device)         -- IV Once 12/05/24 0924    12/05/24 1015  vancomycin - pharmacy to dose  (vancomycin IVPB (PEDS and ADULTS))        Placed in "And" Linked Group    -- IV pharmacy to manage frequency 12/05/24 0915            Microbiology Results (last 7 days)       Procedure Component Value Units Date/Time    Blood culture x two cultures. Draw prior to antibiotics. [[id number]] Collected: 12/05/24 0813    Order Status: Sent Specimen: Blood from Peripheral, Antecubital, Right Updated: 12/05/24 0836    Narrative:      Collection has been rescheduled by [facility] at 12/05/2024 08:23 Reason:   DONE.   Collection has been rescheduled by [facility] at 12/05/2024 08:23 Reason:   DONE.     Blood culture x two cultures. Draw prior to antibiotics. [[id number]] Collected: 12/05/24 0823    Order Status: Sent Specimen: Blood from Peripheral, Antecubital, Right Updated: 12/05/24 0836            COPD (chronic obstructive pulmonary disease)  Patient's COPD is with exacerbation noted by worsening of baseline hypoxia currently.  Patient is currently on COPD Pathway. Continue scheduled inhalers Antibiotics and Supplemental oxygen and monitor respiratory status closely.  Consult placed to patient's pulmonologist Dr. Haro      VTE Risk Mitigation (From admission, onward)      None                            OLIVA Peters  Department of Hospital Medicine  [facility] - Emergency Dept          "

## 2024-12-05 NOTE — HPI
The patient is a 74-year-old female with a past medical history of COPD (maintained on 3 L nasal cannula at home), paroxysmal AFib on Coumadin, status post TAVR, heart failure with preserved ejection fraction, anemia who presents to the emergency department for the evaluation of epistaxis, productive cough and shortness of breath.  Patient reports that over the past few days she has been having some epistaxis as well as upper respiratory infection symptoms with productive cough, subjective fever and chills, and increased dyspnea.  She denies recent sick contacts or recent travel.  The patient reports that she was recently given a blood transfusion for anemia that has been followed outpatient by her PCP.  Patient reports that since she has been on Coumadin she has been suffering with anemia.  She is scheduled to have a GI workup for upper and lower scopes to evaluate for causes of bleeding as well as bone marrow biopsy per heme Onc as well as a workup for possible Watchman device to transition off Coumadin.  Patient was evaluated in the emergency department.  Hemoglobin 8.4 (was 7 prior to blood transfusion on 12/03/2024), white blood cell count 10.8, renal function within normal limits.  ABG revealed pH of 7.4, PO2 of 77, CO2 of 44 and bicarb of 27.  Flu and COVID negative.  Chest x-ray with bilateral diffuse interstitial opacities worse in the right lung consistent with possible superimposed pneumonia.  Lactic acid within normal limits, BNP mildly elevated 309.  PT 17.6 INR 1.6.  Afrin was placed to sterile gauze to left Landeros with some improvement in hemostasis.  Patient was initiated on cefepime and vancomycin per ER MD.  Patient admitted to the hospital medicine service with pulmonary and cardiology consultation for further evaluation.

## 2024-12-05 NOTE — ASSESSMENT & PLAN NOTE
Patient has paroxysmal (<7 days) atrial fibrillation. Patient is currently in sinus rhythm. [id number] Score: 4. The patients heart rate in the last 24 hours is as follows:  Pulse  Min: 59  Max: 60     Antiarrhythmics       Anticoagulants       Plan  - Replete lytes with a goal of K>4, Mg >2  - Patient is anticoagulated, see medications listed above.  - Patient's afib is currently controlled  - holding Coumadin at this time due to epistaxis and anemia

## 2024-12-05 NOTE — ASSESSMENT & PLAN NOTE
Patient presented with recent history of epistaxis  No trauma  Coumadin held at this time  Coags noted, PT 17.6 INR 1.6  Afrin placed to nares with gauze  Rhino rocket placed by ER physician  If epistaxis not controlled with above measures, consider transfer for ENT evaluation

## 2024-12-05 NOTE — SUBJECTIVE & OBJECTIVE
Past Medical History:   Diagnosis Date    Anticoagulant long-term use     Arthritis     Atrial fibrillation     Bell's palsy     Boil of buttock     burst 12/19/22    CHF (congestive heart failure)     Chronic cough     COPD (chronic obstructive pulmonary disease)     use O2  3l/m NC at night; also taking during day currently 12/4/23    Dizziness     Encounter for blood transfusion     GI bleed 2011    transfusion    H/O diverticulitis of colon     Hard of hearing     Heart murmur     Hematoma 02/2023    left hand    Heterozygous alpha 1-antitrypsin deficiency     History of home oxygen therapy     3L NC at night    Hypertension     Lung disease     copd    MONSERRAT (obstructive sleep apnea)     resolved with wt loss 131#    Pacemaker     Pneumonia     last episode 2019    Pulmonary edema     Skin cancer     Unspecified visual disturbance     episode of vision disturbance and dizziness...occasional recurrences       Past Surgical History:   Procedure Laterality Date    CARDIAC ELECTROPHYSIOLOGY STUDY AND ABLATION      CAROTID ENDARTERECTOMY Left 12/22/2022    Procedure: ENDARTERECTOMY-CAROTID;  Surgeon: Maximilian Connolly MD;  Location: [facility];  Service: Peripheral Vascular;  Laterality: Left;    CAROTID STENT Left 2/29/2024    Procedure: INSERTION, STENT, ARTERY, CAROTID;  Surgeon: CIRILO Valdivia III, MD;  Location: [address];  Service: Vascular;  Laterality: Left;  left carotid artery stent placement  mgy  146.29   gymc2  8.0730  fluro time  4.8min    CARPAL TUNNEL RELEASE Left     CHOLECYSTECTOMY      EYE SURGERY Bilateral     cataract    HYSTERECTOMY      INSERT / REPLACE / REMOVE PACEMAKER      KNEE ARTHROSCOPY Left     LEFT HEART CATHETERIZATION  11/1/2023    Procedure: Left heart cath;  Surgeon: Puma Shelton MD;  Location: [facility] CATH;  Service: Cardiology;;    REPLACEMENT OF PACEMAKER GENERATOR Left 01/20/2022    Procedure: REPLACEMENT, PACEMAKER GENERATOR;  Surgeon: Raymond Pérez MD;  Location:  [facility] CATH/EP LAB;  Service: Cardiology;  Laterality: Left;    SKIN CANCER EXCISION      TRANSCATHETER AORTIC VALVE REPLACEMENT (TAVR) Bilateral 12/6/2023    Procedure: (TAVR);  Surgeon: Puma Shelton MD;  Location: [facility] CATH;  Service: Cardiology;  Laterality: Bilateral;    TRANSCATHETER AORTIC VALVE REPLACEMENT (TAVR) Bilateral 12/6/2023    Procedure: (TAVR) - Surgeon;  Surgeon: Maximilian Connolly MD;  Location: [facility] CATH;  Service: Peripheral Vascular;  Laterality: Bilateral;       Review of patient's allergies indicates:   Allergen Reactions    Sulfa (sulfonamide antibiotics) Itching       Current Facility-Administered Medications on File Prior to Encounter   Medication    [COMPLETED] 0.9%  NaCl infusion (for blood administration)    furosemide injection 20 mg     Current Outpatient Medications on File Prior to Encounter   Medication Sig    acetaminophen (TYLENOL ARTHRITIS ORAL) Take 2 tablets by mouth daily as needed.    albuterol (ACCUNEB) 1.25 mg/3 mL Nebu INHALE THE CONTENTS OF 1 VIAL VIA NEBULIZER EVERY 6 HOURS AS NEEDED FOR RESCUE    albuterol (PROVENTIL/VENTOLIN HFA) 90 mcg/actuation inhaler INHALE 2 PUFFS EVERY 6 HOURS AS NEEDED FOR WHEEZING (RESCUE)    clopidogreL (PLAVIX) 75 mg tablet Take 1 tablet (75 mg total) by mouth once daily.    digoxin (LANOXIN) 250 mcg tablet TAKE 1 TABLET EVERY DAY    ezetimibe (ZETIA) 10 mg tablet TAKE 1 TABLET ONE TIME DAILY    fluticasone propionate (FLONASE) 50 mcg/actuation nasal spray by Each Nostril route daily as needed.    furosemide (LASIX) 20 MG tablet Take 1 tablet (20 mg total) by mouth every morning.    metoprolol succinate (TOPROL-XL) 25 MG 24 hr tablet Take 1 tablet (25 mg total) by mouth every morning.    pantoprazole (PROTONIX) 40 MG tablet Take 1 tablet (40 mg total) by mouth every morning.    promethazine-dextromethorphan (PROMETHAZINE-DM) 6.25-15 mg/5 mL Syrp Take 5 mLs by mouth every 6 (six) hours as needed (cough).    rOPINIRole (REQUIP) 1 MG tablet  Take 1 tablet (1 mg total) by mouth every evening.    rosuvastatin (CRESTOR) 40 MG Tab Take 1 tablet (40 mg total) by mouth every evening.    sacubitriL-valsartan (ENTRESTO) 24-26 mg per tablet Take 1 tablet by mouth 2 (two) times daily.    spironolactone (ALDACTONE) 25 MG tablet Take 1 tablet (25 mg total) by mouth every morning.    TRELEGY ELLIPTA 100-62.5-25 mcg DsDv INHALE 1 PUFF INTO THE LUNGS ONCE DAILY.    warfarin (COUMADIN) 3 MG tablet Take 1.5 pills by mouth daily or as directed per the [facility]; #145 pills = 90 day supply    [DISCONTINUED] acetaminophen (TYLENOL) 325 MG tablet Take 325 mg by mouth every 6 (six) hours as needed for Pain.     Family History       Problem Relation (Age of Onset)    Cancer Father, Brother    Kidney failure Mother          Tobacco Use    Smoking status: Former     Current packs/day: 0.00     Average packs/day: 2.0 packs/day for 40.0 years (80.0 ttl pk-yrs)     Types: Cigarettes     Start date: 1971     Quit date: 2011     Years since quittin.8     Passive exposure: Past    Smokeless tobacco: Never    Tobacco comments:          Quit in    Substance and Sexual Activity    Alcohol use: Not Currently     Alcohol/week: 8.0 standard drinks of alcohol     Types: 8 Glasses of wine per week    Drug use: No    Sexual activity: Never     Review of Systems   Constitutional:  Positive for activity change, chills and fever. Negative for appetite change.   HENT:  Positive for nosebleeds.    Respiratory:  Positive for cough and shortness of breath.    Cardiovascular:  Positive for leg swelling. Negative for chest pain and palpitations.   Gastrointestinal:  Negative for diarrhea, nausea and vomiting.   Hematological:  Bruises/bleeds easily.     Objective:     Vital Signs (Most Recent):  Temp: 99.4 °F (37.4 °C) (24)  Pulse: 60 (24)  Resp: (!) 24 (24)  BP: (!) 106/58 (24)  SpO2: 100 % (24) Vital Signs (24h  Range):  Temp:  [97.1 °F (36.2 °C)-99.4 °F (37.4 °C)] 99.4 °F (37.4 °C)  Pulse:  [59-61] 60  Resp:  [22-24] 24  SpO2:  [72 %-100 %] 100 %  BP: (106-191)/(56-78) 106/58     Weight: 76.7 kg (169 lb)  Body mass index is 28.12 kg/m².     Physical Exam  Constitutional:       General: She is not in acute distress.     Appearance: She is not toxic-appearing.   HENT:      Head: Normocephalic.      Nose:      Comments: Epistaxis noted to left nare     Mouth/Throat:      Mouth: Mucous membranes are moist.   Eyes:      Pupils: Pupils are equal, round, and reactive to light.      Comments: Redness noted to right conjunctiva   Cardiovascular:      Rate and Rhythm: Normal rate and regular rhythm.      Pulses: Normal pulses.      Heart sounds: Murmur heard.   Pulmonary:      Effort: Pulmonary effort is normal.      Breath sounds: Wheezing and rhonchi present.   Abdominal:      General: There is no distension.      Palpations: Abdomen is soft.      Tenderness: There is no abdominal tenderness. There is no guarding.   Musculoskeletal:      Right lower leg: Edema (trace) present.      Left lower leg: Edema (trace) present.   Skin:     General: Skin is warm.      Capillary Refill: Capillary refill takes less than 2 seconds.   Neurological:      General: No focal deficit present.      Mental Status: She is alert and oriented to person, place, and time.   Psychiatric:         Mood and Affect: Mood normal.              CRANIAL NERVES     CN III, IV, VI   Pupils are equal, round, and reactive to light.       Significant Labs: All pertinent labs within the past 24 hours have been reviewed.  Recent Lab Results         12/05/24  0816   12/05/24  0813   12/05/24  0807        Influenza A, Molecular Negative           Influenza B, Molecular Negative           Albumin 4.3           ALP 91           Allens Test     Pass       ALT 14           Anion Gap 2           AST 48           Baso # 0.03           Basophil % 0.3           BILIRUBIN TOTAL  1.1  Comment: For infants and newborns, interpretation of results should be based  on gestational age, weight and in agreement with clinical  observations.    Premature Infant recommended reference ranges:  Up to 24 hours.............<8.0 mg/dL  Up to 48 hours............<12.0 mg/dL  3-5 days..................<15.0 mg/dL  6-29 days.................<15.0 mg/dL               Comment: Values of less than 100 pg/ml are consistent with non-CHF populations.           Site     RR       BUN 28           Calcium 9.3           Chloride 102           CO2 32           Creatinine 0.7           [employer]     Nasal Can       Differential Method Automated           eGFR >60.0           Eos # 0.1           Eos % 0.8           Flow     3       Flu A & B Source Nasal swab           Glucose 100           Gran # (ANC) 8.9           Gran % 82.2           Hematocrit 25.6           Hemoglobin 8.4           Immature Grans (Abs) 0.05  Comment: Mild elevation in immature granulocytes is non specific and   can be seen in a variety of conditions including stress response,   acute inflammation, trauma and pregnancy. Correlation with other   laboratory and clinical findings is essential.             Immature Granulocytes 0.5           INR 1.6  Comment: Coumadin Therapy:  2.0 - 3.0 for INR for all indicators except mechanical heart valves  and antiphospholipid syndromes which should use 2.5 - 3.5.             Lymph # 0.6           Lymph % 5.2           MCH 30.5           MCHC 32.8           MCV 93  Comment: Delta check review           Mode     SPONT       Mono # 1.2           Mono % 11.0           MPV 10.8           nRBC 0           Platelet Count 299  Comment: Delta check review           POC BE     3       POC Glucose     114       POC HCO3     27.6       POC Hematocrit     26       POC Ionized Calcium     1.23       POC Lactate   0.74         POC PCO2     44.4       POC PH     7.402       POC PO2     77       POC Potassium     4.6        POC SATURATED O2     95       POC Sodium     138       POC TCO2     29       Potassium 5.1           PROTEIN TOTAL 8.0           PT 17.6           RBC 2.75           RDW 19.6           Sample   VENOUS   ARTERIAL       SARS-CoV-2 RNA, Amplification, Qual Negative  Comment: This test utilizes isothermal nucleic acid amplification technology   to   detect the SARS-CoV-2 RdRp nucleic acid segment. The analytical   sensitivity   (limit of detection) is 500 copies/swab.     A POSITIVE result is indicative of the presence of SARS-CoV-2 RNA;   clinical   correlation with patient history and other diagnostic information is   necessary to determine patient infection status.    A NEGATIVE result means that SARS-CoV-2 nucleic acids are not present   above   the limit of detection. A NEGATIVE result should be treated as   presumptive.   It does not rule out the possibility of COVID-19 and should not be   the sole   basis for treatment decisions. If COVID-19 is strongly suspected   based on   clinical and exposure history, re-testing using an alternate   molecular assay   should be considered.     This test is FDA approved.Performance characteristics of this test   has been   independently verified by [facility] in conjunction   with   [facility] Department of Pathology and Laboratory   Medicine.             Sodium 136           Sp02     94       WBC 10.88                   Significant Imaging: I have reviewed all pertinent imaging results/findings within the past 24 hours.

## 2024-12-05 NOTE — ASSESSMENT & PLAN NOTE
Anemia is likely due to acute blood loss which was from epistaxis . Most recent hemoglobin and hematocrit are listed below.  Recent Labs     12/02/24  1300 12/05/24  0807 12/05/24  0816   HGB 7.0*  --  8.4*   HCT 22.8* 26* 25.6*     Plan  - Monitor serial CBC: Every 12 hours  - Transfuse PRBC if patient becomes hemodynamically unstable, symptomatic or H/H drops below 7/21.  - Patient has received 2 units of PRBCs on 12/3/24  - Patient's anemia is currently stable  - continue to trend and monitor blood count, transfuse as needed, holding anticoagulation at this time in setting of epistaxis  - patient to have outpatient upper and lower scope for evaluation with GI as well as workup with Hematology Oncology outpatient

## 2024-12-05 NOTE — ED PROVIDER NOTES
Encounter Date: 12/5/2024       History     Chief Complaint   Patient presents with    Shortness of Breath    Cough     Patient presents complaining of cough and shortness of breath that has been getting worse over the last 24 hours.  Patient has a history of COPD is on 3 L of oxygen at home.  Patient notes worsening shortness of breath.  Patient has had intermittent nosebleed for the last few days.  She noticed increasing work of breathing over the last few days.  Patient is on Coumadin therapy for AFib.      Review of patient's allergies indicates:   Allergen Reactions    Sulfa (sulfonamide antibiotics) Itching     Past Medical History:   Diagnosis Date    Anticoagulant long-term use     Arthritis     Atrial fibrillation     Bell's palsy     Boil of buttock     burst 12/19/22    CHF (congestive heart failure)     Chronic cough     COPD (chronic obstructive pulmonary disease)     use O2  3l/m NC at night; also taking during day currently 12/4/23    Dizziness     Encounter for blood transfusion     GI bleed 2011    transfusion    H/O diverticulitis of colon     Hard of hearing     Heart murmur     Hematoma 02/2023    left hand    Heterozygous alpha 1-antitrypsin deficiency     History of home oxygen therapy     3L NC at night    Hypertension     Lung disease     copd    MONSERRAT (obstructive sleep apnea)     resolved with wt loss 131#    Pacemaker     Pneumonia     last episode 2019    Pulmonary edema     Skin cancer     Unspecified visual disturbance     episode of vision disturbance and dizziness...occasional recurrences     Past Surgical History:   Procedure Laterality Date    CARDIAC ELECTROPHYSIOLOGY STUDY AND ABLATION      CAROTID ENDARTERECTOMY Left 12/22/2022    Procedure: ENDARTERECTOMY-CAROTID;  Surgeon: Maximilian Connolly MD;  Location: [facility] OR;  Service: Peripheral Vascular;  Laterality: Left;    CAROTID STENT Left 2/29/2024    Procedure: INSERTION, STENT, ARTERY, CAROTID;  Surgeon: CIRILO Valdivia III,  MD;  [facility];  Service: Vascular;  Laterality: Left;  left carotid artery stent placement  mgy  146.29   gymc2  8.0730  fluro time  4.8min    CARPAL TUNNEL RELEASE Left     CHOLECYSTECTOMY      EYE SURGERY Bilateral     cataract    HYSTERECTOMY      INSERT / REPLACE / REMOVE PACEMAKER      KNEE ARTHROSCOPY Left     LEFT HEART CATHETERIZATION  2023    Procedure: Left heart cath;  Surgeon: Puma Shelton MD;  Location: [facility] CATH;  Service: Cardiology;;    REPLACEMENT OF PACEMAKER GENERATOR Left 2022    Procedure: REPLACEMENT, PACEMAKER GENERATOR;  Surgeon: Raymond Pérez MD;  Location: [facility] CATH/EP LAB;  Service: Cardiology;  Laterality: Left;    SKIN CANCER EXCISION      TRANSCATHETER AORTIC VALVE REPLACEMENT (TAVR) Bilateral 2023    Procedure: (TAVR);  Surgeon: Puma Shelton MD;  Location: [facility] CATH;  Service: Cardiology;  Laterality: Bilateral;    TRANSCATHETER AORTIC VALVE REPLACEMENT (TAVR) Bilateral 2023    Procedure: (TAVR) - Surgeon;  Surgeon: Maximilian Connolly MD;  Location: [facility] CATH;  Service: Peripheral Vascular;  Laterality: Bilateral;     Family History   Problem Relation Name Age of Onset    Kidney failure Mother      Cancer Father      Cancer Brother       Social History     Tobacco Use    Smoking status: Former     Current packs/day: 0.00     Average packs/day: 2.0 packs/day for 40.0 years (80.0 ttl pk-yrs)     Types: Cigarettes     Start date: 1971     Quit date: 2011     Years since quittin.8     Passive exposure: Past    Smokeless tobacco: Never    Tobacco comments:          Quit in    Substance Use Topics    Alcohol use: Not Currently     Alcohol/week: 8.0 standard drinks of alcohol     Types: 8 Glasses of wine per week    Drug use: No     Review of Systems   All other systems reviewed and are negative.      Physical Exam     Initial Vitals   BP Pulse Resp Temp SpO2   24 0739 24 0739 24 0748 24 0739 24  0739   106/63 60 (!) 24 99.4 °F (37.4 °C) (!) 72 %      MAP       --                Physical Exam    Nursing note and vitals reviewed.  Constitutional: She appears well-developed and well-nourished.   Pleasant, polite   HENT:   Head: Normocephalic and atraumatic.   No active nosebleed at this time   Eyes: EOM are normal.   Neck: Neck supple.   Normal range of motion.  Cardiovascular:  Intact distal pulses.           Irregularly irregular   Pulmonary/Chest: No respiratory distress.   Bilateral wheezes, mild tachypnea   Abdominal: Abdomen is soft.   Musculoskeletal:         General: Normal range of motion.      Cervical back: Normal range of motion and neck supple.     Neurological: She is alert and oriented to person, place, and time. She has normal strength.   Skin: Skin is warm and dry. Capillary refill takes less than 2 seconds.   Psychiatric: She has a normal mood and affect. Her behavior is normal. Judgment and thought content normal.         ED Course   Procedures  Labs Reviewed   CBC W/ AUTO DIFFERENTIAL - Abnormal       Result Value    WBC 10.88      RBC 2.75 (*)     Hemoglobin 8.4 (*)     Hematocrit 25.6 (*)     MCV 93      MCH 30.5      MCHC 32.8      RDW 19.6 (*)     Platelets 299      MPV 10.8      Immature Granulocytes 0.5      Gran # (ANC) 8.9 (*)     Immature Grans (Abs) 0.05 (*)     Lymph # 0.6 (*)     Mono # 1.2 (*)     Eos # 0.1      Baso # 0.03      nRBC 0      Gran % 82.2 (*)     Lymph % 5.2 (*)     Mono % 11.0      Eosinophil % 0.8      Basophil % 0.3      Differential Method Automated      Narrative:     Release to patient->Immediate   COMPREHENSIVE METABOLIC PANEL - Abnormal    Sodium 136      Potassium 5.1      Chloride 102      CO2 32 (*)     Glucose 100      BUN 28 (*)     Creatinine 0.7      Calcium 9.3      Total Protein 8.0      Albumin 4.3      Total Bilirubin 1.1 (*)     Alkaline Phosphatase 91      AST 48 (*)     ALT 14      eGFR >60.0      Anion Gap 2 (*)     Narrative:     Release to  patient->Immediate   URINALYSIS, REFLEX TO URINE CULTURE - Abnormal    Specimen UA Urine, Clean Catch      Color, UA Yellow      Appearance, UA Hazy (*)     pH, UA 6.0      Specific Gravity, UA 1.020      Protein, UA 1+ (*)     Glucose, UA Negative      Ketones, UA Negative      Bilirubin (UA) Negative      Occult Blood UA 1+ (*)     Nitrite, UA Negative      Urobilinogen, UA Negative      Leukocytes, UA Trace (*)     Narrative:     Specimen Source->Urine   B-TYPE NATRIURETIC PEPTIDE - Abnormal     (*)     Narrative:     Release to patient->Immediate   PROTIME-INR - Abnormal    Prothrombin Time 17.6 (*)     INR 1.6 (*)     Narrative:     Release to patient->Immediate   URINALYSIS MICROSCOPIC - Abnormal    RBC, UA 1      WBC, UA 5      Bacteria Rare      Squam Epithel, UA 0      Hyaline Casts, UA 0      Granular Casts, UA 4 (*)     Microscopic Comment SEE COMMENT      Narrative:     Specimen Source->Urine   CBC WITHOUT DIFFERENTIAL - Abnormal    WBC 10.68      RBC 2.89 (*)     Hemoglobin 8.4 (*)     Hematocrit 26.8 (*)     MCV 93      MCH 29.1      MCHC 31.3 (*)     RDW 19.3 (*)     Platelets 250      MPV 10.3     ISTAT PROCEDURE - Abnormal    POC PH 7.402      POC PCO2 44.4      POC PO2 77 (*)     POC HCO3 27.6      POC BE 3 (*)     POC SATURATED O2 95      POC Glucose 114 (*)     POC Sodium 138      POC Potassium 4.6      POC TCO2 29 (*)     POC Ionized Calcium 1.23      POC Hematocrit 26 (*)     Sample ARTERIAL      Site RR      Allens Test Pass      DelSys Nasal Can      Mode SPONT      Flow 3      Sp02 94     CULTURE, BLOOD    Blood Culture, Routine No Growth to date      Narrative:     Aerobic and anaerobic  Collection has been rescheduled by [facility] at 12/05/2024 08:23 Reason:   DONE.   Collection has been rescheduled by [facility] at 12/05/2024 08:23 Reason:   DONE.    CULTURE, BLOOD    Blood Culture, Routine No Growth to date      Narrative:     Aerobic and anaerobic   HEPATITIS C ANTIBODY    Hepatitis C Ab  Negative      Narrative:     Release to patient->Immediate   HIV 1 / 2 ANTIBODY    HIV 1/2 Ag/Ab Negative      Narrative:     Release to patient->Immediate   INFLUENZA A AND B ANTIGEN    Influenza A, Molecular Negative      Influenza B, Molecular Negative      Flu A & B Source Nasal swab      Narrative:     Specimen Source->Nasopharyngeal Swab   SARS-COV-2 RNA AMPLIFICATION, QUAL    SARS-CoV-2 RNA, Amplification, Qual Negative     ISTAT LACTATE    POC Lactate 0.74      Sample VENOUS     POCT LACTATE        ECG Results              EKG 12-lead (In process)        Collection Time Result Time QRS Duration OHS QTC Calculation    12/05/24 07:46:41 12/05/24 13:50:15 152 416                     In process by Interface, Lab In [facility] (12/05/24 13:50:18)                   Narrative:    Test Reason : R07.9,    Vent. Rate :  61 BPM     Atrial Rate : 357 BPM     P-R Int :    ms          QRS Dur : 152 ms      QT Int : 414 ms       P-R-T Axes :    -72  98 degrees    QTcB Int : 416 ms    Ventricular-paced rhythm  Abnormal ECG  When compared with ECG of 14-Mar-2024 10:33,  No significant change was found    Referred By: AAAREFERRAL SELF           Confirmed By:                                   Imaging Results              X-Ray Chest AP Portable (Final result)  Result time 12/05/24 08:35:24      Final result by Dima Christina DO (12/05/24 08:35:24)                   Impression:      1. Unchanged enlarged cardiomediastinal silhouette.  2. Bilateral diffuse interstitial opacities again noted, worst in the right lung.  This likely reflects chronic interstitial lung disease without being able to exclude a superimposed acute process.  3. Hazy opacities of the lung bases with blunting of the costophrenic angles likely reflects scarring, atelectasis or pleural effusions.      Electronically signed by: Dima Christina  Date:    12/05/2024  Time:    08:35               Narrative:    EXAMINATION:  XR CHEST AP PORTABLE    CLINICAL  HISTORY:  Sepsis;    FINDINGS:  Portable chest with comparison chest x-ray 01/26/2024.  Enlarged cardiomediastinal silhouette with left single lead cardiac pacemaker again noted.Bilateral diffuse interstitial opacities noted, worse in the right lung.  There is also hazy opacification of the right lung base with blunting of the costophrenic angle, similar to previous exam.  Blunting of the left costophrenic angle.  Pulmonary vasculature is normal. No acute osseous abnormality.                                       Medications   vancomycin - pharmacy to dose (has no administration in time range)   sodium chloride 0.9% flush 3 mL (has no administration in time range)   predniSONE tablet 40 mg (40 mg Oral Given 12/5/24 1337)   albuterol-ipratropium 2.5 mg-0.5 mg/3 mL nebulizer solution 3 mL (3 mLs Nebulization Given 12/5/24 1353)   ondansetron injection 4 mg (has no administration in time range)   acetaminophen tablet 650 mg (has no administration in time range)   acetaminophen tablet 650 mg (has no administration in time range)   HYDROcodone-acetaminophen 5-325 mg per tablet 1 tablet (has no administration in time range)   ceFEPIme injection 2 g (has no administration in time range)   budesonide nebulizer solution 0.5 mg (has no administration in time range)   oxymetazoline 0.05 % nasal spray 2 spray (2 sprays Topical (Top) Not Given 12/5/24 1300)   sodium chloride 0.9% flush 10 mL (has no administration in time range)   albuterol-ipratropium 2.5 mg-0.5 mg/3 mL nebulizer solution 3 mL (has no administration in time range)   melatonin tablet 9 mg (has no administration in time range)   senna-docusate 8.6-50 mg per tablet 1 tablet (has no administration in time range)   naloxone 0.4 mg/mL injection 0.02 mg (has no administration in time range)   potassium bicarbonate disintegrating tablet 50 mEq (has no administration in time range)   potassium bicarbonate disintegrating tablet 35 mEq (has no administration in time  range)   potassium bicarbonate disintegrating tablet 60 mEq (has no administration in time range)   magnesium oxide tablet 800 mg (has no administration in time range)   magnesium oxide tablet 800 mg (has no administration in time range)   potassium, sodium phosphates 280-160-250 mg packet 2 packet (has no administration in time range)   potassium, sodium phosphates 280-160-250 mg packet 2 packet (has no administration in time range)   potassium, sodium phosphates 280-160-250 mg packet 2 packet (has no administration in time range)   glucose chewable tablet 16 g (has no administration in time range)   glucose chewable tablet 24 g (has no administration in time range)   dextrose 50% injection 12.5 g (has no administration in time range)   dextrose 50% injection 25 g (has no administration in time range)   glucagon (human recombinant) injection 1 mg (has no administration in time range)   digoxin tablet 0.25 mg (has no administration in time range)   ezetimibe tablet 10 mg (has no administration in time range)   metoprolol succinate (TOPROL-XL) 24 hr tablet 25 mg (has no administration in time range)   pantoprazole EC tablet 40 mg (has no administration in time range)   rOPINIRole tablet 1 mg (has no administration in time range)   vancomycin (VANCOCIN) 1,000 mg in 0.9% NaCl 250 mL IVPB (admixture device) (1,000 mg Intravenous Trough Due As Scheduled Before Dose 12/6/24 2200)   furosemide injection 20 mg (has no administration in time range)   methylPREDNISolone sodium succinate injection 125 mg (125 mg Intravenous Given 12/5/24 0830)   albuterol-ipratropium 2.5 mg-0.5 mg/3 mL nebulizer solution 3 mL (3 mLs Nebulization Given 12/5/24 0800)   ceFEPIme injection 2 g (2 g Intravenous Given 12/5/24 0931)   phenylephrine HCL 0.5% nasal spray 2 spray (2 sprays Each Nostril Given 12/5/24 0932)   vancomycin (VANCOCIN) 2,000 mg in 0.9% NaCl 500 mL IVPB (admixture device) (0 mg Intravenous Stopped 12/5/24 1214)     Medical  Decision Making  Patient appears in mild distress    Hypoxia is present    Considerations include COPD exacerbation, aspiration pneumonia, pneumonia, acute kidney injury, dehydration, electrolyte abnormalities, pulmonary edema, CHF    MDM    Patient presents for emergent evaluation of acute shortness of breath that poses a possible threat to life and/or bodily function.    In the ED patient found to have acute pneumonia.     Amount and/or complexity of data reviewed  I ordered labs and personally reviewed them.  Labs significant for normal white blood cell count.    I ordered X-rays and personally reviewed them and reviewed the radiologist interpretation.  Xray significant for right-sided opacity.    I ordered EKG and personally reviewed it.  EKG significant for paced rhythm.        I did review prior medical records.    Social determinants of health - none    Admission MDM and Risk  I discussed the patient presentation labs, ekg, X-rays, CT findings with the consultant(s) hospitalist   Patient was managed in the ED with IV cefepime, vanc, Solu-Medrol, DuoNeb.    The response to treatment was improved.  Oxygen has improved with 3 nasal cannula  Shared decision-making with the patient regarding diagnosis, treatment, and plan was well received.    Patient required emergent consultation to hospitalist for admission.      Procedure note  Patient having persistent oozing from the left nostril despite Afrin.  I put a rhino rocket into the left nostril without complication.    Amount and/or Complexity of Data Reviewed  Labs: ordered. Decision-making details documented in ED Course.  Radiology: ordered.    Risk  OTC drugs.  Prescription drug management.  Decision regarding hospitalization.               ED Course as of 12/05/24 1602   Thu Dec 05, 2024   0914 INR(!): 1.6 [AP]   0914 WBC: 10.88 [AP]   0914 Hemoglobin(!): 8.4 [AP]   0914 Hematocrit(!): 25.6 [AP]   0914 Platelet Count: 299 [AP]      ED Course User Index  [AP]  Lm Biswas MD                           Clinical Impression:  Final diagnoses:  [J18.9] Pneumonia due to infectious organism, unspecified laterality, unspecified part of lung (Primary)          ED Disposition Condition    Admit Stable                Lm Biswas MD  12/05/24 7008

## 2024-12-05 NOTE — ASSESSMENT & PLAN NOTE
"Patient has a diagnosis of pneumonia. The cause of the pneumonia is suspected to be bacterial in etiology but organism is not known. The pneumonia is stable. The patient has the following signs/symptoms of pneumonia: persistent hypoxia , cough, sputum production, and shortness of breath. The patient does have a current oxygen requirement and the patient does have a home oxygen requirement. I have reviewed the pertinent imaging. The following cultures have been collected: Blood cultures The culture results are listed below.     Current antimicrobial regimen consists of the antibiotics listed below. Will monitor patient closely and Adjust treatment plan as follows      Antibiotics (From admission, onward)      Start     Stop Route Frequency Ordered    12/05/24 1730  ceFEPIme injection 2 g         -- IV Every 8 hours (non-standard times) 12/05/24 1026    12/05/24 1030  vancomycin (VANCOCIN) 2,000 mg in 0.9% NaCl 500 mL IVPB (admixture device)         -- IV Once 12/05/24 0924    12/05/24 1015  vancomycin - pharmacy to dose  (vancomycin IVPB (PEDS and ADULTS))        Placed in "And" Linked Group    -- IV pharmacy to manage frequency 12/05/24 0915            Microbiology Results (last 7 days)       Procedure Component Value Units Date/Time    Blood culture x two cultures. Draw prior to antibiotics. [[id number]] Collected: 12/05/24 0813    Order Status: Sent Specimen: Blood from Peripheral, Antecubital, Right Updated: 12/05/24 0836    Narrative:      Collection has been rescheduled by [facility] at 12/05/2024 08:23 Reason:   DONE.   Collection has been rescheduled by [facility] at 12/05/2024 08:23 Reason:   DONE.     Blood culture x two cultures. Draw prior to antibiotics. [[id number]] Collected: 12/05/24 0823    Order Status: Sent Specimen: Blood from Peripheral, Antecubital, Right Updated: 12/05/24 0836          "

## 2024-12-05 NOTE — ED NOTES
Dr. Moe and NP Zuleyka notified of pt's BP trending down. Pt receiving 500 ml nacl with vanc right now. Repeating CBC

## 2024-12-05 NOTE — ASSESSMENT & PLAN NOTE
Patient is identified as having Diastolic (HFpEF) heart failure that is Chronic. CHF is currently controlled. Latest ECHO performed and demonstrates- Results for orders placed during the hospital encounter of 12/03/24    Echo    Interpretation Summary    Left Ventricle: The left ventricle is normal in size. Normal wall thickness. There is normal systolic function with a visually estimated ejection fraction of 55 - 60%. Unable to assess diastolic function due to atrial fibrillation.    Right Ventricle: Normal right ventricular cavity size. Wall thickness is normal. Systolic function is normal. Pacemaker lead present in the ventricle.    Left Atrium: Left atrium is mildly dilated.    Right Atrium: Right atrium is mildly dilated.    Aortic Valve: There is a transcatheter valve replacement in the aortic position. It is reported to be a 26 mm Jauregui valve. There is mild stenosis. Aortic valve area by VTI is 1.5 cm². Aortic valve peak velocity is 3.6 m/s. Mean gradient is 28.0 mmHg. The dimensionless index is 0.43. There is mild to moderate aortic regurgitation with an eccentrically directed jet.    Mitral Valve: There is bileaflet sclerosis. There is mitral annular calcification present. There is mild regurgitation.    Tricuspid Valve: There is mild to moderate regurgitation.    IVC/SVC: Normal venous pressure at 3 mmHg.  . Continue Beta Blocker Furosemide Entresto and monitor clinical status closely. Monitor on telemetry. Patient is on CHF pathway.  Monitor strict Is&Os and daily weights.  Place on fluid restriction of 1.5 L. Cardiology is consulted. Continue to stress to patient importance of self efficacy and  on diet for CHF. Last BNP reviewed- and noted below   Recent Labs   Lab 12/05/24  0816   *   .  Recent echo December 3, 2024 with EF 55-60%, mild AS with TAVR, mild MR, moderate TR

## 2024-12-05 NOTE — H&P (VIEW-ONLY)
Replaced by [facility] - Emergency Dept  Hospital Medicine  History & Physical    Patient Name: Lisa Smith  MRN: [id number]  Patient Class: Emergency  Admission Date: 12/5/2024  Attending Physician: Madan Cavazos MD   Primary Care Provider: Hope Tang FNP         Patient information was obtained from patient, relative(s), past medical records, and ER records.     Subjective:     Principal Problem:<principal problem not specified>    Chief Complaint:   Chief Complaint   Patient presents with    Shortness of Breath    Cough        HPI: The patient is a 74-year-old female with a past medical history of COPD (maintained on 3 L nasal cannula at home), paroxysmal AFib on Coumadin, status post TAVR, heart failure with preserved ejection fraction, anemia who presents to the emergency department for the evaluation of epistaxis, productive cough and shortness of breath.  Patient reports that over the past few days she has been having some epistaxis as well as upper respiratory infection symptoms with productive cough, subjective fever and chills, and increased dyspnea.  She denies recent sick contacts or recent travel.  The patient reports that she was recently given a blood transfusion for anemia that has been followed outpatient by her PCP.  Patient reports that since she has been on Coumadin she has been suffering with anemia.  She is scheduled to have a GI workup for upper and lower scopes to evaluate for causes of bleeding as well as bone marrow biopsy per heme Onc as well as a workup for possible Watchman device to transition off Coumadin.  Patient was evaluated in the emergency department.  Hemoglobin 8.4 (was 7 prior to blood transfusion on 12/03/2024), white blood cell count 10.8, renal function within normal limits.  ABG revealed pH of 7.4, PO2 of 77, CO2 of 44 and bicarb of 27.  Flu and COVID negative.  Chest x-ray with bilateral diffuse interstitial opacities worse in the right lung consistent  with possible superimposed pneumonia.  Lactic acid within normal limits, BNP mildly elevated 309.  PT 17.6 INR 1.6.  Afrin was placed to sterile gauze to left Landeros with some improvement in hemostasis.  Patient was initiated on cefepime and vancomycin per ER MD.  Patient admitted to the hospital medicine service with pulmonary and cardiology consultation for further evaluation.    Past Medical History:   Diagnosis Date    Anticoagulant long-term use     Arthritis     Atrial fibrillation     Bell's palsy     Boil of buttock     burst 12/19/22    CHF (congestive heart failure)     Chronic cough     COPD (chronic obstructive pulmonary disease)     use O2  3l/m NC at night; also taking during day currently 12/4/23    Dizziness     Encounter for blood transfusion     GI bleed 2011    transfusion    H/O diverticulitis of colon     Hard of hearing     Heart murmur     Hematoma 02/2023    left hand    Heterozygous alpha 1-antitrypsin deficiency     History of home oxygen therapy     3L NC at night    Hypertension     Lung disease     copd    MONSERRAT (obstructive sleep apnea)     resolved with wt loss 131#    Pacemaker     Pneumonia     last episode 2019    Pulmonary edema     Skin cancer     Unspecified visual disturbance     episode of vision disturbance and dizziness...occasional recurrences       Past Surgical History:   Procedure Laterality Date    CARDIAC ELECTROPHYSIOLOGY STUDY AND ABLATION      CAROTID ENDARTERECTOMY Left 12/22/2022    Procedure: ENDARTERECTOMY-CAROTID;  Surgeon: Maximilian Connolly MD;  Location: [facility] OR;  Service: Peripheral Vascular;  Laterality: Left;    CAROTID STENT Left 2/29/2024    Procedure: INSERTION, STENT, ARTERY, CAROTID;  Surgeon: CIRILO Valdivia III, MD;  Location: [address];  Service: Vascular;  Laterality: Left;  left carotid artery stent placement  mgy  146.29   gymc2  8.0730  fluro time  4.8min    CARPAL TUNNEL RELEASE Left     CHOLECYSTECTOMY      EYE SURGERY Bilateral      cataract    HYSTERECTOMY      INSERT / REPLACE / REMOVE PACEMAKER      KNEE ARTHROSCOPY Left     LEFT HEART CATHETERIZATION  11/1/2023    Procedure: Left heart cath;  Surgeon: Puma Shelton MD;  Location: [facility] CATH;  Service: Cardiology;;    REPLACEMENT OF PACEMAKER GENERATOR Left 01/20/2022    Procedure: REPLACEMENT, PACEMAKER GENERATOR;  Surgeon: Raymond Pérez MD;  Location: [facility] CATH/EP LAB;  Service: Cardiology;  Laterality: Left;    SKIN CANCER EXCISION      TRANSCATHETER AORTIC VALVE REPLACEMENT (TAVR) Bilateral 12/6/2023    Procedure: (TAVR);  Surgeon: Puma Shelton MD;  Location: [facility] CATH;  Service: Cardiology;  Laterality: Bilateral;    TRANSCATHETER AORTIC VALVE REPLACEMENT (TAVR) Bilateral 12/6/2023    Procedure: (TAVR) - Surgeon;  Surgeon: Maximilian Connolly MD;  Location: [facility] CATH;  Service: Peripheral Vascular;  Laterality: Bilateral;       Review of patient's allergies indicates:   Allergen Reactions    Sulfa (sulfonamide antibiotics) Itching       Current Facility-Administered Medications on File Prior to Encounter   Medication    [COMPLETED] 0.9%  NaCl infusion (for blood administration)    furosemide injection 20 mg     Current Outpatient Medications on File Prior to Encounter   Medication Sig    acetaminophen (TYLENOL ARTHRITIS ORAL) Take 2 tablets by mouth daily as needed.    albuterol (ACCUNEB) 1.25 mg/3 mL Nebu INHALE THE CONTENTS OF 1 VIAL VIA NEBULIZER EVERY 6 HOURS AS NEEDED FOR RESCUE    albuterol (PROVENTIL/VENTOLIN HFA) 90 mcg/actuation inhaler INHALE 2 PUFFS EVERY 6 HOURS AS NEEDED FOR WHEEZING (RESCUE)    clopidogreL (PLAVIX) 75 mg tablet Take 1 tablet (75 mg total) by mouth once daily.    digoxin (LANOXIN) 250 mcg tablet TAKE 1 TABLET EVERY DAY    ezetimibe (ZETIA) 10 mg tablet TAKE 1 TABLET ONE TIME DAILY    fluticasone propionate (FLONASE) 50 mcg/actuation nasal spray by Each Nostril route daily as needed.    furosemide (LASIX) 20 MG tablet Take 1 tablet (20 mg total) by  mouth every morning.    metoprolol succinate (TOPROL-XL) 25 MG 24 hr tablet Take 1 tablet (25 mg total) by mouth every morning.    pantoprazole (PROTONIX) 40 MG tablet Take 1 tablet (40 mg total) by mouth every morning.    promethazine-dextromethorphan (PROMETHAZINE-DM) 6.25-15 mg/5 mL Syrp Take 5 mLs by mouth every 6 (six) hours as needed (cough).    rOPINIRole (REQUIP) 1 MG tablet Take 1 tablet (1 mg total) by mouth every evening.    rosuvastatin (CRESTOR) 40 MG Tab Take 1 tablet (40 mg total) by mouth every evening.    sacubitriL-valsartan (ENTRESTO) 24-26 mg per tablet Take 1 tablet by mouth 2 (two) times daily.    spironolactone (ALDACTONE) 25 MG tablet Take 1 tablet (25 mg total) by mouth every morning.    TRELEGY ELLIPTA 100-62.5-25 mcg DsDv INHALE 1 PUFF INTO THE LUNGS ONCE DAILY.    warfarin (COUMADIN) 3 MG tablet Take 1.5 pills by mouth daily or as directed per the [facility]; #145 pills = 90 day supply    [DISCONTINUED] acetaminophen (TYLENOL) 325 MG tablet Take 325 mg by mouth every 6 (six) hours as needed for Pain.     Family History       Problem Relation (Age of Onset)    Cancer Father, Brother    Kidney failure Mother          Tobacco Use    Smoking status: Former     Current packs/day: 0.00     Average packs/day: 2.0 packs/day for 40.0 years (80.0 ttl pk-yrs)     Types: Cigarettes     Start date: 1971     Quit date: 2011     Years since quittin.8     Passive exposure: Past    Smokeless tobacco: Never    Tobacco comments:          Quit in    Substance and Sexual Activity    Alcohol use: Not Currently     Alcohol/week: 8.0 standard drinks of alcohol     Types: 8 Glasses of wine per week    Drug use: No    Sexual activity: Never     Review of Systems   Constitutional:  Positive for activity change, chills and fever. Negative for appetite change.   HENT:  Positive for nosebleeds.    Respiratory:  Positive for cough and shortness of breath.    Cardiovascular:  Positive for leg  swelling. Negative for chest pain and palpitations.   Gastrointestinal:  Negative for diarrhea, nausea and vomiting.   Hematological:  Bruises/bleeds easily.     Objective:     Vital Signs (Most Recent):  Temp: 99.4 °F (37.4 °C) (12/05/24 0739)  Pulse: 60 (12/05/24 0939)  Resp: (!) 24 (12/05/24 0939)  BP: (!) 106/58 (12/05/24 0939)  SpO2: 100 % (12/05/24 0939) Vital Signs (24h Range):  Temp:  [97.1 °F (36.2 °C)-99.4 °F (37.4 °C)] 99.4 °F (37.4 °C)  Pulse:  [59-61] 60  Resp:  [22-24] 24  SpO2:  [72 %-100 %] 100 %  BP: (106-191)/(56-78) 106/58     Weight: 76.7 kg (169 lb)  Body mass index is 28.12 kg/m².     Physical Exam  Constitutional:       General: She is not in acute distress.     Appearance: She is not toxic-appearing.   HENT:      Head: Normocephalic.      Nose:      Comments: Epistaxis noted to left nare     Mouth/Throat:      Mouth: Mucous membranes are moist.   Eyes:      Pupils: Pupils are equal, round, and reactive to light.      Comments: Redness noted to right conjunctiva   Cardiovascular:      Rate and Rhythm: Normal rate and regular rhythm.      Pulses: Normal pulses.      Heart sounds: Murmur heard.   Pulmonary:      Effort: Pulmonary effort is normal.      Breath sounds: Wheezing and rhonchi present.   Abdominal:      General: There is no distension.      Palpations: Abdomen is soft.      Tenderness: There is no abdominal tenderness. There is no guarding.   Musculoskeletal:      Right lower leg: Edema (trace) present.      Left lower leg: Edema (trace) present.   Skin:     General: Skin is warm.      Capillary Refill: Capillary refill takes less than 2 seconds.   Neurological:      General: No focal deficit present.      Mental Status: She is alert and oriented to person, place, and time.   Psychiatric:         Mood and Affect: Mood normal.              CRANIAL NERVES     CN III, IV, VI   Pupils are equal, round, and reactive to light.       Significant Labs: All pertinent labs within the past 24  hours have been reviewed.  Recent Lab Results         12/05/24  0816   12/05/24  0813   12/05/24  0807        Influenza A, Molecular Negative           Influenza B, Molecular Negative           Albumin 4.3           ALP 91           Allens Test     Pass       ALT 14           Anion Gap 2           AST 48           Baso # 0.03           Basophil % 0.3           BILIRUBIN TOTAL 1.1  Comment: For infants and newborns, interpretation of results should be based  on gestational age, weight and in agreement with clinical  observations.    Premature Infant recommended reference ranges:  Up to 24 hours.............<8.0 mg/dL  Up to 48 hours............<12.0 mg/dL  3-5 days..................<15.0 mg/dL  6-29 days.................<15.0 mg/dL               Comment: Values of less than 100 pg/ml are consistent with non-CHF populations.           Site     RR       BUN 28           Calcium 9.3           Chloride 102           CO2 32           Creatinine 0.7           DelSys     Nasal Can       Differential Method Automated           eGFR >60.0           Eos # 0.1           Eos % 0.8           Flow     3       Flu A & B Source Nasal swab           Glucose 100           Gran # (ANC) 8.9           Gran % 82.2           Hematocrit 25.6           Hemoglobin 8.4           Immature Grans (Abs) 0.05  Comment: Mild elevation in immature granulocytes is non specific and   can be seen in a variety of conditions including stress response,   acute inflammation, trauma and pregnancy. Correlation with other   laboratory and clinical findings is essential.             Immature Granulocytes 0.5           INR 1.6  Comment: Coumadin Therapy:  2.0 - 3.0 for INR for all indicators except mechanical heart valves  and antiphospholipid syndromes which should use 2.5 - 3.5.             Lymph # 0.6           Lymph % 5.2           MCH 30.5           MCHC 32.8           MCV 93  Comment: Delta check review           Mode     SPONT       Mono # 1.2            Mono % 11.0           MPV 10.8           nRBC 0           Platelet Count 299  Comment: Delta check review           POC BE     3       POC Glucose     114       POC HCO3     27.6       POC Hematocrit     26       POC Ionized Calcium     1.23       POC Lactate   0.74         POC PCO2     44.4       POC PH     7.402       POC PO2     77       POC Potassium     4.6       POC SATURATED O2     95       POC Sodium     138       POC TCO2     29       Potassium 5.1           PROTEIN TOTAL 8.0           PT 17.6           RBC 2.75           RDW 19.6           Sample   VENOUS   ARTERIAL       SARS-CoV-2 RNA, Amplification, Qual Negative  Comment: This test utilizes isothermal nucleic acid amplification technology   to   detect the SARS-CoV-2 RdRp nucleic acid segment. The analytical   sensitivity   (limit of detection) is 500 copies/swab.     A POSITIVE result is indicative of the presence of SARS-CoV-2 RNA;   clinical   correlation with patient history and other diagnostic information is   necessary to determine patient infection status.    A NEGATIVE result means that SARS-CoV-2 nucleic acids are not present   above   the limit of detection. A NEGATIVE result should be treated as   presumptive.   It does not rule out the possibility of COVID-19 and should not be   the sole   basis for treatment decisions. If COVID-19 is strongly suspected   based on   clinical and exposure history, re-testing using an alternate   molecular assay   should be considered.     This test is FDA approved.Performance characteristics of this test   has been   independently verified by [facility] in conjunction   with   [facility] Department of Pathology and Laboratory   Medicine.             Sodium 136           Sp02     94       WBC 10.88                   Significant Imaging: I have reviewed all pertinent imaging results/findings within the past 24 hours.  Assessment/Plan:     Epistaxis  Patient presented with  recent history of epistaxis  No trauma  Coumadin held at this time  Coags noted, PT 17.6 INR 1.6  Afrin placed to nares with gauze  Rhino rocket placed by ER physician  If epistaxis not controlled with above measures, consider transfer for ENT evaluation        Anemia  Anemia is likely due to acute blood loss which was from epistaxis . Most recent hemoglobin and hematocrit are listed below.  Recent Labs     12/02/24  1300 12/05/24  0807 12/05/24  0816   HGB 7.0*  --  8.4*   HCT 22.8* 26* 25.6*     Plan  - Monitor serial CBC: Every 12 hours  - Transfuse PRBC if patient becomes hemodynamically unstable, symptomatic or H/H drops below 7/21.  - Patient has received 2 units of PRBCs on 12/3/24  - Patient's anemia is currently stable  - continue to trend and monitor blood count, transfuse as needed, holding anticoagulation at this time in setting of epistaxis  - patient to have outpatient upper and lower scope for evaluation with GI as well as workup with Hematology Oncology outpatient    Heart failure with improved ejection fraction (HFimpEF)    Patient is identified as having Diastolic (HFpEF) heart failure that is Chronic. CHF is currently controlled. Latest ECHO performed and demonstrates- Results for orders placed during the hospital encounter of 12/03/24    Echo    Interpretation Summary    Left Ventricle: The left ventricle is normal in size. Normal wall thickness. There is normal systolic function with a visually estimated ejection fraction of 55 - 60%. Unable to assess diastolic function due to atrial fibrillation.    Right Ventricle: Normal right ventricular cavity size. Wall thickness is normal. Systolic function is normal. Pacemaker lead present in the ventricle.    Left Atrium: Left atrium is mildly dilated.    Right Atrium: Right atrium is mildly dilated.    Aortic Valve: There is a transcatheter valve replacement in the aortic position. It is reported to be a 26 mm Jauregui valve. There is mild stenosis.  Aortic valve area by VTI is 1.5 cm². Aortic valve peak velocity is 3.6 m/s. Mean gradient is 28.0 mmHg. The dimensionless index is 0.43. There is mild to moderate aortic regurgitation with an eccentrically directed jet.    Mitral Valve: There is bileaflet sclerosis. There is mitral annular calcification present. There is mild regurgitation.    Tricuspid Valve: There is mild to moderate regurgitation.    IVC/SVC: Normal venous pressure at 3 mmHg.  . Continue Beta Blocker Furosemide Entresto and monitor clinical status closely. Monitor on telemetry. Patient is on CHF pathway.  Monitor strict Is&Os and daily weights.  Place on fluid restriction of 1.5 L. Cardiology is consulted. Continue to stress to patient importance of self efficacy and  on diet for CHF. Last BNP reviewed- and noted below   Recent Labs   Lab 12/05/24  0816   *   .  Recent echo December 3, 2024 with EF 55-60%, mild AS with TAVR, mild MR, moderate TR       Atrial fibrillation  Patient has paroxysmal (<7 days) atrial fibrillation. Patient is currently in sinus rhythm. [id number] Score: 4. The patients heart rate in the last 24 hours is as follows:  Pulse  Min: 59  Max: 60     Antiarrhythmics       Anticoagulants       Plan  - Replete lytes with a goal of K>4, Mg >2  - Patient is anticoagulated, see medications listed above.  - Patient's afib is currently controlled  - holding Coumadin at this time due to epistaxis and anemia        Pneumonia  Patient has a diagnosis of pneumonia. The cause of the pneumonia is suspected to be bacterial in etiology but organism is not known. The pneumonia is stable. The patient has the following signs/symptoms of pneumonia: persistent hypoxia , cough, sputum production, and shortness of breath. The patient does have a current oxygen requirement and the patient does have a home oxygen requirement. I have reviewed the pertinent imaging. The following cultures have been collected: Blood cultures The culture  "results are listed below.     Current antimicrobial regimen consists of the antibiotics listed below. Will monitor patient closely and Adjust treatment plan as follows      Antibiotics (From admission, onward)      Start     Stop Route Frequency Ordered    12/05/24 1730  ceFEPIme injection 2 g         -- IV Every 8 hours (non-standard times) 12/05/24 1026    12/05/24 1030  vancomycin (VANCOCIN) 2,000 mg in 0.9% NaCl 500 mL IVPB (admixture device)         -- IV Once 12/05/24 0924    12/05/24 1015  vancomycin - pharmacy to dose  (vancomycin IVPB (PEDS and ADULTS))        Placed in "And" Linked Group    -- IV pharmacy to manage frequency 12/05/24 0915            Microbiology Results (last 7 days)       Procedure Component Value Units Date/Time    Blood culture x two cultures. Draw prior to antibiotics. [[id number]] Collected: 12/05/24 0813    Order Status: Sent Specimen: Blood from Peripheral, Antecubital, Right Updated: 12/05/24 0836    Narrative:      Collection has been rescheduled by [facility] at 12/05/2024 08:23 Reason:   DONE.   Collection has been rescheduled by [facility] at 12/05/2024 08:23 Reason:   DONE.     Blood culture x two cultures. Draw prior to antibiotics. [[id number]] Collected: 12/05/24 0823    Order Status: Sent Specimen: Blood from Peripheral, Antecubital, Right Updated: 12/05/24 0836            COPD (chronic obstructive pulmonary disease)  Patient's COPD is with exacerbation noted by worsening of baseline hypoxia currently.  Patient is currently on COPD Pathway. Continue scheduled inhalers Antibiotics and Supplemental oxygen and monitor respiratory status closely.  Consult placed to patient's pulmonologist Dr. Haro      VTE Risk Mitigation (From admission, onward)      None                            OLIVA Peters  Department of Hospital Medicine  [facility] - Emergency Dept          "

## 2024-12-05 NOTE — PROGRESS NOTES
Pharmacokinetic Initial Assessment: IV Vancomycin    Assessment/Plan:    Initiate intravenous vancomycin with loading dose of 2000 mg once followed by a maintenance dose of vancomycin 1000 mg IV every 12 hours  Desired empiric serum trough concentration is 15 to 20 mcg/mL  Draw vancomycin trough level 60 min prior to fourth dose on 12/6 at approximately 2200  Pharmacy will continue to follow and monitor vancomycin.      Please contact pharmacy at extension [phone] with any questions regarding this assessment.     Thank you for the consult,   Zuhair Walker       Patient brief summary:  Lisa Smith is a 74 y.o. female initiated on antimicrobial therapy with IV Vancomycin for treatment of suspected lower respiratory infection    Drug Allergies:   Review of patient's allergies indicates:   Allergen Reactions    Sulfa (sulfonamide antibiotics) Itching       Actual Body Weight:   76.7 kg    Renal Function:   Estimated Creatinine Clearance: 70.1 mL/min (based on SCr of 0.7 mg/dL).,     Dialysis Method (if applicable):  N/A    CBC (last 72 hours):  Recent Labs   Lab Result Units 12/02/24  1300 12/05/24  0816 12/05/24  1101   WBC K/uL 9.47 10.88 10.68   Hemoglobin g/dL 7.0* 8.4* 8.4*   Hematocrit % 22.8* 25.6* 26.8*   Platelets K/uL 199 299 250   Gran % % 81.8* 82.2*  --    Lymph % % 8.6* 5.2*  --    Mono % % 8.3 11.0  --    Eosinophil % % 0.5 0.8  --    Basophil % % 0.2 0.3  --    Differential Method  Automated Automated  --        Metabolic Panel (last 72 hours):  Recent Labs   Lab Result Units 12/02/24  1300 12/05/24  0816 12/05/24  0930   Sodium mmol/L 137 136  --    Potassium mmol/L 4.7 5.1  --    Chloride mmol/L 99 102  --    CO2 mmol/L 32* 32*  --    Glucose mg/dL 113* 100  --    Glucose, UA   --   --  Negative   BUN mg/dL 39* 28*  --    Creatinine mg/dL 0.8 0.7  --    Albumin g/dL 4.0 4.3  --    Total Bilirubin mg/dL 0.6 1.1*  --    Alkaline Phosphatase U/L 74 91  --    AST U/L 27 48*  --    ALT U/L 10 14  --   "      Drug levels (last 3 results):  No results for input(s): "VANCOMYCINRA", "VANCORANDOM", "VANCOMYCINPE", "VANCOPEAK", "VANCOMYCINTR", "VANCOTROUGH" in the last 72 hours.    Microbiologic Results:  Microbiology Results (last 7 days)       Procedure Component Value Units Date/Time    Blood culture x two cultures. Draw prior to antibiotics. [[id number]] Collected: 12/05/24 0813    Order Status: Sent Specimen: Blood from Peripheral, Antecubital, Right Updated: 12/05/24 0836    Narrative:      [facility] 12/05/2024 08:23 Reason:   DONE.   [facility] 12/05/2024 08:23 Reason:   DONE.     Blood culture x two cultures. Draw prior to antibiotics. [[id number]] Collected: 12/05/24 0823    Order Status: Sent Specimen: Blood from Peripheral, Antecubital, Right Updated: 12/05/24 0836            "

## 2024-12-05 NOTE — PLAN OF CARE
[facility] - Emergency Dept  Initial Discharge Assessment       Primary Care Provider: Hope Tang FNP    Admission Diagnosis: Pneumonia due to infectious organism, unspecified laterality, unspecified part of lung [J18.9]    Admission Date: 12/5/2024  Expected Discharge Date:     Transition of Care Barriers: (P) None    SW met with patient bedside. SW verified demographics, insurance, supports, and PCP.  Patient reported previous HH with [facility]. SW assessed patient's needs. Patient is able to complete ADLs independently. Patient verified at home DME: walker, BP machine, shower chair, glucometer.  Patient verified No current HH, No Dialysis, Yes Blood Thinners- Eliquis, and No Oxygen.     Patient verified [facility] of choice: [facility]  Patient Daughter/Jordana will be source of transportation at the time of discharge.       Payor: [employer] MEDICARE / Plan: [employer] MEDICARE HMO / Product Type: Capitation /     Extended Emergency Contact Information  Primary Emergency Contact: Jordana Marsh  Address: [address] [place], [place]  Home Phone: [phone]  Mobile Phone: [phone]  Relation: Daughter    Discharge Plan A: (P) Home  Discharge Plan B: (P) Home Health (Previous HH with [facility])      [address] [address]  Phone: [phone] Fax: [phone]    [facility] - [facility] [address]  [address] [facility] [address]  Phone: [phone] Fax: [phone]    [facility] - [place], [place] - [address]  [address]  [facility] [address]  Phone: [phone] Fax: [phone]    [facility] - [place], [place] - [address] [facility] [address]  [address] [facility] [address]  [place] [address]  Phone: [phone] Fax: [phone]      Initial  Assessment (most recent)       Adult Discharge Assessment - 12/05/24 1510          Discharge Assessment    Assessment Type Discharge Planning Assessment (P)      Confirmed/corrected address, phone number and insurance Yes (P)      Confirmed Demographics Correct on Facesheet (P)      Source of Information patient (P)      When was your last doctors appointment? 11/13/24 (P)      Communicated PILI with patient/caregiver Date not available/Unable to determine (P)      Reason For Admission Epistaxis (P)      People in Home alone (P)      Facility Arrived From: home (P)      Do you expect to return to your current living situation? Yes (P)      Do you have help at home or someone to help you manage your care at home? No (P)      Prior to hospitilization cognitive status: Alert/Oriented (P)      Current cognitive status: Alert/Oriented (P)      Walking or Climbing Stairs Difficulty yes (P)      Walking or Climbing Stairs ambulation difficulty, requires equipment (P)      Mobility Management walker (P)      Dressing/Bathing Difficulty yes (P)      Dressing/Bathing bathing difficulty, assistance 1 person (P)      Dressing/Bathing Management difficulty stepping into shower tub (P)      Equipment Currently Used at Home walker, standard;blood pressure machine;glucometer (P)      Readmission within 30 days? No (P)      Patient currently being followed by outpatient case management? No (P)      Do you currently have service(s) that help you manage your care at home? No (P)      Do you take prescription medications? Yes (P)      Do you have prescription coverage? Yes (P)      Coverage Payor: [employer] MANAGED MEDICARE - [employer] MEDICARE HMO - CAPITATED (P)      Do you have any problems affording any of your prescribed medications? No (P)      Is the patient taking medications as prescribed? yes (P)      Who is going to help you get home at discharge? Daughter/ Jordana [phone] (P)      How do you get to doctors appointments? car,  drives self (P)      Are you on dialysis? No (P)      Do you take coumadin? Yes (P)      Who monitors your labs? Dr. Branch (P)      Discharge Plan A Home (P)      Discharge Plan B Home Health (P)    Previous HH with [facility]    DME Needed Upon Discharge  none (P)      Discharge Plan discussed with: Patient (P)      Transition of Care Barriers None (P)

## 2024-12-06 LAB
ALBUMIN SERPL BCP-MCNC: 3.7 G/DL (ref 3.5–5.2)
ALP SERPL-CCNC: 75 U/L (ref 55–135)
ALT SERPL W/O P-5'-P-CCNC: 12 U/L (ref 10–44)
ANION GAP SERPL CALC-SCNC: 3 MMOL/L (ref 8–16)
AST SERPL-CCNC: 32 U/L (ref 10–40)
BASOPHILS # BLD AUTO: 0.01 K/UL (ref 0–0.2)
BASOPHILS NFR BLD: 0.1 % (ref 0–1.9)
BILIRUB SERPL-MCNC: 0.7 MG/DL (ref 0.1–1)
BUN SERPL-MCNC: 30 MG/DL (ref 8–23)
CALCIUM SERPL-MCNC: 9 MG/DL (ref 8.7–10.5)
CHLORIDE SERPL-SCNC: 103 MMOL/L (ref 95–110)
CO2 SERPL-SCNC: 32 MMOL/L (ref 23–29)
CREAT SERPL-MCNC: 0.8 MG/DL (ref 0.5–1.4)
DIFFERENTIAL METHOD BLD: ABNORMAL
EOSINOPHIL # BLD AUTO: 0 K/UL (ref 0–0.5)
EOSINOPHIL NFR BLD: 0 % (ref 0–8)
ERYTHROCYTE [DISTWIDTH] IN BLOOD BY AUTOMATED COUNT: 18.6 % (ref 11.5–14.5)
EST. GFR  (NO RACE VARIABLE): >60 ML/MIN/1.73 M^2
GLUCOSE SERPL-MCNC: 134 MG/DL (ref 70–110)
HCT VFR BLD AUTO: 25 % (ref 37–48.5)
HGB BLD-MCNC: 8 G/DL (ref 12–16)
IMM GRANULOCYTES # BLD AUTO: 0.1 K/UL (ref 0–0.04)
IMM GRANULOCYTES NFR BLD AUTO: 1 % (ref 0–0.5)
INR PPP: 2.2 (ref 0.8–1.2)
LYMPHOCYTES # BLD AUTO: 0.4 K/UL (ref 1–4.8)
LYMPHOCYTES NFR BLD: 3.7 % (ref 18–48)
MAGNESIUM SERPL-MCNC: 2.2 MG/DL (ref 1.6–2.6)
MCH RBC QN AUTO: 30.1 PG (ref 27–31)
MCHC RBC AUTO-ENTMCNC: 32 G/DL (ref 32–36)
MCV RBC AUTO: 94 FL (ref 82–98)
MONOCYTES # BLD AUTO: 1.2 K/UL (ref 0.3–1)
MONOCYTES NFR BLD: 12 % (ref 4–15)
NEUTROPHILS # BLD AUTO: 8 K/UL (ref 1.8–7.7)
NEUTROPHILS NFR BLD: 83.2 % (ref 38–73)
NRBC BLD-RTO: 0 /100 WBC
PHOSPHATE SERPL-MCNC: 3.9 MG/DL (ref 2.7–4.5)
PLATELET # BLD AUTO: 248 K/UL (ref 150–450)
PMV BLD AUTO: 10.8 FL (ref 9.2–12.9)
POTASSIUM SERPL-SCNC: 5.3 MMOL/L (ref 3.5–5.1)
PROT SERPL-MCNC: 6.8 G/DL (ref 6–8.4)
PROTHROMBIN TIME: 23.8 SEC (ref 9–12.5)
RBC # BLD AUTO: 2.66 M/UL (ref 4–5.4)
SODIUM SERPL-SCNC: 138 MMOL/L (ref 136–145)
WBC # BLD AUTO: 9.65 K/UL (ref 3.9–12.7)

## 2024-12-06 PROCEDURE — 99233 SBSQ HOSP IP/OBS HIGH 50: CPT | Mod: ,,, | Performed by: INTERNAL MEDICINE

## 2024-12-06 PROCEDURE — 85025 COMPLETE CBC W/AUTO DIFF WBC: CPT | Performed by: NURSE PRACTITIONER

## 2024-12-06 PROCEDURE — 27000221 HC OXYGEN, UP TO 24 HOURS

## 2024-12-06 PROCEDURE — 25000242 PHARM REV CODE 250 ALT 637 W/ HCPCS: Performed by: NURSE PRACTITIONER

## 2024-12-06 PROCEDURE — 85610 PROTHROMBIN TIME: CPT | Performed by: NURSE PRACTITIONER

## 2024-12-06 PROCEDURE — 94640 AIRWAY INHALATION TREATMENT: CPT

## 2024-12-06 PROCEDURE — 21400001 HC TELEMETRY ROOM

## 2024-12-06 PROCEDURE — 83735 ASSAY OF MAGNESIUM: CPT | Performed by: NURSE PRACTITIONER

## 2024-12-06 PROCEDURE — 25000242 PHARM REV CODE 250 ALT 637 W/ HCPCS: Performed by: INTERNAL MEDICINE

## 2024-12-06 PROCEDURE — 63600175 PHARM REV CODE 636 W HCPCS: Performed by: NURSE PRACTITIONER

## 2024-12-06 PROCEDURE — 63700000 PHARM REV CODE 250 ALT 637 W/O HCPCS: Performed by: INTERNAL MEDICINE

## 2024-12-06 PROCEDURE — 94761 N-INVAS EAR/PLS OXIMETRY MLT: CPT

## 2024-12-06 PROCEDURE — 25000003 PHARM REV CODE 250: Performed by: INTERNAL MEDICINE

## 2024-12-06 PROCEDURE — 36415 COLL VENOUS BLD VENIPUNCTURE: CPT | Performed by: NURSE PRACTITIONER

## 2024-12-06 PROCEDURE — 99232 SBSQ HOSP IP/OBS MODERATE 35: CPT | Mod: ,,, | Performed by: INTERNAL MEDICINE

## 2024-12-06 PROCEDURE — 80053 COMPREHEN METABOLIC PANEL: CPT | Performed by: NURSE PRACTITIONER

## 2024-12-06 PROCEDURE — 99900031 HC PATIENT EDUCATION (STAT)

## 2024-12-06 PROCEDURE — 84100 ASSAY OF PHOSPHORUS: CPT | Performed by: NURSE PRACTITIONER

## 2024-12-06 PROCEDURE — 25000003 PHARM REV CODE 250: Performed by: NURSE PRACTITIONER

## 2024-12-06 RX ORDER — LORAZEPAM 0.5 MG/1
0.5 TABLET ORAL EVERY 6 HOURS PRN
Status: DISCONTINUED | OUTPATIENT
Start: 2024-12-06 | End: 2024-12-10 | Stop reason: HOSPADM

## 2024-12-06 RX ORDER — FUROSEMIDE 10 MG/ML
40 INJECTION INTRAMUSCULAR; INTRAVENOUS DAILY
Status: DISCONTINUED | OUTPATIENT
Start: 2024-12-06 | End: 2024-12-07

## 2024-12-06 RX ADMIN — LORAZEPAM 0.5 MG: 0.5 TABLET ORAL at 08:12

## 2024-12-06 RX ADMIN — ARFORMOTEROL TARTRATE 15 MCG: 15 SOLUTION RESPIRATORY (INHALATION) at 07:12

## 2024-12-06 RX ADMIN — EZETIMIBE 10 MG: 10 TABLET ORAL at 08:12

## 2024-12-06 RX ADMIN — FUROSEMIDE 40 MG: 10 INJECTION, SOLUTION INTRAMUSCULAR; INTRAVENOUS at 12:12

## 2024-12-06 RX ADMIN — BUDESONIDE INHALATION 0.5 MG: 0.5 SUSPENSION RESPIRATORY (INHALATION) at 07:12

## 2024-12-06 RX ADMIN — IPRATROPIUM BROMIDE AND ALBUTEROL SULFATE 3 ML: .5; 3 SOLUTION RESPIRATORY (INHALATION) at 07:12

## 2024-12-06 RX ADMIN — METOPROLOL SUCCINATE 25 MG: 25 TABLET, FILM COATED, EXTENDED RELEASE ORAL at 08:12

## 2024-12-06 RX ADMIN — PREDNISONE 40 MG: 20 TABLET ORAL at 08:12

## 2024-12-06 RX ADMIN — Medication 2 SPRAY: at 08:12

## 2024-12-06 RX ADMIN — ROPINIROLE HYDROCHLORIDE 1 MG: 1 TABLET, FILM COATED ORAL at 08:12

## 2024-12-06 RX ADMIN — IPRATROPIUM BROMIDE AND ALBUTEROL SULFATE 3 ML: .5; 3 SOLUTION RESPIRATORY (INHALATION) at 01:12

## 2024-12-06 RX ADMIN — AZITHROMYCIN DIHYDRATE 500 MG: 250 TABLET ORAL at 08:12

## 2024-12-06 RX ADMIN — PANTOPRAZOLE SODIUM 40 MG: 40 TABLET, DELAYED RELEASE ORAL at 08:12

## 2024-12-06 RX ADMIN — Medication 9 MG: at 08:12

## 2024-12-06 RX ADMIN — DIGOXIN 0.25 MG: 125 TABLET ORAL at 08:12

## 2024-12-06 NOTE — PLAN OF CARE
Hospital f/u scheduled with PCP and Cardio follow up with Dr. Pérez; both are added to pt's AVS.     12/06/24 1347   Post-Acute Status   Hospital Resources/Appts/Education Provided Appointments scheduled and added to AVS

## 2024-12-06 NOTE — HOSPITAL COURSE
74-year-old female with COPD on 3 L by nasal cannula, paroxysmal AFib on Coumadin, prior TAVR who has had multiple episodes of significant epistaxis presents with recurrent epistaxis in addition to feeling globally unwell with worsened cough, shortness of breath and wheezing for roughly 5 days consistent with an acute COPD exacerbation compounded by heart failure decompensation after receiving an outpatient transfusion on 12/4.    Regarding COPD exacerbation, the patient has been placed on Solu-Medrol, doxycycline and scheduled nebulizers.  Regarding her heart failure decompensation, she is on IV Lasix and is currently diuresing well.  Supplemental oxygen requirements have stabilized and she is beginning to feel better.      Regarding her nosebleed, Afrin did not lead to resolution of the ER.  A rhino rocket was placed. And was removed on 12/8. NO more bleeding was noted.     Cardiology has been discussing a Watchman procedure with the patient and potentially discontinuing anticoagulation. Patient was resumed on Eliquis, for 1 month prior to the procedure as recommended by cardiology.  Oxygen weaned to 3 L on discharge. Patient discharged on Prednisone taper per pulmonary recommendations

## 2024-12-06 NOTE — CARE UPDATE
12/05/24 1935   Patient Assessment/Suction   Level of Consciousness (AVPU) alert   Respiratory Effort Normal;Unlabored   Expansion/Accessory Muscles/Retractions no use of accessory muscles;no retractions   All Lung Fields Breath Sounds Anterior:;Posterior:;diminished;Lateral:;wheezes, expiratory   Rhythm/Pattern, Respiratory unlabored;pattern regular;depth regular;no shortness of breath reported   Cough Frequency infrequent   Skin Integrity   $ Wound Care Tech Time 15 min   Area Observed Left;Right;Behind ear;Cheek;Chin;Bridge of nose   Skin Appearance without discoloration   PRE-TX-O2   Device (Oxygen Therapy) other (comment)  (oxy mask)   $ Is the patient on Low Flow Oxygen? Yes   Flow (L/min) (Oxygen Therapy) 4   SpO2 (!) 93 %   Pulse Oximetry Type Intermittent   $ Pulse Oximetry - Multiple Charge Pulse Oximetry - Multiple   Pulse 61   Resp 16   Temp 98.6 °F (37 °C)   BP (!) 153/53  (89)   Aerosol Therapy   $ Aerosol Therapy Charges Aerosol Treatment  (budesonide)   Daily Review of Necessity (SVN) completed   Respiratory Treatment Status (SVN) given   Treatment Route (SVN) mask   Patient Position semi-Martin's   Post Treatment Assessment (SVN) breath sounds improved;increased aeration   Signs of Intolerance (SVN) none   Breath Sounds Post-Respiratory Treatment   Throughout All Fields Post-Treatment All Fields   Throughout All Fields Post-Treatment aeration increased   Post-treatment Heart Rate (beats/min) 60   Post-treatment Resp Rate (breaths/min) 16   Education   $ Education 15 min;Oxygen;Bronchodilator

## 2024-12-06 NOTE — ASSESSMENT & PLAN NOTE
Patient has paroxysmal (<7 days) atrial fibrillation. Patient is currently in sinus rhythm. [id number] Score: 4. The patients heart rate in the last 24 hours is as follows:  Pulse  Min: 59  Max: 68     Antiarrhythmics  metoprolol succinate (TOPROL-XL) 24 hr tablet 25 mg, Every morning, Oral    Anticoagulants       Plan  - Replete lytes with a goal of K>4, Mg >2  - Patient is anticoagulated, see medications listed above.  - Patient's afib is currently controlled  - holding Coumadin at this time due to epistaxis and anemia

## 2024-12-06 NOTE — ASSESSMENT & PLAN NOTE
Patient's COPD is with exacerbation noted by worsening of baseline hypoxia currently.  Patient is currently on COPD Pathway. Continue scheduled inhalers Antibiotics and Supplemental oxygen and monitor respiratory status closely.           Regarding COPD exacerbation, the patient has been placed on Solu-Medrol, doxycycline and scheduled nebulizers.

## 2024-12-06 NOTE — PROGRESS NOTES
[facility] - Emergency Dept  Department of Cardiology  Consult Note      PATIENT NAME: Lisa Smith  MRN: [id number]  TODAY'S DATE: 12/06/2024  ADMIT DATE: 12/5/2024                          CONSULT REQUESTED BY: Josr Murguia MD    SUBJECTIVE     PRINCIPAL PROBLEM: <principal problem not specified>      REASON FOR CONSULT:  CAD  AFIB    INTERVAL HISTORY:    Patient states she is feeling sightly better and than ENT is coming to address her nose today.  INR 2.2.  Hg 8.0  CXR reviewed  Coughing green/yellow sputum.    HPI:    Ms Smith is a 74 year old female patient known to us. She has a PMH significant for  COPD (maintained on 3 L nasal cannula at home), paroxysmal AFib on Coumadin, status post TAVR, heart failure with preserved ejection fraction, anemia who presents to the emergency department for the evaluation of epistaxis, productive cough and shortness of breath. She was previously tried on eliquis however could not afford this. We have set her up with Dr. Cantu for possible watchman. Patient had to get 2 units of PRBCs yesterday and nose bleeding started again and would not stop so came to ER.        EKG                    CXR 12/6/2024 12/5/2024          FROM H AND P                 Shortness of Breath     Cough         HPI: The patient is a 74-year-old female with a past medical history of COPD (maintained on 3 L nasal cannula at home), paroxysmal AFib on Coumadin, status post TAVR, heart failure with preserved ejection fraction, anemia who presents to the emergency department for the evaluation of epistaxis, productive cough and shortness of breath.  Patient reports that over the past few days she has been having some epistaxis as well as upper respiratory infection symptoms with productive cough, subjective fever and chills, and increased dyspnea.  She denies recent sick contacts or recent travel.  The patient reports that she was recently given a blood transfusion for anemia  that has been followed outpatient by her PCP.  Patient reports that since she has been on Coumadin she has been suffering with anemia.  She is scheduled to have a GI workup for upper and lower scopes to evaluate for causes of bleeding as well as bone marrow biopsy per heme Onc as well as a workup for possible Watchman device to transition off Coumadin.  Patient was evaluated in the emergency department.  Hemoglobin 8.4 (was 7 prior to blood transfusion on 12/03/2024), white blood cell count 10.8, renal function within normal limits.  ABG revealed pH of 7.4, PO2 of 77, CO2 of 44 and bicarb of 27.  Flu and COVID negative.  Chest x-ray with bilateral diffuse interstitial opacities worse in the right lung consistent with possible superimposed pneumonia.  Lactic acid within normal limits, BNP mildly elevated 309.  PT 17.6 INR 1.6.  Afrin was placed to sterile gauze to left Landeros with some improvement in hemostasis.  Patient was initiated on cefepime and vancomycin per ER MD.  Patient admitted to the hospital medicine service with pulmonary and cardiology consultation for further evaluation.      Review of patient's allergies indicates:   Allergen Reactions    Sulfa (sulfonamide antibiotics) Itching       Past Medical History:   Diagnosis Date    Anticoagulant long-term use     Arthritis     Atrial fibrillation     Bell's palsy     Boil of buttock     burst 12/19/22    CHF (congestive heart failure)     Chronic cough     COPD (chronic obstructive pulmonary disease)     use O2  3l/m NC at night; also taking during day currently 12/4/23    Dizziness     Encounter for blood transfusion     GI bleed 2011    transfusion    H/O diverticulitis of colon     Hard of hearing     Heart murmur     Hematoma 02/2023    left hand    Heterozygous alpha 1-antitrypsin deficiency     History of home oxygen therapy     3L NC at night    Hypertension     Lung disease     copd    MONSERRAT (obstructive sleep apnea)     resolved with wt loss 131#     Pacemaker     Pneumonia     last episode 2019    Pulmonary edema     Skin cancer     Unspecified visual disturbance     episode of vision disturbance and dizziness...occasional recurrences     Past Surgical History:   Procedure Laterality Date    CARDIAC ELECTROPHYSIOLOGY STUDY AND ABLATION      CAROTID ENDARTERECTOMY Left 12/22/2022    Procedure: ENDARTERECTOMY-CAROTID;  Surgeon: Maximilian Connolly MD;  Location: [facility] OR;  Service: Peripheral Vascular;  Laterality: Left;    CAROTID STENT Left 2/29/2024    Procedure: INSERTION, STENT, ARTERY, CAROTID;  Surgeon: CIRILO Valdivia III, MD;  Location: [address];  Service: Vascular;  Laterality: Left;  left carotid artery stent placement  mgy  146.29   gymc2  8.0730  fluro time  4.8min    CARPAL TUNNEL RELEASE Left     CHOLECYSTECTOMY      EYE SURGERY Bilateral     cataract    HYSTERECTOMY      INSERT / REPLACE / REMOVE PACEMAKER      KNEE ARTHROSCOPY Left     LEFT HEART CATHETERIZATION  11/1/2023    Procedure: Left heart cath;  Surgeon: Puma Shelton MD;  Location: [facility] CATH;  Service: Cardiology;;    REPLACEMENT OF PACEMAKER GENERATOR Left 01/20/2022    Procedure: REPLACEMENT, PACEMAKER GENERATOR;  Surgeon: Raymond Pérez MD;  Location: [facility] CATH/EP LAB;  Service: Cardiology;  Laterality: Left;    SKIN CANCER EXCISION      TRANSCATHETER AORTIC VALVE REPLACEMENT (TAVR) Bilateral 12/6/2023    Procedure: (TAVR);  Surgeon: Puma Shelton MD;  Location: [facility] CATH;  Service: Cardiology;  Laterality: Bilateral;    TRANSCATHETER AORTIC VALVE REPLACEMENT (TAVR) Bilateral 12/6/2023    Procedure: (TAVR) - Surgeon;  Surgeon: Maximilian Connolly MD;  Location: [facility] CATH;  Service: Peripheral Vascular;  Laterality: Bilateral;     Social History     Tobacco Use    Smoking status: Former     Current packs/day: 0.00     Average packs/day: 2.0 packs/day for 40.0 years (80.0 ttl pk-yrs)     Types: Cigarettes     Start date: 2/5/1971     Quit date: 2/5/2011     Years since  quittin.8     Passive exposure: Past    Smokeless tobacco: Never    Tobacco comments:          Quit in 2011   Substance Use Topics    Alcohol use: Not Currently     Alcohol/week: 8.0 standard drinks of alcohol     Types: 8 Glasses of wine per week    Drug use: No        REVIEW OF SYSTEMS    As mentioned in HPI    OBJECTIVE     VITAL SIGNS (Most Recent)  Temp: 97.9 °F (36.6 °C) (24)  Pulse: 60 (24)  Resp: 18 (24)  BP: (!) 119/47 (24)  SpO2: (!) 93 % (24)    VENTILATION STATUS  Resp: 18 (24)  SpO2: (!) 93 % (24)           I & O (Last 24H):  Intake/Output Summary (Last 24 hours) at 2024 0840  Last data filed at 2024 1640  Gross per 24 hour   Intake 40 ml   Output --   Net 40 ml       WEIGHTS  Wt Readings from Last 3 Encounters:   24 1729 76.6 kg (168 lb 15.7 oz)   24 0739 76.7 kg (169 lb)   24 0730 76.7 kg (169 lb)   24 1032 74.4 kg (164 lb)       PHYSICAL EXAM    CONSTITUTIONAL: NAD  HEENT: Normocephalic. + pallor-,nose bleeding with nasal packing  NECK: no JVD  LUNGS: wheezing rhonchi  HEART: + systolic murmur  ABDOMEN: soft, non-tender, bowel sounds normal  EXTREMITIES: mild edema  SKIN: No rash  NEURO: AAO X 3  PSYCH: normal affect      HOME MEDICATIONS:  No current facility-administered medications on file prior to encounter.     Current Outpatient Medications on File Prior to Encounter   Medication Sig Dispense Refill    acetaminophen (TYLENOL ARTHRITIS ORAL) Take 2 tablets by mouth daily as needed.      albuterol (ACCUNEB) 1.25 mg/3 mL Nebu INHALE THE CONTENTS OF 1 VIAL VIA NEBULIZER EVERY 6 HOURS AS NEEDED FOR RESCUE (Patient taking differently: Take 1.25 mg by nebulization every 6 (six) hours as needed. INHALE THE CONTENTS OF 1 VIAL VIA NEBULIZER EVERY 6 HOURS AS NEEDED FOR RESCUE) 360 mL 5    albuterol (PROVENTIL/VENTOLIN HFA) 90 mcg/actuation inhaler INHALE 2 PUFFS EVERY 6 HOURS AS NEEDED FOR  WHEEZING (RESCUE) (Patient taking differently: Inhale 2 puffs into the lungs every 6 (six) hours as needed for Wheezing.) 18 g 6    aspirin (ECOTRIN) 81 MG EC tablet Take 81 mg by mouth once daily.      clopidogreL (PLAVIX) 75 mg tablet Take 1 tablet (75 mg total) by mouth once daily. 30 tablet 11    digoxin (LANOXIN) 250 mcg tablet TAKE 1 TABLET EVERY DAY (Patient taking differently: Take 250 mcg by mouth once daily.) 90 tablet 3    ezetimibe (ZETIA) 10 mg tablet TAKE 1 TABLET ONE TIME DAILY (Patient taking differently: Take 10 mg by mouth once daily.) 90 tablet 3    fluticasone propionate (FLONASE) 50 mcg/actuation nasal spray 1 spray by Each Nostril route daily as needed for Rhinitis or Allergies.      furosemide (LASIX) 20 MG tablet Take 1 tablet (20 mg total) by mouth every morning. 90 tablet 3    metoprolol succinate (TOPROL-XL) 25 MG 24 hr tablet Take 1 tablet (25 mg total) by mouth every morning. 90 tablet 3    pantoprazole (PROTONIX) 40 MG tablet Take 1 tablet (40 mg total) by mouth every morning. 90 tablet 3    promethazine-dextromethorphan (PROMETHAZINE-DM) 6.25-15 mg/5 mL Syrp Take 5 mLs by mouth every 6 (six) hours as needed (cough). 118 mL 0    rOPINIRole (REQUIP) 1 MG tablet Take 1 tablet (1 mg total) by mouth every evening. 90 tablet 1    rosuvastatin (CRESTOR) 40 MG Tab Take 1 tablet (40 mg total) by mouth every evening. (Patient taking differently: Take 40 mg by mouth once daily.) 90 tablet 3    sacubitriL-valsartan (ENTRESTO) 24-26 mg per tablet Take 1 tablet by mouth 2 (two) times daily. 180 tablet 3    spironolactone (ALDACTONE) 25 MG tablet Take 1 tablet (25 mg total) by mouth every morning. 90 tablet 3    TRELEGY ELLIPTA 100-62.5-25 mcg DsDv INHALE 1 PUFF INTO THE LUNGS ONCE DAILY. (Patient taking differently: Inhale 1 puff into the lungs once daily.) 90 each 3    warfarin (COUMADIN) 3 MG tablet Take 1.5 pills by mouth daily or as directed per the [facility]; #145 pills = 90 day supply  (Patient taking differently: Take 4.5 mg by mouth every evening. Take 1.5 pills by mouth daily or as directed per the [facility]; #145 pills = 90 day supply  Except 3 mg on Monday) 145 tablet 2       SCHEDULED MEDS:   albuterol-ipratropium  3 mL Nebulization Q6H WAKE    arformoteroL  15 mcg Nebulization BID    azithromycin  500 mg Oral Daily    budesonide  0.5 mg Nebulization Q12H    digoxin  0.25 mg Oral Daily    ezetimibe  10 mg Oral QHS    furosemide (LASIX) injection  20 mg Intravenous Daily    metoprolol succinate  25 mg Oral QAM    oxymetazoline  2 spray Topical (Top) BID    pantoprazole  40 mg Oral QAM    predniSONE  40 mg Oral Daily    rOPINIRole  1 mg Oral QHS       CONTINUOUS INFUSIONS:    PRN MEDS:  Current Facility-Administered Medications:     acetaminophen, 650 mg, Oral, Q8H PRN    acetaminophen, 650 mg, Oral, Q4H PRN    albuterol-ipratropium, 3 mL, Nebulization, Q6H PRN    dextrose 50%, 12.5 g, Intravenous, PRN    dextrose 50%, 25 g, Intravenous, PRN    glucagon (human recombinant), 1 mg, Intramuscular, PRN    glucose, 16 g, Oral, PRN    glucose, 24 g, Oral, PRN    HYDROcodone-acetaminophen, 1 tablet, Oral, Q6H PRN    magnesium oxide, 800 mg, Oral, PRN    magnesium oxide, 800 mg, Oral, PRN    melatonin, 9 mg, Oral, Nightly PRN    naloxone, 0.02 mg, Intravenous, PRN    ondansetron, 4 mg, Intravenous, Q12H PRN    potassium bicarbonate, 35 mEq, Oral, PRN    potassium bicarbonate, 50 mEq, Oral, PRN    potassium bicarbonate, 60 mEq, Oral, PRN    potassium, sodium phosphates, 2 packet, Oral, PRN    potassium, sodium phosphates, 2 packet, Oral, PRN    potassium, sodium phosphates, 2 packet, Oral, PRN    senna-docusate 8.6-50 mg, 1 tablet, Oral, BID PRN    sodium chloride 0.9%, 10 mL, Intravenous, Q12H PRN    sodium chloride 0.9%, 3 mL, Intravenous, PRN    LABS AND DIAGNOSTICS     CBC LAST 3 DAYS  Recent Labs   Lab 12/02/24  1300 12/05/24  0807 12/05/24  0816 12/05/24  1101 12/06/24  0509   WBC 9.47  --   "10.88 10.68 9.65   RBC 2.25*  --  2.75* 2.89* 2.66*   HGB 7.0*  --  8.4* 8.4* 8.0*   HCT 22.8*   < > 25.6* 26.8* 25.0*   *  --  93 93 94   MCH 31.1*  --  30.5 29.1 30.1   MCHC 30.7*  --  32.8 31.3* 32.0   RDW 16.5*  --  19.6* 19.3* 18.6*     --  299 250 248   MPV 10.7  --  10.8 10.3 10.8   GRAN 81.8*  7.7  --  82.2*  8.9*  --  83.2*  8.0*   LYMPH 8.6*  0.8*  --  5.2*  0.6*  --  3.7*  0.4*   MONO 8.3  0.8  --  11.0  1.2*  --  12.0  1.2*   BASO 0.02  --  0.03  --  0.01   NRBC 0  --  0  --  0    < > = values in this interval not displayed.       COAGULATION LAST 3 DAYS  Recent Labs   Lab 12/04/24  0000 12/05/24  0816 12/06/24  0509   INR 2.4 1.6* 2.2*       CHEMISTRY LAST 3 DAYS  Recent Labs   Lab 12/02/24  1300 12/05/24  0807 12/05/24  0816 12/06/24  0509     --  136 138   K 4.7  --  5.1 5.3*   CL 99  --  102 103   CO2 32*  --  32* 32*   ANIONGAP 6*  --  2* 3*   BUN 39*  --  28* 30*   CREATININE 0.8  --  0.7 0.8   *  --  100 134*   CALCIUM 8.9  --  9.3 9.0   PH  --  7.402  --   --    MG  --   --   --  2.2   ALBUMIN 4.0  --  4.3 3.7   PROT 7.0  --  8.0 6.8   ALKPHOS 74  --  91 75   ALT 10  --  14 12   AST 27  --  48* 32   BILITOT 0.6  --  1.1* 0.7       CARDIAC PROFILE LAST 3 DAYS  Recent Labs   Lab 12/05/24  0816   *       ENDOCRINE LAST 3 DAYS  No results for input(s): "TSH", "PROCAL" in the last 168 hours.    LAST ARTERIAL BLOOD GAS  ABG  Recent Labs   Lab 12/05/24  0807   PH 7.402   PO2 77*   PCO2 44.4   HCO3 27.6   BE 3*       LAST 7 DAYS MICROBIOLOGY   Microbiology Results (last 7 days)       Procedure Component Value Units Date/Time    Blood culture x two cultures. Draw prior to antibiotics. [[id number]] Collected: 12/05/24 0813    Order Status: Completed Specimen: Blood from Peripheral, Antecubital, Right Updated: 12/05/24 1517     Blood Culture, Routine No Growth to date    Narrative:      Aerobic and anaerobic  Collection has been rescheduled by [facility] at 12/05/2024 " 08:23 Reason:   DONE.   Collection has been rescheduled by [facility] at 12/05/2024 08:23 Reason:   DONE.     Blood culture x two cultures. Draw prior to antibiotics. [[id number]] Collected: 12/05/24 0823    Order Status: Completed Specimen: Blood from Peripheral, Antecubital, Right Updated: 12/05/24 1517     Blood Culture, Routine No Growth to date    Narrative:      Aerobic and anaerobic            MOST RECENT IMAGING  X-Ray Chest 1 View  Narrative: CLINICAL HISTORY:  ([id number] y/o  (1950) F    Chronic interstitial changes and bronchiectasis;    TECHNIQUE:  (A#[id number], exam time 12/6/2024 6:57)    XR CHEST 1 VIEW [id number]    COMPARISON:  Radiograph from 12/05/2024.    FINDINGS:  Moderately increased central hilar interstitial opacities are seen bilaterally suggestive of either mild edema  atypical infection/pneumonia or bronchitis in the appropriate clinical setting.  There is blunting of both costophrenic angles consistent with trace pleural effusions and adjacent atelectasis. No pneumothorax is identified. The cardiac silhouette is moderately enlarged. Osseous structures appear unchanged. The visualized upper abdomen is unremarkable.  Impression: Slight interval worsening, M79 mild-to-moderate diffuse interstitial opacity in both lungs suggesting a combination of atelectasis, trace edema and or pneumonia.    Electronically signed by: Ian Moreno  Date:    12/06/2024  Time:    07:47      ECHOCARDIOGRAM RESULTS (last 5)  Results for orders placed during the hospital encounter of 12/03/24    Echo    Interpretation Summary    Left Ventricle: The left ventricle is normal in size. Normal wall thickness. There is normal systolic function with a visually estimated ejection fraction of 55 - 60%. Unable to assess diastolic function due to atrial fibrillation.    Right Ventricle: Normal right ventricular cavity size. Wall thickness is normal. Systolic function is normal. Pacemaker lead present in the ventricle.     Left Atrium: Left atrium is mildly dilated.    Right Atrium: Right atrium is mildly dilated.    Aortic Valve: There is a transcatheter valve replacement in the aortic position. It is reported to be a 26 mm Jauregui valve. There is mild stenosis. Aortic valve area by VTI is 1.5 cm². Aortic valve peak velocity is 3.6 m/s. Mean gradient is 28.0 mmHg. The dimensionless index is 0.43. There is mild to moderate aortic regurgitation with an eccentrically directed jet.    Mitral Valve: There is bileaflet sclerosis. There is mitral annular calcification present. There is mild regurgitation.    Tricuspid Valve: There is mild to moderate regurgitation.    IVC/SVC: Normal venous pressure at 3 mmHg.      Results for orders placed during the hospital encounter of 01/23/24    Echo    Interpretation Summary    Left Ventricle: The left ventricle is normal in size. Mildly increased wall thickness. There is mildly reduced systolic function with a visually estimated ejection fraction of 45 - 50%. Unable to assess diastolic function due to atrial fibrillation.    Right Ventricle: Normal right ventricular cavity size. Wall thickness is normal. Right ventricle wall motion  is normal. Systolic function is normal.    Left Atrium: Left atrium is mildly dilated.    Right Atrium: Right atrium is mildly dilated.    Aortic Valve: There is a transcatheter valve replacement in the aortic position that is appropriately positioned. Mild paravalvular regurgitation.    Mitral Valve: There is bileaflet sclerosis. There is moderate mitral annular calcification present. There is no stenosis. The mean pressure gradient across the mitral valve is 2 mmHg at a heart rate of  bpm. There is moderate regurgitation.    Tricuspid Valve: There is mild regurgitation.    IVC/SVC: Normal venous pressure at 3 mmHg.      Results for orders placed during the hospital encounter of 12/06/23    Echo Saline Bubble? No    Interpretation Summary    Left Ventricle: The left  ventricle is moderately dilated. There is mildly reduced systolic function with a visually estimated ejection fraction of 40 - 45%. Unable to assess diastolic function due to atrial fibrillation.    Right Ventricle: Mild right ventricular enlargement. Systolic function is mildly reduced.    Left Atrium: Left atrium is mildly dilated.    Right Atrium: Right atrium is moderately dilated.    Aortic Valve: There is a transcatheter valve replacement in the aortic position. It is reported to be a Jauregui valve.    Mitral Valve: There is moderate bileaflet sclerosis. There is no stenosis. The mean pressure gradient across the mitral valve is 3 mmHg at a heart rate of  bpm. There is mild regurgitation.    Tricuspid Valve: There is mild regurgitation.    IVC/SVC: Normal venous pressure at 3 mmHg.      Echo Saline Bubble? No    Interpretation Summary    Limited 2D echocardiogram with color-flow Doppler done intraoperatively doing TAVR procedure    Well-positioned balloon expandable valve with no perivalvular regurgitation    No pericardial effusion      Results for orders placed during the hospital encounter of 10/23/23    Stress Echo Which stress agent will be used? Pharmacological; Color Flow Doppler? No    Interpretation Summary  Dobutamine aortic valve study.  Baseline left ventricular function proximally 25% with abnormal septal motion.  Aortic valve poorly seen questionable bioprosthetic aortic valve.  Baseline peak aortic velocity 3.51 m/sec with a max/mean gradient of 49/27 mm Hg  With dobutamine 10 mcg ejection fraction improved to approximately 35%.  With a peak aortic velocity of 4.02 m/sec max/mean gradient of 67/36 mm Hg.  Aortic valve area 0.  Nine 0.88    Study consistent with viable left ventricular function and severe aortic stenosis.      CURRENT/PREVIOUS VISIT EKG  Results for orders placed or performed during the hospital encounter of 12/05/24   EKG 12-lead    Collection Time: 12/05/24  7:46 AM   Result  Value Ref Range    QRS Duration 152 ms    [facility] QTC Calculation 416 ms    Narrative    Test Reason : R07.9,    Vent. Rate :  61 BPM     Atrial Rate : 357 BPM     P-R Int :    ms          QRS Dur : 152 ms      QT Int : 414 ms       P-R-T Axes :    -72  98 degrees    QTcB Int : 416 ms    Ventricular-paced rhythm  Abnormal ECG  When compared with ECG of 14-Mar-2024 10:33,  No significant change was found    Referred By: AAAREFERRAL SELF           Confirmed By:            ASSESSMENT/PLAN:     Active Hospital Problems    Diagnosis    Anemia    Epistaxis    Heart failure with improved ejection fraction (HFimpEF)    Atrial fibrillation    Pneumonia    COPD (chronic obstructive pulmonary disease)     Dr. Haro pulmonologist         ASSESSMENT & PLAN:       Epistaxis  Anemia s/p 2 units PRBC yesterday  Chronic AF  S/P TAVR  Pneumonia  COPD  CHFpEF  Chronic coumadin therapy    RECOMMENDATIONS:    Patient can not tolerate coumadin  INR 2.2  Patient did tolerate plavix prior to TAVR  She is V Paced underlying afib  She is going to try to get on Eliquis program for affordability  She is also being evaluated for watchman device  Increase lasix to 40 mg today see if this helps further  Continue ABX, Steroids  Will follow        Madhuri Hunter NP  Department of Cardiology  Date of Service: 12/06/2024      Clinically improving.  Currently on azithromycin and prednisone therapy still coughing up some green yellow stuff  ENT to evaluate her and some point  Regarding long-term anticoagulation therapy she will benefit from Eliquis rather than Coumadin therapy.  I have personally interviewed and examined the patient, I have reviewed the Nurse Practitioner's history and physical, assessment, and plan. I agree with the findings and plan.  Patient is to be re-evaluated for Watchman device implantation in an effort to discontinue anticoagulation therapy for multiple reasons  For long-term management patient may need to use a humidifier in  the house to minimize recurrence of nosebleeds and needs to be re-evaluated for Watchman device implanted  Raymond Pérez M.D.  Department of Cardiology  Date of Service: 12/06/2024  2:46 PM

## 2024-12-06 NOTE — SUBJECTIVE & OBJECTIVE
Interval History:  Patient feels she is improving compared to admission.  She is having good urine output.  Tolerating oral intake.  The rhino rocket is uncomfortable but she feels she can continue to tolerate it.    Review of Systems  Objective:     Vital Signs (Most Recent):  Temp: 97.5 °F (36.4 °C) (12/06/24 1146)  Pulse: 68 (12/06/24 1554)  Resp: 18 (12/06/24 1346)  BP: (!) 130/53 (12/06/24 1554)  SpO2: (!) 94 % (12/06/24 1554) Vital Signs (24h Range):  Temp:  [97.5 °F (36.4 °C)-98.6 °F (37 °C)] 97.5 °F (36.4 °C)  Pulse:  [59-68] 68  Resp:  [16-18] 18  SpO2:  [90 %-98 %] 94 %  BP: (119-164)/(47-72) 130/53     Weight: 76.6 kg (168 lb 15.7 oz)  Body mass index is 28.12 kg/m².    Intake/Output Summary (Last 24 hours) at 12/6/2024 1745  Last data filed at 12/6/2024 1435  Gross per 24 hour   Intake 240 ml   Output --   Net 240 ml         Physical Exam      NAD, alert interactive  Rhino rocket in place  Crackles present.  No wheezing.    Soft nontender nondistended, bowel sounds present   RRR   No edema    Significant Labs: All pertinent labs within the past 24 hours have been reviewed.  CBC:   Recent Labs   Lab 12/05/24  0816 12/05/24  1101 12/06/24  0509   WBC 10.88 10.68 9.65   HGB 8.4* 8.4* 8.0*   HCT 25.6* 26.8* 25.0*    250 248     CMP:   Recent Labs   Lab 12/05/24  0816 12/06/24  0509    138   K 5.1 5.3*    103   CO2 32* 32*    134*   BUN 28* 30*   CREATININE 0.7 0.8   CALCIUM 9.3 9.0   PROT 8.0 6.8   ALBUMIN 4.3 3.7   BILITOT 1.1* 0.7   ALKPHOS 91 75   AST 48* 32   ALT 14 12   ANIONGAP 2* 3*     Magnesium:   Recent Labs   Lab 12/06/24  0509   MG 2.2       Significant Imaging: I have reviewed all pertinent imaging results/findings within the past 24 hours.

## 2024-12-06 NOTE — ASSESSMENT & PLAN NOTE
Patient is identified as having Diastolic (HFpEF) heart failure that is Acute on  Chronic. CHF is currently controlled. Latest ECHO performed and demonstrates- Results for orders placed during the hospital encounter of 12/03/24    Echo    Interpretation Summary    Left Ventricle: The left ventricle is normal in size. Normal wall thickness. There is normal systolic function with a visually estimated ejection fraction of 55 - 60%. Unable to assess diastolic function due to atrial fibrillation.    Right Ventricle: Normal right ventricular cavity size. Wall thickness is normal. Systolic function is normal. Pacemaker lead present in the ventricle.    Left Atrium: Left atrium is mildly dilated.    Right Atrium: Right atrium is mildly dilated.    Aortic Valve: There is a transcatheter valve replacement in the aortic position. It is reported to be a 26 mm Jauregui valve. There is mild stenosis. Aortic valve area by VTI is 1.5 cm². Aortic valve peak velocity is 3.6 m/s. Mean gradient is 28.0 mmHg. The dimensionless index is 0.43. There is mild to moderate aortic regurgitation with an eccentrically directed jet.    Mitral Valve: There is bileaflet sclerosis. There is mitral annular calcification present. There is mild regurgitation.    Tricuspid Valve: There is mild to moderate regurgitation.    IVC/SVC: Normal venous pressure at 3 mmHg.  . Continue Beta Blocker Furosemide Entresto and monitor clinical status closely. Monitor on telemetry. Patient is on CHF pathway.  Monitor strict Is&Os and daily weights.  Place on fluid restriction of 1.5 L. Cardiology is consulted. Continue to stress to patient importance of self efficacy and  on diet for CHF. Last BNP reviewed- and noted below   Recent Labs   Lab 12/05/24  0816   *     .  Recent echo December 3, 2024 with EF 55-60%, mild AS with TAVR, mild MR, moderate TR    Diuresing well. Continue IV Lasix

## 2024-12-06 NOTE — PLAN OF CARE
Problem: COPD (Chronic Obstructive Pulmonary Disease)  Goal: Optimal Chronic Illness Coping  Outcome: Progressing  Goal: Optimal Level of Functional [place]  Outcome: Progressing  Goal: Absence of Infection Signs and Symptoms  Outcome: Progressing  Goal: Improved Oral Intake  Outcome: Progressing  Goal: Effective Oxygenation and Ventilation  Outcome: Progressing     Problem: Adult Inpatient Plan of Care  Goal: Plan of Care Review  Outcome: Progressing  Goal: Patient-Specific Goal (Individualized)  Outcome: Progressing  Goal: Absence of Hospital-Acquired Illness or Injury  Outcome: Progressing  Goal: Optimal Comfort and Wellbeing  Outcome: Progressing  Goal: Readiness for Transition of Care  Outcome: Progressing     Problem: Pneumonia  Goal: Fluid Balance  Outcome: Progressing  Goal: Resolution of Infection Signs and Symptoms  Outcome: Progressing  Goal: Effective Oxygenation and Ventilation  Outcome: Progressing     Problem: Wound  Goal: Optimal Coping  Outcome: Progressing  Goal: Optimal Functional Ability  Outcome: Progressing  Goal: Absence of Infection Signs and Symptoms  Outcome: Progressing  Goal: Improved Oral Intake  Outcome: Progressing  Goal: Optimal Pain Control and Function  Outcome: Progressing  Goal: Skin Health and Integrity  Outcome: Progressing  Goal: Optimal Wound Healing  Outcome: Progressing     Problem: Fall Injury Risk  Goal: Absence of Fall and Fall-Related Injury  Outcome: Progressing

## 2024-12-06 NOTE — PROGRESS NOTES
Pulmonary/Critical Care progress note      PATIENT NAME: Lisa Smith  MRN: [id number]  TODAY'S DATE: 2024  4:31 PM  ADMIT DATE: 2024  AGE: 74 y.o. : 1950    CONSULT REQUESTED BY: Josr Murguia MD    REASON FOR CONSULT:   Increased shortness of breath    HPI:  The patient is a 74-year-old female with COPD and bronchiectasis and chronic respiratory failure who began feeling poorly around .  She has also been very anemic with Coumadin which she has taking since she has had her TAVR.  She was transfused 2 units packed red blood cells on Tuesday.  Overnight she became so short of breath she asked to come into the hospital.  The patient has moderate obstruction on her PFTs she has PFTs pending at this time.    The patient's chest x-ray is basically stable.  However, she has multi phonic wheezing with a prolonged expiratory phase consistent with a COPD exacerbation.     the patient is a bit distressed because she was told her rhino rocket would have to stay until next week.  We will discontinue it tomorrow.  That will be 3 days.  She is currently on 7 L of oxygen because she is only getting oxygen in through 1 nostril.  She is receiving Lasix and is diuresing.    REVIEW OF SYSTEMS  GENERAL: Feeling terrible  EYES: Vision is good.   ENT:  She has rhinitis and epistaxis  HEART: No chest pain or palpitations.  LUNGS:  She is coughing a great deal.  She is more short of breath than usual.  She is wheezing.  GI: No Nausea, vomiting, constipation, diarrhea, or reflux.  : No dysuria, hesitancy, or nocturia.  SKIN: No lesions or rashes.  MUSCULOSKELETAL: No joint pain or myalgias.  NEURO: No headaches or neuropathy.  LYMPH: No edema or adenopathy.  PSYCH: No anxiety or depression.  ENDO: No weight change.    No change in the patient's Past Medical History, Past Surgical History, Social History or Family History since admission.      VITAL SIGNS (MOST RECENT)  Temp: 97.5 °F (36.4 °C)  (12/06/24 1146)  Pulse: 68 (12/06/24 1554)  Resp: 18 (12/06/24 1346)  BP: (!) 130/53 (12/06/24 1554)  SpO2: (!) 94 % (12/06/24 1554)    INTAKE AND OUTPUT (LAST 24 HOURS):  Intake/Output Summary (Last 24 hours) at 12/6/2024 1736  Last data filed at 12/6/2024 1435  Gross per 24 hour   Intake 240 ml   Output --   Net 240 ml       WEIGHT  Wt Readings from Last 1 Encounters:   12/05/24 76.6 kg (168 lb 15.7 oz)       PHYSICAL EXAM  GENERAL: Older patient in no distress.  HEENT: Pupils equal and reactive. Extraocular movements intact. Nose has a rhino rocket on the left. Pharynx moist.  NECK: Supple.   HEART: Regular rate and rhythm. No murmur or gallop auscultated.  LUNGS:  There is an inspiratory wheeze heard in the right posterior lung fields.  She sounds much better than yesterday.  Lung excursion symmetrical. No change in fremitus.   ABDOMEN: Bowel sounds present. Non-tender, no masses palpated.  : Normal anatomy.  EXTREMITIES: Normal muscle tone and joint movement, no cyanosis or clubbing.   LYMPHATICS: No adenopathy palpated, no edema.  SKIN: Dry, intact, no lesions.   NEURO: Cranial nerves II-XII intact. Motor strength 5/5 bilaterally, upper and lower extremities.  PSYCH: Appropriate affect      CBC LAST (LAST 24 HOURS)  Recent Labs   Lab 12/06/24  0509   WBC 9.65   RBC 2.66*   HGB 8.0*   HCT 25.0*   MCV 94   MCH 30.1   MCHC 32.0   RDW 18.6*      MPV 10.8   GRAN 83.2*  8.0*   LYMPH 3.7*  0.4*   MONO 12.0  1.2*   BASO 0.01   NRBC 0       CHEMISTRY LAST (LAST 24 HOURS)  Recent Labs   Lab 12/06/24  0509      K 5.3*      CO2 32*   ANIONGAP 3*   BUN 30*   CREATININE 0.8   *   CALCIUM 9.0   MG 2.2   ALBUMIN 3.7   PROT 6.8   ALKPHOS 75   ALT 12   AST 32   BILITOT 0.7       COAGULATION LAST (LAST 24 HOURS)  Recent Labs   Lab 12/06/24  0509   INR 2.2*       CARDIAC PROFILE (LAST 24 HOURS)  Recent Labs   Lab 12/05/24  0816   *       LAST 7 DAYS MICROBIOLOGY   Microbiology Results (last 7  days)       Procedure Component Value Units Date/Time    Blood culture x two cultures. Draw prior to antibiotics. [[id number]] Collected: 12/05/24 0813    Order Status: Completed Specimen: Blood from Peripheral, Antecubital, Right Updated: 12/06/24 1032     Blood Culture, Routine No Growth to date      No Growth to date    Narrative:      Aerobic and anaerobic  Collection has been rescheduled by [facility] at 12/05/2024 08:23 Reason:   DONE.   Collection has been rescheduled by [facility] at 12/05/2024 08:23 Reason:   DONE.     Blood culture x two cultures. Draw prior to antibiotics. [[id number]] Collected: 12/05/24 0823    Order Status: Completed Specimen: Blood from Peripheral, Antecubital, Right Updated: 12/06/24 1032     Blood Culture, Routine No Growth to date      No Growth to date    Narrative:      Aerobic and anaerobic            MOST RECENT IMAGING  X-Ray Chest 1 View  Narrative: CLINICAL HISTORY:  ([id number])73 y/o  (1950) F    Chronic interstitial changes and bronchiectasis;    TECHNIQUE:  (A#[id number], exam time 12/6/2024 6:57)    XR CHEST 1 VIEW [id number]    COMPARISON:  Radiograph from 12/05/2024.    FINDINGS:  Moderately increased central hilar interstitial opacities are seen bilaterally suggestive of either mild edema  atypical infection/pneumonia or bronchitis in the appropriate clinical setting.  There is blunting of both costophrenic angles consistent with trace pleural effusions and adjacent atelectasis. No pneumothorax is identified. The cardiac silhouette is moderately enlarged. Osseous structures appear unchanged. The visualized upper abdomen is unremarkable.  Impression: Slight interval worsening, M79 mild-to-moderate diffuse interstitial opacity in both lungs suggesting a combination of atelectasis, trace edema and or pneumonia.    Electronically signed by: Ian Moreno  Date:    12/06/2024  Time:    07:47      CURRENT VISIT EKG  Results for orders placed or performed during the hospital encounter  of 12/05/24   EKG 12-lead   Result Value Ref Range    QRS Duration 152 ms    [facility] QTC Calculation 416 ms    Narrative    Test Reason : R07.9,    Vent. Rate :  61 BPM     Atrial Rate : 357 BPM     P-R Int :    ms          QRS Dur : 152 ms      QT Int : 414 ms       P-R-T Axes :    -72  98 degrees    QTcB Int : 416 ms    Ventricular-paced rhythm  Abnormal ECG  When compared with ECG of 14-Mar-2024 10:33,  No significant change was found    Referred By: AAAREFERRAL SELF           Confirmed By:        ECHOCARDIOGRAM RESULTS  Results for orders placed during the hospital encounter of 12/03/24    Echo    Interpretation Summary    Left Ventricle: The left ventricle is normal in size. Normal wall thickness. There is normal systolic function with a visually estimated ejection fraction of 55 - 60%. Unable to assess diastolic function due to atrial fibrillation.    Right Ventricle: Normal right ventricular cavity size. Wall thickness is normal. Systolic function is normal. Pacemaker lead present in the ventricle.    Left Atrium: Left atrium is mildly dilated.    Right Atrium: Right atrium is mildly dilated.    Aortic Valve: There is a transcatheter valve replacement in the aortic position. It is reported to be a 26 mm Jauregui valve. There is mild stenosis. Aortic valve area by VTI is 1.5 cm². Aortic valve peak velocity is 3.6 m/s. Mean gradient is 28.0 mmHg. The dimensionless index is 0.43. There is mild to moderate aortic regurgitation with an eccentrically directed jet.    Mitral Valve: There is bileaflet sclerosis. There is mitral annular calcification present. There is mild regurgitation.    Tricuspid Valve: There is mild to moderate regurgitation.    IVC/SVC: Normal venous pressure at 3 mmHg.        Oxygen INFORMATION   7 L in 1 nostril           LAST ARTERIAL BLOOD GAS  ABG  Recent Labs   Lab 12/05/24  0807   PH 7.402   PO2 77*   PCO2 44.4   HCO3 27.6   BE 3*       IMPRESSION AND PLAN  COPD exacerbation  - maximum  bronchodilators  - receiving 40 mg prednisone  - azithromycin for its anti-inflammatory properties  Chronic hypoxic respiratory failure  - on 7 L now going in through 1 nostril  Epistaxis  - patient is had great difficulty with Coumadin  Anemia  - becoming transfusion dependent with  Coumadin      Will follow with you  Kathryn Haro MD  Date of Service: 12/06/2024  4:31 PM

## 2024-12-06 NOTE — PLAN OF CARE
Pt not medically cleared for discharge at this time. Rhino-rocket remains in place, pending ENT consult. Pt also is still needing fluid volume correction, per provider, 1-2 days of IV lasix with steroids should have her prepared for discharge. Case management continuing to follow. Noted that pts primary cardiologist is MASOUD Pérez and primary pulmonologist is Doctor Haro.      12/06/24 1401   Discharge Reassessment   Assessment Type Discharge Planning Reassessment   Did the patient's condition or plan change since previous assessment? No   Discharge Plan discussed with: Patient   Communicated PILI with patient/caregiver Yes   Discharge Plan A Home Health

## 2024-12-06 NOTE — PROGRESS NOTES
[facility] Medicine  Progress Note    Patient Name: Lisa Smith  MRN: [id number]  Patient Class: IP- Inpatient   Admission Date: 12/5/2024  Length of Stay: 1 days  Attending Physician: Josr Murguia MD  Primary Care Provider: Hope Tang FNP        Subjective     Principal Problem:<principal problem not specified>        HPI:  The patient is a 74-year-old female with a past medical history of COPD (maintained on 3 L nasal cannula at home), paroxysmal AFib on Coumadin, status post TAVR, heart failure with preserved ejection fraction, anemia who presents to the emergency department for the evaluation of epistaxis, productive cough and shortness of breath.  Patient reports that over the past few days she has been having some epistaxis as well as upper respiratory infection symptoms with productive cough, subjective fever and chills, and increased dyspnea.  She denies recent sick contacts or recent travel.  The patient reports that she was recently given a blood transfusion for anemia that has been followed outpatient by her PCP.  Patient reports that since she has been on Coumadin she has been suffering with anemia.  She is scheduled to have a GI workup for upper and lower scopes to evaluate for causes of bleeding as well as bone marrow biopsy per heme Onc as well as a workup for possible Watchman device to transition off Coumadin.  Patient was evaluated in the emergency department.  Hemoglobin 8.4 (was 7 prior to blood transfusion on 12/03/2024), white blood cell count 10.8, renal function within normal limits.  ABG revealed pH of 7.4, PO2 of 77, CO2 of 44 and bicarb of 27.  Flu and COVID negative.  Chest x-ray with bilateral diffuse interstitial opacities worse in the right lung consistent with possible superimposed pneumonia.  Lactic acid within normal limits, BNP mildly elevated 309.  PT 17.6 INR 1.6.  Afrin was placed to sterile gauze to left [place] with some improvement in  hemostasis.  Patient was initiated on cefepime and vancomycin per ER MD.  Patient admitted to the hospital medicine service with pulmonary and cardiology consultation for further evaluation.    Overview/Hospital Course:  74-year-old female with COPD on 3 L by nasal cannula, paroxysmal AFib on Coumadin, prior TAVR who has had multiple episodes of significant epistaxis presents with recurrent epistaxis in addition to feeling globally unwell with worsened cough, shortness of breath and wheezing for roughly 5 days consistent with an acute COPD exacerbation compounded by heart failure decompensation after receiving an outpatient transfusion on 12/4.    Regarding COPD exacerbation, the patient has been placed on Solu-Medrol, doxycycline and scheduled nebulizers.  Regarding her heart failure decompensation, she is on IV Lasix and is currently diuresing well.  Supplemental oxygen requirements have stabilized and she is beginning to feel better.      Regarding her nosebleed, Afrin did not lead to resolution of the ER.  A rhino rocket was placed.     Cardiology has been discussing a Watchman procedure with the patient and potentially discontinuing anticoagulation.    Interval History:  Patient feels she is improving compared to admission.  She is having good urine output.  Tolerating oral intake.  The rhino rocket is uncomfortable but she feels she can continue to tolerate it.    Review of Systems  Objective:     Vital Signs (Most Recent):  Temp: 97.5 °F (36.4 °C) (12/06/24 1146)  Pulse: 68 (12/06/24 1554)  Resp: 18 (12/06/24 1346)  BP: (!) 130/53 (12/06/24 1554)  SpO2: (!) 94 % (12/06/24 1554) Vital Signs (24h Range):  Temp:  [97.5 °F (36.4 °C)-98.6 °F (37 °C)] 97.5 °F (36.4 °C)  Pulse:  [59-68] 68  Resp:  [16-18] 18  SpO2:  [90 %-98 %] 94 %  BP: (119-164)/(47-72) 130/53     Weight: 76.6 kg (168 lb 15.7 oz)  Body mass index is 28.12 kg/m².    Intake/Output Summary (Last 24 hours) at 12/6/2024 3857  Last data filed at  12/6/2024 1435  Gross per 24 hour   Intake 240 ml   Output --   Net 240 ml         Physical Exam      NAD, alert interactive  Rhino rocket in place  Crackles present.  No wheezing.    Soft nontender nondistended, bowel sounds present   RRR   No edema    Significant Labs: All pertinent labs within the past 24 hours have been reviewed.  CBC:   Recent Labs   Lab 12/05/24  0816 12/05/24  1101 12/06/24  0509   WBC 10.88 10.68 9.65   HGB 8.4* 8.4* 8.0*   HCT 25.6* 26.8* 25.0*    250 248     CMP:   Recent Labs   Lab 12/05/24  0816 12/06/24  0509    138   K 5.1 5.3*    103   CO2 32* 32*    134*   BUN 28* 30*   CREATININE 0.7 0.8   CALCIUM 9.3 9.0   PROT 8.0 6.8   ALBUMIN 4.3 3.7   BILITOT 1.1* 0.7   ALKPHOS 91 75   AST 48* 32   ALT 14 12   ANIONGAP 2* 3*     Magnesium:   Recent Labs   Lab 12/06/24  0509   MG 2.2       Significant Imaging: I have reviewed all pertinent imaging results/findings within the past 24 hours.    Assessment and Plan     Epistaxis  Patient presented with recent history of epistaxis  No trauma  Coumadin held at this time  Coags noted, PT 17.6 INR 1.6  Afrin placed to nares with gauze  Rhino rocket placed by ER physician           Regarding her nosebleed, Afrin did not lead to resolution of the ER.  A rhino rocket was placed. H/h stable. Critical care physician following for assistance with timing of removal  Coumadin held           Anemia  Anemia is likely due to acute blood loss which was from epistaxis . Most recent hemoglobin and hematocrit are listed below.  Recent Labs     12/02/24  1300 12/05/24  0807 12/05/24  0816   HGB 7.0*  --  8.4*   HCT 22.8* 26* 25.6*     Plan  - Monitor serial CBC: Every 12 hours  - Transfuse PRBC if patient becomes hemodynamically unstable, symptomatic or H/H drops below 7/21.  - Patient has received 2 units of PRBCs on 12/3/24  - Patient's anemia is currently stable  - continue to trend and monitor blood count, transfuse as needed, holding  anticoagulation at this time in setting of epistaxis  - patient to have outpatient upper and lower scope for evaluation with GI as well as workup with Hematology Oncology outpatient    Heart failure with improved ejection fraction (HFimpEF)    Patient is identified as having Diastolic (HFpEF) heart failure that is Acute on  Chronic. CHF is currently controlled. Latest ECHO performed and demonstrates- Results for orders placed during the hospital encounter of 12/03/24    Echo    Interpretation Summary    Left Ventricle: The left ventricle is normal in size. Normal wall thickness. There is normal systolic function with a visually estimated ejection fraction of 55 - 60%. Unable to assess diastolic function due to atrial fibrillation.    Right Ventricle: Normal right ventricular cavity size. Wall thickness is normal. Systolic function is normal. Pacemaker lead present in the ventricle.    Left Atrium: Left atrium is mildly dilated.    Right Atrium: Right atrium is mildly dilated.    Aortic Valve: There is a transcatheter valve replacement in the aortic position. It is reported to be a 26 mm Jauregui valve. There is mild stenosis. Aortic valve area by VTI is 1.5 cm². Aortic valve peak velocity is 3.6 m/s. Mean gradient is 28.0 mmHg. The dimensionless index is 0.43. There is mild to moderate aortic regurgitation with an eccentrically directed jet.    Mitral Valve: There is bileaflet sclerosis. There is mitral annular calcification present. There is mild regurgitation.    Tricuspid Valve: There is mild to moderate regurgitation.    IVC/SVC: Normal venous pressure at 3 mmHg.  . Continue Beta Blocker Furosemide Entresto and monitor clinical status closely. Monitor on telemetry. Patient is on CHF pathway.  Monitor strict Is&Os and daily weights.  Place on fluid restriction of 1.5 L. Cardiology is consulted. Continue to stress to patient importance of self efficacy and  on diet for CHF. Last BNP reviewed- and noted below    Recent Labs   Lab 12/05/24  0816   *     .  Recent echo December 3, 2024 with EF 55-60%, mild AS with TAVR, mild MR, moderate TR    Diuresing well. Continue IV Lasix       Atrial fibrillation  Patient has paroxysmal (<7 days) atrial fibrillation. Patient is currently in sinus rhythm. [id number] Score: 4. The patients heart rate in the last 24 hours is as follows:  Pulse  Min: 59  Max: 68     Antiarrhythmics  metoprolol succinate (TOPROL-XL) 24 hr tablet 25 mg, Every morning, Oral    Anticoagulants       Plan  - Replete lytes with a goal of K>4, Mg >2  - Patient is anticoagulated, see medications listed above.  - Patient's afib is currently controlled  - holding Coumadin at this time due to epistaxis and anemia        Pneumonia  Ruled out.    COPD (chronic obstructive pulmonary disease)  Patient's COPD is with exacerbation noted by worsening of baseline hypoxia currently.  Patient is currently on COPD Pathway. Continue scheduled inhalers Antibiotics and Supplemental oxygen and monitor respiratory status closely.           Regarding COPD exacerbation, the patient has been placed on Solu-Medrol, doxycycline and scheduled nebulizers.          VTE Risk Mitigation (From admission, onward)           Ordered     IP VTE HIGH RISK PATIENT  Once         12/05/24 1259     Place sequential compression device  Until discontinued         12/05/24 1259     Place sequential compression device  Until discontinued         12/05/24 1115                    Discharge Planning   PILI: 12/7/2024     Code Status: Full Code   Medical Readiness for Discharge Date:   Discharge Plan A: Home Health              Josr Murguia MD  Department of Hospital Medicine   [facility]

## 2024-12-06 NOTE — ASSESSMENT & PLAN NOTE
Patient presented with recent history of epistaxis  No trauma  Coumadin held at this time  Coags noted, PT 17.6 INR 1.6  Afrin placed to nares with gauze  Rhino rocket placed by ER physician           Regarding her nosebleed, Afrin did not lead to resolution of the ER.  A rhino rocket was placed. H/h stable. Critical care physician following for assistance with timing of removal  Coumadin held

## 2024-12-07 PROBLEM — I50.33 ACUTE ON CHRONIC HEART FAILURE WITH PRESERVED EJECTION FRACTION (HFPEF): Status: ACTIVE | Noted: 2024-12-07

## 2024-12-07 LAB
ADENOVIRUS: NOT DETECTED
ALBUMIN SERPL BCP-MCNC: 3.6 G/DL (ref 3.5–5.2)
ALP SERPL-CCNC: 79 U/L (ref 55–135)
ALT SERPL W/O P-5'-P-CCNC: 14 U/L (ref 10–44)
ANION GAP SERPL CALC-SCNC: 2 MMOL/L (ref 8–16)
AST SERPL-CCNC: 35 U/L (ref 10–40)
BASOPHILS # BLD AUTO: 0.01 K/UL (ref 0–0.2)
BASOPHILS NFR BLD: 0.1 % (ref 0–1.9)
BILIRUB SERPL-MCNC: 0.7 MG/DL (ref 0.1–1)
BORDETELLA PARAPERTUSSIS (IS1001): NOT DETECTED
BORDETELLA PERTUSSIS (PTXP): NOT DETECTED
BUN SERPL-MCNC: 28 MG/DL (ref 8–23)
CALCIUM SERPL-MCNC: 8.8 MG/DL (ref 8.7–10.5)
CHLAMYDIA PNEUMONIAE: NOT DETECTED
CHLORIDE SERPL-SCNC: 103 MMOL/L (ref 95–110)
CO2 SERPL-SCNC: 35 MMOL/L (ref 23–29)
CORONAVIRUS 229E, COMMON COLD VIRUS: NOT DETECTED
CORONAVIRUS HKU1, COMMON COLD VIRUS: NOT DETECTED
CORONAVIRUS NL63, COMMON COLD VIRUS: NOT DETECTED
CORONAVIRUS OC43, COMMON COLD VIRUS: NOT DETECTED
CREAT SERPL-MCNC: 0.7 MG/DL (ref 0.5–1.4)
DIFFERENTIAL METHOD BLD: ABNORMAL
EOSINOPHIL # BLD AUTO: 0 K/UL (ref 0–0.5)
EOSINOPHIL NFR BLD: 0.1 % (ref 0–8)
ERYTHROCYTE [DISTWIDTH] IN BLOOD BY AUTOMATED COUNT: 18.1 % (ref 11.5–14.5)
EST. GFR  (NO RACE VARIABLE): >60 ML/MIN/1.73 M^2
FLUBV RNA NPH QL NAA+NON-PROBE: NOT DETECTED
GLUCOSE SERPL-MCNC: 99 MG/DL (ref 70–110)
HCT VFR BLD AUTO: 24.5 % (ref 37–48.5)
HGB BLD-MCNC: 7.6 G/DL (ref 12–16)
HPIV1 RNA NPH QL NAA+NON-PROBE: NOT DETECTED
HPIV2 RNA NPH QL NAA+NON-PROBE: NOT DETECTED
HPIV3 RNA NPH QL NAA+NON-PROBE: NOT DETECTED
HPIV4 RNA NPH QL NAA+NON-PROBE: NOT DETECTED
HUMAN METAPNEUMOVIRUS: NOT DETECTED
IMM GRANULOCYTES # BLD AUTO: 0.13 K/UL (ref 0–0.04)
IMM GRANULOCYTES NFR BLD AUTO: 1.1 % (ref 0–0.5)
INFLUENZA A (SUBTYPES H1,H1-2009,H3): NOT DETECTED
INR PPP: 1.8 (ref 0.8–1.2)
LYMPHOCYTES # BLD AUTO: 0.9 K/UL (ref 1–4.8)
LYMPHOCYTES NFR BLD: 7.5 % (ref 18–48)
MAGNESIUM SERPL-MCNC: 2.1 MG/DL (ref 1.6–2.6)
MCH RBC QN AUTO: 29.6 PG (ref 27–31)
MCHC RBC AUTO-ENTMCNC: 31 G/DL (ref 32–36)
MCV RBC AUTO: 95 FL (ref 82–98)
MONOCYTES # BLD AUTO: 1.2 K/UL (ref 0.3–1)
MONOCYTES NFR BLD: 10.1 % (ref 4–15)
MYCOPLASMA PNEUMONIAE: NOT DETECTED
NEUTROPHILS # BLD AUTO: 9.3 K/UL (ref 1.8–7.7)
NEUTROPHILS NFR BLD: 81.1 % (ref 38–73)
NRBC BLD-RTO: 0 /100 WBC
PHOSPHATE SERPL-MCNC: 4.2 MG/DL (ref 2.7–4.5)
PLATELET # BLD AUTO: 259 K/UL (ref 150–450)
PMV BLD AUTO: 10.2 FL (ref 9.2–12.9)
POTASSIUM SERPL-SCNC: 4.8 MMOL/L (ref 3.5–5.1)
PROT SERPL-MCNC: 6.6 G/DL (ref 6–8.4)
PROTHROMBIN TIME: 19.4 SEC (ref 9–12.5)
RBC # BLD AUTO: 2.57 M/UL (ref 4–5.4)
RESPIRATORY INFECTION PANEL SOURCE: NORMAL
RSV RNA NPH QL NAA+NON-PROBE: NOT DETECTED
RV+EV RNA NPH QL NAA+NON-PROBE: NOT DETECTED
SARS-COV-2 RNA RESP QL NAA+PROBE: NOT DETECTED
SODIUM SERPL-SCNC: 140 MMOL/L (ref 136–145)
WBC # BLD AUTO: 11.44 K/UL (ref 3.9–12.7)

## 2024-12-07 PROCEDURE — 99232 SBSQ HOSP IP/OBS MODERATE 35: CPT | Mod: ,,, | Performed by: INTERNAL MEDICINE

## 2024-12-07 PROCEDURE — 27000221 HC OXYGEN, UP TO 24 HOURS

## 2024-12-07 PROCEDURE — 25000242 PHARM REV CODE 250 ALT 637 W/ HCPCS: Performed by: NURSE PRACTITIONER

## 2024-12-07 PROCEDURE — 63600175 PHARM REV CODE 636 W HCPCS: Performed by: NURSE PRACTITIONER

## 2024-12-07 PROCEDURE — 84100 ASSAY OF PHOSPHORUS: CPT | Performed by: NURSE PRACTITIONER

## 2024-12-07 PROCEDURE — 83735 ASSAY OF MAGNESIUM: CPT | Performed by: NURSE PRACTITIONER

## 2024-12-07 PROCEDURE — 94640 AIRWAY INHALATION TREATMENT: CPT

## 2024-12-07 PROCEDURE — 21400001 HC TELEMETRY ROOM

## 2024-12-07 PROCEDURE — 87486 CHLMYD PNEUM DNA AMP PROBE: CPT | Performed by: INTERNAL MEDICINE

## 2024-12-07 PROCEDURE — 97116 GAIT TRAINING THERAPY: CPT

## 2024-12-07 PROCEDURE — 25000003 PHARM REV CODE 250: Performed by: INTERNAL MEDICINE

## 2024-12-07 PROCEDURE — 85610 PROTHROMBIN TIME: CPT | Performed by: INTERNAL MEDICINE

## 2024-12-07 PROCEDURE — 97165 OT EVAL LOW COMPLEX 30 MIN: CPT

## 2024-12-07 PROCEDURE — 36415 COLL VENOUS BLD VENIPUNCTURE: CPT | Performed by: INTERNAL MEDICINE

## 2024-12-07 PROCEDURE — 94761 N-INVAS EAR/PLS OXIMETRY MLT: CPT

## 2024-12-07 PROCEDURE — 99233 SBSQ HOSP IP/OBS HIGH 50: CPT | Mod: ,,, | Performed by: INTERNAL MEDICINE

## 2024-12-07 PROCEDURE — 97162 PT EVAL MOD COMPLEX 30 MIN: CPT

## 2024-12-07 PROCEDURE — 85025 COMPLETE CBC W/AUTO DIFF WBC: CPT | Performed by: NURSE PRACTITIONER

## 2024-12-07 PROCEDURE — 63700000 PHARM REV CODE 250 ALT 637 W/O HCPCS: Performed by: INTERNAL MEDICINE

## 2024-12-07 PROCEDURE — 25000242 PHARM REV CODE 250 ALT 637 W/ HCPCS: Performed by: INTERNAL MEDICINE

## 2024-12-07 PROCEDURE — 99900031 HC PATIENT EDUCATION (STAT)

## 2024-12-07 PROCEDURE — 25000003 PHARM REV CODE 250: Performed by: STUDENT IN AN ORGANIZED HEALTH CARE EDUCATION/TRAINING PROGRAM

## 2024-12-07 PROCEDURE — 63600175 PHARM REV CODE 636 W HCPCS: Performed by: INTERNAL MEDICINE

## 2024-12-07 PROCEDURE — 25000003 PHARM REV CODE 250: Performed by: NURSE PRACTITIONER

## 2024-12-07 PROCEDURE — 80053 COMPREHEN METABOLIC PANEL: CPT | Performed by: NURSE PRACTITIONER

## 2024-12-07 RX ORDER — POLYETHYLENE GLYCOL 3350 17 G/17G
17 POWDER, FOR SOLUTION ORAL DAILY
Status: DISCONTINUED | OUTPATIENT
Start: 2024-12-07 | End: 2024-12-10 | Stop reason: HOSPADM

## 2024-12-07 RX ORDER — GUAIFENESIN 100 MG/5ML
200 SOLUTION ORAL EVERY 4 HOURS PRN
Status: DISCONTINUED | OUTPATIENT
Start: 2024-12-07 | End: 2024-12-10 | Stop reason: HOSPADM

## 2024-12-07 RX ORDER — IPRATROPIUM BROMIDE AND ALBUTEROL SULFATE 2.5; .5 MG/3ML; MG/3ML
3 SOLUTION RESPIRATORY (INHALATION) EVERY 4 HOURS
Status: DISCONTINUED | OUTPATIENT
Start: 2024-12-07 | End: 2024-12-08

## 2024-12-07 RX ORDER — FUROSEMIDE 20 MG/1
20 TABLET ORAL DAILY
Status: DISCONTINUED | OUTPATIENT
Start: 2024-12-08 | End: 2024-12-10 | Stop reason: HOSPADM

## 2024-12-07 RX ADMIN — ARFORMOTEROL TARTRATE 15 MCG: 15 SOLUTION RESPIRATORY (INHALATION) at 07:12

## 2024-12-07 RX ADMIN — ROPINIROLE HYDROCHLORIDE 1 MG: 1 TABLET, FILM COATED ORAL at 08:12

## 2024-12-07 RX ADMIN — IPRATROPIUM BROMIDE AND ALBUTEROL SULFATE 3 ML: .5; 3 SOLUTION RESPIRATORY (INHALATION) at 11:12

## 2024-12-07 RX ADMIN — FUROSEMIDE 40 MG: 10 INJECTION, SOLUTION INTRAMUSCULAR; INTRAVENOUS at 08:12

## 2024-12-07 RX ADMIN — GUAIFENESIN 200 MG: 200 SOLUTION ORAL at 08:12

## 2024-12-07 RX ADMIN — AZITHROMYCIN DIHYDRATE 500 MG: 250 TABLET ORAL at 08:12

## 2024-12-07 RX ADMIN — IPRATROPIUM BROMIDE AND ALBUTEROL SULFATE 3 ML: .5; 3 SOLUTION RESPIRATORY (INHALATION) at 07:12

## 2024-12-07 RX ADMIN — GUAIFENESIN 200 MG: 200 SOLUTION ORAL at 02:12

## 2024-12-07 RX ADMIN — IPRATROPIUM BROMIDE AND ALBUTEROL SULFATE 3 ML: .5; 3 SOLUTION RESPIRATORY (INHALATION) at 04:12

## 2024-12-07 RX ADMIN — BUDESONIDE INHALATION 0.5 MG: 0.5 SUSPENSION RESPIRATORY (INHALATION) at 07:12

## 2024-12-07 RX ADMIN — EZETIMIBE 10 MG: 10 TABLET ORAL at 08:12

## 2024-12-07 RX ADMIN — POLYETHYLENE GLYCOL 3350 17 G: 17 POWDER, FOR SOLUTION ORAL at 01:12

## 2024-12-07 RX ADMIN — Medication 2 SPRAY: at 08:12

## 2024-12-07 RX ADMIN — GUAIFENESIN 200 MG: 200 SOLUTION ORAL at 10:12

## 2024-12-07 RX ADMIN — METHYLPREDNISOLONE SODIUM SUCCINATE 40 MG: 40 INJECTION, POWDER, FOR SOLUTION INTRAMUSCULAR; INTRAVENOUS at 11:12

## 2024-12-07 RX ADMIN — Medication 9 MG: at 08:12

## 2024-12-07 RX ADMIN — DIGOXIN 0.25 MG: 125 TABLET ORAL at 08:12

## 2024-12-07 RX ADMIN — PANTOPRAZOLE SODIUM 40 MG: 40 TABLET, DELAYED RELEASE ORAL at 06:12

## 2024-12-07 RX ADMIN — METOPROLOL SUCCINATE 25 MG: 25 TABLET, FILM COATED, EXTENDED RELEASE ORAL at 06:12

## 2024-12-07 RX ADMIN — METHYLPREDNISOLONE SODIUM SUCCINATE 40 MG: 40 INJECTION, POWDER, FOR SOLUTION INTRAMUSCULAR; INTRAVENOUS at 05:12

## 2024-12-07 RX ADMIN — LORAZEPAM 0.5 MG: 0.5 TABLET ORAL at 08:12

## 2024-12-07 RX ADMIN — METHYLPREDNISOLONE SODIUM SUCCINATE 40 MG: 40 INJECTION, POWDER, FOR SOLUTION INTRAMUSCULAR; INTRAVENOUS at 12:12

## 2024-12-07 NOTE — PLAN OF CARE
Problem: COPD (Chronic Obstructive Pulmonary Disease)  Goal: Optimal Chronic Illness Coping  Outcome: Progressing  Goal: Optimal Level of Functional [place]  Outcome: Progressing  Goal: Absence of Infection Signs and Symptoms  Outcome: Progressing  Goal: Improved Oral Intake  Outcome: Progressing  Goal: Effective Oxygenation and Ventilation  Outcome: Progressing     Problem: Adult Inpatient Plan of Care  Goal: Plan of Care Review  Outcome: Progressing  Goal: Patient-Specific Goal (Individualized)  Outcome: Progressing  Goal: Absence of Hospital-Acquired Illness or Injury  Outcome: Progressing  Goal: Optimal Comfort and Wellbeing  Outcome: Progressing  Goal: Readiness for Transition of Care  Outcome: Progressing     Problem: Pneumonia  Goal: Fluid Balance  Outcome: Progressing  Goal: Resolution of Infection Signs and Symptoms  Outcome: Progressing  Goal: Effective Oxygenation and Ventilation  Outcome: Progressing     Problem: Wound  Goal: Optimal Coping  Outcome: Progressing  Goal: Optimal Functional Ability  Outcome: Progressing  Goal: Absence of Infection Signs and Symptoms  Outcome: Progressing  Goal: Improved Oral Intake  Outcome: Progressing  Goal: Optimal Pain Control and Function  Outcome: Progressing  Goal: Skin Health and Integrity  Outcome: Progressing  Goal: Optimal Wound Healing  Outcome: Progressing     Problem: Fall Injury Risk  Goal: Absence of Fall and Fall-Related Injury  Outcome: Progressing     Problem: Fluid Volume Excess  Goal: Fluid Balance  Outcome: Progressing

## 2024-12-07 NOTE — SUBJECTIVE & OBJECTIVE
Interval History: her oxygen requirements went up to 7 L this morning. Patient denies any worsening symptoms. She has not had BM for 4 days. Patient also expressed concerned about not getting the BM biopsy that is being scheduled outpatient. Advised that this is a decision for her hematologist.     Review of Systems  Objective:     Vital Signs (Most Recent):  Temp: 97.8 °F (36.6 °C) (12/07/24 1158)  Pulse: 60 (12/07/24 1158)  Resp: 17 (12/07/24 1158)  BP: 130/65 (12/07/24 1158)  SpO2: 100 % (12/07/24 1158) Vital Signs (24h Range):  Temp:  [97.4 °F (36.3 °C)-98.4 °F (36.9 °C)] 97.8 °F (36.6 °C)  Pulse:  [58-84] 60  Resp:  [16-18] 17  SpO2:  [90 %-100 %] 100 %  BP: (130-162)/(53-66) 130/65     Weight: 78.4 kg (172 lb 13.5 oz)  Body mass index is 28.76 kg/m².    Intake/Output Summary (Last 24 hours) at 12/7/2024 1240  Last data filed at 12/7/2024 0926  Gross per 24 hour   Intake 360 ml   Output 1800 ml   Net -1440 ml         Physical Exam  Vitals and nursing note reviewed.   Constitutional:       General: She is not in acute distress.     Appearance: She is not toxic-appearing.   HENT:      Head: Atraumatic.      Mouth/Throat:      Mouth: Mucous membranes are moist.      Pharynx: Oropharynx is clear.   Eyes:      General: No scleral icterus.     Conjunctiva/sclera: Conjunctivae normal.      Pupils: Pupils are equal, round, and reactive to light.   Cardiovascular:      Rate and Rhythm: Normal rate and regular rhythm.      Heart sounds: Murmur heard.   Pulmonary:      Effort: No respiratory distress.      Breath sounds: No wheezing or rhonchi.      Comments: Diminished breath sounds   Abdominal:      General: Abdomen is flat. Bowel sounds are normal.      Palpations: Abdomen is soft.   Musculoskeletal:         General: No swelling or deformity.      Cervical back: No rigidity or tenderness.   Skin:     Coloration: Skin is not jaundiced or pale.      Findings: No bruising, erythema or rash.   Neurological:      General:  No focal deficit present.      Mental Status: She is alert and oriented to person, place, and time.      Cranial Nerves: No cranial nerve deficit.      Sensory: No sensory deficit.      Motor: No weakness.   Psychiatric:         Mood and Affect: Mood normal.         Behavior: Behavior normal.             Significant Labs: All pertinent labs within the past 24 hours have been reviewed.  CBC:   Recent Labs   Lab 12/06/24  0509 12/07/24  0452   WBC 9.65 11.44   HGB 8.0* 7.6*   HCT 25.0* 24.5*    259     CMP:   Recent Labs   Lab 12/06/24  0509 12/07/24  0452    140   K 5.3* 4.8    103   CO2 32* 35*   * 99   BUN 30* 28*   CREATININE 0.8 0.7   CALCIUM 9.0 8.8   PROT 6.8 6.6   ALBUMIN 3.7 3.6   BILITOT 0.7 0.7   ALKPHOS 75 79   AST 32 35   ALT 12 14   ANIONGAP 3* 2*       Significant Imaging: I have reviewed all pertinent imaging results/findings within the past 24 hours.

## 2024-12-07 NOTE — PROGRESS NOTES
[facility] Medicine  Progress Note    Patient Name: Lisa Smith  MRN: [id number]  Patient Class: IP- Inpatient   Admission Date: 12/5/2024  Length of Stay: 2 days  Attending Physician: Tarah Cassidy MD  Primary Care Provider: Hope Tang FNP        Subjective     Principal Problem:<principal problem not specified>        HPI:  The patient is a 74-year-old female with a past medical history of COPD (maintained on 3 L nasal cannula at home), paroxysmal AFib on Coumadin, status post TAVR, heart failure with preserved ejection fraction, anemia who presents to the emergency department for the evaluation of epistaxis, productive cough and shortness of breath.  Patient reports that over the past few days she has been having some epistaxis as well as upper respiratory infection symptoms with productive cough, subjective fever and chills, and increased dyspnea.  She denies recent sick contacts or recent travel.  The patient reports that she was recently given a blood transfusion for anemia that has been followed outpatient by her PCP.  Patient reports that since she has been on Coumadin she has been suffering with anemia.  She is scheduled to have a GI workup for upper and lower scopes to evaluate for causes of bleeding as well as bone marrow biopsy per heme Onc as well as a workup for possible Watchman device to transition off Coumadin.  Patient was evaluated in the emergency department.  Hemoglobin 8.4 (was 7 prior to blood transfusion on 12/03/2024), white blood cell count 10.8, renal function within normal limits.  ABG revealed pH of 7.4, PO2 of 77, CO2 of 44 and bicarb of 27.  Flu and COVID negative.  Chest x-ray with bilateral diffuse interstitial opacities worse in the right lung consistent with possible superimposed pneumonia.  Lactic acid within normal limits, BNP mildly elevated 309.  PT 17.6 INR 1.6.  Afrin was placed to sterile gauze to left Landeros with some improvement in  hemostasis.  Patient was initiated on cefepime and vancomycin per ER MD.  Patient admitted to the hospital medicine service with pulmonary and cardiology consultation for further evaluation.    Overview/Hospital Course:  74-year-old female with COPD on 3 L by nasal cannula, paroxysmal AFib on Coumadin, prior TAVR who has had multiple episodes of significant epistaxis presents with recurrent epistaxis in addition to feeling globally unwell with worsened cough, shortness of breath and wheezing for roughly 5 days consistent with an acute COPD exacerbation compounded by heart failure decompensation after receiving an outpatient transfusion on 12/4.    Regarding COPD exacerbation, the patient has been placed on Solu-Medrol, doxycycline and scheduled nebulizers.  Regarding her heart failure decompensation, she is on IV Lasix and is currently diuresing well.  Supplemental oxygen requirements have stabilized and she is beginning to feel better.      Regarding her nosebleed, Afrin did not lead to resolution of the ER.  A rhino rocket was placed.     Cardiology has been discussing a Watchman procedure with the patient and potentially discontinuing anticoagulation.    Interval History: her oxygen requirements went up to 7 L this morning. Patient denies any worsening symptoms. She has not had BM for 4 days. Patient also expressed concerned about not getting the BM biopsy that is being scheduled outpatient. Advised that this is a decision for her hematologist.     Review of Systems  Objective:     Vital Signs (Most Recent):  Temp: 97.8 °F (36.6 °C) (12/07/24 1158)  Pulse: 60 (12/07/24 1158)  Resp: 17 (12/07/24 1158)  BP: 130/65 (12/07/24 1158)  SpO2: 100 % (12/07/24 1158) Vital Signs (24h Range):  Temp:  [97.4 °F (36.3 °C)-98.4 °F (36.9 °C)] 97.8 °F (36.6 °C)  Pulse:  [58-84] 60  Resp:  [16-18] 17  SpO2:  [90 %-100 %] 100 %  BP: (130-162)/(53-66) 130/65     Weight: 78.4 kg (172 lb 13.5 oz)  Body mass index is 28.76  kg/m².    Intake/Output Summary (Last 24 hours) at 12/7/2024 1240  Last data filed at 12/7/2024 0926  Gross per 24 hour   Intake 360 ml   Output 1800 ml   Net -1440 ml         Physical Exam  Vitals and nursing note reviewed.   Constitutional:       General: She is not in acute distress.     Appearance: She is not toxic-appearing.   HENT:      Head: Atraumatic.      Mouth/Throat:      Mouth: Mucous membranes are moist.      Pharynx: Oropharynx is clear.   Eyes:      General: No scleral icterus.     Conjunctiva/sclera: Conjunctivae normal.      Pupils: Pupils are equal, round, and reactive to light.   Cardiovascular:      Rate and Rhythm: Normal rate and regular rhythm.      Heart sounds: Murmur heard.   Pulmonary:      Effort: No respiratory distress.      Breath sounds: No wheezing or rhonchi.      Comments: Diminished breath sounds   Abdominal:      General: Abdomen is flat. Bowel sounds are normal.      Palpations: Abdomen is soft.   Musculoskeletal:         General: No swelling or deformity.      Cervical back: No rigidity or tenderness.   Skin:     Coloration: Skin is not jaundiced or pale.      Findings: No bruising, erythema or rash.   Neurological:      General: No focal deficit present.      Mental Status: She is alert and oriented to person, place, and time.      Cranial Nerves: No cranial nerve deficit.      Sensory: No sensory deficit.      Motor: No weakness.   Psychiatric:         Mood and Affect: Mood normal.         Behavior: Behavior normal.             Significant Labs: All pertinent labs within the past 24 hours have been reviewed.  CBC:   Recent Labs   Lab 12/06/24  0509 12/07/24  0452   WBC 9.65 11.44   HGB 8.0* 7.6*   HCT 25.0* 24.5*    259     CMP:   Recent Labs   Lab 12/06/24  0509 12/07/24  0452    140   K 5.3* 4.8    103   CO2 32* 35*   * 99   BUN 30* 28*   CREATININE 0.8 0.7   CALCIUM 9.0 8.8   PROT 6.8 6.6   ALBUMIN 3.7 3.6   BILITOT 0.7 0.7   ALKPHOS 75 79   AST  32 35   ALT 12 14   ANIONGAP 3* 2*       Significant Imaging: I have reviewed all pertinent imaging results/findings within the past 24 hours.    Assessment and Plan     Acute on chronic heart failure with preserved ejection fraction (HFpEF)  Patient has Diastolic (HFpEF) heart failure that is Acute on chronic. On presentation their CHF was decompensated. Evidence of decompensated CHF on presentation includes: edema and shortness of breath  Most recent BNP and echo results are listed below.  Recent Labs     12/05/24  0816   *     Latest ECHO  Results for orders placed during the hospital encounter of 12/03/24    Echo    Interpretation Summary    Left Ventricle: The left ventricle is normal in size. Normal wall thickness. There is normal systolic function with a visually estimated ejection fraction of 55 - 60%. Unable to assess diastolic function due to atrial fibrillation.    Right Ventricle: Normal right ventricular cavity size. Wall thickness is normal. Systolic function is normal. Pacemaker lead present in the ventricle.    Left Atrium: Left atrium is mildly dilated.    Right Atrium: Right atrium is mildly dilated.    Aortic Valve: There is a transcatheter valve replacement in the aortic position. It is reported to be a 26 mm Jauregui valve. There is mild stenosis. Aortic valve area by VTI is 1.5 cm². Aortic valve peak velocity is 3.6 m/s. Mean gradient is 28.0 mmHg. The dimensionless index is 0.43. There is mild to moderate aortic regurgitation with an eccentrically directed jet.    Mitral Valve: There is bileaflet sclerosis. There is mitral annular calcification present. There is mild regurgitation.    Tricuspid Valve: There is mild to moderate regurgitation.    IVC/SVC: Normal venous pressure at 3 mmHg.    Current Heart Failure Medications  digoxin tablet 0.25 mg, Daily, Oral  metoprolol succinate (TOPROL-XL) 24 hr tablet 25 mg, Every morning, Oral  furosemide injection 40 mg, Daily,  Intravenous    Plan  - Monitor strict I&Os and daily weights.    - Place on telemetry  - Low sodium diet  - Place on fluid restriction of 1.5 L.         Epistaxis  Patient presented with recent history of epistaxis  No trauma  Coumadin held at this time  Coags noted, PT 17.6 INR 1.6  Afrin placed to nares with gauze  Rhino rocket placed by ER physician           Regarding her nosebleed, Afrin did not lead to resolution of the ER.  A rhino rocket was placed. H/h stable. Critical care physician following for assistance with timing of removal  Coumadin held           Anemia  Anemia is likely due to acute blood loss which was from epistaxis . Most recent hemoglobin and hematocrit are listed below.  Recent Labs     12/02/24  1300 12/05/24  0807 12/05/24  0816   HGB 7.0*  --  8.4*   HCT 22.8* 26* 25.6*     Plan  - Monitor serial CBC: Every 12 hours  - Transfuse PRBC if patient becomes hemodynamically unstable, symptomatic or H/H drops below 7/21.  - Patient has received 2 units of PRBCs on 12/3/24  - Patient's anemia is currently stable  - continue to trend and monitor blood count, transfuse as needed, holding anticoagulation at this time in setting of epistaxis  - patient to have outpatient upper and lower scope for evaluation with GI as well as workup with Hematology Oncology outpatient    Heart failure with improved ejection fraction (HFimpEF)    Patient is identified as having Diastolic (HFpEF) heart failure that is Acute on  Chronic. CHF is currently controlled. Latest ECHO performed and demonstrates- Results for orders placed during the hospital encounter of 12/03/24    Echo    Interpretation Summary    Left Ventricle: The left ventricle is normal in size. Normal wall thickness. There is normal systolic function with a visually estimated ejection fraction of 55 - 60%. Unable to assess diastolic function due to atrial fibrillation.    Right Ventricle: Normal right ventricular cavity size. Wall thickness is normal.  Systolic function is normal. Pacemaker lead present in the ventricle.    Left Atrium: Left atrium is mildly dilated.    Right Atrium: Right atrium is mildly dilated.    Aortic Valve: There is a transcatheter valve replacement in the aortic position. It is reported to be a 26 mm Jauregui valve. There is mild stenosis. Aortic valve area by VTI is 1.5 cm². Aortic valve peak velocity is 3.6 m/s. Mean gradient is 28.0 mmHg. The dimensionless index is 0.43. There is mild to moderate aortic regurgitation with an eccentrically directed jet.    Mitral Valve: There is bileaflet sclerosis. There is mitral annular calcification present. There is mild regurgitation.    Tricuspid Valve: There is mild to moderate regurgitation.    IVC/SVC: Normal venous pressure at 3 mmHg.  . Continue Beta Blocker Furosemide Entresto and monitor clinical status closely. Monitor on telemetry. Patient is on CHF pathway.  Monitor strict Is&Os and daily weights.  Place on fluid restriction of 1.5 L. Cardiology is consulted. Continue to stress to patient importance of self efficacy and  on diet for CHF. Last BNP reviewed- and noted below   Recent Labs   Lab 12/05/24  0816   *     .  Recent echo December 3, 2024 with EF 55-60%, mild AS with TAVR, mild MR, moderate TR    Diuresing well. Continue IV Lasix       Atrial fibrillation  Patient has paroxysmal (<7 days) atrial fibrillation. Patient is currently in sinus rhythm. [id number] Score: 4. The patients heart rate in the last 24 hours is as follows:  Pulse  Min: 58  Max: 84     Antiarrhythmics  metoprolol succinate (TOPROL-XL) 24 hr tablet 25 mg, Every morning, Oral    Anticoagulants       Plan  - Replete lytes with a goal of K>4, Mg >2  - Patient is anticoagulated, see medications listed above.  - Patient's afib is currently controlled  - holding Coumadin at this time due to epistaxis and anemia  - Planning for outpatient watchman device         Pneumonia  Ruled out.    COPD (chronic  obstructive pulmonary disease)  Acute exacerbation   Acute on chronic hypoxic respiratory failure   - change PO Prednisone to IV Solumedrol   - Increase Duoneb to Q4H   - wean oxygen   - Cont PO Azithromycin   - Cont LABA and Pulmicort nebulizer         VTE Risk Mitigation (From admission, onward)           Ordered     IP VTE HIGH RISK PATIENT  Once         12/05/24 1259     Place sequential compression device  Until discontinued         12/05/24 1259     Place sequential compression device  Until discontinued         12/05/24 1115                    Discharge Planning   PILI: 12/7/2024     Code Status: Full Code   Medical Readiness for Discharge Date:   Discharge Plan A: Home Health                Please place Justification for DME        Tarah Cassidy MD  Department of Hospital Medicine   [facility]

## 2024-12-07 NOTE — PT/OT/SLP EVAL
"Physical Therapy Evaluation    Patient Name:  Lisa Smith   MRN:  [id number]    Recommendations:     Discharge Recommendations: Low Intensity Therapy   Discharge Equipment Recommendations: walker, rolling, bath bench    Lisa's mobility limitation cannot be sufficiently resolved by the use of a cane. Her functional mobility deficit can be sufficiently resolved with the use of a Rolling Walker. Patient's mobility limitation significantly impairs their ability to participate in one of more activities of daily living.  The use of a RW will significantly improve the patient's ability to participate in MRADLS and the patient will use it on regular basis in the home.   Barriers to discharge:  lives alone, medical status    Assessment:     Lisa Smith is a 74 y.o. female admitted with a medical diagnosis of <principal problem not specified>.  She presents with the following impairments/functional limitations: weakness, impaired endurance, impaired self care skills, gait instability, impaired balance, impaired cardiopulmonary response to activity, edema.  Arrived to pt's room and she is A & Ox4 and agreeable to PT assessment.  She is coughing intermittently throughout evaluation, daughter present at bedside to assist pt as needed.  Pt requires Andres for bed mobility and safety with STS. Gait training performed with RW and CGA, requires Vcs for posture. Pt with low endurance and elevated oxygen needs, at rest prior to PT noted sats 85% on 7L which pt and daughter report as "normal" this admission.  Sats decreased to 81% with minimal activity on 7L O2.  RN arrived to room with MD at end of session and informed of desats at rest and with activity.  Continue to progress as able.  Recommend OOB as much as possible/tolerated by pt for improved respiratory status.  Recommend low intensity therapy following discharge form acute care setting for max return to PLOF.    Rehab Prognosis: Good; patient would benefit from acute " "skilled PT services to address these deficits and reach maximum level of function.    Recent Surgery: * No surgery found *      Plan:     During this hospitalization, patient to be seen 5 x/week to address the identified rehab impairments via gait training, therapeutic activities, therapeutic exercises and progress toward the following goals:    Plan of Care Expires:       Subjective     Chief Complaint: weakness, SOB, cough  Patient/Family Comments/goals: get stronger, go home  Pain/Comfort:  Pain Rating 1: 0/10    Patients cultural, spiritual, Judaism conflicts given the current situation:      Living Environment:  Pt lives alone in [facility] with small MIRTHA. Tub/shower combo in guest bathroom, "steps up" to her tub in master.  Prior to admission, patients level of function was modified indep with use of intermittent external support, however no reliable and appropriate AD available at home.  Equipment used at home: oxygen (reports previously used her  husbands RW, however is old and does not function properly therefore fall hazard).  DME owned (not currently used): none.  Upon discharge, patient will have assistance from family, friends.    Objective:     Communicated with RNChioma prior to session.  Patient found HOB elevated with telemetry, bed alarm, peripheral IV, oxygen  upon PT entry to room.    General Precautions: Standard, hearing impaired, fall  Orthopedic Precautions:    Braces:    Respiratory Status: Nasal cannula, flow 7 L/min sats 84-85% at rest on 7L O2 via nc, per pt and daughter sats have been 70's-90% during current hospitalization    Exams:  Cognitive Exam:  Patient is oriented to Person, Place, Time, and Situation  Postural Exam:  Patient presented with the following abnormalities:    -       Rounded shoulders  -       Forward head  RLE Strength: 4/5  LLE Strength: 4/5    Functional Mobility:  Bed Mobility:     Supine to Sit: minimum assistance  Sit to Supine: minimum " assistance  Transfers:     Sit to Stand:  minimum assistance with rolling walker  Bed to Chair: contact guard assistance with  rolling walker  using  Step Transfer  Gait: 4x15' w/ RW for support and safety, CGA for safety and balance as well as management of lines and tubes.      AM-PAC 6 CLICK MOBILITY  Total Score:17       Treatment & Education:  Pt was educated on the following: call light use, importance of OOB activity and functional mobility to negate the negative effects of prolonged bed rest during this hospitalization, safe transfers/ambulation and discharge planning recommendations/options.     Patient left up in chair with all lines intact, call button in reach, RN notified, daughter present, and RN notified of pt UIC and needs assist BTB after lunch .    GOALS:   Multidisciplinary Problems       Physical Therapy Goals          Problem: Physical Therapy    Goal Priority Disciplines Outcome Interventions   Physical Therapy Goal     PT, PT/OT Progressing    Description: All Physical therapy goals to be met by discharge:    1. Supine to sit with Stand-by Assistance  2. Sit to stand transfer with Stand-by Assistance  3.. Bed to chair transfer with Supervision   4. Gait  x 200 feet with supervision only, no AD                            History:     Past Medical History:   Diagnosis Date    Anticoagulant long-term use     Arthritis     Atrial fibrillation     Bell's palsy     Boil of buttock     burst 12/19/22    CHF (congestive heart failure)     Chronic cough     COPD (chronic obstructive pulmonary disease)     use O2  3l/m NC at night; also taking during day currently 12/4/23    Dizziness     Encounter for blood transfusion     GI bleed 2011    transfusion    H/O diverticulitis of colon     Hard of hearing     Heart murmur     Hematoma 02/2023    left hand    Heterozygous alpha 1-antitrypsin deficiency     History of home oxygen therapy     3L NC at night    Hypertension     Lung disease     copd    MONSERRAT  (obstructive sleep apnea)     resolved with wt loss 131#    Pacemaker     Pneumonia     last episode 2019    Pulmonary edema     Skin cancer     Unspecified visual disturbance     episode of vision disturbance and dizziness...occasional recurrences       Past Surgical History:   Procedure Laterality Date    CARDIAC ELECTROPHYSIOLOGY STUDY AND ABLATION      CAROTID ENDARTERECTOMY Left 12/22/2022    Procedure: ENDARTERECTOMY-CAROTID;  Surgeon: Maximilian Connolly MD;  Location: [facility] OR;  Service: Peripheral Vascular;  Laterality: Left;    CAROTID STENT Left 2/29/2024    Procedure: INSERTION, STENT, ARTERY, CAROTID;  Surgeon: CIRILO Valdivia III, MD;  Location: [facility] OR [address] [place] [address];  Service: Vascular;  Laterality: Left;  left carotid artery stent placement  mgy  146.29   gymc2  8.0730  fluro time  4.8min    CARPAL TUNNEL RELEASE Left     CHOLECYSTECTOMY      EYE SURGERY Bilateral     cataract    HYSTERECTOMY      INSERT / REPLACE / REMOVE PACEMAKER      KNEE ARTHROSCOPY Left     LEFT HEART CATHETERIZATION  11/1/2023    Procedure: Left heart cath;  Surgeon: Puma Shelton MD;  Location: [facility] CATH;  Service: Cardiology;;    REPLACEMENT OF PACEMAKER GENERATOR Left 01/20/2022    Procedure: REPLACEMENT, PACEMAKER GENERATOR;  Surgeon: Raymond Pérez MD;  Location: [facility] CATH/EP LAB;  Service: Cardiology;  Laterality: Left;    SKIN CANCER EXCISION      TRANSCATHETER AORTIC VALVE REPLACEMENT (TAVR) Bilateral 12/6/2023    Procedure: (TAVR);  Surgeon: Puma Shelton MD;  Location: [facility] CATH;  Service: Cardiology;  Laterality: Bilateral;    TRANSCATHETER AORTIC VALVE REPLACEMENT (TAVR) Bilateral 12/6/2023    Procedure: (TAVR) - Surgeon;  Surgeon: Maximilian Connolly MD;  Location: [facility] CATH;  Service: Peripheral Vascular;  Laterality: Bilateral;       Time Tracking:     PT Received On: 12/07/24  PT Start Time: 1118     PT Stop Time: 1137  PT Total Time (min): 19 min     Billable Minutes: Evaluation 9 and Gait Training  10      12/07/2024

## 2024-12-07 NOTE — NURSING
Patient has oxygen via nasal cannula through one nostril, related to rhino rocket in other nostril. Attempted to apply oxymask for more comfort, patient states oxymask is not comfortable and does not wish to wear it. Patient states she wants to continue with nasal cannula in one nostril for now.

## 2024-12-07 NOTE — CARE UPDATE
12/06/24 1909   Patient Assessment/Suction   Level of Consciousness (AVPU) alert   Respiratory Effort Unlabored   Expansion/Accessory Muscles/Retractions no retractions;no use of accessory muscles   All Lung Fields Breath Sounds Anterior:;Lateral:;diminished   Rhythm/Pattern, Respiratory no shortness of breath reported   PRE-TX-O2   Device (Oxygen Therapy) high flow nasal cannula   Flow (L/min) (Oxygen Therapy) 7   SpO2 (!) 93 %   Pulse Oximetry Type Intermittent   $ Pulse Oximetry - Multiple Charge Pulse Oximetry - Multiple   Pulse 62   Resp 18   Aerosol Therapy   $ Aerosol Therapy Charges Aerosol Treatment   Daily Review of Necessity (SVN) completed   Respiratory Treatment Status (SVN) given   Treatment Route (SVN) mask;oxygen   Patient Position HOB elevated   Post Treatment Assessment (SVN) breath sounds improved   Signs of Intolerance (SVN) none   Breath Sounds Post-Respiratory Treatment   Throughout All Fields Post-Treatment All Fields   Throughout All Fields Post-Treatment aeration increased   Post-treatment Heart Rate (beats/min) 65   Post-treatment Resp Rate (breaths/min) 18   Education   $ Education Bronchodilator;Oxygen;15 min

## 2024-12-07 NOTE — PROGRESS NOTES
Pulmonary/Critical Care progress note      PATIENT NAME: Lisa Smith  MRN: [id number]  TODAY'S DATE: 2024  4:31 PM  ADMIT DATE: 2024  AGE: 74 y.o. : 1950    CONSULT REQUESTED BY: Tarah Cassidy MD    REASON FOR CONSULT:   Increased shortness of breath    HPI:  The patient is a 74-year-old female with COPD and bronchiectasis and chronic respiratory failure who began feeling poorly around .  She has also been very anemic with Coumadin which she has taking since she has had her TAVR.  She was transfused 2 units packed red blood cells on Tuesday.  Overnight she became so short of breath she asked to come into the hospital.  The patient has moderate obstruction on her PFTs she has PFTs pending at this time.    The patient's chest x-ray is basically stable.  However, she has multi phonic wheezing with a prolonged expiratory phase consistent with a COPD exacerbation.     the patient is a bit distressed because she was told her rhino rocket would have to stay until next week.  We will discontinue it tomorrow.  That will be 3 days.  She is currently on 7 L of oxygen because she is only getting oxygen in through 1 nostril.  She is receiving Lasix and is diuresing.     the patient's rhino rocket was removed.  So far so good.  She has been instructed not to blow her nose.  She is needing 4-5 L of oxygen to oxygenate.    REVIEW OF SYSTEMS  GENERAL: Feeling better with nasal tampon removed  EYES: Vision is good.   ENT:  She has rhinitis and epistaxis  HEART: No chest pain or palpitations.  LUNGS:  She is coughing a great deal.  She is still short of breath  GI: No Nausea, vomiting, constipation, diarrhea, or reflux.  : No dysuria, hesitancy, or nocturia.  SKIN: No lesions or rashes.  MUSCULOSKELETAL: No joint pain or myalgias.  NEURO: No headaches or neuropathy.  LYMPH: No edema or adenopathy.  PSYCH: No anxiety or depression.  ENDO: No weight change.    No change in the  patient's Past Medical History, Past Surgical History, Social History or Family History since admission.      VITAL SIGNS (MOST RECENT)  Temp: 97.6 °F (36.4 °C) (12/07/24 0738)  Pulse: 60 (12/07/24 1158)  Resp: 18 (12/07/24 1149)  BP: 130/65 (12/07/24 1158)  SpO2: 100 % (12/07/24 1158)    INTAKE AND OUTPUT (LAST 24 HOURS):  Intake/Output Summary (Last 24 hours) at 12/7/2024 1209  Last data filed at 12/7/2024 0926  Gross per 24 hour   Intake 360 ml   Output 1800 ml   Net -1440 ml       WEIGHT  Wt Readings from Last 1 Encounters:   12/06/24 78.4 kg (172 lb 13.5 oz)       PHYSICAL EXAM  GENERAL: Older patient in no distress.  HEENT: Pupils equal and reactive. Extraocular movements intact. Nose intact.. Pharynx moist.  NECK: Supple.   HEART: Regular rate and rhythm. No murmur or gallop auscultated.  LUNGS:  The lungs still have some mild wheezes.  Lung excursion symmetrical. No change in fremitus.   ABDOMEN: Bowel sounds present. Non-tender, no masses palpated.  : Normal anatomy.  EXTREMITIES: Normal muscle tone and joint movement, no cyanosis or clubbing.   LYMPHATICS: No adenopathy palpated, no edema.  SKIN: Dry, intact, no lesions.   NEURO: Cranial nerves II-XII intact. Motor strength 5/5 bilaterally, upper and lower extremities.  PSYCH: Appropriate affect      CBC LAST (LAST 24 HOURS)  Recent Labs   Lab 12/07/24  0452   WBC 11.44   RBC 2.57*   HGB 7.6*   HCT 24.5*   MCV 95   MCH 29.6   MCHC 31.0*   RDW 18.1*      MPV 10.2   GRAN 81.1*  9.3*   LYMPH 7.5*  0.9*   MONO 10.1  1.2*   BASO 0.01   NRBC 0       CHEMISTRY LAST (LAST 24 HOURS)  Recent Labs   Lab 12/07/24  0452      K 4.8      CO2 35*   ANIONGAP 2*   BUN 28*   CREATININE 0.7   GLU 99   CALCIUM 8.8   MG 2.1   ALBUMIN 3.6   PROT 6.6   ALKPHOS 79   ALT 14   AST 35   BILITOT 0.7       COAGULATION LAST (LAST 24 HOURS)  Recent Labs   Lab 12/07/24  0452   INR 1.8*       CARDIAC PROFILE (LAST 24 HOURS)  Recent Labs   Lab 12/05/24  0816   BNP  301*       LAST 7 DAYS MICROBIOLOGY   Microbiology Results (last 7 days)       Procedure Component Value Units Date/Time    Blood culture x two cultures. Draw prior to antibiotics. [[id number]] Collected: 12/05/24 0823    Order Status: Completed Specimen: Blood from Peripheral, Antecubital, Right Updated: 12/07/24 1032     Blood Culture, Routine No Growth to date      No Growth to date      No Growth to date    Narrative:      Aerobic and anaerobic    Blood culture x two cultures. Draw prior to antibiotics. [[id number]] Collected: 12/05/24 0813    Order Status: Completed Specimen: Blood from Peripheral, Antecubital, Right Updated: 12/07/24 1032     Blood Culture, Routine No Growth to date      No Growth to date      No Growth to date    Narrative:      Aerobic and anaerobic  Collection has been rescheduled by [facility] at 12/05/2024 08:23 Reason:   DONE.   Collection has been rescheduled by [facility] at 12/05/2024 08:23 Reason:   DONE.     Respiratory Infection Panel (PCR), Nasopharyngeal [[id number]] Collected: 12/07/24 0901    Order Status: Completed Specimen: Nasopharyngeal Swab Updated: 12/07/24 1030     Respiratory Infection Panel Source NP swab     Adenovirus Not Detected     Coronavirus 229E, Common Cold Virus Not Detected     Coronavirus HKU1, Common Cold Virus Not Detected     Coronavirus NL63, Common Cold Virus Not Detected     Coronavirus OC43, Common Cold Virus Not Detected     Comment: Coronavirus strains 229E, HKU1, NL63, and OC43 can cause the common   cold   and are not associated with the respiratory disease outbreak caused   by  the COVID-19 (SARS-CoV-2 novel Coronavirus) strain.           SARS-CoV2 (COVID-19) Qualitative PCR Not Detected     Human Metapneumovirus Not Detected     Human Rhinovirus/Enterovirus Not Detected     Influenza A (subtypes H1, H1-2009,H3) Not Detected     Influenza B Not Detected     Parainfluenza Virus 1 Not Detected     Parainfluenza Virus 2 Not Detected     Parainfluenza Virus 3 Not  Detected     Parainfluenza Virus 4 Not Detected     Respiratory Syncytial Virus Not Detected     Bordetella Parapertussis (VF3871) Not Detected     Bordetella pertussis (ptxP) Not Detected     Chlamydia pneumoniae Not Detected     Mycoplasma pneumoniae Not Detected     Comment: Respiratory Infection Panel testing performed by Multiplex PCR.       Narrative:      Respiratory Infection Panel source->NP Swab            MOST RECENT IMAGING  X-Ray Chest 1 View  Narrative: EXAMINATION:  XR CHEST 1 VIEW    CLINICAL HISTORY:  Chronic interstitial changes and bronchiectasis;    TECHNIQUE:  Erect AP view of the chest.    COMPARISON:  12/07/2024    FINDINGS:  Single lead pacemaker device projects over the patient's left lower chest wall with the distal lead projecting over the right atrium.  The cardiomediastinal silhouette appears globally enlarged appears similar patient's previous imaging study.  Diffuse interstitial and vascular prominence is again redemonstrated related to pulmonary edema, although may have overlap relative to underlying pulmonary infection.  No significant change.  Deployment of a metallic stent across the aortic valve plane is noted.  Bilateral glenohumeral joint osteoarthritis.  Impression: Factors that are in keeping with mild pulmonary congestion/edema without detrimental change upon comparison.    Electronically signed by: Ian Carbajal MD  Date:    12/07/2024  Time:    08:54  X-Ray Chest 1 View  Narrative: EXAMINATION:  XR CHEST 1 VIEW    CLINICAL HISTORY:  Chronic interstitial changes and bronchiectasis;    TECHNIQUE:  Single frontal view of the chest was performed.    COMPARISON:  12/06/2024    FINDINGS:  Enlarged cardiac silhouette with pulmonary vascular congestion.  Diffuse interstitial prominence right greater than left representing edema or pneumonitis.  Small to moderate right and small left pleural effusions.  Left chest wall cardiac pacemaker.  Status post aortic valve  replacement.  Impression: As above.    Electronically signed by: Jim Joy  Date:    12/07/2024  Time:    01:58      CURRENT VISIT EKG  Results for orders placed or performed during the hospital encounter of 12/05/24   EKG 12-lead   Result Value Ref Range    QRS Duration 152 ms    [facility] QTC Calculation 416 ms    Narrative    Test Reason : R07.9,    Vent. Rate :  61 BPM     Atrial Rate : 357 BPM     P-R Int :    ms          QRS Dur : 152 ms      QT Int : 414 ms       P-R-T Axes :    -72  98 degrees    QTcB Int : 416 ms    Ventricular-paced rhythm  Abnormal ECG  When compared with ECG of 14-Mar-2024 10:33,  No significant change was found    Referred By: AAAREFERRAL SELF           Confirmed By:        ECHOCARDIOGRAM RESULTS  Results for orders placed during the hospital encounter of 12/03/24    Echo    Interpretation Summary    Left Ventricle: The left ventricle is normal in size. Normal wall thickness. There is normal systolic function with a visually estimated ejection fraction of 55 - 60%. Unable to assess diastolic function due to atrial fibrillation.    Right Ventricle: Normal right ventricular cavity size. Wall thickness is normal. Systolic function is normal. Pacemaker lead present in the ventricle.    Left Atrium: Left atrium is mildly dilated.    Right Atrium: Right atrium is mildly dilated.    Aortic Valve: There is a transcatheter valve replacement in the aortic position. It is reported to be a 26 mm Jauregui valve. There is mild stenosis. Aortic valve area by VTI is 1.5 cm². Aortic valve peak velocity is 3.6 m/s. Mean gradient is 28.0 mmHg. The dimensionless index is 0.43. There is mild to moderate aortic regurgitation with an eccentrically directed jet.    Mitral Valve: There is bileaflet sclerosis. There is mitral annular calcification present. There is mild regurgitation.    Tricuspid Valve: There is mild to moderate regurgitation.    IVC/SVC: Normal venous pressure at 3 mmHg.        Oxygen  INFORMATION   5 L via OxyMask           LAST ARTERIAL BLOOD GAS  ABG  Recent Labs   Lab 12/05/24  0807   PH 7.402   PO2 77*   PCO2 44.4   HCO3 27.6   BE 3*       IMPRESSION AND PLAN  COPD exacerbation  - maximum bronchodilators  - receiving 40 mg prednisone  - azithromycin for its anti-inflammatory properties  - hopefully can start weaning things tomorrow  Chronic hypoxic respiratory failure  - hopefully can wean now that the rhino rocket is out  Epistaxis  - appears to have settled  Anemia  - worsening  Paroxysmal atrial fibrillation  - patient will be started on Eliquis, she has been unable to tolerate Coumadin      Will follow with you    Kathryn Haro MD  Date of Service: 12/07/2024  4:31 PM

## 2024-12-07 NOTE — ASSESSMENT & PLAN NOTE
Patient has Diastolic (HFpEF) heart failure that is Acute on chronic. On presentation their CHF was decompensated. Evidence of decompensated CHF on presentation includes: edema and shortness of breath  Most recent BNP and echo results are listed below.  Recent Labs     12/05/24  0816   *     Latest ECHO  Results for orders placed during the hospital encounter of 12/03/24    Echo    Interpretation Summary    Left Ventricle: The left ventricle is normal in size. Normal wall thickness. There is normal systolic function with a visually estimated ejection fraction of 55 - 60%. Unable to assess diastolic function due to atrial fibrillation.    Right Ventricle: Normal right ventricular cavity size. Wall thickness is normal. Systolic function is normal. Pacemaker lead present in the ventricle.    Left Atrium: Left atrium is mildly dilated.    Right Atrium: Right atrium is mildly dilated.    Aortic Valve: There is a transcatheter valve replacement in the aortic position. It is reported to be a 26 mm Jauregui valve. There is mild stenosis. Aortic valve area by VTI is 1.5 cm². Aortic valve peak velocity is 3.6 m/s. Mean gradient is 28.0 mmHg. The dimensionless index is 0.43. There is mild to moderate aortic regurgitation with an eccentrically directed jet.    Mitral Valve: There is bileaflet sclerosis. There is mitral annular calcification present. There is mild regurgitation.    Tricuspid Valve: There is mild to moderate regurgitation.    IVC/SVC: Normal venous pressure at 3 mmHg.    Current Heart Failure Medications  digoxin tablet 0.25 mg, Daily, Oral  metoprolol succinate (TOPROL-XL) 24 hr tablet 25 mg, Every morning, Oral  furosemide injection 40 mg, Daily, Intravenous    Plan  - Monitor strict I&Os and daily weights.    - Place on telemetry  - Low sodium diet  - Place on fluid restriction of 1.5 L.

## 2024-12-07 NOTE — PLAN OF CARE
Problem: COPD (Chronic Obstructive Pulmonary Disease)  Goal: Optimal Chronic Illness Coping  Outcome: Progressing  Goal: Optimal Level of Functional [place]  Outcome: Progressing  Goal: Absence of Infection Signs and Symptoms  Outcome: Progressing  Goal: Improved Oral Intake  Outcome: Progressing  Goal: Effective Oxygenation and Ventilation  Outcome: Progressing     Problem: Adult Inpatient Plan of Care  Goal: Plan of Care Review  Outcome: Progressing  Goal: Patient-Specific Goal (Individualized)  Outcome: Progressing  Goal: Absence of Hospital-Acquired Illness or Injury  Outcome: Progressing  Goal: Optimal Comfort and Wellbeing  Outcome: Progressing  Goal: Readiness for Transition of Care  Outcome: Progressing     Problem: Pneumonia  Goal: Fluid Balance  Outcome: Progressing  Goal: Resolution of Infection Signs and Symptoms  Outcome: Progressing  Goal: Effective Oxygenation and Ventilation  Outcome: Progressing     Problem: Wound  Goal: Optimal Coping  Outcome: Progressing  Goal: Optimal Functional Ability  Outcome: Progressing  Goal: Absence of Infection Signs and Symptoms  Outcome: Progressing  Goal: Improved Oral Intake  Outcome: Progressing  Goal: Optimal Pain Control and Function  Outcome: Progressing  Goal: Skin Health and Integrity  Outcome: Progressing  Goal: Optimal Wound Healing  Outcome: Progressing     Problem: Fall Injury Risk  Goal: Absence of Fall and Fall-Related Injury  Outcome: Progressing     Problem: Fluid Volume Excess  Goal: Fluid Balance  Outcome: Progressing

## 2024-12-07 NOTE — PLAN OF CARE
Problem: Physical Therapy  Goal: Physical Therapy Goal  Description: All Physical therapy goals to be met by discharge:    1. Supine to sit with Stand-by Assistance  2. Sit to stand transfer with Stand-by Assistance  3.. Bed to chair transfer with Supervision   4. Gait  x 200 feet with supervision only, no AD       Outcome: Progressing

## 2024-12-07 NOTE — ASSESSMENT & PLAN NOTE
Patient has paroxysmal (<7 days) atrial fibrillation. Patient is currently in sinus rhythm. [id number] Score: 4. The patients heart rate in the last 24 hours is as follows:  Pulse  Min: 58  Max: 84     Antiarrhythmics  metoprolol succinate (TOPROL-XL) 24 hr tablet 25 mg, Every morning, Oral    Anticoagulants       Plan  - Replete lytes with a goal of K>4, Mg >2  - Patient is anticoagulated, see medications listed above.  - Patient's afib is currently controlled  - holding Coumadin at this time due to epistaxis and anemia  - Planning for outpatient watchman device

## 2024-12-07 NOTE — PROGRESS NOTES
[facility] - Emergency Dept  Department of Cardiology  Consult Note      PATIENT NAME: Lisa Smith  MRN: [id number]  TODAY'S DATE: 12/07/2024  ADMIT DATE: 12/5/2024                          CONSULT REQUESTED BY: Tarah Cassidy MD    SUBJECTIVE     PRINCIPAL PROBLEM: <principal problem not specified>      REASON FOR CONSULT:  CAD  AFIB    INTERVAL HISTORY:  12/07/2024:    Patient feels better she has less short of breath nasal packing still in place  ENT intervention tentatively for Monday  No new bleeding tendencies noted  Congested cough is still persisting with scant sputum production    12/06/2024    Patient states she is feeling sightly better and than ENT is coming to address her nose today.  INR 2.2.  Hg 8.0  CXR reviewed  Coughing green/yellow sputum.    HPI:    Ms Smith is a 74 year old female patient known to us. She has a PMH significant for  COPD (maintained on 3 L nasal cannula at home), paroxysmal AFib on Coumadin, status post TAVR, heart failure with preserved ejection fraction, anemia who presents to the emergency department for the evaluation of epistaxis, productive cough and shortness of breath. She was previously tried on eliquis however could not afford this. We have set her up with Dr. Cantu for possible watchman. Patient had to get 2 units of PRBCs yesterday and nose bleeding started again and would not stop so came to ER.        EKG                    CXR 12/6/2024 12/5/2024          FROM H AND P                 Shortness of Breath     Cough         HPI: The patient is a 74-year-old female with a past medical history of COPD (maintained on 3 L nasal cannula at home), paroxysmal AFib on Coumadin, status post TAVR, heart failure with preserved ejection fraction, anemia who presents to the emergency department for the evaluation of epistaxis, productive cough and shortness of breath.  Patient reports that over the past few days she has been having some epistaxis  as well as upper respiratory infection symptoms with productive cough, subjective fever and chills, and increased dyspnea.  She denies recent sick contacts or recent travel.  The patient reports that she was recently given a blood transfusion for anemia that has been followed outpatient by her PCP.  Patient reports that since she has been on Coumadin she has been suffering with anemia.  She is scheduled to have a GI workup for upper and lower scopes to evaluate for causes of bleeding as well as bone marrow biopsy per heme Onc as well as a workup for possible Watchman device to transition off Coumadin.  Patient was evaluated in the emergency department.  Hemoglobin 8.4 (was 7 prior to blood transfusion on 12/03/2024), white blood cell count 10.8, renal function within normal limits.  ABG revealed pH of 7.4, PO2 of 77, CO2 of 44 and bicarb of 27.  Flu and COVID negative.  Chest x-ray with bilateral diffuse interstitial opacities worse in the right lung consistent with possible superimposed pneumonia.  Lactic acid within normal limits, BNP mildly elevated 309.  PT 17.6 INR 1.6.  Afrin was placed to sterile gauze to left Landeros with some improvement in hemostasis.  Patient was initiated on cefepime and vancomycin per ER MD.  Patient admitted to the hospital medicine service with pulmonary and cardiology consultation for further evaluation.      Review of patient's allergies indicates:   Allergen Reactions    Sulfa (sulfonamide antibiotics) Itching       Past Medical History:   Diagnosis Date    Anticoagulant long-term use     Arthritis     Atrial fibrillation     Bell's palsy     Boil of buttock     burst 12/19/22    CHF (congestive heart failure)     Chronic cough     COPD (chronic obstructive pulmonary disease)     use O2  3l/m NC at night; also taking during day currently 12/4/23    Dizziness     Encounter for blood transfusion     GI bleed 2011    transfusion    H/O diverticulitis of colon     Hard of hearing      Heart murmur     Hematoma 02/2023    left hand    Heterozygous alpha 1-antitrypsin deficiency     History of home oxygen therapy     3L NC at night    Hypertension     Lung disease     copd    MONSERRAT (obstructive sleep apnea)     resolved with wt loss 131#    Pacemaker     Pneumonia     last episode 2019    Pulmonary edema     Skin cancer     Unspecified visual disturbance     episode of vision disturbance and dizziness...occasional recurrences     Past Surgical History:   Procedure Laterality Date    CARDIAC ELECTROPHYSIOLOGY STUDY AND ABLATION      CAROTID ENDARTERECTOMY Left 12/22/2022    Procedure: ENDARTERECTOMY-CAROTID;  Surgeon: Maximilian Connolly MD;  Location: [facility] OR;  Service: Peripheral Vascular;  Laterality: Left;    CAROTID STENT Left 2/29/2024    Procedure: INSERTION, STENT, ARTERY, CAROTID;  Surgeon: CIRILO Valdivia III, MD;  Location: [facility] OR [address] [place] [address];  Service: Vascular;  Laterality: Left;  left carotid artery stent placement  mgy  146.29   gymc2  8.0730  fluro time  4.8min    CARPAL TUNNEL RELEASE Left     CHOLECYSTECTOMY      EYE SURGERY Bilateral     cataract    HYSTERECTOMY      INSERT / REPLACE / REMOVE PACEMAKER      KNEE ARTHROSCOPY Left     LEFT HEART CATHETERIZATION  11/1/2023    Procedure: Left heart cath;  Surgeon: Puma Shelton MD;  Location: [facility] CATH;  Service: Cardiology;;    REPLACEMENT OF PACEMAKER GENERATOR Left 01/20/2022    Procedure: REPLACEMENT, PACEMAKER GENERATOR;  Surgeon: Raymond Pérez MD;  Location: [facility] CATH/EP LAB;  Service: Cardiology;  Laterality: Left;    SKIN CANCER EXCISION      TRANSCATHETER AORTIC VALVE REPLACEMENT (TAVR) Bilateral 12/6/2023    Procedure: (TAVR);  Surgeon: Puma Shelton MD;  Location: [facility] CATH;  Service: Cardiology;  Laterality: Bilateral;    TRANSCATHETER AORTIC VALVE REPLACEMENT (TAVR) Bilateral 12/6/2023    Procedure: (TAVR) - Surgeon;  Surgeon: Maximilian Connolly MD;  Location: [facility] CATH;  Service: Peripheral Vascular;  Laterality:  Bilateral;     Social History     Tobacco Use    Smoking status: Former     Current packs/day: 0.00     Average packs/day: 2.0 packs/day for 40.0 years (80.0 ttl pk-yrs)     Types: Cigarettes     Start date: 1971     Quit date: 2011     Years since quittin.8     Passive exposure: Past    Smokeless tobacco: Never    Tobacco comments:          Quit in    Substance Use Topics    Alcohol use: Not Currently     Alcohol/week: 8.0 standard drinks of alcohol     Types: 8 Glasses of wine per week    Drug use: No        REVIEW OF SYSTEMS    As mentioned in HPI    OBJECTIVE     VITAL SIGNS (Most Recent)  Temp: 97.6 °F (36.4 °C) (24)  Pulse: (!) 59 (24)  Resp: 16 (24)  BP: (!) 149/55 (24)  SpO2: 99 % (24)    VENTILATION STATUS  Resp: 16 (24)  SpO2: 99 % (24)           I & O (Last 24H):  Intake/Output Summary (Last 24 hours) at 2024 1038  Last data filed at 2024 0926  Gross per 24 hour   Intake 360 ml   Output 1800 ml   Net -1440 ml       WEIGHTS  Wt Readings from Last 3 Encounters:   24 78.4 kg (172 lb 13.5 oz)   24 1729 76.6 kg (168 lb 15.7 oz)   24 0739 76.7 kg (169 lb)   24 0730 76.7 kg (169 lb)   24 1032 74.4 kg (164 lb)       PHYSICAL EXAM    CONSTITUTIONAL: NAD  HEENT: Normocephalic. + pallor-,nose bleeding with nasal packing  NECK: no JVD  LUNGS:  Wheezing has improved scattered rhonchi bilaterally   HEART: + systolic murmur  ABDOMEN: soft, non-tender, bowel sounds normal  EXTREMITIES: mild edema  SKIN: No rash  NEURO: AAO X 3  PSYCH: normal affect      HOME MEDICATIONS:  No current facility-administered medications on file prior to encounter.     Current Outpatient Medications on File Prior to Encounter   Medication Sig Dispense Refill    acetaminophen (TYLENOL ARTHRITIS ORAL) Take 2 tablets by mouth daily as needed.      albuterol (ACCUNEB) 1.25 mg/3 mL Nebu INHALE THE CONTENTS OF 1  VIAL VIA NEBULIZER EVERY 6 HOURS AS NEEDED FOR RESCUE (Patient taking differently: Take 1.25 mg by nebulization every 6 (six) hours as needed. INHALE THE CONTENTS OF 1 VIAL VIA NEBULIZER EVERY 6 HOURS AS NEEDED FOR RESCUE) 360 mL 5    albuterol (PROVENTIL/VENTOLIN HFA) 90 mcg/actuation inhaler INHALE 2 PUFFS EVERY 6 HOURS AS NEEDED FOR WHEEZING (RESCUE) (Patient taking differently: Inhale 2 puffs into the lungs every 6 (six) hours as needed for Wheezing.) 18 g 6    aspirin (ECOTRIN) 81 MG EC tablet Take 81 mg by mouth once daily.      clopidogreL (PLAVIX) 75 mg tablet Take 1 tablet (75 mg total) by mouth once daily. 30 tablet 11    digoxin (LANOXIN) 250 mcg tablet TAKE 1 TABLET EVERY DAY (Patient taking differently: Take 250 mcg by mouth once daily.) 90 tablet 3    ezetimibe (ZETIA) 10 mg tablet TAKE 1 TABLET ONE TIME DAILY (Patient taking differently: Take 10 mg by mouth once daily.) 90 tablet 3    fluticasone propionate (FLONASE) 50 mcg/actuation nasal spray 1 spray by Each Nostril route daily as needed for Rhinitis or Allergies.      furosemide (LASIX) 20 MG tablet Take 1 tablet (20 mg total) by mouth every morning. 90 tablet 3    metoprolol succinate (TOPROL-XL) 25 MG 24 hr tablet Take 1 tablet (25 mg total) by mouth every morning. 90 tablet 3    pantoprazole (PROTONIX) 40 MG tablet Take 1 tablet (40 mg total) by mouth every morning. 90 tablet 3    promethazine-dextromethorphan (PROMETHAZINE-DM) 6.25-15 mg/5 mL Syrp Take 5 mLs by mouth every 6 (six) hours as needed (cough). 118 mL 0    rOPINIRole (REQUIP) 1 MG tablet Take 1 tablet (1 mg total) by mouth every evening. 90 tablet 1    rosuvastatin (CRESTOR) 40 MG Tab Take 1 tablet (40 mg total) by mouth every evening. (Patient taking differently: Take 40 mg by mouth once daily.) 90 tablet 3    sacubitriL-valsartan (ENTRESTO) 24-26 mg per tablet Take 1 tablet by mouth 2 (two) times daily. 180 tablet 3    spironolactone (ALDACTONE) 25 MG tablet Take 1 tablet (25 mg  total) by mouth every morning. 90 tablet 3    TRELEGY ELLIPTA 100-62.5-25 mcg DsDv INHALE 1 PUFF INTO THE LUNGS ONCE DAILY. (Patient taking differently: Inhale 1 puff into the lungs once daily.) 90 each 3    warfarin (COUMADIN) 3 MG tablet Take 1.5 pills by mouth daily or as directed per the [facility]; #145 pills = 90 day supply (Patient taking differently: Take 4.5 mg by mouth every evening. Take 1.5 pills by mouth daily or as directed per the [facility]; #145 pills = 90 day supply  Except 3 mg on Monday) 145 tablet 2       SCHEDULED MEDS:   albuterol-ipratropium  3 mL Nebulization Q4H    arformoteroL  15 mcg Nebulization BID    azithromycin  500 mg Oral Daily    budesonide  0.5 mg Nebulization Q12H    digoxin  0.25 mg Oral Daily    ezetimibe  10 mg Oral QHS    furosemide (LASIX) injection  40 mg Intravenous Daily    methylPREDNISolone injection (PEDS and ADULTS)  40 mg Intravenous Q6H    metoprolol succinate  25 mg Oral QAM    oxymetazoline  2 spray Topical (Top) BID    pantoprazole  40 mg Oral QAM    rOPINIRole  1 mg Oral QHS       CONTINUOUS INFUSIONS:    PRN MEDS:  Current Facility-Administered Medications:     acetaminophen, 650 mg, Oral, Q8H PRN    acetaminophen, 650 mg, Oral, Q4H PRN    albuterol-ipratropium, 3 mL, Nebulization, Q6H PRN    dextrose 50%, 12.5 g, Intravenous, PRN    dextrose 50%, 25 g, Intravenous, PRN    glucagon (human recombinant), 1 mg, Intramuscular, PRN    glucose, 16 g, Oral, PRN    glucose, 24 g, Oral, PRN    guaiFENesin 100 mg/5 ml, 200 mg, Oral, Q4H PRN    HYDROcodone-acetaminophen, 1 tablet, Oral, Q6H PRN    LORazepam, 0.5 mg, Oral, Q6H PRN    magnesium oxide, 800 mg, Oral, PRN    magnesium oxide, 800 mg, Oral, PRN    melatonin, 9 mg, Oral, Nightly PRN    naloxone, 0.02 mg, Intravenous, PRN    ondansetron, 4 mg, Intravenous, Q12H PRN    potassium bicarbonate, 35 mEq, Oral, PRN    potassium bicarbonate, 50 mEq, Oral, PRN    potassium bicarbonate, 60 mEq, Oral, PRN     "potassium, sodium phosphates, 2 packet, Oral, PRN    potassium, sodium phosphates, 2 packet, Oral, PRN    potassium, sodium phosphates, 2 packet, Oral, PRN    senna-docusate 8.6-50 mg, 1 tablet, Oral, BID PRN    sodium chloride 0.9%, 10 mL, Intravenous, Q12H PRN    sodium chloride 0.9%, 3 mL, Intravenous, PRN    LABS AND DIAGNOSTICS     CBC LAST 3 DAYS  Recent Labs   Lab 12/05/24  0816 12/05/24  1101 12/06/24  0509 12/07/24  0452   WBC 10.88 10.68 9.65 11.44   RBC 2.75* 2.89* 2.66* 2.57*   HGB 8.4* 8.4* 8.0* 7.6*   HCT 25.6* 26.8* 25.0* 24.5*   MCV 93 93 94 95   MCH 30.5 29.1 30.1 29.6   MCHC 32.8 31.3* 32.0 31.0*   RDW 19.6* 19.3* 18.6* 18.1*    250 248 259   MPV 10.8 10.3 10.8 10.2   GRAN 82.2*  8.9*  --  83.2*  8.0* 81.1*  9.3*   LYMPH 5.2*  0.6*  --  3.7*  0.4* 7.5*  0.9*   MONO 11.0  1.2*  --  12.0  1.2* 10.1  1.2*   BASO 0.03  --  0.01 0.01   NRBC 0  --  0 0       COAGULATION LAST 3 DAYS  Recent Labs   Lab 12/05/24  0816 12/06/24  0509 12/07/24  0452   INR 1.6* 2.2* 1.8*       CHEMISTRY LAST 3 DAYS  Recent Labs   Lab 12/05/24  0807 12/05/24  0816 12/06/24  0509 12/07/24  0452   NA  --  136 138 140   K  --  5.1 5.3* 4.8   CL  --  102 103 103   CO2  --  32* 32* 35*   ANIONGAP  --  2* 3* 2*   BUN  --  28* 30* 28*   CREATININE  --  0.7 0.8 0.7   GLU  --  100 134* 99   CALCIUM  --  9.3 9.0 8.8   PH 7.402  --   --   --    MG  --   --  2.2 2.1   ALBUMIN  --  4.3 3.7 3.6   PROT  --  8.0 6.8 6.6   ALKPHOS  --  91 75 79   ALT  --  14 12 14   AST  --  48* 32 35   BILITOT  --  1.1* 0.7 0.7       CARDIAC PROFILE LAST 3 DAYS  Recent Labs   Lab 12/05/24  0816   *       ENDOCRINE LAST 3 DAYS  No results for input(s): "TSH", "PROCAL" in the last 168 hours.    LAST ARTERIAL BLOOD GAS  ABG  Recent Labs   Lab 12/05/24  0807   PH 7.402   PO2 77*   PCO2 44.4   HCO3 27.6   BE 3*       LAST 7 DAYS MICROBIOLOGY   Microbiology Results (last 7 days)       Procedure Component Value Units Date/Time    Blood culture " x two cultures. Draw prior to antibiotics. [[id number]] Collected: 12/05/24 0823    Order Status: Completed Specimen: Blood from Peripheral, Antecubital, Right Updated: 12/07/24 1032     Blood Culture, Routine No Growth to date      No Growth to date      No Growth to date    Narrative:      Aerobic and anaerobic    Blood culture x two cultures. Draw prior to antibiotics. [[id number]] Collected: 12/05/24 0813    Order Status: Completed Specimen: Blood from Peripheral, Antecubital, Right Updated: 12/07/24 1032     Blood Culture, Routine No Growth to date      No Growth to date      No Growth to date    Narrative:      Aerobic and anaerobic  Collection has been rescheduled by [facility] at 12/05/2024 08:23 Reason:   DONE.   Collection has been rescheduled by [facility] at 12/05/2024 08:23 Reason:   DONE.     Respiratory Infection Panel (PCR), Nasopharyngeal [[id number]] Collected: 12/07/24 0901    Order Status: Completed Specimen: Nasopharyngeal Swab Updated: 12/07/24 1030     Respiratory Infection Panel Source NP swab     Adenovirus Not Detected     Coronavirus 229E, Common Cold Virus Not Detected     Coronavirus HKU1, Common Cold Virus Not Detected     Coronavirus NL63, Common Cold Virus Not Detected     Coronavirus OC43, Common Cold Virus Not Detected     Comment: Coronavirus strains 229E, HKU1, NL63, and OC43 can cause the common   cold   and are not associated with the respiratory disease outbreak caused   by  the COVID-19 (SARS-CoV-2 novel Coronavirus) strain.           SARS-CoV2 (COVID-19) Qualitative PCR Not Detected     Human Metapneumovirus Not Detected     Human Rhinovirus/Enterovirus Not Detected     Influenza A (subtypes H1, H1-2009,H3) Not Detected     Influenza B Not Detected     Parainfluenza Virus 1 Not Detected     Parainfluenza Virus 2 Not Detected     Parainfluenza Virus 3 Not Detected     Parainfluenza Virus 4 Not Detected     Respiratory Syncytial Virus Not Detected     Bordetella Parapertussis (PN7952) Not  Detected     Bordetella pertussis (ptxP) Not Detected     Chlamydia pneumoniae Not Detected     Mycoplasma pneumoniae Not Detected     Comment: Respiratory Infection Panel testing performed by Multiplex PCR.       Narrative:      Respiratory Infection Panel source->NP Swab            MOST RECENT IMAGING  X-Ray Chest 1 View  Narrative: EXAMINATION:  XR CHEST 1 VIEW    CLINICAL HISTORY:  Chronic interstitial changes and bronchiectasis;    TECHNIQUE:  Erect AP view of the chest.    COMPARISON:  12/07/2024    FINDINGS:  Single lead pacemaker device projects over the patient's left lower chest wall with the distal lead projecting over the right atrium.  The cardiomediastinal silhouette appears globally enlarged appears similar patient's previous imaging study.  Diffuse interstitial and vascular prominence is again redemonstrated related to pulmonary edema, although may have overlap relative to underlying pulmonary infection.  No significant change.  Deployment of a metallic stent across the aortic valve plane is noted.  Bilateral glenohumeral joint osteoarthritis.  Impression: Factors that are in keeping with mild pulmonary congestion/edema without detrimental change upon comparison.    Electronically signed by: Ian Carbajal MD  Date:    12/07/2024  Time:    08:54  X-Ray Chest 1 View  Narrative: EXAMINATION:  XR CHEST 1 VIEW    CLINICAL HISTORY:  Chronic interstitial changes and bronchiectasis;    TECHNIQUE:  Single frontal view of the chest was performed.    COMPARISON:  12/06/2024    FINDINGS:  Enlarged cardiac silhouette with pulmonary vascular congestion.  Diffuse interstitial prominence right greater than left representing edema or pneumonitis.  Small to moderate right and small left pleural effusions.  Left chest wall cardiac pacemaker.  Status post aortic valve replacement.  Impression: As above.    Electronically signed by: Jim Joy  Date:    12/07/2024  Time:    01:58      ECHOCARDIOGRAM RESULTS  (last 5)  Results for orders placed during the hospital encounter of 12/03/24    Echo    Interpretation Summary    Left Ventricle: The left ventricle is normal in size. Normal wall thickness. There is normal systolic function with a visually estimated ejection fraction of 55 - 60%. Unable to assess diastolic function due to atrial fibrillation.    Right Ventricle: Normal right ventricular cavity size. Wall thickness is normal. Systolic function is normal. Pacemaker lead present in the ventricle.    Left Atrium: Left atrium is mildly dilated.    Right Atrium: Right atrium is mildly dilated.    Aortic Valve: There is a transcatheter valve replacement in the aortic position. It is reported to be a 26 mm Jauregui valve. There is mild stenosis. Aortic valve area by VTI is 1.5 cm². Aortic valve peak velocity is 3.6 m/s. Mean gradient is 28.0 mmHg. The dimensionless index is 0.43. There is mild to moderate aortic regurgitation with an eccentrically directed jet.    Mitral Valve: There is bileaflet sclerosis. There is mitral annular calcification present. There is mild regurgitation.    Tricuspid Valve: There is mild to moderate regurgitation.    IVC/SVC: Normal venous pressure at 3 mmHg.      Results for orders placed during the hospital encounter of 01/23/24    Echo    Interpretation Summary    Left Ventricle: The left ventricle is normal in size. Mildly increased wall thickness. There is mildly reduced systolic function with a visually estimated ejection fraction of 45 - 50%. Unable to assess diastolic function due to atrial fibrillation.    Right Ventricle: Normal right ventricular cavity size. Wall thickness is normal. Right ventricle wall motion  is normal. Systolic function is normal.    Left Atrium: Left atrium is mildly dilated.    Right Atrium: Right atrium is mildly dilated.    Aortic Valve: There is a transcatheter valve replacement in the aortic position that is appropriately positioned. Mild paravalvular  regurgitation.    Mitral Valve: There is bileaflet sclerosis. There is moderate mitral annular calcification present. There is no stenosis. The mean pressure gradient across the mitral valve is 2 mmHg at a heart rate of  bpm. There is moderate regurgitation.    Tricuspid Valve: There is mild regurgitation.    IVC/SVC: Normal venous pressure at 3 mmHg.      Results for orders placed during the hospital encounter of 12/06/23    Echo Saline Bubble? No    Interpretation Summary    Left Ventricle: The left ventricle is moderately dilated. There is mildly reduced systolic function with a visually estimated ejection fraction of 40 - 45%. Unable to assess diastolic function due to atrial fibrillation.    Right Ventricle: Mild right ventricular enlargement. Systolic function is mildly reduced.    Left Atrium: Left atrium is mildly dilated.    Right Atrium: Right atrium is moderately dilated.    Aortic Valve: There is a transcatheter valve replacement in the aortic position. It is reported to be a Jauregui valve.    Mitral Valve: There is moderate bileaflet sclerosis. There is no stenosis. The mean pressure gradient across the mitral valve is 3 mmHg at a heart rate of  bpm. There is mild regurgitation.    Tricuspid Valve: There is mild regurgitation.    IVC/SVC: Normal venous pressure at 3 mmHg.      Echo Saline Bubble? No    Interpretation Summary    Limited 2D echocardiogram with color-flow Doppler done intraoperatively doing TAVR procedure    Well-positioned balloon expandable valve with no perivalvular regurgitation    No pericardial effusion      Results for orders placed during the hospital encounter of 10/23/23    Stress Echo Which stress agent will be used? Pharmacological; Color Flow Doppler? No    Interpretation Summary  Dobutamine aortic valve study.  Baseline left ventricular function proximally 25% with abnormal septal motion.  Aortic valve poorly seen questionable bioprosthetic aortic valve.  Baseline peak aortic  velocity 3.51 m/sec with a max/mean gradient of 49/27 mm Hg  With dobutamine 10 mcg ejection fraction improved to approximately 35%.  With a peak aortic velocity of 4.02 m/sec max/mean gradient of 67/36 mm Hg.  Aortic valve area 0.  Nine 0.88    Study consistent with viable left ventricular function and severe aortic stenosis.      CURRENT/PREVIOUS VISIT EKG  Results for orders placed or performed during the hospital encounter of 12/05/24   EKG 12-lead    Collection Time: 12/05/24  7:46 AM   Result Value Ref Range    QRS Duration 152 ms    [facility] QTC Calculation 416 ms    Narrative    Test Reason : R07.9,    Vent. Rate :  61 BPM     Atrial Rate : 357 BPM     P-R Int :    ms          QRS Dur : 152 ms      QT Int : 414 ms       P-R-T Axes :    -72  98 degrees    QTcB Int : 416 ms    Ventricular-paced rhythm  Abnormal ECG  When compared with ECG of 14-Mar-2024 10:33,  No significant change was found    Referred By: AAAREFERRAL SELF           Confirmed By:            ASSESSMENT/PLAN:     Active Hospital Problems    Diagnosis    Anemia    Epistaxis    Heart failure with improved ejection fraction (HFimpEF)    Atrial fibrillation    Pneumonia    COPD (chronic obstructive pulmonary disease)     Dr. Haro pulmonologist         ASSESSMENT & PLAN:       Epistaxis  Anemia s/p 2 units PRBC yesterday  Chronic AF  S/P TAVR  Pneumonia  COPD  CHFpEF  Chronic coumadin therapy    RECOMMENDATIONS:    Will withhold anticoagulation therapy for another 24 hours  After long discussion with the patient regarding anticoagulation therapy  She is willing to restart on Eliquis 5 mg p.o. b.i.d.  Continue on digoxin 0.25 mg daily  May switch to Lasix oral therapy  Currently on intravenous steroids may consider switching to oral therapy  Maintain on antibiotic therapy  Continue on pantoprazole              Raymond Pérez MD  Department of Cardiology  Date of Service: 12/07/2024      Clinically improving.  Currently on azithromycin and prednisone  therapy still coughing up some green yellow stuff  ENT to evaluate her and some point  Regarding long-term anticoagulation therapy she will benefit from Eliquis rather than Coumadin therapy.  I have personally interviewed and examined the patient, I have reviewed the Nurse Practitioner's history and physical, assessment, and plan. I agree with the findings and plan.  Patient is to be re-evaluated for Watchman device implantation in an effort to discontinue anticoagulation therapy for multiple reasons  For long-term management patient may need to use a humidifier in the house to minimize recurrence of nosebleeds and needs to be re-evaluated for Watchman device implanted    Raymond Pérez M.D.  Department of Cardiology  Date of Service: 12/07/2024  2:46 PM

## 2024-12-07 NOTE — ASSESSMENT & PLAN NOTE
Acute exacerbation   Acute on chronic hypoxic respiratory failure   - change PO Prednisone to IV Solumedrol   - Increase Duoneb to Q4H   - wean oxygen   - Cont PO Azithromycin   - Cont LABA and Pulmicort nebulizer

## 2024-12-07 NOTE — PT/OT/SLP EVAL
Occupational Therapy   Evaluation    Name: Lisa Smith  MRN: [id number]  Admitting Diagnosis: <principal problem not specified>  Recent Surgery: * No surgery found *      Recommendations:     Discharge Recommendations: Low Intensity Therapy (HHOT)  Discharge Equipment Recommendations:  walker, rolling  Barriers to discharge:  Decreased caregiver support (increased assistance with ADLs, fall risk)    Assessment:     Lisa Smith is a 74 y.o. female with a medical diagnosis of Anemia. Performance deficits affecting function: weakness, impaired endurance, impaired self care skills, impaired functional mobility, pain, impaired cardiopulmonary response to activity, impaired balance, gait instability.       Lisa's mobility limitation cannot be sufficiently resolved by the use of a cane. Her functional mobility deficit can be sufficiently resolved with the use of a Rolling Walker. Patient's mobility limitation significantly impairs their ability to participate in one of more activities of daily living.  The use of a RW will significantly improve the patient's ability to participate in MRADLS and the patient will use it on regular basis in the home.     Rehab Prognosis: Good; patient would benefit from acute skilled OT services to address these deficits and reach maximum level of function.       Plan:     Patient to be seen 5 x/week to address the above listed problems via self-care/home management, therapeutic activities, therapeutic exercises  Plan of Care Expires: 01/07/25  Plan of Care Reviewed with: patient    Subjective     Chief Complaint: She is having back pain due to laying in the bed for a couple of days.   Patient/Family Comments/goals: to return home    Occupational Profile:  Living Environment: She lives alone in a single story home with 1 step entry.  Previous level of function: Mod Independent with self care, driving and light housework   Roles and Routines: active, homemaker  Equipment Used at  Home: rollator, oxygen  Assistance upon Discharge: she has a daughter that lives in [place] that checks on her.    Pain/Comfort:  Pain Rating 1: 5/10  Location - Side 1: Bilateral  Location - Orientation 1: posterior  Location 1: back  Pain Addressed 1: Reposition, Distraction    Patients cultural, spiritual, Gnosticist conflicts given the current situation: no    Objective:     Communicated with: nurse prior to session.  Patient found up in chair with telemetry, chair check, peripheral IV, oxygen, pulse ox (continuous) upon OT entry to room.    General Precautions: Standard, hearing impaired, respiratory, fall  Orthopedic Precautions: N/A  Braces: N/A  Respiratory Status: Nasal cannula, flow 7 L/min    Occupational Performance:  Functional Mobility/Transfers:  Patient completed Sit <> Stand Transfer with supervision  with  no assistive device     Cognitive/Visual Perceptual:  Cognitive/Psychosocial Skills:     -       Oriented to: Person, Place, Time, and Situation   -       Follows Commands/attention:Follows one-step commands  -       Communication: clear/fluent  -       Memory: No Deficits noted  -       Safety awareness/insight to disability: intact   -       Mood/Affect/Coping skills/emotional control: Appropriate to situation    Physical Exam:  Balance:    -       static sitting balance - Good  Dominant hand:    -       right  Upper Extremity Range of Motion:     -       Right Upper Extremity: WNL  -       Left Upper Extremity: WNL  Upper Extremity Strength:    -       Right Upper Extremity: Within Functional Limits 4/5  -       Left Upper Extremity: Within Functional Limits 4/5   Strength:    -       Right Upper Extremity: WFL  -       Left Upper Extremity: WFL  Fine Motor Coordination:    -       Intact  Gross motor coordination:   WFL    AMPAC 6 Click ADL:  AMPAC Total Score: 20    Treatment & Education:  She was educated on Role of Occupational Therapy, goals and plan of care.  Discussed importance  of OOB activity and functional mobility to negate the negative effects of prolonged bedrest during this hospitalization, safe transfers and d/c planning recommendations.     Patient left up in chair with all lines intact, call button in reach, and chair alarm on    GOALS:   Multidisciplinary Problems       Occupational Therapy Goals          Problem: Occupational Therapy    Goal Priority Disciplines Outcome Interventions   Occupational Therapy Goal     OT, PT/OT     Description: Goals to be met by: 01/07/2025     Patient will increase functional independence with ADLs by performing:    LE Dressing with Modified [place].  Grooming while standing at sink with Modified [place].  Toileting from toilet with Modified [place] for hygiene and clothing management.   Toilet transfer to toilet with Modified [place].                         History:     Past Medical History:   Diagnosis Date    Anticoagulant long-term use     Arthritis     Atrial fibrillation     Bell's palsy     Boil of buttock     burst 12/19/22    CHF (congestive heart failure)     Chronic cough     COPD (chronic obstructive pulmonary disease)     use O2  3l/m NC at night; also taking during day currently 12/4/23    Dizziness     Encounter for blood transfusion     GI bleed 2011    transfusion    H/O diverticulitis of colon     Hard of hearing     Heart murmur     Hematoma 02/2023    left hand    Heterozygous alpha 1-antitrypsin deficiency     History of home oxygen therapy     3L NC at night    Hypertension     Lung disease     copd    MONSERRAT (obstructive sleep apnea)     resolved with wt loss 131#    Pacemaker     Pneumonia     last episode 2019    Pulmonary edema     Skin cancer     Unspecified visual disturbance     episode of vision disturbance and dizziness...occasional recurrences         Past Surgical History:   Procedure Laterality Date    CARDIAC ELECTROPHYSIOLOGY STUDY AND ABLATION      CAROTID ENDARTERECTOMY Left 12/22/2022     Procedure: ENDARTERECTOMY-CAROTID;  Surgeon: Maximilian Connolly MD;  Location: [facility] OR;  Service: Peripheral Vascular;  Laterality: Left;    CAROTID STENT Left 2/29/2024    Procedure: INSERTION, STENT, ARTERY, CAROTID;  Surgeon: CIRILO Valdivia III, MD;  Location: [facility] OR [facility] FLR;  Service: Vascular;  Laterality: Left;  left carotid artery stent placement  mgy  146.29   gymc2  8.0730  fluro time  4.8min    CARPAL TUNNEL RELEASE Left     CHOLECYSTECTOMY      EYE SURGERY Bilateral     cataract    HYSTERECTOMY      INSERT / REPLACE / REMOVE PACEMAKER      KNEE ARTHROSCOPY Left     LEFT HEART CATHETERIZATION  11/1/2023    Procedure: Left heart cath;  Surgeon: Puma Shelton MD;  Location: [facility] CATH;  Service: Cardiology;;    REPLACEMENT OF PACEMAKER GENERATOR Left 01/20/2022    Procedure: REPLACEMENT, PACEMAKER GENERATOR;  Surgeon: Raymond Pérez MD;  Location: [facility] CATH/EP LAB;  Service: Cardiology;  Laterality: Left;    SKIN CANCER EXCISION      TRANSCATHETER AORTIC VALVE REPLACEMENT (TAVR) Bilateral 12/6/2023    Procedure: (TAVR);  Surgeon: Puma Shelton MD;  Location: [facility] CATH;  Service: Cardiology;  Laterality: Bilateral;    TRANSCATHETER AORTIC VALVE REPLACEMENT (TAVR) Bilateral 12/6/2023    Procedure: (TAVR) - Surgeon;  Surgeon: Maximilian Connolly MD;  Location: [facility] CATH;  Service: Peripheral Vascular;  Laterality: Bilateral;       Time Tracking:     OT Date of Treatment: 12/07/24  OT Start Time: 0946  OT Stop Time: 1004  OT Total Time (min): 18 min    Billable Minutes:Evaluation 18    12/7/2024

## 2024-12-08 PROBLEM — I50.32 HEART FAILURE WITH IMPROVED EJECTION FRACTION (HFIMPEF): Status: RESOLVED | Noted: 2023-12-05 | Resolved: 2024-12-08

## 2024-12-08 LAB
ALBUMIN SERPL BCP-MCNC: 3.8 G/DL (ref 3.5–5.2)
ALP SERPL-CCNC: 84 U/L (ref 55–135)
ALT SERPL W/O P-5'-P-CCNC: 19 U/L (ref 10–44)
ANION GAP SERPL CALC-SCNC: 3 MMOL/L (ref 8–16)
AST SERPL-CCNC: 37 U/L (ref 10–40)
BASOPHILS # BLD AUTO: 0.02 K/UL (ref 0–0.2)
BASOPHILS NFR BLD: 0.2 % (ref 0–1.9)
BILIRUB SERPL-MCNC: 0.7 MG/DL (ref 0.1–1)
BUN SERPL-MCNC: 30 MG/DL (ref 8–23)
CALCIUM SERPL-MCNC: 9 MG/DL (ref 8.7–10.5)
CHLORIDE SERPL-SCNC: 99 MMOL/L (ref 95–110)
CO2 SERPL-SCNC: 37 MMOL/L (ref 23–29)
CREAT SERPL-MCNC: 0.8 MG/DL (ref 0.5–1.4)
DIFFERENTIAL METHOD BLD: ABNORMAL
EOSINOPHIL # BLD AUTO: 0 K/UL (ref 0–0.5)
EOSINOPHIL NFR BLD: 0 % (ref 0–8)
ERYTHROCYTE [DISTWIDTH] IN BLOOD BY AUTOMATED COUNT: 17.8 % (ref 11.5–14.5)
EST. GFR  (NO RACE VARIABLE): >60 ML/MIN/1.73 M^2
GLUCOSE SERPL-MCNC: 141 MG/DL (ref 70–110)
HCT VFR BLD AUTO: 25.7 % (ref 37–48.5)
HGB BLD-MCNC: 7.8 G/DL (ref 12–16)
IMM GRANULOCYTES # BLD AUTO: 0.22 K/UL (ref 0–0.04)
IMM GRANULOCYTES NFR BLD AUTO: 2.6 % (ref 0–0.5)
INR PPP: 1.4 (ref 0.8–1.2)
LYMPHOCYTES # BLD AUTO: 0.3 K/UL (ref 1–4.8)
LYMPHOCYTES NFR BLD: 4 % (ref 18–48)
MAGNESIUM SERPL-MCNC: 2.1 MG/DL (ref 1.6–2.6)
MCH RBC QN AUTO: 29 PG (ref 27–31)
MCHC RBC AUTO-ENTMCNC: 30.4 G/DL (ref 32–36)
MCV RBC AUTO: 96 FL (ref 82–98)
MONOCYTES # BLD AUTO: 0.2 K/UL (ref 0.3–1)
MONOCYTES NFR BLD: 2.6 % (ref 4–15)
NEUTROPHILS # BLD AUTO: 7.7 K/UL (ref 1.8–7.7)
NEUTROPHILS NFR BLD: 90.6 % (ref 38–73)
NRBC BLD-RTO: 0 /100 WBC
OHS QRS DURATION: 152 MS
OHS QTC CALCULATION: 416 MS
PHOSPHATE SERPL-MCNC: 4.8 MG/DL (ref 2.7–4.5)
PLATELET # BLD AUTO: 256 K/UL (ref 150–450)
PMV BLD AUTO: 9.7 FL (ref 9.2–12.9)
POTASSIUM SERPL-SCNC: 5.7 MMOL/L (ref 3.5–5.1)
PROT SERPL-MCNC: 6.8 G/DL (ref 6–8.4)
PROTHROMBIN TIME: 15.1 SEC (ref 9–12.5)
RBC # BLD AUTO: 2.69 M/UL (ref 4–5.4)
SODIUM SERPL-SCNC: 139 MMOL/L (ref 136–145)
WBC # BLD AUTO: 8.45 K/UL (ref 3.9–12.7)

## 2024-12-08 PROCEDURE — 25000242 PHARM REV CODE 250 ALT 637 W/ HCPCS: Performed by: INTERNAL MEDICINE

## 2024-12-08 PROCEDURE — 80053 COMPREHEN METABOLIC PANEL: CPT | Performed by: NURSE PRACTITIONER

## 2024-12-08 PROCEDURE — 21400001 HC TELEMETRY ROOM

## 2024-12-08 PROCEDURE — 94640 AIRWAY INHALATION TREATMENT: CPT

## 2024-12-08 PROCEDURE — 25000003 PHARM REV CODE 250: Performed by: INTERNAL MEDICINE

## 2024-12-08 PROCEDURE — 27000221 HC OXYGEN, UP TO 24 HOURS

## 2024-12-08 PROCEDURE — 94761 N-INVAS EAR/PLS OXIMETRY MLT: CPT

## 2024-12-08 PROCEDURE — 36415 COLL VENOUS BLD VENIPUNCTURE: CPT | Performed by: INTERNAL MEDICINE

## 2024-12-08 PROCEDURE — 83735 ASSAY OF MAGNESIUM: CPT | Performed by: NURSE PRACTITIONER

## 2024-12-08 PROCEDURE — 84100 ASSAY OF PHOSPHORUS: CPT | Performed by: NURSE PRACTITIONER

## 2024-12-08 PROCEDURE — 63700000 PHARM REV CODE 250 ALT 637 W/O HCPCS: Performed by: INTERNAL MEDICINE

## 2024-12-08 PROCEDURE — 25000242 PHARM REV CODE 250 ALT 637 W/ HCPCS: Performed by: NURSE PRACTITIONER

## 2024-12-08 PROCEDURE — 99233 SBSQ HOSP IP/OBS HIGH 50: CPT | Mod: ,,, | Performed by: INTERNAL MEDICINE

## 2024-12-08 PROCEDURE — 99900031 HC PATIENT EDUCATION (STAT)

## 2024-12-08 PROCEDURE — 85025 COMPLETE CBC W/AUTO DIFF WBC: CPT | Performed by: NURSE PRACTITIONER

## 2024-12-08 PROCEDURE — 63600175 PHARM REV CODE 636 W HCPCS: Performed by: INTERNAL MEDICINE

## 2024-12-08 PROCEDURE — 25000003 PHARM REV CODE 250: Performed by: NURSE PRACTITIONER

## 2024-12-08 PROCEDURE — 25000003 PHARM REV CODE 250: Performed by: STUDENT IN AN ORGANIZED HEALTH CARE EDUCATION/TRAINING PROGRAM

## 2024-12-08 PROCEDURE — 99232 SBSQ HOSP IP/OBS MODERATE 35: CPT | Mod: ,,, | Performed by: INTERNAL MEDICINE

## 2024-12-08 PROCEDURE — 85610 PROTHROMBIN TIME: CPT | Performed by: INTERNAL MEDICINE

## 2024-12-08 RX ORDER — IPRATROPIUM BROMIDE AND ALBUTEROL SULFATE 2.5; .5 MG/3ML; MG/3ML
3 SOLUTION RESPIRATORY (INHALATION) EVERY 6 HOURS
Status: DISCONTINUED | OUTPATIENT
Start: 2024-12-08 | End: 2024-12-10 | Stop reason: HOSPADM

## 2024-12-08 RX ORDER — PREDNISONE 20 MG/1
40 TABLET ORAL DAILY
Status: DISCONTINUED | OUTPATIENT
Start: 2024-12-08 | End: 2024-12-10 | Stop reason: HOSPADM

## 2024-12-08 RX ADMIN — BUDESONIDE INHALATION 0.5 MG: 0.5 SUSPENSION RESPIRATORY (INHALATION) at 07:12

## 2024-12-08 RX ADMIN — PANTOPRAZOLE SODIUM 40 MG: 40 TABLET, DELAYED RELEASE ORAL at 06:12

## 2024-12-08 RX ADMIN — APIXABAN 5 MG: 5 TABLET, FILM COATED ORAL at 10:12

## 2024-12-08 RX ADMIN — AZITHROMYCIN DIHYDRATE 500 MG: 250 TABLET ORAL at 10:12

## 2024-12-08 RX ADMIN — IPRATROPIUM BROMIDE AND ALBUTEROL SULFATE 3 ML: .5; 3 SOLUTION RESPIRATORY (INHALATION) at 07:12

## 2024-12-08 RX ADMIN — METOPROLOL SUCCINATE 25 MG: 25 TABLET, FILM COATED, EXTENDED RELEASE ORAL at 06:12

## 2024-12-08 RX ADMIN — GUAIFENESIN 200 MG: 200 SOLUTION ORAL at 03:12

## 2024-12-08 RX ADMIN — ROPINIROLE HYDROCHLORIDE 1 MG: 1 TABLET, FILM COATED ORAL at 08:12

## 2024-12-08 RX ADMIN — IPRATROPIUM BROMIDE AND ALBUTEROL SULFATE 3 ML: .5; 3 SOLUTION RESPIRATORY (INHALATION) at 12:12

## 2024-12-08 RX ADMIN — EZETIMIBE 10 MG: 10 TABLET ORAL at 08:12

## 2024-12-08 RX ADMIN — FUROSEMIDE 20 MG: 20 TABLET ORAL at 10:12

## 2024-12-08 RX ADMIN — ARFORMOTEROL TARTRATE 15 MCG: 15 SOLUTION RESPIRATORY (INHALATION) at 07:12

## 2024-12-08 RX ADMIN — METHYLPREDNISOLONE SODIUM SUCCINATE 40 MG: 40 INJECTION, POWDER, FOR SOLUTION INTRAMUSCULAR; INTRAVENOUS at 06:12

## 2024-12-08 RX ADMIN — GUAIFENESIN 200 MG: 200 SOLUTION ORAL at 10:12

## 2024-12-08 RX ADMIN — LORAZEPAM 0.5 MG: 0.5 TABLET ORAL at 08:12

## 2024-12-08 RX ADMIN — IPRATROPIUM BROMIDE AND ALBUTEROL SULFATE 3 ML: .5; 3 SOLUTION RESPIRATORY (INHALATION) at 04:12

## 2024-12-08 RX ADMIN — GUAIFENESIN 200 MG: 200 SOLUTION ORAL at 11:12

## 2024-12-08 RX ADMIN — APIXABAN 5 MG: 5 TABLET, FILM COATED ORAL at 08:12

## 2024-12-08 RX ADMIN — POLYETHYLENE GLYCOL 3350 17 G: 17 POWDER, FOR SOLUTION ORAL at 10:12

## 2024-12-08 RX ADMIN — DIGOXIN 0.25 MG: 125 TABLET ORAL at 10:12

## 2024-12-08 RX ADMIN — PREDNISONE 40 MG: 20 TABLET ORAL at 01:12

## 2024-12-08 NOTE — PLAN OF CARE
Problem: COPD (Chronic Obstructive Pulmonary Disease)  Goal: Optimal Chronic Illness Coping  Outcome: Progressing  Goal: Optimal Level of Functional [place]  Outcome: Progressing  Goal: Absence of Infection Signs and Symptoms  Outcome: Progressing  Goal: Improved Oral Intake  Outcome: Progressing  Goal: Effective Oxygenation and Ventilation  Outcome: Progressing     Problem: Adult Inpatient Plan of Care  Goal: Plan of Care Review  Outcome: Progressing  Goal: Patient-Specific Goal (Individualized)  Outcome: Progressing  Goal: Absence of Hospital-Acquired Illness or Injury  Outcome: Progressing  Goal: Optimal Comfort and Wellbeing  Outcome: Progressing  Goal: Readiness for Transition of Care  Outcome: Progressing     Problem: Pneumonia  Goal: Fluid Balance  Outcome: Progressing  Goal: Resolution of Infection Signs and Symptoms  Outcome: Progressing  Goal: Effective Oxygenation and Ventilation  Outcome: Progressing     Problem: Wound  Goal: Optimal Coping  Outcome: Progressing  Goal: Optimal Functional Ability  Outcome: Progressing  Goal: Absence of Infection Signs and Symptoms  Outcome: Progressing  Goal: Improved Oral Intake  Outcome: Progressing  Goal: Optimal Pain Control and Function  Outcome: Progressing  Goal: Skin Health and Integrity  Outcome: Progressing  Goal: Optimal Wound Healing  Outcome: Progressing     Problem: Fall Injury Risk  Goal: Absence of Fall and Fall-Related Injury  Outcome: Progressing     Problem: Fluid Volume Excess  Goal: Fluid Balance  Outcome: Progressing     Problem: Skin Injury Risk Increased  Goal: Skin Health and Integrity  Outcome: Progressing

## 2024-12-08 NOTE — PLAN OF CARE
Problem: COPD (Chronic Obstructive Pulmonary Disease)  Goal: Optimal Chronic Illness Coping  Outcome: Progressing  Goal: Optimal Level of Functional [employer]  Outcome: Progressing  Goal: Absence of Infection Signs and Symptoms  Outcome: Progressing  Goal: Improved Oral Intake  Outcome: Progressing  Goal: Effective Oxygenation and Ventilation  Outcome: Progressing     Problem: Adult Inpatient Plan of Care  Goal: Plan of Care Review  Outcome: Progressing  Goal: Patient-Specific Goal (Individualized)  Outcome: Progressing  Goal: Absence of Hospital-Acquired Illness or Injury  Outcome: Progressing  Goal: Optimal Comfort and Wellbeing  Outcome: Progressing  Goal: Readiness for Transition of Care  Outcome: Progressing     Problem: Pneumonia  Goal: Fluid Balance  Outcome: Progressing  Goal: Resolution of Infection Signs and Symptoms  Outcome: Progressing  Goal: Effective Oxygenation and Ventilation  Outcome: Progressing     Problem: Wound  Goal: Optimal Coping  Outcome: Progressing  Goal: Optimal Functional Ability  Outcome: Progressing  Goal: Absence of Infection Signs and Symptoms  Outcome: Progressing  Goal: Improved Oral Intake  Outcome: Progressing  Goal: Optimal Pain Control and Function  Outcome: Progressing  Goal: Skin Health and Integrity  Outcome: Progressing  Goal: Optimal Wound Healing  Outcome: Progressing     Problem: Fall Injury Risk  Goal: Absence of Fall and Fall-Related Injury  Outcome: Progressing     Problem: Fluid Volume Excess  Goal: Fluid Balance  Outcome: Progressing     Problem: Skin Injury Risk Increased  Goal: Skin Health and Integrity  Outcome: Progressing

## 2024-12-08 NOTE — ASSESSMENT & PLAN NOTE
Anemia is likely due to acute blood loss which was from epistaxis . Most recent hemoglobin and hematocrit are listed below.  Recent Labs     12/06/24  0509 12/07/24  0452 12/08/24  0455   HGB 8.0* 7.6* 7.8*   HCT 25.0* 24.5* 25.7*       Plan  - Monitor serial CBC: Every 12 hours  - Transfuse PRBC if patient becomes hemodynamically unstable, symptomatic or H/H drops below 7/21.  - Patient has received 2 units of PRBCs on 12/3/24  - Patient's anemia is currently stable  - patient to have outpatient upper and lower scope for evaluation with GI as well as workup with Hematology Oncology outpatient  - Resumed Eliquis 12/8

## 2024-12-08 NOTE — CARE UPDATE
12/07/24 1938   Patient Assessment/Suction   Level of Consciousness (AVPU) alert   Respiratory Effort Unlabored   Expansion/Accessory Muscles/Retractions expansion symmetric;no retractions   All Lung Fields Breath Sounds Anterior:;Lateral:;diminished   Rhythm/Pattern, Respiratory no shortness of breath reported   Cough Frequency frequent   Cough Type loose   PRE-TX-O2   Device (Oxygen Therapy) high flow mask  (pt states nc irritates her nose)   Flow (L/min) (Oxygen Therapy) 5   SpO2 (!) 93 %   Pulse Oximetry Type Intermittent   $ Pulse Oximetry - Multiple Charge Pulse Oximetry - Multiple   Pulse 60   Resp 18   Aerosol Therapy   $ Aerosol Therapy Charges Aerosol Treatment   Daily Review of Necessity (SVN) completed   Respiratory Treatment Status (SVN) given   Treatment Route (SVN) mask;oxygen   Patient Position sitting in chair   Post Treatment Assessment (SVN) patient reports breathing improved   Signs of Intolerance (SVN) none   Breath Sounds Post-Respiratory Treatment   Throughout All Fields Post-Treatment All Fields   Throughout All Fields Post-Treatment aeration increased   Post-treatment Heart Rate (beats/min) 65   Post-treatment Resp Rate (breaths/min) 18   Education   $ Education Bronchodilator;Oxygen;15 min

## 2024-12-08 NOTE — ASSESSMENT & PLAN NOTE
Patient has paroxysmal (<7 days) atrial fibrillation. Patient is currently in sinus rhythm. [id number] Score: 4. The patients heart rate in the last 24 hours is as follows:  Pulse  Min: 59  Max: 66     Antiarrhythmics  metoprolol succinate (TOPROL-XL) 24 hr tablet 25 mg, Every morning, Oral    Anticoagulants  apixaban tablet 5 mg, 2 times daily, Oral    Plan  - Replete lytes with a goal of K>4, Mg >2  - Patient is anticoagulated, see medications listed above.  - Patient's afib is currently controlled  - resume Eliquis per cardiology recommendations   - Planning for outpatient watchman device

## 2024-12-08 NOTE — CARE UPDATE
12/08/24 0737   Patient Assessment/Suction   Level of Consciousness (AVPU) alert   Respiratory Effort Normal;Unlabored   Expansion/Accessory Muscles/Retractions no use of accessory muscles;no retractions;expansion symmetric   All Lung Fields Breath Sounds Anterior:   ANDREW Breath Sounds clear   RUL Breath Sounds wheezes, expiratory   Rhythm/Pattern, Respiratory unlabored;depth regular;pattern regular   Cough Frequency infrequent   Cough Type dry   PRE-TX-O2   Device (Oxygen Therapy) nasal cannula   $ Is the patient on Low Flow Oxygen? Yes   Flow (L/min) (Oxygen Therapy) 5   SpO2 (!) 94 %   Pulse Oximetry Type Intermittent   $ Pulse Oximetry - Multiple Charge Pulse Oximetry - Multiple   Pulse 61   Resp 19   Aerosol Therapy   $ Aerosol Therapy Charges Aerosol Treatment   Daily Review of Necessity (SVN) completed   Respiratory Treatment Status (SVN) given   Treatment Route (SVN) mask;oxygen   Patient Position HOB elevated   Post Treatment Assessment (SVN) patient reports breathing improved   Signs of Intolerance (SVN) none   Breath Sounds Post-Respiratory Treatment   Throughout All Fields Post-Treatment All Fields   Throughout All Fields Post-Treatment aeration increased   Post-treatment Heart Rate (beats/min) 65   Post-treatment Resp Rate (breaths/min) 8   Education   $ Education Bronchodilator;15 min

## 2024-12-08 NOTE — PROGRESS NOTES
Pulmonary/Critical Care progress note      PATIENT NAME: Lisa Smith  MRN: [id number]  TODAY'S DATE: 2024  4:31 PM  ADMIT DATE: 2024  AGE: 74 y.o. : 1950    CONSULT REQUESTED BY: Tarah Cassidy MD    REASON FOR CONSULT:   Increased shortness of breath    HPI:  The patient is a 74-year-old female with COPD and bronchiectasis and chronic respiratory failure who began feeling poorly around .  She has also been very anemic with Coumadin which she has taking since she has had her TAVR.  She was transfused 2 units packed red blood cells on Tuesday.  Overnight she became so short of breath she asked to come into the hospital.  The patient has moderate obstruction on her PFTs she has PFTs pending at this time.    The patient's chest x-ray is basically stable.  However, she has multi phonic wheezing with a prolonged expiratory phase consistent with a COPD exacerbation.     the patient is a bit distressed because she was told her rhino rocket would have to stay until next week.  We will discontinue it tomorrow.  That will be 3 days.  She is currently on 7 L of oxygen because she is only getting oxygen in through 1 nostril.  She is receiving Lasix and is diuresing.     the patient's rhino rocket was removed.  So far so good.  She has been instructed not to blow her nose.  She is needing 4-5 L of oxygen to oxygenate.     the patient is breathing much better today her oxygen requirements are down.  Her epistaxis did not return.    REVIEW OF SYSTEMS  GENERAL: Feeling better   EYES: Vision is good.   ENT:  No further epistaxis  HEART: No chest pain or palpitations.  LUNGS:  She is still coughing but is no longer short of breath    GI: No Nausea, vomiting, constipation, diarrhea, or reflux.  : No dysuria, hesitancy, or nocturia.  SKIN: No lesions or rashes.  MUSCULOSKELETAL: No joint pain or myalgias.  NEURO: No headaches or neuropathy.  LYMPH: No edema or adenopathy.  PSYCH:  No anxiety or depression.  ENDO: No weight change.    No change in the patient's Past Medical History, Past Surgical History, Social History or Family History since admission.      VITAL SIGNS (MOST RECENT)  Temp: 97.7 °F (36.5 °C) (12/08/24 0746)  Pulse: (!) 59 (12/08/24 0746)  Resp: 17 (12/08/24 0746)  BP: (!) 183/64 (12/08/24 0746)  SpO2: 100 % (12/08/24 0746)    INTAKE AND OUTPUT (LAST 24 HOURS):  Intake/Output Summary (Last 24 hours) at 12/8/2024 1056  Last data filed at 12/8/2024 0451  Gross per 24 hour   Intake --   Output 2200 ml   Net -2200 ml       WEIGHT  Wt Readings from Last 1 Encounters:   12/06/24 78.4 kg (172 lb 13.5 oz)       PHYSICAL EXAM  GENERAL: Older patient in no distress.  HEENT: Pupils equal and reactive. Extraocular movements intact. Nose intact.. Pharynx moist.  NECK: Supple.   HEART: Regular rate and rhythm. No murmur or gallop auscultated.  LUNGS:  The lungs are clear to auscultation.  Lung excursion symmetrical. No change in fremitus.   ABDOMEN: Bowel sounds present. Non-tender, no masses palpated.  : Normal anatomy.  EXTREMITIES: Normal muscle tone and joint movement, no cyanosis or clubbing.   LYMPHATICS: No adenopathy palpated, no edema.  SKIN: Dry, intact, no lesions.   NEURO: Cranial nerves II-XII intact. Motor strength 5/5 bilaterally, upper and lower extremities.  PSYCH: Appropriate affect      CBC LAST (LAST 24 HOURS)  Recent Labs   Lab 12/08/24  0455   WBC 8.45   RBC 2.69*   HGB 7.8*   HCT 25.7*   MCV 96   MCH 29.0   MCHC 30.4*   RDW 17.8*      MPV 9.7   GRAN 90.6*  7.7   LYMPH 4.0*  0.3*   MONO 2.6*  0.2*   BASO 0.02   NRBC 0       CHEMISTRY LAST (LAST 24 HOURS)  Recent Labs   Lab 12/08/24  0454      K 5.7*   CL 99   CO2 37*   ANIONGAP 3*   BUN 30*   CREATININE 0.8   *   CALCIUM 9.0   MG 2.1   ALBUMIN 3.8   PROT 6.8   ALKPHOS 84   ALT 19   AST 37   BILITOT 0.7       COAGULATION LAST (LAST 24 HOURS)  Recent Labs   Lab 12/08/24  0455   INR 1.4*        CARDIAC PROFILE (LAST 24 HOURS)  Recent Labs   Lab 12/05/24  0816   *       LAST 7 DAYS MICROBIOLOGY   Microbiology Results (last 7 days)       Procedure Component Value Units Date/Time    Blood culture x two cultures. Draw prior to antibiotics. [[id number]] Collected: 12/05/24 0813    Order Status: Completed Specimen: Blood from Peripheral, Antecubital, Right Updated: 12/08/24 1032     Blood Culture, Routine No Growth to date      No Growth to date      No Growth to date      No Growth to date    Narrative:      Aerobic and anaerobic  Collection has been rescheduled by [facility] at 12/05/2024 08:23 Reason:   DONE.   Collection has been rescheduled by [facility] at 12/05/2024 08:23 Reason:   DONE.     Blood culture x two cultures. Draw prior to antibiotics. [[id number]] Collected: 12/05/24 0823    Order Status: Completed Specimen: Blood from Peripheral, Antecubital, Right Updated: 12/08/24 1032     Blood Culture, Routine No Growth to date      No Growth to date      No Growth to date      No Growth to date    Narrative:      Aerobic and anaerobic    Respiratory Infection Panel (PCR), Nasopharyngeal [[id number]] Collected: 12/07/24 0901    Order Status: Completed Specimen: Nasopharyngeal Swab Updated: 12/07/24 1030     Respiratory Infection Panel Source NP swab     Adenovirus Not Detected     Coronavirus 229E, Common Cold Virus Not Detected     Coronavirus HKU1, Common Cold Virus Not Detected     Coronavirus NL63, Common Cold Virus Not Detected     Coronavirus OC43, Common Cold Virus Not Detected     Comment: Coronavirus strains 229E, HKU1, NL63, and OC43 can cause the common   cold   and are not associated with the respiratory disease outbreak caused   by  the COVID-19 (SARS-CoV-2 novel Coronavirus) strain.           SARS-CoV2 (COVID-19) Qualitative PCR Not Detected     Human Metapneumovirus Not Detected     Human Rhinovirus/Enterovirus Not Detected     Influenza A (subtypes H1, H1-2009,H3) Not Detected      Influenza B Not Detected     Parainfluenza Virus 1 Not Detected     Parainfluenza Virus 2 Not Detected     Parainfluenza Virus 3 Not Detected     Parainfluenza Virus 4 Not Detected     Respiratory Syncytial Virus Not Detected     Bordetella Parapertussis (FM4828) Not Detected     Bordetella pertussis (ptxP) Not Detected     Chlamydia pneumoniae Not Detected     Mycoplasma pneumoniae Not Detected     Comment: Respiratory Infection Panel testing performed by Multiplex PCR.       Narrative:      Respiratory Infection Panel source->NP Swab            MOST RECENT IMAGING  X-Ray Chest 1 View  Narrative: EXAMINATION:  XR CHEST 1 VIEW    CLINICAL HISTORY:  Chronic interstitial changes and bronchiectasis;    TECHNIQUE:  Erect AP view of the chest.    COMPARISON:  12/07/2024    FINDINGS:  Single lead pacemaker device projects over the patient's left lower chest wall with the distal lead projecting over the right atrium.  The cardiomediastinal silhouette appears globally enlarged appears similar patient's previous imaging study.  Diffuse interstitial and vascular prominence is again redemonstrated related to pulmonary edema, although may have overlap relative to underlying pulmonary infection.  No significant change.  Deployment of a metallic stent across the aortic valve plane is noted.  Bilateral glenohumeral joint osteoarthritis.  Impression: Factors that are in keeping with mild pulmonary congestion/edema without detrimental change upon comparison.    Electronically signed by: Ian Carbajal MD  Date:    12/07/2024  Time:    08:54  X-Ray Chest 1 View  Narrative: EXAMINATION:  XR CHEST 1 VIEW    CLINICAL HISTORY:  Chronic interstitial changes and bronchiectasis;    TECHNIQUE:  Single frontal view of the chest was performed.    COMPARISON:  12/06/2024    FINDINGS:  Enlarged cardiac silhouette with pulmonary vascular congestion.  Diffuse interstitial prominence right greater than left representing edema or pneumonitis.   Small to moderate right and small left pleural effusions.  Left chest wall cardiac pacemaker.  Status post aortic valve replacement.  Impression: As above.    Electronically signed by: Jim Joy  Date:    12/07/2024  Time:    01:58      CURRENT VISIT EKG  Results for orders placed or performed during the hospital encounter of 12/05/24   EKG 12-lead   Result Value Ref Range    QRS Duration 152 ms    [facility] QTC Calculation 416 ms    Narrative    Test Reason : R07.9,    Vent. Rate :  61 BPM     Atrial Rate : 357 BPM     P-R Int :    ms          QRS Dur : 152 ms      QT Int : 414 ms       P-R-T Axes :    -72  98 degrees    QTcB Int : 416 ms    Ventricular-paced rhythm  Abnormal ECG  When compared with ECG of 14-Mar-2024 10:33,  No significant change was found    Referred By: AAAREFERRAL SELF           Confirmed By:        ECHOCARDIOGRAM RESULTS  Results for orders placed during the hospital encounter of 12/03/24    Echo    Interpretation Summary    Left Ventricle: The left ventricle is normal in size. Normal wall thickness. There is normal systolic function with a visually estimated ejection fraction of 55 - 60%. Unable to assess diastolic function due to atrial fibrillation.    Right Ventricle: Normal right ventricular cavity size. Wall thickness is normal. Systolic function is normal. Pacemaker lead present in the ventricle.    Left Atrium: Left atrium is mildly dilated.    Right Atrium: Right atrium is mildly dilated.    Aortic Valve: There is a transcatheter valve replacement in the aortic position. It is reported to be a 26 mm Jauregui valve. There is mild stenosis. Aortic valve area by VTI is 1.5 cm². Aortic valve peak velocity is 3.6 m/s. Mean gradient is 28.0 mmHg. The dimensionless index is 0.43. There is mild to moderate aortic regurgitation with an eccentrically directed jet.    Mitral Valve: There is bileaflet sclerosis. There is mitral annular calcification present. There is mild regurgitation.     Tricuspid Valve: There is mild to moderate regurgitation.    IVC/SVC: Normal venous pressure at 3 mmHg.        Oxygen INFORMATION   5 L via nasal cannula but satting 100%           LAST ARTERIAL BLOOD GAS  ABG  Recent Labs   Lab 12/05/24  0807   PH 7.402   PO2 77*   PCO2 44.4   HCO3 27.6   BE 3*       IMPRESSION AND PLAN  COPD exacerbation  - maximum bronchodilators change frequency to q.6  - receiving 40 mg prednisone  - azithromycin for its anti-inflammatory properties  Chronic hypoxic respiratory failure  - this can be weaned  Epistaxis  - appears to have settled  Anemia  - stable  Paroxysmal atrial fibrillation  - patient has been started on Eliquis, she has been unable to tolerate Coumadin      No objection to discharge.  She will need a steroid taper enter resume her home Trelegy and p.r.n. albuterol.  She should follow up in the office in 1-2 weeks.    Kathryn Haro MD  Date of Service: 12/08/2024  4:31 PM

## 2024-12-08 NOTE — ASSESSMENT & PLAN NOTE
Acute exacerbation   Acute on chronic hypoxic respiratory failure   - change PO Prednisone to IV Solumedrol > Weaned to Q12H  - Duoneb to Q4H   - wean oxygen   - Cont PO Azithromycin   - Cont LABA and Pulmicort nebulizer

## 2024-12-08 NOTE — PROGRESS NOTES
[facility] - Emergency Dept  Department of Cardiology  Consult Note      PATIENT NAME: Lisa Smith  MRN: [id number]  TODAY'S DATE: 12/08/2024  ADMIT DATE: 12/5/2024                          CONSULT REQUESTED BY: Tarah Cassidy MD    SUBJECTIVE     PRINCIPAL PROBLEM: <principal problem not specified>      REASON FOR CONSULT:  CAD  AFIB    INTERVAL HISTORY:  12/08/2024:  Nasal packing removed yesterday, transient nosebleeds noted after removal spontaneously resolved.  Shortness of breath is improving  Congested cough is still persisting        12/07/2024:    Patient feels better she has less short of breath nasal packing still in place  ENT intervention tentatively for Monday  No new bleeding tendencies noted  Congested cough is still persisting with scant sputum production    12/06/2024    Patient states she is feeling sightly better and than ENT is coming to address her nose today.  INR 2.2.  Hg 8.0  CXR reviewed  Coughing green/yellow sputum.    HPI:    Ms Smith is a 74 year old female patient known to us. She has a PMH significant for  COPD (maintained on 3 L nasal cannula at home), paroxysmal AFib on Coumadin, status post TAVR, heart failure with preserved ejection fraction, anemia who presents to the emergency department for the evaluation of epistaxis, productive cough and shortness of breath. She was previously tried on eliquis however could not afford this. We have set her up with Dr. Cantu for possible watchman. Patient had to get 2 units of PRBCs yesterday and nose bleeding started again and would not stop so came to ER.        EKG                    CXR 12/6/2024 12/5/2024          FROM H AND P                 Shortness of Breath     Cough         HPI: The patient is a 74-year-old female with a past medical history of COPD (maintained on 3 L nasal cannula at home), paroxysmal AFib on Coumadin, status post TAVR, heart failure with preserved ejection fraction, anemia who  presents to the emergency department for the evaluation of epistaxis, productive cough and shortness of breath.  Patient reports that over the past few days she has been having some epistaxis as well as upper respiratory infection symptoms with productive cough, subjective fever and chills, and increased dyspnea.  She denies recent sick contacts or recent travel.  The patient reports that she was recently given a blood transfusion for anemia that has been followed outpatient by her PCP.  Patient reports that since she has been on Coumadin she has been suffering with anemia.  She is scheduled to have a GI workup for upper and lower scopes to evaluate for causes of bleeding as well as bone marrow biopsy per heme Onc as well as a workup for possible Watchman device to transition off Coumadin.  Patient was evaluated in the emergency department.  Hemoglobin 8.4 (was 7 prior to blood transfusion on 12/03/2024), white blood cell count 10.8, renal function within normal limits.  ABG revealed pH of 7.4, PO2 of 77, CO2 of 44 and bicarb of 27.  Flu and COVID negative.  Chest x-ray with bilateral diffuse interstitial opacities worse in the right lung consistent with possible superimposed pneumonia.  Lactic acid within normal limits, BNP mildly elevated 309.  PT 17.6 INR 1.6.  Afrin was placed to sterile gauze to left Landeros with some improvement in hemostasis.  Patient was initiated on cefepime and vancomycin per ER MD.  Patient admitted to the hospital medicine service with pulmonary and cardiology consultation for further evaluation.      Review of patient's allergies indicates:   Allergen Reactions    Sulfa (sulfonamide antibiotics) Itching       Past Medical History:   Diagnosis Date    Anticoagulant long-term use     Arthritis     Atrial fibrillation     Bell's palsy     Boil of buttock     burst 12/19/22    CHF (congestive heart failure)     Chronic cough     COPD (chronic obstructive pulmonary disease)     use O2  3l/m NC  at night; also taking during day currently 12/4/23    Dizziness     Encounter for blood transfusion     GI bleed 2011    transfusion    H/O diverticulitis of colon     Hard of hearing     Heart murmur     Hematoma 02/2023    left hand    Heterozygous alpha 1-antitrypsin deficiency     History of home oxygen therapy     3L NC at night    Hypertension     Lung disease     copd    MONSERRAT (obstructive sleep apnea)     resolved with wt loss 131#    Pacemaker     Pneumonia     last episode 2019    Pulmonary edema     Skin cancer     Unspecified visual disturbance     episode of vision disturbance and dizziness...occasional recurrences     Past Surgical History:   Procedure Laterality Date    CARDIAC ELECTROPHYSIOLOGY STUDY AND ABLATION      CAROTID ENDARTERECTOMY Left 12/22/2022    Procedure: ENDARTERECTOMY-CAROTID;  Surgeon: Maximilian Connolly MD;  Location: [facility] OR;  Service: Peripheral Vascular;  Laterality: Left;    CAROTID STENT Left 2/29/2024    Procedure: INSERTION, STENT, ARTERY, CAROTID;  Surgeon: CIRILO Valdivia III, MD;  Location: [facility] OR [address] [place] [address];  Service: Vascular;  Laterality: Left;  left carotid artery stent placement  mgy  146.29   gymc2  8.0730  fluro time  4.8min    CARPAL TUNNEL RELEASE Left     CHOLECYSTECTOMY      EYE SURGERY Bilateral     cataract    HYSTERECTOMY      INSERT / REPLACE / REMOVE PACEMAKER      KNEE ARTHROSCOPY Left     LEFT HEART CATHETERIZATION  11/1/2023    Procedure: Left heart cath;  Surgeon: Puma Shelton MD;  Location: [facility] CATH;  Service: Cardiology;;    REPLACEMENT OF PACEMAKER GENERATOR Left 01/20/2022    Procedure: REPLACEMENT, PACEMAKER GENERATOR;  Surgeon: Raymond Pérez MD;  Location: [facility] CATH/EP LAB;  Service: Cardiology;  Laterality: Left;    SKIN CANCER EXCISION      TRANSCATHETER AORTIC VALVE REPLACEMENT (TAVR) Bilateral 12/6/2023    Procedure: (TAVR);  Surgeon: Puma Shelton MD;  Location: [facility] CATH;  Service: Cardiology;  Laterality: Bilateral;     TRANSCATHETER AORTIC VALVE REPLACEMENT (TAVR) Bilateral 2023    Procedure: (TAVR) - Surgeon;  Surgeon: Maximilian Connolly MD;  Location: [facility];  Service: Peripheral Vascular;  Laterality: Bilateral;     Social History     Tobacco Use    Smoking status: Former     Current packs/day: 0.00     Average packs/day: 2.0 packs/day for 40.0 years (80.0 ttl pk-yrs)     Types: Cigarettes     Start date: 1971     Quit date: 2011     Years since quittin.8     Passive exposure: Past    Smokeless tobacco: Never    Tobacco comments:          Quit in    Substance Use Topics    Alcohol use: Not Currently     Alcohol/week: 8.0 standard drinks of alcohol     Types: 8 Glasses of wine per week    Drug use: No        REVIEW OF SYSTEMS    As mentioned in HPI    OBJECTIVE     VITAL SIGNS (Most Recent)  Temp: 97.7 °F (36.5 °C) (24)  Pulse: (!) 59 (24)  Resp: 17 (24)  BP: (!) 183/64 (24)  SpO2: 100 % (24)    VENTILATION STATUS  Resp: 17 (24)  SpO2: 100 % (24)           I & O (Last 24H):  Intake/Output Summary (Last 24 hours) at 2024 1043  Last data filed at 2024 0451  Gross per 24 hour   Intake --   Output 2200 ml   Net -2200 ml       WEIGHTS  Wt Readings from Last 3 Encounters:   24 78.4 kg (172 lb 13.5 oz)   24 172 76.6 kg (168 lb 15.7 oz)   24 0739 76.7 kg (169 lb)   24 0730 76.7 kg (169 lb)   24 1032 74.4 kg (164 lb)       PHYSICAL EXAM    CONSTITUTIONAL: NAD  HEENT: Normocephalic. + pallor-,nose bleeding with nasal packing  NECK: no JVD  LUNGS:  Wheezing has improved scattered rhonchi bilaterally scattered rhonchi bilaterally   HEART: + systolic murmur  ABDOMEN: soft, non-tender, bowel sounds normal  EXTREMITIES: mild edema  SKIN: No rash  NEURO: AAO X 3  PSYCH: normal affect      HOME MEDICATIONS:  No current facility-administered medications on file prior to encounter.     Current  Outpatient Medications on File Prior to Encounter   Medication Sig Dispense Refill    acetaminophen (TYLENOL ARTHRITIS ORAL) Take 2 tablets by mouth daily as needed.      albuterol (ACCUNEB) 1.25 mg/3 mL Nebu INHALE THE CONTENTS OF 1 VIAL VIA NEBULIZER EVERY 6 HOURS AS NEEDED FOR RESCUE (Patient taking differently: Take 1.25 mg by nebulization every 6 (six) hours as needed. INHALE THE CONTENTS OF 1 VIAL VIA NEBULIZER EVERY 6 HOURS AS NEEDED FOR RESCUE) 360 mL 5    albuterol (PROVENTIL/VENTOLIN HFA) 90 mcg/actuation inhaler INHALE 2 PUFFS EVERY 6 HOURS AS NEEDED FOR WHEEZING (RESCUE) (Patient taking differently: Inhale 2 puffs into the lungs every 6 (six) hours as needed for Wheezing.) 18 g 6    aspirin (ECOTRIN) 81 MG EC tablet Take 81 mg by mouth once daily.      clopidogreL (PLAVIX) 75 mg tablet Take 1 tablet (75 mg total) by mouth once daily. 30 tablet 11    digoxin (LANOXIN) 250 mcg tablet TAKE 1 TABLET EVERY DAY (Patient taking differently: Take 250 mcg by mouth once daily.) 90 tablet 3    ezetimibe (ZETIA) 10 mg tablet TAKE 1 TABLET ONE TIME DAILY (Patient taking differently: Take 10 mg by mouth once daily.) 90 tablet 3    fluticasone propionate (FLONASE) 50 mcg/actuation nasal spray 1 spray by Each Nostril route daily as needed for Rhinitis or Allergies.      furosemide (LASIX) 20 MG tablet Take 1 tablet (20 mg total) by mouth every morning. 90 tablet 3    metoprolol succinate (TOPROL-XL) 25 MG 24 hr tablet Take 1 tablet (25 mg total) by mouth every morning. 90 tablet 3    pantoprazole (PROTONIX) 40 MG tablet Take 1 tablet (40 mg total) by mouth every morning. 90 tablet 3    promethazine-dextromethorphan (PROMETHAZINE-DM) 6.25-15 mg/5 mL Syrp Take 5 mLs by mouth every 6 (six) hours as needed (cough). 118 mL 0    rOPINIRole (REQUIP) 1 MG tablet Take 1 tablet (1 mg total) by mouth every evening. 90 tablet 1    rosuvastatin (CRESTOR) 40 MG Tab Take 1 tablet (40 mg total) by mouth every evening. (Patient taking  differently: Take 40 mg by mouth once daily.) 90 tablet 3    sacubitriL-valsartan (ENTRESTO) 24-26 mg per tablet Take 1 tablet by mouth 2 (two) times daily. 180 tablet 3    spironolactone (ALDACTONE) 25 MG tablet Take 1 tablet (25 mg total) by mouth every morning. 90 tablet 3    TRELEGY ELLIPTA 100-62.5-25 mcg DsDv INHALE 1 PUFF INTO THE LUNGS ONCE DAILY. (Patient taking differently: Inhale 1 puff into the lungs once daily.) 90 each 3    warfarin (COUMADIN) 3 MG tablet Take 1.5 pills by mouth daily or as directed per the [facility]; #145 pills = 90 day supply (Patient taking differently: Take 4.5 mg by mouth every evening. Take 1.5 pills by mouth daily or as directed per the [facility]; #145 pills = 90 day supply  Except 3 mg on Monday) 145 tablet 2       SCHEDULED MEDS:   albuterol-ipratropium  3 mL Nebulization Q4H    apixaban  5 mg Oral BID    arformoteroL  15 mcg Nebulization BID    azithromycin  500 mg Oral Daily    budesonide  0.5 mg Nebulization Q12H    digoxin  0.25 mg Oral Daily    ezetimibe  10 mg Oral QHS    furosemide  20 mg Oral Daily    methylPREDNISolone injection (PEDS and ADULTS)  40 mg Intravenous Q12H    metoprolol succinate  25 mg Oral QAM    pantoprazole  40 mg Oral QAM    polyethylene glycol  17 g Oral Daily    rOPINIRole  1 mg Oral QHS       CONTINUOUS INFUSIONS:    PRN MEDS:  Current Facility-Administered Medications:     acetaminophen, 650 mg, Oral, Q8H PRN    acetaminophen, 650 mg, Oral, Q4H PRN    albuterol-ipratropium, 3 mL, Nebulization, Q6H PRN    dextrose 50%, 12.5 g, Intravenous, PRN    dextrose 50%, 25 g, Intravenous, PRN    glucagon (human recombinant), 1 mg, Intramuscular, PRN    glucose, 16 g, Oral, PRN    glucose, 24 g, Oral, PRN    guaiFENesin 100 mg/5 ml, 200 mg, Oral, Q4H PRN    HYDROcodone-acetaminophen, 1 tablet, Oral, Q6H PRN    LORazepam, 0.5 mg, Oral, Q6H PRN    magnesium oxide, 800 mg, Oral, PRN    magnesium oxide, 800 mg, Oral, PRN    melatonin, 9 mg, Oral,  Nightly PRN    naloxone, 0.02 mg, Intravenous, PRN    ondansetron, 4 mg, Intravenous, Q12H PRN    potassium bicarbonate, 35 mEq, Oral, PRN    potassium bicarbonate, 50 mEq, Oral, PRN    potassium bicarbonate, 60 mEq, Oral, PRN    potassium, sodium phosphates, 2 packet, Oral, PRN    potassium, sodium phosphates, 2 packet, Oral, PRN    potassium, sodium phosphates, 2 packet, Oral, PRN    senna-docusate 8.6-50 mg, 1 tablet, Oral, BID PRN    sodium chloride 0.9%, 10 mL, Intravenous, Q12H PRN    sodium chloride 0.9%, 3 mL, Intravenous, PRN    LABS AND DIAGNOSTICS     CBC LAST 3 DAYS  Recent Labs   Lab 12/06/24  0509 12/07/24  0452 12/08/24  0455   WBC 9.65 11.44 8.45   RBC 2.66* 2.57* 2.69*   HGB 8.0* 7.6* 7.8*   HCT 25.0* 24.5* 25.7*   MCV 94 95 96   MCH 30.1 29.6 29.0   MCHC 32.0 31.0* 30.4*   RDW 18.6* 18.1* 17.8*    259 256   MPV 10.8 10.2 9.7   GRAN 83.2*  8.0* 81.1*  9.3* 90.6*  7.7   LYMPH 3.7*  0.4* 7.5*  0.9* 4.0*  0.3*   MONO 12.0  1.2* 10.1  1.2* 2.6*  0.2*   BASO 0.01 0.01 0.02   NRBC 0 0 0       COAGULATION LAST 3 DAYS  Recent Labs   Lab 12/06/24  0509 12/07/24  0452 12/08/24  0455   INR 2.2* 1.8* 1.4*       CHEMISTRY LAST 3 DAYS  Recent Labs   Lab 12/05/24  0807 12/05/24  0816 12/06/24  0509 12/07/24  0452 12/08/24  0454   NA  --    < > 138 140 139   K  --    < > 5.3* 4.8 5.7*   CL  --    < > 103 103 99   CO2  --    < > 32* 35* 37*   ANIONGAP  --    < > 3* 2* 3*   BUN  --    < > 30* 28* 30*   CREATININE  --    < > 0.8 0.7 0.8   GLU  --    < > 134* 99 141*   CALCIUM  --    < > 9.0 8.8 9.0   PH 7.402  --   --   --   --    MG  --   --  2.2 2.1 2.1   ALBUMIN  --    < > 3.7 3.6 3.8   PROT  --    < > 6.8 6.6 6.8   ALKPHOS  --    < > 75 79 84   ALT  --    < > 12 14 19   AST  --    < > 32 35 37   BILITOT  --    < > 0.7 0.7 0.7    < > = values in this interval not displayed.       CARDIAC PROFILE LAST 3 DAYS  Recent Labs   Lab 12/05/24  0816   *       ENDOCRINE LAST 3 DAYS  No results for  "input(s): "TSH", "PROCAL" in the last 168 hours.    LAST ARTERIAL BLOOD GAS  ABG  Recent Labs   Lab 12/05/24  0807   PH 7.402   PO2 77*   PCO2 44.4   HCO3 27.6   BE 3*       LAST 7 DAYS MICROBIOLOGY   Microbiology Results (last 7 days)       Procedure Component Value Units Date/Time    Blood culture x two cultures. Draw prior to antibiotics. [[id number]] Collected: 12/05/24 0813    Order Status: Completed Specimen: Blood from Peripheral, Antecubital, Right Updated: 12/08/24 1032     Blood Culture, Routine No Growth to date      No Growth to date      No Growth to date      No Growth to date    Narrative:      Aerobic and anaerobic  Collection has been rescheduled by [facility] at 12/05/2024 08:23 Reason:   DONE.   Collection has been rescheduled by [facility] at 12/05/2024 08:23 Reason:   DONE.     Blood culture x two cultures. Draw prior to antibiotics. [[id number]] Collected: 12/05/24 0823    Order Status: Completed Specimen: Blood from Peripheral, Antecubital, Right Updated: 12/08/24 1032     Blood Culture, Routine No Growth to date      No Growth to date      No Growth to date      No Growth to date    Narrative:      Aerobic and anaerobic    Respiratory Infection Panel (PCR), Nasopharyngeal [[id number]] Collected: 12/07/24 0901    Order Status: Completed Specimen: Nasopharyngeal Swab Updated: 12/07/24 1030     Respiratory Infection Panel Source NP swab     Adenovirus Not Detected     Coronavirus 229E, Common Cold Virus Not Detected     Coronavirus HKU1, Common Cold Virus Not Detected     Coronavirus NL63, Common Cold Virus Not Detected     Coronavirus OC43, Common Cold Virus Not Detected     Comment: Coronavirus strains 229E, HKU1, NL63, and OC43 can cause the common   cold   and are not associated with the respiratory disease outbreak caused   by  the COVID-19 (SARS-CoV-2 novel Coronavirus) strain.           SARS-CoV2 (COVID-19) Qualitative PCR Not Detected     Human Metapneumovirus Not Detected     Human " Rhinovirus/Enterovirus Not Detected     Influenza A (subtypes H1, H1-2009,H3) Not Detected     Influenza B Not Detected     Parainfluenza Virus 1 Not Detected     Parainfluenza Virus 2 Not Detected     Parainfluenza Virus 3 Not Detected     Parainfluenza Virus 4 Not Detected     Respiratory Syncytial Virus Not Detected     Bordetella Parapertussis (CT8203) Not Detected     Bordetella pertussis (ptxP) Not Detected     Chlamydia pneumoniae Not Detected     Mycoplasma pneumoniae Not Detected     Comment: Respiratory Infection Panel testing performed by Multiplex PCR.       Narrative:      Respiratory Infection Panel source->NP Swab            MOST RECENT IMAGING  X-Ray Chest 1 View  Narrative: EXAMINATION:  XR CHEST 1 VIEW    CLINICAL HISTORY:  Chronic interstitial changes and bronchiectasis;    TECHNIQUE:  Erect AP view of the chest.    COMPARISON:  12/07/2024    FINDINGS:  Single lead pacemaker device projects over the patient's left lower chest wall with the distal lead projecting over the right atrium.  The cardiomediastinal silhouette appears globally enlarged appears similar patient's previous imaging study.  Diffuse interstitial and vascular prominence is again redemonstrated related to pulmonary edema, although may have overlap relative to underlying pulmonary infection.  No significant change.  Deployment of a metallic stent across the aortic valve plane is noted.  Bilateral glenohumeral joint osteoarthritis.  Impression: Factors that are in keeping with mild pulmonary congestion/edema without detrimental change upon comparison.    Electronically signed by: Ian Carbajal MD  Date:    12/07/2024  Time:    08:54  X-Ray Chest 1 View  Narrative: EXAMINATION:  XR CHEST 1 VIEW    CLINICAL HISTORY:  Chronic interstitial changes and bronchiectasis;    TECHNIQUE:  Single frontal view of the chest was performed.    COMPARISON:  12/06/2024    FINDINGS:  Enlarged cardiac silhouette with pulmonary vascular congestion.   Diffuse interstitial prominence right greater than left representing edema or pneumonitis.  Small to moderate right and small left pleural effusions.  Left chest wall cardiac pacemaker.  Status post aortic valve replacement.  Impression: As above.    Electronically signed by: Jim Joy  Date:    12/07/2024  Time:    01:58      ECHOCARDIOGRAM RESULTS (last 5)  Results for orders placed during the hospital encounter of 12/03/24    Echo    Interpretation Summary    Left Ventricle: The left ventricle is normal in size. Normal wall thickness. There is normal systolic function with a visually estimated ejection fraction of 55 - 60%. Unable to assess diastolic function due to atrial fibrillation.    Right Ventricle: Normal right ventricular cavity size. Wall thickness is normal. Systolic function is normal. Pacemaker lead present in the ventricle.    Left Atrium: Left atrium is mildly dilated.    Right Atrium: Right atrium is mildly dilated.    Aortic Valve: There is a transcatheter valve replacement in the aortic position. It is reported to be a 26 mm Jauregui valve. There is mild stenosis. Aortic valve area by VTI is 1.5 cm². Aortic valve peak velocity is 3.6 m/s. Mean gradient is 28.0 mmHg. The dimensionless index is 0.43. There is mild to moderate aortic regurgitation with an eccentrically directed jet.    Mitral Valve: There is bileaflet sclerosis. There is mitral annular calcification present. There is mild regurgitation.    Tricuspid Valve: There is mild to moderate regurgitation.    IVC/SVC: Normal venous pressure at 3 mmHg.      Results for orders placed during the hospital encounter of 01/23/24    Echo    Interpretation Summary    Left Ventricle: The left ventricle is normal in size. Mildly increased wall thickness. There is mildly reduced systolic function with a visually estimated ejection fraction of 45 - 50%. Unable to assess diastolic function due to atrial fibrillation.    Right Ventricle: Normal  right ventricular cavity size. Wall thickness is normal. Right ventricle wall motion  is normal. Systolic function is normal.    Left Atrium: Left atrium is mildly dilated.    Right Atrium: Right atrium is mildly dilated.    Aortic Valve: There is a transcatheter valve replacement in the aortic position that is appropriately positioned. Mild paravalvular regurgitation.    Mitral Valve: There is bileaflet sclerosis. There is moderate mitral annular calcification present. There is no stenosis. The mean pressure gradient across the mitral valve is 2 mmHg at a heart rate of  bpm. There is moderate regurgitation.    Tricuspid Valve: There is mild regurgitation.    IVC/SVC: Normal venous pressure at 3 mmHg.      Results for orders placed during the hospital encounter of 12/06/23    Echo Saline Bubble? No    Interpretation Summary    Left Ventricle: The left ventricle is moderately dilated. There is mildly reduced systolic function with a visually estimated ejection fraction of 40 - 45%. Unable to assess diastolic function due to atrial fibrillation.    Right Ventricle: Mild right ventricular enlargement. Systolic function is mildly reduced.    Left Atrium: Left atrium is mildly dilated.    Right Atrium: Right atrium is moderately dilated.    Aortic Valve: There is a transcatheter valve replacement in the aortic position. It is reported to be a Jauregui valve.    Mitral Valve: There is moderate bileaflet sclerosis. There is no stenosis. The mean pressure gradient across the mitral valve is 3 mmHg at a heart rate of  bpm. There is mild regurgitation.    Tricuspid Valve: There is mild regurgitation.    IVC/SVC: Normal venous pressure at 3 mmHg.      Echo Saline Bubble? No    Interpretation Summary    Limited 2D echocardiogram with color-flow Doppler done intraoperatively doing TAVR procedure    Well-positioned balloon expandable valve with no perivalvular regurgitation    No pericardial effusion      Results for orders placed  during the hospital encounter of 10/23/23    Stress Echo Which stress agent will be used? Pharmacological; Color Flow Doppler? No    Interpretation Summary  Dobutamine aortic valve study.  Baseline left ventricular function proximally 25% with abnormal septal motion.  Aortic valve poorly seen questionable bioprosthetic aortic valve.  Baseline peak aortic velocity 3.51 m/sec with a max/mean gradient of 49/27 mm Hg  With dobutamine 10 mcg ejection fraction improved to approximately 35%.  With a peak aortic velocity of 4.02 m/sec max/mean gradient of 67/36 mm Hg.  Aortic valve area 0.  Nine 0.88    Study consistent with viable left ventricular function and severe aortic stenosis.      CURRENT/PREVIOUS VISIT EKG  Results for orders placed or performed during the hospital encounter of 12/05/24   EKG 12-lead    Collection Time: 12/05/24  7:46 AM   Result Value Ref Range    QRS Duration 152 ms    [facility] QTC Calculation 416 ms    Narrative    Test Reason : R07.9,    Vent. Rate :  61 BPM     Atrial Rate : 357 BPM     P-R Int :    ms          QRS Dur : 152 ms      QT Int : 414 ms       P-R-T Axes :    -72  98 degrees    QTcB Int : 416 ms    Ventricular-paced rhythm  Abnormal ECG  When compared with ECG of 14-Mar-2024 10:33,  No significant change was found    Referred By: AAAREFERRAL SELF           Confirmed By:            ASSESSMENT/PLAN:     Active Hospital Problems    Diagnosis    Acute on chronic heart failure with preserved ejection fraction (HFpEF)    Anemia    Epistaxis    Heart failure with improved ejection fraction (HFimpEF)    Atrial fibrillation    Pneumonia    COPD (chronic obstructive pulmonary disease)     Dr. Haro pulmonologist         ASSESSMENT & PLAN:       Epistaxis, now controlled  Anemia s/p 2 units PRBC yesterday  Chronic AF  S/P TAVR  Pneumonia  COPD  CHFpEF  Chronic coumadin therapy, discontinue    RECOMMENDATIONS:  12/08/2024  Patient has no further episodes of bleeding.  Consider starting her on  Eliquis 5 mg p.o. b.i.d.  Hemoglobin remains low at 7.8 continue to monitor  Potassium is 5.7 this has maybe secondary to some hemolysis.  Recommend to repeat this  Continue Lasix  And if hyperkalemia is spurious may continue on digoxin therapy otherwise withhold digoxin.  Continue on other medications  Watchman device evaluation as an outpatient  Discussed with the patient as well as nursing staff  Will sign off for now please re-consult as needed              12/07/2024  Will withhold anticoagulation therapy for another 24 hours  After long discussion with the patient regarding anticoagulation therapy  She is willing to restart on Eliquis 5 mg p.o. b.i.d.  Continue on digoxin 0.25 mg daily  May switch to Lasix oral therapy  Currently on intravenous steroids may consider switching to oral therapy  Maintain on antibiotic therapy  Continue on pantoprazole              Raymond Pérez MD  Department of Cardiology  Date of Service: 12/08/2024      Clinically improving.  Currently on azithromycin and prednisone therapy still coughing up some green yellow stuff  ENT to evaluate her and some point  Regarding long-term anticoagulation therapy she will benefit from Eliquis rather than Coumadin therapy.  I have personally interviewed and examined the patient, I have reviewed the Nurse Practitioner's history and physical, assessment, and plan. I agree with the findings and plan.  Patient is to be re-evaluated for Watchman device implantation in an effort to discontinue anticoagulation therapy for multiple reasons  For long-term management patient may need to use a humidifier in the house to minimize recurrence of nosebleeds and needs to be re-evaluated for Watchman device implanted    Raymond Pérez M.D.  Department of Cardiology  Date of Service: 12/08/2024  2:46 PM

## 2024-12-08 NOTE — ASSESSMENT & PLAN NOTE
Patient is identified as having Diastolic (HFpEF) heart failure that is Acute on  Chronic. CHF is currently controlled. Latest ECHO performed and demonstrates- Results for orders placed during the hospital encounter of 12/03/24    Echo    Interpretation Summary    Left Ventricle: The left ventricle is normal in size. Normal wall thickness. There is normal systolic function with a visually estimated ejection fraction of 55 - 60%. Unable to assess diastolic function due to atrial fibrillation.    Right Ventricle: Normal right ventricular cavity size. Wall thickness is normal. Systolic function is normal. Pacemaker lead present in the ventricle.    Left Atrium: Left atrium is mildly dilated.    Right Atrium: Right atrium is mildly dilated.    Aortic Valve: There is a transcatheter valve replacement in the aortic position. It is reported to be a 26 mm Jauregui valve. There is mild stenosis. Aortic valve area by VTI is 1.5 cm². Aortic valve peak velocity is 3.6 m/s. Mean gradient is 28.0 mmHg. The dimensionless index is 0.43. There is mild to moderate aortic regurgitation with an eccentrically directed jet.    Mitral Valve: There is bileaflet sclerosis. There is mitral annular calcification present. There is mild regurgitation.    Tricuspid Valve: There is mild to moderate regurgitation.    IVC/SVC: Normal venous pressure at 3 mmHg.  . Continue Beta Blocker Furosemide Entresto and monitor clinical status closely. Monitor on telemetry. Patient is on CHF pathway.  Monitor strict Is&Os and daily weights.  Place on fluid restriction of 1.5 L. Cardiology is consulted. Continue to stress to patient importance of self efficacy and  on diet for CHF. Last BNP reviewed- and noted below   Recent Labs   Lab 12/05/24  0816   *     .  Recent echo December 3, 2024 with EF 55-60%, mild AS with TAVR, mild MR, moderate TR    Diuresing well. Continue IV Lasix

## 2024-12-08 NOTE — SUBJECTIVE & OBJECTIVE
Interval History: Patient's oxygen is down to 5 L today, Nasal packing was removed. Patient using oxymask due to nasal cannula irritating the nose. Patient feels better. No more bleeding noted.     Review of Systems  Objective:     Vital Signs (Most Recent):  Temp: 97.3 °F (36.3 °C) (12/08/24 1106)  Pulse: 62 (12/08/24 1106)  Resp: 18 (12/08/24 1106)  BP: (!) 160/64 (12/08/24 1106)  SpO2: 100 % (12/08/24 1106) Vital Signs (24h Range):  Temp:  [97.3 °F (36.3 °C)-98.1 °F (36.7 °C)] 97.3 °F (36.3 °C)  Pulse:  [59-66] 62  Resp:  [17-20] 18  SpO2:  [89 %-100 %] 100 %  BP: (128-183)/(54-71) 160/64     Weight: 78.4 kg (172 lb 13.5 oz)  Body mass index is 28.76 kg/m².    Intake/Output Summary (Last 24 hours) at 12/8/2024 1314  Last data filed at 12/8/2024 0451  Gross per 24 hour   Intake --   Output 1300 ml   Net -1300 ml         Physical Exam  Vitals and nursing note reviewed.   Constitutional:       General: She is not in acute distress.     Appearance: She is not toxic-appearing.   HENT:      Head: Atraumatic.      Mouth/Throat:      Mouth: Mucous membranes are moist.      Pharynx: Oropharynx is clear.   Eyes:      General: No scleral icterus.     Conjunctiva/sclera: Conjunctivae normal.      Pupils: Pupils are equal, round, and reactive to light.   Cardiovascular:      Rate and Rhythm: Normal rate and regular rhythm.      Heart sounds: Murmur heard.   Pulmonary:      Effort: No respiratory distress.      Breath sounds: No wheezing or rhonchi.      Comments: Diminished breath sounds improved today   Abdominal:      General: Abdomen is flat. Bowel sounds are normal.      Palpations: Abdomen is soft.   Musculoskeletal:         General: No swelling or deformity.      Cervical back: No rigidity or tenderness.   Skin:     Coloration: Skin is not jaundiced or pale.      Findings: No bruising, erythema or rash.   Neurological:      General: No focal deficit present.      Mental Status: She is alert and oriented to person,  place, and time.      Cranial Nerves: No cranial nerve deficit.      Sensory: No sensory deficit.      Motor: No weakness.   Psychiatric:         Mood and Affect: Mood normal.         Behavior: Behavior normal.     Significant Labs: All pertinent labs within the past 24 hours have been reviewed.  CBC:   Recent Labs   Lab 12/07/24 0452 12/08/24 0455   WBC 11.44 8.45   HGB 7.6* 7.8*   HCT 24.5* 25.7*    256     CMP:   Recent Labs   Lab 12/07/24 0452 12/08/24 0454    139   K 4.8 5.7*    99   CO2 35* 37*   GLU 99 141*   BUN 28* 30*   CREATININE 0.7 0.8   CALCIUM 8.8 9.0   PROT 6.6 6.8   ALBUMIN 3.6 3.8   BILITOT 0.7 0.7   ALKPHOS 79 84   AST 35 37   ALT 14 19   ANIONGAP 2* 3*       Significant Imaging: I have reviewed all pertinent imaging results/findings within the past 24 hours.

## 2024-12-08 NOTE — PROGRESS NOTES
[facility] Medicine  Progress Note    Patient Name: Lisa Smith  MRN: [id number]  Patient Class: IP- Inpatient   Admission Date: 12/5/2024  Length of Stay: 3 days  Attending Physician: Tarah Cassidy MD  Primary Care Provider: Hope Tang FNP        Subjective     Principal Problem:<principal problem not specified>        HPI:  The patient is a 74-year-old female with a past medical history of COPD (maintained on 3 L nasal cannula at home), paroxysmal AFib on Coumadin, status post TAVR, heart failure with preserved ejection fraction, anemia who presents to the emergency department for the evaluation of epistaxis, productive cough and shortness of breath.  Patient reports that over the past few days she has been having some epistaxis as well as upper respiratory infection symptoms with productive cough, subjective fever and chills, and increased dyspnea.  She denies recent sick contacts or recent travel.  The patient reports that she was recently given a blood transfusion for anemia that has been followed outpatient by her PCP.  Patient reports that since she has been on Coumadin she has been suffering with anemia.  She is scheduled to have a GI workup for upper and lower scopes to evaluate for causes of bleeding as well as bone marrow biopsy per heme Onc as well as a workup for possible Watchman device to transition off Coumadin.  Patient was evaluated in the emergency department.  Hemoglobin 8.4 (was 7 prior to blood transfusion on 12/03/2024), white blood cell count 10.8, renal function within normal limits.  ABG revealed pH of 7.4, PO2 of 77, CO2 of 44 and bicarb of 27.  Flu and COVID negative.  Chest x-ray with bilateral diffuse interstitial opacities worse in the right lung consistent with possible superimposed pneumonia.  Lactic acid within normal limits, BNP mildly elevated 309.  PT 17.6 INR 1.6.  Afrin was placed to sterile gauze to left Landeros with some improvement in  hemostasis.  Patient was initiated on cefepime and vancomycin per ER MD.  Patient admitted to the hospital medicine service with pulmonary and cardiology consultation for further evaluation.    Overview/Hospital Course:  74-year-old female with COPD on 3 L by nasal cannula, paroxysmal AFib on Coumadin, prior TAVR who has had multiple episodes of significant epistaxis presents with recurrent epistaxis in addition to feeling globally unwell with worsened cough, shortness of breath and wheezing for roughly 5 days consistent with an acute COPD exacerbation compounded by heart failure decompensation after receiving an outpatient transfusion on 12/4.    Regarding COPD exacerbation, the patient has been placed on Solu-Medrol, doxycycline and scheduled nebulizers.  Regarding her heart failure decompensation, she is on IV Lasix and is currently diuresing well.  Supplemental oxygen requirements have stabilized and she is beginning to feel better.      Regarding her nosebleed, Afrin did not lead to resolution of the ER.  A rhino rocket was placed. And was removed on 12/8. NO more bleeding was noted.     Cardiology has been discussing a Watchman procedure with the patient and potentially discontinuing anticoagulation. Patient was resumed on Eliquis, for 1 month prior to the procedure as recommended by cardiology.     Interval History: Patient's oxygen is down to 5 L today, Nasal packing was removed. Patient using oxymask due to nasal cannula irritating the nose. Patient feels better. No more bleeding noted.     Review of Systems  Objective:     Vital Signs (Most Recent):  Temp: 97.3 °F (36.3 °C) (12/08/24 1106)  Pulse: 62 (12/08/24 1106)  Resp: 18 (12/08/24 1106)  BP: (!) 160/64 (12/08/24 1106)  SpO2: 100 % (12/08/24 1106) Vital Signs (24h Range):  Temp:  [97.3 °F (36.3 °C)-98.1 °F (36.7 °C)] 97.3 °F (36.3 °C)  Pulse:  [59-66] 62  Resp:  [17-20] 18  SpO2:  [89 %-100 %] 100 %  BP: (128-183)/(54-71) 160/64     Weight: 78.4 kg  (172 lb 13.5 oz)  Body mass index is 28.76 kg/m².    Intake/Output Summary (Last 24 hours) at 12/8/2024 1314  Last data filed at 12/8/2024 0451  Gross per 24 hour   Intake --   Output 1300 ml   Net -1300 ml         Physical Exam  Vitals and nursing note reviewed.   Constitutional:       General: She is not in acute distress.     Appearance: She is not toxic-appearing.   HENT:      Head: Atraumatic.      Mouth/Throat:      Mouth: Mucous membranes are moist.      Pharynx: Oropharynx is clear.   Eyes:      General: No scleral icterus.     Conjunctiva/sclera: Conjunctivae normal.      Pupils: Pupils are equal, round, and reactive to light.   Cardiovascular:      Rate and Rhythm: Normal rate and regular rhythm.      Heart sounds: Murmur heard.   Pulmonary:      Effort: No respiratory distress.      Breath sounds: No wheezing or rhonchi.      Comments: Diminished breath sounds improved today   Abdominal:      General: Abdomen is flat. Bowel sounds are normal.      Palpations: Abdomen is soft.   Musculoskeletal:         General: No swelling or deformity.      Cervical back: No rigidity or tenderness.   Skin:     Coloration: Skin is not jaundiced or pale.      Findings: No bruising, erythema or rash.   Neurological:      General: No focal deficit present.      Mental Status: She is alert and oriented to person, place, and time.      Cranial Nerves: No cranial nerve deficit.      Sensory: No sensory deficit.      Motor: No weakness.   Psychiatric:         Mood and Affect: Mood normal.         Behavior: Behavior normal.     Significant Labs: All pertinent labs within the past 24 hours have been reviewed.  CBC:   Recent Labs   Lab 12/07/24  0452 12/08/24  0455   WBC 11.44 8.45   HGB 7.6* 7.8*   HCT 24.5* 25.7*    256     CMP:   Recent Labs   Lab 12/07/24  0452 12/08/24  0454    139   K 4.8 5.7*    99   CO2 35* 37*   GLU 99 141*   BUN 28* 30*   CREATININE 0.7 0.8   CALCIUM 8.8 9.0   PROT 6.6 6.8   ALBUMIN 3.6  3.8   BILITOT 0.7 0.7   ALKPHOS 79 84   AST 35 37   ALT 14 19   ANIONGAP 2* 3*       Significant Imaging: I have reviewed all pertinent imaging results/findings within the past 24 hours.    Assessment and Plan     Acute on chronic heart failure with preserved ejection fraction (HFpEF)  Patient has Diastolic (HFpEF) heart failure that is Acute on chronic. On presentation their CHF was decompensated. Evidence of decompensated CHF on presentation includes: edema and shortness of breath  Most recent BNP and echo results are listed below.  Recent Labs     12/05/24  0816   *     Latest ECHO  Results for orders placed during the hospital encounter of 12/03/24    Echo    Interpretation Summary    Left Ventricle: The left ventricle is normal in size. Normal wall thickness. There is normal systolic function with a visually estimated ejection fraction of 55 - 60%. Unable to assess diastolic function due to atrial fibrillation.    Right Ventricle: Normal right ventricular cavity size. Wall thickness is normal. Systolic function is normal. Pacemaker lead present in the ventricle.    Left Atrium: Left atrium is mildly dilated.    Right Atrium: Right atrium is mildly dilated.    Aortic Valve: There is a transcatheter valve replacement in the aortic position. It is reported to be a 26 mm Jauregui valve. There is mild stenosis. Aortic valve area by VTI is 1.5 cm². Aortic valve peak velocity is 3.6 m/s. Mean gradient is 28.0 mmHg. The dimensionless index is 0.43. There is mild to moderate aortic regurgitation with an eccentrically directed jet.    Mitral Valve: There is bileaflet sclerosis. There is mitral annular calcification present. There is mild regurgitation.    Tricuspid Valve: There is mild to moderate regurgitation.    IVC/SVC: Normal venous pressure at 3 mmHg.    Current Heart Failure Medications  digoxin tablet 0.25 mg, Daily, Oral  metoprolol succinate (TOPROL-XL) 24 hr tablet 25 mg, Every morning, Oral  furosemide  injection 40 mg, Daily, Intravenous    Plan  - Monitor strict I&Os and daily weights.    - Place on telemetry  - Low sodium diet  - Place on fluid restriction of 1.5 L.         Epistaxis  Patient presented with recent history of epistaxis  No trauma  Coumadin held, patient was started on Eliquis per cardiology recommendations   Rhino rocket placed by ER physician, removed 12/7      Anemia  Anemia is likely due to acute blood loss which was from epistaxis . Most recent hemoglobin and hematocrit are listed below.  Recent Labs     12/06/24  0509 12/07/24  0452 12/08/24  0455   HGB 8.0* 7.6* 7.8*   HCT 25.0* 24.5* 25.7*       Plan  - Monitor serial CBC: Every 12 hours  - Transfuse PRBC if patient becomes hemodynamically unstable, symptomatic or H/H drops below 7/21.  - Patient has received 2 units of PRBCs on 12/3/24  - Patient's anemia is currently stable  - patient to have outpatient upper and lower scope for evaluation with GI as well as workup with Hematology Oncology outpatient  - Resumed Eliquis 12/8    Atrial fibrillation  Patient has paroxysmal (<7 days) atrial fibrillation. Patient is currently in sinus rhythm. ICLNE3DELs Score: 4. The patients heart rate in the last 24 hours is as follows:  Pulse  Min: 59  Max: 66     Antiarrhythmics  metoprolol succinate (TOPROL-XL) 24 hr tablet 25 mg, Every morning, Oral    Anticoagulants  apixaban tablet 5 mg, 2 times daily, Oral    Plan  - Replete lytes with a goal of K>4, Mg >2  - Patient is anticoagulated, see medications listed above.  - Patient's afib is currently controlled  - resume Eliquis per cardiology recommendations   - Planning for outpatient watchman device         Pneumonia  Ruled out.    COPD (chronic obstructive pulmonary disease)  Acute exacerbation   Acute on chronic hypoxic respiratory failure   - change PO Prednisone to IV Solumedrol > Weaned to Q12H  - Duoneb to Q4H   - wean oxygen   - Cont PO Azithromycin   - Cont LABA and Pulmicort nebulizer          VTE Risk Mitigation (From admission, onward)           Ordered     apixaban tablet 5 mg  2 times daily         12/08/24 0832     IP VTE HIGH RISK PATIENT  Once         12/05/24 1259     Place sequential compression device  Until discontinued         12/05/24 1259     Place sequential compression device  Until discontinued         12/05/24 1115                    Discharge Planning   PILI: 12/7/2024     Code Status: Full Code   Medical Readiness for Discharge Date:   Discharge Plan A: Home Health                Tarah Cassidy MD  Department of Hospital Medicine   [facility]

## 2024-12-08 NOTE — ASSESSMENT & PLAN NOTE
Patient presented with recent history of epistaxis  No trauma  Coumadin held, patient was started on Eliquis per cardiology recommendations   Rhino rocket placed by ER physician, removed 12/7

## 2024-12-09 LAB
ALBUMIN SERPL BCP-MCNC: 4 G/DL (ref 3.5–5.2)
ALP SERPL-CCNC: 83 U/L (ref 55–135)
ALT SERPL W/O P-5'-P-CCNC: 24 U/L (ref 10–44)
ANION GAP SERPL CALC-SCNC: 4 MMOL/L (ref 8–16)
ANISOCYTOSIS BLD QL SMEAR: SLIGHT
AST SERPL-CCNC: 45 U/L (ref 10–40)
BASOPHILS # BLD AUTO: 0.07 K/UL (ref 0–0.2)
BASOPHILS NFR BLD: 0.4 % (ref 0–1.9)
BILIRUB SERPL-MCNC: 1.1 MG/DL (ref 0.1–1)
BUN SERPL-MCNC: 45 MG/DL (ref 8–23)
CALCIUM SERPL-MCNC: 9.3 MG/DL (ref 8.7–10.5)
CHLORIDE SERPL-SCNC: 97 MMOL/L (ref 95–110)
CO2 SERPL-SCNC: 35 MMOL/L (ref 23–29)
CREAT SERPL-MCNC: 1 MG/DL (ref 0.5–1.4)
DIFFERENTIAL METHOD BLD: ABNORMAL
EOSINOPHIL # BLD AUTO: 0 K/UL (ref 0–0.5)
EOSINOPHIL NFR BLD: 0 % (ref 0–8)
ERYTHROCYTE [DISTWIDTH] IN BLOOD BY AUTOMATED COUNT: 17.6 % (ref 11.5–14.5)
EST. GFR  (NO RACE VARIABLE): 58.4 ML/MIN/1.73 M^2
GLUCOSE SERPL-MCNC: 129 MG/DL (ref 70–110)
HCT VFR BLD AUTO: 28.1 % (ref 37–48.5)
HGB BLD-MCNC: 8.6 G/DL (ref 12–16)
IMM GRANULOCYTES # BLD AUTO: 0.74 K/UL (ref 0–0.04)
IMM GRANULOCYTES NFR BLD AUTO: 3.9 % (ref 0–0.5)
INR PPP: 1.3 (ref 0.8–1.2)
LYMPHOCYTES # BLD AUTO: 0.9 K/UL (ref 1–4.8)
LYMPHOCYTES NFR BLD: 4.9 % (ref 18–48)
MAGNESIUM SERPL-MCNC: 2.2 MG/DL (ref 1.6–2.6)
MCH RBC QN AUTO: 29.6 PG (ref 27–31)
MCHC RBC AUTO-ENTMCNC: 30.6 G/DL (ref 32–36)
MCV RBC AUTO: 97 FL (ref 82–98)
MONOCYTES # BLD AUTO: 1.5 K/UL (ref 0.3–1)
MONOCYTES NFR BLD: 7.9 % (ref 4–15)
NEUTROPHILS # BLD AUTO: 15.6 K/UL (ref 1.8–7.7)
NEUTROPHILS NFR BLD: 82.9 % (ref 38–73)
NRBC BLD-RTO: 0 /100 WBC
PHOSPHATE SERPL-MCNC: 4.6 MG/DL (ref 2.7–4.5)
PLATELET # BLD AUTO: 351 K/UL (ref 150–450)
PLATELET BLD QL SMEAR: ABNORMAL
PMV BLD AUTO: 10.4 FL (ref 9.2–12.9)
POTASSIUM SERPL-SCNC: 5.6 MMOL/L (ref 3.5–5.1)
PROT SERPL-MCNC: 7.4 G/DL (ref 6–8.4)
PROTHROMBIN TIME: 13.9 SEC (ref 9–12.5)
RBC # BLD AUTO: 2.91 M/UL (ref 4–5.4)
SODIUM SERPL-SCNC: 136 MMOL/L (ref 136–145)
WBC # BLD AUTO: 18.83 K/UL (ref 3.9–12.7)

## 2024-12-09 PROCEDURE — 63600175 PHARM REV CODE 636 W HCPCS: Performed by: INTERNAL MEDICINE

## 2024-12-09 PROCEDURE — 63700000 PHARM REV CODE 250 ALT 637 W/O HCPCS: Performed by: INTERNAL MEDICINE

## 2024-12-09 PROCEDURE — 25000242 PHARM REV CODE 250 ALT 637 W/ HCPCS: Performed by: NURSE PRACTITIONER

## 2024-12-09 PROCEDURE — 25000003 PHARM REV CODE 250: Performed by: STUDENT IN AN ORGANIZED HEALTH CARE EDUCATION/TRAINING PROGRAM

## 2024-12-09 PROCEDURE — 99900031 HC PATIENT EDUCATION (STAT)

## 2024-12-09 PROCEDURE — 94761 N-INVAS EAR/PLS OXIMETRY MLT: CPT

## 2024-12-09 PROCEDURE — 85025 COMPLETE CBC W/AUTO DIFF WBC: CPT | Performed by: NURSE PRACTITIONER

## 2024-12-09 PROCEDURE — 25000003 PHARM REV CODE 250: Performed by: INTERNAL MEDICINE

## 2024-12-09 PROCEDURE — 94640 AIRWAY INHALATION TREATMENT: CPT

## 2024-12-09 PROCEDURE — 85610 PROTHROMBIN TIME: CPT | Performed by: INTERNAL MEDICINE

## 2024-12-09 PROCEDURE — 25000242 PHARM REV CODE 250 ALT 637 W/ HCPCS: Performed by: INTERNAL MEDICINE

## 2024-12-09 PROCEDURE — 25000003 PHARM REV CODE 250: Performed by: NURSE PRACTITIONER

## 2024-12-09 PROCEDURE — 83735 ASSAY OF MAGNESIUM: CPT | Performed by: NURSE PRACTITIONER

## 2024-12-09 PROCEDURE — 27000221 HC OXYGEN, UP TO 24 HOURS

## 2024-12-09 PROCEDURE — 84100 ASSAY OF PHOSPHORUS: CPT | Performed by: NURSE PRACTITIONER

## 2024-12-09 PROCEDURE — 36415 COLL VENOUS BLD VENIPUNCTURE: CPT | Performed by: INTERNAL MEDICINE

## 2024-12-09 PROCEDURE — 21400001 HC TELEMETRY ROOM

## 2024-12-09 PROCEDURE — 80053 COMPREHEN METABOLIC PANEL: CPT | Performed by: NURSE PRACTITIONER

## 2024-12-09 PROCEDURE — 99232 SBSQ HOSP IP/OBS MODERATE 35: CPT | Mod: ,,, | Performed by: INTERNAL MEDICINE

## 2024-12-09 RX ORDER — GUAIFENESIN AND DEXTROMETHORPHAN HYDROBROMIDE 10; 100 MG/5ML; MG/5ML
15 SYRUP ORAL EVERY 6 HOURS PRN
Status: DISCONTINUED | OUTPATIENT
Start: 2024-12-09 | End: 2024-12-10 | Stop reason: HOSPADM

## 2024-12-09 RX ADMIN — GUAIFENESIN AND DEXTROMETHORPHAN 15 ML: 100; 10 SYRUP ORAL at 09:12

## 2024-12-09 RX ADMIN — IPRATROPIUM BROMIDE AND ALBUTEROL SULFATE 3 ML: .5; 3 SOLUTION RESPIRATORY (INHALATION) at 06:12

## 2024-12-09 RX ADMIN — IPRATROPIUM BROMIDE AND ALBUTEROL SULFATE 3 ML: .5; 3 SOLUTION RESPIRATORY (INHALATION) at 04:12

## 2024-12-09 RX ADMIN — IPRATROPIUM BROMIDE AND ALBUTEROL SULFATE 3 ML: .5; 3 SOLUTION RESPIRATORY (INHALATION) at 12:12

## 2024-12-09 RX ADMIN — APIXABAN 5 MG: 5 TABLET, FILM COATED ORAL at 08:12

## 2024-12-09 RX ADMIN — APIXABAN 5 MG: 5 TABLET, FILM COATED ORAL at 07:12

## 2024-12-09 RX ADMIN — BUDESONIDE INHALATION 0.5 MG: 0.5 SUSPENSION RESPIRATORY (INHALATION) at 07:12

## 2024-12-09 RX ADMIN — PREDNISONE 40 MG: 20 TABLET ORAL at 07:12

## 2024-12-09 RX ADMIN — ARFORMOTEROL TARTRATE 15 MCG: 15 SOLUTION RESPIRATORY (INHALATION) at 07:12

## 2024-12-09 RX ADMIN — FUROSEMIDE 20 MG: 20 TABLET ORAL at 07:12

## 2024-12-09 RX ADMIN — ARFORMOTEROL TARTRATE 15 MCG: 15 SOLUTION RESPIRATORY (INHALATION) at 06:12

## 2024-12-09 RX ADMIN — DIGOXIN 0.25 MG: 125 TABLET ORAL at 07:12

## 2024-12-09 RX ADMIN — BUDESONIDE INHALATION 0.5 MG: 0.5 SUSPENSION RESPIRATORY (INHALATION) at 06:12

## 2024-12-09 RX ADMIN — EZETIMIBE 10 MG: 10 TABLET ORAL at 08:12

## 2024-12-09 RX ADMIN — IPRATROPIUM BROMIDE AND ALBUTEROL SULFATE 3 ML: .5; 3 SOLUTION RESPIRATORY (INHALATION) at 07:12

## 2024-12-09 RX ADMIN — METOPROLOL SUCCINATE 25 MG: 25 TABLET, FILM COATED, EXTENDED RELEASE ORAL at 07:12

## 2024-12-09 RX ADMIN — AZITHROMYCIN DIHYDRATE 500 MG: 250 TABLET ORAL at 07:12

## 2024-12-09 RX ADMIN — GUAIFENESIN 200 MG: 200 SOLUTION ORAL at 04:12

## 2024-12-09 RX ADMIN — IPRATROPIUM BROMIDE AND ALBUTEROL SULFATE 3 ML: .5; 3 SOLUTION RESPIRATORY (INHALATION) at 01:12

## 2024-12-09 RX ADMIN — ROPINIROLE HYDROCHLORIDE 1 MG: 1 TABLET, FILM COATED ORAL at 08:12

## 2024-12-09 RX ADMIN — LORAZEPAM 0.5 MG: 0.5 TABLET ORAL at 08:12

## 2024-12-09 NOTE — PLAN OF CARE
Problem: COPD (Chronic Obstructive Pulmonary Disease)  Goal: Optimal Chronic Illness Coping  Outcome: Progressing  Goal: Optimal Level of Functional Mar[place]  Outcome: Progressing  Goal: Absence of Infection Signs and Symptoms  Outcome: Progressing  Goal: Improved Oral Intake  Outcome: Progressing  Goal: Effective Oxygenation and Ventilation  Outcome: Progressing     Problem: Adult Inpatient Plan of Care  Goal: Plan of Care Review  Outcome: Progressing  Goal: Patient-Specific Goal (Individualized)  Outcome: Progressing  Goal: Absence of Hospital-Acquired Illness or Injury  Outcome: Progressing  Goal: Optimal Comfort and Wellbeing  Outcome: Progressing  Goal: Readiness for Transition of Care  Outcome: Progressing     Problem: Pneumonia  Goal: Fluid Balance  Outcome: Progressing  Goal: Resolution of Infection Signs and Symptoms  Outcome: Progressing  Goal: Effective Oxygenation and Ventilation  Outcome: Progressing     Problem: Wound  Goal: Optimal Coping  Outcome: Progressing  Goal: Optimal Functional Ability  Outcome: Progressing  Goal: Absence of Infection Signs and Symptoms  Outcome: Progressing  Goal: Improved Oral Intake  Outcome: Progressing  Goal: Optimal Pain Control and Function  Outcome: Progressing  Goal: Skin Health and Integrity  Outcome: Progressing  Goal: Optimal Wound Healing  Outcome: Progressing     Problem: Fall Injury Risk  Goal: Absence of Fall and Fall-Related Injury  Outcome: Progressing     Problem: Fluid Volume Excess  Goal: Fluid Balance  Outcome: Progressing     Problem: Skin Injury Risk Increased  Goal: Skin Health and Integrity  Outcome: Progressing

## 2024-12-09 NOTE — CARE UPDATE
12/09/24 0651   Patient Assessment/Suction   Level of Consciousness (AVPU) alert   Respiratory Effort Normal;Unlabored   Expansion/Accessory Muscles/Retractions expansion symmetric;no retractions;no use of accessory muscles   All Lung Fields Breath Sounds Anterior:;Lateral:;diminished;wheezes, expiratory   Rhythm/Pattern, Respiratory pattern regular;unlabored   PRE-TX-O2   Device (Oxygen Therapy) high flow mask   $ Is the patient on Low Flow Oxygen? Yes   Flow (L/min) (Oxygen Therapy) (S)  5   SpO2 98 %   Pulse Oximetry Type Intermittent   $ Pulse Oximetry - Multiple Charge Pulse Oximetry - Multiple   Pulse 60   Resp 18   Aerosol Therapy   $ Aerosol Therapy Charges Aerosol Treatment   Daily Review of Necessity (SVN) completed   Respiratory Treatment Status (SVN) given   Treatment Route (SVN) mask;oxygen   Patient Position sitting in chair   Post Treatment Assessment (SVN) breath sounds improved   Signs of Intolerance (SVN) none   Education   $ Education Bronchodilator;15 min

## 2024-12-09 NOTE — CARE UPDATE
12/08/24 1941   Patient Assessment/Suction   Level of Consciousness (AVPU) alert   Respiratory Effort Unlabored   Expansion/Accessory Muscles/Retractions expansion symmetric;no retractions   All Lung Fields Breath Sounds Anterior:;Lateral:;clear   Rhythm/Pattern, Respiratory no shortness of breath reported;depth regular   Cough Frequency frequent   Cough Type dry   PRE-TX-O2   Device (Oxygen Therapy) high flow mask   Flow (L/min) (Oxygen Therapy) 5   SpO2 (!) 93 %   Pulse Oximetry Type Intermittent   $ Pulse Oximetry - Multiple Charge Pulse Oximetry - Multiple   Pulse 60   Resp 18   Aerosol Therapy   $ Aerosol Therapy Charges Aerosol Treatment   Daily Review of Necessity (SVN) completed   Respiratory Treatment Status (SVN) given   Treatment Route (SVN) oxygen;mask   Patient Position HOB elevated   Post Treatment Assessment (SVN) increased aeration   Signs of Intolerance (SVN) none   Breath Sounds Post-Respiratory Treatment   Throughout All Fields Post-Treatment All Fields   Throughout All Fields Post-Treatment aeration increased   Post-treatment Heart Rate (beats/min) 65   Post-treatment Resp Rate (breaths/min) 20   Education   $ Education Bronchodilator;Oxygen;15 min

## 2024-12-09 NOTE — PLAN OF CARE
[facility] [employer] per pt's preference.      12/09/24 1208   Post-Acute Status   Post-Acute Authorization Home Health   Home Health Status Pending medical clearance/testing   Discharge Plan   Discharge Plan A [facility]     TiffanieDomingo Corrie responded via [employer] stating [facility]     1345- Per Jonnie with [facility] will be 12/12/24

## 2024-12-09 NOTE — SUBJECTIVE & OBJECTIVE
Interval History: no more nose bleeding. She is still on 5 L oxymask. Rpt CXR is similar to prior. No other overnight events.     Review of Systems  Objective:     Vital Signs (Most Recent):  Temp: 97.8 °F (36.6 °C) (12/09/24 1123)  Pulse: 63 (12/09/24 1224)  Resp: 18 (12/09/24 1224)  BP: 139/62 (12/09/24 1123)  SpO2: (!) 92 % (12/09/24 1224) Vital Signs (24h Range):  Temp:  [97.5 °F (36.4 °C)-97.9 °F (36.6 °C)] 97.8 °F (36.6 °C)  Pulse:  [59-65] 63  Resp:  [17-20] 18  SpO2:  [90 %-98 %] 92 %  BP: (139-177)/(61-75) 139/62     Weight: 78.4 kg (172 lb 13.5 oz)  Body mass index is 28.76 kg/m².    Intake/Output Summary (Last 24 hours) at 12/9/2024 1319  Last data filed at 12/9/2024 0632  Gross per 24 hour   Intake 600 ml   Output 1250 ml   Net -650 ml         Physical Exam  Vitals and nursing note reviewed.   Constitutional:       General: She is not in acute distress.     Appearance: She is not toxic-appearing.   HENT:      Head: Atraumatic.      Mouth/Throat:      Mouth: Mucous membranes are moist.      Pharynx: Oropharynx is clear.   Eyes:      General: No scleral icterus.     Conjunctiva/sclera: Conjunctivae normal.      Pupils: Pupils are equal, round, and reactive to light.   Cardiovascular:      Rate and Rhythm: Normal rate and regular rhythm.      Heart sounds: Murmur heard.   Pulmonary:      Effort: No respiratory distress.      Breath sounds: No wheezing or rhonchi.      Comments: Diminished breath sounds improved today   Abdominal:      General: Abdomen is flat. Bowel sounds are normal.      Palpations: Abdomen is soft.   Musculoskeletal:         General: No swelling or deformity.      Cervical back: No rigidity or tenderness.   Skin:     Coloration: Skin is not jaundiced or pale.      Findings: No bruising, erythema or rash.   Neurological:      General: No focal deficit present.      Mental Status: She is alert and oriented to person, place, and time.      Cranial Nerves: No cranial nerve deficit.       Sensory: No sensory deficit.      Motor: No weakness.   Psychiatric:         Mood and Affect: Mood normal.         Behavior: Behavior normal.     Significant Labs: All pertinent labs within the past 24 hours have been reviewed.  CBC:   Recent Labs   Lab 12/08/24 0455 12/09/24 0427   WBC 8.45 18.83*   HGB 7.8* 8.6*   HCT 25.7* 28.1*    351     CMP:   Recent Labs   Lab 12/08/24 0454 12/09/24 0427    136   K 5.7* 5.6*   CL 99 97   CO2 37* 35*   * 129*   BUN 30* 45*   CREATININE 0.8 1.0   CALCIUM 9.0 9.3   PROT 6.8 7.4   ALBUMIN 3.8 4.0   BILITOT 0.7 1.1*   ALKPHOS 84 83   AST 37 45*   ALT 19 24   ANIONGAP 3* 4*       Significant Imaging: I have reviewed all pertinent imaging results/findings within the past 24 hours.

## 2024-12-09 NOTE — ASSESSMENT & PLAN NOTE
Acute exacerbation   Acute on chronic hypoxic respiratory failure   - change PO Prednisone to IV Solumedrol > Weaned to Q12H  - rpt CXR is similar to prior   - Duoneb to Q4H   - wean oxygen   - Cont PO Azithromycin   - Cont LABA and Pulmicort nebulizer

## 2024-12-09 NOTE — PROGRESS NOTES
[facility] Medicine  Progress Note    Patient Name: Lisa Smith  MRN: [id number]  Patient Class: IP- Inpatient   Admission Date: 12/5/2024  Length of Stay: 4 days  Attending Physician: Tarah Cassidy MD  Primary Care Provider: Hope Tang FNP        Subjective     Principal Problem:<principal problem not specified>        HPI:  The patient is a 74-year-old female with a past medical history of COPD (maintained on 3 L nasal cannula at home), paroxysmal AFib on Coumadin, status post TAVR, heart failure with preserved ejection fraction, anemia who presents to the emergency department for the evaluation of epistaxis, productive cough and shortness of breath.  Patient reports that over the past few days she has been having some epistaxis as well as upper respiratory infection symptoms with productive cough, subjective fever and chills, and increased dyspnea.  She denies recent sick contacts or recent travel.  The patient reports that she was recently given a blood transfusion for anemia that has been followed outpatient by her PCP.  Patient reports that since she has been on Coumadin she has been suffering with anemia.  She is scheduled to have a GI workup for upper and lower scopes to evaluate for causes of bleeding as well as bone marrow biopsy per heme Onc as well as a workup for possible Watchman device to transition off Coumadin.  Patient was evaluated in the emergency department.  Hemoglobin 8.4 (was 7 prior to blood transfusion on 12/03/2024), white blood cell count 10.8, renal function within normal limits.  ABG revealed pH of 7.4, PO2 of 77, CO2 of 44 and bicarb of 27.  Flu and COVID negative.  Chest x-ray with bilateral diffuse interstitial opacities worse in the right lung consistent with possible superimposed pneumonia.  Lactic acid within normal limits, BNP mildly elevated 309.  PT 17.6 INR 1.6.  Afrin was placed to sterile gauze to left Landeros with some improvement in  hemostasis.  Patient was initiated on cefepime and vancomycin per ER MD.  Patient admitted to the hospital medicine service with pulmonary and cardiology consultation for further evaluation.    Overview/Hospital Course:  74-year-old female with COPD on 3 L by nasal cannula, paroxysmal AFib on Coumadin, prior TAVR who has had multiple episodes of significant epistaxis presents with recurrent epistaxis in addition to feeling globally unwell with worsened cough, shortness of breath and wheezing for roughly 5 days consistent with an acute COPD exacerbation compounded by heart failure decompensation after receiving an outpatient transfusion on 12/4.    Regarding COPD exacerbation, the patient has been placed on Solu-Medrol, doxycycline and scheduled nebulizers.  Regarding her heart failure decompensation, she is on IV Lasix and is currently diuresing well.  Supplemental oxygen requirements have stabilized and she is beginning to feel better.      Regarding her nosebleed, Afrin did not lead to resolution of the ER.  A rhino rocket was placed. And was removed on 12/8. NO more bleeding was noted.     Cardiology has been discussing a Watchman procedure with the patient and potentially discontinuing anticoagulation. Patient was resumed on Eliquis, for 1 month prior to the procedure as recommended by cardiology.     Interval History: no more nose bleeding. She is still on 5 L oxymask. Rpt CXR is similar to prior. No other overnight events.     Review of Systems  Objective:     Vital Signs (Most Recent):  Temp: 97.8 °F (36.6 °C) (12/09/24 1123)  Pulse: 63 (12/09/24 1224)  Resp: 18 (12/09/24 1224)  BP: 139/62 (12/09/24 1123)  SpO2: (!) 92 % (12/09/24 1224) Vital Signs (24h Range):  Temp:  [97.5 °F (36.4 °C)-97.9 °F (36.6 °C)] 97.8 °F (36.6 °C)  Pulse:  [59-65] 63  Resp:  [17-20] 18  SpO2:  [90 %-98 %] 92 %  BP: (139-177)/(61-75) 139/62     Weight: 78.4 kg (172 lb 13.5 oz)  Body mass index is 28.76 kg/m².    Intake/Output Summary  (Last 24 hours) at 12/9/2024 1319  Last data filed at 12/9/2024 0632  Gross per 24 hour   Intake 600 ml   Output 1250 ml   Net -650 ml         Physical Exam  Vitals and nursing note reviewed.   Constitutional:       General: She is not in acute distress.     Appearance: She is not toxic-appearing.   HENT:      Head: Atraumatic.      Mouth/Throat:      Mouth: Mucous membranes are moist.      Pharynx: Oropharynx is clear.   Eyes:      General: No scleral icterus.     Conjunctiva/sclera: Conjunctivae normal.      Pupils: Pupils are equal, round, and reactive to light.   Cardiovascular:      Rate and Rhythm: Normal rate and regular rhythm.      Heart sounds: Murmur heard.   Pulmonary:      Effort: No respiratory distress.      Breath sounds: No wheezing or rhonchi.      Comments: Diminished breath sounds improved today   Abdominal:      General: Abdomen is flat. Bowel sounds are normal.      Palpations: Abdomen is soft.   Musculoskeletal:         General: No swelling or deformity.      Cervical back: No rigidity or tenderness.   Skin:     Coloration: Skin is not jaundiced or pale.      Findings: No bruising, erythema or rash.   Neurological:      General: No focal deficit present.      Mental Status: She is alert and oriented to person, place, and time.      Cranial Nerves: No cranial nerve deficit.      Sensory: No sensory deficit.      Motor: No weakness.   Psychiatric:         Mood and Affect: Mood normal.         Behavior: Behavior normal.     Significant Labs: All pertinent labs within the past 24 hours have been reviewed.  CBC:   Recent Labs   Lab 12/08/24  0455 12/09/24 0427   WBC 8.45 18.83*   HGB 7.8* 8.6*   HCT 25.7* 28.1*    351     CMP:   Recent Labs   Lab 12/08/24  0454 12/09/24  0427    136   K 5.7* 5.6*   CL 99 97   CO2 37* 35*   * 129*   BUN 30* 45*   CREATININE 0.8 1.0   CALCIUM 9.0 9.3   PROT 6.8 7.4   ALBUMIN 3.8 4.0   BILITOT 0.7 1.1*   ALKPHOS 84 83   AST 37 45*   ALT 19 24    ANIONGAP 3* 4*       Significant Imaging: I have reviewed all pertinent imaging results/findings within the past 24 hours.    Assessment and Plan     Acute on chronic heart failure with preserved ejection fraction (HFpEF)  Patient has Diastolic (HFpEF) heart failure that is Acute on chronic. On presentation their CHF was decompensated. Evidence of decompensated CHF on presentation includes: edema and shortness of breath  Most recent BNP and echo results are listed below.  Recent Labs     12/05/24  0816   *     Latest ECHO  Results for orders placed during the hospital encounter of 12/03/24    Echo    Interpretation Summary    Left Ventricle: The left ventricle is normal in size. Normal wall thickness. There is normal systolic function with a visually estimated ejection fraction of 55 - 60%. Unable to assess diastolic function due to atrial fibrillation.    Right Ventricle: Normal right ventricular cavity size. Wall thickness is normal. Systolic function is normal. Pacemaker lead present in the ventricle.    Left Atrium: Left atrium is mildly dilated.    Right Atrium: Right atrium is mildly dilated.    Aortic Valve: There is a transcatheter valve replacement in the aortic position. It is reported to be a 26 mm Jauregui valve. There is mild stenosis. Aortic valve area by VTI is 1.5 cm². Aortic valve peak velocity is 3.6 m/s. Mean gradient is 28.0 mmHg. The dimensionless index is 0.43. There is mild to moderate aortic regurgitation with an eccentrically directed jet.    Mitral Valve: There is bileaflet sclerosis. There is mitral annular calcification present. There is mild regurgitation.    Tricuspid Valve: There is mild to moderate regurgitation.    IVC/SVC: Normal venous pressure at 3 mmHg.    Current Heart Failure Medications  digoxin tablet 0.25 mg, Daily, Oral  metoprolol succinate (TOPROL-XL) 24 hr tablet 25 mg, Every morning, Oral  furosemide injection 40 mg, Daily, Intravenous    Plan  - Monitor strict  I&Os and daily weights.    - Place on telemetry  - Low sodium diet  - Place on fluid restriction of 1.5 L.         Epistaxis  Patient presented with recent history of epistaxis  No trauma  Coumadin held, patient was started on Eliquis per cardiology recommendations   Rhino rocket placed by ER physician, removed 12/7      Anemia  Anemia is likely due to acute blood loss which was from epistaxis . Most recent hemoglobin and hematocrit are listed below.  Recent Labs     12/07/24  0452 12/08/24  0455 12/09/24  0427   HGB 7.6* 7.8* 8.6*   HCT 24.5* 25.7* 28.1*       Plan  - Monitor serial CBC: Every 12 hours  - Transfuse PRBC if patient becomes hemodynamically unstable, symptomatic or H/H drops below 7/21.  - Patient has received 2 units of PRBCs on 12/3/24  - Patient's anemia is currently stable  - patient to have outpatient upper and lower scope for evaluation with GI as well as workup with Hematology Oncology outpatient  - Resumed Eliquis 12/8    Atrial fibrillation  Patient has paroxysmal (<7 days) atrial fibrillation. Patient is currently in sinus rhythm. [id number] Score: 4. The patients heart rate in the last 24 hours is as follows:  Pulse  Min: 59  Max: 66     Antiarrhythmics  metoprolol succinate (TOPROL-XL) 24 hr tablet 25 mg, Every morning, Oral    Anticoagulants  apixaban tablet 5 mg, 2 times daily, Oral    Plan  - Replete lytes with a goal of K>4, Mg >2  - Patient is anticoagulated, see medications listed above.  - Patient's afib is currently controlled  - resume Eliquis per cardiology recommendations   - Planning for outpatient watchman device         Pneumonia  Ruled out.    COPD (chronic obstructive pulmonary disease)  Acute exacerbation   Acute on chronic hypoxic respiratory failure   - change PO Prednisone to IV Solumedrol > Weaned to Q12H  - rpt CXR is similar to prior   - Duoneb to Q4H   - wean oxygen   - Cont PO Azithromycin   - Cont LABA and Pulmicort nebulizer         VTE Risk Mitigation (From  admission, onward)           Ordered     apixaban tablet 5 mg  2 times daily         12/08/24 0832     IP VTE HIGH RISK PATIENT  Once         12/05/24 1259     Place sequential compression device  Until discontinued         12/05/24 1259     Place sequential compression device  Until discontinued         12/05/24 1115                    Discharge Planning   PILI: 12/10/2024     Code Status: Full Code   Medical Readiness for Discharge Date:   Discharge Plan A: Home Health              Tarah Cassidy MD  Department of Hospital Medicine   [facility]

## 2024-12-09 NOTE — PLAN OF CARE
Discharge Planning    The patient will DC with eliquis. NATALIE spoke with  with [facility] pharmacy to confirm cost. She stated that the patient is in the gap period with her insurance and the cost of the medication is $139.76 for a 30 day supply. NATALIE spoke with the patient at the bedside about the cost and she confirmed she will pay for the med.

## 2024-12-09 NOTE — PLAN OF CARE
Problem: COPD (Chronic Obstructive Pulmonary Disease)  Goal: Optimal Chronic Illness Coping  Outcome: Progressing  Goal: Optimal Level of Functional Bishop  Outcome: Progressing  Goal: Absence of Infection Signs and Symptoms  Outcome: Progressing  Goal: Improved Oral Intake  Outcome: Progressing  Goal: Effective Oxygenation and Ventilation  Outcome: Progressing     Problem: Adult Inpatient Plan of Care  Goal: Plan of Care Review  Outcome: Progressing  Goal: Patient-Specific Goal (Individualized)  Outcome: Progressing  Goal: Absence of Hospital-Acquired Illness or Injury  Outcome: Progressing  Goal: Optimal Comfort and Wellbeing  Outcome: Progressing  Goal: Readiness for Transition of Care  Outcome: Progressing     Problem: Pneumonia  Goal: Fluid Balance  Outcome: Progressing  Goal: Resolution of Infection Signs and Symptoms  Outcome: Progressing  Goal: Effective Oxygenation and Ventilation  Outcome: Progressing     Problem: Wound  Goal: Optimal Coping  Outcome: Progressing  Goal: Optimal Functional Ability  Outcome: Progressing  Goal: Absence of Infection Signs and Symptoms  Outcome: Progressing  Goal: Improved Oral Intake  Outcome: Progressing  Goal: Optimal Pain Control and Function  Outcome: Progressing  Goal: Skin Health and Integrity  Outcome: Progressing  Goal: Optimal Wound Healing  Outcome: Progressing     Problem: Fall Injury Risk  Goal: Absence of Fall and Fall-Related Injury  Outcome: Progressing     Problem: Fluid Volume Excess  Goal: Fluid Balance  Outcome: Progressing     Problem: Skin Injury Risk Increased  Goal: Skin Health and Integrity  Outcome: Progressing

## 2024-12-09 NOTE — CARE UPDATE
12/09/24 0430   Patient Assessment/Suction   Level of Consciousness (AVPU) alert   Respiratory Effort Unlabored   Expansion/Accessory Muscles/Retractions no retractions;no use of accessory muscles   All Lung Fields Breath Sounds Anterior:;Lateral:;wheezes, expiratory   Rhythm/Pattern, Respiratory shortness of breath  (pt desats having coughing fits & getting up out of bed)   Cough Frequency frequent   Cough Type dry   PRE-TX-O2   Device (Oxygen Therapy) high flow mask   Flow (L/min) (Oxygen Therapy) 7   SpO2 95 %   Pulse Oximetry Type Intermittent   $ Pulse Oximetry - Multiple Charge Pulse Oximetry - Multiple   Pulse 65   Resp 20   Aerosol Therapy   $ Aerosol Therapy Charges Aerosol Treatment   Daily Review of Necessity (SVN) completed   Respiratory Treatment Status (SVN) given   Treatment Route (SVN) mask;oxygen   Patient Position HOB elevated   Post Treatment Assessment (SVN) patient reports breathing improved  (also states she breathes better sitting up rather than lying flat)   Signs of Intolerance (SVN) none   Breath Sounds Post-Respiratory Treatment   Throughout All Fields Post-Treatment All Fields   Throughout All Fields Post-Treatment aeration increased   Post-treatment Heart Rate (beats/min) 66   Post-treatment Resp Rate (breaths/min) 20

## 2024-12-09 NOTE — ASSESSMENT & PLAN NOTE
Anemia is likely due to acute blood loss which was from epistaxis . Most recent hemoglobin and hematocrit are listed below.  Recent Labs     12/07/24  0452 12/08/24  0455 12/09/24  0427   HGB 7.6* 7.8* 8.6*   HCT 24.5* 25.7* 28.1*       Plan  - Monitor serial CBC: Every 12 hours  - Transfuse PRBC if patient becomes hemodynamically unstable, symptomatic or H/H drops below 7/21.  - Patient has received 2 units of PRBCs on 12/3/24  - Patient's anemia is currently stable  - patient to have outpatient upper and lower scope for evaluation with GI as well as workup with Hematology Oncology outpatient  - Resumed Eliquis 12/8

## 2024-12-10 ENCOUNTER — PATIENT OUTREACH (OUTPATIENT)
Dept: FAMILY MEDICINE | Facility: CLINIC | Age: 74
End: 2024-12-10
Payer: MEDICARE

## 2024-12-10 VITALS
HEART RATE: 72 BPM | RESPIRATION RATE: 20 BRPM | HEIGHT: 65 IN | BODY MASS INDEX: 28.79 KG/M2 | SYSTOLIC BLOOD PRESSURE: 131 MMHG | OXYGEN SATURATION: 92 % | WEIGHT: 172.81 LBS | DIASTOLIC BLOOD PRESSURE: 52 MMHG | TEMPERATURE: 98 F

## 2024-12-10 LAB
ALBUMIN SERPL BCP-MCNC: 3.7 G/DL (ref 3.5–5.2)
ALP SERPL-CCNC: 73 U/L (ref 55–135)
ALT SERPL W/O P-5'-P-CCNC: 36 U/L (ref 10–44)
ANION GAP SERPL CALC-SCNC: 2 MMOL/L (ref 8–16)
ANISOCYTOSIS BLD QL SMEAR: SLIGHT
AST SERPL-CCNC: 54 U/L (ref 10–40)
BACTERIA BLD CULT: NORMAL
BACTERIA BLD CULT: NORMAL
BASOPHILS NFR BLD: 1 % (ref 0–1.9)
BILIRUB SERPL-MCNC: 0.9 MG/DL (ref 0.1–1)
BUN SERPL-MCNC: 40 MG/DL (ref 8–23)
CALCIUM SERPL-MCNC: 8.8 MG/DL (ref 8.7–10.5)
CHLORIDE SERPL-SCNC: 99 MMOL/L (ref 95–110)
CO2 SERPL-SCNC: 39 MMOL/L (ref 23–29)
CREAT SERPL-MCNC: 0.8 MG/DL (ref 0.5–1.4)
DIFFERENTIAL METHOD BLD: ABNORMAL
DIGOXIN SERPL-MCNC: 1.4 NG/ML (ref 0.8–2)
EOSINOPHIL NFR BLD: 1 % (ref 0–8)
ERYTHROCYTE [DISTWIDTH] IN BLOOD BY AUTOMATED COUNT: 17.9 % (ref 11.5–14.5)
EST. GFR  (NO RACE VARIABLE): >60 ML/MIN/1.73 M^2
GLUCOSE SERPL-MCNC: 94 MG/DL (ref 70–110)
HCT VFR BLD AUTO: 24.8 % (ref 37–48.5)
HGB BLD-MCNC: 7.8 G/DL (ref 12–16)
IMM GRANULOCYTES # BLD AUTO: ABNORMAL K/UL (ref 0–0.04)
IMM GRANULOCYTES NFR BLD AUTO: ABNORMAL % (ref 0–0.5)
INR PPP: 1.1 (ref 0.8–1.2)
LYMPHOCYTES NFR BLD: 12 % (ref 18–48)
MAGNESIUM SERPL-MCNC: 2.1 MG/DL (ref 1.6–2.6)
MCH RBC QN AUTO: 30.2 PG (ref 27–31)
MCHC RBC AUTO-ENTMCNC: 31.5 G/DL (ref 32–36)
MCV RBC AUTO: 96 FL (ref 82–98)
MONOCYTES NFR BLD: 4 % (ref 4–15)
NEUTROPHILS NFR BLD: 81 % (ref 38–73)
NEUTS BAND NFR BLD MANUAL: 1 %
NRBC BLD-RTO: 0 /100 WBC
PHOSPHATE SERPL-MCNC: 4.1 MG/DL (ref 2.7–4.5)
PLATELET # BLD AUTO: 263 K/UL (ref 150–450)
PMV BLD AUTO: 10.2 FL (ref 9.2–12.9)
POTASSIUM SERPL-SCNC: 4.8 MMOL/L (ref 3.5–5.1)
PROT SERPL-MCNC: 6.6 G/DL (ref 6–8.4)
PROTHROMBIN TIME: 12.4 SEC (ref 9–12.5)
RBC # BLD AUTO: 2.58 M/UL (ref 4–5.4)
SODIUM SERPL-SCNC: 140 MMOL/L (ref 136–145)
WBC # BLD AUTO: 14.22 K/UL (ref 3.9–12.7)

## 2024-12-10 PROCEDURE — 36415 COLL VENOUS BLD VENIPUNCTURE: CPT | Performed by: INTERNAL MEDICINE

## 2024-12-10 PROCEDURE — 80053 COMPREHEN METABOLIC PANEL: CPT | Performed by: NURSE PRACTITIONER

## 2024-12-10 PROCEDURE — 25000242 PHARM REV CODE 250 ALT 637 W/ HCPCS: Performed by: NURSE PRACTITIONER

## 2024-12-10 PROCEDURE — 25000003 PHARM REV CODE 250: Performed by: NURSE PRACTITIONER

## 2024-12-10 PROCEDURE — 25000003 PHARM REV CODE 250: Performed by: INTERNAL MEDICINE

## 2024-12-10 PROCEDURE — 80162 ASSAY OF DIGOXIN TOTAL: CPT | Performed by: INTERNAL MEDICINE

## 2024-12-10 PROCEDURE — 83735 ASSAY OF MAGNESIUM: CPT | Performed by: NURSE PRACTITIONER

## 2024-12-10 PROCEDURE — 84100 ASSAY OF PHOSPHORUS: CPT | Performed by: NURSE PRACTITIONER

## 2024-12-10 PROCEDURE — 85027 COMPLETE CBC AUTOMATED: CPT | Performed by: NURSE PRACTITIONER

## 2024-12-10 PROCEDURE — 25000242 PHARM REV CODE 250 ALT 637 W/ HCPCS: Performed by: INTERNAL MEDICINE

## 2024-12-10 PROCEDURE — 94640 AIRWAY INHALATION TREATMENT: CPT

## 2024-12-10 PROCEDURE — 27000221 HC OXYGEN, UP TO 24 HOURS

## 2024-12-10 PROCEDURE — 99900035 HC TECH TIME PER 15 MIN (STAT)

## 2024-12-10 PROCEDURE — 94761 N-INVAS EAR/PLS OXIMETRY MLT: CPT

## 2024-12-10 PROCEDURE — 63700000 PHARM REV CODE 250 ALT 637 W/O HCPCS: Performed by: INTERNAL MEDICINE

## 2024-12-10 PROCEDURE — 99232 SBSQ HOSP IP/OBS MODERATE 35: CPT | Mod: ,,, | Performed by: INTERNAL MEDICINE

## 2024-12-10 PROCEDURE — 63600175 PHARM REV CODE 636 W HCPCS: Performed by: INTERNAL MEDICINE

## 2024-12-10 PROCEDURE — 85007 BL SMEAR W/DIFF WBC COUNT: CPT | Performed by: NURSE PRACTITIONER

## 2024-12-10 PROCEDURE — 85610 PROTHROMBIN TIME: CPT | Performed by: INTERNAL MEDICINE

## 2024-12-10 RX ORDER — IPRATROPIUM BROMIDE AND ALBUTEROL SULFATE 2.5; .5 MG/3ML; MG/3ML
3 SOLUTION RESPIRATORY (INHALATION) EVERY 6 HOURS PRN
Qty: 90 ML | Refills: 0 | Status: SHIPPED | OUTPATIENT
Start: 2024-12-10 | End: 2025-01-09

## 2024-12-10 RX ORDER — PREDNISONE 20 MG/1
TABLET ORAL
Qty: 15 TABLET | Refills: 0 | Status: SHIPPED | OUTPATIENT
Start: 2024-12-10 | End: 2024-12-22

## 2024-12-10 RX ADMIN — AZITHROMYCIN DIHYDRATE 500 MG: 250 TABLET ORAL at 08:12

## 2024-12-10 RX ADMIN — IPRATROPIUM BROMIDE AND ALBUTEROL SULFATE 3 ML: .5; 3 SOLUTION RESPIRATORY (INHALATION) at 07:12

## 2024-12-10 RX ADMIN — PREDNISONE 40 MG: 20 TABLET ORAL at 08:12

## 2024-12-10 RX ADMIN — IPRATROPIUM BROMIDE AND ALBUTEROL SULFATE 3 ML: .5; 3 SOLUTION RESPIRATORY (INHALATION) at 01:12

## 2024-12-10 RX ADMIN — APIXABAN 5 MG: 5 TABLET, FILM COATED ORAL at 08:12

## 2024-12-10 RX ADMIN — FUROSEMIDE 20 MG: 20 TABLET ORAL at 08:12

## 2024-12-10 RX ADMIN — DIGOXIN 0.25 MG: 125 TABLET ORAL at 08:12

## 2024-12-10 RX ADMIN — BUDESONIDE INHALATION 0.5 MG: 0.5 SUSPENSION RESPIRATORY (INHALATION) at 07:12

## 2024-12-10 RX ADMIN — ARFORMOTEROL TARTRATE 15 MCG: 15 SOLUTION RESPIRATORY (INHALATION) at 07:12

## 2024-12-10 NOTE — PLAN OF CARE
Charts and orders reviewed. Pt to discharge home with [facility] home health; SOC 12/12/2024.  called Pt's PCP office to schedule Pt follow-up appointment; appointment scheduled (12/12/2024 @ 08:00AM) and PCP details added to AVS. Pt has no other needs to be addressed by case management. Pt cleared to discharge from case management standpoint; after nebulizer delivered at bedside.    Dr. GEMMA Pérez scheduled 12/11/2024 @ 10:00 AM  Dr. Addison scheduled 01/14/2025 @ 10:15 AM    Pt's family will be transporting pt home from [facility].       12/10/24 1025   Final Note   Assessment Type Final Discharge Note   Anticipated Discharge Disposition Home-Health   What phone number can be called within the next 1-3 days to see how you are doing after discharge? [phone]   Hospital Resources/Appts/Education Provided Appointments scheduled and added to AVS;Post-Acute resouces added to AVS   Post-Acute Status   Post-Acute Authorization Home Health   Home Health Status Set-up Complete/Auth obtained   Coverage [employer] [facility]NA[facility]ED MEDIC[facility]   Patient choice form signed by patient/caregiver List with quality metrics by geographic area provided   Discharge Delays (!) Home Medical Equipment (Insurance, Delivery)

## 2024-12-10 NOTE — ASSESSMENT & PLAN NOTE
Patient has paroxysmal (<7 days) atrial fibrillation. Patient is currently in sinus rhythm. [id number] Score: 4. The patients heart rate in the last 24 hours is as follows:  Pulse  Min: 59  Max: 76     Antiarrhythmics  metoprolol succinate (TOPROL-XL) 24 hr tablet 25 mg, Every morning, Oral    Anticoagulants  apixaban tablet 5 mg, 2 times daily, Oral  apixaban tablet, 2 times daily, Oral

## 2024-12-10 NOTE — PROGRESS NOTES
Pulmonary/Critical Care progress note      PATIENT NAME: Lisa Smith  MRN: [id number]  TODAY'S DATE: 12/10/2024  4:31 PM  ADMIT DATE: 2024  AGE: 74 y.o. : 1950    CONSULT REQUESTED BY: Tarah Cassidy MD    REASON FOR CONSULT:   Increased shortness of breath    HPI:  The patient is a 74-year-old female with COPD and bronchiectasis and chronic respiratory failure who began feeling poorly around .  She has also been very anemic with Coumadin which she has taking since she has had her TAVR.  She was transfused 2 units packed red blood cells on Tuesday.  Overnight she became so short of breath she asked to come into the hospital.  The patient has moderate obstruction on her PFTs she has PFTs pending at this time.    The patient's chest x-ray is basically stable.  However, she has multi phonic wheezing with a prolonged expiratory phase consistent with a COPD exacerbation.     the patient is a bit distressed because she was told her rhino rocket would have to stay until next week.  We will discontinue it tomorrow.  That will be 3 days.  She is currently on 7 L of oxygen because she is only getting oxygen in through 1 nostril.  She is receiving Lasix and is diuresing.     the patient's rhino rocket was removed.  So far so good.  She has been instructed not to blow her nose.  She is needing 4-5 L of oxygen to oxygenate.     the patient is breathing much better today her oxygen requirements are down.  Her epistaxis did not return.     the patient states she awakened from sleep very short of breath.  She was given 2 treatments in his feeling a little better.  She looks tired.    REVIEW OF SYSTEMS  GENERAL: Feeling a bit worse  EYES: Vision is good.   ENT:  No further epistaxis  HEART: No chest pain or palpitations.  LUNGS:  She was very short of breath this morning.  Now not so much  GI: No Nausea, vomiting, constipation, diarrhea, or reflux.  : No dysuria, hesitancy, or  nocturia.  SKIN: No lesions or rashes.  MUSCULOSKELETAL: No joint pain or myalgias.  NEURO: No headaches or neuropathy.  LYMPH: No edema or adenopathy.  PSYCH: No anxiety or depression.  ENDO: No weight change.    No change in the patient's Past Medical History, Past Surgical History, Social History or Family History since admission.      VITAL SIGNS (MOST RECENT)  Temp: 97.8 °F (36.6 °C) (12/10/24 1108)  Pulse: 72 (12/10/24 1301)  Resp: 20 (12/10/24 1301)  BP: (!) 131/52 (12/10/24 1108)  SpO2: (!) 92 % (12/10/24 1301)    INTAKE AND OUTPUT (LAST 24 HOURS):  Intake/Output Summary (Last 24 hours) at 12/10/2024 1332  Last data filed at 12/10/2024 0629  Gross per 24 hour   Intake 600 ml   Output 1450 ml   Net -850 ml       WEIGHT  Wt Readings from Last 1 Encounters:   12/06/24 78.4 kg (172 lb 13.5 oz)       PHYSICAL EXAM  GENERAL: Older patient in no distress.  HEENT: Pupils equal and reactive. Extraocular movements intact. Nose intact.. Pharynx moist.  NECK: Supple.   HEART: Regular rate and rhythm. No murmur or gallop auscultated.  LUNGS:  The lungs have some hoarse breath sounds posteriorly with end expiratory wheezing.  Lung excursion symmetrical. No change in fremitus.   ABDOMEN: Bowel sounds present. Non-tender, no masses palpated.  : Normal anatomy.  EXTREMITIES: Normal muscle tone and joint movement, no cyanosis or clubbing.   LYMPHATICS: No adenopathy palpated, no edema.  SKIN: Dry, intact, no lesions.   NEURO: Cranial nerves II-XII intact. Motor strength 5/5 bilaterally, upper and lower extremities.  PSYCH:  Fatigued      CBC LAST (LAST 24 HOURS)  Recent Labs   Lab 12/10/24  0511   WBC 14.22*   RBC 2.58*   HGB 7.8*   HCT 24.8*   MCV 96   MCH 30.2   MCHC 31.5*   RDW 17.9*      MPV 10.2   GRAN 81.0*   LYMPH 12.0*   MONO 4.0   NRBC 0       CHEMISTRY LAST (LAST 24 HOURS)  Recent Labs   Lab 12/10/24  0511      K 4.8   CL 99   CO2 39*   ANIONGAP 2*   BUN 40*   CREATININE 0.8   GLU 94   CALCIUM 8.8    MG 2.1   ALBUMIN 3.7   PROT 6.6   ALKPHOS 73   ALT 36   AST 54*   BILITOT 0.9       COAGULATION LAST (LAST 24 HOURS)  Recent Labs   Lab 12/10/24  0511   INR 1.1       CARDIAC PROFILE (LAST 24 HOURS)  Recent Labs   Lab 12/05/24  0816   *       LAST 7 DAYS MICROBIOLOGY   Microbiology Results (last 7 days)       Procedure Component Value Units Date/Time    Blood culture x two cultures. Draw prior to antibiotics. [[id number]] Collected: 12/05/24 0823    Order Status: Completed Specimen: Blood from Peripheral, Antecubital, Right Updated: 12/10/24 1032     Blood Culture, Routine No growth after 5 days.    Narrative:      Aerobic and anaerobic    Blood culture x two cultures. Draw prior to antibiotics. [[id number]] Collected: 12/05/24 0813    Order Status: Completed Specimen: Blood from Peripheral, Antecubital, Right Updated: 12/10/24 1032     Blood Culture, Routine No growth after 5 days.    Narrative:      Aerobic and anaerobic  Collection has been rescheduled by [facility] at 12/05/2024 08:23 Reason:   DONE.   Collection has been rescheduled by [facility] at 12/05/2024 08:23 Reason:   DONE.     Respiratory Infection Panel (PCR), Nasopharyngeal [[id number]] Collected: 12/07/24 0901    Order Status: Completed Specimen: Nasopharyngeal Swab Updated: 12/07/24 1030     Respiratory Infection Panel Source NP swab     Adenovirus Not Detected     Coronavirus 229E, Common Cold Virus Not Detected     Coronavirus HKU1, Common Cold Virus Not Detected     Coronavirus NL63, Common Cold Virus Not Detected     Coronavirus OC43, Common Cold Virus Not Detected     Comment: Coronavirus strains 229E, HKU1, NL63, and OC43 can cause the common   cold   and are not associated with the respiratory disease outbreak caused   by  the COVID-19 (SARS-CoV-2 novel Coronavirus) strain.           SARS-CoV2 (COVID-19) Qualitative PCR Not Detected     Human Metapneumovirus Not Detected     Human Rhinovirus/Enterovirus Not Detected     Influenza A (subtypes H1,  H1-2009,H3) Not Detected     Influenza B Not Detected     Parainfluenza Virus 1 Not Detected     Parainfluenza Virus 2 Not Detected     Parainfluenza Virus 3 Not Detected     Parainfluenza Virus 4 Not Detected     Respiratory Syncytial Virus Not Detected     Bordetella Parapertussis (AS2909) Not Detected     Bordetella pertussis (ptxP) Not Detected     Chlamydia pneumoniae Not Detected     Mycoplasma pneumoniae Not Detected     Comment: Respiratory Infection Panel testing performed by [facility].       Narrative:      Respiratory Infection Panel source->NP Swab            MOST RECENT IMAGING  X-Ray Chest AP Portable  Narrative: EXAMINATION:  XR CHEST AP PORTABLE    CLINICAL HISTORY:  increased dyspnea;    FINDINGS:  Portable chest at 11:32 is compared to 12/07/2024 shows cardiomegaly.  There is a left subclavian vein pacemaker with lead tip overlying the right ventricle.    There are small bilateral pleural effusions.  There are increased interstitial markings right greater than left suggestive of pulmonary edema.  There are no confluent infiltrates.  Pulmonary vasculature is normal. No acute osseous abnormality.  Impression: Cardiomegaly with increased interstitial markings and small pleural effusions suggestive of pulmonary edema    Electronically signed by: Maria L Flores  Date:    12/09/2024  Time:    11:50      CURRENT VISIT EKG  Results for orders placed or performed during the hospital encounter of 12/05/24   EKG 12-lead   Result Value Ref Range    QRS Duration 152 ms    [facility] QTC Calculation 416 ms    Narrative    Test Reason : R07.9,    Vent. Rate :  61 BPM     Atrial Rate : 357 BPM     P-R Int :    ms          QRS Dur : 152 ms      QT Int : 414 ms       P-R-T Axes :    -72  98 degrees    QTcB Int : 416 ms    Ventricular-paced rhythm  Abnormal ECG  When compared with ECG of 14-Mar-2024 10:33,  No significant change was found    Referred By: AAAREFERRAL SELF           Confirmed By:        ECHOCARDIOGRAM  RESULTS  Results for orders placed during the hospital encounter of 12/03/24    Echo    Interpretation Summary    Left Ventricle: The left ventricle is normal in size. Normal wall thickness. There is normal systolic function with a visually estimated ejection fraction of 55 - 60%. Unable to assess diastolic function due to atrial fibrillation.    Right Ventricle: Normal right ventricular cavity size. Wall thickness is normal. Systolic function is normal. Pacemaker lead present in the ventricle.    Left Atrium: Left atrium is mildly dilated.    Right Atrium: Right atrium is mildly dilated.    Aortic Valve: There is a transcatheter valve replacement in the aortic position. It is reported to be a 26 mm Jauregui valve. There is mild stenosis. Aortic valve area by VTI is 1.5 cm². Aortic valve peak velocity is 3.6 m/s. Mean gradient is 28.0 mmHg. The dimensionless index is 0.43. There is mild to moderate aortic regurgitation with an eccentrically directed jet.    Mitral Valve: There is bileaflet sclerosis. There is mitral annular calcification present. There is mild regurgitation.    Tricuspid Valve: There is mild to moderate regurgitation.    IVC/SVC: Normal venous pressure at 3 mmHg.        Oxygen INFORMATION   5 L via nasal cannula, normally on 3           LAST ARTERIAL BLOOD GAS  ABG  Recent Labs   Lab 12/05/24  0807   PH 7.402   PO2 77*   PCO2 44.4   HCO3 27.6   BE 3*       IMPRESSION AND PLAN  COPD exacerbation  - maximum bronchodilators change frequency to q.6  - receiving 40 mg prednisone  - azithromycin for its anti-inflammatory properties  Chronic hypoxic respiratory failure  - needing 3 L which is what she wears at home  Epistaxis  - resolved  Anemia  - stable  Paroxysmal atrial fibrillation  - patient has been started on Eliquis, she has been unable to tolerate Coumadin  Hyperkalemia  - resolved  Prerenal azotemia    No objection to discharge home with a tapering dose of steroids and resumption of her usual  home meds  She should follow up in the office in a couple of weeks      Kathryn Haro MD  Date of Service: 12/10/2024  4:31 PM

## 2024-12-10 NOTE — ASSESSMENT & PLAN NOTE
Patient has Diastolic (HFpEF) heart failure that is Acute on chronic. On presentation their CHF was decompensated. Evidence of decompensated CHF on presentation includes: edema and shortness of breath  Most recent BNP and echo results are listed below.  Latest ECHO  Results for orders placed during the hospital encounter of 12/03/24    Echo    Interpretation Summary    Left Ventricle: The left ventricle is normal in size. Normal wall thickness. There is normal systolic function with a visually estimated ejection fraction of 55 - 60%. Unable to assess diastolic function due to atrial fibrillation.    Right Ventricle: Normal right ventricular cavity size. Wall thickness is normal. Systolic function is normal. Pacemaker lead present in the ventricle.    Left Atrium: Left atrium is mildly dilated.    Right Atrium: Right atrium is mildly dilated.    Aortic Valve: There is a transcatheter valve replacement in the aortic position. It is reported to be a 26 mm Jauregui valve. There is mild stenosis. Aortic valve area by VTI is 1.5 cm². Aortic valve peak velocity is 3.6 m/s. Mean gradient is 28.0 mmHg. The dimensionless index is 0.43. There is mild to moderate aortic regurgitation with an eccentrically directed jet.    Mitral Valve: There is bileaflet sclerosis. There is mitral annular calcification present. There is mild regurgitation.    Tricuspid Valve: There is mild to moderate regurgitation.    IVC/SVC: Normal venous pressure at 3 mmHg.    Current Heart Failure Medications  digoxin tablet 0.25 mg, Daily, Oral  metoprolol succinate (TOPROL-XL) 24 hr tablet 25 mg, Every morning, Oral  furosemide tablet 20 mg, Daily, Oral

## 2024-12-10 NOTE — PT/OT/SLP PROGRESS
"Occupational Therapy      Patient Name:  Lisa Smith   MRN:  [id number]    Patient not seen today secondary to  (Attempted OT evaluation. Patient deferred today stating "My nose is still bleeding and it's hard to breathe right now." Nurse notified.). Will follow-up tomorrow.    12/6/2024  "
Occupational Therapy      Patient Name:  Lisa Smith   MRN:  [id number]    Patient not seen today secondary to  (Pt unavailable getting respiratory treatment.). Will follow-up 12/10/2024.    12/9/2024  
Physical Therapy      Patient Name:  Lisa Smith   MRN:  [id number]    Patient not seen today secondary to Patient unwilling to participate, Other (Comment) (Pt sitting up in chair on 7Lpm O2 with shortness of breath due to L nostril plugged to stop bleeding.). Will follow-up 12/7/24.    
Physical Therapy      Patient Name:  Lisa Smith   MRN:  [id number]    Patient not seen today secondary to Other (Comment) (Pt not seen due to discharge orders in chart, then in pm PT noted further testing being done. Will attempt tx in am if pt not discharged home today as anticipated.). Will follow-up 12/11/24.    
Physical Therapy      Patient Name:  Lisa Smith   MRN:  [id number]    Patient not seen today secondary to Patient unwilling to participate, Other (Comment) (Pt refused. Hoping to go home today, but needs sleep.). Will follow-up 12/10/24.    
yes

## 2024-12-10 NOTE — PT/OT/SLP DISCHARGE
Occupational Therapy Discharge Summary    Lisa Smith  MRN: [id number]   Principal Problem: COPD (chronic obstructive pulmonary disease)      Patient Discharged from acute Occupational Therapy on 12/10/2024.  Please refer to prior OT note dated 12/7/2024 for functional status.    Objective:     GOALS:   Multidisciplinary Problems       Occupational Therapy Goals          Problem: Occupational Therapy    Goal Priority Disciplines Outcome Interventions   Occupational Therapy Goal     OT, PT/OT     Description: Goals to be met by: 01/07/2025     Patient will increase functional independence with ADLs by performing:    LE Dressing with Modified [place].  Grooming while standing at sink with Modified [place].  Toileting from toilet with Modified [place] for hygiene and clothing management.   Toilet transfer to toilet with Modified [place].                         Reasons for Discontinuation of Therapy Services  Per discussion with patient, patient stated she is at or close to baseline with ADLs and no longer require further acute OT services.        Plan:     Discharging OT services per patient's request.     12/10/2024

## 2024-12-10 NOTE — DISCHARGE SUMMARY
[facility]  Discharge Summary      Patient Name: Lisa Smith  MRN: [id number]  FAB: [id number]  Patient Class: IP- Inpatient  Admission Date: 12/5/2024  Hospital Length of Stay: 5 days  Discharge Date and Time:  12/10/2024 10:28 AM  Attending Physician: Tarah Cassidy MD   Discharging Provider: Tarah Cassidy MD  Primary Care Provider: Hope Tang FNP    Primary Care Team: Networked reference to record PCT     HPI:   The patient is a 74-year-old female with a past medical history of COPD (maintained on 3 L nasal cannula at home), paroxysmal AFib on Coumadin, status post TAVR, heart failure with preserved ejection fraction, anemia who presents to the emergency department for the evaluation of epistaxis, productive cough and shortness of breath.  Patient reports that over the past few days she has been having some epistaxis as well as upper respiratory infection symptoms with productive cough, subjective fever and chills, and increased dyspnea.  She denies recent sick contacts or recent travel.  The patient reports that she was recently given a blood transfusion for anemia that has been followed outpatient by her PCP.  Patient reports that since she has been on Coumadin she has been suffering with anemia.  She is scheduled to have a GI workup for upper and lower scopes to evaluate for causes of bleeding as well as bone marrow biopsy per heme Onc as well as a workup for possible Watchman device to transition off Coumadin.  Patient was evaluated in the emergency department.  Hemoglobin 8.4 (was 7 prior to blood transfusion on 12/03/2024), white blood cell count 10.8, renal function within normal limits.  ABG revealed pH of 7.4, PO2 of 77, CO2 of 44 and bicarb of 27.  Flu and COVID negative.  Chest x-ray with bilateral diffuse interstitial opacities worse in the right lung consistent with possible superimposed pneumonia.  Lactic acid within normal limits, BNP  mildly elevated 309.  PT 17.6 INR 1.6.  Afrin was placed to sterile gauze to left Lnaderos with some improvement in hemostasis.  Patient was initiated on cefepime and vancomycin per ER MD.  Patient admitted to the hospital medicine service with pulmonary and cardiology consultation for further evaluation.    * No surgery found *      Hospital Course:   74-year-old female with COPD on 3 L by nasal cannula, paroxysmal AFib on Coumadin, prior TAVR who has had multiple episodes of significant epistaxis presents with recurrent epistaxis in addition to feeling globally unwell with worsened cough, shortness of breath and wheezing for roughly 5 days consistent with an acute COPD exacerbation compounded by heart failure decompensation after receiving an outpatient transfusion on 12/4.    Regarding COPD exacerbation, the patient has been placed on Solu-Medrol, doxycycline and scheduled nebulizers.  Regarding her heart failure decompensation, she is on IV Lasix and is currently diuresing well.  Supplemental oxygen requirements have stabilized and she is beginning to feel better.      Regarding her nosebleed, Afrin did not lead to resolution of the ER.  A rhino rocket was placed. And was removed on 12/8. NO more bleeding was noted.     Cardiology has been discussing a Watchman procedure with the patient and potentially discontinuing anticoagulation. Patient was resumed on Eliquis, for 1 month prior to the procedure as recommended by cardiology.  Oxygen weaned to 3 L on discharge. Patient discharged on Prednisone taper per pulmonary recommendations      Goals of Care Treatment Preferences:  Code Status: Full Code    Physical Exam  Vitals and nursing note reviewed.   Constitutional:       General: She is not in acute distress.     Appearance: She is not toxic-appearing.   HENT:      Head: Atraumatic.      Mouth/Throat:      Mouth: Mucous membranes are moist.      Pharynx: Oropharynx is clear.   Eyes:      General: No scleral  icterus.     Conjunctiva/sclera: Conjunctivae normal.      Pupils: Pupils are equal, round, and reactive to light.   Cardiovascular:      Rate and Rhythm: Normal rate and regular rhythm.      Heart sounds: Murmur heard.   Pulmonary:      Effort: No respiratory distress.      Breath sounds: No wheezing or rhonchi.      Comments: Diminished breath sounds improved today   Abdominal:      General: Abdomen is flat. Bowel sounds are normal.      Palpations: Abdomen is soft.   Musculoskeletal:         General: No swelling or deformity.      Cervical back: No rigidity or tenderness.   Skin:     Coloration: Skin is not jaundiced or pale.      Findings: No bruising, erythema or rash.   Neurological:      General: No focal deficit present.      Mental Status: She is alert and oriented to person, place, and time.      Cranial Nerves: No cranial nerve deficit.      Sensory: No sensory deficit.      Motor: No weakness.   Psychiatric:         Mood and Affect: Mood normal.         Behavior: Behavior normal.    SDOH Screening:  The patient declined to be screened for utility difficulties, food insecurity, transport difficulties, housing insecurity, and interpersonal safety, so no concerns could be identified this admission.     Consults:   Consults (From admission, onward)          Status Ordering Provider     Inpatient consult to ENT  Once        Provider:  Suman Alvarez MD    Acknowledged SHARRON MORENO     Inpatient consult to Pulmonology  Once        Provider:  Wil May MD    Completed GABINO GUZMÁN     Inpatient consult to Cardiology  Once        Provider:  Luiza Hernandez MD    Completed GABINO GUZMÁN            * COPD (chronic obstructive pulmonary disease)  Acute exacerbation   Acute on chronic hypoxic respiratory failure   - IV Solumedrol > Weaned to Q12H >> PO Prednisone taper on discharge   - rpt CXR is similar to prior   - Duoneb to Q6H PRN and nebulizer  - resume home breathing Rx on discharge        Acute on chronic heart failure with preserved ejection fraction (HFpEF)  Patient has Diastolic (HFpEF) heart failure that is Acute on chronic. On presentation their CHF was decompensated. Evidence of decompensated CHF on presentation includes: edema and shortness of breath  Most recent BNP and echo results are listed below.  Latest ECHO  Results for orders placed during the hospital encounter of 12/03/24    Echo    Interpretation Summary    Left Ventricle: The left ventricle is normal in size. Normal wall thickness. There is normal systolic function with a visually estimated ejection fraction of 55 - 60%. Unable to assess diastolic function due to atrial fibrillation.    Right Ventricle: Normal right ventricular cavity size. Wall thickness is normal. Systolic function is normal. Pacemaker lead present in the ventricle.    Left Atrium: Left atrium is mildly dilated.    Right Atrium: Right atrium is mildly dilated.    Aortic Valve: There is a transcatheter valve replacement in the aortic position. It is reported to be a 26 mm Jauregui valve. There is mild stenosis. Aortic valve area by VTI is 1.5 cm². Aortic valve peak velocity is 3.6 m/s. Mean gradient is 28.0 mmHg. The dimensionless index is 0.43. There is mild to moderate aortic regurgitation with an eccentrically directed jet.    Mitral Valve: There is bileaflet sclerosis. There is mitral annular calcification present. There is mild regurgitation.    Tricuspid Valve: There is mild to moderate regurgitation.    IVC/SVC: Normal venous pressure at 3 mmHg.    Current Heart Failure Medications  digoxin tablet 0.25 mg, Daily, Oral  metoprolol succinate (TOPROL-XL) 24 hr tablet 25 mg, Every morning, Oral  furosemide tablet 20 mg, Daily, Oral            Epistaxis  Patient presented with recent history of epistaxis  No trauma  Coumadin held, patient was started on Eliquis per cardiology recommendations   Bennieo rocket placed by ER physiciansanjay  12/7      Anemia  Anemia is likely due to acute blood loss which was from epistaxis . Most recent hemoglobin and hematocrit are listed below.  Recent Labs     12/08/24  0455 12/09/24  0427 12/10/24  0511   HGB 7.8* 8.6* 7.8*   HCT 25.7* 28.1* 24.8*     - Patient has received 2 units of PRBCs on 12/3/24  - Patient's anemia is currently stable  - patient to have outpatient upper and lower scope for evaluation with GI as well as workup with Hematology Oncology outpatient  - Resumed Eliquis 12/8    Atrial fibrillation  Patient has paroxysmal (<7 days) atrial fibrillation. Patient is currently in sinus rhythm. [id number] Score: 4. The patients heart rate in the last 24 hours is as follows:  Pulse  Min: 59  Max: 76     Antiarrhythmics  metoprolol succinate (TOPROL-XL) 24 hr tablet 25 mg, Every morning, Oral    Anticoagulants  apixaban tablet 5 mg, 2 times daily, Oral  apixaban tablet, 2 times daily, Oral          Pneumonia  Ruled out.      Final Active Diagnoses:    Diagnosis Date Noted POA    PRINCIPAL PROBLEM:  COPD (chronic obstructive pulmonary disease) [J44.9] 10/01/2019 Yes     Chronic    Acute on chronic heart failure with preserved ejection fraction (HFpEF) [I50.33] 12/07/2024 Yes    Anemia [D64.9] 12/05/2024 Yes    Epistaxis [R04.0] 12/05/2024 Yes    Atrial fibrillation [I48.91] 07/12/2023 Yes    Pneumonia [J18.9] 11/25/2019 Yes      Problems Resolved During this Admission:    Diagnosis Date Noted Date Resolved POA    Heart failure with improved ejection fraction (HFimpEF) [I50.32] 12/05/2023 12/08/2024 Yes       Discharged Condition: good    Disposition: Home-Health Care [facility]    Follow Up:   Follow-up Information       Hope Tang FNP. Go on 12/9/2024.    Specialty: Family Medicine  Why: hospital f/u 12/09/2024 @ 9:20 AM  Contact information:  [facility]  [address]  [facility] [address]  [phone]               Raymond Pérez MD. Go on 12/11/2024.    Specialties: Interventional Cardiology,  "Cardiology  Why: 2024 @ 10:00 AM, hospital f/u  Contact information:  [address] [facility]  [address]  [facility]  [facility] [address]  [phone]               Enrique Addison MD. Go on 2025.    Specialties: Hematology, Hematology and Oncology, Oncology  Why: 2025 @ 10:15AM  Contact information:  [address] [facility]  [address]  [facility] [address]  [phone]               [facility] Follow up.    Specialties: Home Health Services, Home Therapy Services, Home Living Aide Services  Why: Start of care date is 2024  Contact information:  [address] [facility] [address]  [phone]                         Patient Instructions:      WALKER FOR HOME USE     Order Specific Question Answer Comments   Type of Walker: Adult (5'4"-6'6") Rolling Walker   With wheels? Yes    Height: 5' 5" (1.651 m)    Weight: 78.4 kg (172 lb 13.5 oz)    Length of need (1-99 months): 99    Does patient have medical equipment at home? oxygen reports previously used her  husbands RW, however is old and does not function properly therefore fall hazard   Please check all that apply: Patient's condition impairs ambulation.      NEBULIZER FOR HOME USE     Order Specific Question Answer Comments   Height: 5' 5" (1.651 m)    Weight: 78.4 kg (172 lb 13.5 oz)    Does patient have medical equipment at home? oxygen reports previously used her  husbands RW, however is old and does not function properly therefore fall hazard   Length of need (1-99 months): 3      VARIABLE CONCENTRATION MASK FOR HOME USE     Order Specific Question Answer Comments   Height: 5' 5" (1.651 m)    Weight: 78.4 kg (172 lb 13.5 oz)    Does patient have medical equipment at home? oxygen reports previously used her  husbands RW, however is old and does not function properly therefore fall hazard   Length of need (1-99 months): 3      Ambulatory referral/consult to Home Health   Standing Status: " Future   Referral Priority: Routine Referral Type: Home Health   Referral Reason: Specialty Services Required   Requested Specialty: Home Health Services   Number of Visits Requested: 1       Significant Diagnostic Studies: Labs: CMP   Recent Labs   Lab 12/09/24  0427 12/10/24  0511    140   K 5.6* 4.8   CL 97 99   CO2 35* 39*   * 94   BUN 45* 40*   CREATININE 1.0 0.8   CALCIUM 9.3 8.8   PROT 7.4 6.6   ALBUMIN 4.0 3.7   BILITOT 1.1* 0.9   ALKPHOS 83 73   AST 45* 54*   ALT 24 36   ANIONGAP 4* 2*    and CBC   Recent Labs   Lab 12/09/24  0427 12/10/24  0511   WBC 18.83* 14.22*   HGB 8.6* 7.8*   HCT 28.1* 24.8*    263       Pending Diagnostic Studies:       None           Medications:  Reconciled Home Medications:      Medication List        START taking these medications      albuterol-ipratropium 2.5 mg-0.5 mg/3 mL nebulizer solution  Commonly known as: DUO-NEB  Take 3 mLs by nebulization every 6 (six) hours as needed for Wheezing or Shortness of Breath. Rescue     apixaban 5 mg Tab  Commonly known as: ELIQUIS  Take 1 tablet (5 mg total) by mouth 2 (two) times daily.     predniSONE 20 MG tablet  Commonly known as: DELTASONE  Take 2 tablets (40 mg total) by mouth once daily for 3 days, THEN 1.5 tablets (30 mg total) once daily for 3 days, THEN 1 tablet (20 mg total) once daily for 3 days, THEN 0.5 tablets (10 mg total) once daily for 3 days.  Start taking on: December 10, 2024            CHANGE how you take these medications      * albuterol 1.25 mg/3 mL Nebu  Commonly known as: ACCUNEB  INHALE THE CONTENTS OF 1 VIAL VIA NEBULIZER EVERY 6 HOURS AS NEEDED FOR RESCUE  What changed:   how much to take  how to take this  when to take this  reasons to take this     * albuterol 90 mcg/actuation inhaler  Commonly known as: PROVENTIL/VENTOLIN HFA  INHALE 2 PUFFS EVERY 6 HOURS AS NEEDED FOR WHEEZING (RESCUE)  What changed: See the new instructions.     digoxin 250 mcg tablet  Commonly known as: LANOXIN  TAKE  1 TABLET EVERY DAY  What changed: when to take this     ezetimibe 10 mg tablet  Commonly known as: ZETIA  TAKE 1 TABLET ONE TIME DAILY  What changed: when to take this     rosuvastatin 40 MG Tab  Commonly known as: CRESTOR  Take 1 tablet (40 mg total) by mouth every evening.  What changed: when to take this     TRELEGY ELLIPTA 100-62.5-25 mcg Dsdv  Generic drug: fluticasone-umeclidin-vilanter  INHALE 1 PUFF INTO THE LUNGS ONCE DAILY.  What changed: when to take this           * This list has 2 medication(s) that are the same as other medications prescribed for you. Read the directions carefully, and ask your doctor or other care provider to review them with you.                CONTINUE taking these medications      ENTRESTO 24-26 mg per tablet  Generic drug: sacubitriL-valsartan  Take 1 tablet by mouth 2 (two) times daily.     fluticasone propionate 50 mcg/actuation nasal spray  Commonly known as: FLONASE  1 spray by Each Nostril route daily as needed for Rhinitis or Allergies.     furosemide 20 MG tablet  Commonly known as: LASIX  Take 1 tablet (20 mg total) by mouth every morning.     metoprolol succinate 25 MG 24 hr tablet  Commonly known as: TOPROL-XL  Take 1 tablet (25 mg total) by mouth every morning.     pantoprazole 40 MG tablet  Commonly known as: PROTONIX  Take 1 tablet (40 mg total) by mouth every morning.     promethazine-dextromethorphan 6.25-15 mg/5 mL Syrp  Commonly known as: PROMETHAZINE-DM  Take 5 mLs by mouth every 6 (six) hours as needed (cough).     rOPINIRole 1 MG tablet  Commonly known as: REQUIP  Take 1 tablet (1 mg total) by mouth every evening.     spironolactone 25 MG tablet  Commonly known as: ALDACTONE  Take 1 tablet (25 mg total) by mouth every morning.     TYLENOL ARTHRITIS ORAL  Take 2 tablets by mouth daily as needed.            STOP taking these medications      aspirin 81 MG EC tablet  Commonly known as: ECOTRIN     clopidogreL 75 mg tablet  Commonly known as: PLAVIX     warfarin 3  MG tablet  Commonly known as: COUMADIN              Indwelling Lines/Drains at time of discharge:   Lines/Drains/Airways       None                   Time spent on the discharge of patient: 40 minutes         Tarah Cassidy MD  Department of Hospital Medicine  [facility]

## 2024-12-10 NOTE — CARE UPDATE
12/09/24 1956   Patient Assessment/Suction   Level of Consciousness (AVPU) alert   Respiratory Effort Normal;Unlabored   Expansion/Accessory Muscles/Retractions no use of accessory muscles   All Lung Fields Breath Sounds wheezes, expiratory;clear   Rhythm/Pattern, Respiratory pattern regular;unlabored   Cough Frequency with stimulation   Cough Type dry   PRE-TX-O2   Device (Oxygen Therapy) high flow mask   $ Is the patient on Low Flow Oxygen? Yes   Flow (L/min) (Oxygen Therapy) 4   SpO2 97 %   Pulse Oximetry Type Intermittent   $ Pulse Oximetry - Multiple Charge Pulse Oximetry - Multiple   Pulse 62   Resp 18   Positioning HOB elevated 45 degrees   Positioning   Head of Bed (HOB) Positioning HOB at 45 degrees   Positioning/Transfer Devices pillows;in use   Aerosol Therapy   $ Aerosol Therapy Charges Aerosol Treatment   Daily Review of Necessity (SVN) completed   Respiratory Treatment Status (SVN) given   Treatment Route (SVN) mask;oxygen   Patient Position HOB elevated   Post Treatment Assessment (SVN) increased aeration;wheezing decreased   Signs of Intolerance (SVN) none   Education   $ Education Bronchodilator;Oxygen;15 min

## 2024-12-10 NOTE — PLAN OF CARE
Problem: COPD (Chronic Obstructive Pulmonary Disease)  Goal: Optimal Chronic Illness Coping  Outcome: Progressing  Goal: Improved Oral Intake  Outcome: Progressing     Problem: Adult Inpatient Plan of Care  Goal: Plan of Care Review  Outcome: Progressing  Goal: Optimal Comfort and Wellbeing  Outcome: Progressing     Problem: Fall Injury Risk  Goal: Absence of Fall and Fall-Related Injury  Outcome: Progressing

## 2024-12-10 NOTE — ASSESSMENT & PLAN NOTE
Anemia is likely due to acute blood loss which was from epistaxis . Most recent hemoglobin and hematocrit are listed below.  Recent Labs     12/08/24  0455 12/09/24  0427 12/10/24  0511   HGB 7.8* 8.6* 7.8*   HCT 25.7* 28.1* 24.8*     - Patient has received 2 units of PRBCs on 12/3/24  - Patient's anemia is currently stable  - patient to have outpatient upper and lower scope for evaluation with GI as well as workup with Hematology Oncology outpatient  - Resumed Eliquis 12/8

## 2024-12-10 NOTE — TELEPHONE ENCOUNTER
Discharge Information     Discharge Date:   12/10/24    Primary Discharge Diagnosis:  COPD      Discharge Summary:  Reviewed      Medication & Order Review     Were medication changes made or new medications added?   Yes    If so, has the patient filled the prescriptions?  Yes     Was Home Health ordered? Yes    If so, has Home Health contacted patient and/or initiated services?  Yes    Name of Home Health Agency?  [facility]    Durable Medical Equipment ordered?  No     If so, has the DME provider contacted patient and delivered equipment?  N/A    Follow Up               Any problems since discharge? Patient just discharged today and just walking into her home.     How is the patient feeling since returning home?  Weakness    Have you set up recommended follow up appointments?  Patient waiting on CT scan to be scheduled    Schedule Hospital Follow-up appointment within 7-14 days (preferably 7).      Notes:  Patient did not know about initial hospital follow up appt that was scheduled. Says there was no way that she could make it to an appt this week. Patient rescheduled to next Thursday 12/19/24 at 10:20 am. Patient verbalized understanding.             Zack Lange

## 2024-12-10 NOTE — ASSESSMENT & PLAN NOTE
Acute exacerbation   Acute on chronic hypoxic respiratory failure   - IV Solumedrol > Weaned to Q12H >> PO Prednisone taper on discharge   - rpt CXR is similar to prior   - Duoneb to Q6H PRN and nebulizer  - resume home breathing Rx on discharge

## 2024-12-13 ENCOUNTER — PATIENT OUTREACH (OUTPATIENT)
Dept: ADMINISTRATIVE | Facility: CLINIC | Age: 74
End: 2024-12-13
Payer: MEDICARE

## 2024-12-13 NOTE — PROGRESS NOTES
C3 nurse spoke with Lisa Smith  for a TCC post hospital discharge follow up call. The patient has a scheduled [facility] appointment with.Hope Tang FNP on 12/19/2024 at 9:20 AM.

## 2024-12-13 NOTE — PROGRESS NOTES
C3 nurse attempted to contact Lisa Smith  for a TCC post hospital discharge follow up call. No answer. Left voicemail with callback information. The patient has a scheduled [facility] appointment with Hope Tang FNP on 12/9/2024 at 9:20 AM.  C3 nurse spoke to patient's daughter, Jordana, who stated that Lisa Smith takes her own medical calls.

## 2024-12-18 ENCOUNTER — HOSPITAL ENCOUNTER (OUTPATIENT)
Dept: RADIOLOGY | Facility: HOSPITAL | Age: 74
Discharge: HOME | End: 2024-12-18
Attending: NURSE PRACTITIONER
Payer: MEDICARE

## 2024-12-18 VITALS
OXYGEN SATURATION: 100 % | TEMPERATURE: 98 F | SYSTOLIC BLOOD PRESSURE: 142 MMHG | RESPIRATION RATE: 20 BRPM | BODY MASS INDEX: 27.99 KG/M2 | WEIGHT: 168 LBS | HEART RATE: 60 BPM | DIASTOLIC BLOOD PRESSURE: 49 MMHG | HEIGHT: 65 IN

## 2024-12-18 DIAGNOSIS — D64.9 ANEMIA, UNSPECIFIED TYPE: ICD-10-CM

## 2024-12-18 LAB
APTT PPP: 26.5 SEC (ref 21–32)
BASOPHILS # BLD AUTO: 0.03 K/UL (ref 0–0.2)
BASOPHILS NFR BLD: 0.2 % (ref 0–1.9)
DIFFERENTIAL METHOD BLD: ABNORMAL
EOSINOPHIL # BLD AUTO: 0.1 K/UL (ref 0–0.5)
EOSINOPHIL NFR BLD: 0.5 % (ref 0–8)
ERYTHROCYTE [DISTWIDTH] IN BLOOD BY AUTOMATED COUNT: 19.1 % (ref 11.5–14.5)
HCT VFR BLD AUTO: 27.6 % (ref 37–48.5)
HGB BLD-MCNC: 8.5 G/DL (ref 12–16)
IMM GRANULOCYTES # BLD AUTO: 0.15 K/UL (ref 0–0.04)
IMM GRANULOCYTES NFR BLD AUTO: 0.8 % (ref 0–0.5)
INR PPP: 1 (ref 0.8–1.2)
LYMPHOCYTES # BLD AUTO: 0.7 K/UL (ref 1–4.8)
LYMPHOCYTES NFR BLD: 3.6 % (ref 18–48)
MCH RBC QN AUTO: 30 PG (ref 27–31)
MCHC RBC AUTO-ENTMCNC: 30.8 G/DL (ref 32–36)
MCV RBC AUTO: 98 FL (ref 82–98)
MONOCYTES # BLD AUTO: 1.1 K/UL (ref 0.3–1)
MONOCYTES NFR BLD: 6.3 % (ref 4–15)
NEUTROPHILS # BLD AUTO: 15.9 K/UL (ref 1.8–7.7)
NEUTROPHILS NFR BLD: 88.6 % (ref 38–73)
NRBC BLD-RTO: 0 /100 WBC
PLATELET # BLD AUTO: 231 K/UL (ref 150–450)
PMV BLD AUTO: 10.7 FL (ref 9.2–12.9)
PROTHROMBIN TIME: 11.4 SEC (ref 9–12.5)
RBC # BLD AUTO: 2.83 M/UL (ref 4–5.4)
WBC # BLD AUTO: 17.91 K/UL (ref 3.9–12.7)

## 2024-12-18 PROCEDURE — 99152 MOD SED SAME PHYS/QHP 5/>YRS: CPT | Performed by: RADIOLOGY

## 2024-12-18 PROCEDURE — 85730 THROMBOPLASTIN TIME PARTIAL: CPT | Performed by: NURSE PRACTITIONER

## 2024-12-18 PROCEDURE — 38222 DX BONE MARROW BX & ASPIR: CPT | Mod: LT,,, | Performed by: RADIOLOGY

## 2024-12-18 PROCEDURE — 85025 COMPLETE CBC W/AUTO DIFF WBC: CPT | Performed by: NURSE PRACTITIONER

## 2024-12-18 PROCEDURE — 77012 CT SCAN FOR NEEDLE BIOPSY: CPT | Mod: TC | Performed by: RADIOLOGY

## 2024-12-18 PROCEDURE — 63600175 PHARM REV CODE 636 W HCPCS: Performed by: RADIOLOGY

## 2024-12-18 PROCEDURE — 85610 PROTHROMBIN TIME: CPT | Performed by: NURSE PRACTITIONER

## 2024-12-18 PROCEDURE — 25000003 PHARM REV CODE 250: Performed by: RADIOLOGY

## 2024-12-18 PROCEDURE — 99152 MOD SED SAME PHYS/QHP 5/>YRS: CPT | Mod: ,,, | Performed by: RADIOLOGY

## 2024-12-18 PROCEDURE — A4215 STERILE NEEDLE: HCPCS

## 2024-12-18 PROCEDURE — 99152 MOD SED SAME PHYS/QHP 5/>YRS: CPT

## 2024-12-18 PROCEDURE — 77012 CT SCAN FOR NEEDLE BIOPSY: CPT | Mod: 26,,, | Performed by: RADIOLOGY

## 2024-12-18 RX ORDER — FENTANYL CITRATE 50 UG/ML
INJECTION, SOLUTION INTRAMUSCULAR; INTRAVENOUS
Status: COMPLETED | OUTPATIENT
Start: 2024-12-18 | End: 2024-12-18

## 2024-12-18 RX ORDER — LIDOCAINE HYDROCHLORIDE 10 MG/ML
INJECTION, SOLUTION EPIDURAL; INFILTRATION; INTRACAUDAL; PERINEURAL
Status: COMPLETED | OUTPATIENT
Start: 2024-12-18 | End: 2024-12-18

## 2024-12-18 RX ORDER — MIDAZOLAM HYDROCHLORIDE 1 MG/ML
INJECTION, SOLUTION INTRAMUSCULAR; INTRAVENOUS
Status: COMPLETED | OUTPATIENT
Start: 2024-12-18 | End: 2024-12-18

## 2024-12-18 RX ORDER — SODIUM CHLORIDE 9 MG/ML
INJECTION, SOLUTION INTRAVENOUS
Status: COMPLETED | OUTPATIENT
Start: 2024-12-18 | End: 2024-12-18

## 2024-12-18 RX ADMIN — LIDOCAINE HYDROCHLORIDE 10 ML: 10 INJECTION, SOLUTION EPIDURAL; INFILTRATION; INTRACAUDAL at 09:12

## 2024-12-18 RX ADMIN — FENTANYL CITRATE 50 MCG: 50 INJECTION INTRAMUSCULAR; INTRAVENOUS at 09:12

## 2024-12-18 RX ADMIN — MIDAZOLAM 2 MG: 1 INJECTION INTRAMUSCULAR; INTRAVENOUS at 09:12

## 2024-12-18 RX ADMIN — SODIUM CHLORIDE 250 ML/HR: 9 INJECTION, SOLUTION INTRAVENOUS at 09:12

## 2024-12-18 NOTE — PLAN OF CARE
Patient to CT via stretcher. AAOx3, MAEx4. Denies pain, nausea, O2 via 3L per home use.  All questions answers, verbalizes understanding, NAD noted.

## 2024-12-18 NOTE — DISCHARGE INSTRUCTIONS
Medical Imaging Discharge Instructions    Discharge Instructions After:  Bone Marrow Biopsy    Diet:  Resume diet as prescribed by your physician and Avoid the use of muscle relaxants, sedative, hypnotics or mood altering medications for 24 hours unless approved by your physician    Medications:  Resume medications as prescribed by your physician and Avoid the use of muscle relaxants, sedatives, hypnotics or mood altering medications for 24 hours unless approved by your physician.    Activity:  Rest today, Avoid strenuous activity for 1 days, Do not drive an automobile or operate hazardous machinery for 24 hours, Make no major decisions or sign any legal documents for 24 hours, and Limited activities : na    Care of Dressing and Incision:  Do not remove dressing until 24 hours and May change dressing or bandage as needed    Report to M.D. any of the Following:  Any severe pain, new onset pain or worsening symptoms, Excessive bleeding, bruising or swelling, Temperature of 101 degrees or above, Any sudden shortness of breath or bloody sputum greater than 2 tablespoons, and IV site may become tender. If so, place a warm, wet compress on IV site four times a day. This should relieve symptoms in a few days.    Follow up with your physician regarding the results of this exam. Please feel free to call the radiology department at [phone] for any problems or questions. Ask to speak with the radiology nurse.    Niecy Serna RN  Date of Service: 12/18/2024  9:31 AM

## 2024-12-18 NOTE — PLAN OF CARE
Tolerated procedure well. VS remain stable. Dressing to lower back CDI. Denies pain and nausea. Back to room 1556 via stretcher. Report given to Charisse Corona.

## 2024-12-18 NOTE — INTERVAL H&P NOTE
The patient has been examined and the H&P has been reviewed prior to ct guided bone marrow biopsy.      There are no hospital problems to display for this patient.

## 2024-12-23 ENCOUNTER — TELEPHONE (OUTPATIENT)
Dept: FAMILY MEDICINE | Facility: CLINIC | Age: 74
End: 2024-12-23
Payer: MEDICARE

## 2024-12-23 DIAGNOSIS — I49.5 SSS (SICK SINUS SYNDROME): ICD-10-CM

## 2024-12-23 DIAGNOSIS — Z01.818 PREOP TESTING: Primary | ICD-10-CM

## 2024-12-23 DIAGNOSIS — I48.0 PAF (PAROXYSMAL ATRIAL FIBRILLATION): ICD-10-CM

## 2024-12-23 DIAGNOSIS — D64.9 ANEMIA, UNSPECIFIED TYPE: ICD-10-CM

## 2024-12-23 DIAGNOSIS — R26.89 POOR BALANCE: ICD-10-CM

## 2024-12-23 DIAGNOSIS — R04.0 EPISTAXIS: ICD-10-CM

## 2024-12-23 DIAGNOSIS — Z91.89 AT HIGH RISK FOR BLEEDING: ICD-10-CM

## 2024-12-23 DIAGNOSIS — K92.2 GASTROINTESTINAL HEMORRHAGE, UNSPECIFIED GASTROINTESTINAL HEMORRHAGE TYPE: ICD-10-CM

## 2024-12-23 NOTE — TELEPHONE ENCOUNTER
----- Message from Kat sent at 12/23/2024 11:26 AM CST -----  [facility]'s [facility] is 129/49. Damaris @[phone]

## 2024-12-24 ENCOUNTER — TELEPHONE (OUTPATIENT)
Dept: CARDIOLOGY | Facility: CLINIC | Age: 74
End: 2024-12-24
Payer: MEDICARE

## 2024-12-24 NOTE — TELEPHONE ENCOUNTER
Spoke with patient and she confirmed she is feeling well and BP is WNL for her norm. Advised to call if any symptoms occur. Patient verbalized understanding. Patient did have Cardiac CTA done today.

## 2024-12-24 NOTE — TELEPHONE ENCOUNTER
Spoke with Damaris  nurse she stated patient is asymptomatic, denies dizziness or light headedness. Patient states that is her normal BP. Advised to call if patient becomes symptomatic or with any other questions or concerns.

## 2025-01-02 RX ORDER — SPIRONOLACTONE 25 MG/1
25 TABLET ORAL EVERY MORNING
Qty: 90 TABLET | Refills: 3 | Status: SHIPPED | OUTPATIENT
Start: 2025-01-02

## 2025-01-06 ENCOUNTER — TELEPHONE (OUTPATIENT)
Dept: CARDIOLOGY | Facility: CLINIC | Age: 75
End: 2025-01-06
Payer: MEDICARE

## 2025-01-07 DIAGNOSIS — I50.43 ACUTE ON CHRONIC COMBINED SYSTOLIC AND DIASTOLIC CHF, NYHA CLASS 3: ICD-10-CM

## 2025-01-07 NOTE — TELEPHONE ENCOUNTER
----- Message from Julia sent at 1/7/2025 11:38 AM CST -----  Regarding: entresto  Contact: pt  Type:  Needs Medical Advice    Who Called: pt    Would the patient rather a call back or a response via [facility]? Call back    Best Call Back Number:  for assistance    Additional Information: pt approved for program for Entresto  [employer] pt assistance [facility]  .  Asking that doctor send the rx to [facility].      Pt iD  [id number]    a letter was sent to doctor from [employer]    Pt's phone #  [phone]

## 2025-01-08 RX ORDER — METOPROLOL SUCCINATE 25 MG/1
25 TABLET, EXTENDED RELEASE ORAL EVERY MORNING
Qty: 90 TABLET | Refills: 3 | Status: SHIPPED | OUTPATIENT
Start: 2025-01-08

## 2025-01-08 RX ORDER — SACUBITRIL AND VALSARTAN 24; 26 MG/1; MG/1
1 TABLET, FILM COATED ORAL 2 TIMES DAILY
Qty: 180 TABLET | Refills: 3 | Status: SHIPPED | OUTPATIENT
Start: 2025-01-08 | End: 2026-01-08

## 2025-01-08 RX ORDER — ROSUVASTATIN CALCIUM 40 MG/1
40 TABLET, COATED ORAL NIGHTLY
Qty: 90 TABLET | Refills: 3 | Status: SHIPPED | OUTPATIENT
Start: 2025-01-08

## 2025-01-09 ENCOUNTER — TELEPHONE (OUTPATIENT)
Dept: CARDIOLOGY | Facility: CLINIC | Age: 75
End: 2025-01-09

## 2025-01-09 ENCOUNTER — LAB VISIT (OUTPATIENT)
Dept: LAB | Facility: HOSPITAL | Age: 75
End: 2025-01-09
Payer: MEDICARE

## 2025-01-09 ENCOUNTER — OFFICE VISIT (OUTPATIENT)
Dept: CARDIOLOGY | Facility: CLINIC | Age: 75
End: 2025-01-09
Payer: MEDICARE

## 2025-01-09 VITALS
SYSTOLIC BLOOD PRESSURE: 125 MMHG | WEIGHT: 172.63 LBS | HEART RATE: 62 BPM | DIASTOLIC BLOOD PRESSURE: 57 MMHG | BODY MASS INDEX: 28.76 KG/M2 | HEIGHT: 65 IN

## 2025-01-09 DIAGNOSIS — R04.0 EPISTAXIS: ICD-10-CM

## 2025-01-09 DIAGNOSIS — Z95.0 S/P PLACEMENT OF CARDIAC PACEMAKER: Chronic | ICD-10-CM

## 2025-01-09 DIAGNOSIS — K92.2 GASTROINTESTINAL HEMORRHAGE, UNSPECIFIED GASTROINTESTINAL HEMORRHAGE TYPE: ICD-10-CM

## 2025-01-09 DIAGNOSIS — I49.5 SSS (SICK SINUS SYNDROME): ICD-10-CM

## 2025-01-09 DIAGNOSIS — Z91.89 AT HIGH RISK FOR BLEEDING: ICD-10-CM

## 2025-01-09 DIAGNOSIS — I35.0 SEVERE AORTIC VALVE STENOSIS: ICD-10-CM

## 2025-01-09 DIAGNOSIS — I48.0 PAF (PAROXYSMAL ATRIAL FIBRILLATION): ICD-10-CM

## 2025-01-09 DIAGNOSIS — I48.21 PERMANENT ATRIAL FIBRILLATION: ICD-10-CM

## 2025-01-09 DIAGNOSIS — D64.9 ANEMIA, UNSPECIFIED TYPE: ICD-10-CM

## 2025-01-09 DIAGNOSIS — Z01.818 PREOP TESTING: ICD-10-CM

## 2025-01-09 DIAGNOSIS — I50.43 ACUTE ON CHRONIC COMBINED SYSTOLIC AND DIASTOLIC CHF, NYHA CLASS 3: ICD-10-CM

## 2025-01-09 DIAGNOSIS — R26.89 POOR BALANCE: ICD-10-CM

## 2025-01-09 DIAGNOSIS — Z95.2 S/P TAVR (TRANSCATHETER AORTIC VALVE REPLACEMENT): ICD-10-CM

## 2025-01-09 DIAGNOSIS — I25.10 CORONARY ARTERY DISEASE INVOLVING NATIVE CORONARY ARTERY OF NATIVE HEART WITHOUT ANGINA PECTORIS: Chronic | ICD-10-CM

## 2025-01-09 DIAGNOSIS — I65.29 OCCLUSION AND STENOSIS OF UNSPECIFIED CAROTID ARTERY: ICD-10-CM

## 2025-01-09 DIAGNOSIS — Z98.890 S/P ABLATION OF ATRIAL FIBRILLATION: ICD-10-CM

## 2025-01-09 DIAGNOSIS — Z01.810 PREOP CARDIOVASCULAR EXAM: Primary | ICD-10-CM

## 2025-01-09 DIAGNOSIS — Z86.79 S/P ABLATION OF ATRIAL FIBRILLATION: ICD-10-CM

## 2025-01-09 DIAGNOSIS — Z98.890 S/P CAROTID ENDARTERECTOMY: ICD-10-CM

## 2025-01-09 PROBLEM — I50.33 ACUTE ON CHRONIC HEART FAILURE WITH PRESERVED EJECTION FRACTION (HFPEF): Status: RESOLVED | Noted: 2024-12-07 | Resolved: 2025-01-09

## 2025-01-09 PROBLEM — I48.91 ATRIAL FIBRILLATION: Status: RESOLVED | Noted: 2023-07-12 | Resolved: 2025-01-09

## 2025-01-09 LAB
BACTERIA #/AREA URNS HPF: NORMAL /HPF
BILIRUB UR QL STRIP: NEGATIVE
CLARITY UR: CLEAR
COLOR UR: YELLOW
GLUCOSE UR QL STRIP: NEGATIVE
HGB UR QL STRIP: NEGATIVE
KETONES UR QL STRIP: NEGATIVE
LEUKOCYTE ESTERASE UR QL STRIP: ABNORMAL
MICROSCOPIC COMMENT: NORMAL
NITRITE UR QL STRIP: NEGATIVE
PH UR STRIP: 7 [PH] (ref 5–8)
PROT UR QL STRIP: NEGATIVE
SP GR UR STRIP: 1.01 (ref 1–1.03)
SQUAMOUS #/AREA URNS HPF: 2 /HPF
URN SPEC COLLECT METH UR: ABNORMAL
WBC #/AREA URNS HPF: 2 /HPF (ref 0–5)

## 2025-01-09 PROCEDURE — 99999 PR PBB SHADOW E&M-EST. PATIENT-LVL III: CPT | Mod: PBBFAC,HCNC,,

## 2025-01-09 PROCEDURE — 93005 ELECTROCARDIOGRAM TRACING: CPT | Mod: HCNC,PO

## 2025-01-09 PROCEDURE — 81000 URINALYSIS NONAUTO W/SCOPE: CPT | Mod: HCNC,PO

## 2025-01-09 PROCEDURE — 93010 ELECTROCARDIOGRAM REPORT: CPT | Mod: HCNC,S$GLB,, | Performed by: INTERNAL MEDICINE

## 2025-01-09 RX ORDER — CHLORHEXIDINE GLUCONATE ORAL RINSE 1.2 MG/ML
15 SOLUTION DENTAL 2 TIMES DAILY
Qty: 60 ML | Refills: 0 | Status: SHIPPED | OUTPATIENT
Start: 2025-01-09 | End: 2025-01-11

## 2025-01-09 RX ORDER — MUPIROCIN 20 MG/G
OINTMENT TOPICAL 3 TIMES DAILY
Qty: 1 EACH | Refills: 0 | Status: SHIPPED | OUTPATIENT
Start: 2025-01-09 | End: 2025-01-11

## 2025-01-09 NOTE — TELEPHONE ENCOUNTER
----- Message from Cat sent at 1/9/2025  2:51 PM CST -----  Regarding: advice  Contact: patient  Type: Needs Medical Advice  Who Called:  patient  Symptoms (please be specific):    How long has patient had these symptoms:    Pharmacy name and phone #:    Best Call Back Number: [phone] (home)     Additional Information: Patient needs confirmation on when to stop apixaban (ELIQUIS) 5 mg Tab.  Please call patient to advise. Thanks!

## 2025-01-09 NOTE — H&P (VIEW-ONLY)
[facility]  [facility]  Date: 1/9/25    Patient: Lisa Smith, 1950, [id number]  Primary Care Provider: Hope Tang FNP     Chief Complaint: Preoperative Watchman evaluation     Subjective:       Lisa Smith is a 74 y.o. female who presents for preoperative Watchman evaluation. They follow with Dr. Pérez and were referred to Dr. Cantu for Watchman.      Patient scheduled to undergo LAAO with Watchman device on 1/13/25. She is here for her preoperative evaluation. Denies fever, chills, chest discomfort, dyspnea, palpitations, dizziness, syncope, orthopnea, PND, edema, exposed wounds, loose or painful teeth, dysuria.    Focused Past History includes:  Permanent atrial fibrillation s/p ablation   Aortic stenosis s/p TAVR 12/2023  Chronic heart failure with reduced ejection fraction   TTE 12/2024 reported EF 55-60%, normal RV. Bilateral atrial enlargement. TAV MG 28 and DI 0.43 with mild to moderate PVL). Moderate MR. PASP 45   Coronary artery disease  Sick sinus syndrome s/p PPM  Carotid artery disease s/p CEA  Severe anemia requiring transfusion    Current Outpatient Medications   Medication Sig    acetaminophen (TYLENOL ARTHRITIS ORAL) Take 2 tablets by mouth daily as needed.    albuterol (ACCUNEB) 1.25 mg/3 mL Nebu INHALE THE CONTENTS OF 1 VIAL VIA NEBULIZER EVERY 6 HOURS AS NEEDED FOR RESCUE    albuterol-ipratropium (DUO-NEB) 2.5 mg-0.5 mg/3 mL nebulizer solution Take 3 mLs by nebulization every 6 (six) hours as needed for Wheezing or Shortness of Breath. Rescue    apixaban (ELIQUIS) 5 mg Tab Take 1 tablet (5 mg total) by mouth 2 (two) times daily.    digoxin (LANOXIN) 250 mcg tablet TAKE 1 TABLET EVERY DAY    ezetimibe (ZETIA) 10 mg tablet TAKE 1 TABLET ONE TIME DAILY    fluticasone propionate (FLONASE) 50 mcg/actuation nasal spray 1 spray by Each Nostril route daily as needed for Rhinitis or Allergies.    furosemide (LASIX) 20 MG tablet Take 1 tablet (20 mg total) by mouth every  morning.    metoprolol succinate (TOPROL-XL) 25 MG 24 hr tablet TAKE 1 TABLET EVERY MORNING    pantoprazole (PROTONIX) 40 MG tablet Take 1 tablet (40 mg total) by mouth every morning.    rOPINIRole (REQUIP) 1 MG tablet Take 1 tablet (1 mg total) by mouth every evening.    rosuvastatin (CRESTOR) 40 MG Tab TAKE 1 TABLET EVERY EVENING    sacubitriL-valsartan (ENTRESTO) 24-26 mg per tablet Take 1 tablet by mouth 2 (two) times daily.    TRELEGY ELLIPTA 100-62.5-25 mcg DsDv INHALE 1 PUFF INTO THE LUNGS ONCE DAILY.    albuterol (PROVENTIL/VENTOLIN HFA) 90 mcg/actuation inhaler INHALE 2 PUFFS EVERY 6 HOURS AS NEEDED FOR WHEEZING (RESCUE)    chlorhexidine (PERIDEX) 0.12 % solution Use as directed 15 mLs in the mouth or throat 2 (two) times daily. for 2 days    mupirocin (BACTROBAN) 2 % ointment Apply topically 3 (three) times daily. for 2 days    spironolactone (ALDACTONE) 25 MG tablet TAKE 1 TABLET EVERY MORNING     No current facility-administered medications for this visit.            Objective       Review of Systems  Constitutional: negative for fevers, night sweats, and weight loss  Eyes: negative for visual disturbance, diplopia  Respiratory: negative for cough, hemoptysis, sputum, and wheezing  Cardiovascular: see HPI  Gastrointestinal: negative for abdominal pain, bright red blood per rectum, change in bowel habits, dysphagia, melena, and reflux symptoms  Genitourinary:negative for dysuria, frequency, and hematuria  Hematologic/lymphatic: negative for bleeding, easy bruising, and lymphadenopathy  Musculoskeletal:negative for arthralgias, back pain, and myalgias  Neurological: negative for gait problems, paresthesia, speech problems, vertigo, and weakness  Behavioral/Psych: negative for excessive alcohol consumption, illegal drug usage, and sleep disturbance    -------------------------------------    Anticoagulant long-term use    Arthritis    Atrial fibrillation    Bell's palsy    Boil of buttock    burst 12/19/22     CHF (congestive heart failure)    Chronic cough    COPD (chronic obstructive pulmonary disease)    use O2  3l/m NC at night; also taking during day currently 12/4/23    Dizziness    Encounter for blood transfusion    GI bleed    transfusion    H/O diverticulitis of colon    Hard of hearing    Heart murmur    Hematoma    left hand    Heterozygous alpha 1-antitrypsin deficiency    History of home oxygen therapy    3L NC at night    Hypertension    Lung disease    copd    MONSERRAT (obstructive sleep apnea)    resolved with wt loss 131#    Pacemaker    Pneumonia    last episode 2019    Pulmonary edema    Skin cancer    Unspecified visual disturbance    episode of vision disturbance and dizziness...occasional recurrences     ----------------------------    Cardiac electrophysiology study and ablation    Carotid endarterectomy    Procedure: ENDARTERECTOMY-CAROTID;  Surgeon: Maximilian Connolly MD;  Location: [facility] OR;  Service: Peripheral Vascular;  Laterality: Left;    Carotid stent    Procedure: INSERTION, STENT, ARTERY, CAROTID;  Surgeon: CIRILO Valdivia III, MD;  Location: [facility] OR [address] [place] [address];  Service: Vascular;  Laterality: Left;  left carotid artery stent placement  mgy  146.29   gymc2  8.0730  fluro time  4.8min    Carpal tunnel release    Cholecystectomy    Eye surgery    cataract    Hysterectomy    Insert / replace / remove pacemaker    Knee arthroscopy    Left heart catheterization    Procedure: Left heart cath;  Surgeon: Puma Shelton MD;  Location: [facility] CATH;  Service: Cardiology;;    Replacement of pacemaker generator    Procedure: REPLACEMENT, PACEMAKER GENERATOR;  Surgeon: Raymond Pérez MD;  Location: [facility] CATH/EP LAB;  Service: Cardiology;  Laterality: Left;    Skin cancer excision    Transcatheter aortic valve replacement (tavr)    Procedure: (TAVR);  Surgeon: Puma Shelton MD;  Location: [facility] CATH;  Service: Cardiology;  Laterality: Bilateral;    Transcatheter aortic valve replacement (tavr)     "Procedure: (TAVR) - Surgeon;  Surgeon: Maximilian Connolly MD;  [facility];  Service: Peripheral Vascular;  Laterality: Bilateral;        Family History   Problem Relation Name Age of Onset    Kidney failure Mother      Cancer Father      Cancer Brother       Social History     Tobacco Use    Smoking status: Former     Current packs/day: 0.00     Average packs/day: 2.0 packs/day for 40.0 years (80.0 ttl pk-yrs)     Types: Cigarettes     Start date: 1971     Quit date: 2011     Years since quittin.9     Passive exposure: Past    Smokeless tobacco: Never    Tobacco comments:          Quit in    Substance Use Topics    Alcohol use: Not Currently     Alcohol/week: 8.0 standard drinks of alcohol     Types: 8 Glasses of wine per week    Drug use: No       Physical Exam  BP (!) 125/57 (BP Location: Right arm, Patient Position: Sitting)   Pulse 62   Ht 5' 5" (1.651 m)   Wt 78.3 kg (172 lb 9.9 oz)   LMP  (LMP Unknown)   BMI 28.73 kg/m²   Body surface area is 1.89 meters squared.  Body mass index is 28.73 kg/m².    General appearance: alert, appears stated age, cooperative, and no distress  Head: Normocephalic, without obvious abnormality, atraumatic  Neck: no carotid bruit, no JVD, and supple, symmetrical, trachea midline  Lungs: clear to auscultation bilaterally  Heart: regular rate and rhythm; S1, S2 normal, no click, rub or gallop; murmur present  Abdomen: soft, non-tender, no distended  Extremities: extremities atraumatic, no pitting edema  Skin: warm, no cyanosis, no pathologic ecchymosis in exposed portions  Neurologic: Grossly normal. A&O x3      Lab Review   Lab Results   Component Value Date    WBC 17.91 (H) 2024    HGB 8.5 (L) 2024    HCT 27.6 (L) 2024    MCV 98 2024     2024         BMP  Lab Results   Component Value Date     12/10/2024    K 4.8 12/10/2024    CL 99 12/10/2024    CO2 39 (H) 12/10/2024    BUN 40 (H) 12/10/2024    CREATININE " 0.8 12/10/2024    CALCIUM 8.8 12/10/2024    ANIONGAP 2 (L) 12/10/2024    ESTGFRAFRICA >60.0 05/10/2022    EGFRNONAA >60.0 05/10/2022       Lab Results   Component Value Date    LABPROT 11.4 12/18/2024    ALBUMIN 3.7 12/10/2024       Lab Results   Component Value Date    ALT 36 12/10/2024    AST 54 (H) 12/10/2024    ALKPHOS 73 12/10/2024    BILITOT 0.9 12/10/2024       Lab Results   Component Value Date    TSH 1.040 05/10/2022       Lab Results   Component Value Date    CHOL 199 05/10/2022    CHOL 190 02/17/2020    CHOL 178 03/23/2004     Lab Results   Component Value Date    HDL 59 05/10/2022    HDL 68 02/17/2020    HDL 95.0 (H) 03/23/2004     Lab Results   Component Value Date    LDLCALC 112.0 05/10/2022    LDLCALC 106.0 02/17/2020    LDLCALC 68.4 03/23/2004     Lab Results   Component Value Date    TRIG 140 05/10/2022    TRIG 80 02/17/2020    TRIG 73 03/23/2004     Lab Results   Component Value Date    CHOLHDL 29.6 05/10/2022    CHOLHDL 35.8 02/17/2020    CHOLHDL 53.4 (H) 03/23/2004        [id number] score   Sex:     1  Female (1 point)              Male (0 points)     Age:     2              <=64 years old (0 points)              65 to 74 years old (1 point)              >=75 years old (2 points)     Comorbidities: 1+1+0+0+1              Heart failure (1 point)              Hypertension (1 point)              Diabetes mellitus (1 point)              History of stroke, TIA, or thromboembolism (2 points)              Vascular disease (history of MI, PAD, or aortic atherosclerosis) (1 point)     Total points: 6     Unadjusted stroke rate: [Veronica L, Antoine M, Bruce GY. Evaluation of risk stratification schemes for ischaemic stroke and bleeding in [id number] patients with atrial fibrillation: the [place] Atrial Fibrillation cohort study. Eur Heart J 2012; 33:1500.]  0 points:0.2% per year  1 point:                  0.6% per year  2 points:2.2% per year  3 points:3.2% per year  4 points:4.8% per year  5 points:7.2%  per year  6 points:9.7% per year  7 points:11.1% per year  8 points:11% per year  9 points:12.2% per year     HAS-BLED score:  Hypertension (1 point) : 1  Abnormal liver function (cirrhosis, bilirubin >2xULN or ALT>3xULN) (1 point): 0  Abnormal renal function (dialysis, renal transplant or Cr>2.26) (1 point): 0  Stroke (1 point): 0  Bleeding tendency or predisposition (1 point): 1  Labile INRs in patients taking warfarin (<60%TTR or high/labile INR) (1 point): 1  Elderly: age greater than 65 years (1 point): 1  Drugs: concomitant antiplatelet agents (eg, aspirin, clopidogrel, ticlopidine, nonsteroidal antiinflammatory agents) (1 point): 1  Drugs: concomitant excess alcohol use (1 point): 0     Total score: 5     0 points:1.13 bleeds per 100 patient-years  1 point:                        1.02 bleeds per 100 patient-years  2 points:1.88 bleeds per 100 patient-years  3 points:3.74 bleeds per 100 patient-years  4 points:8.70 bleeds per 100 patient-years  5 to 9 points:Insufficient data     [Lip GY. Implications of the JEREMY(2)DS(2)-VASc and HAS-BLED Scores for thromboprophylaxis in atrial fibrillation. Am J Med 2011; 124:111.]       EKG 1/9/25: V-paced rhythm 61 bpm, , no ST changes      Assessment & Plan:     This is a 74 y.o. female with permanent AF s/p ablation, AS s/p TAVR, chronic HFrEF, CAD, SSS s/p PPM, carotid artery disease s/p CEA, history of GIB in 2011, severe anemia requiring transfusions, poor balance. Her [id number] score is 6 and HAS-BLED score is 5. She is scheduled to undergo Watchman procedure on 1/13/25 with Dr. Cantu.    1. Preoperative cardiovascular exam  - EKG today stable   - Hold apixaban 2 days prior to procedure (last dose on evening of 1/10/25)  - Hold sacubitril-valsartan day of procedure (last dose on evening of 1/12/25)  - Begin mupirocin ointment and peridex mouthwash 2 days prior to procedure (first dose 12/14/24)   - Complete preoperative labs     2. Permanent atrial  fibrillation s/p ablation  - Rate controlled in clinic today  - Continue digoxin 250 mcg qd  - Continue apixaban 5 mg BID  - Scheduled for Watchman procedure on 1/13/25    3. Severe aortic valve stenosis s/p TAVR  - TTE 12/2024: TAV MG 28 and DI 0.43 with mild to moderate PVL  - Asymptomatic     4. Chronic combined systolic and diastolic CHF, NYHA class 3  - Euvolemic on exam  - Continue metoprolol succinate 25 mg qd   - Continue sacubitril-valsartan 24-26 mg BID  - Continue furosemide 20 mg qd   - Continue spironolactone 25 mg qd     5. Coronary artery disease   - No anginal equivalents  - Continue rosuvastatin 40 mg qd   - Continue ezetimibe 10 mg qd     6. Sick sinus syndrome s/p PPM   - Normal device function  - Asymptomatic     7. Carotid artery disease s/p carotid endarterectomy  - Asymptomatic   - Continue rosuvastatin 40 mg qd   - Continue ezetimibe 10 mg qd       Emphasized the importance of modifying lifestyle related risk factors including limiting alcohol intake, exercise, diet most resembling a Mediterranean diet.    Please follow up after procedure.  Please follow up with primary cardiologist as scheduled.      Kait Kincaid PA-C  [facility] [place]  Office: [phone]

## 2025-01-09 NOTE — PATIENT INSTRUCTIONS
Christiano    Arrive for procedure at: [facility] [address]    You will receive a phone call from a nurse from Pre-Op Department with further instructions and exact arrival time prior to your scheduled procedure.    Notify the nurse if you are ALLERGIC TO IODINE.    FASTING: You MAY NOT have anything to eat or drink AFTER MIDNIGHT the day before your procedure. If your procedure is scheduled in the afternoon, you may have a LIGHT BREAKFAST 6-8 hours prior to your procedure.  For example: Two slices of toast; black coffee or black tea.    MEDICATIONS: You may take your regular morning medications with water. If there are any medications that you should not take, you will be instructed to hold them for that morning.    CARDIOLOGY PRE-PROCEDURE MEDICATION ORDERS:  ** Please hold any medications that are checked below:    HOLD                          # OF DAYS TO HOLD  Eliquis                        2 days prior to procedure         (last dose on evening of          1/10/25)           Entresto        Day of procedure (last dose         evening of 1/12/25)            START the following medications               Mupirocin ointment  2 days prior to procedure   (first dose on morning of 1/11/25)  Peridex mouthwash  2 days prior to procedure   (first dose on morning of 1/11/25)      WHAT TO EXPECT:    How long will the procedure take?  The procedure will take an average of 1 - 2 hours to perform.  After the procedure, you will need to lay flat for around 4 - 6 hours to minimize bleeding from the puncture site.    When can I go home?  You may be able to be discharged home that same afternoon if there were no complications.  If you have one of the following: balloon; stent; pacemaker or defibrillator procedures, you may spend one night for observation.  Your doctor will determine your discharge based upon your progress.  The results of your procedure will be discussed with you before  you are discharged.  Any further testing or procedures will be scheduled for you either before you leave or you will be instructed to call for a future appointment.      TRANSPORTATION:  PLEASE ARRANGE TO HAVE SOMEONE DRIVE YOU HOME FOLLOWING YOUR PROCEDURE, YOU WILL NOT BE ALLOWED TO DRIVE.

## 2025-01-09 NOTE — PROGRESS NOTES
[facility]  [facility]  Date: 1/9/25    Patient: Lisa Smith, 1950, [id number]  Primary Care Provider: Hope Tang FNP     Chief Complaint: Preoperative Watchman evaluation     Subjective:       Lisa Smith is a 74 y.o. female who presents for preoperative Watchman evaluation. They follow with Dr. Pérez and were referred to Dr. Cantu for Watchman.      Patient scheduled to undergo LAAO with Watchman device on 1/13/25. She is here for her preoperative evaluation. Denies fever, chills, chest discomfort, dyspnea, palpitations, dizziness, syncope, orthopnea, PND, edema, exposed wounds, loose or painful teeth, dysuria.    Focused Past History includes:  Permanent atrial fibrillation s/p ablation   Aortic stenosis s/p TAVR 12/2023  Chronic heart failure with reduced ejection fraction   TTE 12/2024 reported EF 55-60%, normal RV. Bilateral atrial enlargement. TAV MG 28 and DI 0.43 with mild to moderate PVL). Moderate MR. PASP 45   Coronary artery disease  Sick sinus syndrome s/p PPM  Carotid artery disease s/p CEA  Severe anemia requiring transfusion    Current Outpatient Medications   Medication Sig    acetaminophen (TYLENOL ARTHRITIS ORAL) Take 2 tablets by mouth daily as needed.    albuterol (ACCUNEB) 1.25 mg/3 mL Nebu INHALE THE CONTENTS OF 1 VIAL VIA NEBULIZER EVERY 6 HOURS AS NEEDED FOR RESCUE    albuterol-ipratropium (DUO-NEB) 2.5 mg-0.5 mg/3 mL nebulizer solution Take 3 mLs by nebulization every 6 (six) hours as needed for Wheezing or Shortness of Breath. Rescue    apixaban (ELIQUIS) 5 mg Tab Take 1 tablet (5 mg total) by mouth 2 (two) times daily.    digoxin (LANOXIN) 250 mcg tablet TAKE 1 TABLET EVERY DAY    ezetimibe (ZETIA) 10 mg tablet TAKE 1 TABLET ONE TIME DAILY    fluticasone propionate (FLONASE) 50 mcg/actuation nasal spray 1 spray by Each Nostril route daily as needed for Rhinitis or Allergies.    furosemide (LASIX) 20 MG tablet Take 1 tablet (20 mg total) by mouth every  morning.    metoprolol succinate (TOPROL-XL) 25 MG 24 hr tablet TAKE 1 TABLET EVERY MORNING    pantoprazole (PROTONIX) 40 MG tablet Take 1 tablet (40 mg total) by mouth every morning.    rOPINIRole (REQUIP) 1 MG tablet Take 1 tablet (1 mg total) by mouth every evening.    rosuvastatin (CRESTOR) 40 MG Tab TAKE 1 TABLET EVERY EVENING    sacubitriL-valsartan (ENTRESTO) 24-26 mg per tablet Take 1 tablet by mouth 2 (two) times daily.    TRELEGY ELLIPTA 100-62.5-25 mcg DsDv INHALE 1 PUFF INTO THE LUNGS ONCE DAILY.    albuterol (PROVENTIL/VENTOLIN HFA) 90 mcg/actuation inhaler INHALE 2 PUFFS EVERY 6 HOURS AS NEEDED FOR WHEEZING (RESCUE)    chlorhexidine (PERIDEX) 0.12 % solution Use as directed 15 mLs in the mouth or throat 2 (two) times daily. for 2 days    mupirocin (BACTROBAN) 2 % ointment Apply topically 3 (three) times daily. for 2 days    spironolactone (ALDACTONE) 25 MG tablet TAKE 1 TABLET EVERY MORNING     No current facility-administered medications for this visit.            Objective       Review of Systems  Constitutional: negative for fevers, night sweats, and weight loss  Eyes: negative for visual disturbance, diplopia  Respiratory: negative for cough, hemoptysis, sputum, and wheezing  Cardiovascular: see HPI  Gastrointestinal: negative for abdominal pain, bright red blood per rectum, change in bowel habits, dysphagia, melena, and reflux symptoms  Genitourinary:negative for dysuria, frequency, and hematuria  Hematologic/lymphatic: negative for bleeding, easy bruising, and lymphadenopathy  Musculoskeletal:negative for arthralgias, back pain, and myalgias  Neurological: negative for gait problems, paresthesia, speech problems, vertigo, and weakness  Behavioral/Psych: negative for excessive alcohol consumption, illegal drug usage, and sleep disturbance    -------------------------------------    Anticoagulant long-term use    Arthritis    Atrial fibrillation    Bell's palsy    Boil of buttock    burst 12/19/22     CHF (congestive heart failure)    Chronic cough    COPD (chronic obstructive pulmonary disease)    use O2  3l/m NC at night; also taking during day currently 12/4/23    Dizziness    Encounter for blood transfusion    GI bleed    transfusion    H/O diverticulitis of colon    Hard of hearing    Heart murmur    Hematoma    left hand    Heterozygous alpha 1-antitrypsin deficiency    History of home oxygen therapy    3L NC at night    Hypertension    Lung disease    copd    MONSERRAT (obstructive sleep apnea)    resolved with wt loss 131#    Pacemaker    Pneumonia    last episode 2019    Pulmonary edema    Skin cancer    Unspecified visual disturbance    episode of vision disturbance and dizziness...occasional recurrences     ----------------------------    Cardiac electrophysiology study and ablation    Carotid endarterectomy    Procedure: ENDARTERECTOMY-CAROTID;  Surgeon: Maximilian Connolly MD;  Location: [facility] OR;  Service: Peripheral Vascular;  Laterality: Left;    Carotid stent    Procedure: INSERTION, STENT, ARTERY, CAROTID;  Surgeon: CIRILO Valdivia III, MD;  Location: [facility] OR [address] [place] [address];  Service: Vascular;  Laterality: Left;  left carotid artery stent placement  mgy  146.29   gymc2  8.0730  fluro time  4.8min    Carpal tunnel release    Cholecystectomy    Eye surgery    cataract    Hysterectomy    Insert / replace / remove pacemaker    Knee arthroscopy    Left heart catheterization    Procedure: Left heart cath;  Surgeon: Puma Shelton MD;  Location: [facility] CATH;  Service: Cardiology;;    Replacement of pacemaker generator    Procedure: REPLACEMENT, PACEMAKER GENERATOR;  Surgeon: Raymond Pérez MD;  Location: [facility] CATH/EP LAB;  Service: Cardiology;  Laterality: Left;    Skin cancer excision    Transcatheter aortic valve replacement (tavr)    Procedure: (TAVR);  Surgeon: Puma Shelton MD;  Location: [facility] CATH;  Service: Cardiology;  Laterality: Bilateral;    Transcatheter aortic valve replacement (tavr)     "Procedure: (TAVR) - Surgeon;  Surgeon: Maximilian Connolly MD;  [facility] C[facility];  Service: Peripheral Vascular;  Laterality: Bilateral;        Family History   Problem Relation Name Age of Onset    Kidney failure Mother      Cancer Father      Cancer Brother       Social History     Tobacco Use    Smoking status: Former     Current packs/day: 0.00     Average packs/day: 2.0 packs/day for 40.0 years (80.0 ttl pk-yrs)     Types: Cigarettes     Start date: 1971     Quit date: 2011     Years since quittin.9     Passive exposure: Past    Smokeless tobacco: Never    Tobacco comments:          Quit in    Substance Use Topics    Alcohol use: Not Currently     Alcohol/week: 8.0 standard drinks of alcohol     Types: 8 Glasses of wine per week    Drug use: No       Physical Exam  BP (!) 125/57 (BP Location: Right arm, Patient Position: Sitting)   Pulse 62   Ht 5' 5" (1.651 m)   Wt 78.3 kg (172 lb 9.9 oz)   LMP  (LMP Unknown)   BMI 28.73 kg/m²   Body surface area is 1.89 meters squared.  Body mass index is 28.73 kg/m².    General appearance: alert, appears stated age, cooperative, and no distress  Head: Normocephalic, without obvious abnormality, atraumatic  Neck: no carotid bruit, no JVD, and supple, symmetrical, trachea midline  Lungs: clear to auscultation bilaterally  Heart: regular rate and rhythm; S1, S2 normal, no click, rub or gallop; murmur present  Abdomen: soft, non-tender, no distended  Extremities: extremities atraumatic, no pitting edema  Skin: warm, no cyanosis, no pathologic ecchymosis in exposed portions  Neurologic: Grossly normal. A&O x3      Lab Review   Lab Results   Component Value Date    WBC 17.91 (H) 2024    HGB 8.5 (L) 2024    HCT 27.6 (L) 2024    MCV 98 2024     2024         BMP  Lab Results   Component Value Date     12/10/2024    K 4.8 12/10/2024    CL 99 12/10/2024    CO2 39 (H) 12/10/2024    BUN 40 (H) 12/10/2024    CREATININE " 0.8 12/10/2024    CALCIUM 8.8 12/10/2024    ANIONGAP 2 (L) 12/10/2024    ESTGFRAFRICA >60.0 05/10/2022    EGFRNONAA >60.0 05/10/2022       Lab Results   Component Value Date    LABPROT 11.4 12/18/2024    ALBUMIN 3.7 12/10/2024       Lab Results   Component Value Date    ALT 36 12/10/2024    AST 54 (H) 12/10/2024    ALKPHOS 73 12/10/2024    BILITOT 0.9 12/10/2024       Lab Results   Component Value Date    TSH 1.040 05/10/2022       Lab Results   Component Value Date    CHOL 199 05/10/2022    CHOL 190 02/17/2020    CHOL 178 03/23/2004     Lab Results   Component Value Date    HDL 59 05/10/2022    HDL 68 02/17/2020    HDL 95.0 (H) 03/23/2004     Lab Results   Component Value Date    LDLCALC 112.0 05/10/2022    LDLCALC 106.0 02/17/2020    LDLCALC 68.4 03/23/2004     Lab Results   Component Value Date    TRIG 140 05/10/2022    TRIG 80 02/17/2020    TRIG 73 03/23/2004     Lab Results   Component Value Date    CHOLHDL 29.6 05/10/2022    CHOLHDL 35.8 02/17/2020    CHOLHDL 53.4 (H) 03/23/2004        [id number] score   Sex:     1  Female (1 point)              Male (0 points)     Age:     2              <=64 years old (0 points)              65 to 74 years old (1 point)              >=75 years old (2 points)     Comorbidities: 1+1+0+0+1              Heart failure (1 point)              Hypertension (1 point)              Diabetes mellitus (1 point)              History of stroke, TIA, or thromboembolism (2 points)              Vascular disease (history of MI, PAD, or aortic atherosclerosis) (1 point)     Total points: 6     Unadjusted stroke rate: [Veronica L, Antoine M, Bruce GY. Evaluation of risk stratification schemes for ischaemic stroke and bleeding in [id number] patients with atrial fibrillation: the [place] Atrial Fibrillation cohort study. Eur Heart J 2012; 33:1500.]  0 points:0.2% per year  1 point:                  0.6% per year  2 points:2.2% per year  3 points:3.2% per year  4 points:4.8% per year  5 points:7.2%  per year  6 points:9.7% per year  7 points:11.1% per year  8 points:11% per year  9 points:12.2% per year     HAS-BLED score:  Hypertension (1 point) : 1  Abnormal liver function (cirrhosis, bilirubin >2xULN or ALT>3xULN) (1 point): 0  Abnormal renal function (dialysis, renal transplant or Cr>2.26) (1 point): 0  Stroke (1 point): 0  Bleeding tendency or predisposition (1 point): 1  Labile INRs in patients taking warfarin (<60%TTR or high/labile INR) (1 point): 1  Elderly: age greater than 65 years (1 point): 1  Drugs: concomitant antiplatelet agents (eg, aspirin, clopidogrel, ticlopidine, nonsteroidal antiinflammatory agents) (1 point): 1  Drugs: concomitant excess alcohol use (1 point): 0     Total score: 5     0 points:1.13 bleeds per 100 patient-years  1 point:                        1.02 bleeds per 100 patient-years  2 points:1.88 bleeds per 100 patient-years  3 points:3.74 bleeds per 100 patient-years  4 points:8.70 bleeds per 100 patient-years  5 to 9 points:Insufficient data     [Lip GY. Implications of the JEREMY(2)DS(2)-VASc and HAS-BLED Scores for thromboprophylaxis in atrial fibrillation. Am J Med 2011; 124:111.]       EKG 1/9/25: V-paced rhythm 61 bpm, , no ST changes      Assessment & Plan:     This is a 74 y.o. female with permanent AF s/p ablation, AS s/p TAVR, chronic HFrEF, CAD, SSS s/p PPM, carotid artery disease s/p CEA, history of GIB in 2011, severe anemia requiring transfusions, poor balance. Her DON0XR2-ABGt score is 6 and HAS-BLED score is 5. She is scheduled to undergo Watchman procedure on 1/13/25 with Dr. Cantu.    1. Preoperative cardiovascular exam  - EKG today stable   - Hold apixaban 2 days prior to procedure (last dose on evening of 1/10/25)  - Hold sacubitril-valsartan day of procedure (last dose on evening of 1/12/25)  - Begin mupirocin ointment and peridex mouthwash 2 days prior to procedure (first dose 12/14/24)   - Complete preoperative labs     2. Permanent atrial  fibrillation s/p ablation  - Rate controlled in clinic today  - Continue digoxin 250 mcg qd  - Continue apixaban 5 mg BID  - Scheduled for Watchman procedure on 1/13/25    3. Severe aortic valve stenosis s/p TAVR  - TTE 12/2024: TAV MG 28 and DI 0.43 with mild to moderate PVL  - Asymptomatic     4. Chronic combined systolic and diastolic CHF, NYHA class 3  - Euvolemic on exam  - Continue metoprolol succinate 25 mg qd   - Continue sacubitril-valsartan 24-26 mg BID  - Continue furosemide 20 mg qd   - Continue spironolactone 25 mg qd     5. Coronary artery disease   - No anginal equivalents  - Continue rosuvastatin 40 mg qd   - Continue ezetimibe 10 mg qd     6. Sick sinus syndrome s/p PPM   - Normal device function  - Asymptomatic     7. Carotid artery disease s/p carotid endarterectomy  - Asymptomatic   - Continue rosuvastatin 40 mg qd   - Continue ezetimibe 10 mg qd       Emphasized the importance of modifying lifestyle related risk factors including limiting alcohol intake, exercise, diet most resembling a Mediterranean diet.    Please follow up after procedure.  Please follow up with primary cardiologist as scheduled.      Kait Kincaid PA-C  [facility] [place]  Office: [phone]

## 2025-01-09 NOTE — TELEPHONE ENCOUNTER
----- Message from Aida sent at 1/9/2025 11:42 AM CST -----  Type:  Pharmacy Calling to Clarify an RX    Name of Caller:  mae  Pharmacy Name:    [facility] [address]  [address] [address]  Phone: [phone] Fax: [phone]      Phone: [phone] Fax: [phone]    Prescription Name:  Paradax Solution  What do they need to clarify?:  clarity on the directions, need to know if she should swallow solution or dispose, need more clearer instructions    Best Call Back Number:  Additional Information:

## 2025-01-10 LAB
OHS QRS DURATION: 178 MS
OHS QTC CALCULATION: 442 MS

## 2025-01-13 ENCOUNTER — ANESTHESIA (OUTPATIENT)
Dept: CARDIOLOGY | Facility: HOSPITAL | Age: 75
DRG: 274 | End: 2025-01-13
Payer: MEDICARE

## 2025-01-13 ENCOUNTER — CLINICAL SUPPORT (OUTPATIENT)
Dept: CARDIOLOGY | Facility: HOSPITAL | Age: 75
DRG: 274 | End: 2025-01-13
Attending: INTERNAL MEDICINE
Payer: MEDICARE

## 2025-01-13 ENCOUNTER — HOSPITAL ENCOUNTER (INPATIENT)
Facility: HOSPITAL | Age: 75
LOS: 2 days | Discharge: HOME HEALTH | DRG: 274 | End: 2025-01-15
Attending: INTERNAL MEDICINE | Admitting: INTERNAL MEDICINE
Payer: MEDICARE

## 2025-01-13 ENCOUNTER — ANESTHESIA EVENT (OUTPATIENT)
Dept: CARDIOLOGY | Facility: HOSPITAL | Age: 75
DRG: 274 | End: 2025-01-13
Payer: MEDICARE

## 2025-01-13 VITALS — WEIGHT: 170 LBS | HEIGHT: 65 IN | BODY MASS INDEX: 28.32 KG/M2

## 2025-01-13 DIAGNOSIS — Z00.00 ENCOUNTER FOR MEDICARE ANNUAL WELLNESS EXAM: ICD-10-CM

## 2025-01-13 DIAGNOSIS — I48.0 PAF (PAROXYSMAL ATRIAL FIBRILLATION): ICD-10-CM

## 2025-01-13 DIAGNOSIS — D64.9 ANEMIA, UNSPECIFIED TYPE: ICD-10-CM

## 2025-01-13 DIAGNOSIS — Z95.818 PRESENCE OF WATCHMAN LEFT ATRIAL APPENDAGE CLOSURE DEVICE: Primary | ICD-10-CM

## 2025-01-13 DIAGNOSIS — R07.9 CHEST PAIN: ICD-10-CM

## 2025-01-13 DIAGNOSIS — K92.2 GASTROINTESTINAL HEMORRHAGE, UNSPECIFIED GASTROINTESTINAL HEMORRHAGE TYPE: ICD-10-CM

## 2025-01-13 DIAGNOSIS — R04.0 EPISTAXIS: ICD-10-CM

## 2025-01-13 DIAGNOSIS — D62 ACUTE BLOOD LOSS ANEMIA: ICD-10-CM

## 2025-01-13 DIAGNOSIS — I49.5 SSS (SICK SINUS SYNDROME): ICD-10-CM

## 2025-01-13 DIAGNOSIS — Z91.89 AT HIGH RISK FOR BLEEDING: ICD-10-CM

## 2025-01-13 DIAGNOSIS — I95.81 HYPOTENSION AFTER PROCEDURE: ICD-10-CM

## 2025-01-13 DIAGNOSIS — R26.89 POOR BALANCE: ICD-10-CM

## 2025-01-13 PROBLEM — R04.2 HEMOPTYSIS: Status: ACTIVE | Noted: 2025-01-13

## 2025-01-13 LAB
ABO + RH BLD: NORMAL
ANION GAP SERPL CALC-SCNC: 9 MMOL/L (ref 8–16)
BASOPHILS # BLD AUTO: 0.04 K/UL (ref 0–0.2)
BASOPHILS NFR BLD: 0.5 % (ref 0–1.9)
BLD GP AB SCN CELLS X3 SERPL QL: NORMAL
BLD PROD TYP BPU: NORMAL
BLD PROD TYP BPU: NORMAL
BLOOD UNIT EXPIRATION DATE: NORMAL
BLOOD UNIT EXPIRATION DATE: NORMAL
BLOOD UNIT TYPE CODE: 5100
BLOOD UNIT TYPE CODE: 5100
BLOOD UNIT TYPE: NORMAL
BLOOD UNIT TYPE: NORMAL
BSA FOR ECHO PROCEDURE: 1.88 M2
BSA FOR ECHO PROCEDURE: 1.88 M2
BUN SERPL-MCNC: 33 MG/DL (ref 8–23)
CALCIUM SERPL-MCNC: 9.3 MG/DL (ref 8.7–10.5)
CHLORIDE SERPL-SCNC: 96 MMOL/L (ref 95–110)
CO2 SERPL-SCNC: 31 MMOL/L (ref 23–29)
CODING SYSTEM: NORMAL
CODING SYSTEM: NORMAL
CREAT SERPL-MCNC: 0.8 MG/DL (ref 0.5–1.4)
CROSSMATCH INTERPRETATION: NORMAL
CROSSMATCH INTERPRETATION: NORMAL
DIFFERENTIAL METHOD BLD: ABNORMAL
DISPENSE STATUS: NORMAL
DISPENSE STATUS: NORMAL
EOSINOPHIL # BLD AUTO: 0.2 K/UL (ref 0–0.5)
EOSINOPHIL NFR BLD: 2 % (ref 0–8)
ERYTHROCYTE [DISTWIDTH] IN BLOOD BY AUTOMATED COUNT: 17.2 % (ref 11.5–14.5)
ERYTHROCYTE [DISTWIDTH] IN BLOOD BY AUTOMATED COUNT: 17.5 % (ref 11.5–14.5)
EST. GFR  (NO RACE VARIABLE): >60 ML/MIN/1.73 M^2
GLUCOSE SERPL-MCNC: 91 MG/DL (ref 70–110)
HCT VFR BLD AUTO: 23 % (ref 37–48.5)
HCT VFR BLD AUTO: 24.5 % (ref 37–48.5)
HCT VFR BLD AUTO: 28.5 % (ref 37–48.5)
HGB BLD-MCNC: 7.4 G/DL (ref 12–16)
HGB BLD-MCNC: 7.7 G/DL (ref 12–16)
HGB BLD-MCNC: 9.1 G/DL (ref 12–16)
IMM GRANULOCYTES # BLD AUTO: 0.05 K/UL (ref 0–0.04)
IMM GRANULOCYTES NFR BLD AUTO: 0.6 % (ref 0–0.5)
LYMPHOCYTES # BLD AUTO: 1.1 K/UL (ref 1–4.8)
LYMPHOCYTES NFR BLD: 12.6 % (ref 18–48)
MCH RBC QN AUTO: 30.3 PG (ref 27–31)
MCH RBC QN AUTO: 31.4 PG (ref 27–31)
MCHC RBC AUTO-ENTMCNC: 31.4 G/DL (ref 32–36)
MCHC RBC AUTO-ENTMCNC: 31.9 G/DL (ref 32–36)
MCV RBC AUTO: 97 FL (ref 82–98)
MCV RBC AUTO: 98 FL (ref 82–98)
MONOCYTES # BLD AUTO: 0.6 K/UL (ref 0.3–1)
MONOCYTES NFR BLD: 7.2 % (ref 4–15)
NEUTROPHILS # BLD AUTO: 6.5 K/UL (ref 1.8–7.7)
NEUTROPHILS NFR BLD: 77.1 % (ref 38–73)
NRBC BLD-RTO: 0 /100 WBC
NUM UNITS TRANS PACKED RBC: NORMAL
NUM UNITS TRANS PACKED RBC: NORMAL
PLATELET # BLD AUTO: 208 K/UL (ref 150–450)
PLATELET # BLD AUTO: 213 K/UL (ref 150–450)
PMV BLD AUTO: 10.5 FL (ref 9.2–12.9)
PMV BLD AUTO: 10.6 FL (ref 9.2–12.9)
POC ACTIVATED CLOTTING TIME K: 170 SEC (ref 74–137)
POC ACTIVATED CLOTTING TIME K: 227 SEC (ref 74–137)
POC ACTIVATED CLOTTING TIME K: 262 SEC (ref 74–137)
POC ACTIVATED CLOTTING TIME K: 279 SEC (ref 74–137)
POTASSIUM SERPL-SCNC: 4.4 MMOL/L (ref 3.5–5.1)
RBC # BLD AUTO: 2.54 M/UL (ref 4–5.4)
RBC # BLD AUTO: 2.9 M/UL (ref 4–5.4)
SAMPLE: ABNORMAL
SODIUM SERPL-SCNC: 136 MMOL/L (ref 136–145)
WBC # BLD AUTO: 11.52 K/UL (ref 3.9–12.7)
WBC # BLD AUTO: 8.44 K/UL (ref 3.9–12.7)

## 2025-01-13 PROCEDURE — D9220A PRA ANESTHESIA: Mod: CRNA,,, | Performed by: NURSE ANESTHETIST, CERTIFIED REGISTERED

## 2025-01-13 PROCEDURE — 63600175 PHARM REV CODE 636 W HCPCS: Mod: JZ,TB | Performed by: INTERNAL MEDICINE

## 2025-01-13 PROCEDURE — C1769 GUIDE WIRE: HCPCS | Performed by: INTERNAL MEDICINE

## 2025-01-13 PROCEDURE — 93308 TTE F-UP OR LMTD: CPT

## 2025-01-13 PROCEDURE — 94799 UNLISTED PULMONARY SVC/PX: CPT

## 2025-01-13 PROCEDURE — 85027 COMPLETE CBC AUTOMATED: CPT | Performed by: INTERNAL MEDICINE

## 2025-01-13 PROCEDURE — C1894 INTRO/SHEATH, NON-LASER: HCPCS | Performed by: INTERNAL MEDICINE

## 2025-01-13 PROCEDURE — 99900035 HC TECH TIME PER 15 MIN (STAT)

## 2025-01-13 PROCEDURE — 33340 PERQ CLSR TCAT L ATR APNDGE: CPT | Mod: Q0 | Performed by: INTERNAL MEDICINE

## 2025-01-13 PROCEDURE — 02L73DK OCCLUSION OF LEFT ATRIAL APPENDAGE WITH INTRALUMINAL DEVICE, PERCUTANEOUS APPROACH: ICD-10-PCS | Performed by: INTERNAL MEDICINE

## 2025-01-13 PROCEDURE — 25000003 PHARM REV CODE 250: Performed by: INTERNAL MEDICINE

## 2025-01-13 PROCEDURE — 85018 HEMOGLOBIN: CPT | Performed by: INTERNAL MEDICINE

## 2025-01-13 PROCEDURE — 63600175 PHARM REV CODE 636 W HCPCS: Performed by: ANESTHESIOLOGY

## 2025-01-13 PROCEDURE — P9016 RBC LEUKOCYTES REDUCED: HCPCS | Performed by: GENERAL PRACTICE

## 2025-01-13 PROCEDURE — 85025 COMPLETE CBC W/AUTO DIFF WBC: CPT | Performed by: INTERNAL MEDICINE

## 2025-01-13 PROCEDURE — 25000003 PHARM REV CODE 250

## 2025-01-13 PROCEDURE — 27000221 HC OXYGEN, UP TO 24 HOURS

## 2025-01-13 PROCEDURE — 63600175 PHARM REV CODE 636 W HCPCS: Performed by: NURSE ANESTHETIST, CERTIFIED REGISTERED

## 2025-01-13 PROCEDURE — 85014 HEMATOCRIT: CPT | Performed by: INTERNAL MEDICINE

## 2025-01-13 PROCEDURE — 37000008 HC ANESTHESIA 1ST 15 MINUTES: Performed by: INTERNAL MEDICINE

## 2025-01-13 PROCEDURE — 99900031 HC PATIENT EDUCATION (STAT)

## 2025-01-13 PROCEDURE — 33340 PERQ CLSR TCAT L ATR APNDGE: CPT | Mod: Q0,,, | Performed by: INTERNAL MEDICINE

## 2025-01-13 PROCEDURE — 94761 N-INVAS EAR/PLS OXIMETRY MLT: CPT

## 2025-01-13 PROCEDURE — B245ZZ4 ULTRASONOGRAPHY OF LEFT HEART, TRANSESOPHAGEAL: ICD-10-PCS | Performed by: INTERNAL MEDICINE

## 2025-01-13 PROCEDURE — 86920 COMPATIBILITY TEST SPIN: CPT | Performed by: GENERAL PRACTICE

## 2025-01-13 PROCEDURE — 36430 TRANSFUSION BLD/BLD COMPNT: CPT

## 2025-01-13 PROCEDURE — 93355 ECHO TRANSESOPHAGEAL (TEE): CPT

## 2025-01-13 PROCEDURE — D9220A PRA ANESTHESIA: Mod: ANES,,, | Performed by: ANESTHESIOLOGY

## 2025-01-13 PROCEDURE — C1889 IMPLANT/INSERT DEVICE, NOC: HCPCS | Performed by: INTERNAL MEDICINE

## 2025-01-13 PROCEDURE — 25500020 PHARM REV CODE 255: Performed by: INTERNAL MEDICINE

## 2025-01-13 PROCEDURE — 63600175 PHARM REV CODE 636 W HCPCS: Performed by: INTERNAL MEDICINE

## 2025-01-13 PROCEDURE — 20000000 HC ICU ROOM

## 2025-01-13 PROCEDURE — 80048 BASIC METABOLIC PNL TOTAL CA: CPT | Performed by: INTERNAL MEDICINE

## 2025-01-13 PROCEDURE — 93308 TTE F-UP OR LMTD: CPT | Mod: 26,,, | Performed by: INTERNAL MEDICINE

## 2025-01-13 PROCEDURE — 86850 RBC ANTIBODY SCREEN: CPT | Performed by: INTERNAL MEDICINE

## 2025-01-13 PROCEDURE — 63600175 PHARM REV CODE 636 W HCPCS

## 2025-01-13 PROCEDURE — 37000009 HC ANESTHESIA EA ADD 15 MINS: Performed by: INTERNAL MEDICINE

## 2025-01-13 PROCEDURE — 27201423 OPTIME MED/SURG SUP & DEVICES STERILE SUPPLY: Performed by: INTERNAL MEDICINE

## 2025-01-13 PROCEDURE — 25000003 PHARM REV CODE 250: Performed by: NURSE ANESTHETIST, CERTIFIED REGISTERED

## 2025-01-13 PROCEDURE — 36415 COLL VENOUS BLD VENIPUNCTURE: CPT | Performed by: INTERNAL MEDICINE

## 2025-01-13 PROCEDURE — 93355 ECHO TRANSESOPHAGEAL (TEE): CPT | Mod: ,,, | Performed by: INTERNAL MEDICINE

## 2025-01-13 DEVICE — LEFT ATRIAL APPENDAGE CLOSURE DEVICE WITH DELIVERY SYSTEM
Type: IMPLANTABLE DEVICE | Site: CHEST | Status: FUNCTIONAL
Brand: WATCHMAN FLX™ PRO

## 2025-01-13 RX ORDER — LIDOCAINE HYDROCHLORIDE 20 MG/ML
INJECTION, SOLUTION EPIDURAL; INFILTRATION; INTRACAUDAL; PERINEURAL
Status: DISCONTINUED | OUTPATIENT
Start: 2025-01-13 | End: 2025-01-13

## 2025-01-13 RX ORDER — ROPINIROLE 0.5 MG/1
1 TABLET, FILM COATED ORAL NIGHTLY
Status: DISCONTINUED | OUTPATIENT
Start: 2025-01-13 | End: 2025-01-15 | Stop reason: HOSPADM

## 2025-01-13 RX ORDER — ATORVASTATIN CALCIUM 40 MG/1
40 TABLET, FILM COATED ORAL NIGHTLY
Status: DISCONTINUED | OUTPATIENT
Start: 2025-01-13 | End: 2025-01-15 | Stop reason: HOSPADM

## 2025-01-13 RX ORDER — NAPROXEN SODIUM 220 MG/1
TABLET, FILM COATED ORAL
Status: COMPLETED
Start: 2025-01-13 | End: 2025-01-13

## 2025-01-13 RX ORDER — SODIUM CHLORIDE 9 MG/ML
INJECTION, SOLUTION INTRAVENOUS CONTINUOUS
Status: DISCONTINUED | OUTPATIENT
Start: 2025-01-13 | End: 2025-01-13

## 2025-01-13 RX ORDER — FUROSEMIDE 10 MG/ML
INJECTION INTRAMUSCULAR; INTRAVENOUS
Status: DISCONTINUED | OUTPATIENT
Start: 2025-01-13 | End: 2025-01-13

## 2025-01-13 RX ORDER — NALOXONE HCL 0.4 MG/ML
0.02 VIAL (ML) INJECTION
Status: DISCONTINUED | OUTPATIENT
Start: 2025-01-13 | End: 2025-01-15 | Stop reason: HOSPADM

## 2025-01-13 RX ORDER — SODIUM,POTASSIUM PHOSPHATES 280-250MG
2 POWDER IN PACKET (EA) ORAL
Status: DISCONTINUED | OUTPATIENT
Start: 2025-01-13 | End: 2025-01-15 | Stop reason: HOSPADM

## 2025-01-13 RX ORDER — FENTANYL CITRATE 50 UG/ML
25 INJECTION, SOLUTION INTRAMUSCULAR; INTRAVENOUS EVERY 5 MIN PRN
Status: DISCONTINUED | OUTPATIENT
Start: 2025-01-13 | End: 2025-01-13 | Stop reason: HOSPADM

## 2025-01-13 RX ORDER — IODIXANOL 320 MG/ML
INJECTION, SOLUTION INTRAVASCULAR
Status: DISCONTINUED | OUTPATIENT
Start: 2025-01-13 | End: 2025-01-13 | Stop reason: HOSPADM

## 2025-01-13 RX ORDER — FUROSEMIDE 10 MG/ML
40 INJECTION INTRAMUSCULAR; INTRAVENOUS ONCE
Status: COMPLETED | OUTPATIENT
Start: 2025-01-13 | End: 2025-01-14

## 2025-01-13 RX ORDER — ACETAMINOPHEN 325 MG/1
650 TABLET ORAL EVERY 8 HOURS PRN
Status: DISCONTINUED | OUTPATIENT
Start: 2025-01-13 | End: 2025-01-15 | Stop reason: HOSPADM

## 2025-01-13 RX ORDER — AMOXICILLIN 250 MG
1 CAPSULE ORAL DAILY PRN
Status: DISCONTINUED | OUTPATIENT
Start: 2025-01-13 | End: 2025-01-15 | Stop reason: HOSPADM

## 2025-01-13 RX ORDER — EZETIMIBE 10 MG/1
10 TABLET ORAL DAILY
Status: DISCONTINUED | OUTPATIENT
Start: 2025-01-13 | End: 2025-01-15 | Stop reason: HOSPADM

## 2025-01-13 RX ORDER — CLOPIDOGREL BISULFATE 300 MG/1
600 TABLET, FILM COATED ORAL ONCE
Status: COMPLETED | OUTPATIENT
Start: 2025-01-13 | End: 2025-01-13

## 2025-01-13 RX ORDER — PROPOFOL 10 MG/ML
VIAL (ML) INTRAVENOUS
Status: DISCONTINUED | OUTPATIENT
Start: 2025-01-13 | End: 2025-01-13

## 2025-01-13 RX ORDER — HYDRALAZINE HYDROCHLORIDE 20 MG/ML
10 INJECTION INTRAMUSCULAR; INTRAVENOUS EVERY 4 HOURS PRN
Status: DISCONTINUED | OUTPATIENT
Start: 2025-01-13 | End: 2025-01-15 | Stop reason: HOSPADM

## 2025-01-13 RX ORDER — ONDANSETRON HYDROCHLORIDE 2 MG/ML
4 INJECTION, SOLUTION INTRAVENOUS DAILY PRN
Status: DISCONTINUED | OUTPATIENT
Start: 2025-01-13 | End: 2025-01-13 | Stop reason: HOSPADM

## 2025-01-13 RX ORDER — CEFAZOLIN 2 G/1
INJECTION, POWDER, FOR SOLUTION INTRAMUSCULAR; INTRAVENOUS
Status: COMPLETED
Start: 2025-01-13 | End: 2025-01-13

## 2025-01-13 RX ORDER — ETOMIDATE 2 MG/ML
INJECTION INTRAVENOUS
Status: DISCONTINUED | OUTPATIENT
Start: 2025-01-13 | End: 2025-01-13

## 2025-01-13 RX ORDER — HYDROMORPHONE HYDROCHLORIDE 1 MG/ML
0.5 INJECTION, SOLUTION INTRAMUSCULAR; INTRAVENOUS; SUBCUTANEOUS
Status: DISCONTINUED | OUTPATIENT
Start: 2025-01-13 | End: 2025-01-15 | Stop reason: HOSPADM

## 2025-01-13 RX ORDER — ONDANSETRON HYDROCHLORIDE 2 MG/ML
4 INJECTION, SOLUTION INTRAVENOUS EVERY 6 HOURS PRN
Status: DISCONTINUED | OUTPATIENT
Start: 2025-01-13 | End: 2025-01-15 | Stop reason: HOSPADM

## 2025-01-13 RX ORDER — SACUBITRIL AND VALSARTAN 24; 26 MG/1; MG/1
1 TABLET, FILM COATED ORAL 2 TIMES DAILY
Status: DISCONTINUED | OUTPATIENT
Start: 2025-01-13 | End: 2025-01-15 | Stop reason: HOSPADM

## 2025-01-13 RX ORDER — HYDROCODONE BITARTRATE AND ACETAMINOPHEN 500; 5 MG/1; MG/1
TABLET ORAL
Status: DISCONTINUED | OUTPATIENT
Start: 2025-01-13 | End: 2025-01-15 | Stop reason: HOSPADM

## 2025-01-13 RX ORDER — NAPROXEN SODIUM 220 MG/1
324 TABLET, FILM COATED ORAL ONCE
Status: COMPLETED | OUTPATIENT
Start: 2025-01-13 | End: 2025-01-13

## 2025-01-13 RX ORDER — ACETAMINOPHEN 325 MG/1
650 TABLET ORAL EVERY 4 HOURS PRN
Status: DISCONTINUED | OUTPATIENT
Start: 2025-01-13 | End: 2025-01-15 | Stop reason: HOSPADM

## 2025-01-13 RX ORDER — PANTOPRAZOLE SODIUM 40 MG/1
40 TABLET, DELAYED RELEASE ORAL EVERY MORNING
Status: DISCONTINUED | OUTPATIENT
Start: 2025-01-14 | End: 2025-01-15 | Stop reason: HOSPADM

## 2025-01-13 RX ORDER — ONDANSETRON HYDROCHLORIDE 2 MG/ML
INJECTION, SOLUTION INTRAMUSCULAR; INTRAVENOUS
Status: DISCONTINUED | OUTPATIENT
Start: 2025-01-13 | End: 2025-01-13

## 2025-01-13 RX ORDER — FUROSEMIDE 40 MG/1
40 TABLET ORAL EVERY MORNING
Qty: 90 TABLET | Refills: 3 | Status: SHIPPED | OUTPATIENT
Start: 2025-01-13 | End: 2025-01-15

## 2025-01-13 RX ORDER — ACETAMINOPHEN 10 MG/ML
1000 INJECTION, SOLUTION INTRAVENOUS ONCE
Status: COMPLETED | OUTPATIENT
Start: 2025-01-13 | End: 2025-01-13

## 2025-01-13 RX ORDER — ACETAMINOPHEN 10 MG/ML
INJECTION, SOLUTION INTRAVENOUS
Status: COMPLETED
Start: 2025-01-13 | End: 2025-01-13

## 2025-01-13 RX ORDER — HEPARIN SODIUM 5000 [USP'U]/ML
INJECTION, SOLUTION INTRAVENOUS; SUBCUTANEOUS
Status: DISCONTINUED | OUTPATIENT
Start: 2025-01-13 | End: 2025-01-13

## 2025-01-13 RX ORDER — CLOPIDOGREL BISULFATE 75 MG/1
75 TABLET ORAL DAILY
Status: DISCONTINUED | OUTPATIENT
Start: 2025-01-14 | End: 2025-01-15 | Stop reason: HOSPADM

## 2025-01-13 RX ORDER — MUPIROCIN 20 MG/G
OINTMENT TOPICAL 2 TIMES DAILY
Status: DISCONTINUED | OUTPATIENT
Start: 2025-01-13 | End: 2025-01-15 | Stop reason: HOSPADM

## 2025-01-13 RX ORDER — ALBUTEROL SULFATE 0.83 MG/ML
2.5 SOLUTION RESPIRATORY (INHALATION) EVERY 6 HOURS PRN
Status: DISCONTINUED | OUTPATIENT
Start: 2025-01-13 | End: 2025-01-15 | Stop reason: HOSPADM

## 2025-01-13 RX ORDER — LANOLIN ALCOHOL/MO/W.PET/CERES
800 CREAM (GRAM) TOPICAL
Status: DISCONTINUED | OUTPATIENT
Start: 2025-01-13 | End: 2025-01-15 | Stop reason: HOSPADM

## 2025-01-13 RX ORDER — DIPHENHYDRAMINE HYDROCHLORIDE 50 MG/ML
12.5 INJECTION INTRAMUSCULAR; INTRAVENOUS
Status: DISCONTINUED | OUTPATIENT
Start: 2025-01-13 | End: 2025-01-13 | Stop reason: HOSPADM

## 2025-01-13 RX ORDER — GLUCAGON 1 MG
1 KIT INJECTION
Status: DISCONTINUED | OUTPATIENT
Start: 2025-01-13 | End: 2025-01-13 | Stop reason: HOSPADM

## 2025-01-13 RX ORDER — FENTANYL CITRATE 50 UG/ML
INJECTION, SOLUTION INTRAMUSCULAR; INTRAVENOUS
Status: DISCONTINUED | OUTPATIENT
Start: 2025-01-13 | End: 2025-01-13

## 2025-01-13 RX ORDER — SODIUM CHLORIDE 0.9 % (FLUSH) 0.9 %
10 SYRINGE (ML) INJECTION EVERY 12 HOURS PRN
Status: DISCONTINUED | OUTPATIENT
Start: 2025-01-13 | End: 2025-01-15 | Stop reason: HOSPADM

## 2025-01-13 RX ORDER — IBUPROFEN 200 MG
16 TABLET ORAL
Status: DISCONTINUED | OUTPATIENT
Start: 2025-01-13 | End: 2025-01-15 | Stop reason: HOSPADM

## 2025-01-13 RX ORDER — FAMOTIDINE 10 MG/ML
INJECTION INTRAVENOUS
Status: DISCONTINUED | OUTPATIENT
Start: 2025-01-13 | End: 2025-01-13

## 2025-01-13 RX ORDER — CEFAZOLIN SODIUM 1 G/3ML
2 INJECTION, POWDER, FOR SOLUTION INTRAMUSCULAR; INTRAVENOUS ONCE
Status: COMPLETED | OUTPATIENT
Start: 2025-01-13 | End: 2025-01-13

## 2025-01-13 RX ORDER — CLOPIDOGREL BISULFATE 75 MG/1
75 TABLET ORAL DAILY
Qty: 90 TABLET | Refills: 3 | Status: SHIPPED | OUTPATIENT
Start: 2025-01-13 | End: 2026-01-13

## 2025-01-13 RX ORDER — DEXAMETHASONE SODIUM PHOSPHATE 4 MG/ML
INJECTION, SOLUTION INTRA-ARTICULAR; INTRALESIONAL; INTRAMUSCULAR; INTRAVENOUS; SOFT TISSUE
Status: DISCONTINUED | OUTPATIENT
Start: 2025-01-13 | End: 2025-01-13

## 2025-01-13 RX ORDER — LIDOCAINE HYDROCHLORIDE 10 MG/ML
INJECTION, SOLUTION INFILTRATION; PERINEURAL
Status: DISCONTINUED | OUTPATIENT
Start: 2025-01-13 | End: 2025-01-13 | Stop reason: HOSPADM

## 2025-01-13 RX ORDER — AMOXICILLIN 500 MG/1
TABLET, FILM COATED ORAL
Qty: 20 TABLET | Refills: 0 | Status: SHIPPED | OUTPATIENT
Start: 2025-01-13 | End: 2025-01-23

## 2025-01-13 RX ORDER — DIGOXIN 125 MCG
0.25 TABLET ORAL DAILY
Status: DISCONTINUED | OUTPATIENT
Start: 2025-01-14 | End: 2025-01-15 | Stop reason: HOSPADM

## 2025-01-13 RX ORDER — HYDROCODONE BITARTRATE AND ACETAMINOPHEN 5; 325 MG/1; MG/1
1 TABLET ORAL EVERY 4 HOURS PRN
Status: DISCONTINUED | OUTPATIENT
Start: 2025-01-13 | End: 2025-01-15 | Stop reason: HOSPADM

## 2025-01-13 RX ORDER — ONDANSETRON HYDROCHLORIDE 2 MG/ML
INJECTION, SOLUTION INTRAVENOUS
Status: DISPENSED
Start: 2025-01-13 | End: 2025-01-14

## 2025-01-13 RX ORDER — GLUCAGON 1 MG
1 KIT INJECTION
Status: DISCONTINUED | OUTPATIENT
Start: 2025-01-13 | End: 2025-01-15 | Stop reason: HOSPADM

## 2025-01-13 RX ORDER — TALC
6 POWDER (GRAM) TOPICAL NIGHTLY PRN
Status: DISCONTINUED | OUTPATIENT
Start: 2025-01-13 | End: 2025-01-15 | Stop reason: HOSPADM

## 2025-01-13 RX ORDER — PROTAMINE SULFATE 10 MG/ML
INJECTION, SOLUTION INTRAVENOUS
Status: DISCONTINUED | OUTPATIENT
Start: 2025-01-13 | End: 2025-01-13

## 2025-01-13 RX ORDER — ALUMINUM HYDROXIDE, MAGNESIUM HYDROXIDE, AND SIMETHICONE 1200; 120; 1200 MG/30ML; MG/30ML; MG/30ML
30 SUSPENSION ORAL 4 TIMES DAILY PRN
Status: DISCONTINUED | OUTPATIENT
Start: 2025-01-13 | End: 2025-01-15 | Stop reason: HOSPADM

## 2025-01-13 RX ORDER — METOPROLOL SUCCINATE 25 MG/1
25 TABLET, EXTENDED RELEASE ORAL EVERY MORNING
Status: DISCONTINUED | OUTPATIENT
Start: 2025-01-14 | End: 2025-01-15 | Stop reason: HOSPADM

## 2025-01-13 RX ORDER — IBUPROFEN 200 MG
24 TABLET ORAL
Status: DISCONTINUED | OUTPATIENT
Start: 2025-01-13 | End: 2025-01-15 | Stop reason: HOSPADM

## 2025-01-13 RX ORDER — DEXMEDETOMIDINE HYDROCHLORIDE 100 UG/ML
INJECTION, SOLUTION INTRAVENOUS
Status: DISCONTINUED | OUTPATIENT
Start: 2025-01-13 | End: 2025-01-13

## 2025-01-13 RX ORDER — HEPARIN SODIUM 1000 [USP'U]/ML
INJECTION, SOLUTION INTRAVENOUS; SUBCUTANEOUS
Status: DISCONTINUED | OUTPATIENT
Start: 2025-01-13 | End: 2025-01-13 | Stop reason: HOSPADM

## 2025-01-13 RX ORDER — ROCURONIUM BROMIDE 10 MG/ML
INJECTION, SOLUTION INTRAVENOUS
Status: DISCONTINUED | OUTPATIENT
Start: 2025-01-13 | End: 2025-01-13

## 2025-01-13 RX ADMIN — FENTANYL CITRATE 25 MCG: 0.05 INJECTION, SOLUTION INTRAMUSCULAR; INTRAVENOUS at 10:01

## 2025-01-13 RX ADMIN — HEPARIN SODIUM 4000 UNITS: 5000 INJECTION INTRAVENOUS; SUBCUTANEOUS at 09:01

## 2025-01-13 RX ADMIN — SODIUM CHLORIDE: 900 INJECTION INTRAVENOUS at 08:01

## 2025-01-13 RX ADMIN — ACETAMINOPHEN 1000 MG: 10 INJECTION INTRAVENOUS at 07:01

## 2025-01-13 RX ADMIN — HYDROCODONE BITARTRATE AND ACETAMINOPHEN 1 TABLET: 5; 325 TABLET ORAL at 10:01

## 2025-01-13 RX ADMIN — ETOMIDATE 10 MG: 2 INJECTION, SOLUTION INTRAVENOUS at 08:01

## 2025-01-13 RX ADMIN — LIDOCAINE HYDROCHLORIDE 50 MG: 20 INJECTION, SOLUTION EPIDURAL; INFILTRATION; INTRACAUDAL; PERINEURAL at 08:01

## 2025-01-13 RX ADMIN — HYDROMORPHONE HYDROCHLORIDE 0.5 MG: 1 INJECTION, SOLUTION INTRAMUSCULAR; INTRAVENOUS; SUBCUTANEOUS at 07:01

## 2025-01-13 RX ADMIN — PROPOFOL 50 MG: 10 INJECTION, EMULSION INTRAVENOUS at 08:01

## 2025-01-13 RX ADMIN — DEXMEDETOMIDINE HYDROCHLORIDE 0.5 MCG/KG/HR: 100 INJECTION, SOLUTION INTRAVENOUS at 09:01

## 2025-01-13 RX ADMIN — CEFAZOLIN 2 G: 2 INJECTION, POWDER, FOR SOLUTION INTRAMUSCULAR; INTRAVENOUS at 06:01

## 2025-01-13 RX ADMIN — DEXMEDETOMIDINE HYDROCHLORIDE 10 MCG: 100 INJECTION, SOLUTION INTRAVENOUS at 09:01

## 2025-01-13 RX ADMIN — EZETIMIBE 10 MG: 10 TABLET ORAL at 10:01

## 2025-01-13 RX ADMIN — PROTAMINE SULFATE 50 MG: 10 INJECTION, SOLUTION INTRAVENOUS at 09:01

## 2025-01-13 RX ADMIN — FAMOTIDINE 20 MG: 10 INJECTION, SOLUTION INTRAVENOUS at 08:01

## 2025-01-13 RX ADMIN — SUGAMMADEX 200 MG: 100 INJECTION, SOLUTION INTRAVENOUS at 09:01

## 2025-01-13 RX ADMIN — HEPARIN SODIUM 4000 UNITS: 5000 INJECTION INTRAVENOUS; SUBCUTANEOUS at 08:01

## 2025-01-13 RX ADMIN — SODIUM CHLORIDE, SODIUM LACTATE, POTASSIUM CHLORIDE, AND CALCIUM CHLORIDE: .6; .31; .03; .02 INJECTION, SOLUTION INTRAVENOUS at 08:01

## 2025-01-13 RX ADMIN — NAPROXEN SODIUM 324 MG: 220 TABLET, FILM COATED ORAL at 06:01

## 2025-01-13 RX ADMIN — FENTANYL CITRATE 50 MCG: 0.05 INJECTION, SOLUTION INTRAMUSCULAR; INTRAVENOUS at 08:01

## 2025-01-13 RX ADMIN — ONDANSETRON 4 MG: 2 INJECTION INTRAMUSCULAR; INTRAVENOUS at 08:01

## 2025-01-13 RX ADMIN — ROPINIROLE HYDROCHLORIDE 1 MG: 0.5 TABLET, FILM COATED ORAL at 08:01

## 2025-01-13 RX ADMIN — ASPIRIN 81 MG 324 MG: 81 TABLET ORAL at 06:01

## 2025-01-13 RX ADMIN — ROCURONIUM BROMIDE 50 MG: 10 INJECTION, SOLUTION INTRAVENOUS at 08:01

## 2025-01-13 RX ADMIN — ONDANSETRON 4 MG: 2 INJECTION INTRAMUSCULAR; INTRAVENOUS at 04:01

## 2025-01-13 RX ADMIN — DEXMEDETOMIDINE HYDROCHLORIDE 8 MCG: 100 INJECTION, SOLUTION INTRAVENOUS at 09:01

## 2025-01-13 RX ADMIN — FUROSEMIDE 20 MG: 10 INJECTION, SOLUTION INTRAMUSCULAR; INTRAVENOUS at 09:01

## 2025-01-13 RX ADMIN — SODIUM CHLORIDE 500 ML: 9 INJECTION, SOLUTION INTRAVENOUS at 06:01

## 2025-01-13 RX ADMIN — DEXAMETHASONE SODIUM PHOSPHATE 4 MG: 4 INJECTION, SOLUTION INTRAMUSCULAR; INTRAVENOUS at 08:01

## 2025-01-13 RX ADMIN — CLOPIDOGREL BISULFATE 600 MG: 300 TABLET, FILM COATED ORAL at 01:01

## 2025-01-13 RX ADMIN — SACUBITRIL AND VALSARTAN 1 TABLET: 24; 26 TABLET, FILM COATED ORAL at 10:01

## 2025-01-13 RX ADMIN — HYDROCODONE BITARTRATE AND ACETAMINOPHEN 1 TABLET: 5; 325 TABLET ORAL at 04:01

## 2025-01-13 RX ADMIN — MUPIROCIN 1 G: 20 OINTMENT TOPICAL at 08:01

## 2025-01-13 RX ADMIN — ATORVASTATIN CALCIUM 40 MG: 40 TABLET, FILM COATED ORAL at 08:01

## 2025-01-13 RX ADMIN — PROTAMINE SULFATE 50 MG: 10 INJECTION, SOLUTION INTRAVENOUS at 10:01

## 2025-01-13 NOTE — TRANSFER OF CARE
"Anesthesia Transfer of Care Note    Patient: Lisa Smith    Procedure(s) Performed: Procedure(s) (LRB):  Left atrial appendage closure device (Right)    Patient location: PACU    Anesthesia Type: general    Transport from OR: Transported from OR on 2-3 L/min O2 by NC with adequate spontaneous ventilation. Continuous ECG monitoring in transport. Continuous SpO2 monitoring in transport    Post pain: adequate analgesia    Post assessment: tolerated procedure well and no apparent anesthetic complications    Post vital signs: stable    Level of consciousness: awake    Nausea/Vomiting: no nausea/vomiting    Complications: none    Transfer of care protocol was followed      Last vitals: Visit Vitals  BP (!) 154/65   Pulse 60   Resp 20   Ht 5' 5" (1.651 m)   Wt 77.1 kg (170 lb)   LMP  (LMP Unknown)   SpO2 100%   Breastfeeding No   BMI 28.29 kg/m²     "

## 2025-01-13 NOTE — PLAN OF CARE
Nursing Transfer Note      Reason patient is being transferred: PACU 05    Transfer To: [facility]    Transfer via stretcher    Transfer with 3L NC to O2, cardiac monitoring    Transported by FAM Cronin and FAM Perry     Medicines sent: None    Any special needs or follow-up needed: Bed Rest complete at 1215, Echo scheduled for 1300, and D/C at 1600    Chart send with patient: Yes    Notified: daughter via secure chat    Patient reassessed at: 1/13/2025 1145 (date, time)     Patient arrived to [facility] and met by FAM White. Patient placed on wall O2 at 3L NC, external catheter reattached, patient repositioned.

## 2025-01-13 NOTE — HPI
Ms. Smith is a 74-year-old female patient with past medical history of multiple cardiac comorbidities including permanent atrial fibrillation status post ablation, aortic stenosis status post TAVR 2023, HFpEF, CAD, sick sinus s/p PPM, anemia, carotid stenosis s/p CEA who presented today for Watchman procedure.  Following the Watchman placement, during extubation, patient coughed herself to a massive hemoptysis, venous bleed as per Dr. Cantu, episode repeated after she was in periop area resulting in hypotension. Also bleed from right groin site. Hemoglobin dropped 9.1 preprocedure--> 7.7 -->7.4 postprocedure.  2 unit PRBC were ordered with 40 mg IV Lasix given her previous fluid overload after transfusion.  Vitals stable, patient awake alert and oriented.  Internal medicine team was consulted for ICU admission for close monitoring.

## 2025-01-13 NOTE — SUBJECTIVE & OBJECTIVE
Past Medical History:   Diagnosis Date    Anticoagulant long-term use     Arthritis     Atrial fibrillation     Bell's palsy     Boil of buttock     burst 12/19/22    CHF (congestive heart failure)     Chronic cough     COPD (chronic obstructive pulmonary disease)     use O2  3l/m NC at night; also taking during day currently 12/4/23    Dizziness     Encounter for blood transfusion     GI bleed 2011    transfusion    H/O diverticulitis of colon     Hard of hearing     Heart murmur     Hematoma 02/2023    left hand    Heterozygous alpha 1-antitrypsin deficiency     History of home oxygen therapy     3L NC at night    Hypertension     Lung disease     copd    MONSERRAT (obstructive sleep apnea)     resolved with wt loss 131#    Pacemaker     Pneumonia     last episode 2019    Pulmonary edema     Skin cancer     Unspecified visual disturbance     episode of vision disturbance and dizziness...occasional recurrences       Past Surgical History:   Procedure Laterality Date    CARDIAC ELECTROPHYSIOLOGY STUDY AND ABLATION      CAROTID ENDARTERECTOMY Left 12/22/2022    Procedure: ENDARTERECTOMY-CAROTID;  Surgeon: Maximilian Connolly MD;  Location: [facility];  Service: Peripheral Vascular;  Laterality: Left;    CAROTID STENT Left 2/29/2024    Procedure: INSERTION, STENT, ARTERY, CAROTID;  Surgeon: CIRILO Valdivia III, MD;  Location: [address];  Service: Vascular;  Laterality: Left;  left carotid artery stent placement  mgy  146.29   gymc2  8.0730  fluro time  4.8min    CARPAL TUNNEL RELEASE Left     CHOLECYSTECTOMY      EYE SURGERY Bilateral     cataract    HYSTERECTOMY      INSERT / REPLACE / REMOVE PACEMAKER      KNEE ARTHROSCOPY Left     LEFT HEART CATHETERIZATION  11/1/2023    Procedure: Left heart cath;  Surgeon: Puma Shelton MD;  Location: [facility] CATH;  Service: Cardiology;;    REPLACEMENT OF PACEMAKER GENERATOR Left 01/20/2022    Procedure: REPLACEMENT, PACEMAKER GENERATOR;  Surgeon: Raymond Pérez MD;  Location:  [facility] CATH/EP LAB;  Service: Cardiology;  Laterality: Left;    SKIN CANCER EXCISION      TRANSCATHETER AORTIC VALVE REPLACEMENT (TAVR) Bilateral 12/6/2023    Procedure: (TAVR);  Surgeon: Puma Shelton MD;  Location: [facility] CATH;  Service: Cardiology;  Laterality: Bilateral;    TRANSCATHETER AORTIC VALVE REPLACEMENT (TAVR) Bilateral 12/6/2023    Procedure: (TAVR) - Surgeon;  Surgeon: Maximilian Connolly MD;  Location: [facility] CATH;  Service: Peripheral Vascular;  Laterality: Bilateral;       Review of patient's allergies indicates:   Allergen Reactions    Sulfa (sulfonamide antibiotics) Itching       No current facility-administered medications on file prior to encounter.     Current Outpatient Medications on File Prior to Encounter   Medication Sig    albuterol (ACCUNEB) 1.25 mg/3 mL Nebu INHALE THE CONTENTS OF 1 VIAL VIA NEBULIZER EVERY 6 HOURS AS NEEDED FOR RESCUE    digoxin (LANOXIN) 250 mcg tablet TAKE 1 TABLET EVERY DAY    ezetimibe (ZETIA) 10 mg tablet TAKE 1 TABLET ONE TIME DAILY    pantoprazole (PROTONIX) 40 MG tablet Take 1 tablet (40 mg total) by mouth every morning.    rOPINIRole (REQUIP) 1 MG tablet Take 1 tablet (1 mg total) by mouth every evening.    TRELEGY ELLIPTA 100-62.5-25 mcg DsDv INHALE 1 PUFF INTO THE LUNGS ONCE DAILY.    [DISCONTINUED] furosemide (LASIX) 20 MG tablet Take 1 tablet (20 mg total) by mouth every morning.    acetaminophen (TYLENOL ARTHRITIS ORAL) Take 2 tablets by mouth daily as needed.    albuterol (PROVENTIL/VENTOLIN HFA) 90 mcg/actuation inhaler INHALE 2 PUFFS EVERY 6 HOURS AS NEEDED FOR WHEEZING (RESCUE)    albuterol-ipratropium (DUO-NEB) 2.5 mg-0.5 mg/3 mL nebulizer solution Take 3 mLs by nebulization every 6 (six) hours as needed for Wheezing or Shortness of Breath. Rescue    fluticasone propionate (FLONASE) 50 mcg/actuation nasal spray 1 spray by Each Nostril route daily as needed for Rhinitis or Allergies.     Family History       Problem Relation (Age of Onset)    Cancer  Father, Brother    Kidney failure Mother          Tobacco Use    Smoking status: Former     Current packs/day: 0.00     Average packs/day: 2.0 packs/day for 40.0 years (80.0 ttl pk-yrs)     Types: Cigarettes     Start date: 1971     Quit date: 2011     Years since quittin.9     Passive exposure: Past    Smokeless tobacco: Never    Tobacco comments:          Quit in    Substance and Sexual Activity    Alcohol use: Not Currently     Alcohol/week: 8.0 standard drinks of alcohol     Types: 8 Glasses of wine per week    Drug use: No    Sexual activity: Never     Review of Systems   Constitutional:  Negative for appetite change, chills and fever.   HENT:  Negative for congestion.    Respiratory:  Negative for cough, chest tightness and shortness of breath.    Cardiovascular:  Negative for chest pain, palpitations and leg swelling.   Gastrointestinal:  Negative for abdominal pain, nausea and vomiting.   Genitourinary:  Negative for dysuria.   Musculoskeletal:  Positive for back pain.   Skin:  Negative for color change.   Neurological:  Negative for syncope and headaches.     Objective:     Vital Signs (Most Recent):  Temp: 97.1 °F (36.2 °C) (25 1015)  Pulse: 60 (25 1600)  Resp: 18 (25 1655)  BP: (!) 150/65 (25 1600)  SpO2: 100 % (25 1600) Vital Signs (24h Range):  Temp:  [97.1 °F (36.2 °C)] 97.1 °F (36.2 °C)  Pulse:  [60-62] 60  Resp:  [16-29] 18  SpO2:  [90 %-100 %] 100 %  BP: ()/(41-68) 150/65     Weight: 77.1 kg (170 lb)  Body mass index is 28.29 kg/m².     Physical Exam  Constitutional:       General: She is not in acute distress.     Appearance: Normal appearance. She is not ill-appearing or diaphoretic.   HENT:      Head: Normocephalic.      Nose: Nose normal.      Mouth/Throat:      Mouth: Mucous membranes are dry.   Eyes:      Pupils: Pupils are equal, round, and reactive to light.   Cardiovascular:      Rate and Rhythm: Normal rate and regular rhythm.       "Pulses: Normal pulses.   Pulmonary:      Effort: Pulmonary effort is normal.      Breath sounds: Normal breath sounds.   Abdominal:      General: Abdomen is flat. There is no distension.      Tenderness: There is no abdominal tenderness.   Musculoskeletal:      Right lower leg: No edema.      Left lower leg: No edema.   Skin:     General: Skin is warm.   Neurological:      General: No focal deficit present.      Mental Status: She is alert and oriented to person, place, and time. Mental status is at baseline.   Psychiatric:         Mood and Affect: Mood normal.              CRANIAL NERVES     CN III, IV, VI   Pupils are equal, round, and reactive to light.       Significant Labs: All pertinent labs within the past 24 hours have been reviewed.  BMP:   Recent Labs   Lab 01/13/25  0606   GLU 91      K 4.4   CL 96   CO2 31*   BUN 33*   CREATININE 0.8   CALCIUM 9.3     CBC:   Recent Labs   Lab 01/13/25  0606 01/13/25  1350   WBC 8.44 11.52   HGB 9.1* 7.7*   HCT 28.5* 24.5*    213     CMP:   Recent Labs   Lab 01/13/25  0606      K 4.4   CL 96   CO2 31*   GLU 91   BUN 33*   CREATININE 0.8   CALCIUM 9.3   ANIONGAP 9     Cardiac Markers: No results for input(s): "CKMB", "MYOGLOBIN", "BNP", "TROPISTAT" in the last 48 hours.  Coagulation: No results for input(s): "PT", "INR", "APTT" in the last 48 hours.    Significant Imaging: I have reviewed all pertinent imaging results/findings within the past 24 hours.    Echo    Result Date: 1/13/2025  Limited 2D study post left atrial appendage occlusion: Moderately dilated left ventricular size with mildly reduced systolic function Normal right ventricular size and systolic function No significant pericardial effusion No evidence of occluder embolization    Transesophageal echo (GRETCHEN)    Result Date: 1/13/2025    Left Ventricle: The left ventricle is normal in size. There is normal systolic function with a visually estimated ejection fraction of 55 - 60%.   Right " Ventricle: Normal right ventricular cavity size. Systolic function is normal.   Left Atrium: Left atrium is severely dilated. There is no thrombus in the left atrial appendage.   Right Atrium: Right atrium is moderately dilated.   Aortic Valve: There is a bioprosthetic valve in the aortic position. Mild to moderate paravalvular regurgitation.   Mitral Valve: There is mild to moderate regurgitation.   Tricuspid Valve: There is mild to moderate regurgitation.   Watchman device was placed successfully, final images showed no significant leaks around the device, no pericardial effusion.     Cardiac catheterization    Result Date: 1/13/2025  LEFT ATRIAL APPENDAGE CLOSURE PROCEDURE NOTE   Date of procedure: 01/13/2025  : Roxana Cantu MD  Procedure: Left atrial appendage closure using 27-mm Watchman FLX Pro  Anesthesia: General anesthesia with endotracheal intubation Additional arterial and venous lines were placed by anesthesiology as needed.  Indication: Atrial fibrillation with [id number] score 6 and HAS-BLED score 5.  Description of the Procedure: Following informed consent, the patient was bought to the Cath Lab and placed under anesthesia as above.  Preprocedure RUY sizing was performed using gated CT. GRETCHEN was used to aid in guidance of the procedure; this demonstrated no thrombus in the left atrial appendage and the following baseline ostial measurements: Right femoral vein access was obtained with a micropuncture needle and ultrasound guidance and a short 8 French sheath was placed. 100 units/kg IV heparin was administered with additional heparin as needed to achieve and maintain ACT greater than 300 seconds.  Transseptal puncture was performed under GRETCHEN and fluoroscopy guidance using a VersaCross system.  The TruSteer Watchman Access Sheath (WAS) was advanced into the left atrium over the VersaCross wire. The wire and the WAS dilator were then removed. A Pigtail catheter was advanced into the left  atrial appendage under fluoroscopy and GRETCHEN guidance.  Baseline LA pressure was 18. Angiography through the pigtail catheter was used to confirm RUY shape and plan a landing zone.  A 27-mm Watchman FLX Pro device was chosen. The WAS was advanced over the pigtail catheter to deployment position.  The Watchman Delivery System (WDS) was prepped in the usual fashion.  The pigtail catheter was removed from the WAS and the WDS was advanced under fluoroscopy and GRETCHEN guidance.  The device was deployed under fluoroscopy and GRETCHEN in the usual manner.  Device release criteria were verified as follows: Position was assessed on GRETCHEN and using angiography and deemed to be satisfactory [place] was assessed on GRETCHEN and fluoroscopy and deemed to be satisfactory Size was assessed on GRETCHEN and a compression ratio of 10-19% was achieved Seal was assessed on GRETCHEN and there was no peridevice leak.  The device was released in a standard fashion and reevaluation with GRETCHEN and fluoroscopy ruled out embolization/peridevice leak.  The WDS was removed.  The WAS was then removed and hemostasis achieved using manual pressure.  Protamine IV was administered.  The patient was extubated in the Cath Lab and demonstrated no neurological deficits.  Complications: none Blood loss: <50 mL Contrast: 20 mL   Conclusions: Successful percutaneous left atrial appendage closure using 27-mm Watchman FLX Pro Normal TAV leaflet excursion with moderate paravalvular leak  Plan: Bedrest for 2 hours Limited TTE to rule out pericardial effusion and device embolization at 4 hours. Discharge in 6 hours if no complications. Planned postprocedure antithrombotic therapy provided follow-up doesn't reveal late complications: Clopidogrel monotherapy - check platelet response assay in follow-up Endocarditis prophylaxis indefinitely (history of LULU) Office visit in 6 weeks per protocol GRETCHEN in 3 months.   Roxana Cantu MD, [facility] Interventional Cardiology/Structural Heart Disease [facility]  [facility] Office: [phone] The procedure log was documented by Documenter: Babs Morris RT and verified by Roxana Cantu MD. Date: 1/13/2025  Time: 10:15 AM     CT Cardiac With Cont For Structure Morph    Result Date: 12/24/2024  EXAMINATION: CT CARDIAC WITH CONT FOR STRUCTURE MORPH CLINICAL HISTORY: Cardiac valve replacement, dysfunctional;TAV high gradient r/o thrombosis;  Presence of prosthetic heart valve TECHNIQUE: Limited cross-sectional imaging was obtained of the thorax after the intravenous administration of 100 mL of Omnipaque 350.  An automated dose exposure technique was utilized this limits radiation does the patient. COMPARISON: 12/09/2024 FINDINGS: Heart size is enlarged with percutaneous aortic valve replacement.  Ectasia of the ascending thoracic aorta measuring 4.0 cm.  The descending thoracic aorta is of caliber.  A triple vessel aortic arch is identified with a great vessels being patent.  Partially visualized pacer lead is identified.  The visualized pulmonary artery is dilated.  Shotty lymph nodes are identified. Parenchymal scarring is identified within the right lung with volume loss and mediastinal shift.  No evidence for fibrosis.  Traction bronchiectasis is identified. The visualized hollow and solid viscera of the upper abdomen are grossly normal.  No suspicious osseous lesions.  Scattered spondylotic changes.     1. Status post percutaneous aortic valve replacement. 2. Ectasia of the ascending thoracic aorta. 3. Parenchymal scarring as above. Electronically signed by: Iker Gilliam MD Date:    12/24/2024 Time:    09:27    CT Dx Bone Marrow Biopsy(ies) w/ Aspiration: Left(XPD)    Result Date: 12/18/2024  EXAMINATION: CT DIAGNOSTIC BONE MARROW BIOPSY(IES) WITH ASPRIATION LEFT (XPD) CLINICAL HISTORY: Anemia, unspecified TECHNIQUE: CMS Mandated Quality Data-CT Radiation Dose-436 All CT scans at this facility dose modulation, iterative reconstruction,  and or weight-based dosing when appropriate to reduce radiation dose to as low as reasonably achievable. FINDINGS: After obtaining written informed consent, which included a discussion of the risks and benefits of the procedure to include infection and bleeding, and allowing the patient the opportunity to ask questions, the patient agreed to the procedure. The patient was placed prone on the CT fluoroscopy table. A suitable skin entrance site overlying the left posterior iliac spine was localized with CT guidance. The skin was marked, and then prepped and draped in sterile fashion, with several milliliters of Lidocaine 1% injected subcutaneously and along the periosteum to achieve adequate local anesthesia. Following, a small skin incision was made, and an 11-gauge bone biopsy needle was advanced into the posterior iliac spine under intermittent CT fluoroscopic guidance utilizing [facility] .  A bone marrow aspirate and a core biopsy sample were then obtained.  The needle was removed and a bandage was applied to the skin. The patient tolerated the procedure well, with no immediate postprocedural complications.  There was no significant blood loss.  CT fluoroscopy time was less than 1 minute. Total moderate conscious sedation time with supervision by the interventional radiologist and monitoring by the special procedures registered nurse was 20 minutes.  The patient received Versed 2 mg and Fentanyl 100 mcg IV during the procedure.     Successful CT-guided bone marrow aspiration and biopsy. Electronically signed by: Vitor Rogers Date:    12/18/2024 Time:    09:52  - pulls last radiology orders

## 2025-01-13 NOTE — DISCHARGE INSTRUCTIONS
Stop Eliquis and aspirin   Start clopidogrel (Plavix) 75 mg daily   Amoxicillin 2000 mg (4 tabs) once, one hour prior to any dental procedure or high risk procedure for infection   Follow up with Dr. Cantu as will be scheduled        Discharge Instructions, [facility] Medicine    Thank you for choosing [facility] for your medical care. The primary doctor who is taking care of you at the time of your discharge is Poli Steele, *.     You were admitted to the hospital with Presence of Watchman left atrial appendage closure device.     Please note your discharge instructions, including diet/activity restrictions, follow-up appointments, and medication changes.  If you have any questions about your medical issues, prescriptions, or any other questions, please feel free to contact the [facility] Medicine Dept at [phone] and we will help.    If you are previously with Home health, outpatient PT/OT or under a therapy program, you are cleared to return to those programs.    Please direct all long term medication refills and follow up to your primary care provider, Hope Tang FNP. Thank you again for letting us take care of your health care needs.    Please note the following discharge instructions per your discharging physician-    Please follow up with Cardiology    Please follow up with your PCP    Please repeat your hemoglobin in 3-5 days after discharge, if hemoglobin less than 8, please call your doctor immediately    Please check your blood pressure everyday, hold Entresto if BP <105/60 mm Hg    Hold lasix if BP < 90/60.    If you are becoming short of breath or if you are gaining more than 4 lbs in less than 48 hours, please call your doctor    For any other worsening signs or symptoms, please call your doctor    For any other emergency, please call 911

## 2025-01-13 NOTE — ASSESSMENT & PLAN NOTE
Patient with known CAD, which is controlled Continue statin, and monitor for S/Sx of angina/ACS. Continue to monitor on telemetry.   Reached out to Cardiology regarding continuing further Plavix, will await recommendations

## 2025-01-13 NOTE — Clinical Note
MD notified of increased bleeding at groin site despite manual pressure and patient restless. MD holding manual pressure. ACT being repeated

## 2025-01-13 NOTE — NURSING
pressure assisted device applied by cath lab tech, Assisted with cleaning pt and changing linens. Pt daughter is aware of situation and pt being admitted to ICU

## 2025-01-13 NOTE — NURSING
Went to assess pt noted blood in canister from purewick, massive bleed form rt groin site, pt unaware, pressure held, called for help, cath lab tech to take over holding pressure

## 2025-01-13 NOTE — ANESTHESIA PROCEDURE NOTES
Intubation    Date/Time: 1/13/2025 8:25 AM    Performed by: Ethel Cantu CRNA  Authorized by: Lee Cheatham MD    Intubation:     Induction:  Intravenous    Intubated:  Postinduction    Mask Ventilation:  Easy mask    Attempts:  1    Attempted By:  CRNA    Method of Intubation:  Direct and video laryngoscopy    Blade:  Cantor 3    Laryngeal View Grade: Grade I - full view of cords      Difficult Airway Encountered?: No      Complications:  None    Airway Device:  Oral endotracheal tube    Airway Device Size:  7.5    Style/Cuff Inflation:  Cuffed (inflated to minimal occlusive pressure)    Tube secured:  21    Secured at:  The teeth    Placement Verified By:  Capnometry    Complicating Factors:  None    Findings Post-Intubation:  BS equal bilateral and atraumatic/condition of teeth unchanged

## 2025-01-13 NOTE — ASSESSMENT & PLAN NOTE
S/p TAVR  stable    Echo    Result Date: 1/13/2025  Limited 2D study post left atrial appendage occlusion:   Moderately dilated left ventricular size with mildly reduced systolic   function  Normal right ventricular size and systolic function   No significant pericardial effusion   No evidence of occluder embolization      Transesophageal echo (GRETCHEN)    Result Date: 1/13/2025    Left Ventricle: The left ventricle is normal in size. There is normal   systolic function with a visually estimated ejection fraction of 55 - 60%.    Right Ventricle: Normal right ventricular cavity size. Systolic   function is normal.    Left Atrium: Left atrium is severely dilated. There is no thrombus in   the left atrial appendage.    Right Atrium: Right atrium is moderately dilated.    Aortic Valve: There is a bioprosthetic valve in the aortic position.   Mild to moderate paravalvular regurgitation.    Mitral Valve: There is mild to moderate regurgitation.    Tricuspid Valve: There is mild to moderate regurgitation.    Watchman device was placed successfully, final images showed no   significant leaks around the device, no pericardial effusion.        Echo    Result Date: 12/4/2024    Left Ventricle: The left ventricle is normal in size. Normal wall   thickness. There is normal systolic function with a visually estimated   ejection fraction of 55 - 60%. Unable to assess diastolic function due to   atrial fibrillation.    Right Ventricle: Normal right ventricular cavity size. Wall thickness   is normal. Systolic function is normal. Pacemaker lead present in the   ventricle.    Left Atrium: Left atrium is mildly dilated.    Right Atrium: Right atrium is mildly dilated.    Aortic Valve: There is a transcatheter valve replacement in the aortic   position. It is reported to be a 26 mm Jauregui valve. There is mild   stenosis. Aortic valve area by VTI is 1.5 cm². Aortic valve peak velocity   is 3.6 m/s. Mean gradient is 28.0 mmHg. The  dimensionless index is 0.43.   There is mild to moderate aortic regurgitation with an eccentrically   directed jet.    Mitral Valve: There is bileaflet sclerosis. There is mitral annular   calcification present. There is mild regurgitation.    Tricuspid Valve: There is mild to moderate regurgitation.    IVC/SVC: Normal venous pressure at 3 mmHg.        Echo    Result Date: 1/24/2024    Left Ventricle: The left ventricle is normal in size. Mildly increased   wall thickness. There is mildly reduced systolic function with a visually   estimated ejection fraction of 45 - 50%. Unable to assess diastolic   function due to atrial fibrillation.    Right Ventricle: Normal right ventricular cavity size. Wall thickness   is normal. Right ventricle wall motion  is normal. Systolic function is   normal.    Left Atrium: Left atrium is mildly dilated.    Right Atrium: Right atrium is mildly dilated.    Aortic Valve: There is a transcatheter valve replacement in the aortic   position that is appropriately positioned. Mild paravalvular   regurgitation.    Mitral Valve: There is bileaflet sclerosis. There is moderate mitral   annular calcification present. There is no stenosis. The mean pressure   gradient across the mitral valve is 2 mmHg at a heart rate of  bpm. There   is moderate regurgitation.    Tricuspid Valve: There is mild regurgitation.    IVC/SVC: Normal venous pressure at 3 mmHg.

## 2025-01-13 NOTE — ASSESSMENT & PLAN NOTE
[id number] Score: 4. The patients heart rate in the last 24 hours is as follows:  Pulse  Min: 60  Max: 62     Antiarrhythmics  metoprolol succinate (TOPROL-XL) 24 hr tablet 25 mg, Every morning, Oral    Anticoagulants  heparin (porcine) injection, As needed (PRN),     Plan  - Replete lytes with a goal of K>4, Mg >2  - Patient is not anticoagulated due to active bleed and post watchman  - Patient's afib is currently controlled

## 2025-01-13 NOTE — ASSESSMENT & PLAN NOTE
Anemia is likely due to acute blood loss which was from post procedure . Most recent hemoglobin and hematocrit are listed below.  Recent Labs     01/13/25  0606 01/13/25  1350   HGB 9.1* 7.7*   HCT 28.5* 24.5*     Plan  - Monitor serial CBC: Every 12 hours  - Transfuse PRBC if patient becomes hemodynamically unstable, symptomatic or H/H drops below 7/21.  - Patient has received 2 units of PRBCs on 1/13  - Patient's anemia is currently new

## 2025-01-13 NOTE — H&P
Formerly [facility] Medicine  History & Physical    Patient Name: Lisa Smith  MRN: [id number]  Patient Class: IP- Inpatient  Admission Date: 1/13/2025  Attending Physician: Roxana Cantu MD   Primary Care Provider: Hope Tang FNP         Patient information was obtained from patient, ER records, and primary team.     Subjective:     Principal Problem:Presence of Watchman left atrial appendage closure device    Chief Complaint: No chief complaint on file.       HPI: Ms. Smith is a 74-year-old female patient with past medical history of multiple cardiac comorbidities including permanent atrial fibrillation status post ablation, aortic stenosis status post TAVR 2023, HFpEF, CAD, sick sinus s/p PPM, anemia, carotid stenosis s/p CEA who presented today for Watchman procedure.  Following the Watchman placement, during extubation, patient coughed herself to a massive hemoptysis, venous bleed as per Dr. Cantu, episode repeated after she was in periop area resulting in hypotension. Also bleed from right groin site. Hemoglobin dropped 9.1 preprocedure--> 7.7 -->7.4 postprocedure.  2 unit PRBC were ordered with 40 mg IV Lasix given her previous fluid overload after transfusion.  Vitals stable, patient awake alert and oriented.  Internal medicine team was consulted for ICU admission for close monitoring.    Past Medical History:   Diagnosis Date    Anticoagulant long-term use     Arthritis     Atrial fibrillation     Bell's palsy     Boil of buttock     burst 12/19/22    CHF (congestive heart failure)     Chronic cough     COPD (chronic obstructive pulmonary disease)     use O2  3l/m NC at night; also taking during day currently 12/4/23    Dizziness     Encounter for blood transfusion     GI bleed 2011    transfusion    H/O diverticulitis of colon     Hard of hearing     Heart murmur     Hematoma 02/2023    left hand    Heterozygous alpha 1-antitrypsin deficiency     History of home oxygen therapy      3L NC at night    Hypertension     Lung disease     copd    MONSERRAT (obstructive sleep apnea)     resolved with wt loss 131#    Pacemaker     Pneumonia     last episode 2019    Pulmonary edema     Skin cancer     Unspecified visual disturbance     episode of vision disturbance and dizziness...occasional recurrences       Past Surgical History:   Procedure Laterality Date    CARDIAC ELECTROPHYSIOLOGY STUDY AND ABLATION      CAROTID ENDARTERECTOMY Left 12/22/2022    Procedure: ENDARTERECTOMY-CAROTID;  Surgeon: Maximilian Connolly MD;  Location: [facility] OR;  Service: Peripheral Vascular;  Laterality: Left;    CAROTID STENT Left 2/29/2024    Procedure: INSERTION, STENT, ARTERY, CAROTID;  Surgeon: CIRILO Valdivia III, MD;  Location: [facility] OR [facility] FLR;  Service: Vascular;  Laterality: Left;  left carotid artery stent placement  mgy  146.29   gymc2  8.0730  fluro time  4.8min    CARPAL TUNNEL RELEASE Left     CHOLECYSTECTOMY      EYE SURGERY Bilateral     cataract    HYSTERECTOMY      INSERT / REPLACE / REMOVE PACEMAKER      KNEE ARTHROSCOPY Left     LEFT HEART CATHETERIZATION  11/1/2023    Procedure: Left heart cath;  Surgeon: Puma Shelton MD;  Location: [facility] CATH;  Service: Cardiology;;    REPLACEMENT OF PACEMAKER GENERATOR Left 01/20/2022    Procedure: REPLACEMENT, PACEMAKER GENERATOR;  Surgeon: Raymond Pérez MD;  Location: [facility] CATH/EP LAB;  Service: Cardiology;  Laterality: Left;    SKIN CANCER EXCISION      TRANSCATHETER AORTIC VALVE REPLACEMENT (TAVR) Bilateral 12/6/2023    Procedure: (TAVR);  Surgeon: Puma Shelton MD;  Location: [facility] CATH;  Service: Cardiology;  Laterality: Bilateral;    TRANSCATHETER AORTIC VALVE REPLACEMENT (TAVR) Bilateral 12/6/2023    Procedure: (TAVR) - Surgeon;  Surgeon: Maximilian Connolly MD;  Location: [facility] CATH;  Service: Peripheral Vascular;  Laterality: Bilateral;       Review of patient's allergies indicates:   Allergen Reactions    Sulfa (sulfonamide antibiotics) Itching        No current facility-administered medications on file prior to encounter.     Current Outpatient Medications on File Prior to Encounter   Medication Sig    albuterol (ACCUNEB) 1.25 mg/3 mL Nebu INHALE THE CONTENTS OF 1 VIAL VIA NEBULIZER EVERY 6 HOURS AS NEEDED FOR RESCUE    digoxin (LANOXIN) 250 mcg tablet TAKE 1 TABLET EVERY DAY    ezetimibe (ZETIA) 10 mg tablet TAKE 1 TABLET ONE TIME DAILY    pantoprazole (PROTONIX) 40 MG tablet Take 1 tablet (40 mg total) by mouth every morning.    rOPINIRole (REQUIP) 1 MG tablet Take 1 tablet (1 mg total) by mouth every evening.    TRELEGY ELLIPTA 100-62.5-25 mcg DsDv INHALE 1 PUFF INTO THE LUNGS ONCE DAILY.    [DISCONTINUED] furosemide (LASIX) 20 MG tablet Take 1 tablet (20 mg total) by mouth every morning.    acetaminophen (TYLENOL ARTHRITIS ORAL) Take 2 tablets by mouth daily as needed.    albuterol (PROVENTIL/VENTOLIN HFA) 90 mcg/actuation inhaler INHALE 2 PUFFS EVERY 6 HOURS AS NEEDED FOR WHEEZING (RESCUE)    albuterol-ipratropium (DUO-NEB) 2.5 mg-0.5 mg/3 mL nebulizer solution Take 3 mLs by nebulization every 6 (six) hours as needed for Wheezing or Shortness of Breath. Rescue    fluticasone propionate (FLONASE) 50 mcg/actuation nasal spray 1 spray by Each Nostril route daily as needed for Rhinitis or Allergies.     Family History       Problem Relation (Age of Onset)    Cancer Father, Brother    Kidney failure Mother          Tobacco Use    Smoking status: Former     Current packs/day: 0.00     Average packs/day: 2.0 packs/day for 40.0 years (80.0 ttl pk-yrs)     Types: Cigarettes     Start date: 1971     Quit date: 2011     Years since quittin.9     Passive exposure: Past    Smokeless tobacco: Never    Tobacco comments:          Quit in    Substance and Sexual Activity    Alcohol use: Not Currently     Alcohol/week: 8.0 standard drinks of alcohol     Types: 8 Glasses of wine per week    Drug use: No    Sexual activity: Never     Review of Systems    Constitutional:  Negative for appetite change, chills and fever.   HENT:  Negative for congestion.    Respiratory:  Negative for cough, chest tightness and shortness of breath.    Cardiovascular:  Negative for chest pain, palpitations and leg swelling.   Gastrointestinal:  Negative for abdominal pain, nausea and vomiting.   Genitourinary:  Negative for dysuria.   Musculoskeletal:  Positive for back pain.   Skin:  Negative for color change.   Neurological:  Negative for syncope and headaches.     Objective:     Vital Signs (Most Recent):  Temp: 97.1 °F (36.2 °C) (01/13/25 1015)  Pulse: 60 (01/13/25 1600)  Resp: 18 (01/13/25 1655)  BP: (!) 150/65 (01/13/25 1600)  SpO2: 100 % (01/13/25 1600) Vital Signs (24h Range):  Temp:  [97.1 °F (36.2 °C)] 97.1 °F (36.2 °C)  Pulse:  [60-62] 60  Resp:  [16-29] 18  SpO2:  [90 %-100 %] 100 %  BP: ()/(41-68) 150/65     Weight: 77.1 kg (170 lb)  Body mass index is 28.29 kg/m².     Physical Exam  Constitutional:       General: She is not in acute distress.     Appearance: Normal appearance. She is not ill-appearing or diaphoretic.   HENT:      Head: Normocephalic.      Nose: Nose normal.      Mouth/Throat:      Mouth: Mucous membranes are dry.   Eyes:      Pupils: Pupils are equal, round, and reactive to light.   Cardiovascular:      Rate and Rhythm: Normal rate and regular rhythm.      Pulses: Normal pulses.   Pulmonary:      Effort: Pulmonary effort is normal.      Breath sounds: Normal breath sounds.   Abdominal:      General: Abdomen is flat. There is no distension.      Tenderness: There is no abdominal tenderness.   Musculoskeletal:      Right lower leg: No edema.      Left lower leg: No edema.   Skin:     General: Skin is warm.   Neurological:      General: No focal deficit present.      Mental Status: She is alert and oriented to person, place, and time. Mental status is at baseline.   Psychiatric:         Mood and Affect: Mood normal.              CRANIAL NERVES     CN  "III, IV, VI   Pupils are equal, round, and reactive to light.       Significant Labs: All pertinent labs within the past 24 hours have been reviewed.  BMP:   Recent Labs   Lab 01/13/25  0606   GLU 91      K 4.4   CL 96   CO2 31*   BUN 33*   CREATININE 0.8   CALCIUM 9.3     CBC:   Recent Labs   Lab 01/13/25  0606 01/13/25  1350   WBC 8.44 11.52   HGB 9.1* 7.7*   HCT 28.5* 24.5*    213     CMP:   Recent Labs   Lab 01/13/25  0606      K 4.4   CL 96   CO2 31*   GLU 91   BUN 33*   CREATININE 0.8   CALCIUM 9.3   ANIONGAP 9     Cardiac Markers: No results for input(s): "CKMB", "MYOGLOBIN", "BNP", "TROPISTAT" in the last 48 hours.  Coagulation: No results for input(s): "PT", "INR", "APTT" in the last 48 hours.    Significant Imaging: I have reviewed all pertinent imaging results/findings within the past 24 hours.    Echo    Result Date: 1/13/2025  Limited 2D study post left atrial appendage occlusion: Moderately dilated left ventricular size with mildly reduced systolic function Normal right ventricular size and systolic function No significant pericardial effusion No evidence of occluder embolization    Transesophageal echo (GRETCHEN)    Result Date: 1/13/2025    Left Ventricle: The left ventricle is normal in size. There is normal systolic function with a visually estimated ejection fraction of 55 - 60%.   Right Ventricle: Normal right ventricular cavity size. Systolic function is normal.   Left Atrium: Left atrium is severely dilated. There is no thrombus in the left atrial appendage.   Right Atrium: Right atrium is moderately dilated.   Aortic Valve: There is a bioprosthetic valve in the aortic position. Mild to moderate paravalvular regurgitation.   Mitral Valve: There is mild to moderate regurgitation.   Tricuspid Valve: There is mild to moderate regurgitation.   Watchman device was placed successfully, final images showed no significant leaks around the device, no pericardial effusion.     Cardiac " catheterization    Result Date: 1/13/2025  LEFT ATRIAL APPENDAGE CLOSURE PROCEDURE NOTE   Date of procedure: 01/13/2025  : Roxana Cantu MD  Procedure: Left atrial appendage closure using 27-mm Watchman FLX Pro  Anesthesia: General anesthesia with endotracheal intubation Additional arterial and venous lines were placed by anesthesiology as needed.  Indication: Atrial fibrillation with [id number] score 6 and HAS-BLED score 5.  Description of the Procedure: Following informed consent, the patient was bought to the Cath Lab and placed under anesthesia as above.  Preprocedure RUY sizing was performed using gated CT. GRETCHEN was used to aid in guidance of the procedure; this demonstrated no thrombus in the left atrial appendage and the following baseline ostial measurements: Right femoral vein access was obtained with a micropuncture needle and ultrasound guidance and a short 8 French sheath was placed. 100 units/kg IV heparin was administered with additional heparin as needed to achieve and maintain ACT greater than 300 seconds.  Transseptal puncture was performed under GRETCHEN and fluoroscopy guidance using a VersaCross system.  The TruSteer Watchman Access Sheath (WAS) was advanced into the left atrium over the VersaCross wire. The wire and the WAS dilator were then removed. A Pigtail catheter was advanced into the left atrial appendage under fluoroscopy and GRETCHEN guidance.  Baseline LA pressure was 18. Angiography through the pigtail catheter was used to confirm RUY shape and plan a landing zone.  A 27-mm Watchman FLX Pro device was chosen. The WAS was advanced over the pigtail catheter to deployment position.  The Watchman Delivery System (WDS) was prepped in the usual fashion.  The pigtail catheter was removed from the WAS and the WDS was advanced under fluoroscopy and GRETCHEN guidance.  The device was deployed under fluoroscopy and GRETCHEN in the usual manner.  Device release criteria were verified as follows:  Position was assessed on GRETCHEN and using angiography and deemed to be satisfactory [place] was assessed on GRETCHEN and fluoroscopy and deemed to be satisfactory Size was assessed on GRETCHEN and a compression ratio of 10-19% was achieved Seal was assessed on GRETCHEN and there was no peridevice leak.  The device was released in a standard fashion and reevaluation with GRETCHEN and fluoroscopy ruled out embolization/peridevice leak.  The WDS was removed.  The WAS was then removed and hemostasis achieved using manual pressure.  Protamine IV was administered.  The patient was extubated in the Cath Lab and demonstrated no neurological deficits.  Complications: none Blood loss: <50 mL Contrast: 20 mL   Conclusions: Successful percutaneous left atrial appendage closure using 27-mm Watchman FLX Pro Normal TAV leaflet excursion with moderate paravalvular leak  Plan: Bedrest for 2 hours Limited TTE to rule out pericardial effusion and device embolization at 4 hours. Discharge in 6 hours if no complications. Planned postprocedure antithrombotic therapy provided follow-up doesn't reveal late complications: Clopidogrel monotherapy - check platelet response assay in follow-up Endocarditis prophylaxis indefinitely (history of LULU) Office visit in 6 weeks per protocol GRETCHEN in 3 months.   Roxana Cantu MD, [facility] Interventional Cardiology/Structural Heart Disease [facility] Office: [phone] The procedure log was documented by Documenter: Babs Morris RT and verified by Roxana Cantu MD. Date: 1/13/2025  Time: 10:15 AM     CT Cardiac With Cont For Structure Morph    Result Date: 12/24/2024  EXAMINATION: CT CARDIAC WITH CONT FOR STRUCTURE MORPH CLINICAL HISTORY: Cardiac valve replacement, dysfunctional;TAV high gradient r/o thrombosis;  Presence of prosthetic heart valve TECHNIQUE: Limited cross-sectional imaging was obtained of the thorax after the intravenous administration of 100 mL of Omnipaque 350.  An  automated dose exposure technique was utilized this limits radiation does the patient. COMPARISON: 12/09/2024 FINDINGS: Heart size is enlarged with percutaneous aortic valve replacement.  Ectasia of the ascending thoracic aorta measuring 4.0 cm.  The descending thoracic aorta is of caliber.  A triple vessel aortic arch is identified with a great vessels being patent.  Partially visualized pacer lead is identified.  The visualized pulmonary artery is dilated.  Shotty lymph nodes are identified. Parenchymal scarring is identified within the right lung with volume loss and mediastinal shift.  No evidence for fibrosis.  Traction bronchiectasis is identified. The visualized hollow and solid viscera of the upper abdomen are grossly normal.  No suspicious osseous lesions.  Scattered spondylotic changes.     1. Status post percutaneous aortic valve replacement. 2. Ectasia of the ascending thoracic aorta. 3. Parenchymal scarring as above. Electronically signed by: Iker Gilliam MD Date:    12/24/2024 Time:    09:27    CT Dx Bone Marrow Biopsy(ies) w/ Aspiration: Left(XPD)    Result Date: 12/18/2024  EXAMINATION: CT DIAGNOSTIC BONE MARROW BIOPSY(IES) WITH ASPRIATION LEFT (XPD) CLINICAL HISTORY: Anemia, unspecified TECHNIQUE: CMS Mandated Quality Data-CT Radiation Dose-436 All CT scans at this facility dose modulation, iterative reconstruction, and or weight-based dosing when appropriate to reduce radiation dose to as low as reasonably achievable. FINDINGS: After obtaining written informed consent, which included a discussion of the risks and benefits of the procedure to include infection and bleeding, and allowing the patient the opportunity to ask questions, the patient agreed to the procedure. The patient was placed prone on the CT fluoroscopy table. A suitable skin entrance site overlying the left posterior iliac spine was localized with CT guidance. The skin was marked, and then prepped and draped in sterile fashion, with  several milliliters of Lidocaine 1% injected subcutaneously and along the periosteum to achieve adequate local anesthesia. Following, a small skin incision was made, and an 11-gauge bone biopsy needle was advanced into the posterior iliac spine under intermittent CT fluoroscopic guidance utilizing [employer] .  A bone marrow aspirate and a core biopsy sample were then obtained.  The needle was removed and a bandage was applied to the skin. The patient tolerated the procedure well, with no immediate postprocedural complications.  There was no significant blood loss.  CT fluoroscopy time was less than 1 minute. Total moderate conscious sedation time with supervision by the interventional radiologist and monitoring by the special procedures registered nurse was 20 minutes.  The patient received Versed 2 mg and Fentanyl 100 mcg IV during the procedure.     Successful CT-guided bone marrow aspiration and biopsy. Electronically signed by: Vitor Rogers Date:    12/18/2024 Time:    09:52  - pulls last radiology orders    Assessment/Plan:     * Presence of Watchman left atrial appendage closure device  - Cardiology following  - Bed rest for 6 more hours  - Close monitoring of femoral groin site        Acute blood loss anemia  Anemia is likely due to acute blood loss which was from post procedure . Most recent hemoglobin and hematocrit are listed below.  Recent Labs     01/13/25  0606 01/13/25  1350   HGB 9.1* 7.7*   HCT 28.5* 24.5*     Plan  - Monitor serial CBC: Every 12 hours  - Transfuse PRBC if patient becomes hemodynamically unstable, symptomatic or H/H drops below 7/21.  - Patient has received 2 units of PRBCs on 1/13  - Patient's anemia is currently new        Hemoptysis/Nasal bleed  - Post procedure after extubation  - Close monitoring in ICU  - Anemia as above  Improved  No repeat episodes        Atrial fibrillation   [id number] Score: 4. The patients heart rate in the last 24 hours is as follows:  Pulse  Min:  60  Max: 62     Antiarrhythmics  metoprolol succinate (TOPROL-XL) 24 hr tablet 25 mg, Every morning, Oral    Anticoagulants  heparin (porcine) injection, As needed (PRN),     Plan  - Replete lytes with a goal of K>4, Mg >2  - Patient is not anticoagulated due to active bleed and post watchman  - Patient's afib is currently controlled          SSS (sick sinus syndrome)  S/p PPM  Aware        Severe aortic valve stenosis  S/p TAVR  stable      Transesophageal echo (GRETCHEN)  Result Date: 1/13/2025    Left Ventricle: The left ventricle is normal in size. There is normal   systolic function with a visually estimated ejection fraction of 55 - 60%.    Right Ventricle: Normal right ventricular cavity size. Systolic   function is normal.    Left Atrium: Left atrium is severely dilated. There is no thrombus in   the left atrial appendage.    Right Atrium: Right atrium is moderately dilated.    Aortic Valve: There is a bioprosthetic valve in the aortic position.   Mild to moderate paravalvular regurgitation.    Mitral Valve: There is mild to moderate regurgitation.    Tricuspid Valve: There is mild to moderate regurgitation.    Watchman device was placed successfully, final images showed no   significant leaks around the device, no pericardial effusion.          CAD (coronary artery disease)  Patient with known CAD, which is controlled Continue statin, and monitor for S/Sx of angina/ACS. Continue to monitor on telemetry.   Reached out to Cardiology regarding continuing further Plavix, will await recommendations          VTE Risk Mitigation (From admission, onward)           Ordered     Reason for No Pharmacological VTE Prophylaxis  Once        Question:  Reasons:  Answer:  Active Bleeding    01/13/25 1533     IP VTE HIGH RISK PATIENT  Once         01/13/25 1533     Place sequential compression device  Until discontinued         01/13/25 1533                  Critical care time spent on the evaluation and treatment of severe organ  dysfunction, review of pertinent labs and imaging studies, discussions with consulting providers and discussions with patient/family: 40 minutes.                  Poli Steele MD  Department of Hospital Medicine  [facility]

## 2025-01-13 NOTE — NURSING
Dr Cantu At bedside to check pt site instructed to keep on another 4 hours bedrest and keep assisted device on rt groin for 4 hours

## 2025-01-14 PROBLEM — I95.81 HYPOTENSION AFTER PROCEDURE: Status: ACTIVE | Noted: 2025-01-14

## 2025-01-14 LAB
ALBUMIN SERPL BCP-MCNC: 3.4 G/DL (ref 3.5–5.2)
ALP SERPL-CCNC: 53 U/L (ref 55–135)
ALT SERPL W/O P-5'-P-CCNC: 6 U/L (ref 10–44)
ANION GAP SERPL CALC-SCNC: 4 MMOL/L (ref 8–16)
ANION GAP SERPL CALC-SCNC: 4 MMOL/L (ref 8–16)
AST SERPL-CCNC: 21 U/L (ref 10–40)
BASOPHILS # BLD AUTO: 0.02 K/UL (ref 0–0.2)
BASOPHILS # BLD AUTO: 0.02 K/UL (ref 0–0.2)
BASOPHILS NFR BLD: 0.2 % (ref 0–1.9)
BASOPHILS NFR BLD: 0.2 % (ref 0–1.9)
BILIRUB SERPL-MCNC: 0.9 MG/DL (ref 0.1–1)
BUN SERPL-MCNC: 31 MG/DL (ref 8–23)
BUN SERPL-MCNC: 31 MG/DL (ref 8–23)
CALCIUM SERPL-MCNC: 8.2 MG/DL (ref 8.7–10.5)
CALCIUM SERPL-MCNC: 8.2 MG/DL (ref 8.7–10.5)
CHLORIDE SERPL-SCNC: 99 MMOL/L (ref 95–110)
CHLORIDE SERPL-SCNC: 99 MMOL/L (ref 95–110)
CO2 SERPL-SCNC: 31 MMOL/L (ref 23–29)
CO2 SERPL-SCNC: 31 MMOL/L (ref 23–29)
CREAT SERPL-MCNC: 0.7 MG/DL (ref 0.5–1.4)
CREAT SERPL-MCNC: 0.7 MG/DL (ref 0.5–1.4)
DIFFERENTIAL METHOD BLD: ABNORMAL
DIFFERENTIAL METHOD BLD: ABNORMAL
EOSINOPHIL # BLD AUTO: 0 K/UL (ref 0–0.5)
EOSINOPHIL # BLD AUTO: 0 K/UL (ref 0–0.5)
EOSINOPHIL NFR BLD: 0.2 % (ref 0–8)
EOSINOPHIL NFR BLD: 0.2 % (ref 0–8)
ERYTHROCYTE [DISTWIDTH] IN BLOOD BY AUTOMATED COUNT: 16.6 % (ref 11.5–14.5)
ERYTHROCYTE [DISTWIDTH] IN BLOOD BY AUTOMATED COUNT: 16.6 % (ref 11.5–14.5)
EST. GFR  (NO RACE VARIABLE): >60 ML/MIN/1.73 M^2
EST. GFR  (NO RACE VARIABLE): >60 ML/MIN/1.73 M^2
GLUCOSE SERPL-MCNC: 122 MG/DL (ref 70–110)
GLUCOSE SERPL-MCNC: 122 MG/DL (ref 70–110)
HCT VFR BLD AUTO: 24.5 % (ref 37–48.5)
HCT VFR BLD AUTO: 24.5 % (ref 37–48.5)
HCT VFR BLD AUTO: 25.3 % (ref 37–48.5)
HGB BLD-MCNC: 8 G/DL (ref 12–16)
IMM GRANULOCYTES # BLD AUTO: 0.1 K/UL (ref 0–0.04)
IMM GRANULOCYTES # BLD AUTO: 0.1 K/UL (ref 0–0.04)
IMM GRANULOCYTES NFR BLD AUTO: 1 % (ref 0–0.5)
IMM GRANULOCYTES NFR BLD AUTO: 1 % (ref 0–0.5)
LYMPHOCYTES # BLD AUTO: 0.7 K/UL (ref 1–4.8)
LYMPHOCYTES # BLD AUTO: 0.7 K/UL (ref 1–4.8)
LYMPHOCYTES NFR BLD: 6.8 % (ref 18–48)
LYMPHOCYTES NFR BLD: 6.8 % (ref 18–48)
MAGNESIUM SERPL-MCNC: 1.9 MG/DL (ref 1.6–2.6)
MCH RBC QN AUTO: 30.5 PG (ref 27–31)
MCH RBC QN AUTO: 30.5 PG (ref 27–31)
MCHC RBC AUTO-ENTMCNC: 32.7 G/DL (ref 32–36)
MCHC RBC AUTO-ENTMCNC: 32.7 G/DL (ref 32–36)
MCV RBC AUTO: 94 FL (ref 82–98)
MCV RBC AUTO: 94 FL (ref 82–98)
MONOCYTES # BLD AUTO: 0.9 K/UL (ref 0.3–1)
MONOCYTES # BLD AUTO: 0.9 K/UL (ref 0.3–1)
MONOCYTES NFR BLD: 9.2 % (ref 4–15)
MONOCYTES NFR BLD: 9.2 % (ref 4–15)
NEUTROPHILS # BLD AUTO: 8.4 K/UL (ref 1.8–7.7)
NEUTROPHILS # BLD AUTO: 8.4 K/UL (ref 1.8–7.7)
NEUTROPHILS NFR BLD: 82.6 % (ref 38–73)
NEUTROPHILS NFR BLD: 82.6 % (ref 38–73)
NRBC BLD-RTO: 0 /100 WBC
NRBC BLD-RTO: 0 /100 WBC
PLATELET # BLD AUTO: 204 K/UL (ref 150–450)
PLATELET # BLD AUTO: 204 K/UL (ref 150–450)
PMV BLD AUTO: 10.3 FL (ref 9.2–12.9)
PMV BLD AUTO: 10.3 FL (ref 9.2–12.9)
POTASSIUM SERPL-SCNC: 4.7 MMOL/L (ref 3.5–5.1)
POTASSIUM SERPL-SCNC: 4.7 MMOL/L (ref 3.5–5.1)
PROT SERPL-MCNC: 5.7 G/DL (ref 6–8.4)
RBC # BLD AUTO: 2.62 M/UL (ref 4–5.4)
RBC # BLD AUTO: 2.62 M/UL (ref 4–5.4)
SODIUM SERPL-SCNC: 134 MMOL/L (ref 136–145)
SODIUM SERPL-SCNC: 134 MMOL/L (ref 136–145)
WBC # BLD AUTO: 10.22 K/UL (ref 3.9–12.7)
WBC # BLD AUTO: 10.22 K/UL (ref 3.9–12.7)

## 2025-01-14 PROCEDURE — 99223 1ST HOSP IP/OBS HIGH 75: CPT | Mod: ,,, | Performed by: GENERAL PRACTICE

## 2025-01-14 PROCEDURE — 83735 ASSAY OF MAGNESIUM: CPT

## 2025-01-14 PROCEDURE — 85025 COMPLETE CBC W/AUTO DIFF WBC: CPT | Performed by: INTERNAL MEDICINE

## 2025-01-14 PROCEDURE — 30233N1 TRANSFUSION OF NONAUTOLOGOUS RED BLOOD CELLS INTO PERIPHERAL VEIN, PERCUTANEOUS APPROACH: ICD-10-PCS

## 2025-01-14 PROCEDURE — 20000000 HC ICU ROOM

## 2025-01-14 PROCEDURE — 85014 HEMATOCRIT: CPT

## 2025-01-14 PROCEDURE — 99900031 HC PATIENT EDUCATION (STAT)

## 2025-01-14 PROCEDURE — 25000003 PHARM REV CODE 250: Performed by: INTERNAL MEDICINE

## 2025-01-14 PROCEDURE — 94761 N-INVAS EAR/PLS OXIMETRY MLT: CPT

## 2025-01-14 PROCEDURE — 85018 HEMOGLOBIN: CPT

## 2025-01-14 PROCEDURE — 25000003 PHARM REV CODE 250

## 2025-01-14 PROCEDURE — 63600175 PHARM REV CODE 636 W HCPCS: Mod: JZ,TB | Performed by: INTERNAL MEDICINE

## 2025-01-14 PROCEDURE — 25000242 PHARM REV CODE 250 ALT 637 W/ HCPCS

## 2025-01-14 PROCEDURE — 99900035 HC TECH TIME PER 15 MIN (STAT)

## 2025-01-14 PROCEDURE — 80053 COMPREHEN METABOLIC PANEL: CPT

## 2025-01-14 PROCEDURE — 27000221 HC OXYGEN, UP TO 24 HOURS

## 2025-01-14 PROCEDURE — 25000242 PHARM REV CODE 250 ALT 637 W/ HCPCS: Performed by: INTERNAL MEDICINE

## 2025-01-14 PROCEDURE — 63600175 PHARM REV CODE 636 W HCPCS

## 2025-01-14 PROCEDURE — 94640 AIRWAY INHALATION TREATMENT: CPT

## 2025-01-14 PROCEDURE — 36415 COLL VENOUS BLD VENIPUNCTURE: CPT

## 2025-01-14 RX ORDER — LIDOCAINE 50 MG/G
2 PATCH TOPICAL
Status: DISCONTINUED | OUTPATIENT
Start: 2025-01-14 | End: 2025-01-15 | Stop reason: HOSPADM

## 2025-01-14 RX ORDER — BUDESONIDE 0.5 MG/2ML
0.5 INHALANT ORAL EVERY 12 HOURS
Status: DISCONTINUED | OUTPATIENT
Start: 2025-01-14 | End: 2025-01-15 | Stop reason: HOSPADM

## 2025-01-14 RX ORDER — ARFORMOTEROL TARTRATE 15 UG/2ML
15 SOLUTION RESPIRATORY (INHALATION) 2 TIMES DAILY
Status: DISCONTINUED | OUTPATIENT
Start: 2025-01-14 | End: 2025-01-15 | Stop reason: HOSPADM

## 2025-01-14 RX ORDER — IPRATROPIUM BROMIDE 0.5 MG/2.5ML
0.5 SOLUTION RESPIRATORY (INHALATION) EVERY 6 HOURS
Status: DISCONTINUED | OUTPATIENT
Start: 2025-01-14 | End: 2025-01-15 | Stop reason: HOSPADM

## 2025-01-14 RX ADMIN — EZETIMIBE 10 MG: 10 TABLET ORAL at 09:01

## 2025-01-14 RX ADMIN — ALBUTEROL SULFATE 2.5 MG: 2.5 SOLUTION RESPIRATORY (INHALATION) at 11:01

## 2025-01-14 RX ADMIN — HYDROMORPHONE HYDROCHLORIDE 0.5 MG: 1 INJECTION, SOLUTION INTRAMUSCULAR; INTRAVENOUS; SUBCUTANEOUS at 01:01

## 2025-01-14 RX ADMIN — ROPINIROLE HYDROCHLORIDE 1 MG: 0.5 TABLET, FILM COATED ORAL at 08:01

## 2025-01-14 RX ADMIN — CLOPIDOGREL BISULFATE 75 MG: 75 TABLET, FILM COATED ORAL at 09:01

## 2025-01-14 RX ADMIN — LIDOCAINE 2 PATCH: 50 PATCH CUTANEOUS at 06:01

## 2025-01-14 RX ADMIN — ARFORMOTEROL TARTRATE 15 MCG: 15 SOLUTION RESPIRATORY (INHALATION) at 07:01

## 2025-01-14 RX ADMIN — IPRATROPIUM BROMIDE 0.5 MG: 0.5 SOLUTION RESPIRATORY (INHALATION) at 07:01

## 2025-01-14 RX ADMIN — ATORVASTATIN CALCIUM 40 MG: 40 TABLET, FILM COATED ORAL at 08:01

## 2025-01-14 RX ADMIN — SODIUM CHLORIDE, POTASSIUM CHLORIDE, SODIUM LACTATE AND CALCIUM CHLORIDE 1000 ML: 600; 310; 30; 20 INJECTION, SOLUTION INTRAVENOUS at 11:01

## 2025-01-14 RX ADMIN — FUROSEMIDE 40 MG: 10 INJECTION, SOLUTION INTRAMUSCULAR; INTRAVENOUS at 02:01

## 2025-01-14 RX ADMIN — MUPIROCIN 1 G: 20 OINTMENT TOPICAL at 09:01

## 2025-01-14 RX ADMIN — BUDESONIDE INHALATION 0.5 MG: 0.5 SUSPENSION RESPIRATORY (INHALATION) at 07:01

## 2025-01-14 RX ADMIN — PANTOPRAZOLE SODIUM 40 MG: 40 TABLET, DELAYED RELEASE ORAL at 05:01

## 2025-01-14 RX ADMIN — DIGOXIN 0.25 MG: 125 TABLET ORAL at 09:01

## 2025-01-14 RX ADMIN — HYDROCODONE BITARTRATE AND ACETAMINOPHEN 1 TABLET: 5; 325 TABLET ORAL at 09:01

## 2025-01-14 RX ADMIN — HYDROCODONE BITARTRATE AND ACETAMINOPHEN 1 TABLET: 5; 325 TABLET ORAL at 08:01

## 2025-01-14 NOTE — PROGRESS NOTES
[facility]  Progress Note    Patient Name: Lisa Smith  MRN: [id number]  Patient Class: IP- Inpatient   Admission Date: 1/13/2025  Length of Stay: 1 days  Attending Physician: Poli Steele, *  Primary Care Provider: Hope Tang FNP        Subjective     Principal Problem:Presence of Watchman left atrial appendage closure device        HPI:  Ms. Smith is a 74-year-old female patient with past medical history of multiple cardiac comorbidities including permanent atrial fibrillation status post ablation, aortic stenosis status post TAVR 2023, HFpEF, CAD, sick sinus s/p PPM, anemia, carotid stenosis s/p CEA who presented today for Watchman procedure.  Following the Watchman placement, during extubation, patient coughed herself to a massive hemoptysis, venous bleed as per Dr. Cantu, episode repeated after she was in periop area resulting in hypotension. Also bleed from right groin site. Hemoglobin dropped 9.1 preprocedure--> 7.7 -->7.4 postprocedure.  2 unit PRBC were ordered with 40 mg IV Lasix given her previous fluid overload after transfusion.  Vitals stable, patient awake alert and oriented.  Internal medicine team was consulted for ICU admission for close monitoring.     Overview/Hospital Course:  Ms. Smith is a 74-year-old female patient with past medical history of multiple cardiac comorbidities including permanent atrial fibrillation status post ablation, aortic stenosis status post TAVR 2023, HFpEF, CAD, sick sinus s/p PPM, anemia, carotid stenosis s/p CEA who underwent Watchman procedure 1/13 and developed nasal bleed/hemoptysis? And groin site femoral bleed resulting acute anemia post procedure. She underwent 2U PRBC transfusion and fluid bolus for associated hypotension.     Interval History:  Patient is seen and examined this morning.  No active complaints.  Blood pressure soft this morning. 1L LR administered. Hb 8 after 2 units.  No respiratory  distress. No chest pain. No repeat hematemesis/hemoptysis.     Overnight minimal bleed at the site, stable, no repeat episodes.    Comfortably sitting in chair, complained of back pain in bed, lidocaine patch ordered.      Review of Systems   Constitutional:  Negative for appetite change, chills and fever.   HENT:  Negative for congestion.    Respiratory:  Negative for cough, chest tightness and shortness of breath.    Cardiovascular:  Negative for chest pain, palpitations and leg swelling.   Gastrointestinal:  Negative for abdominal pain, nausea and vomiting.   Genitourinary:  Negative for dysuria.   Musculoskeletal:  Positive for back pain.   Skin:  Negative for color change.   Neurological:  Negative for syncope and headaches.     Objective:     Vital Signs (Most Recent):  Temp: 98.8 °F (37.1 °C) (01/14/25 0715)  Pulse: 61 (01/14/25 1143)  Resp: 20 (01/14/25 1143)  BP: (!) 91/42 (01/14/25 1000)  SpO2: 98 % (01/14/25 1143) Vital Signs (24h Range):  Temp:  [97.6 °F (36.4 °C)-98.8 °F (37.1 °C)] 98.8 °F (37.1 °C)  Pulse:  [60-68] 61  Resp:  [14-58] 20  SpO2:  [91 %-100 %] 98 %  BP: ()/(41-68) 91/42     Weight: 79.9 kg (176 lb 2.4 oz)  Body mass index is 29.31 kg/m².    Intake/Output Summary (Last 24 hours) at 1/14/2025 1329  Last data filed at 1/14/2025 0613  Gross per 24 hour   Intake 983.33 ml   Output 600 ml   Net 383.33 ml         Physical Exam  Constitutional:       General: She is not in acute distress.     Appearance: Normal appearance. She is not ill-appearing or diaphoretic.   HENT:      Head: Normocephalic.      Nose: Nose normal.      Mouth/Throat:      Mouth: Mucous membranes are moist.   Eyes:      Pupils: Pupils are equal, round, and reactive to light.   Cardiovascular:      Rate and Rhythm: Normal rate and regular rhythm.      Pulses: Normal pulses.   Pulmonary:      Effort: Pulmonary effort is normal.      Breath sounds: Normal breath sounds.   Abdominal:      General: Abdomen is flat. There is  no distension.      Tenderness: There is no abdominal tenderness.   Musculoskeletal:      Right lower leg: No edema.      Left lower leg: No edema.   Skin:     General: Skin is warm.   Neurological:      General: No focal deficit present.      Mental Status: She is alert and oriented to person, place, and time. Mental status is at baseline.   Psychiatric:         Mood and Affect: Mood normal.             Significant Labs: All pertinent labs within the past 24 hours have been reviewed.    Significant Imaging: I have reviewed all pertinent imaging results/findings within the past 24 hours.    Assessment and Plan     * Presence of Watchman left atrial appendage closure device  - Cardiology following  - Close monitoring of femoral groin site  - No repeat bleed  - Stable      Hypotension after procedure  - Likely from blood loss  - 1L LR bolus  - Asymptomatic  - Hold Entresto      Hemoptysis  Nasal bleed as per patient, ?Hemoptysis after extubation     - Post procedure after extubation  - Close monitoring in ICU  - Anemia as above  - Resolved    Acute blood loss anemia  Anemia is likely due to acute blood loss which was from post procedure . Most recent hemoglobin and hematocrit are listed below.  Recent Labs     01/13/25  1350 01/13/25  1718 01/14/25  0402   HGB 7.7* 7.4* 8.0*  8.0*   HCT 24.5* 23.0* 24.5*  24.5*       Plan  - Monitor serial CBC: Every 12 hours  - Transfuse PRBC if patient becomes hemodynamically unstable, symptomatic or H/H drops below 7/21.  - Patient has received 2 units of PRBCs on 1/13  - Patient's anemia is improving      Atrial fibrillation   [id number] Score: 4. The patients heart rate in the last 24 hours is as follows:  Pulse  Min: 60  Max: 68     Antiarrhythmics  metoprolol succinate (TOPROL-XL) 24 hr tablet 25 mg, Every morning, Oral    Anticoagulants       Plan  - Replete lytes with a goal of K>4, Mg >2  - Patient is not anticoagulated due to bleed post procedure and post watchman  -  Patient's afib is currently controlled          SSS (sick sinus syndrome)  S/p PPM  Aware      Severe aortic valve stenosis  S/p TAVR  stable    Echo    Result Date: 1/13/2025  Limited 2D study post left atrial appendage occlusion:   Moderately dilated left ventricular size with mildly reduced systolic   function  Normal right ventricular size and systolic function   No significant pericardial effusion   No evidence of occluder embolization      Transesophageal echo (GRETCHEN)    Result Date: 1/13/2025    Left Ventricle: The left ventricle is normal in size. There is normal   systolic function with a visually estimated ejection fraction of 55 - 60%.    Right Ventricle: Normal right ventricular cavity size. Systolic   function is normal.    Left Atrium: Left atrium is severely dilated. There is no thrombus in   the left atrial appendage.    Right Atrium: Right atrium is moderately dilated.    Aortic Valve: There is a bioprosthetic valve in the aortic position.   Mild to moderate paravalvular regurgitation.    Mitral Valve: There is mild to moderate regurgitation.    Tricuspid Valve: There is mild to moderate regurgitation.    Watchman device was placed successfully, final images showed no   significant leaks around the device, no pericardial effusion.        Echo    Result Date: 12/4/2024    Left Ventricle: The left ventricle is normal in size. Normal wall   thickness. There is normal systolic function with a visually estimated   ejection fraction of 55 - 60%. Unable to assess diastolic function due to   atrial fibrillation.    Right Ventricle: Normal right ventricular cavity size. Wall thickness   is normal. Systolic function is normal. Pacemaker lead present in the   ventricle.    Left Atrium: Left atrium is mildly dilated.    Right Atrium: Right atrium is mildly dilated.    Aortic Valve: There is a transcatheter valve replacement in the aortic   position. It is reported to be a 26 mm Jauregui valve. There is mild    stenosis. Aortic valve area by VTI is 1.5 cm². Aortic valve peak velocity   is 3.6 m/s. Mean gradient is 28.0 mmHg. The dimensionless index is 0.43.   There is mild to moderate aortic regurgitation with an eccentrically   directed jet.    Mitral Valve: There is bileaflet sclerosis. There is mitral annular   calcification present. There is mild regurgitation.    Tricuspid Valve: There is mild to moderate regurgitation.    IVC/SVC: Normal venous pressure at 3 mmHg.        Echo    Result Date: 1/24/2024    Left Ventricle: The left ventricle is normal in size. Mildly increased   wall thickness. There is mildly reduced systolic function with a visually   estimated ejection fraction of 45 - 50%. Unable to assess diastolic   function due to atrial fibrillation.    Right Ventricle: Normal right ventricular cavity size. Wall thickness   is normal. Right ventricle wall motion  is normal. Systolic function is   normal.    Left Atrium: Left atrium is mildly dilated.    Right Atrium: Right atrium is mildly dilated.    Aortic Valve: There is a transcatheter valve replacement in the aortic   position that is appropriately positioned. Mild paravalvular   regurgitation.    Mitral Valve: There is bileaflet sclerosis. There is moderate mitral   annular calcification present. There is no stenosis. The mean pressure   gradient across the mitral valve is 2 mmHg at a heart rate of  bpm. There   is moderate regurgitation.    Tricuspid Valve: There is mild regurgitation.    IVC/SVC: Normal venous pressure at 3 mmHg.          CAD (coronary artery disease)  Patient with known CAD, which is controlled Continue statin, and monitor for S/Sx of angina/ACS. Continue to monitor on telemetry.   Reached out to Cardiology regarding continuing further Plavix, Dr Snow recommended to continue      VTE Risk Mitigation (From admission, onward)           Ordered     Reason for No Pharmacological VTE Prophylaxis  Once        Question:  Reasons:  Answer:   Active Bleeding    01/13/25 1533     IP VTE HIGH RISK PATIENT  Once         01/13/25 1533     Place sequential compression device  Until discontinued         01/13/25 1533                    Discharge Planning   PILI: 1/16/2025     Code Status: Full Code   Medical Readiness for Discharge Date:                Critical care time spent on the evaluation and treatment of severe organ dysfunction, review of pertinent labs and imaging studies, discussions with consulting providers and discussions with patient/family: 40 minutes.            Poli Steele MD  Department of Hospital Medicine   [facility]

## 2025-01-14 NOTE — NURSING
Tried to remove safeguard earlier, but was unable due to pt bleeding. Pt stopped bleeding and was able to remove. Gauze and tegaderm placed on top off insertion site. Site is soft. Pulses dopplered.

## 2025-01-14 NOTE — ASSESSMENT & PLAN NOTE
Patient with known CAD, which is controlled Continue statin, and monitor for S/Sx of angina/ACS. Continue to monitor on telemetry.   Reached out to Cardiology regarding continuing further Plavix, Dr Snow recommended to continue

## 2025-01-14 NOTE — PROGRESS NOTES
Automatic Inhaler to Nebulizer Interchange    fluticasone/umeclidinium/vilanterol (Trelegy) 100 mcg/ 62.5 mcg/ 25 mcg changed to budesonide 0.5 mg twice daily AND ipratropium 0.5 mg every 6 hours scheduled AND arformoterol 15 mcg twice daily per [facility] Automatic Therapeutic Substitutions Protocol.    Please contact pharmacy at extension [phone] with any questions.     Thank you,   Steven Osullivan

## 2025-01-14 NOTE — PLAN OF CARE
[facility]  Initial Discharge Assessment    CM met with pt to discuss discharge planning. Pt lives alone in a SSH with  no steps. She is independent in functioning, drives and daughter will  also drive her to appointments and home from hospital. HH with [facility] and has home O2 from [facility]. Plans to return home with HH. Will continue to follow.     Primary Care Provider: Hope Tang FNP    Admission Diagnosis: Gastrointestinal hemorrhage, unspecified gastrointestinal hemorrhage type [K92.2]  Poor balance [R26.89]  Anemia, unspecified type [D64.9]  PAF (paroxysmal atrial fibrillation) [I48.0]  SSS (sick sinus syndrome) [I49.5]  Epistaxis [R04.0]  At high risk for bleeding [Z91.89]  Presence of Watchman left atrial appendage closure device [Z95.818]    Admission Date: 1/13/2025  Expected Discharge Date: 1/16/2025    Transition of Care Barriers: None    Payor: [employer] MANAGED MEDICARE / Plan: [employer] MEDICARE HMO / Product Type: Capitation /     Extended Emergency Contact Information  Primary Emergency Contact: Jordana Marsh  Address: [address] [place], [place]  Home Phone: [phone]  Mobile Phone: [phone]  Relation: Daughter    Discharge Plan A: Home Health  Discharge Plan B: Home with family      [address]  [address] [facility] [address]  Phone: [phone] Fax: [phone]    [facility] - [place], [place] - [address]  [address] [address]  Phone: [phone] Fax: [phone]    [facility] - [place], [place] - [address]  [address]  [facility] [address]  Phone: [phone] Fax: [phone]    [facility] - [place] - [address]  [address]  [place] [address]  Phone: [phone] Fax: [phone]    [facility]  [facility]  [address]  [place] [address]  Phone: [phone] Fax: [phone]      Initial Assessment (most recent)       Adult Discharge Assessment - 01/14/25 1108          Discharge Assessment    Assessment Type Discharge Planning Assessment     Confirmed/corrected address, phone number and insurance Yes     Confirmed Demographics Correct on Facesheet     Source of Information patient     When was your last doctors appointment? --   December    Communicated PILI with patient/caregiver Yes     Reason For Admission Watchman     People in Home alone     Do you expect to return to your current living situation? Yes     Do you have help at home or someone to help you manage your care at home? No     Prior to hospitilization cognitive status: Unable to Assess     Current cognitive status: Alert/Oriented     Walking or Climbing Stairs Difficulty yes     Walking or Climbing Stairs ambulation difficulty, requires equipment     Mobility Management Walker     Dressing/Bathing Difficulty yes     Dressing/Bathing bathing difficulty, requires equipment     Dressing/Bathing Management tub bench     Home Accessibility wheelchair accessible     Home Layout Other (see comments)   [facility] with no steps    Equipment Currently Used at Home walker, rolling;blood pressure machine;oxygen;bath bench;nebulizer     Readmission within 30 days? No     Patient currently being followed by outpatient case management? No     Do you currently have service(s) that help you manage your care at home? Yes     Name and Contact number of agency [facility]     Is the pt/caregiver preference to resume services with current agency Yes     Do you take prescription medications? Yes     Do you have prescription coverage? Yes     Coverage [employer]     Do you have any problems affording any of your prescribed medications? No     Is the patient taking medications as prescribed? yes     Who is going to help you get home at discharge? Daughter      How do you get to doctors appointments? family or friend will provide;car, drives self     Are you on dialysis? No     Do you take coumadin? No     Discharge Plan A Home Health     Discharge Plan B Home with family     DME Needed Upon Discharge  none     Discharge Plan discussed with: Patient     Transition of Care Barriers None

## 2025-01-14 NOTE — SUBJECTIVE & OBJECTIVE
Interval History:  Patient is seen and examined this morning.  No active complaints.  Blood pressure soft this morning. 1L LR administered. Hb 8 after 2 units.  No respiratory distress. No chest pain. No repeat hematemesis/hemoptysis.     Overnight minimal bleed at the site, stable, no repeat episodes.    Comfortably sitting in chair, complained of back pain in bed, lidocaine patch ordered.      Review of Systems   Constitutional:  Negative for appetite change, chills and fever.   HENT:  Negative for congestion.    Respiratory:  Negative for cough, chest tightness and shortness of breath.    Cardiovascular:  Negative for chest pain, palpitations and leg swelling.   Gastrointestinal:  Negative for abdominal pain, nausea and vomiting.   Genitourinary:  Negative for dysuria.   Musculoskeletal:  Positive for back pain.   Skin:  Negative for color change.   Neurological:  Negative for syncope and headaches.     Objective:     Vital Signs (Most Recent):  Temp: 98.8 °F (37.1 °C) (01/14/25 0715)  Pulse: 61 (01/14/25 1143)  Resp: 20 (01/14/25 1143)  BP: (!) 91/42 (01/14/25 1000)  SpO2: 98 % (01/14/25 1143) Vital Signs (24h Range):  Temp:  [97.6 °F (36.4 °C)-98.8 °F (37.1 °C)] 98.8 °F (37.1 °C)  Pulse:  [60-68] 61  Resp:  [14-58] 20  SpO2:  [91 %-100 %] 98 %  BP: ()/(41-68) 91/42     Weight: 79.9 kg (176 lb 2.4 oz)  Body mass index is 29.31 kg/m².    Intake/Output Summary (Last 24 hours) at 1/14/2025 1329  Last data filed at 1/14/2025 0613  Gross per 24 hour   Intake 983.33 ml   Output 600 ml   Net 383.33 ml         Physical Exam  Constitutional:       General: She is not in acute distress.     Appearance: Normal appearance. She is not ill-appearing or diaphoretic.   HENT:      Head: Normocephalic.      Nose: Nose normal.      Mouth/Throat:      Mouth: Mucous membranes are moist.   Eyes:      Pupils: Pupils are equal, round, and reactive to light.   Cardiovascular:      Rate and Rhythm: Normal rate and regular rhythm.       Pulses: Normal pulses.   Pulmonary:      Effort: Pulmonary effort is normal.      Breath sounds: Normal breath sounds.   Abdominal:      General: Abdomen is flat. There is no distension.      Tenderness: There is no abdominal tenderness.   Musculoskeletal:      Right lower leg: No edema.      Left lower leg: No edema.   Skin:     General: Skin is warm.   Neurological:      General: No focal deficit present.      Mental Status: She is alert and oriented to person, place, and time. Mental status is at baseline.   Psychiatric:         Mood and Affect: Mood normal.             Significant Labs: All pertinent labs within the past 24 hours have been reviewed.    Significant Imaging: I have reviewed all pertinent imaging results/findings within the past 24 hours.

## 2025-01-14 NOTE — NURSING TRANSFER
Nursing Transfer Note      1/13/2025   6:35 PM    Nurse giving handoff:Cindy  Nurse receiving handoff:Rancho    Reason patient is being transferred: admit    Transfer To: 1006    Transfer via stretcher    Transfer with  to O2, cardiac monitoring    Transported by Cindy    Transfer Vital Signs:  Blood Pressure:  Heart Rate:  O2:  Temperature:  Respirations:    Telemetry:   Order for Tele Monitor?     Additional Lines:     Medicines sent: none    Any special needs or follow-up needed: assess right groin for any bleeding    Patient belongings transferred with patient: Yes    Chart send with patient: Yes    Notified: daughter    Patient reassessed at: 01/13/2025 1600 (date, time)  1  Upon arrival to floor: cardiac monitor applied, patient oriented to room, call bell in reach, and bed in lowest position

## 2025-01-14 NOTE — ASSESSMENT & PLAN NOTE
Anemia is likely due to acute blood loss which was from post procedure . Most recent hemoglobin and hematocrit are listed below.  Recent Labs     01/13/25  1350 01/13/25  1718 01/14/25  0402   HGB 7.7* 7.4* 8.0*  8.0*   HCT 24.5* 23.0* 24.5*  24.5*       Plan  - Monitor serial CBC: Every 12 hours  - Transfuse PRBC if patient becomes hemodynamically unstable, symptomatic or H/H drops below 7/21.  - Patient has received 2 units of PRBCs on 1/13  - Patient's anemia is improving

## 2025-01-14 NOTE — HOSPITAL COURSE
Ms. Smith is a 74-year-old female patient with past medical history of multiple cardiac comorbidities including permanent atrial fibrillation status post ablation, aortic stenosis status post TAVR 2023, HFpEF, CAD, sick sinus s/p PPM, anemia, carotid stenosis s/p CEA who underwent Watchman procedure 1/13 and developed nasal bleed/hemoptysis? And groin site femoral bleed resulting acute anemia ( 9.1 --> 7.4) post procedure requiring 2U PRBC transfusion. Also required fluid bolus for associated hypotension. Hb further dropped to 7.3 requiring 3rd unit on 1/15/2024. Also updated Dr Cantu. Repeat CBC showed Hb to 8.5, improved.  Discussed with Dr Snow that patient wants to go home and cardiology cleared for discharge. BP instructions given. Dr Snow recommended to continue Entresto if BP > 105 systolic, continue lasix 20mg and to continue plavix. She will follow up with cardiology, PCP after discharge. Case management was consulted who arranged home health and appointments with Dr Pérez. Repeat CBC orders placed with home health.   All return precautions advised. All instructions given and patient verbalized understanding.

## 2025-01-14 NOTE — CARE UPDATE
CONTINUE PRN TX AS NEEDED   01/14/25 1143   Patient Assessment/Suction   Level of Consciousness (AVPU) alert   Respiratory Effort Normal;Unlabored   Expansion/Accessory Muscles/Retractions no use of accessory muscles;expansion symmetric   All Lung Fields Breath Sounds clear;equal bilaterally;coarse;diminished   ANDREW Breath Sounds clear   LLL Breath Sounds clear   RUL Breath Sounds clear   RML Breath Sounds coarse   RLL Breath Sounds diminished   Rhythm/Pattern, Respiratory unlabored;depth regular   PRE-TX-O2   Device (Oxygen Therapy) nasal cannula   $ Is the patient on Low Flow Oxygen? Yes   Flow (L/min) (Oxygen Therapy) 3   SpO2 98 %   Pulse Oximetry Type Continuous   $ Pulse Oximetry - Multiple Charge Pulse Oximetry - Multiple   Pulse 61   Resp 20   Aerosol Therapy   $ Aerosol Therapy Charges Aerosol Treatment   Daily Review of Necessity (SVN) completed   Respiratory Treatment Status (SVN) given   Treatment Route (SVN) mask;oxygen   Patient Position HOB elevated   Post Treatment Assessment (SVN) breath sounds improved;increased aeration   Signs of Intolerance (SVN) none   Breath Sounds Post-Respiratory Treatment   Throughout All Fields Post-Treatment All Fields   Throughout All Fields Post-Treatment aeration increased   Post-treatment Heart Rate (beats/min) 60   Post-treatment Resp Rate (breaths/min) 20

## 2025-01-14 NOTE — PLAN OF CARE
Plan of care reviewed with patient. Patient verbalized complete understanding. Pt awake, alert, and oriented. Pt complaining of pain, meds given. Pt received 2 units of blood, lasix given after second unit. Pt insertion site is clean, dry and intact. VSS. Paced rhythm.  All fall precautions maintained, bed in lowest position, locked, call light within reach. Side rails up times 2. Slip resistant socks maintained.

## 2025-01-14 NOTE — ASSESSMENT & PLAN NOTE
Nasal bleed as per patient, ?Hemoptysis after extubation     - Post procedure after extubation  - Close monitoring in ICU  - Anemia as above  - Resolved

## 2025-01-14 NOTE — PROGRESS NOTES
[facility] [place]  Department of Cardiology  Consult Note      PATIENT NAME: Lisa Smith  MRN: [id number]  TODAY'S DATE: 01/14/2025  ADMIT DATE: 1/13/2025                          CONSULT REQUESTED BY: Poli Steele, *    SUBJECTIVE     PRINCIPAL PROBLEM: Presence of Watchman left atrial appendage closure device            HPI:    Patient is a 74 y.o. female with PMH permanent atrial fibrillation status post ablation, aortic stenosis status post TAVR 2023, HFpEF, CAD, sick sinus s/p PPM, anemia, carotid stenosis s/p CEA who had Watchman procedure yesterday with Dr. Norton.  She developed hemoptysis during extubation, And groin site femoral bleed resulting acute anemia post procedure. She underwent 2U PRBC transfusion and fluid bolus for associated hypotension.   Hypotensive today.  H&H stable 8/25 this am.  Denies complaints. BP stable during exam.        FROM DR NORTON's OFFICE H&P  Lisa Smith is a 74 y.o. female who presents for preoperative Watchman evaluation. They follow with Dr. Pérez and were referred to Dr. Norton for Watchman.      Patient scheduled to undergo LAAO with Watchman device on 1/13/25. She is here for her preoperative evaluation. Denies fever, chills, chest discomfort, dyspnea, palpitations, dizziness, syncope, orthopnea, PND, edema, exposed wounds, loose or painful teeth, dysuria.     Focused Past History includes:  Permanent atrial fibrillation s/p ablation   Aortic stenosis s/p TAVR 12/2023  Chronic heart failure with reduced ejection fraction   TTE 12/2024 reported EF 55-60%, normal RV. Bilateral atrial enlargement. TAV MG 28 and DI 0.43 with mild to moderate PVL). Moderate MR. PASP 45   Coronary artery disease  Sick sinus syndrome s/p PPM  Carotid artery disease s/p CEA  Severe anemia requiring transfusion    Review of patient's allergies indicates:   Allergen Reactions    Sulfa (sulfonamide antibiotics) Itching       Past Medical History:   Diagnosis Date     Anticoagulant long-term use     Arthritis     Atrial fibrillation     Bell's palsy     Boil of buttock     burst 12/19/22    CHF (congestive heart failure)     Chronic cough     COPD (chronic obstructive pulmonary disease)     use O2  3l/m NC at night; also taking during day currently 12/4/23    Dizziness     Encounter for blood transfusion     GI bleed 2011    transfusion    H/O diverticulitis of colon     Hard of hearing     Heart murmur     Hematoma 02/2023    left hand    Heterozygous alpha 1-antitrypsin deficiency     History of home oxygen therapy     3L NC at night    Hypertension     Lung disease     copd    MONSERRAT (obstructive sleep apnea)     resolved with wt loss 131#    Pacemaker     Pneumonia     last episode 2019    Pulmonary edema     Skin cancer     Unspecified visual disturbance     episode of vision disturbance and dizziness...occasional recurrences     Past Surgical History:   Procedure Laterality Date    CARDIAC ELECTROPHYSIOLOGY STUDY AND ABLATION      CAROTID ENDARTERECTOMY Left 12/22/2022    Procedure: ENDARTERECTOMY-CAROTID;  Surgeon: Maximilian Connolly MD;  Location: [facility] OR;  Service: Peripheral Vascular;  Laterality: Left;    CAROTID STENT Left 2/29/2024    Procedure: INSERTION, STENT, ARTERY, CAROTID;  Surgeon: CIRILO Valdivia III, MD;  Location: [address];  Service: Vascular;  Laterality: Left;  left carotid artery stent placement  mgy  146.29   gymc2  8.0730  fluro time  4.8min    CARPAL TUNNEL RELEASE Left     CHOLECYSTECTOMY      CLOSURE OF LEFT ATRIAL APPENDAGE USING DEVICE Right 1/13/2025    Procedure: Left atrial appendage closure device;  Surgeon: Roxana Cantu MD;  Location: [facility] CATH/EP LAB;  Service: Cardiology;  Laterality: Right;    EYE SURGERY Bilateral     cataract    HYSTERECTOMY      INSERT / REPLACE / REMOVE PACEMAKER      KNEE ARTHROSCOPY Left     LEFT HEART CATHETERIZATION  11/1/2023    Procedure: Left heart cath;  Surgeon: Puma Shelton MD;  Location: [facility] CATH;   Service: Cardiology;;    REPLACEMENT OF PACEMAKER GENERATOR Left 2022    Procedure: REPLACEMENT, PACEMAKER GENERATOR;  Surgeon: Raymond Pérez MD;  Location: [facility] CATH/EP LAB;  Service: Cardiology;  Laterality: Left;    SKIN CANCER EXCISION      TRANSCATHETER AORTIC VALVE REPLACEMENT (TAVR) Bilateral 2023    Procedure: (TAVR);  Surgeon: Puma Shelton MD;  Location: [facility] CATH;  Service: Cardiology;  Laterality: Bilateral;    TRANSCATHETER AORTIC VALVE REPLACEMENT (TAVR) Bilateral 2023    Procedure: (TAVR) - Surgeon;  Surgeon: Maximilian Connolly MD;  Location: [facility] CATH;  Service: Peripheral Vascular;  Laterality: Bilateral;     Social History     Tobacco Use    Smoking status: Former     Current packs/day: 0.00     Average packs/day: 2.0 packs/day for 40.0 years (80.0 ttl pk-yrs)     Types: Cigarettes     Start date: 1971     Quit date: 2011     Years since quittin.9     Passive exposure: Past    Smokeless tobacco: Never    Tobacco comments:          Quit in    Substance Use Topics    Alcohol use: Not Currently     Alcohol/week: 8.0 standard drinks of alcohol     Types: 8 Glasses of wine per week    Drug use: No        REVIEW OF SYSTEMS    As mentioned in HPI    OBJECTIVE     VITAL SIGNS (Most Recent)  Temp: 98.7 °F (37.1 °C) (25 1115)  Pulse: 62 (25 1500)  Resp: 12 (25 1345)  BP: (!) 106/55 (25 1500)  SpO2: 98 % (25 1500)    VENTILATION STATUS  Resp: 12 (25 1345)  SpO2: 98 % (25 1500)           I & O (Last 24H):  Intake/Output Summary (Last 24 hours) at 2025 1634  Last data filed at 2025 0613  Gross per 24 hour   Intake 983.33 ml   Output 600 ml   Net 383.33 ml       WEIGHTS  Wt Readings from Last 3 Encounters:   25 0632 79.9 kg (176 lb 2.4 oz)   25 0657 77.1 kg (170 lb)   25 1303 77.1 kg (169 lb 15.6 oz)   25 1119 78.3 kg (172 lb 9.9 oz)       PHYSICAL EXAM    CONSTITUTIONAL: NAD  HEENT:  Normocephalic. No pallor  NECK: no JVD  LUNGS: CTA b/l  HEART: regular rate and rhythm, S1, S2 normal, no murmur   ABDOMEN: soft, non-tender, bowel sounds normal  EXTREMITIES: No edema  SKIN: No rash  NEURO: AAO X 3  PSYCH: normal affect      HOME MEDICATIONS:  No current facility-administered medications on file prior to encounter.     Current Outpatient Medications on File Prior to Encounter   Medication Sig Dispense Refill    albuterol (ACCUNEB) 1.25 mg/3 mL Nebu INHALE THE CONTENTS OF 1 VIAL VIA NEBULIZER EVERY 6 HOURS AS NEEDED FOR RESCUE 360 mL 5    digoxin (LANOXIN) 250 mcg tablet TAKE 1 TABLET EVERY DAY 90 tablet 3    ezetimibe (ZETIA) 10 mg tablet TAKE 1 TABLET ONE TIME DAILY 90 tablet 3    pantoprazole (PROTONIX) 40 MG tablet Take 1 tablet (40 mg total) by mouth every morning. 90 tablet 3    rOPINIRole (REQUIP) 1 MG tablet Take 1 tablet (1 mg total) by mouth every evening. 90 tablet 1    TRELEGY ELLIPTA 100-62.5-25 mcg DsDv INHALE 1 PUFF INTO THE LUNGS ONCE DAILY. 90 each 3    acetaminophen (TYLENOL ARTHRITIS ORAL) Take 2 tablets by mouth daily as needed.      albuterol (PROVENTIL/VENTOLIN HFA) 90 mcg/actuation inhaler INHALE 2 PUFFS EVERY 6 HOURS AS NEEDED FOR WHEEZING (RESCUE) 18 g 6    albuterol-ipratropium (DUO-NEB) 2.5 mg-0.5 mg/3 mL nebulizer solution Take 3 mLs by nebulization every 6 (six) hours as needed for Wheezing or Shortness of Breath. Rescue 90 mL 0    fluticasone propionate (FLONASE) 50 mcg/actuation nasal spray 1 spray by Each Nostril route daily as needed for Rhinitis or Allergies.         SCHEDULED MEDS:   arformoteroL  15 mcg Nebulization BID    atorvastatin  40 mg Oral QHS    budesonide  0.5 mg Nebulization Q12H    clopidogreL  75 mg Oral Daily    digoxin  0.25 mg Oral Daily    ezetimibe  10 mg Oral Daily    ipratropium  0.5 mg Nebulization Q6H    LIDOcaine  2 patch Transdermal Q24H    metoprolol succinate  25 mg Oral QAM    mupirocin   Nasal BID    pantoprazole  40 mg Oral QAM     rOPINIRole  1 mg Oral QHS    sacubitriL-valsartan  1 tablet Oral BID       CONTINUOUS INFUSIONS:    PRN MEDS:  Current Facility-Administered Medications:     0.9%  NaCl infusion (for blood administration), , Intravenous, Q24H PRN    0.9%  NaCl infusion (for blood administration), , Intravenous, Q24H PRN    acetaminophen, 650 mg, Oral, Q4H PRN    acetaminophen, 650 mg, Oral, Q8H PRN    albuterol, 2.5 mg, Nebulization, Q6H PRN    aluminum-magnesium hydroxide-simethicone, 30 mL, Oral, QID PRN    dextrose 50%, 12.5 g, Intravenous, PRN    dextrose 50%, 25 g, Intravenous, PRN    glucagon (human recombinant), 1 mg, Intramuscular, PRN    glucose, 16 g, Oral, PRN    glucose, 24 g, Oral, PRN    hydrALAZINE, 10 mg, Intravenous, Q4H PRN    HYDROcodone-acetaminophen, 1 tablet, Oral, Q4H PRN    HYDROmorphone, 0.5 mg, Intravenous, Q3H PRN    magnesium oxide, 800 mg, Oral, PRN    magnesium oxide, 800 mg, Oral, PRN    melatonin, 6 mg, Oral, Nightly PRN    naloxone, 0.02 mg, Intravenous, PRN    ondansetron, 4 mg, Intravenous, Q6H PRN    potassium bicarbonate, 35 mEq, Oral, PRN    potassium bicarbonate, 50 mEq, Oral, PRN    potassium bicarbonate, 60 mEq, Oral, PRN    potassium, sodium phosphates, 2 packet, Oral, PRN    potassium, sodium phosphates, 2 packet, Oral, PRN    potassium, sodium phosphates, 2 packet, Oral, PRN    senna-docusate 8.6-50 mg, 1 tablet, Oral, Daily PRN    sodium chloride 0.9%, 10 mL, Intravenous, Q12H PRN    LABS AND DIAGNOSTICS     CBC LAST 3 DAYS  Recent Labs   Lab 01/09/25  1230 01/13/25  0606 01/13/25  1350 01/13/25  1718 01/14/25  0402 01/14/25  1353   WBC 7.00 8.44 11.52  --  10.22  10.22  --    RBC 2.85* 2.90* 2.54*  --  2.62*  2.62*  --    HGB 8.7* 9.1* 7.7* 7.4* 8.0*  8.0* 8.0*   HCT 27.5* 28.5* 24.5* 23.0* 24.5*  24.5* 25.3*   MCV 97 98 97  --  94  94  --    MCH 30.5 31.4* 30.3  --  30.5  30.5  --    MCHC 31.6* 31.9* 31.4*  --  32.7  32.7  --    RDW 17.7* 17.5* 17.2*  --  16.6*  16.6*  --    PLT  "229 208 213  --  204  204  --    MPV 10.7 10.5 10.6  --  10.3  10.3  --    GRAN 74.4*  5.2 77.1*  6.5  --   --  82.6*  82.6*  8.4*  8.4*  --    LYMPH 14.3*  1.0 12.6*  1.1  --   --  6.8*  6.8*  0.7*  0.7*  --    MONO 8.6  0.6 7.2  0.6  --   --  9.2  9.2  0.9  0.9  --    BASO 0.02 0.04  --   --  0.02  0.02  --    NRBC 0 0  --   --  0  0  --        COAGULATION LAST 3 DAYS  Recent Labs   Lab 01/09/25  1230   INR 1.0   APTT 28.1       CHEMISTRY LAST 3 DAYS  Recent Labs   Lab 01/09/25  1230 01/13/25  0606 01/14/25  0402    136 134*  134*   K 5.5* 4.4 4.7  4.7    96 99  99   CO2 31* 31* 31*  31*   ANIONGAP 7* 9 4*  4*   BUN 26* 33* 31*  31*   CREATININE 0.7 0.8 0.7  0.7   GLU 88 91 122*  122*   CALCIUM 9.8 9.3 8.2*  8.2*   MG 1.9  --  1.9   ALBUMIN 3.8  --  3.4*   PROT 7.5  --  5.7*   ALKPHOS 87  --  53*   ALT 10  --  6*   AST 30  --  21   BILITOT 0.4  --  0.9       CARDIAC PROFILE LAST 3 DAYS  No results for input(s): "BNP", "CPK", "CPKMB", "LDH", "TROPONINI", "TROPONINIHS" in the last 168 hours.    ENDOCRINE LAST 3 DAYS  No results for input(s): "TSH", "PROCAL" in the last 168 hours.    LAST ARTERIAL BLOOD GAS  ABG  No results for input(s): "PH", "PO2", "PCO2", "HCO3", "BE" in the last 168 hours.    LAST 7 DAYS MICROBIOLOGY   Microbiology Results (last 7 days)       ** No results found for the last 168 hours. **            MOST RECENT IMAGING  Cardiac catheterization  LEFT ATRIAL APPENDAGE CLOSURE PROCEDURE NOTE        Date of procedure: 01/13/2025      : Roxana Cantu MD       Procedure:   Left atrial appendage closure using 27-mm Watchman FLX Pro     Anesthesia: General anesthesia with endotracheal intubation  Additional arterial and venous lines were placed by anesthesiology as   needed.     Indication:   Atrial fibrillation with [id number] score 6 and HAS-BLED score 5.      Description of the Procedure:   Following informed consent, the patient was bought to the " Cath Lab and   placed under anesthesia as above.  Preprocedure RUY sizing was performed   using gated CT. GRETCHEN was used to aid in guidance of the procedure; this   demonstrated no thrombus in the left atrial appendage and the following   baseline ostial measurements:    Right femoral vein access was obtained with a micropuncture needle and   ultrasound guidance and a short 8 French sheath was placed. 100 units/kg   IV heparin was administered with additional heparin as needed to achieve   and maintain ACT greater than 300 seconds.      Transseptal puncture was performed under GRETCHEN and fluoroscopy guidance   using a VersaCross system.  The TruSteer Watchman Access Sheath (WAS) was   advanced into the left atrium over the VersaCross wire. The wire and the   WAS dilator were then removed. A Pigtail catheter was advanced into the   left atrial appendage under fluoroscopy and GRETCHEN guidance.  Baseline LA   pressure was 18.     Angiography through the pigtail catheter was used to confirm RUY shape and   plan a landing zone.  A 27-mm Watchman FLX Pro device was chosen. The WAS   was advanced over the pigtail catheter to deployment position.     The Watchman Delivery System (WDS) was prepped in the usual fashion.  The   pigtail catheter was removed from the WAS and the WDS was advanced under   fluoroscopy and GRETCHEN guidance.  The device was deployed under fluoroscopy   and GRETCHEN in the usual manner.      Device release criteria were verified as follows:  Position was assessed on GRETCHEN and using angiography and deemed to be   satisfactory  [place] was assessed on GRETCHEN and fluoroscopy and deemed to be satisfactory  Size was assessed on GRETCHEN and a compression ratio of 10-19% was achieved  Seal was assessed on GRETCHEN and there was no peridevice leak.     The device was released in a standard fashion and reevaluation with GRETCHEN   and fluoroscopy ruled out embolization/peridevice leak.     The WDS was removed.  The WAS was then removed and  hemostasis achieved   using manual pressure.  Protamine IV was administered.  The patient was   extubated in the Cath Lab and demonstrated no neurological deficits.     Complications: none  Blood loss: <50 mL  Contrast: 20 mL          Conclusions:  Successful percutaneous left atrial appendage closure using 27-mm Watchman   FLX Pro  Normal TAV leaflet excursion with moderate paravalvular leak     Plan:   Bedrest for 2 hours  Limited TTE to rule out pericardial effusion and device embolization at 4   hours.  Discharge in 6 hours if no complications.   Planned postprocedure antithrombotic therapy provided follow-up doesn't   reveal late complications:   Clopidogrel monotherapy - check platelet response assay in follow-up   Endocarditis prophylaxis indefinitely (history of LULU)  Office visit in 6 weeks per protocol  GRETCHEN in 3 months.         Roxana Cantu MD, [facility]  Interventional Cardiology/Structural Heart Disease  [facility]  Office: [phone]    The procedure log was documented by Documenter: Babs Morris RT and   verified by Roxana Cantu MD.    Date: 1/13/2025  Time: 10:15 AM      ECHOCARDIOGRAM RESULTS (last 5)  Results for orders placed during the hospital encounter of 01/13/25    Echo    Interpretation Summary  Limited 2D study post left atrial appendage occlusion:  Moderately dilated left ventricular size with mildly reduced systolic function  Normal right ventricular size and systolic function  No significant pericardial effusion  No evidence of occluder embolization      Transesophageal echo (GRETCHEN)    Interpretation Summary    Left Ventricle: The left ventricle is normal in size. There is normal systolic function with a visually estimated ejection fraction of 55 - 60%.    Right Ventricle: Normal right ventricular cavity size. Systolic function is normal.    Left Atrium: Left atrium is severely dilated. There is no thrombus in the left atrial appendage.    Right  Atrium: Right atrium is moderately dilated.    Aortic Valve: There is a bioprosthetic valve in the aortic position. Mild to moderate paravalvular regurgitation.    Mitral Valve: There is mild to moderate regurgitation.    Tricuspid Valve: There is mild to moderate regurgitation.    Watchman device was placed successfully, final images showed no significant leaks around the device, no pericardial effusion.      Results for orders placed during the hospital encounter of 12/03/24    Echo    Interpretation Summary    Left Ventricle: The left ventricle is normal in size. Normal wall thickness. There is normal systolic function with a visually estimated ejection fraction of 55 - 60%. Unable to assess diastolic function due to atrial fibrillation.    Right Ventricle: Normal right ventricular cavity size. Wall thickness is normal. Systolic function is normal. Pacemaker lead present in the ventricle.    Left Atrium: Left atrium is mildly dilated.    Right Atrium: Right atrium is mildly dilated.    Aortic Valve: There is a transcatheter valve replacement in the aortic position. It is reported to be a 26 mm Jauregui valve. There is mild stenosis. Aortic valve area by VTI is 1.5 cm². Aortic valve peak velocity is 3.6 m/s. Mean gradient is 28.0 mmHg. The dimensionless index is 0.43. There is mild to moderate aortic regurgitation with an eccentrically directed jet.    Mitral Valve: There is bileaflet sclerosis. There is mitral annular calcification present. There is mild regurgitation.    Tricuspid Valve: There is mild to moderate regurgitation.    IVC/SVC: Normal venous pressure at 3 mmHg.      Results for orders placed during the hospital encounter of 01/23/24    Echo    Interpretation Summary    Left Ventricle: The left ventricle is normal in size. Mildly increased wall thickness. There is mildly reduced systolic function with a visually estimated ejection fraction of 45 - 50%. Unable to assess diastolic function due to atrial  fibrillation.    Right Ventricle: Normal right ventricular cavity size. Wall thickness is normal. Right ventricle wall motion  is normal. Systolic function is normal.    Left Atrium: Left atrium is mildly dilated.    Right Atrium: Right atrium is mildly dilated.    Aortic Valve: There is a transcatheter valve replacement in the aortic position that is appropriately positioned. Mild paravalvular regurgitation.    Mitral Valve: There is bileaflet sclerosis. There is moderate mitral annular calcification present. There is no stenosis. The mean pressure gradient across the mitral valve is 2 mmHg at a heart rate of  bpm. There is moderate regurgitation.    Tricuspid Valve: There is mild regurgitation.    IVC/SVC: Normal venous pressure at 3 mmHg.      Results for orders placed during the hospital encounter of 12/06/23    Echo Saline Bubble? No    Interpretation Summary    Left Ventricle: The left ventricle is moderately dilated. There is mildly reduced systolic function with a visually estimated ejection fraction of 40 - 45%. Unable to assess diastolic function due to atrial fibrillation.    Right Ventricle: Mild right ventricular enlargement. Systolic function is mildly reduced.    Left Atrium: Left atrium is mildly dilated.    Right Atrium: Right atrium is moderately dilated.    Aortic Valve: There is a transcatheter valve replacement in the aortic position. It is reported to be a Jauregui valve.    Mitral Valve: There is moderate bileaflet sclerosis. There is no stenosis. The mean pressure gradient across the mitral valve is 3 mmHg at a heart rate of  bpm. There is mild regurgitation.    Tricuspid Valve: There is mild regurgitation.    IVC/SVC: Normal venous pressure at 3 mmHg.      CURRENT/PREVIOUS VISIT EKG  Results for orders placed or performed in visit on 01/09/25   IN OFFICE EKG 12-LEAD (to [place])    Collection Time: 01/09/25 12:33 PM   Result Value Ref Range    QRS Duration 178 ms    [facility] QTC Calculation 442 ms     Narrative    Test Reason : I48.21,Z98.890,Z86.79,I35.0,Z95.2,I50.43,I25.10,I49.5,Z95.0,I65.29,Z98.890,    Vent. Rate :  61 BPM     Atrial Rate :  31 BPM     P-R Int :    ms          QRS Dur : 178 ms      QT Int : 440 ms       P-R-T Axes :    -80  86 degrees    QTcB Int : 442 ms    Atrial fibrillation with ventricular pacing  When compared with ECG of 05-Dec-2024 07:46,  No significant change was found  Confirmed by Preston Reagan ([id number]) on 1/10/2025 8:18:04 AM    Referred By:            Confirmed By: Preston Reagan           ASSESSMENT/PLAN:     Active Hospital Problems    Diagnosis    *Presence of Watchman left atrial appendage closure device    Hypotension after procedure    Acute blood loss anemia    Hemoptysis    SSS (sick sinus syndrome)    Atrial fibrillation    CAD (coronary artery disease)     Dr. Pérez cardiologist      Severe aortic valve stenosis       ASSESSMENT & PLAN:   S/p Watchman  Hypotension  Acute anemia  PAF  CAD  SSS s/p PPM  AS s/p TAVR 2023  HFpEF      RECOMMENDATIONS:    S/p Watchman yest.  H&H stabilized. 2 units PRBC yest.  No further bleeding.  Intermittent hypotension this am. Likely exacerbated by blood loss. Patient reports low normal BP at home.  BP stable during exam. 1L IVF bolus given. Antihypertensives on hold.  Continue to closely monitor BP and tele overnight.  If no acute events, patient is likely stable for dc in am.    Patient will need to follow with cardiology in next 2 weeks outpatient.      Rebeca Hernandez NP  Department of Cardiology  Date of Service: 01/14/2025        I have personally interviewed and examined the patient, I have reviewed the Nurse Practitioner's history and physical, assessment, and plan. I agree with the findings and plan.    Patient is doing well her grown is healing well this is nontender no hematoma she did get 2 units of blood and repeat H&H is pending.  Okay to discharge from cardiac point of view.  Continue Plavix  Fabian Snow  Department of  Cardiology  Date of Service: 01/14/2025

## 2025-01-15 ENCOUNTER — CLINICAL SUPPORT (OUTPATIENT)
Dept: CARDIOLOGY | Facility: HOSPITAL | Age: 75
DRG: 274 | End: 2025-01-15
Attending: INTERNAL MEDICINE
Payer: MEDICARE

## 2025-01-15 ENCOUNTER — TELEPHONE (OUTPATIENT)
Dept: CARDIOLOGY | Facility: CLINIC | Age: 75
End: 2025-01-15
Payer: MEDICARE

## 2025-01-15 VITALS
WEIGHT: 176.13 LBS | DIASTOLIC BLOOD PRESSURE: 63 MMHG | SYSTOLIC BLOOD PRESSURE: 157 MMHG | HEIGHT: 65 IN | RESPIRATION RATE: 25 BRPM | TEMPERATURE: 99 F | BODY MASS INDEX: 29.34 KG/M2 | OXYGEN SATURATION: 99 % | HEART RATE: 60 BPM

## 2025-01-15 VITALS — BODY MASS INDEX: 29.32 KG/M2 | HEIGHT: 65 IN | WEIGHT: 176 LBS

## 2025-01-15 LAB
ALBUMIN SERPL BCP-MCNC: 3.3 G/DL (ref 3.5–5.2)
ALP SERPL-CCNC: 53 U/L (ref 55–135)
ALT SERPL W/O P-5'-P-CCNC: 6 U/L (ref 10–44)
ANION GAP SERPL CALC-SCNC: 5 MMOL/L (ref 8–16)
AST SERPL-CCNC: 19 U/L (ref 10–40)
BASOPHILS # BLD AUTO: 0.02 K/UL (ref 0–0.2)
BASOPHILS NFR BLD: 0.2 % (ref 0–1.9)
BILIRUB SERPL-MCNC: 0.5 MG/DL (ref 0.1–1)
BLD PROD TYP BPU: NORMAL
BLOOD UNIT EXPIRATION DATE: NORMAL
BLOOD UNIT TYPE CODE: 5100
BLOOD UNIT TYPE: NORMAL
BSA FOR ECHO PROCEDURE: 1.91 M2
BUN SERPL-MCNC: 26 MG/DL (ref 8–23)
CALCIUM SERPL-MCNC: 8.1 MG/DL (ref 8.7–10.5)
CHLORIDE SERPL-SCNC: 101 MMOL/L (ref 95–110)
CO2 SERPL-SCNC: 31 MMOL/L (ref 23–29)
CODING SYSTEM: NORMAL
CREAT SERPL-MCNC: 0.6 MG/DL (ref 0.5–1.4)
CROSSMATCH INTERPRETATION: NORMAL
DIFFERENTIAL METHOD BLD: ABNORMAL
DISPENSE STATUS: NORMAL
EOSINOPHIL # BLD AUTO: 0.1 K/UL (ref 0–0.5)
EOSINOPHIL NFR BLD: 0.9 % (ref 0–8)
ERYTHROCYTE [DISTWIDTH] IN BLOOD BY AUTOMATED COUNT: 17.3 % (ref 11.5–14.5)
ERYTHROCYTE [DISTWIDTH] IN BLOOD BY AUTOMATED COUNT: 17.3 % (ref 11.5–14.5)
EST. GFR  (NO RACE VARIABLE): >60 ML/MIN/1.73 M^2
GLUCOSE SERPL-MCNC: 95 MG/DL (ref 70–110)
HCT VFR BLD AUTO: 22.6 % (ref 37–48.5)
HCT VFR BLD AUTO: 26.5 % (ref 37–48.5)
HGB BLD-MCNC: 7.3 G/DL (ref 12–16)
HGB BLD-MCNC: 8.5 G/DL (ref 12–16)
IMM GRANULOCYTES # BLD AUTO: 0.03 K/UL (ref 0–0.04)
IMM GRANULOCYTES NFR BLD AUTO: 0.4 % (ref 0–0.5)
LYMPHOCYTES # BLD AUTO: 0.9 K/UL (ref 1–4.8)
LYMPHOCYTES NFR BLD: 10.8 % (ref 18–48)
MAGNESIUM SERPL-MCNC: 1.9 MG/DL (ref 1.6–2.6)
MCH RBC QN AUTO: 30.9 PG (ref 27–31)
MCH RBC QN AUTO: 31 PG (ref 27–31)
MCHC RBC AUTO-ENTMCNC: 32.1 G/DL (ref 32–36)
MCHC RBC AUTO-ENTMCNC: 32.3 G/DL (ref 32–36)
MCV RBC AUTO: 96 FL (ref 82–98)
MCV RBC AUTO: 97 FL (ref 82–98)
MONOCYTES # BLD AUTO: 0.8 K/UL (ref 0.3–1)
MONOCYTES NFR BLD: 9.8 % (ref 4–15)
NEUTROPHILS # BLD AUTO: 6.3 K/UL (ref 1.8–7.7)
NEUTROPHILS NFR BLD: 77.9 % (ref 38–73)
NRBC BLD-RTO: 0 /100 WBC
NUM UNITS TRANS PACKED RBC: NORMAL
PLATELET # BLD AUTO: 153 K/UL (ref 150–450)
PLATELET # BLD AUTO: 157 K/UL (ref 150–450)
PMV BLD AUTO: 10.6 FL (ref 9.2–12.9)
PMV BLD AUTO: 10.7 FL (ref 9.2–12.9)
POTASSIUM SERPL-SCNC: 4.6 MMOL/L (ref 3.5–5.1)
PROT SERPL-MCNC: 5.5 G/DL (ref 6–8.4)
RBC # BLD AUTO: 2.36 M/UL (ref 4–5.4)
RBC # BLD AUTO: 2.74 M/UL (ref 4–5.4)
SODIUM SERPL-SCNC: 137 MMOL/L (ref 136–145)
WBC # BLD AUTO: 8.14 K/UL (ref 3.9–12.7)
WBC # BLD AUTO: 8.41 K/UL (ref 3.9–12.7)

## 2025-01-15 PROCEDURE — 25000003 PHARM REV CODE 250: Performed by: INTERNAL MEDICINE

## 2025-01-15 PROCEDURE — 25000242 PHARM REV CODE 250 ALT 637 W/ HCPCS

## 2025-01-15 PROCEDURE — 94761 N-INVAS EAR/PLS OXIMETRY MLT: CPT

## 2025-01-15 PROCEDURE — 94640 AIRWAY INHALATION TREATMENT: CPT

## 2025-01-15 PROCEDURE — 83735 ASSAY OF MAGNESIUM: CPT

## 2025-01-15 PROCEDURE — 27000221 HC OXYGEN, UP TO 24 HOURS

## 2025-01-15 PROCEDURE — 85025 COMPLETE CBC W/AUTO DIFF WBC: CPT

## 2025-01-15 PROCEDURE — 80053 COMPREHEN METABOLIC PANEL: CPT

## 2025-01-15 PROCEDURE — 36415 COLL VENOUS BLD VENIPUNCTURE: CPT

## 2025-01-15 PROCEDURE — 36430 TRANSFUSION BLD/BLD COMPNT: CPT

## 2025-01-15 PROCEDURE — 85027 COMPLETE CBC AUTOMATED: CPT

## 2025-01-15 PROCEDURE — 99900031 HC PATIENT EDUCATION (STAT)

## 2025-01-15 PROCEDURE — 93308 TTE F-UP OR LMTD: CPT

## 2025-01-15 PROCEDURE — 25000003 PHARM REV CODE 250

## 2025-01-15 PROCEDURE — P9016 RBC LEUKOCYTES REDUCED: HCPCS

## 2025-01-15 PROCEDURE — 86920 COMPATIBILITY TEST SPIN: CPT

## 2025-01-15 PROCEDURE — 93308 TTE F-UP OR LMTD: CPT | Mod: 26,,, | Performed by: GENERAL PRACTICE

## 2025-01-15 RX ORDER — LIDOCAINE 50 MG/G
1 PATCH TOPICAL DAILY
Qty: 6 PATCH | Refills: 0 | Status: SHIPPED | OUTPATIENT
Start: 2025-01-15 | End: 2025-01-23

## 2025-01-15 RX ORDER — FUROSEMIDE 20 MG/1
20 TABLET ORAL ONCE
Status: COMPLETED | OUTPATIENT
Start: 2025-01-15 | End: 2025-01-15

## 2025-01-15 RX ORDER — FUROSEMIDE 40 MG/1
20 TABLET ORAL DAILY
Qty: 45 TABLET | Refills: 3 | Status: SHIPPED | OUTPATIENT
Start: 2025-01-15 | End: 2026-01-15

## 2025-01-15 RX ORDER — HYDROCODONE BITARTRATE AND ACETAMINOPHEN 500; 5 MG/1; MG/1
TABLET ORAL
Status: DISCONTINUED | OUTPATIENT
Start: 2025-01-15 | End: 2025-01-15 | Stop reason: HOSPADM

## 2025-01-15 RX ADMIN — PANTOPRAZOLE SODIUM 40 MG: 40 TABLET, DELAYED RELEASE ORAL at 06:01

## 2025-01-15 RX ADMIN — IPRATROPIUM BROMIDE 0.5 MG: 0.5 SOLUTION RESPIRATORY (INHALATION) at 01:01

## 2025-01-15 RX ADMIN — FUROSEMIDE 20 MG: 20 TABLET ORAL at 05:01

## 2025-01-15 RX ADMIN — IPRATROPIUM BROMIDE 0.5 MG: 0.5 SOLUTION RESPIRATORY (INHALATION) at 07:01

## 2025-01-15 RX ADMIN — EZETIMIBE 10 MG: 10 TABLET ORAL at 12:01

## 2025-01-15 RX ADMIN — DIGOXIN 0.25 MG: 125 TABLET ORAL at 12:01

## 2025-01-15 RX ADMIN — CLOPIDOGREL BISULFATE 75 MG: 75 TABLET, FILM COATED ORAL at 12:01

## 2025-01-15 RX ADMIN — BUDESONIDE INHALATION 0.5 MG: 0.5 SUSPENSION RESPIRATORY (INHALATION) at 07:01

## 2025-01-15 RX ADMIN — ARFORMOTEROL TARTRATE 15 MCG: 15 SOLUTION RESPIRATORY (INHALATION) at 07:01

## 2025-01-15 NOTE — PLAN OF CARE
CM notified that pt asking to discharge today home. Planning to recheck labs and, if stable, to discharge home today. Met with pt to discuss discharge plan and f/u appointments. She verbalized understanding and is pleased that orders for HH will include labs so that she does not need to go out to get them drawn. Daughter to transport pt home when discharged. SW to send updated clinical to  with orders.

## 2025-01-15 NOTE — PLAN OF CARE
TCC with PCP scheduled for 1/23/25 1020    1328 In basket message sent to cardiologist requesting they call pt with appointment within 2 weeks       01/15/25 0810   Post-Acute Status   Hospital Resources/Appts/Education Provided Appointments scheduled and added to AVS

## 2025-01-15 NOTE — PROGRESS NOTES
[facility]  Department of Cardiology  Consult Note      PATIENT NAME: Lisa Smith  MRN: [id number]  TODAY'S DATE: 01/15/2025  ADMIT DATE: 1/13/2025                          CONSULT REQUESTED BY: Poli Steele, *    SUBJECTIVE     PRINCIPAL PROBLEM: Presence of Watchman left atrial appendage closure device      01/15/2025  Patient seen sitting up in chair with no acute distress noted.  She is receiving packed red blood cell transfusion today.      HPI:    Patient is a 74 y.o. female with PMH permanent atrial fibrillation status post ablation, aortic stenosis status post TAVR 2023, HFpEF, CAD, sick sinus s/p PPM, anemia, carotid stenosis s/p CEA who had Watchman procedure yesterday with Dr. Norton.  She developed hemoptysis during extubation, And groin site femoral bleed resulting acute anemia post procedure. She underwent 2U PRBC transfusion and fluid bolus for associated hypotension.   Hypotensive today.  H&H stable 8/25 this am.  Denies complaints. BP stable during exam.        FROM DR NORTON's OFFICE H&P  Lisa Smith is a 74 y.o. female who presents for preoperative Watchman evaluation. They follow with Dr. Pérez and were referred to Dr. Norton for Watchman.      Patient scheduled to undergo LAAO with Watchman device on 1/13/25. She is here for her preoperative evaluation. Denies fever, chills, chest discomfort, dyspnea, palpitations, dizziness, syncope, orthopnea, PND, edema, exposed wounds, loose or painful teeth, dysuria.     Focused Past History includes:  Permanent atrial fibrillation s/p ablation   Aortic stenosis s/p TAVR 12/2023  Chronic heart failure with reduced ejection fraction   TTE 12/2024 reported EF 55-60%, normal RV. Bilateral atrial enlargement. TAV MG 28 and DI 0.43 with mild to moderate PVL). Moderate MR. PASP 45   Coronary artery disease  Sick sinus syndrome s/p PPM  Carotid artery disease s/p CEA  Severe anemia requiring transfusion    Review of patient's allergies  indicates:   Allergen Reactions    Sulfa (sulfonamide antibiotics) Itching       Past Medical History:   Diagnosis Date    Anticoagulant long-term use     Arthritis     Atrial fibrillation     Bell's palsy     Boil of buttock     burst 12/19/22    CHF (congestive heart failure)     Chronic cough     COPD (chronic obstructive pulmonary disease)     use O2  3l/m NC at night; also taking during day currently 12/4/23    Dizziness     Encounter for blood transfusion     GI bleed 2011    transfusion    H/O diverticulitis of colon     Hard of hearing     Heart murmur     Hematoma 02/2023    left hand    Heterozygous alpha 1-antitrypsin deficiency     History of home oxygen therapy     3L NC at night    Hypertension     Lung disease     copd    MONSERRAT (obstructive sleep apnea)     resolved with wt loss 131#    Pacemaker     Pneumonia     last episode 2019    Pulmonary edema     Skin cancer     Unspecified visual disturbance     episode of vision disturbance and dizziness...occasional recurrences     Past Surgical History:   Procedure Laterality Date    CARDIAC ELECTROPHYSIOLOGY STUDY AND ABLATION      CAROTID ENDARTERECTOMY Left 12/22/2022    Procedure: ENDARTERECTOMY-CAROTID;  Surgeon: Maximilian Connolly MD;  Location: [facility] OR;  Service: Peripheral Vascular;  Laterality: Left;    CAROTID STENT Left 2/29/2024    Procedure: INSERTION, STENT, ARTERY, CAROTID;  Surgeon: CIRILO Valdivia III, MD;  Location: [facility] OR [address] [place] [address];  Service: Vascular;  Laterality: Left;  left carotid artery stent placement  mgy  146.29   gymc2  8.0730  fluro time  4.8min    CARPAL TUNNEL RELEASE Left     CHOLECYSTECTOMY      CLOSURE OF LEFT ATRIAL APPENDAGE USING DEVICE Right 1/13/2025    Procedure: Left atrial appendage closure device;  Surgeon: Roxana Cantu MD;  Location: [facility] CATH/EP LAB;  Service: Cardiology;  Laterality: Right;    EYE SURGERY Bilateral     cataract    HYSTERECTOMY      INSERT / REPLACE / REMOVE PACEMAKER      KNEE ARTHROSCOPY Left      LEFT HEART CATHETERIZATION  2023    Procedure: Left heart cath;  Surgeon: Puma Shelton MD;  Location: [facility] CATH;  Service: Cardiology;;    REPLACEMENT OF PACEMAKER GENERATOR Left 2022    Procedure: REPLACEMENT, PACEMAKER GENERATOR;  Surgeon: Raymond Pérez MD;  Location: [facility] CATH/EP LAB;  Service: Cardiology;  Laterality: Left;    SKIN CANCER EXCISION      TRANSCATHETER AORTIC VALVE REPLACEMENT (TAVR) Bilateral 2023    Procedure: (TAVR);  Surgeon: Puma Shelton MD;  Location: [facility] CATH;  Service: Cardiology;  Laterality: Bilateral;    TRANSCATHETER AORTIC VALVE REPLACEMENT (TAVR) Bilateral 2023    Procedure: (TAVR) - Surgeon;  Surgeon: Maximilian Connolly MD;  Location: [facility] CATH;  Service: Peripheral Vascular;  Laterality: Bilateral;     Social History     Tobacco Use    Smoking status: Former     Current packs/day: 0.00     Average packs/day: 2.0 packs/day for 40.0 years (80.0 ttl pk-yrs)     Types: Cigarettes     Start date: 1971     Quit date: 2011     Years since quittin.9     Passive exposure: Past    Smokeless tobacco: Never    Tobacco comments:          Quit in    Substance Use Topics    Alcohol use: Not Currently     Alcohol/week: 8.0 standard drinks of alcohol     Types: 8 Glasses of wine per week    Drug use: No        REVIEW OF SYSTEMS    As mentioned in HPI    OBJECTIVE     VITAL SIGNS (Most Recent)  Temp: 97.8 °F (36.6 °C) (01/15/25 0400)  Pulse: 60 (01/15/25 0701)  Resp: (!) 26 (01/15/25 0701)  BP: (!) 120/58 (01/15/25 0701)  SpO2: 100 % (01/15/25 0701)    VENTILATION STATUS  Resp: (!) 26 (01/15/25 0701)  SpO2: 100 % (01/15/25 0701)           I & O (Last 24H):  Intake/Output Summary (Last 24 hours) at 1/15/2025 0849  Last data filed at 1/15/2025 0529  Gross per 24 hour   Intake 1750 ml   Output 800 ml   Net 950 ml       WEIGHTS  Wt Readings from Last 3 Encounters:   25 0632 79.9 kg (176 lb 2.4 oz)   25 0657 77.1 kg (170 lb)   25 1307  77.1 kg (169 lb 15.6 oz)   01/09/25 1119 78.3 kg (172 lb 9.9 oz)       PHYSICAL EXAM    CONSTITUTIONAL: NAD  HEENT: Normocephalic. No pallor  NECK: no JVD  LUNGS: CTA b/l  HEART: regular rate and rhythm, S1, S2 normal, no murmur   ABDOMEN: soft, non-tender, bowel sounds normal  EXTREMITIES: No edema  SKIN: No rash  NEURO: AAO X 3  PSYCH: normal affect      HOME MEDICATIONS:  No current facility-administered medications on file prior to encounter.     Current Outpatient Medications on File Prior to Encounter   Medication Sig Dispense Refill    albuterol (ACCUNEB) 1.25 mg/3 mL Nebu INHALE THE CONTENTS OF 1 VIAL VIA NEBULIZER EVERY 6 HOURS AS NEEDED FOR RESCUE 360 mL 5    digoxin (LANOXIN) 250 mcg tablet TAKE 1 TABLET EVERY DAY 90 tablet 3    ezetimibe (ZETIA) 10 mg tablet TAKE 1 TABLET ONE TIME DAILY 90 tablet 3    pantoprazole (PROTONIX) 40 MG tablet Take 1 tablet (40 mg total) by mouth every morning. 90 tablet 3    rOPINIRole (REQUIP) 1 MG tablet Take 1 tablet (1 mg total) by mouth every evening. 90 tablet 1    TRELEGY ELLIPTA 100-62.5-25 mcg DsDv INHALE 1 PUFF INTO THE LUNGS ONCE DAILY. 90 each 3    acetaminophen (TYLENOL ARTHRITIS ORAL) Take 2 tablets by mouth daily as needed.      albuterol (PROVENTIL/VENTOLIN HFA) 90 mcg/actuation inhaler INHALE 2 PUFFS EVERY 6 HOURS AS NEEDED FOR WHEEZING (RESCUE) 18 g 6    albuterol-ipratropium (DUO-NEB) 2.5 mg-0.5 mg/3 mL nebulizer solution Take 3 mLs by nebulization every 6 (six) hours as needed for Wheezing or Shortness of Breath. Rescue 90 mL 0    fluticasone propionate (FLONASE) 50 mcg/actuation nasal spray 1 spray by Each Nostril route daily as needed for Rhinitis or Allergies.         SCHEDULED MEDS:   arformoteroL  15 mcg Nebulization BID    atorvastatin  40 mg Oral QHS    budesonide  0.5 mg Nebulization Q12H    clopidogreL  75 mg Oral Daily    digoxin  0.25 mg Oral Daily    ezetimibe  10 mg Oral Daily    ipratropium  0.5 mg Nebulization Q6H    LIDOcaine  2 patch  Transdermal Q24H    metoprolol succinate  25 mg Oral QAM    mupirocin   Nasal BID    pantoprazole  40 mg Oral QAM    rOPINIRole  1 mg Oral QHS    sacubitriL-valsartan  1 tablet Oral BID       CONTINUOUS INFUSIONS:    PRN MEDS:  Current Facility-Administered Medications:     0.9%  NaCl infusion (for blood administration), , Intravenous, Q24H PRN    0.9%  NaCl infusion (for blood administration), , Intravenous, Q24H PRN    0.9%  NaCl infusion (for blood administration), , Intravenous, Q24H PRN    acetaminophen, 650 mg, Oral, Q4H PRN    acetaminophen, 650 mg, Oral, Q8H PRN    albuterol, 2.5 mg, Nebulization, Q6H PRN    aluminum-magnesium hydroxide-simethicone, 30 mL, Oral, QID PRN    dextrose 50%, 12.5 g, Intravenous, PRN    dextrose 50%, 25 g, Intravenous, PRN    glucagon (human recombinant), 1 mg, Intramuscular, PRN    glucose, 16 g, Oral, PRN    glucose, 24 g, Oral, PRN    hydrALAZINE, 10 mg, Intravenous, Q4H PRN    HYDROcodone-acetaminophen, 1 tablet, Oral, Q4H PRN    HYDROmorphone, 0.5 mg, Intravenous, Q3H PRN    magnesium oxide, 800 mg, Oral, PRN    magnesium oxide, 800 mg, Oral, PRN    melatonin, 6 mg, Oral, Nightly PRN    naloxone, 0.02 mg, Intravenous, PRN    ondansetron, 4 mg, Intravenous, Q6H PRN    potassium bicarbonate, 35 mEq, Oral, PRN    potassium bicarbonate, 50 mEq, Oral, PRN    potassium bicarbonate, 60 mEq, Oral, PRN    potassium, sodium phosphates, 2 packet, Oral, PRN    potassium, sodium phosphates, 2 packet, Oral, PRN    potassium, sodium phosphates, 2 packet, Oral, PRN    senna-docusate 8.6-50 mg, 1 tablet, Oral, Daily PRN    sodium chloride 0.9%, 10 mL, Intravenous, Q12H PRN    LABS AND DIAGNOSTICS     CBC LAST 3 DAYS  Recent Labs   Lab 01/13/25  0606 01/13/25  1350 01/13/25  1718 01/14/25  0402 01/14/25  1353 01/15/25  0336   WBC 8.44 11.52  --  10.22  10.22  --  8.14   RBC 2.90* 2.54*  --  2.62*  2.62*  --  2.36*   HGB 9.1* 7.7*   < > 8.0*  8.0* 8.0* 7.3*   HCT 28.5* 24.5*   < > 24.5*   "24.5* 25.3* 22.6*   MCV 98 97  --  94  94  --  96   MCH 31.4* 30.3  --  30.5  30.5  --  30.9   MCHC 31.9* 31.4*  --  32.7  32.7  --  32.3   RDW 17.5* 17.2*  --  16.6*  16.6*  --  17.3*    213  --  204  204  --  157   MPV 10.5 10.6  --  10.3  10.3  --  10.6   GRAN 77.1*  6.5  --   --  82.6*  82.6*  8.4*  8.4*  --  77.9*  6.3   LYMPH 12.6*  1.1  --   --  6.8*  6.8*  0.7*  0.7*  --  10.8*  0.9*   MONO 7.2  0.6  --   --  9.2  9.2  0.9  0.9  --  9.8  0.8   BASO 0.04  --   --  0.02  0.02  --  0.02   NRBC 0  --   --  0  0  --  0    < > = values in this interval not displayed.       COAGULATION LAST 3 DAYS  Recent Labs   Lab 01/09/25  1230   INR 1.0   APTT 28.1       CHEMISTRY LAST 3 DAYS  Recent Labs   Lab 01/09/25  1230 01/13/25  0606 01/14/25  0402 01/15/25  0336    136 134*  134* 137   K 5.5* 4.4 4.7  4.7 4.6    96 99  99 101   CO2 31* 31* 31*  31* 31*   ANIONGAP 7* 9 4*  4* 5*   BUN 26* 33* 31*  31* 26*   CREATININE 0.7 0.8 0.7  0.7 0.6   GLU 88 91 122*  122* 95   CALCIUM 9.8 9.3 8.2*  8.2* 8.1*   MG 1.9  --  1.9 1.9   ALBUMIN 3.8  --  3.4* 3.3*   PROT 7.5  --  5.7* 5.5*   ALKPHOS 87  --  53* 53*   ALT 10  --  6* 6*   AST 30  --  21 19   BILITOT 0.4  --  0.9 0.5       CARDIAC PROFILE LAST 3 DAYS  No results for input(s): "BNP", "CPK", "CPKMB", "LDH", "TROPONINI", "TROPONINIHS" in the last 168 hours.    ENDOCRINE LAST 3 DAYS  No results for input(s): "TSH", "PROCAL" in the last 168 hours.    LAST ARTERIAL BLOOD GAS  ABG  No results for input(s): "PH", "PO2", "PCO2", "HCO3", "BE" in the last 168 hours.    LAST 7 DAYS MICROBIOLOGY   Microbiology Results (last 7 days)       ** No results found for the last 168 hours. **            MOST RECENT IMAGING  Cardiac catheterization  LEFT ATRIAL APPENDAGE CLOSURE PROCEDURE NOTE        Date of procedure: 01/13/2025      : Roxana Cantu MD       Procedure:   Left atrial appendage closure using 27-mm Watchman FLX Pro   "   Anesthesia: General anesthesia with endotracheal intubation  Additional arterial and venous lines were placed by anesthesiology as   needed.     Indication:   Atrial fibrillation with [id number] score 6 and HAS-BLED score 5.      Description of the Procedure:   Following informed consent, the patient was bought to the Cath Lab and   placed under anesthesia as above.  Preprocedure RUY sizing was performed   using gated CT. GRETCHEN was used to aid in guidance of the procedure; this   demonstrated no thrombus in the left atrial appendage and the following   baseline ostial measurements:    Right femoral vein access was obtained with a micropuncture needle and   ultrasound guidance and a short 8 French sheath was placed. 100 units/kg   IV heparin was administered with additional heparin as needed to achieve   and maintain ACT greater than 300 seconds.      Transseptal puncture was performed under GRETCHEN and fluoroscopy guidance   using a VersaCross system.  The TruSteer Watchman Access Sheath (WAS) was   advanced into the left atrium over the VersaCross wire. The wire and the   WAS dilator were then removed. A Pigtail catheter was advanced into the   left atrial appendage under fluoroscopy and GRETCHEN guidance.  Baseline LA   pressure was 18.     Angiography through the pigtail catheter was used to confirm RUY shape and   plan a landing zone.  A 27-mm Watchman FLX Pro device was chosen. The WAS   was advanced over the pigtail catheter to deployment position.     The Watchman Delivery System (WDS) was prepped in the usual fashion.  The   pigtail catheter was removed from the WAS and the WDS was advanced under   fluoroscopy and GRETCHEN guidance.  The device was deployed under fluoroscopy   and GRETCHEN in the usual manner.      Device release criteria were verified as follows:  Position was assessed on GRETCHEN and using angiography and deemed to be   satisfactory  [place] was assessed on GRETCHEN and fluoroscopy and deemed to be satisfactory  Size  was assessed on GRETCHEN and a compression ratio of 10-19% was achieved  Seal was assessed on GRETCHEN and there was no peridevice leak.     The device was released in a standard fashion and reevaluation with GRETCHEN   and fluoroscopy ruled out embolization/peridevice leak.     The WDS was removed.  The WAS was then removed and hemostasis achieved   using manual pressure.  Protamine IV was administered.  The patient was   extubated in the Cath Lab and demonstrated no neurological deficits.     Complications: none  Blood loss: <50 mL  Contrast: 20 mL          Conclusions:  Successful percutaneous left atrial appendage closure using 27-mm Watchman   FLX Pro  Normal TAV leaflet excursion with moderate paravalvular leak     Plan:   Bedrest for 2 hours  Limited TTE to rule out pericardial effusion and device embolization at 4   hours.  Discharge in 6 hours if no complications.   Planned postprocedure antithrombotic therapy provided follow-up doesn't   reveal late complications:   Clopidogrel monotherapy - check platelet response assay in follow-up   Endocarditis prophylaxis indefinitely (history of LULU)  Office visit in 6 weeks per protocol  GRETCHEN in 3 months.         Roxana Cantu MD, [facility]  Interventional Cardiology/Structural Heart Disease  [facility]  Office: [phone]    The procedure log was documented by Documenter: Babs Morris RT and   verified by Roxana Cantu MD.    Date: 1/13/2025  Time: 10:15 AM      ECHOCARDIOGRAM RESULTS (last 5)  Results for orders placed during the hospital encounter of 01/13/25    Echo    Interpretation Summary  Limited 2D study post left atrial appendage occlusion:  Moderately dilated left ventricular size with mildly reduced systolic function  Normal right ventricular size and systolic function  No significant pericardial effusion  No evidence of occluder embolization      Transesophageal echo (GRETCHEN)    Interpretation Summary    Left Ventricle: The left  ventricle is normal in size. There is normal systolic function with a visually estimated ejection fraction of 55 - 60%.    Right Ventricle: Normal right ventricular cavity size. Systolic function is normal.    Left Atrium: Left atrium is severely dilated. There is no thrombus in the left atrial appendage.    Right Atrium: Right atrium is moderately dilated.    Aortic Valve: There is a bioprosthetic valve in the aortic position. Mild to moderate paravalvular regurgitation.    Mitral Valve: There is mild to moderate regurgitation.    Tricuspid Valve: There is mild to moderate regurgitation.    Watchman device was placed successfully, final images showed no significant leaks around the device, no pericardial effusion.      Results for orders placed during the hospital encounter of 12/03/24    Echo    Interpretation Summary    Left Ventricle: The left ventricle is normal in size. Normal wall thickness. There is normal systolic function with a visually estimated ejection fraction of 55 - 60%. Unable to assess diastolic function due to atrial fibrillation.    Right Ventricle: Normal right ventricular cavity size. Wall thickness is normal. Systolic function is normal. Pacemaker lead present in the ventricle.    Left Atrium: Left atrium is mildly dilated.    Right Atrium: Right atrium is mildly dilated.    Aortic Valve: There is a transcatheter valve replacement in the aortic position. It is reported to be a 26 mm Jauregui valve. There is mild stenosis. Aortic valve area by VTI is 1.5 cm². Aortic valve peak velocity is 3.6 m/s. Mean gradient is 28.0 mmHg. The dimensionless index is 0.43. There is mild to moderate aortic regurgitation with an eccentrically directed jet.    Mitral Valve: There is bileaflet sclerosis. There is mitral annular calcification present. There is mild regurgitation.    Tricuspid Valve: There is mild to moderate regurgitation.    IVC/SVC: Normal venous pressure at 3 mmHg.      Results for orders placed  during the hospital encounter of 01/23/24    Echo    Interpretation Summary    Left Ventricle: The left ventricle is normal in size. Mildly increased wall thickness. There is mildly reduced systolic function with a visually estimated ejection fraction of 45 - 50%. Unable to assess diastolic function due to atrial fibrillation.    Right Ventricle: Normal right ventricular cavity size. Wall thickness is normal. Right ventricle wall motion  is normal. Systolic function is normal.    Left Atrium: Left atrium is mildly dilated.    Right Atrium: Right atrium is mildly dilated.    Aortic Valve: There is a transcatheter valve replacement in the aortic position that is appropriately positioned. Mild paravalvular regurgitation.    Mitral Valve: There is bileaflet sclerosis. There is moderate mitral annular calcification present. There is no stenosis. The mean pressure gradient across the mitral valve is 2 mmHg at a heart rate of  bpm. There is moderate regurgitation.    Tricuspid Valve: There is mild regurgitation.    IVC/SVC: Normal venous pressure at 3 mmHg.      Results for orders placed during the hospital encounter of 12/06/23    Echo Saline Bubble? No    Interpretation Summary    Left Ventricle: The left ventricle is moderately dilated. There is mildly reduced systolic function with a visually estimated ejection fraction of 40 - 45%. Unable to assess diastolic function due to atrial fibrillation.    Right Ventricle: Mild right ventricular enlargement. Systolic function is mildly reduced.    Left Atrium: Left atrium is mildly dilated.    Right Atrium: Right atrium is moderately dilated.    Aortic Valve: There is a transcatheter valve replacement in the aortic position. It is reported to be a Jauregui valve.    Mitral Valve: There is moderate bileaflet sclerosis. There is no stenosis. The mean pressure gradient across the mitral valve is 3 mmHg at a heart rate of  bpm. There is mild regurgitation.    Tricuspid Valve: There  is mild regurgitation.    IVC/SVC: Normal venous pressure at 3 mmHg.      CURRENT/PREVIOUS VISIT EKG  Results for orders placed or performed in visit on 01/09/25   IN OFFICE EKG 12-LEAD (to [place])    Collection Time: 01/09/25 12:33 PM   Result Value Ref Range    QRS Duration 178 ms    [facility] QTC Calculation 442 ms    Narrative    Test Reason : I48.21,Z98.890,Z86.79,I35.0,Z95.2,I50.43,I25.10,I49.5,Z95.0,I65.29,Z98.890,    Vent. Rate :  61 BPM     Atrial Rate :  31 BPM     P-R Int :    ms          QRS Dur : 178 ms      QT Int : 440 ms       P-R-T Axes :    -80  86 degrees    QTcB Int : 442 ms    Atrial fibrillation with ventricular pacing  When compared with ECG of 05-Dec-2024 07:46,  No significant change was found  Confirmed by Prseton Reagan ([id number]) on 1/10/2025 8:18:04 AM    Referred By:            Confirmed By: Preston Reagan           ASSESSMENT/PLAN:     Active Hospital Problems    Diagnosis    *Presence of Watchman left atrial appendage closure device    Hypotension after procedure    Acute blood loss anemia    Hemoptysis    SSS (sick sinus syndrome)    Atrial fibrillation    CAD (coronary artery disease)     Dr. Pérez cardiologist      Severe aortic valve stenosis       ASSESSMENT & PLAN:   S/p Watchman  Hypotension  Acute anemia  PAF  CAD  SSS s/p PPM  AS s/p TAVR 2023  HFpEF      RECOMMENDATIONS:    Limited echo checked to rule out pericardial effusion.    H&H is being repeated by the hospitalist today. No opposition to dc home if stable.   Recommend close follow up with labs outpatient.     Zenaida King NP  Department of Cardiology  Date of Service: 01/15/2025        I have personally interviewed and examined the patient, I have reviewed the Nurse Practitioner's history and physical, assessment, and plan. I agree with the findings and plan.      Fabian Snow  Department of Cardiology  Date of Service: 01/15/2025

## 2025-01-15 NOTE — TELEPHONE ENCOUNTER
----- Message from  Vikki sent at 1/15/2025  1:27 PM CST -----  Regarding: Hospital follow up  Can you please schedule hospital f/u within 2 weeks for patient? Plan to discharge on 1/16/25. Thanks

## 2025-01-15 NOTE — PLAN OF CARE
Plan of care reviewed with patient. Patient verbalized complete understanding. Pt awake, alert, and oriented. Pt complaining of pain, meds given. Pts blood pressure has remained stable throughout the night. Pt insertion site is clean, dry and intact. VSS. Paced rhythm.  All fall precautions maintained, bed in lowest position, locked, call light within reach. Side rails up times 2. Slip resistant socks maintained.

## 2025-01-15 NOTE — PLAN OF CARE
MADI orders sent to [facility]   Awaiting MADI date   01/15/25 1449   Post-Acute Status   Post-Acute Authorization Home Health   Home Health Status Referrals Sent   Discharge Delays None known at this time   Discharge Plan   Discharge Plan A Home Health   Discharge Plan B Home Health

## 2025-01-15 NOTE — ASSESSMENT & PLAN NOTE
Anemia is likely due to acute blood loss which was from post procedure . Most recent hemoglobin and hematocrit are listed below.  Recent Labs     01/14/25  0402 01/14/25  1353 01/15/25  0336   HGB 8.0*  8.0* 8.0* 7.3*   HCT 24.5*  24.5* 25.3* 22.6*       Plan  - Monitor serial CBC: Every 12 hours  - Transfuse PRBC if patient becomes hemodynamically unstable, symptomatic or H/H drops below 7/21.  - Patient has received 2 units of PRBCs on 1/13  - Patient's anemia is worsening  - transfuse 1 more (3rd unit) 1/15

## 2025-01-15 NOTE — ASSESSMENT & PLAN NOTE
- Likely from blood loss  - 1L LR bolus  - Asymptomatic  - Hold Entresto  - Discuss with cardiology about discontinuing given improved EF

## 2025-01-15 NOTE — CARE UPDATE
01/14/25 1900   Patient Assessment/Suction   Level of Consciousness (AVPU) alert   Respiratory Effort Normal   Expansion/Accessory Muscles/Retractions no use of accessory muscles   All Lung Fields Breath Sounds wheezes, expiratory;diminished   Rhythm/Pattern, Respiratory unlabored   Cough Frequency no cough   Skin Integrity   $ Wound Care Tech Time 15 min   Area Observed Left;Right;Behind ear;Cheek;Nares   Skin Appearance without discoloration   PRE-TX-O2   Device (Oxygen Therapy) nasal cannula   Flow (L/min) (Oxygen Therapy) 3   SpO2 97 %   Pulse Oximetry Type Continuous   $ Pulse Oximetry - Multiple Charge Pulse Oximetry - Multiple   Pulse 60   Resp (!) 33   BP (!) 109/49   Aerosol Therapy   $ Aerosol Therapy Charges Aerosol Treatment   Daily Review of Necessity (SVN) completed   Respiratory Treatment Status (SVN) given   Treatment Route (SVN) mask;oxygen   Patient Position semi-Martin's   Post Treatment Assessment (SVN) breath sounds unchanged;vital signs unchanged   Signs of Intolerance (SVN) none   Education   $ Education Bronchodilator;Oxygen;15 min   Respiratory Evaluation   $ Care Plan Tech Time 15 min

## 2025-01-15 NOTE — PLAN OF CARE
Pt is cleared from CM to Home with [facility]; pending lab results and discharge orders.  MADI date 1/17/25    F/U appointments and Home Health information added to AVS    No other needs to be addressed at this time.   01/15/25 1452   Final Note   Assessment Type Final Discharge Note   Anticipated Discharge Disposition Home-Health   What phone number can be called within the next 1-3 days to see how you are doing after discharge? [phone]   Post-Acute Status   Post-Acute Authorization Home Health   Home Health Status Set-up Complete/Auth obtained   Discharge Delays None known at this time

## 2025-01-15 NOTE — SUBJECTIVE & OBJECTIVE
Interval History:  Patient is seen and examined this morning.  Blood pressure improved while Entresto held. Hb dropped to 7.3. 1 U PRBC ordered      Review of Systems   Constitutional:  Negative for appetite change, chills and fever.   HENT:  Negative for congestion.    Respiratory:  Negative for cough, chest tightness and shortness of breath.    Cardiovascular:  Negative for chest pain, palpitations and leg swelling.   Gastrointestinal:  Negative for abdominal pain, nausea and vomiting.   Genitourinary:  Negative for dysuria.   Musculoskeletal:  Positive for back pain.   Skin:  Negative for color change.   Neurological:  Negative for syncope and headaches.     Objective:     Vital Signs (Most Recent):  Temp: 97.9 °F (36.6 °C) (01/15/25 0930)  Pulse: 60 (01/15/25 0930)  Resp: (!) 27 (01/15/25 0930)  BP: 133/60 (01/15/25 0930)  SpO2: 98 % (01/15/25 0930) Vital Signs (24h Range):  Temp:  [97.7 °F (36.5 °C)-98.9 °F (37.2 °C)] 97.9 °F (36.6 °C)  Pulse:  [36-71] 60  Resp:  [10-42] 27  SpO2:  [92 %-100 %] 98 %  BP: ()/(47-63) 133/60     Weight: 79.9 kg (176 lb 2.4 oz)  Body mass index is 29.31 kg/m².    Intake/Output Summary (Last 24 hours) at 1/15/2025 1150  Last data filed at 1/15/2025 0529  Gross per 24 hour   Intake 1750 ml   Output 800 ml   Net 950 ml         Physical Exam  Constitutional:       General: She is not in acute distress.     Appearance: Normal appearance. She is not ill-appearing or diaphoretic.   HENT:      Head: Normocephalic.      Nose: Nose normal.      Mouth/Throat:      Mouth: Mucous membranes are moist.   Eyes:      Pupils: Pupils are equal, round, and reactive to light.   Cardiovascular:      Rate and Rhythm: Normal rate and regular rhythm.      Pulses: Normal pulses.   Pulmonary:      Effort: Pulmonary effort is normal.      Breath sounds: Normal breath sounds.   Abdominal:      General: Abdomen is flat. There is no distension.      Tenderness: There is no abdominal tenderness.    Musculoskeletal:      Right lower leg: No edema.      Left lower leg: No edema.   Skin:     General: Skin is warm.   Neurological:      General: No focal deficit present.      Mental Status: She is alert and oriented to person, place, and time. Mental status is at baseline.   Psychiatric:         Mood and Affect: Mood normal.             Significant Labs: All pertinent labs within the past 24 hours have been reviewed.    Significant Imaging: I have reviewed all pertinent imaging results/findings within the past 24 hours.

## 2025-01-15 NOTE — ANESTHESIA POSTPROCEDURE EVALUATION
Anesthesia Post Evaluation    Patient: Lisa Smith    Procedure(s) Performed: Procedure(s) (LRB):  Left atrial appendage closure device (Right)    Final Anesthesia Type: general      Patient location during evaluation: ICU  Patient participation: Yes- Able to Participate  Level of consciousness: awake and alert  Post-procedure vital signs: reviewed and stable  Pain management: adequate  Airway patency: patent    PONV status at discharge: No PONV  Anesthetic complications: no      Cardiovascular status: stable  Respiratory status: unassisted and spontaneous ventilation  Hydration status: euvolemic  Follow-up not needed.  Comments: Contrary to patient's ICU admission note, she did not have any hemoptysis or epistaxis upon extubation and for the next few hours while patient was in PACU.  Upon my questioning today, patient denies any hemoptysis or epistaxis during this admission.  However, she did go to the ED for nasal bleeding in the recent past before this admission.              Vitals Value Taken Time   /65 01/13/25 1600   Temp 36.2 °C (97.1 °F) 01/13/25 1015   Pulse 68 01/13/25 1615   Resp 23 01/13/25 1614   SpO2 100 % 01/13/25 1614   Vitals shown include unfiled device data.      Event Time   Out of Recovery 11:39:10         Pain/Vazquez Score: Pain Rating Prior to Med Admin: 3 (1/14/2025  9:36 AM)  Pain Rating Post Med Admin: 1 (1/14/2025 10:36 AM)  Vazquez Score: 9 (1/13/2025 11:30 AM)

## 2025-01-16 ENCOUNTER — PATIENT OUTREACH (OUTPATIENT)
Dept: FAMILY MEDICINE | Facility: CLINIC | Age: 75
End: 2025-01-16
Payer: MEDICARE

## 2025-01-16 ENCOUNTER — TELEPHONE (OUTPATIENT)
Dept: CARDIOLOGY | Facility: CLINIC | Age: 75
End: 2025-01-16
Payer: MEDICARE

## 2025-01-16 NOTE — DISCHARGE SUMMARY
[facility]  Discharge Summary      Patient Name: Lisa Smith  MRN: [id number]  FAB: [id number]  Patient Class: IP- Inpatient  Admission Date: 1/13/2025  Hospital Length of Stay: 2 days  Discharge Date and Time: 1/15/2025  6:10 PM  Attending Physician: No att. providers found   Discharging Provider: Poli Steele MD  Primary Care Provider: Hope Tang FNP    Primary Care Team: Networked reference to record PCT     HPI:   Ms. Smith is a 74-year-old female patient with past medical history of multiple cardiac comorbidities including permanent atrial fibrillation status post ablation, aortic stenosis status post TAVR 2023, HFpEF, CAD, sick sinus s/p PPM, anemia, carotid stenosis s/p CEA who presented today for Watchman procedure.  Following the Watchman placement, during extubation, patient coughed herself to a massive hemoptysis, venous bleed as per Dr. Cantu, episode repeated after she was in periop area resulting in hypotension. Also bleed from right groin site. Hemoglobin dropped 9.1 preprocedure--> 7.7 -->7.4 postprocedure.  2 unit PRBC were ordered with 40 mg IV Lasix given her previous fluid overload after transfusion.  Vitals stable, patient awake alert and oriented.  Internal medicine team was consulted for ICU admission for close monitoring.     Procedure(s) (LRB):  Left atrial appendage closure device (Right)      Hospital Course:   Ms. Smith is a 74-year-old female patient with past medical history of multiple cardiac comorbidities including permanent atrial fibrillation status post ablation, aortic stenosis status post TAVR 2023, HFpEF, CAD, sick sinus s/p PPM, anemia, carotid stenosis s/p CEA who underwent Watchman procedure 1/13 and developed nasal bleed/hemoptysis? And groin site femoral bleed resulting acute anemia ( 9.1 --> 7.4) post procedure requiring 2U PRBC transfusion. Also required fluid bolus for associated hypotension. Hb further dropped to  7.3 requiring 3rd unit on 1/15/2024. Also updated Dr Cantu. Repeat CBC showed Hb to 8.5, improved. Exam back to baseline.  Discussed with Dr Snow that patient wants to go home and cardiology cleared for discharge. BP instructions given. Dr Snow recommended to continue Entresto if BP > 105 systolic, continue lasix 20mg and to continue plavix. She will follow up with cardiology, PCP after discharge. Case management was consulted who arranged home health and appointments with Dr Pérez. Repeat CBC orders placed with home health.   All return precautions advised. All instructions given and patient verbalized understanding.       Goals of Care Treatment Preferences:  Code Status: Full Code      SDOH Screening:  The patient declined to be screened for utility difficulties, food insecurity, transport difficulties, housing insecurity, and interpersonal safety, so no concerns could be identified this admission.     Consults:     Final Active Diagnoses:    Diagnosis Date Noted POA    PRINCIPAL PROBLEM:  Presence of Watchman left atrial appendage closure device [Z95.818] 01/13/2025 No    Hypotension after procedure [I95.81] 01/14/2025 Yes    Acute blood loss anemia [D62] 01/13/2025 Yes    Hemoptysis [R04.2] 01/13/2025 Yes    SSS (sick sinus syndrome) [I49.5] 07/12/2023 Yes    Atrial fibrillation [I48.91] 07/12/2023 Yes    CAD (coronary artery disease) [I25.10] 11/25/2019 Yes     Chronic    Severe aortic valve stenosis [I35.0] 11/25/2019 Yes      Problems Resolved During this Admission:       Discharged Condition: stable    Disposition: Home or Self Care    Follow Up:   Contact information for follow-up providers       Hope Tang FNP Follow up on 1/23/2025.    Specialty: Family Medicine  Why: Hospital follow up 1/23/25 10:20  Contact information:  [address] [place] [address]  [address]  [facility] [address]  [phone]               Raymond Pérez MD. Schedule an appointment as soon as possible for a visit in 2 week(s).     Specialties: Interventional Cardiology, Cardiology  Why: Message sent to office asking them to call you with appointment within 2 weeks. Please call them if you do not hear from them by Friday  Contact information:  [address]  [place] [address]  [facility]  [facility] [address]  [phone]                       Contact information for after-discharge care       Home Medical Care       [facility] Follow up on 1/17/2025.    Service: Home Health Services  Why: They will resume care on 1/17/25  Contact information:  Johanne Knight  [facility] [address]  [phone]                                 Patient Instructions:      Platelet Response Plavix   Standing Status: Future Standing Exp. Date: 04/13/26     Order Specific Question Answer Comments   Instructions: NON FASTING LAB [2194]    Send normal result to authorizing provider's In Basket if patient is active on MyChart: Yes      Ambulatory referral/consult to Home Health   Standing Status: Future   Referral Priority: Routine Referral Type: Home Health Care   Referral Reason: Specialty Services Required   Requested Specialty: Home Health Services   Number of Visits Requested: 1     Notify your health care provider if you experience any of the following:  temperature >100.4     Notify your health care provider if you experience any of the following:  persistent nausea and vomiting or diarrhea     Notify your health care provider if you experience any of the following:  severe uncontrolled pain     Notify your health care provider if you experience any of the following:  redness, tenderness, or signs of infection (pain, swelling, redness, odor or green/yellow discharge around incision site)     Notify your health care provider if you experience any of the following:  difficulty breathing or increased cough     Notify your health care provider if you experience any of the following:  severe persistent headache     Notify your health  care provider if you experience any of the following:  worsening rash     Notify your health care provider if you experience any of the following:  persistent dizziness, light-headedness, or visual disturbances     Notify your health care provider if you experience any of the following:  increased confusion or weakness     Notify your health care provider if you experience any of the following:     Activity as tolerated       Significant Diagnostic Studies: N/A    Pending Diagnostic Studies:       None           Medications:  Reconciled Home Medications:      Medication List        START taking these medications      amoxicillin 500 MG Tab  Commonly known as: AMOXIL  Take 4 tabs (2000 mg) once 60 minutes prior to dental cleaning or other high risk procedures     clopidogreL 75 mg tablet  Commonly known as: PLAVIX  Take 1 tablet (75 mg total) by mouth once daily.     LIDOcaine 5 %  Commonly known as: LIDODERM  Place 1 patch onto the skin once daily. Remove & Discard patch within 12 hours or as directed by MD for back pain            CHANGE how you take these medications      furosemide 40 MG tablet  Commonly known as: LASIX  Take 0.5 tablets (20 mg total) by mouth once daily. Hold if BP< 100/60  What changed:   medication strength  when to take this  additional instructions            CONTINUE taking these medications      * albuterol 1.25 mg/3 mL Nebu  Commonly known as: ACCUNEB  INHALE THE CONTENTS OF 1 VIAL VIA NEBULIZER EVERY 6 HOURS AS NEEDED FOR RESCUE     * albuterol 90 mcg/actuation inhaler  Commonly known as: PROVENTIL/VENTOLIN HFA  INHALE 2 PUFFS EVERY 6 HOURS AS NEEDED FOR WHEEZING (RESCUE)     albuterol-ipratropium 2.5 mg-0.5 mg/3 mL nebulizer solution  Commonly known as: DUO-NEB  Take 3 mLs by nebulization every 6 (six) hours as needed for Wheezing or Shortness of Breath. Rescue     digoxin 250 mcg tablet  Commonly known as: LANOXIN  TAKE 1 TABLET EVERY DAY     ezetimibe 10 mg tablet  Commonly known as:  ZETIA  TAKE 1 TABLET ONE TIME DAILY     fluticasone propionate 50 mcg/actuation nasal spray  Commonly known as: FLONASE  1 spray by Each Nostril route daily as needed for Rhinitis or Allergies.     metoprolol succinate 25 MG 24 hr tablet  Commonly known as: TOPROL-XL  TAKE 1 TABLET EVERY MORNING     pantoprazole 40 MG tablet  Commonly known as: PROTONIX  Take 1 tablet (40 mg total) by mouth every morning.     rOPINIRole 1 MG tablet  Commonly known as: REQUIP  Take 1 tablet (1 mg total) by mouth every evening.     rosuvastatin 40 MG Tab  Commonly known as: CRESTOR  TAKE 1 TABLET EVERY EVENING     sacubitriL-valsartan 24-26 mg per tablet  Commonly known as: ENTRESTO  Take 1 tablet by mouth 2 (two) times daily.     TRELEGY ELLIPTA 100-62.5-25 mcg Dsdv  Generic drug: fluticasone-umeclidin-vilanter  INHALE 1 PUFF INTO THE LUNGS ONCE DAILY.     TYLENOL ARTHRITIS ORAL  Take 2 tablets by mouth daily as needed.           * This list has 2 medication(s) that are the same as other medications prescribed for you. Read the directions carefully, and ask your doctor or other care provider to review them with you.                STOP taking these medications      ELIQUIS 5 mg Tab  Generic drug: apixaban     spironolactone 25 MG tablet  Commonly known as: ALDACTONE              Indwelling Lines/Drains at time of discharge:   Lines/Drains/Airways       None                   Time spent on the discharge of patient: 40 minutes    Critical care time spent on the evaluation and treatment of severe organ dysfunction, review of pertinent labs and imaging studies, discussions with consulting providers and discussions with patient/family: 45 minutes.     Poli Steele MD  Department of Hospital Medicine  [facility]

## 2025-01-16 NOTE — TELEPHONE ENCOUNTER
Spoke with patient follow post Watchman. Patient stated she feels okay. No signs and symptoms of infection to groin site. Advised she may remove gauze and shower. Denies palpitations or shortness of breath. Instructed to call for any questions or concerns. Confirmed follow up appointment on 2/21/25 at 10 am. Patient verbalized understanding.

## 2025-01-16 NOTE — TELEPHONE ENCOUNTER
Discharge Information     Discharge Date:   1/15/25    Primary Discharge Diagnosis:  Presence of Watchman left atrial appendage closure device       Discharge Summary:  Reviewed      Medication & Order Review     Were medication changes made or new medications added?   Yes    If so, has the patient filled the prescriptions?  No     Was Home Health ordered? Yes    If so, has Home Health contacted patient and/or initiated services?  No    Name of Home Health Agency?  [facility]    Durable Medical Equipment ordered?  No     If so, has the DME provider contacted patient and delivered equipment?  N/A    Follow Up               Any problems since discharge? No    How is the patient feeling since returning home? Patient says she has been feeling ok as it's only been a few hours since she has been home. She has just been sleeping.      Have you set up recommended follow up appointments?  (cardiology, surgery, etc.) Yes     Schedule Hospital Follow-up appointment within 7-14 days (preferably 7).      Notes:  Patient says she has not been able to  new medications bc she was discharge late on yesterday. Also says she already had home health, they will just continue.             Zack Lange

## 2025-01-17 ENCOUNTER — LAB VISIT (OUTPATIENT)
Dept: LAB | Facility: HOSPITAL | Age: 75
End: 2025-01-17
Attending: NURSE PRACTITIONER
Payer: MEDICARE

## 2025-01-17 DIAGNOSIS — D62 ACUTE POSTHEMORRHAGIC ANEMIA: Primary | ICD-10-CM

## 2025-01-17 DIAGNOSIS — Z95.818 STATUS POST BLALOCK-TAUSSIG SHUNT: ICD-10-CM

## 2025-01-17 LAB
BASOPHILS # BLD AUTO: 0.03 K/UL (ref 0–0.2)
BASOPHILS NFR BLD: 0.3 % (ref 0–1.9)
DIFFERENTIAL METHOD BLD: ABNORMAL
EOSINOPHIL # BLD AUTO: 0.1 K/UL (ref 0–0.5)
EOSINOPHIL NFR BLD: 1.3 % (ref 0–8)
ERYTHROCYTE [DISTWIDTH] IN BLOOD BY AUTOMATED COUNT: 16.5 % (ref 11.5–14.5)
HCT VFR BLD AUTO: 28 % (ref 37–48.5)
HGB BLD-MCNC: 8.9 G/DL (ref 12–16)
IMM GRANULOCYTES # BLD AUTO: 0.04 K/UL (ref 0–0.04)
IMM GRANULOCYTES NFR BLD AUTO: 0.5 % (ref 0–0.5)
LYMPHOCYTES # BLD AUTO: 0.7 K/UL (ref 1–4.8)
LYMPHOCYTES NFR BLD: 8.4 % (ref 18–48)
MCH RBC QN AUTO: 31.4 PG (ref 27–31)
MCHC RBC AUTO-ENTMCNC: 31.8 G/DL (ref 32–36)
MCV RBC AUTO: 99 FL (ref 82–98)
MONOCYTES # BLD AUTO: 0.7 K/UL (ref 0.3–1)
MONOCYTES NFR BLD: 7.7 % (ref 4–15)
NEUTROPHILS # BLD AUTO: 7.1 K/UL (ref 1.8–7.7)
NEUTROPHILS NFR BLD: 81.8 % (ref 38–73)
NRBC BLD-RTO: 0 /100 WBC
PLATELET # BLD AUTO: 182 K/UL (ref 150–450)
PMV BLD AUTO: 10.6 FL (ref 9.2–12.9)
RBC # BLD AUTO: 2.83 M/UL (ref 4–5.4)
WBC # BLD AUTO: 8.69 K/UL (ref 3.9–12.7)

## 2025-01-17 PROCEDURE — 36415 COLL VENOUS BLD VENIPUNCTURE: CPT | Performed by: NURSE PRACTITIONER

## 2025-01-17 PROCEDURE — 85025 COMPLETE CBC W/AUTO DIFF WBC: CPT | Performed by: NURSE PRACTITIONER

## 2025-01-18 ENCOUNTER — DOCUMENT SCAN (OUTPATIENT)
Dept: HOME HEALTH SERVICES | Facility: HOSPITAL | Age: 75
End: 2025-01-18
Payer: MEDICARE

## 2025-01-22 ENCOUNTER — DOCUMENT SCAN (OUTPATIENT)
Dept: HOME HEALTH SERVICES | Facility: HOSPITAL | Age: 75
End: 2025-01-22
Payer: MEDICARE

## 2025-01-22 ENCOUNTER — PATIENT OUTREACH (OUTPATIENT)
Dept: ADMINISTRATIVE | Facility: CLINIC | Age: 75
End: 2025-01-22
Payer: MEDICARE

## 2025-01-22 NOTE — PROGRESS NOTES
C3 nurse spoke with Lisa Smith for a TCC post hospital discharge follow up call. The patient has a scheduled [facility] appointment with Hope Tang on 01/23/25 @ 1020.

## 2025-01-23 ENCOUNTER — OFFICE VISIT (OUTPATIENT)
Dept: FAMILY MEDICINE | Facility: CLINIC | Age: 75
End: 2025-01-23
Payer: MEDICARE

## 2025-01-23 VITALS
HEIGHT: 65 IN | BODY MASS INDEX: 29.59 KG/M2 | RESPIRATION RATE: 20 BRPM | TEMPERATURE: 98 F | SYSTOLIC BLOOD PRESSURE: 136 MMHG | WEIGHT: 177.63 LBS | DIASTOLIC BLOOD PRESSURE: 56 MMHG | HEART RATE: 63 BPM | OXYGEN SATURATION: 91 %

## 2025-01-23 DIAGNOSIS — J96.11 CHRONIC RESPIRATORY FAILURE WITH HYPOXIA: ICD-10-CM

## 2025-01-23 DIAGNOSIS — Z95.818 PRESENCE OF WATCHMAN LEFT ATRIAL APPENDAGE CLOSURE DEVICE: ICD-10-CM

## 2025-01-23 DIAGNOSIS — I49.5 SSS (SICK SINUS SYNDROME): ICD-10-CM

## 2025-01-23 DIAGNOSIS — D62 ACUTE BLOOD LOSS ANEMIA: ICD-10-CM

## 2025-01-23 DIAGNOSIS — Z09 HOSPITAL DISCHARGE FOLLOW-UP: Primary | ICD-10-CM

## 2025-01-23 PROBLEM — J44.9 COPD (CHRONIC OBSTRUCTIVE PULMONARY DISEASE): Chronic | Status: RESOLVED | Noted: 2019-10-01 | Resolved: 2025-01-23

## 2025-01-23 PROBLEM — Z79.01 LONG TERM (CURRENT) USE OF ANTICOAGULANTS: Status: RESOLVED | Noted: 2024-06-06 | Resolved: 2025-01-23

## 2025-01-23 PROBLEM — R53.83 FATIGUE: Status: RESOLVED | Noted: 2023-12-05 | Resolved: 2025-01-23

## 2025-01-23 PROBLEM — R06.02 SHORTNESS OF BREATH: Status: RESOLVED | Noted: 2023-12-05 | Resolved: 2025-01-23

## 2025-01-23 PROBLEM — J44.1 COPD EXACERBATION: Status: RESOLVED | Noted: 2018-12-05 | Resolved: 2025-01-23

## 2025-01-23 PROCEDURE — 1126F AMNT PAIN NOTED NONE PRSNT: CPT | Mod: HCNC,CPTII,S$GLB, | Performed by: NURSE PRACTITIONER

## 2025-01-23 PROCEDURE — 99999 PR PBB SHADOW E&M-EST. PATIENT-LVL V: CPT | Mod: PBBFAC,HCNC,, | Performed by: NURSE PRACTITIONER

## 2025-01-23 PROCEDURE — 3078F DIAST BP <80 MM HG: CPT | Mod: HCNC,CPTII,S$GLB, | Performed by: NURSE PRACTITIONER

## 2025-01-23 PROCEDURE — 99495 TRANSJ CARE MGMT MOD F2F 14D: CPT | Mod: HCNC,S$GLB,, | Performed by: NURSE PRACTITIONER

## 2025-01-23 PROCEDURE — 3288F FALL RISK ASSESSMENT DOCD: CPT | Mod: HCNC,CPTII,S$GLB, | Performed by: NURSE PRACTITIONER

## 2025-01-23 PROCEDURE — 3075F SYST BP GE 130 - 139MM HG: CPT | Mod: HCNC,CPTII,S$GLB, | Performed by: NURSE PRACTITIONER

## 2025-01-23 PROCEDURE — 1160F RVW MEDS BY RX/DR IN RCRD: CPT | Mod: HCNC,CPTII,S$GLB, | Performed by: NURSE PRACTITIONER

## 2025-01-23 PROCEDURE — 1159F MED LIST DOCD IN RCRD: CPT | Mod: HCNC,CPTII,S$GLB, | Performed by: NURSE PRACTITIONER

## 2025-01-23 PROCEDURE — 1101F PT FALLS ASSESS-DOCD LE1/YR: CPT | Mod: HCNC,CPTII,S$GLB, | Performed by: NURSE PRACTITIONER

## 2025-01-23 PROCEDURE — 1111F DSCHRG MED/CURRENT MED MERGE: CPT | Mod: HCNC,CPTII,S$GLB, | Performed by: NURSE PRACTITIONER

## 2025-01-23 NOTE — PROGRESS NOTES
SUBJECTIVE:    Patient ID: Lisa Smith is a 74 y.o. female.    Chief Complaint: Hospital Follow Up    History of Present Illness    CHIEF COMPLAINT:  Lisa presents for a follow-up visit after hospitalization for pneumonia and Watchman device placement, complicated by post-procedural bleeding.    HPI:  Lisa reports a recent hospitalization for pneumonia and multiple complications. She underwent a Watchman device placement procedure approximately 4-5 days after admission. Prior to discharge, the patient had unexpected bleeding from the groin site, extending her hospital stay by an additional 2-3 days. The bleeding was discovered when the patient was using a bedside urinal while on the phone. She felt warmth and initially thought she was urinating, but the nurse discovered the urinal was full of blood. The exact cause of the bleeding was not explained to the patient, though there is speculation it may have been related to coughing.    Due to the bleeding episode, the patient required blood transfusions, receiving a total of 5 units: 2 units before the Watchman procedure and 3 units after the complication. Currently, the patient reports ongoing fatigue and some breathing difficulties. She notes some improvement but expresses frustration with the slower-than-expected recovery.    Lisa was discharged with home health care services, including nursing and physical therapy, through [facility]. A home health nurse has been drawing blood samples, though there have been some difficulties obtaining sufficient samples for analysis.    During the hospital stay, one of the doctors noted that the patient's iron count was low and recommended iron supplementation. Lisa has been taking 45mg iron pills daily for a few weeks as advised.    Lisa has follow-up visits scheduled with various specialists, including a nurse practitioner in cardiology, Dr. Addison (hematologist), and Dr. Ross. A bone marrow biopsy  was performed and reported as normal. Lisa has not yet scheduled a recommended appointment with a GI doctor.    Lisa denies any oral bleeding during the coughing episode or needing to take amoxicillin prescribed for potential dental procedures.    MEDICATIONS:  Lisa is on Amoxicillin as needed. She is also taking iron pills, 45 mg daily for 30 days.    MEDICAL HISTORY:  Lisa has a history of pneumonia and a anemia.    SURGICAL HISTORY:  She recently underwent a Watchman device placement. Complications included post-procedure bleeding from the groin site, requiring blood transfusions and an extended hospital stay. Lisa also recently had a bone marrow biopsy, with normal results.    TEST RESULTS:  Prior to hospitalization, the patient's hemoglobin was low. During hospitalization, her iron levels were low. The most recent blood count was stable. Leukemia screen and B12 levels were normal.       Review of Systems   Constitutional:  Positive for fatigue. Negative for activity change, appetite change, chills, fever and unexpected weight change.   HENT:  Negative for congestion, ear pain, nosebleeds and trouble swallowing.    Eyes:  Negative for discharge.   Respiratory:  Positive for shortness of breath (improving). Negative for cough, chest tightness and wheezing.    Cardiovascular:  Negative for chest pain and leg swelling.   Gastrointestinal:  Negative for abdominal pain, blood in stool, constipation, diarrhea, nausea and vomiting.   Endocrine: Negative for polydipsia and polyuria.   Genitourinary:  Negative for difficulty urinating, dysuria, frequency, hematuria, menstrual problem and vaginal bleeding.   Musculoskeletal:  Negative for arthralgias, back pain, joint swelling, myalgias and neck pain.   Skin:  Negative for color change, pallor, rash and wound.   Allergic/Immunologic: Negative for environmental allergies.   Neurological:  Positive for weakness. Negative for dizziness, numbness and headaches.    Hematological:  Negative for adenopathy. Bruises/bleeds easily.   Psychiatric/Behavioral:  Negative for confusion, dysphoric mood and sleep disturbance. The patient is not nervous/anxious.        Admit Date: 01/13/25  Discharge Date: 01/15/25  Discharge Facility: Hospital      Family and/or Caretaker present at visit? No  Medication Reconciliation:  Medications changed/added/deleted. added  New Prescriptions filled after discharge: yes  Discharge summary reviewed:  yes  Diagnostic tests reviewed/disposition: I have reviewed all completed as well as pending diagnostic tests at the time of discharge  Disease/illness education: NO   Follow up appointments scheduled:  yes              with Cardiology   Follow up labs/tests ordered:   no  Home Health ordered on discharge: Patient has home health established at home  Home Health company name: [facility]  Establishment or re-establishment of referral orders for community resources: No other necessary community resources  DME ordered at discharge:   no  How patient is feeling since discharge from the hospital? Better since discharge, still some fatigue and SOB but improving      Discussion with other health care providers: No discussion with other health care providers necessary  Patient follow up phone call documented on separate encounter.    Lab Visit on 01/17/2025   Component Date Value Ref Range Status    WBC 01/17/2025 8.69  3.90 - 12.70 K/uL Final    RBC 01/17/2025 2.83 (L)  4.00 - 5.40 M/uL Final    Hemoglobin 01/17/2025 8.9 (L)  12.0 - 16.0 g/dL Final    Hematocrit 01/17/2025 28.0 (L)  37.0 - 48.5 % Final    MCV 01/17/2025 99 (H)  82 - 98 fL Final    MCH 01/17/2025 31.4 (H)  27.0 - 31.0 pg Final    MCHC 01/17/2025 31.8 (L)  32.0 - 36.0 g/dL Final    RDW 01/17/2025 16.5 (H)  11.5 - 14.5 % Final    Platelets 01/17/2025 182  150 - 450 K/uL Final    MPV 01/17/2025 10.6  9.2 - 12.9 fL Final    Immature Granulocytes 01/17/2025 0.5  0.0 - 0.5 % Final    Gran # (ANC)  01/17/2025 7.1  1.8 - 7.7 K/uL Final    Immature Grans (Abs) 01/17/2025 0.04  0.00 - 0.04 K/uL Final    Lymph # 01/17/2025 0.7 (L)  1.0 - 4.8 K/uL Final    Mono # 01/17/2025 0.7  0.3 - 1.0 K/uL Final    Eos # 01/17/2025 0.1  0.0 - 0.5 K/uL Final    Baso # 01/17/2025 0.03  0.00 - 0.20 K/uL Final    nRBC 01/17/2025 0  0 /100 WBC Final    Gran % 01/17/2025 81.8 (H)  38.0 - 73.0 % Final    Lymph % 01/17/2025 8.4 (L)  18.0 - 48.0 % Final    Mono % 01/17/2025 7.7  4.0 - 15.0 % Final    Eosinophil % 01/17/2025 1.3  0.0 - 8.0 % Final    Basophil % 01/17/2025 0.3  0.0 - 1.9 % Final    Differential Method 01/17/2025 Automated   Final   No results displayed because visit has over 200 results.      Lab Visit on 01/09/2025   Component Date Value Ref Range Status    WBC 01/09/2025 7.00  3.90 - 12.70 K/uL Final    RBC 01/09/2025 2.85 (L)  4.00 - 5.40 M/uL Final    Hemoglobin 01/09/2025 8.7 (L)  12.0 - 16.0 g/dL Final    Hematocrit 01/09/2025 27.5 (L)  37.0 - 48.5 % Final    MCV 01/09/2025 97  82 - 98 fL Final    MCH 01/09/2025 30.5  27.0 - 31.0 pg Final    MCHC 01/09/2025 31.6 (L)  32.0 - 36.0 g/dL Final    RDW 01/09/2025 17.7 (H)  11.5 - 14.5 % Final    Platelets 01/09/2025 229  150 - 450 K/uL Final    MPV 01/09/2025 10.7  9.2 - 12.9 fL Final    Immature Granulocytes 01/09/2025 0.4  0.0 - 0.5 % Final    Gran # (ANC) 01/09/2025 5.2  1.8 - 7.7 K/uL Final    Immature Grans (Abs) 01/09/2025 0.03  0.00 - 0.04 K/uL Final    Lymph # 01/09/2025 1.0  1.0 - 4.8 K/uL Final    Mono # 01/09/2025 0.6  0.3 - 1.0 K/uL Final    Eos # 01/09/2025 0.1  0.0 - 0.5 K/uL Final    Baso # 01/09/2025 0.02  0.00 - 0.20 K/uL Final    nRBC 01/09/2025 0  0 /100 WBC Final    Gran % 01/09/2025 74.4 (H)  38.0 - 73.0 % Final    Lymph % 01/09/2025 14.3 (L)  18.0 - 48.0 % Final    Mono % 01/09/2025 8.6  4.0 - 15.0 % Final    Eosinophil % 01/09/2025 2.0  0.0 - 8.0 % Final    Basophil % 01/09/2025 0.3  0.0 - 1.9 % Final    Differential Method 01/09/2025 Automated    Final    Sodium 01/09/2025 139  136 - 145 mmol/L Final    Potassium 01/09/2025 5.5 (H)  3.5 - 5.1 mmol/L Final    Chloride 01/09/2025 101  95 - 110 mmol/L Final    CO2 01/09/2025 31 (H)  23 - 29 mmol/L Final    Glucose 01/09/2025 88  70 - 110 mg/dL Final    BUN 01/09/2025 26 (H)  8 - 23 mg/dL Final    Creatinine 01/09/2025 0.7  0.5 - 1.4 mg/dL Final    Calcium 01/09/2025 9.8  8.7 - 10.5 mg/dL Final    Total Protein 01/09/2025 7.5  6.0 - 8.4 g/dL Final    Albumin 01/09/2025 3.8  3.5 - 5.2 g/dL Final    Total Bilirubin 01/09/2025 0.4  0.1 - 1.0 mg/dL Final    Alkaline Phosphatase 01/09/2025 87  40 - 150 U/L Final    AST 01/09/2025 30  10 - 40 U/L Final    ALT 01/09/2025 10  10 - 44 U/L Final    eGFR 01/09/2025 >60.0  >60 mL/min/1.73 m^2 Final    Anion Gap 01/09/2025 7 (L)  8 - 16 mmol/L Final    Prothrombin Time 01/09/2025 11.6  9.0 - 12.5 sec Final    INR 01/09/2025 1.0  0.8 - 1.2 Final    aPTT 01/09/2025 28.1  21.0 - 32.0 sec Final    Magnesium 01/09/2025 1.9  1.6 - 2.6 mg/dL Final   Lab Visit on 01/09/2025   Component Date Value Ref Range Status    Specimen UA 01/09/2025 Urine, Clean Catch   Final    Color, UA 01/09/2025 Yellow  Yellow, Straw, Nataly Final    Appearance, UA 01/09/2025 Clear  Clear Final    pH, UA 01/09/2025 7.0  5.0 - 8.0 Final    Specific Gravity, UA 01/09/2025 1.010  1.005 - 1.030 Final    Protein, UA 01/09/2025 Negative  Negative Final    Glucose, UA 01/09/2025 Negative  Negative Final    Ketones, UA 01/09/2025 Negative  Negative Final    Bilirubin (UA) 01/09/2025 Negative  Negative Final    Occult Blood UA 01/09/2025 Negative  Negative Final    Nitrite, UA 01/09/2025 Negative  Negative Final    Leukocytes, UA 01/09/2025 1+ (A)  Negative Final    WBC, UA 01/09/2025 2  0 - 5 /hpf Final    Bacteria 01/09/2025 Occasional  None-Occ /hpf Final    Squam Epithel, UA 01/09/2025 2  /hpf Final    Microscopic Comment 01/09/2025 SEE COMMENT   Final   Office Visit on 01/09/2025   Component Date Value Ref  Range Status    QRS Duration 01/09/2025 178  ms Final    [facility] QTC Calculation 01/09/2025 442  ms Final   Hospital Outpatient Visit on 12/18/2024   Component Date Value Ref Range Status    aPTT 12/18/2024 26.5  21.0 - 32.0 sec Final    WBC 12/18/2024 17.91 (H)  3.90 - 12.70 K/uL Final    RBC 12/18/2024 2.83 (L)  4.00 - 5.40 M/uL Final    Hemoglobin 12/18/2024 8.5 (L)  12.0 - 16.0 g/dL Final    Hematocrit 12/18/2024 27.6 (L)  37.0 - 48.5 % Final    MCV 12/18/2024 98  82 - 98 fL Final    MCH 12/18/2024 30.0  27.0 - 31.0 pg Final    MCHC 12/18/2024 30.8 (L)  32.0 - 36.0 g/dL Final    RDW 12/18/2024 19.1 (H)  11.5 - 14.5 % Final    Platelets 12/18/2024 231  150 - 450 K/uL Final    MPV 12/18/2024 10.7  9.2 - 12.9 fL Final    Immature Granulocytes 12/18/2024 0.8 (H)  0.0 - 0.5 % Final    Gran # (ANC) 12/18/2024 15.9 (H)  1.8 - 7.7 K/uL Final    Immature Grans (Abs) 12/18/2024 0.15 (H)  0.00 - 0.04 K/uL Final    Lymph # 12/18/2024 0.7 (L)  1.0 - 4.8 K/uL Final    Mono # 12/18/2024 1.1 (H)  0.3 - 1.0 K/uL Final    Eos # 12/18/2024 0.1  0.0 - 0.5 K/uL Final    Baso # 12/18/2024 0.03  0.00 - 0.20 K/uL Final    nRBC 12/18/2024 0  0 /100 WBC Final    Gran % 12/18/2024 88.6 (H)  38.0 - 73.0 % Final    Lymph % 12/18/2024 3.6 (L)  18.0 - 48.0 % Final    Mono % 12/18/2024 6.3  4.0 - 15.0 % Final    Eosinophil % 12/18/2024 0.5  0.0 - 8.0 % Final    Basophil % 12/18/2024 0.2  0.0 - 1.9 % Final    Differential Method 12/18/2024 Automated   Final    Prothrombin Time 12/18/2024 11.4  9.0 - 12.5 sec Final    INR 12/18/2024 1.0  0.8 - 1.2 Final   No results displayed because visit has over 200 results.      Infusion on 12/04/2024   Component Date Value Ref Range Status    UNIT NUMBER 12/03/2024 [id number]   Final    Product Code 12/03/2024 [id number]   Final    DISPENSE STATUS 12/03/2024 TRANSFUSED   Final    CODING SYSTEM 12/03/2024 [id number]   Final    Unit Blood Type Code 12/03/2024 5100   Final    Unit Blood Type 12/03/2024 O POS   Final     Unit Expiration 12/03/2024 202412242359   Final    CROSSMATCH INTERPRETATION 12/03/2024 Compatible   Final    UNIT NUMBER 12/03/2024 [id number]   Final    Product Code 12/03/2024 [id number]   Final    DISPENSE STATUS 12/03/2024 TRANSFUSED   Final    CODING SYSTEM 12/03/2024 [id number]   Final    Unit Blood Type Code 12/03/2024 5100   Final    Unit Blood Type 12/03/2024 O POS   Final    Unit Expiration 12/03/2024 [id number]   Final    CROSSMATCH INTERPRETATION 12/03/2024 Compatible   Final   Anti-coag visit on 12/04/2024   Component Date Value Ref Range Status    INR 12/04/2024 2.4   Final   Hospital Outpatient Visit on 12/03/2024   Component Date Value Ref Range Status    BSA 12/03/2024 1.85  m2 Final    Young's Biplane MOD Ejection Fra* 12/03/2024 58  % Final    A2C EF 12/03/2024 61  % Final    A4C EF 12/03/2024 57  % Final    LVOT stroke volume 12/03/2024 114.6  cm3 Final    LVIDd 12/03/2024 5.1  3.5 - 6.0 cm Final    LV Systolic Volume 12/03/2024 47.40  mL Final    LV Systolic Volume Index 12/03/2024 26.0  mL/m2 Final    LVIDs 12/03/2024 3.4  2.1 - 4.0 cm Final    LV ESV A2C 12/03/2024 100.00  mL Final    LV Diastolic Volume 12/03/2024 124.00  mL Final    LV ESV A4C 12/03/2024 89.40  mL Final    LV Diastolic Volume Index 12/03/2024 68.13  mL/m2 Final    LV EDV A2C 12/03/2024 191  mL Final    LV EDV A4C 12/03/2024 139.00  mL Final    Left Ventricular End Systolic Volu* 12/03/2024 47.40  mL Final    Left Ventricular End Diastolic Vol* 12/03/2024 124.00  mL Final    IVS 12/03/2024 1.1  0.6 - 1.1 cm Final    LVOT diameter 12/03/2024 2.1  cm Final    LVOT area 12/03/2024 3.5  cm2 Final    FS 12/03/2024 33.3  28 - 44 % Final    Left Ventricle Relative Wall Thick* 12/03/2024 0.43  cm Final    PW 12/03/2024 1.1  0.6 - 1.1 cm Final    LV mass 12/03/2024 213.9  g Final    LV Mass Index 12/03/2024 117.5  g/m2 Final    MV Peak E Henry 12/03/2024 1.36  m/s Final    TDI LATERAL 12/03/2024 0.15  m/s Final    TDI SEPTAL  12/03/2024 0.07  m/s Final    E/E' ratio 12/03/2024 12.36  m/s Final    MV Peak A Henry 12/03/2024 0.40  m/s Final    TR Max Henry 12/03/2024 3.23  m/s Final    E/A ratio 12/03/2024 3.40   Final    E wave deceleration time 12/03/2024 229.00  msec Final    LV SEPTAL E/E' RATIO 12/03/2024 19.43  m/s Final    LV LATERAL E/E' RATIO 12/03/2024 9.07  m/s Final    Left Ventricular Outflow Tract Patti* 12/03/2024 1.10  cm/s Final    Left Ventricular Outflow Tract Patti* 12/03/2024 5.00  mmHg Final    RV S' 12/03/2024 16.70  cm/s Final    RVOT peak VTI 12/03/2024 24.8  cm Final    TAPSE 12/03/2024 2.14  cm Final    LA size 12/03/2024 4.80  cm Final    LA Vol (MOD) 12/03/2024 95.10  mL Final    LANDY (MOD) 12/03/2024 52.3  mL/m2 Final    RA Major Axis 12/03/2024 6.10  cm Final    RA Width 12/03/2024 4.70  cm Final    AV regurgitation pressure 1/2 time 12/03/2024 414  ms Final    AR Max Henry 12/03/2024 3.58  m/s Final    AV mean gradient 12/03/2024 28.0  mmHg Final    AV peak gradient 12/03/2024 51.8  mmHg Final    Ao peak henry 12/03/2024 3.6  m/s Final    Ao VTI 12/03/2024 77.1  cm Final    LVOT peak VTI 12/03/2024 33.1  cm Final    AV valve area 12/03/2024 1.5  cm² Final    AV index (prosthetic) 12/03/2024 0.43   Final    Mr max henry 12/03/2024 4.83  m/s Final    MV mean gradient 12/03/2024 3  mmHg Final    MV peak gradient 12/03/2024 12  mmHg Final    MV stenosis pressure 1/2 time 12/03/2024 107.00  ms Final    MV valve area p 1/2 method 12/03/2024 2.06  cm2 Final    MV valve area by continuity eq 12/03/2024 2.51  cm2 Final    MV VTI 12/03/2024 45.6  cm Final    Triscuspid Valve Regurgitation Pea* 12/03/2024 42  mmHg Final    PV mean gradient 12/03/2024 3  mmHg Final    PV PEAK VELOCITY 12/03/2024 2.20  m/s Final    PV peak gradient 12/03/2024 19  mmHg Final    Pulmonary Valve Mean Velocity 12/03/2024 1.37  m/s Final    RVOT peak henry 12/03/2024 1.26  m/s Final    IVC diameter 12/03/2024 1.70  cm Final    Mean e' 12/03/2024 0.11  m/s  Final    ZLVIDS 12/03/2024 0.70   Final    ZLVIDD 12/03/2024 0.12   Final    LA area A4C 12/03/2024 26.70  cm2 Final    LA area A2C 12/03/2024 28.10  cm2 Final    RVDD 12/03/2024 3.10  cm Final    TV resting pulmonary artery pressu* 12/03/2024 45  mmHg Final    RV TB RVSP 12/03/2024 6  mmHg Final    Est. RA pres 12/03/2024 3  mmHg Final   There may be more visits with results that are not included.       Past Medical History:   Diagnosis Date    Anticoagulant long-term use     Arthritis     Atrial fibrillation     Bell's palsy     Boil of buttock     burst 12/19/22    CHF (congestive heart failure)     Chronic cough     COPD (chronic obstructive pulmonary disease)     use O2  3l/m NC at night; also taking during day currently 12/4/23    Dizziness     Encounter for blood transfusion     GI bleed 2011    transfusion    H/O diverticulitis of colon     Hard of hearing     Heart murmur     Hematoma 02/2023    left hand    Heterozygous alpha 1-antitrypsin deficiency     History of home oxygen therapy     3L NC at night    Hypertension     Lung disease     copd    MONSERRAT (obstructive sleep apnea)     resolved with wt loss 131#    Pacemaker     Pneumonia     last episode 2019    Pulmonary edema     Skin cancer     Unspecified visual disturbance     episode of vision disturbance and dizziness...occasional recurrences     Past Surgical History:   Procedure Laterality Date    CARDIAC ELECTROPHYSIOLOGY STUDY AND ABLATION      CAROTID ENDARTERECTOMY Left 12/22/2022    Procedure: ENDARTERECTOMY-CAROTID;  Surgeon: Maximilian Connolly MD;  Location: [facility] OR;  Service: Peripheral Vascular;  Laterality: Left;    CAROTID STENT Left 2/29/2024    Procedure: INSERTION, STENT, ARTERY, CAROTID;  Surgeon: CIRILO Valdivia III, MD;  Location: [address];  Service: Vascular;  Laterality: Left;  left carotid artery stent placement  mgy  146.29   gymc2  8.0730  fluro time  4.8min    CARPAL TUNNEL RELEASE Left     CHOLECYSTECTOMY      CLOSURE  OF LEFT ATRIAL APPENDAGE USING DEVICE Right 1/13/2025    Procedure: Left atrial appendage closure device;  Surgeon: Roxana Cantu MD;  Location: [facility] CATH/EP LAB;  Service: Cardiology;  Laterality: Right;    EYE SURGERY Bilateral     cataract    HYSTERECTOMY      INSERT / REPLACE / REMOVE PACEMAKER      KNEE ARTHROSCOPY Left     LEFT HEART CATHETERIZATION  11/1/2023    Procedure: Left heart cath;  Surgeon: Puma Shelton MD;  Location: [facility] CATH;  Service: Cardiology;;    REPLACEMENT OF PACEMAKER GENERATOR Left 01/20/2022    Procedure: REPLACEMENT, PACEMAKER GENERATOR;  Surgeon: Raymond Pérez MD;  Location: [facility] CATH/EP LAB;  Service: Cardiology;  Laterality: Left;    SKIN CANCER EXCISION      TRANSCATHETER AORTIC VALVE REPLACEMENT (TAVR) Bilateral 12/6/2023    Procedure: (TAVR);  Surgeon: Puma Shelton MD;  Location: [facility] CATH;  Service: Cardiology;  Laterality: Bilateral;    TRANSCATHETER AORTIC VALVE REPLACEMENT (TAVR) Bilateral 12/6/2023    Procedure: (TAVR) - Surgeon;  Surgeon: Maximilian Connolly MD;  Location: [facility] CATH;  Service: Peripheral Vascular;  Laterality: Bilateral;     Family History   Problem Relation Name Age of Onset    Kidney failure Mother      Cancer Father      Cancer Brother         Marital Status:   Alcohol History:  reports that she does not currently use alcohol after a past usage of about 8.0 standard drinks of alcohol per week.  Tobacco History:  reports that she quit smoking about 13 years ago. Her smoking use included cigarettes. She started smoking about 54 years ago. She has a 80 pack-year smoking history. She has been exposed to tobacco smoke. She has never used smokeless tobacco.  Drug History:  reports no history of drug use.    Review of patient's allergies indicates:   Allergen Reactions    Sulfa (sulfonamide antibiotics) Itching       Current Outpatient Medications:     acetaminophen (TYLENOL ARTHRITIS ORAL), Take 2 tablets by mouth daily as needed., Disp: , Rfl:  "    albuterol (ACCUNEB) 1.25 mg/3 mL Nebu, INHALE THE CONTENTS OF 1 VIAL VIA NEBULIZER EVERY 6 HOURS AS NEEDED FOR RESCUE, Disp: 360 mL, Rfl: 5    albuterol (PROVENTIL/VENTOLIN HFA) 90 mcg/actuation inhaler, INHALE 2 PUFFS EVERY 6 HOURS AS NEEDED FOR WHEEZING (RESCUE), Disp: 18 g, Rfl: 6    albuterol-ipratropium (DUO-NEB) 2.5 mg-0.5 mg/3 mL nebulizer solution, Take 3 mLs by nebulization every 6 (six) hours as needed for Wheezing or Shortness of Breath. Rescue, Disp: 90 mL, Rfl: 0    clopidogreL (PLAVIX) 75 mg tablet, Take 1 tablet (75 mg total) by mouth once daily., Disp: 90 tablet, Rfl: 3    digoxin (LANOXIN) 250 mcg tablet, TAKE 1 TABLET EVERY DAY, Disp: 90 tablet, Rfl: 3    ezetimibe (ZETIA) 10 mg tablet, TAKE 1 TABLET ONE TIME DAILY, Disp: 90 tablet, Rfl: 3    fluticasone propionate (FLONASE) 50 mcg/actuation nasal spray, 1 spray by Each Nostril route daily as needed for Rhinitis or Allergies., Disp: , Rfl:     furosemide (LASIX) 40 MG tablet, Take 0.5 tablets (20 mg total) by mouth once daily. Hold if BP< 100/60, Disp: 45 tablet, Rfl: 3    metoprolol succinate (TOPROL-XL) 25 MG 24 hr tablet, TAKE 1 TABLET EVERY MORNING, Disp: 90 tablet, Rfl: 3    pantoprazole (PROTONIX) 40 MG tablet, Take 1 tablet (40 mg total) by mouth every morning., Disp: 90 tablet, Rfl: 3    rOPINIRole (REQUIP) 1 MG tablet, Take 1 tablet (1 mg total) by mouth every evening., Disp: 90 tablet, Rfl: 1    rosuvastatin (CRESTOR) 40 MG Tab, TAKE 1 TABLET EVERY EVENING, Disp: 90 tablet, Rfl: 3    sacubitriL-valsartan (ENTRESTO) 24-26 mg per tablet, Take 1 tablet by mouth 2 (two) times daily., Disp: 180 tablet, Rfl: 3    TRELEGY ELLIPTA 100-62.5-25 mcg DsDv, INHALE 1 PUFF INTO THE LUNGS ONCE DAILY., Disp: 90 each, Rfl: 3  Objective:      Vitals:    01/23/25 1446   BP: (!) 136/56   Pulse: 63   Resp: 20   Temp: 97.8 °F (36.6 °C)   TempSrc: Oral   SpO2: (!) 91%   Weight: 80.6 kg (177 lb 9.6 oz)   Height: 5' 5" (1.651 m)     Physical Exam  Vitals and " nursing note reviewed.   Constitutional:       General: She is not in acute distress.     Appearance: Normal appearance. She is well-developed. She is obese. She is not ill-appearing.   HENT:      Head: Normocephalic.      Mouth/Throat:      Mouth: Mucous membranes are moist.   Eyes:      General: No scleral icterus.     Pupils: Pupils are equal, round, and reactive to light.   Neck:      Thyroid: No thyroid mass, thyromegaly or thyroid tenderness.   Cardiovascular:      Rate and Rhythm: Normal rate and regular rhythm.      Pulses:           Dorsalis pedis pulses are 1+ on the right side and 1+ on the left side.        Posterior tibial pulses are 1+ on the right side and 1+ on the left side.      Heart sounds: Murmur heard.      Systolic murmur is present with a grade of 3/6.   Pulmonary:      Effort: Pulmonary effort is normal. No respiratory distress.      Breath sounds: Normal breath sounds. No wheezing.   Abdominal:      General: Bowel sounds are normal.      Palpations: Abdomen is soft.   Musculoskeletal:         General: Normal range of motion.      Cervical back: Normal range of motion and neck supple.      Right lower leg: No edema.      Left lower leg: No edema.   Lymphadenopathy:      Cervical: No cervical adenopathy.   Skin:     General: Skin is warm and dry.      Coloration: Skin is not jaundiced or pale.   Neurological:      Mental Status: She is alert and oriented to person, place, and time.   Psychiatric:         Mood and Affect: Mood normal.         Behavior: Behavior normal.         Thought Content: Thought content normal.         Judgment: Judgment normal.       Physical Exam    General: No acute distress. Well-developed. Well-nourished.  Eyes: EOMI. Sclerae anicteric.  HENT: Normocephalic. Atraumatic. Nares patent. Moist oral mucosa.  Ears: Bilateral TMs clear. Bilateral EACs clear.  Cardiovascular: Regular rate. Regular rhythm. No murmurs. No rubs. No gallops. Normal S1, S2.  Respiratory: Normal  respiratory effort. Clear to auscultation bilaterally. No rales. No rhonchi. No wheezing.  Abdomen: Soft. Non-tender. Non-distended. Normoactive bowel sounds.  Musculoskeletal: No  obvious deformity.  Extremities: No lower extremity edema.  Neurological: Alert & oriented x3. No slurred speech. Normal gait.  Psychiatric: Normal mood. Normal affect. Good insight. Good judgment.  Skin: Warm. Dry. No rash.       Assessment:       Assessment & Plan    CHRONIC RESPIRATORY FAILURE WITH HYPOXIA:  - Lisa reports ongoing breathing difficulties and extreme fatigue.  - Observed slow improvement in the patient's condition, though not as significant as expected.  - Further evaluation may be necessary to address these persistent issues.  - Continued home health care and physical therapy for the patient.  - Scheduled follow-up visits with cardiology nurse practitioner and hematologist.    CHRONIC OBSTRUCTIVE PULMONARY DISEASE WITH (ACUTE) EXACERBATION:  - Lisa's breathing difficulties persist, with slow improvement observed.  - As these issues overlap with the chronic respiratory failure, the same approach will be taken: continued home health care and physical therapy, with potential further evaluation if improvement remains slow.  - Follow-up appointments scheduled with cardiology nurse practitioner and other relevant specialists to monitor progress and adjust treatment as needed.    POST-WATCHMAN PROCEDURE CARE:  - Noted recent implantation of Watchman device with significant bleeding from the groin site post-procedure.  - Applied pressure to stop the bleeding.  - Continued home health care for post-surgical care.    ANEMIA:  - Administered multiple blood transfusions to the patient.  - Monitored blood counts regularly, noting they are holding but not increasing significantly.  - Prescribed iron supplements 45 mg daily for 30 days due to previously reported low iron count.  - Advised taking stool softener and drinking  plenty of water with iron supplements.  - Consider further investigation if blood counts don't improve.  - Emphasized the importance of maintaining blood counts.    POTENTIAL GASTROINTESTINAL BLEEDING:  - Educated patient about the importance of identifying potential GI bleeding to improve energy levels and overall health.  - Considered GI consultation to investigate potential ongoing bleeding.    PNEUMONIA:  - Noted patient's history of hospitalization with pneumonia prior to the Watchman procedure.  - Confirmed administration of antibiotics during hospital stay.    FOLLOW UP:  - Scheduled follow-up visits with cardiology nurse practitioner, Dr. Carcamo (hematologist), and Dr. Ross.  - Deferred scheduling additional follow-ups pending these upcoming appointments.  - Lisa to contact the office if any new symptoms develop or condition worsens.  - Instructed patient to inform home health nurse not to draw any more blood samples without direct orders from this office.       Plan:       Hospital discharge follow-up   -Discharge and current medications have been reviewed and reconciled with the chart.     Acute blood loss anemia   -recent CBC stable/improving; f/u with hematology and GI as planned     Presence of Watchman left atrial appendage closure device   -scheduled for f/u appts     SSS (sick sinus syndrome)   -cont care with cardiology     Chronic respiratory failure with hypoxia   -on home O2; cont care with pulmonary     Follow up if symptoms worsen or fail to improve.    This note was generated with the assistance of ambient listening technology. Verbal consent was obtained by the patient and accompanying visitor(s) for the recording of patient appointment to facilitate this note. I attest to having reviewed and edited the generated note for accuracy, though some syntax or spelling errors may persist. Please contact the author of this note for any clarification.

## 2025-02-04 DIAGNOSIS — I50.43 ACUTE ON CHRONIC COMBINED SYSTOLIC AND DIASTOLIC CHF, NYHA CLASS 3: ICD-10-CM

## 2025-02-04 NOTE — TELEPHONE ENCOUNTER
----- Message from Reena sent at 2/4/2025  8:54 AM CST -----  Contact: PAtient  Type:  Needs Medical Advice    Who Called: Patient      Would the patient rather a call back or a response via [facility]? Call    Best Call Back Number: [phone]    Additional Information: sacubitriL-valsartan (ENTRESTO) 24-26 mg per tablet  Patient is requesting a call regarding this medication. Patient states that a response has not been given. Please call to advise

## 2025-02-04 NOTE — TELEPHONE ENCOUNTER
Spoke with Pt she states that she needs a yearly Rx for Entresto program. Pt states that she is almost out of medication.

## 2025-02-05 ENCOUNTER — TELEPHONE (OUTPATIENT)
Dept: CARDIOLOGY | Facility: CLINIC | Age: 75
End: 2025-02-05
Payer: MEDICARE

## 2025-02-05 RX ORDER — SACUBITRIL AND VALSARTAN 24; 26 MG/1; MG/1
1 TABLET, FILM COATED ORAL 2 TIMES DAILY
Qty: 180 TABLET | Refills: 3 | Status: SHIPPED | OUTPATIENT
Start: 2025-02-05 | End: 2026-02-05

## 2025-02-05 NOTE — TELEPHONE ENCOUNTER
----- Message from Madhuri Hunter NP sent at 2/5/2025  4:30 PM CST -----  Contact: PAtient  Hey what is the question?  Thanks  ----- Message -----  From: Dinora Land  Sent: 2/4/2025   3:34 PM CST  To: Madhuri Hunter NP    Please advise  ----- Message -----  From: Reena Reyes  Sent: 2/4/2025   8:55 AM CST  To: Beto Andrade Staff    Type:  Needs Medical Advice    Who Called: Patient      Would the patient rather a call back or a response via [facility]? Call    Best Call Back Number: [phone]    Additional Information: sacubitriL-valsartan (ENTRESTO) 24-26 mg per tablet  Patient is requesting a call regarding this medication. Patient states that a response has not been given. Please call to advise

## 2025-02-10 ENCOUNTER — TELEPHONE (OUTPATIENT)
Dept: CARDIOLOGY | Facility: CLINIC | Age: 75
End: 2025-02-10
Payer: MEDICARE

## 2025-02-10 NOTE — TELEPHONE ENCOUNTER
----- Message from Radha sent at 2/10/2025  9:45 AM CST -----  Regarding: advice  Type:  Needs Medical Advice    Who Called: pt    Best Call Back Number: [phone]      Additional Information: pt wants a callback from a nurse to discuss getting seen earlier. She st that she's having a lot of issues in can't wait till next week.  please call to discuss.

## 2025-02-10 NOTE — TELEPHONE ENCOUNTER
Pt states that she is having SOB when she exerts her self O2 drops into the 80's, retaining fluid, denies wanting to go to the ER, pt is scheduled fro 02/11/2025 at 11:00 with David Jose NP.

## 2025-02-11 ENCOUNTER — HOSPITAL ENCOUNTER (OUTPATIENT)
Dept: RADIOLOGY | Facility: HOSPITAL | Age: 75
Discharge: HOME | End: 2025-02-11
Payer: MEDICARE

## 2025-02-11 ENCOUNTER — TELEPHONE (OUTPATIENT)
Dept: CARDIOLOGY | Facility: CLINIC | Age: 75
End: 2025-02-11

## 2025-02-11 ENCOUNTER — OFFICE VISIT (OUTPATIENT)
Dept: CARDIOLOGY | Facility: CLINIC | Age: 75
End: 2025-02-11
Payer: MEDICARE

## 2025-02-11 VITALS
OXYGEN SATURATION: 92 % | SYSTOLIC BLOOD PRESSURE: 124 MMHG | DIASTOLIC BLOOD PRESSURE: 60 MMHG | BODY MASS INDEX: 29.49 KG/M2 | WEIGHT: 177 LBS | HEART RATE: 61 BPM | HEIGHT: 65 IN

## 2025-02-11 DIAGNOSIS — Z95.818 PRESENCE OF WATCHMAN LEFT ATRIAL APPENDAGE CLOSURE DEVICE: ICD-10-CM

## 2025-02-11 DIAGNOSIS — I48.91 ATRIAL FIBRILLATION, UNSPECIFIED TYPE: ICD-10-CM

## 2025-02-11 DIAGNOSIS — J18.9 PNEUMONIA DUE TO INFECTIOUS ORGANISM, UNSPECIFIED LATERALITY, UNSPECIFIED PART OF LUNG: ICD-10-CM

## 2025-02-11 DIAGNOSIS — R04.0 EPISTAXIS: ICD-10-CM

## 2025-02-11 DIAGNOSIS — I25.10 CORONARY ARTERY DISEASE INVOLVING NATIVE CORONARY ARTERY OF NATIVE HEART WITHOUT ANGINA PECTORIS: Chronic | ICD-10-CM

## 2025-02-11 DIAGNOSIS — R09.89 BILATERAL CAROTID BRUITS: ICD-10-CM

## 2025-02-11 DIAGNOSIS — I70.0 AORTIC ATHEROSCLEROSIS: ICD-10-CM

## 2025-02-11 DIAGNOSIS — I50.23 ACUTE ON CHRONIC HFREF (HEART FAILURE WITH REDUCED EJECTION FRACTION): Primary | ICD-10-CM

## 2025-02-11 DIAGNOSIS — Z95.2 S/P TAVR (TRANSCATHETER AORTIC VALVE REPLACEMENT): ICD-10-CM

## 2025-02-11 DIAGNOSIS — I50.43 ACUTE ON CHRONIC COMBINED SYSTOLIC AND DIASTOLIC CHF, NYHA CLASS 3: ICD-10-CM

## 2025-02-11 DIAGNOSIS — I50.23 ACUTE ON CHRONIC HFREF (HEART FAILURE WITH REDUCED EJECTION FRACTION): ICD-10-CM

## 2025-02-11 DIAGNOSIS — D62 ACUTE BLOOD LOSS ANEMIA: ICD-10-CM

## 2025-02-11 DIAGNOSIS — I65.29 OCCLUSION AND STENOSIS OF UNSPECIFIED CAROTID ARTERY: ICD-10-CM

## 2025-02-11 PROCEDURE — 71046 X-RAY EXAM CHEST 2 VIEWS: CPT | Mod: 26,,, | Performed by: RADIOLOGY

## 2025-02-11 PROCEDURE — 71046 X-RAY EXAM CHEST 2 VIEWS: CPT | Mod: TC

## 2025-02-11 PROCEDURE — 99999 PR PBB SHADOW E&M-EST. PATIENT-LVL IV: CPT | Mod: PBBFAC,HCNC,,

## 2025-02-11 RX ORDER — CLOPIDOGREL BISULFATE 75 MG/1
TABLET ORAL
Qty: 90 TABLET | Refills: 0 | Status: SHIPPED | OUTPATIENT
Start: 2025-02-11

## 2025-02-11 NOTE — ASSESSMENT & PLAN NOTE
Denies chest pain.  Reports shortness of breath at rest and with exertion.  Appears volume overloaded.  Repeat limited echocardiogram to evaluate TAVR, EF, and Watchman.  Continue Plavix and statin therapy.

## 2025-02-11 NOTE — ASSESSMENT & PLAN NOTE
Patient had hemoptysis after extubation from Watchman procedure.  She also had bleeding to right groin.  Was transfused 3 units of PRBC during admission.  Continues to have shortness of breath.  We will recheck CBC today.

## 2025-02-11 NOTE — ASSESSMENT & PLAN NOTE
S/p watchman device.  Patient complains of worsening shortness of breath.  Appears volume overloaded today in office.  Mild crackles to bilateral lower lobes.  1+ edema to bilateral lower extremities.  Check chest x-ray.  Repeat echocardiogram to evaluate EF, TAVR, and Watchman.  Rate controlled Afib today in office.  We will check digoxin level.  Continue digoxin 250 mcg daily for now.  On Plavix 75 mg daily.  Continue metoprolol succinate 25 mg daily.

## 2025-02-11 NOTE — ASSESSMENT & PLAN NOTE
Rule out with CXR.  Crackles noted to BLL.   Patient to increase Lasix to 40 mg daily x3 days then 20 mg daily thereafter.  Strict I&Os.  1.5 L fluid restriction.  2 g salt restriction.  Daily weights.

## 2025-02-11 NOTE — ASSESSMENT & PLAN NOTE
"Patient has Combined Systolic and Diastolic heart failure that is Acute on chronic. On presentation their CHF was decompensated. Evidence of decompensated CHF on presentation includes: edema, crackles on lung auscultation, weight gain, dyspnea on exertion (GAN), and shortness of breath. The etiology of their decompensation is likely dietary indiscretion and increased fluid intake. Most recent BNP and echo results are listed below.  No results for input(s): "BNP" in the last 72 hours.  Latest ECHO  Results for orders placed during the hospital encounter of 01/13/25    Echo    Interpretation Summary    Left Ventricle: Regional wall motion abnormalities present. Septal motion is consistent with pacing. There is moderately reduced systolic function with a visually estimated ejection fraction of 35 - 40%.    Left Atrium: Left atrium is dilated.    Right Atrium: Right atrium is dilated.    Pericardium: There is no pericardial effusion.    A limited echo was performed using limited 2D.         Plan  - Monitor strict I&Os and daily weights.    - Place on telemetry  - Low sodium diet  - Place on fluid restriction of 1.5 L.   - Cardiology has been consulted  - continue Entresto 24-26 mg b.i.d., metoprolol succinate 25 mg daily, and start Jardiance  - Take lasix 40 mg x 3 days then 20 mg thereafter. Patient to notify me on Friday if dyspnea.   She was taken off spironolactone during recent hospital admission for hyperkalemia  "

## 2025-02-11 NOTE — ASSESSMENT & PLAN NOTE
Acute on chronic HFrEF.  Check BMP, BNP, CBC, digoxin level.  Repeat limited echocardiogram for further evaluation of EF, TAVR, Watchman.  Increase Lasix to 40 mg daily x3 days then 20 mg daily thereafter.  Start Jardiance 10mg daily.  Strict I&Os.  1.5 L fluid restriction.  2 g salt restriction.  Epistaxis with Plavix.  It is mild at this time.  Continue to monitor for bleeding.

## 2025-02-11 NOTE — TELEPHONE ENCOUNTER
----- Message from Chun sent at 2/11/2025  3:20 PM CST -----  Regarding: return call  Contact: patient  Type:  Patient Returning Call    Who Called:patient  Who Left Message for Patient:nurse  Does the patient know what this is regarding?:medication NP David wanted her to take/ patient has medication at home but what amount do NP David want her to take?   Spirolactone 25 mg ( I think)  Would the patient rather a call back or a response via [facility]?   Best Call Back Number:[phone]  Additional Information: please call

## 2025-02-11 NOTE — ASSESSMENT & PLAN NOTE
Volume overloaded today in office.  Recheck echo for EF and TAVR and Watchman function.  2 g salt restriction.  1.5 L fluid restriction.  Systolic murmur auscultated  Increase Lasix to 40 mg x 3 days then 20 mg daily thereafter

## 2025-02-11 NOTE — PROGRESS NOTES
Subjective:    Patient ID:  Lisa Smith is a 74 y.o. female patient here for evaluation Hospital Follow Up, Shortness of Breath, Coronary Artery Disease, Fatigue, Leg Swelling, and Foot Swelling      History of Present Illness:         Patient is here today for hospital follow up.  Patient was admitted on 01/13/2025 and discharged on 01/15/2025.  She presented to [facility] for a Watchman procedure with Dr. Cantu.  Following a Watchman placement during extubation she coughed herself and to massive hemoptysis, venous blood per  Cough episode repeated after she was in the perioperative area resulting in hypotension.  She also had bleeding from the right groin site.  Hemoglobin dropped from 09/18 127.4 post procedure.  She was given 2 units of PRBC with 40 mg of IV Lasix.  Patient was also given IV fluid bolus for hypotension.  Her hemoglobin dropped further to 7.3 requiring 1/3 unit on 01/15/2024.  Hemoglobin increased to 8.5 thereafter.  Patient was continued on Entresto, Lasix, Plavix.  No evidence of pericardial effusion noted on echocardiogram on 01/15/2025.  Patient encouraged to continue with Plavix monotherapy and office follow up in 6 weeks with GRETCHEN in 3 months postprocedure.    2/11/25    Reports mild epistaxis from nose this am.  Reports lightheadedness at rest, worse with exertion.  Reports shortness of breath and lower extremity edema  Weight fluctuate from 173-177lbs  Denies CP, falls, jaw/neck/arm pain, and blood in urine or stool.    BP stable at home.  No hypotension noted.   She is eating well.   She is having difficulty sleeping- she gets up coughing and c/o congestion.  Phlegm is light yellow. Sleeps on 2 pillows- not new.  Wears 3 L NC all the time  She takes lasix 20 mg daily (1/2 tab).  She watches her salt and fluid intake closely.          Review of patient's allergies indicates:   Allergen Reactions    Sulfa (sulfonamide antibiotics) Itching       Past Medical History:    Diagnosis Date    Anticoagulant long-term use     Arthritis     Atrial fibrillation     Bell's palsy     Boil of buttock     burst 12/19/22    CHF (congestive heart failure)     Chronic cough     COPD (chronic obstructive pulmonary disease)     use O2  3l/m NC at night; also taking during day currently 12/4/23    Dizziness     Encounter for blood transfusion     GI bleed 2011    transfusion    H/O diverticulitis of colon     Hard of hearing     Heart murmur     Hematoma 02/2023    left hand    Heterozygous alpha 1-antitrypsin deficiency     History of home oxygen therapy     3L NC at night    Hypertension     Lung disease     copd    MONSERRAT (obstructive sleep apnea)     resolved with wt loss 131#    Pacemaker     Pneumonia     last episode 2019    Pulmonary edema     Skin cancer     Unspecified visual disturbance     episode of vision disturbance and dizziness...occasional recurrences     Past Surgical History:   Procedure Laterality Date    CARDIAC ELECTROPHYSIOLOGY STUDY AND ABLATION      CAROTID ENDARTERECTOMY Left 12/22/2022    Procedure: ENDARTERECTOMY-CAROTID;  Surgeon: Maximilian Connolly MD;  Location: [facility] OR;  Service: Peripheral Vascular;  Laterality: Left;    CAROTID STENT Left 2/29/2024    Procedure: INSERTION, STENT, ARTERY, CAROTID;  Surgeon: CIRILO Valdivia III, MD;  Location: [address];  Service: Vascular;  Laterality: Left;  left carotid artery stent placement  mgy  146.29   gymc2  8.0730  fluro time  4.8min    CARPAL TUNNEL RELEASE Left     CHOLECYSTECTOMY      CLOSURE OF LEFT ATRIAL APPENDAGE USING DEVICE Right 1/13/2025    Procedure: Left atrial appendage closure device;  Surgeon: Roxana Cantu MD;  Location: [facility] CATH/EP LAB;  Service: Cardiology;  Laterality: Right;    EYE SURGERY Bilateral     cataract    HYSTERECTOMY      INSERT / REPLACE / REMOVE PACEMAKER      KNEE ARTHROSCOPY Left     LEFT HEART CATHETERIZATION  11/1/2023    Procedure: Left heart cath;  Surgeon: Puma Shelton MD;   Location: [facility] CATH;  Service: Cardiology;;    REPLACEMENT OF PACEMAKER GENERATOR Left 2022    Procedure: REPLACEMENT, PACEMAKER GENERATOR;  Surgeon: Raymond Pérez MD;  Location: [facility] CATH/EP LAB;  Service: Cardiology;  Laterality: Left;    SKIN CANCER EXCISION      TRANSCATHETER AORTIC VALVE REPLACEMENT (TAVR) Bilateral 2023    Procedure: (TAVR);  Surgeon: Puma Shelton MD;  Location: [facility] CATH;  Service: Cardiology;  Laterality: Bilateral;    TRANSCATHETER AORTIC VALVE REPLACEMENT (TAVR) Bilateral 2023    Procedure: (TAVR) - Surgeon;  Surgeon: Maximilian Connolly MD;  Location: [facility] CATH;  Service: Peripheral Vascular;  Laterality: Bilateral;     Social History     Tobacco Use    Smoking status: Former     Current packs/day: 0.00     Average packs/day: 2.0 packs/day for 40.0 years (80.0 ttl pk-yrs)     Types: Cigarettes     Start date: 1971     Quit date: 2011     Years since quittin.0     Passive exposure: Past    Smokeless tobacco: Never    Tobacco comments:          Quit in    Substance Use Topics    Alcohol use: Not Currently     Alcohol/week: 8.0 standard drinks of alcohol     Types: 8 Glasses of wine per week    Drug use: No        Review of Systems:    As noted in HPI in addition      REVIEW OF SYSTEMS  CARDIOVASCULAR: No recent chest pain, palpitations, arm, neck, or jaw pain  RESPIRATORY: No recent fever, cough chills, SOB or congestion  : No blood in the urine  GI: No Nausea, vomiting, constipation, diarrhea, blood, or reflux.  MUSCULOSKELETAL: No myalgias  NEURO: No lightheadedness or dizziness  EYES: No Double vision, blurry, vision or headache              Objective        Vitals:    25 1041   BP: 124/60   Pulse: 61       LIPIDS - LAST 2   Lab Results   Component Value Date    CHOL 199 05/10/2022    CHOL 190 2020    HDL 59 05/10/2022    HDL 68 2020    LDLCALC 112.0 05/10/2022    LDLCALC 106.0 2020    TRIG 140 05/10/2022    TRIG 80  02/17/2020    CHOLHDL 29.6 05/10/2022    CHOLHDL 35.8 02/17/2020       CBC - LAST 2  Lab Results   Component Value Date    WBC 8.69 01/17/2025    WBC 8.41 01/15/2025    RBC 2.83 (L) 01/17/2025    RBC 2.74 (L) 01/15/2025    HGB 8.9 (L) 01/17/2025    HGB 8.5 (L) 01/15/2025    HCT 28.0 (L) 01/17/2025    HCT 26.5 (L) 01/15/2025    MCV 99 (H) 01/17/2025    MCV 97 01/15/2025    MCH 31.4 (H) 01/17/2025    MCH 31.0 01/15/2025    MCHC 31.8 (L) 01/17/2025    MCHC 32.1 01/15/2025    RDW 16.5 (H) 01/17/2025    RDW 17.3 (H) 01/15/2025     01/17/2025     01/15/2025    MPV 10.6 01/17/2025    MPV 10.7 01/15/2025    GRAN 7.1 01/17/2025    GRAN 81.8 (H) 01/17/2025    LYMPH 0.7 (L) 01/17/2025    LYMPH 8.4 (L) 01/17/2025    MONO 0.7 01/17/2025    MONO 7.7 01/17/2025    BASO 0.03 01/17/2025    BASO 0.02 01/15/2025    NRBC 0 01/17/2025    NRBC 0 01/15/2025       CHEMISTRY & LIVER FUNCTION - LAST 2  Lab Results   Component Value Date     01/15/2025     (L) 01/14/2025     (L) 01/14/2025    K 4.6 01/15/2025    K 4.7 01/14/2025    K 4.7 01/14/2025     01/15/2025    CL 99 01/14/2025    CL 99 01/14/2025    CO2 31 (H) 01/15/2025    CO2 31 (H) 01/14/2025    CO2 31 (H) 01/14/2025    ANIONGAP 5 (L) 01/15/2025    ANIONGAP 4 (L) 01/14/2025    ANIONGAP 4 (L) 01/14/2025    BUN 26 (H) 01/15/2025    BUN 31 (H) 01/14/2025    BUN 31 (H) 01/14/2025    CREATININE 0.6 01/15/2025    CREATININE 0.7 01/14/2025    CREATININE 0.7 01/14/2025    GLU 95 01/15/2025     (H) 01/14/2025     (H) 01/14/2025    CALCIUM 8.1 (L) 01/15/2025    CALCIUM 8.2 (L) 01/14/2025    CALCIUM 8.2 (L) 01/14/2025    PH 7.402 12/05/2024    MG 1.9 01/15/2025    MG 1.9 01/14/2025    ALBUMIN 3.3 (L) 01/15/2025    ALBUMIN 3.4 (L) 01/14/2025    PROT 5.5 (L) 01/15/2025    PROT 5.7 (L) 01/14/2025    ALKPHOS 53 (L) 01/15/2025    ALKPHOS 53 (L) 01/14/2025    ALT 6 (L) 01/15/2025    ALT 6 (L) 01/14/2025    AST 19 01/15/2025    AST 21 01/14/2025     BILITOT 0.5 01/15/2025    BILITOT 0.9 01/14/2025        CARDIAC PROFILE - LAST 2  Lab Results   Component Value Date     (H) 12/05/2024     (H) 07/12/2023    TROPONINI <0.030 11/25/2019        COAGULATION - LAST 2  Lab Results   Component Value Date    LABPT 23.0 (H) 01/23/2024    LABPT 23.1 (H) 01/23/2024    INR 1.0 01/09/2025    INR 1.0 12/18/2024    APTT 28.1 01/09/2025    APTT 26.5 12/18/2024       ENDOCRINE & PSA - LAST 2  Lab Results   Component Value Date    HGBA1C 5.2 02/17/2020    TSH 1.040 05/10/2022    TSH 0.630 02/17/2020    PROCAL 0.25 11/27/2019    PROCAL 0.40 11/25/2019        ECHOCARDIOGRAM RESULTS  Results for orders placed during the hospital encounter of 01/13/25    Echo    Interpretation Summary    Left Ventricle: Regional wall motion abnormalities present. Septal motion is consistent with pacing. There is moderately reduced systolic function with a visually estimated ejection fraction of 35 - 40%.    Left Atrium: Left atrium is dilated.    Right Atrium: Right atrium is dilated.    Pericardium: There is no pericardial effusion.    A limited echo was performed using limited 2D.      CURRENT/PREVIOUS VISIT EKG  Results for orders placed or performed in visit on 01/09/25   IN OFFICE EKG 12-LEAD (to Ong)    Collection Time: 01/09/25 12:33 PM   Result Value Ref Range    QRS Duration 178 ms    OHS QTC Calculation 442 ms    Narrative    Test Reason : I48.21,Z98.890,Z86.79,I35.0,Z95.2,I50.43,I25.10,I49.5,Z95.0,I65.29,Z98.890,    Vent. Rate :  61 BPM     Atrial Rate :  31 BPM     P-R Int :    ms          QRS Dur : 178 ms      QT Int : 440 ms       P-R-T Axes :    -80  86 degrees    QTcB Int : 442 ms    Atrial fibrillation with ventricular pacing  When compared with ECG of 05-Dec-2024 07:46,  No significant change was found  Confirmed by Preston Reagan ([id number]) on 1/10/2025 8:18:04 AM    Referred By:            Confirmed By: Preston Reagan     No valid procedures specified.   No results found  for this or any previous visit.    No valid procedures specified.    PHYSICAL EXAM  CONSTITUTIONAL: chronically ill appearing elderly female in no apparent distress  NECK: + bilateral carotid bruit, no JVD  LUNGS: Crackles BLL- mild  CHEST WALL: no tenderness  HEART: regular rate and rhythm, S1, S2 normal, holosystolic murmur,   ABDOMEN: soft, non-tender; bowel sounds normal;EXTREMITIES: Extremities normal, 1+ BLE edema  NEURO: AAO X 3    I HAVE REVIEWED :    The vital signs, nurses notes, and all the pertinent radiology and labs.        Current Outpatient Medications   Medication Instructions    acetaminophen (TYLENOL ARTHRITIS ORAL) 2 tablets, Daily PRN    albuterol (ACCUNEB) 1.25 mg/3 mL Nebu INHALE THE CONTENTS OF 1 VIAL VIA NEBULIZER EVERY 6 HOURS AS NEEDED FOR RESCUE    albuterol (PROVENTIL/VENTOLIN HFA) 90 mcg/actuation inhaler INHALE 2 PUFFS EVERY 6 HOURS AS NEEDED FOR WHEEZING (RESCUE)    albuterol-ipratropium (DUO-NEB) 2.5 mg-0.5 mg/3 mL nebulizer solution 3 mLs, Nebulization, Every 6 hours PRN, Rescue    clopidogreL (PLAVIX) 75 mg, Oral, Daily    digoxin (LANOXIN) 250 mcg tablet TAKE 1 TABLET EVERY DAY    empagliflozin (JARDIANCE) 10 mg, Oral, Daily    ezetimibe (ZETIA) 10 mg, Oral    fluticasone propionate (FLONASE) 50 mcg/actuation nasal spray 1 spray, Daily PRN    furosemide (LASIX) 20 mg, Oral, Daily, Hold if BP< 100/60    metoprolol succinate (TOPROL-XL) 25 mg, Oral, Every morning    pantoprazole (PROTONIX) 40 mg, Oral, Every morning    rOPINIRole (REQUIP) 1 mg, Oral, Nightly    rosuvastatin (CRESTOR) 40 mg, Oral, Nightly    sacubitriL-valsartan (ENTRESTO) 24-26 mg per tablet 1 tablet, Oral, 2 times daily    TRELEGY ELLIPTA 100-62.5-25 mcg DsDv INHALE 1 PUFF INTO THE LUNGS ONCE DAILY.          Assessment & Plan     Acute on chronic combined systolic and diastolic CHF, NYHA class 3  Patient has Combined Systolic and Diastolic heart failure that is Acute on chronic. On presentation their CHF was  "decompensated. Evidence of decompensated CHF on presentation includes: edema, crackles on lung auscultation, weight gain, dyspnea on exertion (GAN), and shortness of breath. The etiology of their decompensation is likely dietary indiscretion and increased fluid intake. Most recent BNP and echo results are listed below.  No results for input(s): "BNP" in the last 72 hours.  Latest ECHO  Results for orders placed during the hospital encounter of 01/13/25    Echo    Interpretation Summary    Left Ventricle: Regional wall motion abnormalities present. Septal motion is consistent with pacing. There is moderately reduced systolic function with a visually estimated ejection fraction of 35 - 40%.    Left Atrium: Left atrium is dilated.    Right Atrium: Right atrium is dilated.    Pericardium: There is no pericardial effusion.    A limited echo was performed using limited 2D.         Plan  - Monitor strict I&Os and daily weights.    - Place on telemetry  - Low sodium diet  - Place on fluid restriction of 1.5 L.   - Cardiology has been consulted  - continue Entresto 24-26 mg b.i.d., metoprolol succinate 25 mg daily, and start Jardiance  - Take lasix 40 mg x 3 days then 20 mg thereafter. Patient to notify me on Friday if dyspnea.   She was taken off spironolactone during recent hospital admission for hyperkalemia    Epistaxis  Mild epistaxis this am.   Continue to monitor  Recheck CBC  Continue Plavix for now.  Off Eliquis    Pneumonia  Rule out with CXR.  Crackles noted to BLL.   Patient to increase Lasix to 40 mg daily x3 days then 20 mg daily thereafter.  Strict I&Os.  1.5 L fluid restriction.  2 g salt restriction.  Daily weights.    Aortic atherosclerosis  Risk factor modification.  Continue low-sodium heart healthy diet.  Continue Zetia 10 mg daily, Crestor 40 mg daily.  Reduce saturated fats.    Atrial fibrillation  S/p watchman device.  Patient complains of worsening shortness of breath.  Appears volume overloaded " today in office.  Mild crackles to bilateral lower lobes.  1+ edema to bilateral lower extremities.  Check chest x-ray.  Repeat echocardiogram to evaluate EF, TAVR, and Watchman.  Rate controlled Afib today in office.  We will check digoxin level.  Continue digoxin 250 mcg daily for now.  On Plavix 75 mg daily.  Continue metoprolol succinate 25 mg daily.    Bilateral carotid bruits  Managed by vascular surgery.  Follows with Dr. Bahena    CAD (coronary artery disease)  Denies chest pain.  Reports shortness of breath at rest and with exertion.  Appears volume overloaded.  Repeat limited echocardiogram to evaluate TAVR, EF, and Watchman.  Continue Plavix and statin therapy.    Presence of Watchman left atrial appendage closure device  Acute on chronic HFrEF.  Check BMP, BNP, CBC, digoxin level.  Repeat limited echocardiogram for further evaluation of EF, TAVR, Watchman.  Increase Lasix to 40 mg daily x3 days then 20 mg daily thereafter.  Start Jardiance 10mg daily.  Strict I&Os.  1.5 L fluid restriction.  2 g salt restriction.  Epistaxis with Plavix.  It is mild at this time.  Continue to monitor for bleeding.      Occlusion and stenosis of unspecified carotid artery  Managed by vascular surgery.    Acute blood loss anemia  Patient had hemoptysis after extubation from Watchman procedure.  She also had bleeding to right groin.  Was transfused 3 units of PRBC during admission.  Continues to have shortness of breath.  We will recheck CBC today.    S/P TAVR (transcatheter aortic valve replacement)  Volume overloaded today in office.  Recheck echo for EF and TAVR and Watchman function.  2 g salt restriction.  1.5 L fluid restriction.  Systolic murmur auscultated  Increase Lasix to 40 mg x 3 days then 20 mg daily thereafter           No follow-ups on file.

## 2025-02-12 ENCOUNTER — PATIENT MESSAGE (OUTPATIENT)
Dept: CARDIOLOGY | Facility: CLINIC | Age: 75
End: 2025-02-12
Payer: MEDICARE

## 2025-02-12 ENCOUNTER — TELEPHONE (OUTPATIENT)
Dept: CARDIOLOGY | Facility: CLINIC | Age: 75
End: 2025-02-12
Payer: MEDICARE

## 2025-02-12 DIAGNOSIS — I50.23 ACUTE ON CHRONIC HFREF (HEART FAILURE WITH REDUCED EJECTION FRACTION): Primary | ICD-10-CM

## 2025-02-12 NOTE — TELEPHONE ENCOUNTER
----- Message from Rebeca Hernandez NP sent at 2/12/2025  4:52 PM CST -----  She does not need to take spironolactone. She was taken off of this for elevated potassium levels.      She can take the lasix 40 mg x 3 days then go back to 20 mg daily    I will place order for pharmacy assistance for Jardiance  ----- Message -----  From: Nidia Rossi MA  Sent: 2/11/2025   4:57 PM CST  To: Rebeca Hernandez NP    Hey this patient is stating that she can not afford the Jardiance  she also stated that she needed clarity in if she should be taking  the Lasix and spirolactone  for three days and then daily lasix please direct

## 2025-02-13 ENCOUNTER — TELEPHONE (OUTPATIENT)
Dept: PHARMACY | Facility: CLINIC | Age: 75
End: 2025-02-13
Payer: MEDICARE

## 2025-02-13 NOTE — LETTER
February 13, 2025    Lisa Smith  [address] [place] [address]             Dear Ms. Smith,      My name is Gely Montoya  I am reaching out on behalf of [facility]s Pharmacy Patient Assistance Team in regards to your request for medication assistance. Our goal is to assist qualified [facility] patients with obtaining financial assistance for prescribed medications.     Please note that enrollment into available support may require the following documents:      Proof of household Income (such as social security award letter, pension statement,1040)  Copy of all insurance cards (front and back)  Completed Medication Access Center Authorization Forms        Please reach out to my phone number below if you are still in need of assistance with your medications. Follow-up call will be made in 5 business days. We look forward to hearing from you soon!    Thank you for choosing [facility] for your healthcare needs      Sincerely  Gely BUSH @[phone]  Pharmacy Patient Assistance Team  [address] [place] [address]  [address] [place] [address]  Fax: [phone]  Email: [email or web address]

## 2025-02-13 NOTE — TELEPHONE ENCOUNTER
We have reached out to MsDennys Luis via letter and portal message  to inform her of the [employer] application process for Jardiance. A follow-up phone call will be made in 5 business days.    Gely Montoya  Pharmacy Patient Assistance Team

## 2025-02-14 ENCOUNTER — CLINICAL SUPPORT (OUTPATIENT)
Dept: CARDIOLOGY | Facility: CLINIC | Age: 75
End: 2025-02-14

## 2025-02-14 ENCOUNTER — HOSPITAL ENCOUNTER (OUTPATIENT)
Dept: CARDIOLOGY | Facility: CLINIC | Age: 75
Discharge: HOME | End: 2025-02-14
Attending: INTERNAL MEDICINE
Payer: MEDICARE

## 2025-02-14 DIAGNOSIS — R00.1 BRADYCARDIA, UNSPECIFIED: ICD-10-CM

## 2025-02-14 DIAGNOSIS — I50.23 ACUTE ON CHRONIC HFREF (HEART FAILURE WITH REDUCED EJECTION FRACTION): Primary | ICD-10-CM

## 2025-02-14 DIAGNOSIS — Z95.0 PRESENCE OF CARDIAC PACEMAKER: ICD-10-CM

## 2025-02-14 PROCEDURE — 93296 REM INTERROG EVL PM/IDS: CPT | Mod: HCNC,PN | Performed by: INTERNAL MEDICINE

## 2025-02-14 PROCEDURE — 93294 REM INTERROG EVL PM/LDLS PM: CPT | Mod: HCNC,S$GLB,, | Performed by: INTERNAL MEDICINE

## 2025-02-14 NOTE — TELEPHONE ENCOUNTER
Hello,       A Jardiance Assistance Application for Lisa Smith - MRN  [id number] was faxed to your office @[phone]. Please have Rebeca Hernandez NP review the application to ensure the prescription is correct. If correct, sign and fax the application back to the Pharmacy Patient Assistance Team @[phone]. Do not fax to the Manufacture Program      **Note** Please do not write any corrections on the application faxed to your office. If changes are needed, please respond to this message ASAP with your corrections. The corrected application will be faxed back to you for signature.      Thanks  Gely Montoya  Pharmacy Patient Assistance  [address] [place] [address]  [address] [place] [id number]  Fax: [phone]  Email: [email or web address]

## 2025-02-17 ENCOUNTER — TELEPHONE (OUTPATIENT)
Dept: FAMILY MEDICINE | Facility: CLINIC | Age: 75
End: 2025-02-17
Payer: MEDICARE

## 2025-02-17 NOTE — TELEPHONE ENCOUNTER
----- Message from Madhuri sent at 2/17/2025  6:55 AM CST -----  - 2/14-3:26 pm- mj with home health is calling about increased pain in her right lower extremity. Would like an ultrasound ordered. She also left a message with cardiology and has not heard from them. [phone]

## 2025-02-18 ENCOUNTER — OFFICE VISIT (OUTPATIENT)
Dept: CARDIOLOGY | Facility: CLINIC | Age: 75
End: 2025-02-18
Payer: MEDICARE

## 2025-02-18 VITALS
HEART RATE: 60 BPM | DIASTOLIC BLOOD PRESSURE: 62 MMHG | SYSTOLIC BLOOD PRESSURE: 120 MMHG | RESPIRATION RATE: 17 BRPM | HEIGHT: 65 IN | BODY MASS INDEX: 29.49 KG/M2 | WEIGHT: 177 LBS

## 2025-02-18 DIAGNOSIS — I48.11 LONGSTANDING PERSISTENT ATRIAL FIBRILLATION: ICD-10-CM

## 2025-02-18 DIAGNOSIS — Z98.62 H/O TRANSCAROTID ARTERY REVASCULARIZATION (TCAR): ICD-10-CM

## 2025-02-18 DIAGNOSIS — D62 ACUTE BLOOD LOSS ANEMIA: ICD-10-CM

## 2025-02-18 DIAGNOSIS — Z95.3 S/P TAVR (TRANSCATHETER AORTIC VALVE REPLACEMENT): ICD-10-CM

## 2025-02-18 DIAGNOSIS — I50.43 ACUTE ON CHRONIC COMBINED SYSTOLIC AND DIASTOLIC CHF, NYHA CLASS 3: Primary | ICD-10-CM

## 2025-02-18 PROCEDURE — 1160F RVW MEDS BY RX/DR IN RCRD: CPT | Mod: HCNC,CPTII,S$GLB, | Performed by: INTERNAL MEDICINE

## 2025-02-18 RX ORDER — TORSEMIDE 20 MG/1
20 TABLET ORAL DAILY
Qty: 30 TABLET | Refills: 11 | Status: SHIPPED | OUTPATIENT
Start: 2025-02-18 | End: 2025-02-21 | Stop reason: SDUPTHER

## 2025-02-18 RX ORDER — SPIRONOLACTONE 25 MG/1
25 TABLET ORAL DAILY
Qty: 90 TABLET | Refills: 3 | Status: SHIPPED | OUTPATIENT
Start: 2025-02-18 | End: 2026-02-18

## 2025-02-18 RX ORDER — EZETIMIBE 10 MG/1
10 TABLET ORAL
Qty: 90 TABLET | Refills: 3 | Status: SHIPPED | OUTPATIENT
Start: 2025-02-18

## 2025-02-18 NOTE — ASSESSMENT & PLAN NOTE
She has chronic atrial fibrillation maintain on Plavix she has a Watchman device place she is off oral anticoagulation therapy

## 2025-02-18 NOTE — ASSESSMENT & PLAN NOTE
"Patient has Combined Systolic and Diastolic heart failure that is Chronic. On presentation their CHF was well compensated. Most recent BNP and echo results are listed below.  No results for input(s): "BNP" in the last 72 hours.  Latest ECHO  Results for orders placed during the hospital encounter of 01/13/25    Echo    Interpretation Summary    Left Ventricle: Regional wall motion abnormalities present. Septal motion is consistent with pacing. There is moderately reduced systolic function with a visually estimated ejection fraction of 35 - 40%.    Left Atrium: Left atrium is dilated.    Right Atrium: Right atrium is dilated.    Pericardium: There is no pericardial effusion.    A limited echo was performed using limited 2D.    Current Heart Failure Medications  torsemide (DEMADEX) tablet, Daily, Oral  spironolactone (ALDACTONE) tablet, Daily, Oral    Plan  - Monitor strict I&Os and daily weights.    - Place on telemetry  - Low sodium diet  - Place on fluid restriction of 1.5 L.   - Cardiology has been consulted  - The patient's volume status is at their baseline  Recommend to continue on Entresto 24/26 mg p.o. b.i.d. added torsemide 20 mg daily and Aldactone 25 mg a day.  Discontinue Lasix continue to monitor weight and daily weight management.    Also monitor salt and fluid intake      "

## 2025-02-18 NOTE — PROGRESS NOTES
Subjective:    Patient ID:  Lisa Smith is a 74 y.o. female patient here for evaluation Follow-up (Pt states that she has been having SOB and toes going numb after having Cath done on 1/13/25, pt seen JOHAN Hernandez on 2/11/25 she adjusted meds due to retaining fluid, pt states that the SOB is improving. )      History of Present Illness:     Patient is a 74-year-old with history of nonischemic cardiomyopathy ejection fraction about 35-40% had chronic atrial fibrillation and AV node ablation has a functioning pacemaker in Situ had a battery change previously.  She had successful Watchman device placed and also has history of TAVR.  She reports so progressive episodes of shortness of breath even with minimal activity increased weight gain swelling in lower extremities and increased abdominal girth.  She is on 3 L of oxygen continuously.  Effort capacity is markedly reduced.  Denies having any palpitations  She has some dizziness and palpitations as well.      Review of patient's allergies indicates:   Allergen Reactions    Sulfa (sulfonamide antibiotics) Itching       Past Medical History:   Diagnosis Date    Anticoagulant long-term use     Arthritis     Atrial fibrillation     Bell's palsy     Boil of buttock     burst 12/19/22    CHF (congestive heart failure)     Chronic cough     COPD (chronic obstructive pulmonary disease)     use O2  3l/m NC at night; also taking during day currently 12/4/23    Dizziness     Encounter for blood transfusion     GI bleed 2011    transfusion    H/O diverticulitis of colon     Hard of hearing     Heart murmur     Hematoma 02/2023    left hand    Heterozygous alpha 1-antitrypsin deficiency     History of home oxygen therapy     3L NC at night    Hypertension     Lung disease     copd    MONSERRAT (obstructive sleep apnea)     resolved with wt loss 131#    Pacemaker     Pneumonia     last episode 2019    Pulmonary edema     Skin cancer     Unspecified visual disturbance     episode of  vision disturbance and dizziness...occasional recurrences     Past Surgical History:   Procedure Laterality Date    CARDIAC ELECTROPHYSIOLOGY STUDY AND ABLATION      CAROTID ENDARTERECTOMY Left 12/22/2022    Procedure: ENDARTERECTOMY-CAROTID;  Surgeon: Maximilian Connolly MD;  Location: [facility] OR;  Service: Peripheral Vascular;  Laterality: Left;    CAROTID STENT Left 2/29/2024    Procedure: INSERTION, STENT, ARTERY, CAROTID;  Surgeon: CIRILO Valdivia III, MD;  Location: [address];  Service: Vascular;  Laterality: Left;  left carotid artery stent placement  mgy  146.29   gymc2  8.0730  fluro time  4.8min    CARPAL TUNNEL RELEASE Left     CHOLECYSTECTOMY      CLOSURE OF LEFT ATRIAL APPENDAGE USING DEVICE Right 1/13/2025    Procedure: Left atrial appendage closure device;  Surgeon: Roxana Cantu MD;  Location: [facility] CATH/EP LAB;  Service: Cardiology;  Laterality: Right;    EYE SURGERY Bilateral     cataract    HYSTERECTOMY      INSERT / REPLACE / REMOVE PACEMAKER      KNEE ARTHROSCOPY Left     LEFT HEART CATHETERIZATION  11/1/2023    Procedure: Left heart cath;  Surgeon: Puma Shelton MD;  Location: [facility] CATH;  Service: Cardiology;;    REPLACEMENT OF PACEMAKER GENERATOR Left 01/20/2022    Procedure: REPLACEMENT, PACEMAKER GENERATOR;  Surgeon: Raymond Pérez MD;  Location: [facility] CATH/EP LAB;  Service: Cardiology;  Laterality: Left;    SKIN CANCER EXCISION      TRANSCATHETER AORTIC VALVE REPLACEMENT (TAVR) Bilateral 12/6/2023    Procedure: (TAVR);  Surgeon: Puma Shelton MD;  Location: [facility] CATH;  Service: Cardiology;  Laterality: Bilateral;    TRANSCATHETER AORTIC VALVE REPLACEMENT (TAVR) Bilateral 12/6/2023    Procedure: (TAVR) - Surgeon;  Surgeon: Maximilian Connolly MD;  Location: [facility] CATH;  Service: Peripheral Vascular;  Laterality: Bilateral;     Social History[1]     Review of Systems:    As noted in HPI in addition      REVIEW OF SYSTEMS  CARDIOVASCULAR: No recent chest pain, palpitations, arm, neck,  or jaw pain  RESPIRATORY: No recent fever, cough shortness of breath as in history   : No blood in the urine  GI: No Nausea, vomiting, constipation, diarrhea, blood, or reflux.  MUSCULOSKELETAL: No myalgias  NEURO:  Positive for dizziness and lightheadedness   EYES: No Double vision, blurry, vision or headache   Patient has been maintain on Entresto             Objective        Vitals:    02/18/25 1035   BP: 120/62   Pulse: 60   Resp: 17       LIPIDS - LAST 2   Lab Results   Component Value Date    CHOL 199 05/10/2022    CHOL 190 02/17/2020    HDL 59 05/10/2022    HDL 68 02/17/2020    LDLCALC 112.0 05/10/2022    LDLCALC 106.0 02/17/2020    TRIG 140 05/10/2022    TRIG 80 02/17/2020    CHOLHDL 29.6 05/10/2022    CHOLHDL 35.8 02/17/2020       CBC - LAST 2  Lab Results   Component Value Date    WBC 6.08 02/11/2025    WBC 8.69 01/17/2025    RBC 2.86 (L) 02/11/2025    RBC 2.83 (L) 01/17/2025    HGB 8.8 (L) 02/11/2025    HGB 8.9 (L) 01/17/2025    HCT 28.8 (L) 02/11/2025    HCT 28.0 (L) 01/17/2025     (H) 02/11/2025    MCV 99 (H) 01/17/2025    MCH 30.8 02/11/2025    MCH 31.4 (H) 01/17/2025    MCHC 30.6 (L) 02/11/2025    MCHC 31.8 (L) 01/17/2025    RDW 16.0 (H) 02/11/2025    RDW 16.5 (H) 01/17/2025     02/11/2025     01/17/2025    MPV 10.7 02/11/2025    MPV 10.6 01/17/2025    GRAN 4.9 02/11/2025    GRAN 79.9 (H) 02/11/2025    LYMPH 0.6 (L) 02/11/2025    LYMPH 10.5 (L) 02/11/2025    MONO 0.5 02/11/2025    MONO 7.6 02/11/2025    BASO 0.03 02/11/2025    BASO 0.03 01/17/2025    NRBC 0 02/11/2025    NRBC 0 01/17/2025       CHEMISTRY & LIVER FUNCTION - LAST 2  Lab Results   Component Value Date     01/15/2025     (L) 01/14/2025     (L) 01/14/2025    K 4.6 01/15/2025    K 4.7 01/14/2025    K 4.7 01/14/2025     01/15/2025    CL 99 01/14/2025    CL 99 01/14/2025    CO2 31 (H) 01/15/2025    CO2 31 (H) 01/14/2025    CO2 31 (H) 01/14/2025    ANIONGAP 5 (L) 01/15/2025    ANIONGAP 4 (L)  01/14/2025    ANIONGAP 4 (L) 01/14/2025    BUN 26 (H) 01/15/2025    BUN 31 (H) 01/14/2025    BUN 31 (H) 01/14/2025    CREATININE 0.6 01/15/2025    CREATININE 0.7 01/14/2025    CREATININE 0.7 01/14/2025    GLU 95 01/15/2025     (H) 01/14/2025     (H) 01/14/2025    CALCIUM 8.1 (L) 01/15/2025    CALCIUM 8.2 (L) 01/14/2025    CALCIUM 8.2 (L) 01/14/2025    PH 7.402 12/05/2024    MG 1.9 01/15/2025    MG 1.9 01/14/2025    ALBUMIN 3.3 (L) 01/15/2025    ALBUMIN 3.4 (L) 01/14/2025    PROT 5.5 (L) 01/15/2025    PROT 5.7 (L) 01/14/2025    ALKPHOS 53 (L) 01/15/2025    ALKPHOS 53 (L) 01/14/2025    ALT 6 (L) 01/15/2025    ALT 6 (L) 01/14/2025    AST 19 01/15/2025    AST 21 01/14/2025    BILITOT 0.5 01/15/2025    BILITOT 0.9 01/14/2025        CARDIAC PROFILE - LAST 2  Lab Results   Component Value Date     (H) 02/11/2025     (H) 12/05/2024    TROPONINI <0.030 11/25/2019        COAGULATION - LAST 2  Lab Results   Component Value Date    LABPT 23.0 (H) 01/23/2024    LABPT 23.1 (H) 01/23/2024    INR 1.0 01/09/2025    INR 1.0 12/18/2024    APTT 28.1 01/09/2025    APTT 26.5 12/18/2024       ENDOCRINE & PSA - LAST 2  Lab Results   Component Value Date    HGBA1C 5.2 02/17/2020    TSH 1.062 02/11/2025    TSH 1.040 05/10/2022    PROCAL 0.25 11/27/2019    PROCAL 0.40 11/25/2019        ECHOCARDIOGRAM RESULTS  Results for orders placed during the hospital encounter of 01/13/25    Echo    Interpretation Summary    Left Ventricle: Regional wall motion abnormalities present. Septal motion is consistent with pacing. There is moderately reduced systolic function with a visually estimated ejection fraction of 35 - 40%.    Left Atrium: Left atrium is dilated.    Right Atrium: Right atrium is dilated.    Pericardium: There is no pericardial effusion.    A limited echo was performed using limited 2D.      CURRENT/PREVIOUS VISIT EKG  Results for orders placed or performed in visit on 01/09/25   IN OFFICE EKG 12-LEAD (to Muse)     Collection Time: 01/09/25 12:33 PM   Result Value Ref Range    QRS Duration 178 ms    OHS QTC Calculation 442 ms    Narrative    Test Reason : I48.21,Z98.890,Z86.79,I35.0,Z95.2,I50.43,I25.10,I49.5,Z95.0,I65.29,Z98.890,    Vent. Rate :  61 BPM     Atrial Rate :  31 BPM     P-R Int :    ms          QRS Dur : 178 ms      QT Int : 440 ms       P-R-T Axes :    -80  86 degrees    QTcB Int : 442 ms    Atrial fibrillation with ventricular pacing  When compared with ECG of 05-Dec-2024 07:46,  No significant change was found  Confirmed by Preston Reagan ([id number]) on 1/10/2025 8:18:04 AM    Referred By:            Confirmed By: Preston Reagan     No valid procedures specified.   No results found for this or any previous visit.    No valid procedures specified.    PHYSICAL EXAM  CONSTITUTIONAL: Well built, well nourished in no apparent distress  NECK: no carotid bruit, no JVD  LUNGS: CTA  CHEST WALL: no tenderness  HEART: regular rate and rhythm, S1, S2 normal, no murmur, click, rub or gallop   ABDOMEN: soft, non-tender; bowel sounds normal; no masses,  no organomegaly  EXTREMITIES: Extremities normal, no edema, no calf tenderness noted  NEURO: AAO X 3    I HAVE REVIEWED :    The vital signs, nurses notes, and all the pertinent radiology and labs.        Current Outpatient Medications   Medication Instructions    acetaminophen (TYLENOL ARTHRITIS ORAL) 2 tablets, Daily PRN    albuterol (ACCUNEB) 1.25 mg/3 mL Nebu INHALE THE CONTENTS OF 1 VIAL VIA NEBULIZER EVERY 6 HOURS AS NEEDED FOR RESCUE    albuterol (PROVENTIL/VENTOLIN HFA) 90 mcg/actuation inhaler INHALE 2 PUFFS EVERY 6 HOURS AS NEEDED FOR WHEEZING (RESCUE)    albuterol-ipratropium (DUO-NEB) 2.5 mg-0.5 mg/3 mL nebulizer solution 3 mLs, Nebulization, Every 6 hours PRN, Rescue    clopidogreL (PLAVIX) 75 mg tablet qd    digoxin (LANOXIN) 250 mcg tablet TAKE 1 TABLET EVERY DAY    empagliflozin (JARDIANCE) 10 mg, Oral, Daily    ezetimibe (ZETIA) 10 mg, Oral    fluticasone  "propionate (FLONASE) 50 mcg/actuation nasal spray 1 spray, Daily PRN    metoprolol succinate (TOPROL-XL) 25 mg, Oral, Every morning    pantoprazole (PROTONIX) 40 mg, Oral, Every morning    rOPINIRole (REQUIP) 1 mg, Oral, Nightly    rosuvastatin (CRESTOR) 40 mg, Oral, Nightly    sacubitriL-valsartan (ENTRESTO) 24-26 mg per tablet 1 tablet, Oral, 2 times daily    spironolactone (ALDACTONE) 25 mg, Oral, Daily    torsemide (DEMADEX) 20 mg, Oral, Daily    TRELEGY ELLIPTA 100-62.5-25 mcg DsDv INHALE 1 PUFF INTO THE LUNGS ONCE DAILY.          Assessment & Plan   74-year-old with complex medical problems including COPD home oxygen dependency 3 L, LV dysfunction ejection fraction 35-40%   Has a functioning pacemaker in Situ with wide complex EKG tracings  She is pacemaker dependent after AV node ablation.  She is now noted to have progressive symptoms of cardiac decompensation and heart failure decompensation with retention of fluid  Recommend to optimize medical therapy  Consider evaluation for biventricular pacemaker evaluation.        1. Acute on chronic combined systolic and diastolic CHF, NYHA class 3  Assessment & Plan:  Patient has Combined Systolic and Diastolic heart failure that is Chronic. On presentation their CHF was well compensated. Most recent BNP and echo results are listed below.  No results for input(s): "BNP" in the last 72 hours.  Latest ECHO  Results for orders placed during the hospital encounter of 01/13/25    Echo    Interpretation Summary    Left Ventricle: Regional wall motion abnormalities present. Septal motion is consistent with pacing. There is moderately reduced systolic function with a visually estimated ejection fraction of 35 - 40%.    Left Atrium: Left atrium is dilated.    Right Atrium: Right atrium is dilated.    Pericardium: There is no pericardial effusion.    A limited echo was performed using limited 2D.    Current Heart Failure Medications  torsemide (DEMADEX) tablet, Daily, " Oral  spironolactone (ALDACTONE) tablet, Daily, Oral    Plan  - Monitor strict I&Os and daily weights.    - Place on telemetry  - Low sodium diet  - Place on fluid restriction of 1.5 L.   - Cardiology has been consulted  - The patient's volume status is at their baseline  Recommend to continue on Entresto 24/26 mg p.o. b.i.d. added torsemide 20 mg daily and Aldactone 25 mg a day.  Discontinue Lasix continue to monitor weight and daily weight management.    Also monitor salt and fluid intake          2. Longstanding persistent atrial fibrillation  Assessment & Plan:  She has chronic atrial fibrillation maintain on Plavix she has a Watchman device place she is off oral anticoagulation therapy      3. H/O transcarotid artery revascularization (TCAR)  Assessment & Plan:  Neurologically stable continue on Plavix therapy      4. S/P TAVR (transcatheter aortic valve replacement)  Assessment & Plan:  Status post TAVR clinically stable LV dysfunction is noted      5. Acute blood loss anemia  Assessment & Plan:  Chronic anemia and chronic blood loss noted.  Required transfusions in the past she is maintaining with a hemoglobin of over 8 g.  Defer to Hematology for further intervention needed  Low threshold for Iron transfusion if deficiency is present      Other orders  -     torsemide (DEMADEX) 20 MG Tab; Take 1 tablet (20 mg total) by mouth once daily.  Dispense: 30 tablet; Refill: 11  -     spironolactone (ALDACTONE) 25 MG tablet; Take 1 tablet (25 mg total) by mouth once daily.  Dispense: 90 tablet; Refill: 3          No follow-ups on file.            [1]   Social History  Tobacco Use    Smoking status: Former     Current packs/day: 0.00     Average packs/day: 2.0 packs/day for 40.0 years (80.0 ttl pk-yrs)     Types: Cigarettes     Start date: 1971     Quit date: 2011     Years since quittin.0     Passive exposure: Past    Smokeless tobacco: Never    Tobacco comments:          Quit in    Substance Use  Topics    Alcohol use: Not Currently     Alcohol/week: 8.0 standard drinks of alcohol     Types: 8 Glasses of wine per week    Drug use: No

## 2025-02-18 NOTE — ASSESSMENT & PLAN NOTE
Chronic anemia and chronic blood loss noted.  Required transfusions in the past she is maintaining with a hemoglobin of over 8 g.  Defer to Hematology for further intervention needed  Low threshold for Iron transfusion if deficiency is present

## 2025-02-20 NOTE — TELEPHONE ENCOUNTER
----- Message from Radha sent at 2/20/2025 12:18 PM CST -----  Regarding: advice  Type:  Needs Medical AdviceWho Called: ptBest Call Back Number: [phone] Information: pt st that she wants a callback from a nurse to discuss getting her torsemide (DEMADEX) 20 MG Tab. Sent to [facility] moving forward with a [employer].  please call to discuss.

## 2025-02-21 ENCOUNTER — TELEPHONE (OUTPATIENT)
Dept: FAMILY MEDICINE | Facility: CLINIC | Age: 75
End: 2025-02-21
Payer: MEDICARE

## 2025-02-21 NOTE — TELEPHONE ENCOUNTER
----- Message from Vani sent at 2/21/2025 10:59 AM CST -----  Bhavana called from [facility] The patient is having increase pain to her RT foot. She has a pressure area on her RT  foot. Bhavana's # [phone] GH

## 2025-02-21 NOTE — TELEPHONE ENCOUNTER
Returned call to Bhavana at home health she stated that the patient was complaining of pain in her right foot; .The pain is a chronic pain that comes and goes according to the patient. Bhavana stated there is a faint pulse and the right foot is slightly discolored but patient states that is normal her. Patient states the pain has increased lately; there is a stage 1 pressure area on her right foot but she is not sure if that is the cause of the pain. The patient has recently had the Watchman procedure and has seen cardio this week. Bhavana stated that they did not seam too concerned with it at the cardiologist appointment. Patient was advised by Bhavana to call and make a sooner appointment with JOHAN Tang but patient has not done so as of yet. Her next appointment is 5/13/25.  Please advise

## 2025-02-21 NOTE — TELEPHONE ENCOUNTER
Called Bhavana with [facility] to advise patient needs to be seen early as part of next week wPatient needs to be seen early as part of next week with Vani. If the foot becomes cold and does not have capillary refill she will need to go to the ER for evaluation.    Patient : David Sigala Age: 64 year old Sex: male   MRN: [id number] Encounter Date: 10/4/2021      History     Chief Complaint   Patient presents with   • Back Pain     HPI    Patient is a 64-year-old male with hypercholesterolemia, hypertension presenting to the emergency department for approximately 1 to 2 weeks of left lower back pain with burning sensation to the left buttocks and around the left anterior thigh.  Patient notes no trauma, patient has not fallen.  Patient does have a history of back pain years ago however does not have chronic back pain at baseline.  Patient denies any urinary or fecal incontinence or difficulty urinating, denies any weakness or numbness in his legs, patient does have pain when ambulating however he is able to ambulate around his house normally still.  Denies any fevers or chills, denies any IV drug use.    No Known Allergies    Current Discharge Medication List          Current Discharge Medication List      New Prescriptions    Details   lidocaine (LIDOCARE) 4 % patch Place 1 patch onto the skin every 24 hours.  Qty: 10 patch, Refills: 0             PMH: As per HPI    Sugical Hx: None pertinent    Fhx: None pertinent    Social Hx: None pertinent    Review of Systems   Constitutional: Negative for chills and fever.   HENT: Negative for voice change.    Respiratory: Negative for cough and shortness of breath.    Cardiovascular: Negative for chest pain.   Gastrointestinal: Negative for abdominal pain.   Genitourinary: Negative for difficulty urinating.   Musculoskeletal: Positive for back pain. Negative for arthralgias, gait problem, joint swelling, myalgias and neck pain.   Skin: Negative for pallor, rash and wound.   Neurological: Negative for dizziness, weakness, numbness and headaches.   Psychiatric/Behavioral: Negative for confusion. The patient is not nervous/anxious.    All other systems reviewed and are negative.      Physical Exam     ED Triage Vitals [10/04/21 0943]   ED  Triage Vitals Group      Temp 98.2 °F (36.8 °C)      Heart Rate 61      Resp 20      BP (!) 188/81      SpO2 96 %      EtCO2 mmHg       Height 5' 11\" (1.803 m)      Weight 250 lb (113.4 kg)      Weight Scale Used ED Stated      BMI (Calculated) 34.87      IBW/kg (Calculated) 75.3       Physical Exam  Vitals and nursing note reviewed.   Constitutional:       Appearance: Normal appearance.   HENT:      Head: Normocephalic and atraumatic.      Right Ear: External ear normal.      Left Ear: External ear normal.      Nose: Nose normal.      Mouth/Throat:      Mouth: Mucous membranes are moist.   Eyes:      Conjunctiva/sclera: Conjunctivae normal.   Cardiovascular:      Rate and Rhythm: Normal rate.      Pulses: Normal pulses.      Comments: 2+ DP pulses bilaterally  Pulmonary:      Effort: Pulmonary effort is normal. No respiratory distress.   Abdominal:      General: Abdomen is flat.      Palpations: Abdomen is soft.   Musculoskeletal:         General: No deformity.      Cervical back: Neck supple.      Comments: Left paraspinal lumbar back pain to palpation.  No midline tenderness to the thoracic down to the lumbar and sacral area.  No step-offs.   Skin:     General: Skin is warm and dry.   Neurological:      General: No focal deficit present.      Mental Status: He is alert. Mental status is at baseline.      Comments: Patient with intact 5 out of 5 muscle strength in bilateral lower extremities to foot plantar and dorsal flexion, knee flexion and extension.  Intact sensation to the bilateral legs throughout   Psychiatric:         Mood and Affect: Mood normal.         Behavior: Behavior normal.             Lab Results     No results found for this visit on 10/04/21.      Radiology Results     Imaging Results    None         ED Medication Orders (From admission, onward)    Ordered Start     Status Ordering Provider    10/04/21 1123 10/04/21 1130  lidocaine (LIDOCARE) 4 % patch 1 patch  ONCE         Last MAR action:  Patch Applied CHOLO RODRIGUEZ    10/04/21 1123 10/04/21 1130  acetaminophen (TYLENOL) tablet 650 mg  ONCE         Last MAR action: Given CHOLO RODRIGUEZ    10/04/21 1123 10/04/21 1130  ibuprofen (MOTRIN) tablet 400 mg  ONCE         Last MAR action: Given CHOLO RODRIGUEZ            [facility]    Patient presents to the emergency department for 2 weeks of nontraumatic left lower back pain.  Patient is ambulatory in the emergency department though he is wheeled in with a wheelchair secondary to pain.  Patient with a normal neurovascular exam in the bilateral lower extremities with intact sensation throughout.  Patient with no red flag symptoms including no urinary or fecal incontinence or difficulty urinating, no saddle anesthesia, no fevers or chills.  Patient is not an IV drug user.  Patient has reproducible left lower lumbar spinal tenderness with radiculopathy down the left leg and a sciatica distribution.  Patient with no focal findings on neurovascular exam, patient with no midline tenderness concerning for pathologic fracture or spinal cord impingement.  Physical therapy evaluate the patient after he was pain controlled with the Lidoderm patch, ibuprofen and Tylenol.  Patient will follow up with physical therapy outpatient, patient was given back exercises and will be discharged home.  All this was explained to his daughter who served as an  and the patient will ultimately be discharged home with outpatient physical therapy follow-up as well as with his primary doctor with return precautions discussed    ED Course            Procedures    Clinical Impression     ED Diagnosis   1. Lumbosacral strain, initial encounter  SERVICE TO PHYSICAL THERAPY       Disposition        Discharge after Treatment 10/4/2021 12:44 PM  After physical therapy Recs                     Cholo Rodriguez DO  Resident  10/04/21 [id number]

## 2025-02-21 NOTE — TELEPHONE ENCOUNTER
Called and spoke with patient regarding her right foot and told patient  she needs to be seen early as part of next week with Vani. If the foot becomes cold and does not have capillary refill she will need to go to the ER for evaluation. Patient expressed verbal understanding.  I did schedule the patient to be seen by JOHAN Tang on Monday.

## 2025-02-22 RX ORDER — TORSEMIDE 20 MG/1
20 TABLET ORAL DAILY
Qty: 90 TABLET | Refills: 3 | Status: SHIPPED | OUTPATIENT
Start: 2025-02-22 | End: 2026-02-22

## 2025-02-24 ENCOUNTER — OFFICE VISIT (OUTPATIENT)
Dept: FAMILY MEDICINE | Facility: CLINIC | Age: 75
End: 2025-02-24
Payer: MEDICARE

## 2025-02-24 VITALS
OXYGEN SATURATION: 92 % | HEART RATE: 61 BPM | HEIGHT: 65 IN | SYSTOLIC BLOOD PRESSURE: 114 MMHG | DIASTOLIC BLOOD PRESSURE: 59 MMHG | WEIGHT: 171.63 LBS | BODY MASS INDEX: 28.6 KG/M2 | TEMPERATURE: 98 F | RESPIRATION RATE: 20 BRPM

## 2025-02-24 DIAGNOSIS — R20.2 PARESTHESIA OF RIGHT FOOT: ICD-10-CM

## 2025-02-24 DIAGNOSIS — R09.89 DIMINISHED PULSES IN LOWER EXTREMITY: ICD-10-CM

## 2025-02-24 DIAGNOSIS — M79.671 ACUTE FOOT PAIN, RIGHT: Primary | ICD-10-CM

## 2025-02-24 PROCEDURE — 99999 PR PBB SHADOW E&M-EST. PATIENT-LVL V: CPT | Mod: PBBFAC,HCNC,, | Performed by: NURSE PRACTITIONER

## 2025-02-24 NOTE — PROGRESS NOTES
SUBJECTIVE:      Patient ID: Lisa Smith is a 74 y.o. female.    Chief Complaint: Foot Pain (Right x 3 months)    History of Present Illness    CHIEF COMPLAINT:  Lisa presents with pain, swelling, and discoloration in the right foot, accompanied by numbness and tingling.    HPI:  Lisa reports issues with her right foot for several years, initially describing it as very sensitive. The condition has significantly worsened over the last few weeks. She now has severe pain in the right foot, particularly at night, which often wakes her up and requires her to walk around for relief. The pain is localized from the front of the foot to varying locations of discomfort.    She reports swelling in the right foot, necessitating the purchase of wider shoes for comfort. Visible discoloration on the right foot has been present for approximately 2-3 months. She has numbness and tingling in the affected foot.    The condition impacts her sleep, as she has restless legs and typically sleeps on her right side. She has two painful spots on the side of the foot. Her toes sometimes become numb.    She was recently evaluated by her cardiologist, who changed her fluid medication in response to the swelling. Since starting the new medication last week, she has lost 7 lbs of fluid, which has improved her breathing.    The left foot occasionally has numbness but does not have the same level of pain or visible symptoms as the right foot. She denies pain in the calf when walking.      MEDICAL HISTORY:  Lisa has a history of restless leg syndrome. She also has a heart condition that required a pacemaker, a TAVR procedure, and a Watchman device.    FAMILY HISTORY:  Family history is significant for mother who had very small veins and lost both legs due to vascular issues. Her mother was also a smoker.    SURGICAL HISTORY:  Lisa underwent pacemaker implantation performed by Dr. Moreau. She also had a TAVR (Transcatheter  Aortic Valve Replacement) and Watchman device implantation, both performed.     SOCIAL HISTORY:  Smoking: Quit, last smoked many years ago          Family History   Problem Relation Name Age of Onset    Kidney failure Mother      Cancer Father      Cancer Brother        Social History     Socioeconomic History    Marital status:     Number of children: 1   Occupational History    Occupation: RETIRED     Comment: [employer]   Tobacco Use    Smoking status: Former     Current packs/day: 0.00     Average packs/day: 2.0 packs/day for 40.0 years (80.0 ttl pk-yrs)     Types: Cigarettes     Start date: 1971     Quit date: 2011     Years since quittin.0     Passive exposure: Past    Smokeless tobacco: Never    Tobacco comments:          Quit in    Substance and Sexual Activity    Alcohol use: Not Currently     Alcohol/week: 8.0 standard drinks of alcohol     Types: 8 Glasses of wine per week    Drug use: No    Sexual activity: Never     Social Drivers of Health     Financial Resource Strain: Patient Declined (2025)    Overall Financial Resource Strain (CARDIA)     Difficulty of Paying Living Expenses: Patient declined   Recent Concern: Financial Resource Strain - High Risk (2024)    Overall Financial Resource Strain (CARDIA)     Difficulty of Paying Living Expenses: Hard   Food Insecurity: Patient Declined (2025)    Hunger Vital Sign     Worried About Running Out of Food in the Last Year: Patient declined     Ran Out of Food in the Last Year: Patient declined   Recent Concern: Food Insecurity - Food Insecurity Present (2024)    Hunger Vital Sign     Worried About Running Out of Food in the Last Year: Sometimes true     Ran Out of Food in the Last Year: Never true   Transportation Needs: Patient Declined (2025)    TRANSPORTATION NEEDS     Transportation : Patient declined   Physical Activity: Insufficiently Active (2024)    Exercise Vital Sign     Days of  Exercise per Week: 1 day     Minutes of Exercise per Session: 10 min   Stress: Patient Declined (1/14/2025)    [employer] [place] [facility] [employer] - Occupational Stress Questionnaire     Feeling of Stress : Patient declined   Housing Stability: Patient Declined (1/14/2025)    Housing Stability Vital Sign     Unable to Pay for Housing in the Last Year: Patient declined     Homeless in the Last Year: Patient declined     Current Medications[1]  Review of patient's allergies indicates:   Allergen Reactions    Sulfa (sulfonamide antibiotics) Itching      Past Medical History:   Diagnosis Date    Anticoagulant long-term use     Arthritis     Atrial fibrillation     Bell's palsy     Boil of buttock     burst 12/19/22    CHF (congestive heart failure)     Chronic cough     COPD (chronic obstructive pulmonary disease)     use O2  3l/m NC at night; also taking during day currently 12/4/23    Dizziness     Encounter for blood transfusion     GI bleed 2011    transfusion    H/O diverticulitis of colon     Hard of hearing     Heart murmur     Hematoma 02/2023    left hand    Heterozygous alpha 1-antitrypsin deficiency     History of home oxygen therapy     3L NC at night    Hypertension     Lung disease     copd    MONSERRAT (obstructive sleep apnea)     resolved with wt loss 131#    Pacemaker     Pneumonia     last episode 2019    Pulmonary edema     Skin cancer     Unspecified visual disturbance     episode of vision disturbance and dizziness...occasional recurrences     Past Surgical History:   Procedure Laterality Date    CARDIAC ELECTROPHYSIOLOGY STUDY AND ABLATION      CAROTID ENDARTERECTOMY Left 12/22/2022    Procedure: ENDARTERECTOMY-CAROTID;  Surgeon: Maximilian Connolly MD;  Location: [facility];  Service: Peripheral Vascular;  Laterality: Left;    CAROTID STENT Left 2/29/2024    Procedure: INSERTION, STENT, ARTERY, CAROTID;  Surgeon: CIRILO Valdivia III, MD;  Location: [address];  Service: Vascular;  Laterality:  Left;  left carotid artery stent placement  mgy  146.29   gymc2  8.0730  fluro time  4.8min    CARPAL TUNNEL RELEASE Left     CHOLECYSTECTOMY      CLOSURE OF LEFT ATRIAL APPENDAGE USING DEVICE Right 1/13/2025    Procedure: Left atrial appendage closure device;  Surgeon: Roxana Cantu MD;  Location: [facility] CATH/EP LAB;  Service: Cardiology;  Laterality: Right;    EYE SURGERY Bilateral     cataract    HYSTERECTOMY      INSERT / REPLACE / REMOVE PACEMAKER      KNEE ARTHROSCOPY Left     LEFT HEART CATHETERIZATION  11/1/2023    Procedure: Left heart cath;  Surgeon: Puma Shelton MD;  Location: [facility] CATH;  Service: Cardiology;;    REPLACEMENT OF PACEMAKER GENERATOR Left 01/20/2022    Procedure: REPLACEMENT, PACEMAKER GENERATOR;  Surgeon: Raymond Pérez MD;  Location: [facility] CATH/EP LAB;  Service: Cardiology;  Laterality: Left;    SKIN CANCER EXCISION      TRANSCATHETER AORTIC VALVE REPLACEMENT (TAVR) Bilateral 12/6/2023    Procedure: (TAVR);  Surgeon: Puma Shelton MD;  Location: [facility] CATH;  Service: Cardiology;  Laterality: Bilateral;    TRANSCATHETER AORTIC VALVE REPLACEMENT (TAVR) Bilateral 12/6/2023    Procedure: (TAVR) - Surgeon;  Surgeon: Maximilian Connolly MD;  Location: [facility] CATH;  Service: Peripheral Vascular;  Laterality: Bilateral;       Review of Systems   Constitutional:  Negative for activity change, appetite change, chills, fatigue, fever and unexpected weight change.   HENT:  Negative for congestion and trouble swallowing.    Eyes:  Negative for discharge.   Respiratory:  Negative for cough and shortness of breath.    Cardiovascular:  Positive for leg swelling (improved). Negative for chest pain and palpitations.   Gastrointestinal:  Negative for abdominal pain, diarrhea, nausea and vomiting.   Endocrine: Negative for polydipsia and polyuria.   Musculoskeletal:  Positive for arthralgias and myalgias. Negative for back pain, joint swelling and neck pain.   Skin:  Positive for color change (right foot).  "Negative for pallor, rash and wound.   Allergic/Immunologic: Negative for environmental allergies.   Neurological:  Positive for weakness and numbness. Negative for dizziness and headaches.   Hematological:  Negative for adenopathy. Bruises/bleeds easily.   Psychiatric/Behavioral:  Negative for confusion, dysphoric mood and sleep disturbance. The patient is not nervous/anxious.       OBJECTIVE:      Vitals:    02/24/25 1059   BP: (!) 114/59   BP Location: Left arm   Patient Position: Sitting   Pulse: 61   Resp: 20   Temp: 97.5 °F (36.4 °C)   TempSrc: Oral   SpO2: (!) 92%   Weight: 77.8 kg (171 lb 9.6 oz)   Height: 5' 5" (1.651 m)     Physical Exam  Vitals and nursing note reviewed.   Constitutional:       General: She is not in acute distress.     Appearance: Normal appearance. She is well-developed. She is obese. She is not ill-appearing.   HENT:      Head: Normocephalic.      Mouth/Throat:      Mouth: Mucous membranes are moist.   Eyes:      Pupils: Pupils are equal, round, and reactive to light.   Neck:      Thyroid: No thyroid mass, thyromegaly or thyroid tenderness.   Cardiovascular:      Rate and Rhythm: Normal rate and regular rhythm.      Pulses:           Dorsalis pedis pulses are 1+ on the left side.        Posterior tibial pulses are 1+ on the left side.      Heart sounds: Murmur heard.      Systolic murmur is present with a grade of 3/6.      Comments: Faint pulses in Right foot-   + discoloration present   Pulmonary:      Effort: Pulmonary effort is normal. No respiratory distress.      Breath sounds: Normal breath sounds. No wheezing, rhonchi or rales.   Musculoskeletal:         General: No tenderness. Normal range of motion.      Cervical back: Normal range of motion and neck supple.      Right lower leg: No edema.      Left lower leg: No edema.   Lymphadenopathy:      Cervical: No cervical adenopathy.   Skin:     General: Skin is warm and dry.      Coloration: Skin is not jaundiced or pale. "   Neurological:      Mental Status: She is alert and oriented to person, place, and time.   Psychiatric:         Mood and Affect: Mood normal.         Behavior: Behavior normal.         Thought Content: Thought content normal.         Judgment: Judgment normal.        Assessment:       1. Acute foot pain, right    2. Paresthesia of right foot    3. Diminished pulses in lower extremity        Plan:      Assessment & Plan    RIGHT FOOT SYMPTOMS:  - Referred the patient to a podiatrist for evaluation of right foot symptoms and potential neuropathy.  - Noted patient reports pain in right foot, particularly at night, waking them up and requiring ambulation.  - Observed the pain has been present for the last few months and is very sensitive.  - Documented patient experiences numbness and tingling in the foot.  - Observed redness and swelling in the right foot, with the left foot appearing normal.  - Performed physical exam of the foot, noting no pain on palpation.  - Suspected potential circulation issues in the foot   - Ordered an arterial ultrasound on the affected leg.  - Considered referral to a vascular surgeon pending ultrasound results.    EDEMA:  - cont meds per cardiology Dr. Pérez    RESTLESS LEGS SYNDROME:  - Noted patient reports having restless legs syndrome, which affects their sleep position.    FAMILY HISTORY:  - Noted patient's mother had vascular issues leading to bilateral lower extremity amputation.    FOLLOW UP:  - Scheduled follow-up visit after ultrasound results are available.         Acute foot pain, right  -     US Lower Extremity Arteries Right; Future; Expected date: 02/24/2025    Paresthesia of right foot  -     US Lower Extremity Arteries Right; Future; Expected date: 02/24/2025    Diminished pulses in lower extremity  -     US Lower Extremity Arteries Right; Future; Expected date: 02/24/2025   -likely PAD; get US       Follow up if symptoms worsen or fail to improve.      2/24/2025  DIAN Oquendo, FNP  This note was generated with the assistance of ambient listening technology. Verbal consent was obtained by the patient and accompanying visitor(s) for the recording of patient appointment to facilitate this note. I attest to having reviewed and edited the generated note for accuracy, though some syntax or spelling errors may persist. Please contact the author of this note for any clarification.     This note was created using "[employer] voice recognition software that occasionally misinterprets phrases or words.       I spent a total of 21 minutes on the day of the visit.This includes face to face time and non-face to face time preparing to see the patient (eg, review of tests), obtaining and/or reviewing separately obtained history, documenting clinical information in the electronic or other health record, independently interpreting results and communicating results to the patient/family/caregiver, or care coordinator.        [1]   Current Outpatient Medications   Medication Sig Dispense Refill    acetaminophen (TYLENOL ARTHRITIS ORAL) Take 2 tablets by mouth daily as needed.      albuterol (ACCUNEB) 1.25 mg/3 mL Nebu INHALE THE CONTENTS OF 1 VIAL VIA NEBULIZER EVERY 6 HOURS AS NEEDED FOR RESCUE 360 mL 5    albuterol (PROVENTIL/VENTOLIN HFA) 90 mcg/actuation inhaler INHALE 2 PUFFS EVERY 6 HOURS AS NEEDED FOR WHEEZING (RESCUE) 18 g 6    clopidogreL (PLAVIX) 75 mg tablet qd 90 tablet 0    digoxin (LANOXIN) 250 mcg tablet TAKE 1 TABLET EVERY DAY 90 tablet 3    ezetimibe (ZETIA) 10 mg tablet TAKE 1 TABLET EVERY DAY 90 tablet 3    fluticasone propionate (FLONASE) 50 mcg/actuation nasal spray 1 spray by Each Nostril route daily as needed for Rhinitis or Allergies.      metoprolol succinate (TOPROL-XL) 25 MG 24 hr tablet TAKE 1 TABLET EVERY MORNING 90 tablet 3    pantoprazole (PROTONIX) 40 MG tablet Take 1 tablet (40 mg total) by mouth every morning. 90 tablet 3    rOPINIRole (REQUIP) 1 MG tablet  Take 1 tablet (1 mg total) by mouth every evening. 90 tablet 1    rosuvastatin (CRESTOR) 40 MG Tab TAKE 1 TABLET EVERY EVENING 90 tablet 3    sacubitriL-valsartan (ENTRESTO) 24-26 mg per tablet Take 1 tablet by mouth 2 (two) times daily. 180 tablet 3    spironolactone (ALDACTONE) 25 MG tablet Take 1 tablet (25 mg total) by mouth once daily. 90 tablet 3    torsemide (DEMADEX) 20 MG Tab Take 1 tablet (20 mg total) by mouth once daily. 90 tablet 3    TRELEGY ELLIPTA 100-62.5-25 mcg DsDv INHALE 1 PUFF INTO THE LUNGS ONCE DAILY. 90 each 3    albuterol-ipratropium (DUO-NEB) 2.5 mg-0.5 mg/3 mL nebulizer solution Take 3 mLs by nebulization every 6 (six) hours as needed for Wheezing or Shortness of Breath. Rescue 90 mL 0     No current facility-administered medications for this visit.

## 2025-02-26 ENCOUNTER — RESULTS FOLLOW-UP (OUTPATIENT)
Dept: FAMILY MEDICINE | Facility: CLINIC | Age: 75
End: 2025-02-26
Payer: MEDICARE

## 2025-02-26 ENCOUNTER — HOSPITAL ENCOUNTER (OUTPATIENT)
Dept: RADIOLOGY | Facility: HOSPITAL | Age: 75
Discharge: HOME | End: 2025-02-26
Attending: NURSE PRACTITIONER
Payer: MEDICARE

## 2025-02-26 DIAGNOSIS — R09.89 DIMINISHED PULSES IN LOWER EXTREMITY: ICD-10-CM

## 2025-02-26 DIAGNOSIS — I73.9 PAD (PERIPHERAL ARTERY DISEASE): Primary | ICD-10-CM

## 2025-02-26 DIAGNOSIS — M79.671 ACUTE FOOT PAIN, RIGHT: ICD-10-CM

## 2025-02-26 DIAGNOSIS — R20.2 PARESTHESIA OF RIGHT FOOT: ICD-10-CM

## 2025-02-26 PROCEDURE — 93926 LOWER EXTREMITY STUDY: CPT | Mod: 26,RT,, | Performed by: RADIOLOGY

## 2025-02-26 PROCEDURE — 93926 LOWER EXTREMITY STUDY: CPT | Mod: TC,RT

## 2025-02-26 NOTE — TELEPHONE ENCOUNTER
Called patient with her arterial ultrasound results. Patient was advised the office was sent a message to see about expediting getting the patient an appointment. Verified that the patient had the contact information for the   Surgeon. Verbal understanding was expressed.

## 2025-02-26 NOTE — PROGRESS NOTES
Please notify patient that results are abnormal of her arterial ultrasound of her right leg; patient has some plaque in buildup in the arteries of her right lower extremity, most pronounced in the lower leg arteries as well as decreased flow of her femoral artery; I have messaged her vascular doctor to see if we can expedite getting her an appointment for follow-up on this; I will place a referral as well to see if we can get them to make her an appointment

## 2025-02-26 NOTE — TELEPHONE ENCOUNTER
----- Message from OLIVA Wilcox sent at 2/26/2025  3:12 PM CST -----  Please notify patient that results are abnormal of her arterial ultrasound of her right leg; patient has some plaque in buildup in the arteries of her right lower extremity, most pronounced in the   lower leg arteries as well as decreased flow of her femoral artery; I have messaged her vascular doctor to see if we can expedite getting her an appointment for follow-up on this; I will place a   referral as well to see if we can get them to make her an appointment  ----- Message -----  From: Interface, Rad Results In  Sent: 2/26/2025  11:01 AM CST  To: OLIVA Wheeler

## 2025-02-27 ENCOUNTER — PATIENT MESSAGE (OUTPATIENT)
Dept: FAMILY MEDICINE | Facility: CLINIC | Age: 75
End: 2025-02-27
Payer: MEDICARE

## 2025-02-27 NOTE — TELEPHONE ENCOUNTER
Patient was informed yesterday that a referral was sent to . Patient is requesting a referral to .    Please advise

## 2025-02-28 ENCOUNTER — TELEPHONE (OUTPATIENT)
Dept: VASCULAR SURGERY | Facility: CLINIC | Age: 75
End: 2025-02-28
Payer: MEDICARE

## 2025-02-28 DIAGNOSIS — I73.9 PAD (PERIPHERAL ARTERY DISEASE): Primary | ICD-10-CM

## 2025-02-28 NOTE — TELEPHONE ENCOUNTER
Contacted pt to schedule FU with Dr. Valdivia for increased leg and foot pain. Appointment scheduled, pt verified.

## 2025-03-03 ENCOUNTER — HOSPITAL ENCOUNTER (OUTPATIENT)
Dept: VASCULAR SURGERY | Facility: CLINIC | Age: 75
Discharge: HOME | End: 2025-03-03
Attending: SURGERY
Payer: MEDICARE

## 2025-03-03 DIAGNOSIS — I73.9 PAD (PERIPHERAL ARTERY DISEASE): ICD-10-CM

## 2025-03-03 PROCEDURE — 93923 UPR/LXTR ART STDY 3+ LVLS: CPT | Mod: HCNC,S$GLB,, | Performed by: SURGERY

## 2025-03-06 ENCOUNTER — TELEPHONE (OUTPATIENT)
Facility: CLINIC | Age: 75
End: 2025-03-06
Payer: MEDICARE

## 2025-03-06 ENCOUNTER — OFFICE VISIT (OUTPATIENT)
Dept: PULMONOLOGY | Facility: CLINIC | Age: 75
End: 2025-03-06
Payer: MEDICARE

## 2025-03-06 VITALS
SYSTOLIC BLOOD PRESSURE: 140 MMHG | DIASTOLIC BLOOD PRESSURE: 65 MMHG | BODY MASS INDEX: 28.32 KG/M2 | WEIGHT: 170 LBS | HEART RATE: 61 BPM | OXYGEN SATURATION: 94 % | HEIGHT: 65 IN

## 2025-03-06 DIAGNOSIS — J44.9 CHRONIC OBSTRUCTIVE PULMONARY DISEASE, UNSPECIFIED COPD TYPE: Primary | ICD-10-CM

## 2025-03-06 DIAGNOSIS — J47.9 BRONCHIECTASIS WITHOUT COMPLICATION: ICD-10-CM

## 2025-03-06 DIAGNOSIS — J96.11 CHRONIC HYPOXEMIC RESPIRATORY FAILURE: ICD-10-CM

## 2025-03-06 PROCEDURE — 99999 PR PBB SHADOW E&M-EST. PATIENT-LVL IV: CPT | Mod: PBBFAC,HCNC,, | Performed by: NURSE PRACTITIONER

## 2025-03-06 RX ORDER — FLUTICASONE FUROATE, UMECLIDINIUM BROMIDE AND VILANTEROL TRIFENATATE 100; 62.5; 25 UG/1; UG/1; UG/1
1 POWDER RESPIRATORY (INHALATION) DAILY
Qty: 90 EACH | Refills: 3 | Status: SHIPPED | OUTPATIENT
Start: 2025-03-06

## 2025-03-06 NOTE — PROGRESS NOTES
SUBJECTIVE:    Patient ID: Lisa Smith is a 74 y.o. female.    Chief Complaint: Follow-up    Patient here today feeling well. . She is wearing her oxygen all of the time again.      She is using Trelegy daily. She is no longer able to get the Trelegy for free from [facility]. She will reach out to [employer] about spreading the deductible for her meds over the course of the year.  Since her last visit she has had a watchman placed.  She is wearing oxygen all of the time.  Her room air sat is 88% at rest, her sats on 3 liters pulsed dose is 94%.  She would like to get an inogen concentrator if her insurance will pay for it.    Past Medical History:   Diagnosis Date    Anticoagulant long-term use     Arthritis     Atrial fibrillation     Bell's palsy     Boil of buttock     burst 12/19/22    CHF (congestive heart failure)     Chronic cough     COPD (chronic obstructive pulmonary disease)     use O2  3l/m NC at night; also taking during day currently 12/4/23    Dizziness     Encounter for blood transfusion     GI bleed 2011    transfusion    H/O diverticulitis of colon     Hard of hearing     Heart murmur     Hematoma 02/2023    left hand    Heterozygous alpha 1-antitrypsin deficiency     History of home oxygen therapy     3L NC at night    Hypertension     Lung disease     copd    MONSERRAT (obstructive sleep apnea)     resolved with wt loss 131#    Pacemaker     Pneumonia     last episode 2019    Pulmonary edema     Skin cancer     Unspecified visual disturbance     episode of vision disturbance and dizziness...occasional recurrences     Past Surgical History:   Procedure Laterality Date    CARDIAC ELECTROPHYSIOLOGY STUDY AND ABLATION      CAROTID ENDARTERECTOMY Left 12/22/2022    Procedure: ENDARTERECTOMY-CAROTID;  Surgeon: Maximilian Connolly MD;  Location: [facility];  Service: Peripheral Vascular;  Laterality: Left;    CAROTID STENT Left 02/29/2024    Procedure: INSERTION, STENT, ARTERY, CAROTID;  Surgeon: CIRILO Valdivia  GERMAN SARKAR MD;  Location: [facility] OR [address] [place] [address];  Service: Vascular;  Laterality: Left;  left carotid artery stent placement  mgy  146.29   gymc2  8.0730  fluro time  4.8min    CARPAL TUNNEL RELEASE Left     CHOLECYSTECTOMY  1995    CLOSURE OF LEFT ATRIAL APPENDAGE USING DEVICE Right 01/13/2025    Procedure: Left atrial appendage closure device;  Surgeon: Roxana Cantu MD;  Location: [facility] CATH/EP LAB;  Service: Cardiology;  Laterality: Right;    EYE SURGERY Bilateral March 2012    cataract    HYSTERECTOMY  1988    INSERT / REPLACE / REMOVE PACEMAKER      KNEE ARTHROSCOPY Left     LEFT HEART CATHETERIZATION  11/01/2023    Procedure: Left heart cath;  Surgeon: Puma Shelton MD;  Location: [facility] CATH;  Service: Cardiology;;    REPLACEMENT OF PACEMAKER GENERATOR Left 01/20/2022    Procedure: REPLACEMENT, PACEMAKER GENERATOR;  Surgeon: Raymond Pérez MD;  Location: [facility] CATH/EP LAB;  Service: Cardiology;  Laterality: Left;    SKIN CANCER EXCISION      TRANSCATHETER AORTIC VALVE REPLACEMENT (TAVR) Bilateral 12/06/2023    Procedure: (TAVR);  Surgeon: Puma Shelton MD;  Location: [facility] CATH;  Service: Cardiology;  Laterality: Bilateral;    TRANSCATHETER AORTIC VALVE REPLACEMENT (TAVR) Bilateral 12/06/2023    Procedure: (TAVR) - Surgeon;  Surgeon: Maximilian Connolly MD;  Location: [facility] CATH;  Service: Peripheral Vascular;  Laterality: Bilateral;     Family History   Problem Relation Name Age of Onset    Kidney failure Mother      Cancer Father Rm Wray     Alcohol abuse Father Rm Wray     Cancer Brother      Arthritis Maternal Grandmother Domenica Wray     Cancer Brother Rm Nils JR         pancreatic        Social History:   Marital Status:   Occupation: housewife  Alcohol History:  reports that she does not currently use alcohol after a past usage of about 8.0 standard drinks of alcohol per week.  Tobacco History:  reports that she quit smoking about 14 years ago. Her smoking use included  cigarettes. She started smoking about 54 years ago. She has a 80 pack-year smoking history. She has been exposed to tobacco smoke. She has never used smokeless tobacco.  Drug History:  reports no history of drug use.    Review of patient's allergies indicates:   Allergen Reactions    Sulfa (sulfonamide antibiotics) Itching       Current Outpatient Medications   Medication Sig Dispense Refill    acetaminophen (TYLENOL ARTHRITIS ORAL) Take 2 tablets by mouth daily as needed.      albuterol (ACCUNEB) 1.25 mg/3 mL Nebu INHALE THE CONTENTS OF 1 VIAL VIA NEBULIZER EVERY 6 HOURS AS NEEDED FOR RESCUE 360 mL 5    albuterol (PROVENTIL/VENTOLIN HFA) 90 mcg/actuation inhaler INHALE 2 PUFFS EVERY 6 HOURS AS NEEDED FOR WHEEZING (RESCUE) 18 g 6    clopidogreL (PLAVIX) 75 mg tablet qd 90 tablet 0    digoxin (LANOXIN) 250 mcg tablet TAKE 1 TABLET EVERY DAY 90 tablet 3    ezetimibe (ZETIA) 10 mg tablet TAKE 1 TABLET EVERY DAY 90 tablet 3    fluticasone propionate (FLONASE) 50 mcg/actuation nasal spray 1 spray by Each Nostril route daily as needed for Rhinitis or Allergies.      metoprolol succinate (TOPROL-XL) 25 MG 24 hr tablet TAKE 1 TABLET EVERY MORNING 90 tablet 3    pantoprazole (PROTONIX) 40 MG tablet Take 1 tablet (40 mg total) by mouth every morning. 90 tablet 3    rOPINIRole (REQUIP) 1 MG tablet Take 1 tablet (1 mg total) by mouth every evening. 90 tablet 1    rosuvastatin (CRESTOR) 40 MG Tab TAKE 1 TABLET EVERY EVENING 90 tablet 3    sacubitriL-valsartan (ENTRESTO) 24-26 mg per tablet Take 1 tablet by mouth 2 (two) times daily. 180 tablet 3    spironolactone (ALDACTONE) 25 MG tablet Take 1 tablet (25 mg total) by mouth once daily. 90 tablet 3    torsemide (DEMADEX) 20 MG Tab Take 1 tablet (20 mg total) by mouth once daily. 90 tablet 3    TRELEGY ELLIPTA 100-62.5-25 mcg DsDv INHALE 1 PUFF INTO THE LUNGS ONCE DAILY. 90 each 3    albuterol-ipratropium (DUO-NEB) 2.5 mg-0.5 mg/3 mL nebulizer solution Take 3 mLs by nebulization  every 6 (six) hours as needed for Wheezing or Shortness of Breath. Rescue 90 mL 0     No current facility-administered medications for this visit.     Echo 01/2024  Left Ventricle: The left ventricle is normal in size. Mildly increased wall thickness. There is mildly reduced systolic function with a visually estimated ejection fraction of 45 - 50%. Unable to assess diastolic function due to atrial fibrillation.    Right Ventricle: Normal right ventricular cavity size. Wall thickness is normal. Right ventricle wall motion  is normal. Systolic function is normal.    Left Atrium: Left atrium is mildly dilated.    Right Atrium: Right atrium is mildly dilated.    Aortic Valve: There is a transcatheter valve replacement in the aortic position that is appropriately positioned. Mild paravalvular regurgitation.    Mitral Valve: There is bileaflet sclerosis. There is moderate mitral annular calcification present. There is no stenosis. The mean pressure gradient across the mitral valve is 2 mmHg at a heart rate of  bpm. There is moderate regurgitation.    Tricuspid Valve: There is mild regurgitation.    IVC/SVC: Normal venous pressure at 3 mmHg.    Alpha-1 Antitrypsin: MZ, patient refused prolastin treatment because of cost    Last PFT:10/2020 moderate obstruction with good response, air trapping  moderate diffusion defect    CTA chest 01/2024    IMPRESSION:     1. No evidence of pulmonary embolus.  2. Dilated main pulmonary artery likely secondary to pulmonary hypertension as noted on previous exam.  3. Scattered bilateral bronchiectasis, scarring, and chronic-appearing pulmonary infiltrates as noted on previous exam and most notably in the right upper lobe.      Chest xray 12/2024  mpression:     Cardiomegaly with increased interstitial markings and small pleural effusions suggestive of pulmonary edema          General: feels well.   Eyes: Vision is good.  ENT:  No current issues   Heart:: no chest pain, no palpitations  "  Lungs:breathing stable   GI: No Nausea, vomiting, constipation, diarrhea, or reflux.  : No dysuria, hesitancy, or nocturia.  Musculoskeletal:  back pain   Skin: No lesions or rashes.  Neuro: headaches, neuropathy   Lymph: No edema or adenopathy.  Psych: No anxiety or depression.  Endo: weight stable      OBJECTIVE:      BP (!) 140/65 (BP Location: Right arm, Patient Position: Sitting)   Pulse 61   Ht 5' 5" (1.651 m)   Wt 77.1 kg (170 lb)   LMP  (LMP Unknown)   SpO2 (!) 94% Comment: 3LPM PULSE DOSE  BMI 28.29 kg/m²      88% on room air at rest     Physical Exam  GENERAL: Older patient in no distress.  HEENT: Pupils equal and reactive. Extraocular movements intact. Nose intact.      Pharynx moist.  NECK: Supple.   HEART: Regular rate and rhythm.     LUNGS: clear   ABDOMEN: Bowel sounds present. Non-tender, no masses palpated.  EXTREMITIES: Normal muscle tone and joint movement, no cyanosis or clubbing.   LYMPHATICS: No adenopathy palpated, no edema.  SKIN: Dry, intact, no lesions.   NEURO: Cranial nerves II-XII intact. Motor strength 5/5 bilaterally, upper and lower extremities.  PSYCH: Appropriate affect.    Assessment:     COPD   Bronchiectasis  Chronic hypoxemic respiratory failure   Plan:         Keep wearing your oxygen   Continue the Trelegy daily, rinse after you use it   Sign order for inogen concentrator                                  "

## 2025-03-06 NOTE — TELEPHONE ENCOUNTER
LVM to confirm appointment scheduled for 3/13 at 1:45 with Dr. Addison and remind pt to complete labs prior to visit.

## 2025-03-11 ENCOUNTER — OFFICE VISIT (OUTPATIENT)
Dept: VASCULAR SURGERY | Facility: CLINIC | Age: 75
End: 2025-03-11
Attending: SURGERY
Payer: MEDICARE

## 2025-03-11 VITALS
RESPIRATION RATE: 18 BRPM | HEIGHT: 65 IN | BODY MASS INDEX: 28.21 KG/M2 | SYSTOLIC BLOOD PRESSURE: 171 MMHG | DIASTOLIC BLOOD PRESSURE: 75 MMHG | TEMPERATURE: 98 F | HEART RATE: 62 BPM | WEIGHT: 169.31 LBS

## 2025-03-11 DIAGNOSIS — R09.89 DIMINISHED PULSES IN LOWER EXTREMITY: ICD-10-CM

## 2025-03-11 DIAGNOSIS — I70.229 ATHEROSCLEROTIC PERIPHERAL VASCULAR DISEASE WITH REST PAIN: Primary | ICD-10-CM

## 2025-03-11 DIAGNOSIS — I73.9 PERIPHERAL VASCULAR DISEASE, UNSPECIFIED: Primary | ICD-10-CM

## 2025-03-11 DIAGNOSIS — I73.9 PAD (PERIPHERAL ARTERY DISEASE): ICD-10-CM

## 2025-03-11 DIAGNOSIS — I65.22 CAROTID STENOSIS, LEFT: ICD-10-CM

## 2025-03-11 DIAGNOSIS — I65.23 BILATERAL CAROTID ARTERY STENOSIS: ICD-10-CM

## 2025-03-11 PROCEDURE — 1159F MED LIST DOCD IN RCRD: CPT | Mod: HCNC,CPTII,S$GLB, | Performed by: SURGERY

## 2025-03-11 PROCEDURE — 3288F FALL RISK ASSESSMENT DOCD: CPT | Mod: HCNC,CPTII,S$GLB, | Performed by: SURGERY

## 2025-03-11 PROCEDURE — 1101F PT FALLS ASSESS-DOCD LE1/YR: CPT | Mod: HCNC,CPTII,S$GLB, | Performed by: SURGERY

## 2025-03-11 PROCEDURE — 1160F RVW MEDS BY RX/DR IN RCRD: CPT | Mod: HCNC,CPTII,S$GLB, | Performed by: SURGERY

## 2025-03-11 PROCEDURE — 99999 PR PBB SHADOW E&M-EST. PATIENT-LVL V: CPT | Mod: PBBFAC,HCNC,, | Performed by: SURGERY

## 2025-03-11 PROCEDURE — 3078F DIAST BP <80 MM HG: CPT | Mod: HCNC,CPTII,S$GLB, | Performed by: SURGERY

## 2025-03-11 PROCEDURE — 99215 OFFICE O/P EST HI 40 MIN: CPT | Mod: HCNC,S$GLB,, | Performed by: SURGERY

## 2025-03-11 PROCEDURE — 3074F SYST BP LT 130 MM HG: CPT | Mod: HCNC,CPTII,S$GLB, | Performed by: SURGERY

## 2025-03-11 RX ORDER — ACETAMINOPHEN, DIPHENHYDRAMINE HCL, PHENYLEPHRINE HCL 325; 25; 5 MG/1; MG/1; MG/1
1 TABLET ORAL NIGHTLY PRN
COMMUNITY

## 2025-03-11 NOTE — PROGRESS NOTES
VASCULAR SURGERY SERVICE    REFERRING DOCTOR: Madhuri Hunter NP    CHIEF COMPLAINT:  Carotid stenosis    HISTORY OF PRESENT ILLNESS: Lisa Smith is a 74 y.o. female status post TAVR 12/20/2023, status post left carotid endarterectomy 12/20/2022, sent for f/u of 95+% recurrent left carotid stenosis with known right ICA occlusion.  In the last 6 weeks she has had several episodes of dizziness bilateral blurred vision and transient bilateral arm weakness.  She denies any lateralizing symptoms.    She is oxygen-dependent COPD using oxygen since 20/11.  She says that her breathing has improved substantially since the TAVR in December 2023.  She still however can not lay flat for extended periods.    She is on AFib and on Coumadin which was evidently started after the TAVR.  Per the daughter who is a nurse she is being evaluated for a Watchman.    4/5/2024:  This is her initial follow-up after left TCAR 02/29/2024.  This was performed for a greater than 95% left carotid stenosis with contralateral occlusion.  She is very well from this procedure and was discharged on the 1st postoperative day.  However she describes on 03/26/2024 an episode of bilateral blurred vision which resolved within 30 minutes, and then on the next day 03/27/2020 for similar episode which also included 30-60 minutes of right hand weakness that then resolved.  She has had no issues since then.  She states compliance with both her Coumadin and Plavix    03/11/2025:  I have not seen this patient in almost 1 year, status post a left TCAR.  However she is not here for follow up for this rather for 2 month history of significant right foot pain at rest.  She had been as limited ambulator for many many years.  Says the pain right foot is worse at night does improve with getting up and walking.    She denies interval stroke TIA or amaurosis.  She has had a Watchman procedure and after that her  Coumadin was stopped for a here AFib and now is on  Plavix only    No VBI symptoms    Past Medical History:   Diagnosis Date    Anticoagulant long-term use     Arthritis     Atrial fibrillation     Bell's palsy     Boil of buttock     burst 12/19/22    CHF (congestive heart failure)     Chronic cough     COPD (chronic obstructive pulmonary disease)     use O2  3l/m NC at night; also taking during day currently 12/4/23    Dizziness     Encounter for blood transfusion     GI bleed 2011    transfusion    H/O diverticulitis of colon     Hard of hearing     Heart murmur     Hematoma 02/2023    left hand    Heterozygous alpha 1-antitrypsin deficiency     History of home oxygen therapy     3L NC at night    Hypertension     Lung disease     copd    MONSERRAT (obstructive sleep apnea)     resolved with wt loss 131#    Pacemaker     Pneumonia     last episode 2019    Pulmonary edema     Skin cancer     Unspecified visual disturbance     episode of vision disturbance and dizziness...occasional recurrences       Past Surgical History:   Procedure Laterality Date    CARDIAC ELECTROPHYSIOLOGY STUDY AND ABLATION      CAROTID ENDARTERECTOMY Left 12/22/2022    Procedure: ENDARTERECTOMY-CAROTID;  Surgeon: Maximilian Connolly MD;  Location: [facility] OR;  Service: Peripheral Vascular;  Laterality: Left;    CAROTID STENT Left 02/29/2024    Procedure: INSERTION, STENT, ARTERY, CAROTID;  Surgeon: CIRILO Valdivia III, MD;  Location: [address];  Service: Vascular;  Laterality: Left;  left carotid artery stent placement  mgy  146.29   gymc2  8.0730  fluro time  4.8min    CARPAL TUNNEL RELEASE Left     CHOLECYSTECTOMY  1995    CLOSURE OF LEFT ATRIAL APPENDAGE USING DEVICE Right 01/13/2025    Procedure: Left atrial appendage closure device;  Surgeon: Roxana Cantu MD;  Location: [facility] CATH/EP LAB;  Service: Cardiology;  Laterality: Right;    EYE SURGERY Bilateral March 2012    cataract    HYSTERECTOMY  1988    INSERT / REPLACE / REMOVE PACEMAKER      KNEE ARTHROSCOPY Left     LEFT HEART  CATHETERIZATION  11/01/2023    Procedure: Left heart cath;  Surgeon: Puma Shelton MD;  [email or web address] C[facility];  Service: Cardiology;;    REPLACEMENT OF PACEMAKER GENERATOR Left 01/20/2022    Procedure: REPLACEMENT, PACEMAKER GENERATOR;  Surgeon: Raymond Pérez MD;  Location: [facility] CATH/EP LAB;  Service: Cardiology;  Laterality: Left;    SKIN CANCER EXCISION      TRANSCATHETER AORTIC VALVE REPLACEMENT (TAVR) Bilateral 12/06/2023    Procedure: (TAVR);  Surgeon: Puma Shelton MD;  [facility]: [email or web address] CATH;  Service: Cardiology;  Laterality: Bilateral;    TRANSCATHETER AORTIC VALVE REPLACEMENT (TAVR) Bilateral 12/06/2023    Procedure: (TAVR) - Surgeon;  Surgeon: Maximilian Connolly MD;  Location: [email or web address] CATH;  Service: Peripheral Vascular;  Laterality: Bilateral;         Current Outpatient Medications:     acetaminophen (TYLENOL ARTHRITIS ORAL), Take 2 tablets by mouth daily as needed., Disp: , Rfl:     albuterol (ACCUNEB) 1.25 mg/3 mL Nebu, INHALE THE CONTENTS OF 1 VIAL VIA NEBULIZER EVERY 6 HOURS AS NEEDED FOR RESCUE, Disp: 360 mL, Rfl: 5    albuterol (PROVENTIL/VENTOLIN HFA) 90 mcg/actuation inhaler, INHALE 2 PUFFS EVERY 6 HOURS AS NEEDED FOR WHEEZING (RESCUE), Disp: 18 g, Rfl: 6    albuterol-ipratropium (DUO-NEB) 2.5 mg-0.5 mg/3 mL nebulizer solution, Take 3 mLs by nebulization every 6 (six) hours as needed for Wheezing or Shortness of Breath. Rescue, Disp: 90 mL, Rfl: 0    clopidogreL (PLAVIX) 75 mg tablet, qd, Disp: 90 tablet, Rfl: 0    digoxin (LANOXIN) 250 mcg tablet, TAKE 1 TABLET EVERY DAY, Disp: 90 tablet, Rfl: 3    ezetimibe (ZETIA) 10 mg tablet, TAKE 1 TABLET EVERY DAY, Disp: 90 tablet, Rfl: 3    fluticasone propionate (FLONASE) 50 mcg/actuation nasal spray, 1 spray by Each Nostril route daily as needed for Rhinitis or Allergies., Disp: , Rfl:     fluticasone-umeclidin-vilanter (TRELEGY ELLIPTA) 100-62.5-25 mcg DsDv, Inhale 1 puff into the lungs once daily., Disp: 90 each, Rfl: 3    melatonin 10 mg Tab, Take  1 tablet by mouth nightly as needed (insomnia)., Disp: , Rfl:     metoprolol succinate (TOPROL-XL) 25 MG 24 hr tablet, TAKE 1 TABLET EVERY MORNING, Disp: 90 tablet, Rfl: 3    pantoprazole (PROTONIX) 40 MG tablet, Take 1 tablet (40 mg total) by mouth every morning., Disp: 90 tablet, Rfl: 3    rOPINIRole (REQUIP) 1 MG tablet, Take 1 tablet (1 mg total) by mouth every evening., Disp: 90 tablet, Rfl: 1    rosuvastatin (CRESTOR) 40 MG Tab, TAKE 1 TABLET EVERY EVENING, Disp: 90 tablet, Rfl: 3    sacubitriL-valsartan (ENTRESTO) 24-26 mg per tablet, Take 1 tablet by mouth 2 (two) times daily., Disp: 180 tablet, Rfl: 3    spironolactone (ALDACTONE) 25 MG tablet, Take 1 tablet (25 mg total) by mouth once daily., Disp: 90 tablet, Rfl: 3    torsemide (DEMADEX) 20 MG Tab, Take 1 tablet (20 mg total) by mouth once daily. (Patient not taking: Reported on 3/11/2025), Disp: 90 tablet, Rfl: 3    Review of patient's allergies indicates:   Allergen Reactions    Sulfa (sulfonamide antibiotics) Itching       Family History   Problem Relation Name Age of Onset    Kidney failure Mother      Cancer Father Rm Wray     Alcohol abuse Father Rm Wray     Cancer Brother      Arthritis Maternal Grandmother Domenica Wray     Cancer Brother Rm Nils          pancreatic       Social History     Tobacco Use    Smoking status: Former     Current packs/day: 0.00     Average packs/day: 2.0 packs/day for 40.0 years (80.0 ttl pk-yrs)     Types: Cigarettes     Start date: 1971     Quit date: 2011     Years since quittin.1     Passive exposure: Past    Smokeless tobacco: Never    Tobacco comments:          Quit in    Substance Use Topics    Alcohol use: Not Currently     Alcohol/week: 8.0 standard drinks of alcohol     Types: 8 Glasses of wine per week    Drug use: No     PHYSICAL EXAM: R /73  LEFT 82/50  BP (!) 171/75 (BP Location: Right arm, Patient Position: Sitting)   Pulse 62   Temp 97.9 °F (36.6 °C)  "(Oral)   Resp 18   Ht 5' 5" (1.651 m)   Wt 76.8 kg (169 lb 5 oz)   LMP  (LMP Unknown)   BMI 28.18 kg/m²   Constitutional:  Alert,   Well-appearing  In no distress.  Traveling by wheelchair.  Appears quite frail.  She is using supplemental oxygen   Neurological: Normal speech  no focal findings  CN II - XII grossly intact.  Neurologic completely intact   Psychiatric: Mood and affect appropriate and symmetric.   HEENT: Normocephalic / atraumatic  PERRLA  Midline trachea     Cardiac: Regular rate and rhythm.   Pulmonary: Normal pulmonary effort.   Abdomen: Soft, not distended.     Skin: Warm and well perfused.    Vascular:  Could not assess her femoral pulses she was in a wheelchair and really could not get up on the table   Extremities/  Musculoskeletal: No edema.        IMAGING:  ABIs of 0.48 right, 0.90 left.  PVRs suggests severe occlusive disease right with blunted thigh waveforms    4/2024 Carotid duplex today reveals a chronic right ICA occlusion, the left carotid stent is widely patent without stenosis      Carotid CTA 1/31/2024 was personally reviewed, demonstrates a fairly calcific aortic arch, however no calcification or stenosis at the left common carotid takeoff.  There was significant left-to-right tortuosity.  There was a greater than 95% stenosis of the ICA on the left proximally 1 cm from the bulb.    Right ICA is occluded    Carotid duplex December 5, 2023 demonstrated a right ICA occlusion and a very high-grade left ICA stenosis with a peak systolic velocity of 535    IMPRESSION:      Severe right PVD with ischemic rest pain.  She likely has a right iliac occlusion +/-infra inguinal occlusive disease   post left TCAR for symptomatic 99% stenosis and contrast occlusion.     status post TAVR, and a Watchman on Plavix.     Severe COPD on home oxygen since 2011.  Really can not lay flat  Likely L subclavian artery occlusion.  Clinicalls Asx      PLAN:  CTA ao/ro, carotid duplex  F/U after  Pt knows " to take BP in R arm    WDennys Valdivia III, MD, FACS  Professor and Chief, Vascular and Endovascular Surgery      CC: Dr Shelton

## 2025-03-11 NOTE — LETTER
[facility] - Vascular Surg 5th Fl  [address]  [facility] [address]  Phone: [phone]  Fax: [phone] March 12, 2025        Hope Tang, [facility]  [address] [place] [address]  [address]  [facility] [address]    Patient: Lisa Smith   MR Number: [id number]   YOB: 1950   Date of Visit: 3/11/2025     Dear Ms. Tang:    Thank you for referring Lisa Smith to me for evaluation. Attached are the relevant portions of my assessment and plan of care.    If you have questions, please do not hesitate to call me. I look forward to following Lisa along with you.    Sincerely,    CIRILO Valdivia III, MD   Professor and Chief  Vascular and Endovascular Surgery  Vice-Chair, Department of Surgery  [facility]

## 2025-03-12 ENCOUNTER — TELEPHONE (OUTPATIENT)
Facility: CLINIC | Age: 75
End: 2025-03-12
Payer: MEDICARE

## 2025-03-12 DIAGNOSIS — D64.9 ANEMIA, UNSPECIFIED TYPE: Primary | ICD-10-CM

## 2025-03-12 RX ORDER — PANTOPRAZOLE SODIUM 40 MG/1
40 TABLET, DELAYED RELEASE ORAL
Qty: 90 TABLET | Refills: 3 | Status: SHIPPED | OUTPATIENT
Start: 2025-03-12

## 2025-03-12 NOTE — TELEPHONE ENCOUNTER
----- Message from Julia sent at 3/12/2025  8:25 AM CDT -----  Pt not sure if she needs to have her labs re-done. Pt said she had some already done om 02/11/25. Pt is re-scheduling her appt. CB#  [phone]

## 2025-03-14 ENCOUNTER — HOSPITAL ENCOUNTER (OUTPATIENT)
Dept: RADIOLOGY | Facility: HOSPITAL | Age: 75
Discharge: HOME | End: 2025-03-14
Attending: SURGERY
Payer: MEDICARE

## 2025-03-14 DIAGNOSIS — I73.9 PERIPHERAL VASCULAR DISEASE, UNSPECIFIED: ICD-10-CM

## 2025-03-14 PROCEDURE — 75635 CT ANGIO ABDOMINAL ARTERIES: CPT | Mod: TC

## 2025-03-14 PROCEDURE — 75635 CT ANGIO ABDOMINAL ARTERIES: CPT | Mod: 26,,, | Performed by: RADIOLOGY

## 2025-03-14 PROCEDURE — 25500020 PHARM REV CODE 255: Performed by: SURGERY

## 2025-03-14 RX ADMIN — IOHEXOL 100 ML: 350 INJECTION, SOLUTION INTRAVENOUS at 03:03

## 2025-03-15 ENCOUNTER — EXTERNAL HOME HEALTH (OUTPATIENT)
Dept: HOME HEALTH SERVICES | Facility: HOSPITAL | Age: 75
End: 2025-03-15
Payer: MEDICARE

## 2025-03-17 ENCOUNTER — TELEPHONE (OUTPATIENT)
Dept: VASCULAR SURGERY | Facility: CLINIC | Age: 75
End: 2025-03-17
Payer: MEDICARE

## 2025-03-17 NOTE — TELEPHONE ENCOUNTER
Contacted pt in resposne to message. States she has been taking Tylenol and Motrin for pain to her foot but this is not helping and pain is worsening. Notified pt that nurse will discuss request for pain medication with Dr. Valdivia and will contact pt with response. Pt verbalized understanding. Message sent to Dr. Valdivia.----- Message from Porsha sent at 3/17/2025  8:13 AM CDT -----   Type:  Needs Medical AdviceWho Called: Ms Gonzalez Symptoms (please be specific): foot pain  How long has patient had these symptoms: couple of monthsPharmacy name and phone #:  [facility] [id number] [place], [place] - [address] [facility] [address]Pstsb: [phone] Fax: [phone] the patient rather a call back or a response via [facility]? Call Best Call Back Number:  [phone] Information: states she can take the pain no longer can you send something to her pharmacy for the pain please call

## 2025-03-18 ENCOUNTER — TELEPHONE (OUTPATIENT)
Dept: VASCULAR SURGERY | Facility: CLINIC | Age: 75
End: 2025-03-18
Payer: MEDICARE

## 2025-03-18 DIAGNOSIS — I65.23 BILATERAL CAROTID ARTERY STENOSIS: Primary | ICD-10-CM

## 2025-03-18 DIAGNOSIS — I70.201 FEMORAL ARTERY OCCLUSION, RIGHT: Primary | ICD-10-CM

## 2025-03-18 NOTE — TELEPHONE ENCOUNTER
Per Dr. Valdivia's request nurse contacted both pt and daughter Jordana to explain Dr. Valdivia would like to schedule R femoral endarterectomy in reference to pt's recent CTA result and that pt must stop taking Plavix now and begin taking ASA 81 mg daily. Case initially scheduled on Wednesday 3/26/25 per Dr. Valdivia's request, however due to conflicts with Jordana's work schedule case is rescheduled to Wednesday 4/2/25 (approved by Dr. Valdivia.) Both pt and Jordana agreed to new date of 4/2/25 and verbalized understanding of instructions to stop taking Plavix now and to start taking ASA 81 mg now. Appts on 4/1/25 rescheduled to earlier time, both verified.

## 2025-03-19 ENCOUNTER — TELEPHONE (OUTPATIENT)
Dept: VASCULAR SURGERY | Facility: CLINIC | Age: 75
End: 2025-03-19
Payer: MEDICARE

## 2025-03-19 RX ORDER — OXYCODONE HYDROCHLORIDE 5 MG/1
5 TABLET ORAL EVERY 4 HOURS PRN
Qty: 10 TABLET | Refills: 0 | Status: SHIPPED | OUTPATIENT
Start: 2025-03-19

## 2025-03-19 NOTE — TELEPHONE ENCOUNTER
Contacted pt in response to message. Notified pt that prescription has been sent to her pharmacy as requested. Pt verbalized understanding.----- Message from Mary sent at 3/19/2025 10:30 AM CDT -----  Regarding: New RX  Contact: [phone]  Patient Requesting Order Order Needed:Hi, pt says she spoke with the nurse and was to have an order for Pain medication sent in on yesterday. Pt is asking for the order to be sent in quickly today to:[address] [place] - [address] [facility] [address]Amlaj: [phone] Fax: [phone] Preference: [phone] you

## 2025-03-20 ENCOUNTER — TELEPHONE (OUTPATIENT)
Dept: PULMONOLOGY | Facility: CLINIC | Age: 75
End: 2025-03-20
Payer: MEDICARE

## 2025-03-20 DIAGNOSIS — R09.02 HYPOXEMIA: Primary | ICD-10-CM

## 2025-03-20 NOTE — TELEPHONE ENCOUNTER
PT needs a NON INVASIVE MASK for her oxygen concentrator at home bc the nasal canula has caused sores, blisters, and a bad pain ful rash on her face.   [facility] is her o2 comp and they are requesting the order for the NON INVASIVE MASK please

## 2025-03-28 DIAGNOSIS — D62 ACUTE BLOOD LOSS ANEMIA: Primary | ICD-10-CM

## 2025-03-31 NOTE — PRE-PROCEDURE INSTRUCTIONS
PreOp Instructions given:     -- Medication information (what to hold and what to take)   -- NPO guidelines as follows: (or as per your Surgeon)  Stop ALL solid food, gum, candy 8 hours before arrival time.  Stop all CLOUDY liquids: coffee with creamer, cloudy juices, 8 hours prior to arrival time.  The patient should be ENCOURAGED to drink carbohydrate-rich clear liquids (sports drinks, clear juices) until 2 hours prior to arrival time.  Stop clear liquids 2 hours prior to arrival time.  CLEAR liquids include water, black coffee NO creamer, clear oral rehydration drinks, clear sports drinks and clear fruit juices (no orange juice, no pulpy juices, no apple cider).   IF IN DOUBT, drink water instead.   NOTHING TO DRINK 2 hours before to surgery/procedure  time. If you are told to take medication on the morning of surgery, it may be taken with a sip of water.   -- *Arrival place and directions given*.  Time to be given the day before procedure by the Surgeon's Office   -- Bathe with antibacterial soap (dial or Hibiclens as instructed)  -- Don't wear any jewelry or valuables and not metals on skin or hair AM of surgery   -- No makeup or moisturizer to face   -- No perfume/cologne, powder, lotions, aftershave or deodorant     Pt verbalized understanding.            *If going to , see below:      Directions and Instructions for [facility] [place]   At [facility], we have an outstanding team of physicians, anesthesiologists, CRNAs, Registered Nurses, Surgical Technologists, and other ancillary team members all focused on your surgical and procedural care.   Before Your Procedure:   The physician's office will call you with a specific arrival time and directions a day or two before your scheduled procedure. You may also receive these instructions through your [facility] portal.   Day of Procedure:   Please be sure to arrive at the arrival time given or you may risk your surgery being delayed  or canceled. The arrival time is earlier than your scheduled surgery or procedure time. In the winter months please dress warm and bring blankets for you or your child as the waiting room may be cold. If you have difficulty locating the facility, please give us a call at [phone].   Directions:   The [facility] is located on the 1st floor of the hospital building near the [address] entrance.   Parking:   You will park in the South Parking Garage (note location on map). [facility] opens at 5:00 a.m. and has a drop off area by the entrance.  parking is available starting at 7:00 a.m. Please see below for further  parking instructions.   Directions from the parking garage elevators   Blue [facility] Elevators: From the parking garage, take the blue [place] elevators (located in the center of the parking garage) to the 1st floor of the garage. You will then take a right once off the elevators then another right to the outside of the parking garage. You will be across from the [facility]. You will walk down the sidewalk, pass the  curve at the [address] entrance and continue to follow the sidewalk. You will pass the radiation oncology entrance on your right. Continue to follow the sidewalk to the [facility] glass door entrance.   Hospital Entrance (Inside Route): If a mostly inside route is preferred: Take the inside elevator bank (located at the far north end of the garage) from the parking garage to the 1st floor. On the 1st floor walk past [employer]. Keep walking down the center of the hallway towards the hospital elevators. Once you reach the red brick genny, take a left and go past the hospital elevators. Take another left and follow the blue and white [place] signs around the hallway to the end. Go outside of the door. You will see the [facility] entrance to your right.   Drop Off:   There is a drop off area  at the doors of the [facility] for your convenience. If utilized for pediatric patients, an adult must accompany the patient into the surgery center while another adult whyte the vehicle.   Junior (at 7:00 a.m.):   Upon check-in, please let the  know that you are utilizing [employer] parking which is free. The . will then call  for your car to be picked up. Your keys and phone number will be collected and given to [employer] services. You will then be given a ticket. Upon discharge,  will be notified to bring your vehicle back when you are ready.           Directions to [facility] [place]:  [phone]     From 1st floor garage elevators: go past [facility] [employer] shop. Look for Black piano on side of coffee shop. Take Atrium (gold) elevator by piano up to 2nd floor. When you exit elevator follow long hallway (you should see a sign hanging from the ceiling that says Day of Surgery Family Waiting Room. When hallway ends you will be entering the day of surgery waiting area. Check in at the desk for your procedure.      From [facility] entrance: Make a right after you enter the door to the hospital. On your Left, take [place] elevator to 2nd floor. Check in at desk      From Lab desk on 2nd floor: Exit lab area toward hospital atrium. You should see a sign that says Day of Surgery Family Waiting Area. Make a Left and follow long hallway (you should see a sign hanging from the ceiling that says Day of Surgery Family Waiting Room. When hallway ends you will be entering the day of surgery waiting area. Check in at the desk for your procedure.

## 2025-04-01 ENCOUNTER — OFFICE VISIT (OUTPATIENT)
Dept: VASCULAR SURGERY | Facility: CLINIC | Age: 75
End: 2025-04-01
Payer: MEDICARE

## 2025-04-01 ENCOUNTER — TELEPHONE (OUTPATIENT)
Facility: CLINIC | Age: 75
End: 2025-04-01
Payer: MEDICARE

## 2025-04-01 ENCOUNTER — LAB VISIT (OUTPATIENT)
Dept: LAB | Facility: HOSPITAL | Age: 75
End: 2025-04-01
Attending: SURGERY
Payer: MEDICARE

## 2025-04-01 ENCOUNTER — HOSPITAL ENCOUNTER (OUTPATIENT)
Dept: VASCULAR SURGERY | Facility: CLINIC | Age: 75
Discharge: HOME | End: 2025-04-01
Attending: SURGERY
Payer: MEDICARE

## 2025-04-01 VITALS
SYSTOLIC BLOOD PRESSURE: 149 MMHG | BODY MASS INDEX: 28.28 KG/M2 | HEART RATE: 64 BPM | TEMPERATURE: 98 F | HEIGHT: 65 IN | WEIGHT: 169.75 LBS | DIASTOLIC BLOOD PRESSURE: 64 MMHG

## 2025-04-01 DIAGNOSIS — I65.23 BILATERAL CAROTID ARTERY STENOSIS: ICD-10-CM

## 2025-04-01 DIAGNOSIS — I70.229 ATHEROSCLEROTIC PERIPHERAL VASCULAR DISEASE WITH REST PAIN: Primary | ICD-10-CM

## 2025-04-01 LAB
ABSOLUTE EOSINOPHIL (OHS): 0.15 K/UL
ABSOLUTE MONOCYTE (OHS): 0.67 K/UL (ref 0.3–1)
ABSOLUTE NEUTROPHIL COUNT (OHS): 5.75 K/UL (ref 1.8–7.7)
ANION GAP (OHS): 10 MMOL/L (ref 8–16)
BASOPHILS # BLD AUTO: 0.04 K/UL
BASOPHILS NFR BLD AUTO: 0.5 %
BUN SERPL-MCNC: 43 MG/DL (ref 8–23)
CALCIUM SERPL-MCNC: 9.8 MG/DL (ref 8.7–10.5)
CHLORIDE SERPL-SCNC: 100 MMOL/L (ref 95–110)
CO2 SERPL-SCNC: 31 MMOL/L (ref 23–29)
CREAT SERPL-MCNC: 0.9 MG/DL (ref 0.5–1.4)
ERYTHROCYTE [DISTWIDTH] IN BLOOD BY AUTOMATED COUNT: 14.7 % (ref 11.5–14.5)
GFR SERPLBLD CREATININE-BSD FMLA CKD-EPI: >60 ML/MIN/1.73/M2
GLUCOSE SERPL-MCNC: 77 MG/DL (ref 70–110)
HCT VFR BLD AUTO: 32.4 % (ref 37–48.5)
HGB BLD-MCNC: 9.9 GM/DL (ref 12–16)
IMM GRANULOCYTES # BLD AUTO: 0.03 K/UL (ref 0–0.04)
IMM GRANULOCYTES NFR BLD AUTO: 0.4 % (ref 0–0.5)
LYMPHOCYTES # BLD AUTO: 1.08 K/UL (ref 1–4.8)
MCH RBC QN AUTO: 30.7 PG (ref 27–31)
MCHC RBC AUTO-ENTMCNC: 30.6 G/DL (ref 32–36)
MCV RBC AUTO: 101 FL (ref 82–98)
NUCLEATED RBC (/100WBC) (OHS): 0 /100 WBC
PLATELET # BLD AUTO: 225 K/UL (ref 150–450)
PMV BLD AUTO: 10.7 FL (ref 9.2–12.9)
POTASSIUM SERPL-SCNC: 5.2 MMOL/L (ref 3.5–5.1)
RBC # BLD AUTO: 3.22 M/UL (ref 4–5.4)
RELATIVE EOSINOPHIL (OHS): 1.9 %
RELATIVE LYMPHOCYTE (OHS): 14 % (ref 18–48)
RELATIVE MONOCYTE (OHS): 8.7 % (ref 4–15)
RELATIVE NEUTROPHIL (OHS): 74.5 % (ref 38–73)
SODIUM SERPL-SCNC: 141 MMOL/L (ref 136–145)
WBC # BLD AUTO: 7.72 K/UL (ref 3.9–12.7)

## 2025-04-01 PROCEDURE — 85025 COMPLETE CBC W/AUTO DIFF WBC: CPT | Mod: HCNC

## 2025-04-01 PROCEDURE — 84520 ASSAY OF UREA NITROGEN: CPT | Mod: HCNC

## 2025-04-01 PROCEDURE — 1159F MED LIST DOCD IN RCRD: CPT | Mod: HCNC,CPTII,S$GLB, | Performed by: SURGERY

## 2025-04-01 PROCEDURE — 36415 COLL VENOUS BLD VENIPUNCTURE: CPT | Mod: HCNC

## 2025-04-01 PROCEDURE — 3078F DIAST BP <80 MM HG: CPT | Mod: HCNC,CPTII,S$GLB, | Performed by: SURGERY

## 2025-04-01 PROCEDURE — 1160F RVW MEDS BY RX/DR IN RCRD: CPT | Mod: HCNC,CPTII,S$GLB, | Performed by: SURGERY

## 2025-04-01 PROCEDURE — 1125F AMNT PAIN NOTED PAIN PRSNT: CPT | Mod: HCNC,CPTII,S$GLB, | Performed by: SURGERY

## 2025-04-01 PROCEDURE — 93880 EXTRACRANIAL BILAT STUDY: CPT | Mod: HCNC,S$GLB,, | Performed by: SURGERY

## 2025-04-01 PROCEDURE — 3077F SYST BP >= 140 MM HG: CPT | Mod: HCNC,CPTII,S$GLB, | Performed by: SURGERY

## 2025-04-01 PROCEDURE — 99999 PR PBB SHADOW E&M-EST. PATIENT-LVL III: CPT | Mod: PBBFAC,HCNC,, | Performed by: SURGERY

## 2025-04-01 PROCEDURE — 99215 OFFICE O/P EST HI 40 MIN: CPT | Mod: HCNC,S$GLB,, | Performed by: SURGERY

## 2025-04-01 NOTE — PROGRESS NOTES
VASCULAR SURGERY SERVICE    REFERRING DOCTOR: Madhuri Hutner NP    CHIEF COMPLAINT:  Carotid stenosis    HISTORY OF PRESENT ILLNESS: Lisa Smith is a 74 y.o. female status post TAVR 12/20/2023, status post left carotid endarterectomy 12/20/2022, sent for f/u of 95+% recurrent left carotid stenosis with known right ICA occlusion.  In the last 6 weeks she has had several episodes of dizziness bilateral blurred vision and transient bilateral arm weakness.  She denies any lateralizing symptoms.    She is oxygen-dependent COPD using oxygen since 20/11.  She says that her breathing has improved substantially since the TAVR in December 2023.  She still however can not lay flat for extended periods.    She is on AFib and on Coumadin which was evidently started after the TAVR.  Per the daughter who is a nurse she is being evaluated for a Watchman.    4/5/2024:  This is her initial follow-up after left TCAR 02/29/2024.  This was performed for a greater than 95% left carotid stenosis with contralateral occlusion.  She is very well from this procedure and was discharged on the 1st postoperative day.  However she describes on 03/26/2024 an episode of bilateral blurred vision which resolved within 30 minutes, and then on the next day 03/27/2020 for similar episode which also included 30-60 minutes of right hand weakness that then resolved.  She has had no issues since then.  She states compliance with both her Coumadin and Plavix    03/11/2025:  I have not seen this patient in almost 1 year, status post a left TCAR.  However she is not here for follow up for this rather for 2 month history of significant right foot pain at rest.  She had been as limited ambulator for many many years.  Says the pain right foot is worse at night does improve with getting up and walking.    She denies interval stroke TIA or amaurosis.  She has had a Watchman procedure and after that her  Coumadin was stopped for a here AFib and now is on  Plavix only    No VBI symptoms    04/01/2025:  She now returns with a CTA.  Preparation for her planned surgery, she has stopped her Plavix greater than a week ago and substituted aspirin 81 mg daily.  She denies stroke TIA or amaurosis.    Past Medical History:   Diagnosis Date    Anticoagulant long-term use     Arthritis     Atrial fibrillation     Bell's palsy     Boil of buttock     burst 12/19/22    CHF (congestive heart failure)     Chronic cough     COPD (chronic obstructive pulmonary disease)     use O2  3l/m NC at night; also taking during day currently 12/4/23    Dizziness     Encounter for blood transfusion     GI bleed 2011    transfusion    H/O diverticulitis of colon     Hard of hearing     Heart murmur     Hematoma 02/2023    left hand    Heterozygous alpha 1-antitrypsin deficiency     History of home oxygen therapy     3L NC at night    Hypertension     Lung disease     copd    MONSERRAT (obstructive sleep apnea)     resolved with wt loss 131#    Pacemaker     Pneumonia     last episode 2019    Pulmonary edema     Skin cancer     Unspecified visual disturbance     episode of vision disturbance and dizziness...occasional recurrences       Past Surgical History:   Procedure Laterality Date    CARDIAC ELECTROPHYSIOLOGY STUDY AND ABLATION      CAROTID ENDARTERECTOMY Left 12/22/2022    Procedure: ENDARTERECTOMY-CAROTID;  Surgeon: Maximilian Connolly MD;  Location: [facility] OR;  Service: Peripheral Vascular;  Laterality: Left;    CAROTID STENT Left 02/29/2024    Procedure: INSERTION, STENT, ARTERY, CAROTID;  Surgeon: CIRILO Valdivia III, MD;  Location: [address];  Service: Vascular;  Laterality: Left;  left carotid artery stent placement  mgy  146.29   gymc2  8.0730  fluro time  4.8min    CARPAL TUNNEL RELEASE Left     CHOLECYSTECTOMY  1995    CLOSURE OF LEFT ATRIAL APPENDAGE USING DEVICE Right 01/13/2025    Procedure: Left atrial appendage closure device;  Surgeon: Roxana Cantu MD;  Location: [facility] CATH/EP  LAB;  Service: Cardiology;  Laterality: Right;    EYE SURGERY Bilateral March 2012    cataract    HYSTERECTOMY  1988    INSERT / REPLACE / REMOVE PACEMAKER      KNEE ARTHROSCOPY Left     LEFT HEART CATHETERIZATION  11/01/2023    Procedure: Left heart cath;  Surgeon: Puma Shelton MD;  Location: [facility] CATH;  Service: Cardiology;;    REPLACEMENT OF PACEMAKER GENERATOR Left 01/20/2022    Procedure: REPLACEMENT, PACEMAKER GENERATOR;  Surgeon: Raymond Pérez MD;  Location: [facility] CATH/EP LAB;  Service: Cardiology;  Laterality: Left;    SKIN CANCER EXCISION      TRANSCATHETER AORTIC VALVE REPLACEMENT (TAVR) Bilateral 12/06/2023    Procedure: (TAVR);  Surgeon: Puma Shelton MD;  Location: [facility] CATH;  Service: Cardiology;  Laterality: Bilateral;    TRANSCATHETER AORTIC VALVE REPLACEMENT (TAVR) Bilateral 12/06/2023    Procedure: (TAVR) - Surgeon;  Surgeon: Maximilian Connolly MD;  Location: [facility] CATH;  Service: Peripheral Vascular;  Laterality: Bilateral;         Current Outpatient Medications:     acetaminophen (TYLENOL ARTHRITIS ORAL), Take 2 tablets by mouth daily as needed., Disp: , Rfl:     albuterol (ACCUNEB) 1.25 mg/3 mL Nebu, INHALE THE CONTENTS OF 1 VIAL VIA NEBULIZER EVERY 6 HOURS AS NEEDED FOR RESCUE, Disp: 360 mL, Rfl: 5    albuterol (PROVENTIL/VENTOLIN HFA) 90 mcg/actuation inhaler, INHALE 2 PUFFS EVERY 6 HOURS AS NEEDED FOR WHEEZING (RESCUE), Disp: 18 g, Rfl: 6    albuterol-ipratropium (DUO-NEB) 2.5 mg-0.5 mg/3 mL nebulizer solution, Take 3 mLs by nebulization every 6 (six) hours as needed for Wheezing or Shortness of Breath. Rescue, Disp: 90 mL, Rfl: 0    clopidogreL (PLAVIX) 75 mg tablet, qd, Disp: 90 tablet, Rfl: 0    digoxin (LANOXIN) 250 mcg tablet, TAKE 1 TABLET EVERY DAY, Disp: 90 tablet, Rfl: 3    ezetimibe (ZETIA) 10 mg tablet, TAKE 1 TABLET EVERY DAY, Disp: 90 tablet, Rfl: 3    fluticasone propionate (FLONASE) 50 mcg/actuation nasal spray, 1 spray by Each Nostril route daily as needed for  Rhinitis or Allergies., Disp: , Rfl:     fluticasone-umeclidin-vilanter (TRELEGY ELLIPTA) 100-62.5-25 mcg DsDv, Inhale 1 puff into the lungs once daily., Disp: 90 each, Rfl: 3    melatonin 10 mg Tab, Take 1 tablet by mouth nightly as needed (insomnia)., Disp: , Rfl:     metoprolol succinate (TOPROL-XL) 25 MG 24 hr tablet, TAKE 1 TABLET EVERY MORNING, Disp: 90 tablet, Rfl: 3    oxyCODONE (ROXICODONE) 5 MG immediate release tablet, Take 1 tablet (5 mg total) by mouth every 4 (four) hours as needed for Pain., Disp: 10 tablet, Rfl: 0    pantoprazole (PROTONIX) 40 MG tablet, TAKE 1 TABLET EVERY DAY, Disp: 90 tablet, Rfl: 3    rOPINIRole (REQUIP) 1 MG tablet, Take 1 tablet (1 mg total) by mouth every evening., Disp: 90 tablet, Rfl: 1    rosuvastatin (CRESTOR) 40 MG Tab, TAKE 1 TABLET EVERY EVENING, Disp: 90 tablet, Rfl: 3    sacubitriL-valsartan (ENTRESTO) 24-26 mg per tablet, Take 1 tablet by mouth 2 (two) times daily., Disp: 180 tablet, Rfl: 3    spironolactone (ALDACTONE) 25 MG tablet, Take 1 tablet (25 mg total) by mouth once daily., Disp: 90 tablet, Rfl: 3    torsemide (DEMADEX) 20 MG Tab, Take 1 tablet (20 mg total) by mouth once daily., Disp: 90 tablet, Rfl: 3    Review of patient's allergies indicates:   Allergen Reactions    Sulfa (sulfonamide antibiotics) Itching       Family History   Problem Relation Name Age of Onset    Kidney failure Mother      Cancer Father Rm Wray     Alcohol abuse Father Rm Wray     Cancer Brother      Arthritis Maternal Grandmother Domenica Wray     Cancer Brother Rm Wray JR         pancreatic       Social History     Tobacco Use    Smoking status: Former     Current packs/day: 0.00     Average packs/day: 2.0 packs/day for 40.0 years (80.0 ttl pk-yrs)     Types: Cigarettes     Start date: 1971     Quit date: 2011     Years since quittin.1     Passive exposure: Past    Smokeless tobacco: Never    Tobacco comments:          Quit in    Substance Use  "Topics    Alcohol use: Not Currently     Alcohol/week: 8.0 standard drinks of alcohol     Types: 8 Glasses of wine per week    Drug use: No     PHYSICAL EXAM: R /73  LEFT 82/50  BP (!) 149/64 (BP Location: Right arm, Patient Position: Sitting)   Pulse 64   Temp 97.7 °F (36.5 °C) (Oral)   Ht 5' 5" (1.651 m)   Wt 77 kg (169 lb 12.1 oz)   LMP  (LMP Unknown)   BMI 28.25 kg/m²   Constitutional:  Alert,   Well-appearing  In no distress.  Traveling by wheelchair.  Appears quite frail.  She is using supplemental oxygen   Neurological: Normal speech  no focal findings  CN II - XII grossly intact.  Neurologic completely intact   Psychiatric: Mood and affect appropriate and symmetric.   HEENT: Normocephalic / atraumatic  PERRLA  Midline trachea     Cardiac: Regular rate and rhythm.   Pulmonary: Normal pulmonary effort.   Abdomen: Soft, not distended.     Skin: Warm and well perfused.    Vascular:  Could not assess her femoral pulses she was in a wheelchair and really could not get up on the table   Extremities/  Musculoskeletal: No edema.        IMAGING:  Recent CTA was personally reviewed and demonstrates a left common femoral artery calcific occlusion with proximal profunda reconstitution, 5-8 cm proximal SFA occlusion that reconstitutes in the mid segment is patent but highly stenotic throughout.    Notably the profunda is quite posterior    Carotid duplex today reveals a new moderate stenosis on the left with a peak systolic velocity of 318 end-diastolic of 63.  Prior this was widely patent a year ago.  Right ICA is chronically occluded      IMPRESSION:      Severe right PVD with severe ischemic rest pain.  Despite her significant medical comorbidities, intervention is indicated.  Endarterectomy is the best choice for her despite her medical comorbidities.  That being said endarterectomy going on with the profunda may be more technically challenging given the very posterior placement of the profunda   post left " TCAR for symptomatic 99% stenosis and contrast occlusion.  She has not asymptomatic moderate restenosis on duplex today   status post TAVR, and a Watchman on Plavix.     Severe COPD on home oxygen since 2011.  Really can not lay flat  Likely L subclavian artery occlusion.  Clinicalls Asx      PLAN:  Right common femoral/profunda endarterectomy tomorrow, 04/02/2025  GETA  Art line on the right    I have explained the risks, benefits and alternatives for this procedure in detail.  Because of the significant COPD she understands that her risk of pulmonary complications postoperatively is increased.  The patient voices understanding and all questions have be answered, and agrees to proceed with the procedure.         CHAPIS Valdivia III, MD, FACS  Professor and Chief, Vascular and Endovascular Surgery      CC: Dr Shelton

## 2025-04-01 NOTE — H&P (VIEW-ONLY)
VASCULAR SURGERY SERVICE    REFERRING DOCTOR: Madhuri Hunter NP    CHIEF COMPLAINT:  Carotid stenosis    HISTORY OF PRESENT ILLNESS: Lisa Smith is a 74 y.o. female status post TAVR 12/20/2023, status post left carotid endarterectomy 12/20/2022, sent for f/u of 95+% recurrent left carotid stenosis with known right ICA occlusion.  In the last 6 weeks she has had several episodes of dizziness bilateral blurred vision and transient bilateral arm weakness.  She denies any lateralizing symptoms.    She is oxygen-dependent COPD using oxygen since 20/11.  She says that her breathing has improved substantially since the TAVR in December 2023.  She still however can not lay flat for extended periods.    She is on AFib and on Coumadin which was evidently started after the TAVR.  Per the daughter who is a nurse she is being evaluated for a Watchman.    4/5/2024:  This is her initial follow-up after left TCAR 02/29/2024.  This was performed for a greater than 95% left carotid stenosis with contralateral occlusion.  She is very well from this procedure and was discharged on the 1st postoperative day.  However she describes on 03/26/2024 an episode of bilateral blurred vision which resolved within 30 minutes, and then on the next day 03/27/2020 for similar episode which also included 30-60 minutes of right hand weakness that then resolved.  She has had no issues since then.  She states compliance with both her Coumadin and Plavix    03/11/2025:  I have not seen this patient in almost 1 year, status post a left TCAR.  However she is not here for follow up for this rather for 2 month history of significant right foot pain at rest.  She had been as limited ambulator for many many years.  Says the pain right foot is worse at night does improve with getting up and walking.    She denies interval stroke TIA or amaurosis.  She has had a Watchman procedure and after that her  Coumadin was stopped for a here AFib and now is on  Plavix only    No VBI symptoms    04/01/2025:  She now returns with a CTA.  Preparation for her planned surgery, she has stopped her Plavix greater than a week ago and substituted aspirin 81 mg daily.  She denies stroke TIA or amaurosis.    Past Medical History:   Diagnosis Date    Anticoagulant long-term use     Arthritis     Atrial fibrillation     Bell's palsy     Boil of buttock     burst 12/19/22    CHF (congestive heart failure)     Chronic cough     COPD (chronic obstructive pulmonary disease)     use O2  3l/m NC at night; also taking during day currently 12/4/23    Dizziness     Encounter for blood transfusion     GI bleed 2011    transfusion    H/O diverticulitis of colon     Hard of hearing     Heart murmur     Hematoma 02/2023    left hand    Heterozygous alpha 1-antitrypsin deficiency     History of home oxygen therapy     3L NC at night    Hypertension     Lung disease     copd    MONSERRAT (obstructive sleep apnea)     resolved with wt loss 131#    Pacemaker     Pneumonia     last episode 2019    Pulmonary edema     Skin cancer     Unspecified visual disturbance     episode of vision disturbance and dizziness...occasional recurrences       Past Surgical History:   Procedure Laterality Date    CARDIAC ELECTROPHYSIOLOGY STUDY AND ABLATION      CAROTID ENDARTERECTOMY Left 12/22/2022    Procedure: ENDARTERECTOMY-CAROTID;  Surgeon: Maximilian Connolly MD;  Location: [facility] OR;  Service: Peripheral Vascular;  Laterality: Left;    CAROTID STENT Left 02/29/2024    Procedure: INSERTION, STENT, ARTERY, CAROTID;  Surgeon: CIRILO Valdivia III, MD;  Location: [address];  Service: Vascular;  Laterality: Left;  left carotid artery stent placement  mgy  146.29   gymc2  8.0730  fluro time  4.8min    CARPAL TUNNEL RELEASE Left     CHOLECYSTECTOMY  1995    CLOSURE OF LEFT ATRIAL APPENDAGE USING DEVICE Right 01/13/2025    Procedure: Left atrial appendage closure device;  Surgeon: Roxana Cantu MD;  Location: [facility] CATH/EP  LAB;  Service: Cardiology;  Laterality: Right;    EYE SURGERY Bilateral March 2012    cataract    HYSTERECTOMY  1988    INSERT / REPLACE / REMOVE PACEMAKER      KNEE ARTHROSCOPY Left     LEFT HEART CATHETERIZATION  11/01/2023    Procedure: Left heart cath;  Surgeon: Puma Shelton MD;  Location: [facility] CATH;  Service: Cardiology;;    REPLACEMENT OF PACEMAKER GENERATOR Left 01/20/2022    Procedure: REPLACEMENT, PACEMAKER GENERATOR;  Surgeon: Raymond Pérez MD;  Location: [facility] CATH/EP LAB;  Service: Cardiology;  Laterality: Left;    SKIN CANCER EXCISION      TRANSCATHETER AORTIC VALVE REPLACEMENT (TAVR) Bilateral 12/06/2023    Procedure: (TAVR);  Surgeon: Puma Shelton MD;  Location: [facility] CATH;  Service: Cardiology;  Laterality: Bilateral;    TRANSCATHETER AORTIC VALVE REPLACEMENT (TAVR) Bilateral 12/06/2023    Procedure: (TAVR) - Surgeon;  Surgeon: Maximilian Connolly MD;  Location: [facility] CATH;  Service: Peripheral Vascular;  Laterality: Bilateral;         Current Outpatient Medications:     acetaminophen (TYLENOL ARTHRITIS ORAL), Take 2 tablets by mouth daily as needed., Disp: , Rfl:     albuterol (ACCUNEB) 1.25 mg/3 mL Nebu, INHALE THE CONTENTS OF 1 VIAL VIA NEBULIZER EVERY 6 HOURS AS NEEDED FOR RESCUE, Disp: 360 mL, Rfl: 5    albuterol (PROVENTIL/VENTOLIN HFA) 90 mcg/actuation inhaler, INHALE 2 PUFFS EVERY 6 HOURS AS NEEDED FOR WHEEZING (RESCUE), Disp: 18 g, Rfl: 6    albuterol-ipratropium (DUO-NEB) 2.5 mg-0.5 mg/3 mL nebulizer solution, Take 3 mLs by nebulization every 6 (six) hours as needed for Wheezing or Shortness of Breath. Rescue, Disp: 90 mL, Rfl: 0    clopidogreL (PLAVIX) 75 mg tablet, qd, Disp: 90 tablet, Rfl: 0    digoxin (LANOXIN) 250 mcg tablet, TAKE 1 TABLET EVERY DAY, Disp: 90 tablet, Rfl: 3    ezetimibe (ZETIA) 10 mg tablet, TAKE 1 TABLET EVERY DAY, Disp: 90 tablet, Rfl: 3    fluticasone propionate (FLONASE) 50 mcg/actuation nasal spray, 1 spray by Each Nostril route daily as needed for  Rhinitis or Allergies., Disp: , Rfl:     fluticasone-umeclidin-vilanter (TRELEGY ELLIPTA) 100-62.5-25 mcg DsDv, Inhale 1 puff into the lungs once daily., Disp: 90 each, Rfl: 3    melatonin 10 mg Tab, Take 1 tablet by mouth nightly as needed (insomnia)., Disp: , Rfl:     metoprolol succinate (TOPROL-XL) 25 MG 24 hr tablet, TAKE 1 TABLET EVERY MORNING, Disp: 90 tablet, Rfl: 3    oxyCODONE (ROXICODONE) 5 MG immediate release tablet, Take 1 tablet (5 mg total) by mouth every 4 (four) hours as needed for Pain., Disp: 10 tablet, Rfl: 0    pantoprazole (PROTONIX) 40 MG tablet, TAKE 1 TABLET EVERY DAY, Disp: 90 tablet, Rfl: 3    rOPINIRole (REQUIP) 1 MG tablet, Take 1 tablet (1 mg total) by mouth every evening., Disp: 90 tablet, Rfl: 1    rosuvastatin (CRESTOR) 40 MG Tab, TAKE 1 TABLET EVERY EVENING, Disp: 90 tablet, Rfl: 3    sacubitriL-valsartan (ENTRESTO) 24-26 mg per tablet, Take 1 tablet by mouth 2 (two) times daily., Disp: 180 tablet, Rfl: 3    spironolactone (ALDACTONE) 25 MG tablet, Take 1 tablet (25 mg total) by mouth once daily., Disp: 90 tablet, Rfl: 3    torsemide (DEMADEX) 20 MG Tab, Take 1 tablet (20 mg total) by mouth once daily., Disp: 90 tablet, Rfl: 3    Review of patient's allergies indicates:   Allergen Reactions    Sulfa (sulfonamide antibiotics) Itching       Family History   Problem Relation Name Age of Onset    Kidney failure Mother      Cancer Father Rm Wray     Alcohol abuse Father Rm Wray     Cancer Brother      Arthritis Maternal Grandmother Domenica Wray     Cancer Brother Rm Wray JR         pancreatic       Social History     Tobacco Use    Smoking status: Former     Current packs/day: 0.00     Average packs/day: 2.0 packs/day for 40.0 years (80.0 ttl pk-yrs)     Types: Cigarettes     Start date: 1971     Quit date: 2011     Years since quittin.1     Passive exposure: Past    Smokeless tobacco: Never    Tobacco comments:          Quit in    Substance Use  "Topics    Alcohol use: Not Currently     Alcohol/week: 8.0 standard drinks of alcohol     Types: 8 Glasses of wine per week    Drug use: No     PHYSICAL EXAM: R /73  LEFT 82/50  BP (!) 149/64 (BP Location: Right arm, Patient Position: Sitting)   Pulse 64   Temp 97.7 °F (36.5 °C) (Oral)   Ht 5' 5" (1.651 m)   Wt 77 kg (169 lb 12.1 oz)   LMP  (LMP Unknown)   BMI 28.25 kg/m²   Constitutional:  Alert,   Well-appearing  In no distress.  Traveling by wheelchair.  Appears quite frail.  She is using supplemental oxygen   Neurological: Normal speech  no focal findings  CN II - XII grossly intact.  Neurologic completely intact   Psychiatric: Mood and affect appropriate and symmetric.   HEENT: Normocephalic / atraumatic  PERRLA  Midline trachea     Cardiac: Regular rate and rhythm.   Pulmonary: Normal pulmonary effort.   Abdomen: Soft, not distended.     Skin: Warm and well perfused.    Vascular:  Could not assess her femoral pulses she was in a wheelchair and really could not get up on the table   Extremities/  Musculoskeletal: No edema.        IMAGING:  Recent CTA was personally reviewed and demonstrates a left common femoral artery calcific occlusion with proximal profunda reconstitution, 5-8 cm proximal SFA occlusion that reconstitutes in the mid segment is patent but highly stenotic throughout.    Notably the profunda is quite posterior    Carotid duplex today reveals a new moderate stenosis on the left with a peak systolic velocity of 318 end-diastolic of 63.  Prior this was widely patent a year ago.  Right ICA is chronically occluded      IMPRESSION:      Severe right PVD with severe ischemic rest pain.  Despite her significant medical comorbidities, intervention is indicated.  Endarterectomy is the best choice for her despite her medical comorbidities.  That being said endarterectomy going on with the profunda may be more technically challenging given the very posterior placement of the profunda   post left " TCAR for symptomatic 99% stenosis and contrast occlusion.  She has not asymptomatic moderate restenosis on duplex today   status post TAVR, and a Watchman on Plavix.     Severe COPD on home oxygen since 2011.  Really can not lay flat  Likely L subclavian artery occlusion.  Clinicalls Asx      PLAN:  Right common femoral/profunda endarterectomy tomorrow, 04/02/2025  GETA  Art line on the right    I have explained the risks, benefits and alternatives for this procedure in detail.  Because of the significant COPD she understands that her risk of pulmonary complications postoperatively is increased.  The patient voices understanding and all questions have be answered, and agrees to proceed with the procedure.         CHAPIS Valdivia III, MD, FACS  Professor and Chief, Vascular and Endovascular Surgery      CC: Dr Shelton

## 2025-04-02 ENCOUNTER — HOSPITAL ENCOUNTER (INPATIENT)
Facility: HOSPITAL | Age: 75
LOS: 2 days | Discharge: HOME | DRG: 253 | End: 2025-04-04
Attending: SURGERY | Admitting: SURGERY
Payer: MEDICARE

## 2025-04-02 ENCOUNTER — TELEPHONE (OUTPATIENT)
Dept: CARDIOLOGY | Facility: HOSPITAL | Age: 75
End: 2025-04-02
Payer: MEDICARE

## 2025-04-02 ENCOUNTER — ANESTHESIA (OUTPATIENT)
Dept: SURGERY | Facility: HOSPITAL | Age: 75
DRG: 253 | End: 2025-04-02
Payer: MEDICARE

## 2025-04-02 ENCOUNTER — ANESTHESIA EVENT (OUTPATIENT)
Dept: SURGERY | Facility: HOSPITAL | Age: 75
DRG: 253 | End: 2025-04-02
Payer: MEDICARE

## 2025-04-02 DIAGNOSIS — Z01.818 PRE-OP EXAM: ICD-10-CM

## 2025-04-02 DIAGNOSIS — I73.9 PAD (PERIPHERAL ARTERY DISEASE): ICD-10-CM

## 2025-04-02 DIAGNOSIS — I70.229 ATHEROSCLEROTIC PERIPHERAL VASCULAR DISEASE WITH REST PAIN: Primary | ICD-10-CM

## 2025-04-02 LAB
ABSOLUTE EOSINOPHIL (OHS): 0.04 K/UL
ABSOLUTE MONOCYTE (OHS): 0.26 K/UL (ref 0.3–1)
ABSOLUTE NEUTROPHIL COUNT (OHS): 9.06 K/UL (ref 1.8–7.7)
ALBUMIN SERPL BCP-MCNC: 3.7 G/DL (ref 3.5–5.2)
ALP SERPL-CCNC: 79 UNIT/L (ref 40–150)
ALT SERPL W/O P-5'-P-CCNC: 10 UNIT/L (ref 10–44)
ANION GAP (OHS): 6 MMOL/L (ref 8–16)
APTT PPP: 23.9 SECONDS (ref 21–32)
AST SERPL-CCNC: 33 UNIT/L (ref 11–45)
BASOPHILS # BLD AUTO: 0.03 K/UL
BASOPHILS NFR BLD AUTO: 0.3 %
BILIRUB SERPL-MCNC: 0.5 MG/DL (ref 0.1–1)
BUN SERPL-MCNC: 24 MG/DL (ref 8–23)
CALCIUM SERPL-MCNC: 8.7 MG/DL (ref 8.7–10.5)
CHLORIDE SERPL-SCNC: 108 MMOL/L (ref 95–110)
CO2 SERPL-SCNC: 27 MMOL/L (ref 23–29)
CREAT SERPL-MCNC: 0.7 MG/DL (ref 0.5–1.4)
ERYTHROCYTE [DISTWIDTH] IN BLOOD BY AUTOMATED COUNT: 14.6 % (ref 11.5–14.5)
GFR SERPLBLD CREATININE-BSD FMLA CKD-EPI: >60 ML/MIN/1.73/M2
GLUCOSE SERPL-MCNC: 126 MG/DL (ref 70–110)
HCT VFR BLD AUTO: 29.9 % (ref 37–48.5)
HGB BLD-MCNC: 9.3 GM/DL (ref 12–16)
IMM GRANULOCYTES # BLD AUTO: 0.13 K/UL (ref 0–0.04)
IMM GRANULOCYTES NFR BLD AUTO: 1.3 % (ref 0–0.5)
INR PPP: 1 (ref 0.8–1.2)
LACTATE SERPL-SCNC: 0.7 MMOL/L (ref 0.5–2.2)
LYMPHOCYTES # BLD AUTO: 0.62 K/UL (ref 1–4.8)
MAGNESIUM SERPL-MCNC: 2.2 MG/DL (ref 1.6–2.6)
MCH RBC QN AUTO: 31.1 PG (ref 27–31)
MCHC RBC AUTO-ENTMCNC: 31.1 G/DL (ref 32–36)
MCV RBC AUTO: 100 FL (ref 82–98)
NUCLEATED RBC (/100WBC) (OHS): 0 /100 WBC
PHOSPHATE SERPL-MCNC: 3.3 MG/DL (ref 2.7–4.5)
PLATELET # BLD AUTO: 187 K/UL (ref 150–450)
PMV BLD AUTO: 11.1 FL (ref 9.2–12.9)
POCT GLUCOSE: 136 MG/DL (ref 70–110)
POTASSIUM SERPL-SCNC: 5 MMOL/L (ref 3.5–5.1)
PROT SERPL-MCNC: 7 GM/DL (ref 6–8.4)
PROTHROMBIN TIME: 11.3 SECONDS (ref 9–12.5)
RBC # BLD AUTO: 2.99 M/UL (ref 4–5.4)
RELATIVE EOSINOPHIL (OHS): 0.4 %
RELATIVE LYMPHOCYTE (OHS): 6.1 % (ref 18–48)
RELATIVE MONOCYTE (OHS): 2.6 % (ref 4–15)
RELATIVE NEUTROPHIL (OHS): 89.3 % (ref 38–73)
SODIUM SERPL-SCNC: 141 MMOL/L (ref 136–145)
WBC # BLD AUTO: 10.14 K/UL (ref 3.9–12.7)

## 2025-04-02 PROCEDURE — 20000000 HC ICU ROOM: Mod: HCNC

## 2025-04-02 PROCEDURE — 25000003 PHARM REV CODE 250: Mod: HCNC | Performed by: SURGERY

## 2025-04-02 PROCEDURE — 37000009 HC ANESTHESIA EA ADD 15 MINS: Mod: HCNC | Performed by: SURGERY

## 2025-04-02 PROCEDURE — 25000003 PHARM REV CODE 250: Mod: HCNC

## 2025-04-02 PROCEDURE — 85610 PROTHROMBIN TIME: CPT | Mod: HCNC

## 2025-04-02 PROCEDURE — 85025 COMPLETE CBC W/AUTO DIFF WBC: CPT | Mod: HCNC | Performed by: NURSE PRACTITIONER

## 2025-04-02 PROCEDURE — 83735 ASSAY OF MAGNESIUM: CPT | Mod: HCNC | Performed by: NURSE PRACTITIONER

## 2025-04-02 PROCEDURE — 84100 ASSAY OF PHOSPHORUS: CPT | Mod: HCNC | Performed by: NURSE PRACTITIONER

## 2025-04-02 PROCEDURE — 25000003 PHARM REV CODE 250: Mod: HCNC | Performed by: STUDENT IN AN ORGANIZED HEALTH CARE EDUCATION/TRAINING PROGRAM

## 2025-04-02 PROCEDURE — 80053 COMPREHEN METABOLIC PANEL: CPT | Mod: HCNC | Performed by: NURSE PRACTITIONER

## 2025-04-02 PROCEDURE — 63600175 PHARM REV CODE 636 W HCPCS: Mod: HCNC | Performed by: STUDENT IN AN ORGANIZED HEALTH CARE EDUCATION/TRAINING PROGRAM

## 2025-04-02 PROCEDURE — 63600175 PHARM REV CODE 636 W HCPCS: Mod: HCNC

## 2025-04-02 PROCEDURE — 99291 CRITICAL CARE FIRST HOUR: CPT | Mod: HCNC,,, | Performed by: STUDENT IN AN ORGANIZED HEALTH CARE EDUCATION/TRAINING PROGRAM

## 2025-04-02 PROCEDURE — 25000003 PHARM REV CODE 250: Mod: HCNC | Performed by: NURSE ANESTHETIST, CERTIFIED REGISTERED

## 2025-04-02 PROCEDURE — 37000008 HC ANESTHESIA 1ST 15 MINUTES: Mod: HCNC | Performed by: SURGERY

## 2025-04-02 PROCEDURE — C1768 GRAFT, VASCULAR: HCPCS | Mod: HCNC | Performed by: SURGERY

## 2025-04-02 PROCEDURE — 27000221 HC OXYGEN, UP TO 24 HOURS: Mod: HCNC

## 2025-04-02 PROCEDURE — 04CK0ZZ EXTIRPATION OF MATTER FROM RIGHT FEMORAL ARTERY, OPEN APPROACH: ICD-10-PCS | Performed by: SURGERY

## 2025-04-02 PROCEDURE — 63600175 PHARM REV CODE 636 W HCPCS: Mod: HCNC | Performed by: SURGERY

## 2025-04-02 PROCEDURE — 83605 ASSAY OF LACTIC ACID: CPT | Mod: HCNC | Performed by: NURSE PRACTITIONER

## 2025-04-02 PROCEDURE — 63600175 PHARM REV CODE 636 W HCPCS: Mod: HCNC | Performed by: NURSE ANESTHETIST, CERTIFIED REGISTERED

## 2025-04-02 PROCEDURE — 36000706: Mod: HCNC | Performed by: SURGERY

## 2025-04-02 PROCEDURE — 36000707: Mod: HCNC | Performed by: SURGERY

## 2025-04-02 PROCEDURE — 04UK0KZ SUPPLEMENT RIGHT FEMORAL ARTERY WITH NONAUTOLOGOUS TISSUE SUBSTITUTE, OPEN APPROACH: ICD-10-PCS | Performed by: SURGERY

## 2025-04-02 PROCEDURE — 85730 THROMBOPLASTIN TIME PARTIAL: CPT | Mod: HCNC

## 2025-04-02 DEVICE — VASCU-GUARD IS PREPARED FROM BOVINE PERICARDIUM CROSS-LINKED WITH GLUTARALDEHYDE, AND TREATED WITH 1 MOLAR SODIUM HYDROXIDE FOR A MINIMUM OF 60 MINUTES AT 20-25 DEGREES C. VASCU-GUARD IS TERMINALLY STERILIZED USING GAMMA IRRADIATION. AND PACKAGED WITHIN A DOUBLE-POUCH SYSTEM. THE CONTENTS OF THE UNOPENED, UNDAMAGED OUTER POUCH ARE STERILE.
Type: IMPLANTABLE DEVICE | Site: ARTERIAL | Status: FUNCTIONAL
Brand: VASCU-GUARD

## 2025-04-02 RX ORDER — HYDRALAZINE HYDROCHLORIDE 20 MG/ML
10 INJECTION INTRAMUSCULAR; INTRAVENOUS EVERY 6 HOURS PRN
Status: DISCONTINUED | OUTPATIENT
Start: 2025-04-02 | End: 2025-04-04 | Stop reason: HOSPADM

## 2025-04-02 RX ORDER — HYDROMORPHONE HYDROCHLORIDE 2 MG/ML
0.5 INJECTION, SOLUTION INTRAMUSCULAR; INTRAVENOUS; SUBCUTANEOUS ONCE
Status: COMPLETED | OUTPATIENT
Start: 2025-04-02 | End: 2025-04-02

## 2025-04-02 RX ORDER — LIDOCAINE HYDROCHLORIDE 20 MG/ML
INJECTION, SOLUTION EPIDURAL; INFILTRATION; INTRACAUDAL; PERINEURAL
Status: DISCONTINUED | OUTPATIENT
Start: 2025-04-02 | End: 2025-04-02

## 2025-04-02 RX ORDER — ROPINIROLE 0.25 MG/1
1 TABLET, FILM COATED ORAL NIGHTLY
Status: DISCONTINUED | OUTPATIENT
Start: 2025-04-02 | End: 2025-04-04 | Stop reason: HOSPADM

## 2025-04-02 RX ORDER — KETAMINE HCL IN 0.9 % NACL 50 MG/5 ML
SYRINGE (ML) INTRAVENOUS
Status: DISCONTINUED | OUTPATIENT
Start: 2025-04-02 | End: 2025-04-02

## 2025-04-02 RX ORDER — ACETAMINOPHEN 325 MG/1
650 TABLET ORAL EVERY 6 HOURS
Status: DISCONTINUED | OUTPATIENT
Start: 2025-04-02 | End: 2025-04-02

## 2025-04-02 RX ORDER — PROPOFOL 10 MG/ML
VIAL (ML) INTRAVENOUS
Status: DISCONTINUED | OUTPATIENT
Start: 2025-04-02 | End: 2025-04-02

## 2025-04-02 RX ORDER — SPIRONOLACTONE 25 MG/1
25 TABLET ORAL DAILY
Status: DISCONTINUED | OUTPATIENT
Start: 2025-04-03 | End: 2025-04-02

## 2025-04-02 RX ORDER — EZETIMIBE 10 MG/1
10 TABLET ORAL DAILY
Status: DISCONTINUED | OUTPATIENT
Start: 2025-04-03 | End: 2025-04-04 | Stop reason: HOSPADM

## 2025-04-02 RX ORDER — CEFAZOLIN 2 G/1
2 INJECTION, POWDER, FOR SOLUTION INTRAMUSCULAR; INTRAVENOUS
Status: COMPLETED | OUTPATIENT
Start: 2025-04-02 | End: 2025-04-03

## 2025-04-02 RX ORDER — CEFAZOLIN 2 G/1
INJECTION, POWDER, FOR SOLUTION INTRAMUSCULAR; INTRAVENOUS
Status: DISCONTINUED | OUTPATIENT
Start: 2025-04-02 | End: 2025-04-02

## 2025-04-02 RX ORDER — DEXMEDETOMIDINE HYDROCHLORIDE 100 UG/ML
INJECTION, SOLUTION INTRAVENOUS
Status: DISCONTINUED | OUTPATIENT
Start: 2025-04-02 | End: 2025-04-02

## 2025-04-02 RX ORDER — PANTOPRAZOLE SODIUM 40 MG/1
40 TABLET, DELAYED RELEASE ORAL DAILY
Status: DISCONTINUED | OUTPATIENT
Start: 2025-04-03 | End: 2025-04-04 | Stop reason: HOSPADM

## 2025-04-02 RX ORDER — TORSEMIDE 20 MG/1
20 TABLET ORAL DAILY
Status: DISCONTINUED | OUTPATIENT
Start: 2025-04-03 | End: 2025-04-02

## 2025-04-02 RX ORDER — PHENYLEPHRINE HYDROCHLORIDE 10 MG/ML
INJECTION INTRAVENOUS
Status: DISCONTINUED | OUTPATIENT
Start: 2025-04-02 | End: 2025-04-02

## 2025-04-02 RX ORDER — HEPARIN SODIUM 5000 [USP'U]/ML
5000 INJECTION, SOLUTION INTRAVENOUS; SUBCUTANEOUS EVERY 8 HOURS
Status: DISCONTINUED | OUTPATIENT
Start: 2025-04-02 | End: 2025-04-03

## 2025-04-02 RX ORDER — ONDANSETRON HYDROCHLORIDE 2 MG/ML
INJECTION, SOLUTION INTRAVENOUS
Status: DISCONTINUED | OUTPATIENT
Start: 2025-04-02 | End: 2025-04-02

## 2025-04-02 RX ORDER — ACETAMINOPHEN 500 MG
1000 TABLET ORAL EVERY 6 HOURS
Status: DISCONTINUED | OUTPATIENT
Start: 2025-04-02 | End: 2025-04-04 | Stop reason: HOSPADM

## 2025-04-02 RX ORDER — OXYCODONE HYDROCHLORIDE 5 MG/1
5 TABLET ORAL EVERY 4 HOURS PRN
Refills: 0 | Status: DISCONTINUED | OUTPATIENT
Start: 2025-04-02 | End: 2025-04-04 | Stop reason: HOSPADM

## 2025-04-02 RX ORDER — MUPIROCIN 20 MG/G
OINTMENT TOPICAL 2 TIMES DAILY
Status: DISCONTINUED | OUTPATIENT
Start: 2025-04-02 | End: 2025-04-04 | Stop reason: HOSPADM

## 2025-04-02 RX ORDER — HEPARIN SOD,PORCINE/0.9 % NACL 1000/500ML
INTRAVENOUS SOLUTION INTRAVENOUS
Status: DISCONTINUED | OUTPATIENT
Start: 2025-04-02 | End: 2025-04-02 | Stop reason: HOSPADM

## 2025-04-02 RX ORDER — ONDANSETRON HYDROCHLORIDE 2 MG/ML
4 INJECTION, SOLUTION INTRAVENOUS EVERY 12 HOURS PRN
Status: DISCONTINUED | OUTPATIENT
Start: 2025-04-02 | End: 2025-04-04 | Stop reason: HOSPADM

## 2025-04-02 RX ORDER — SODIUM CHLORIDE 9 MG/ML
INJECTION, SOLUTION INTRAVENOUS CONTINUOUS
Status: DISCONTINUED | OUTPATIENT
Start: 2025-04-02 | End: 2025-04-04

## 2025-04-02 RX ORDER — ALBUTEROL SULFATE 90 UG/1
2 INHALANT RESPIRATORY (INHALATION) EVERY 6 HOURS PRN
Status: DISCONTINUED | OUTPATIENT
Start: 2025-04-02 | End: 2025-04-04 | Stop reason: HOSPADM

## 2025-04-02 RX ORDER — CLOPIDOGREL BISULFATE 75 MG/1
75 TABLET ORAL DAILY
Status: DISCONTINUED | OUTPATIENT
Start: 2025-04-03 | End: 2025-04-04 | Stop reason: HOSPADM

## 2025-04-02 RX ORDER — ROCURONIUM BROMIDE 10 MG/ML
INJECTION, SOLUTION INTRAVENOUS
Status: DISCONTINUED | OUTPATIENT
Start: 2025-04-02 | End: 2025-04-02

## 2025-04-02 RX ORDER — ATORVASTATIN CALCIUM 40 MG/1
80 TABLET, FILM COATED ORAL DAILY
Status: DISCONTINUED | OUTPATIENT
Start: 2025-04-03 | End: 2025-04-04 | Stop reason: HOSPADM

## 2025-04-02 RX ORDER — BACITRACIN ZINC 500 UNIT/G
OINTMENT (GRAM) TOPICAL
Status: DISCONTINUED | OUTPATIENT
Start: 2025-04-02 | End: 2025-04-02 | Stop reason: HOSPADM

## 2025-04-02 RX ORDER — AMOXICILLIN 250 MG
1 CAPSULE ORAL 2 TIMES DAILY
Status: DISCONTINUED | OUTPATIENT
Start: 2025-04-02 | End: 2025-04-04 | Stop reason: HOSPADM

## 2025-04-02 RX ORDER — DEXAMETHASONE SODIUM PHOSPHATE 4 MG/ML
INJECTION, SOLUTION INTRA-ARTICULAR; INTRALESIONAL; INTRAMUSCULAR; INTRAVENOUS; SOFT TISSUE
Status: DISCONTINUED | OUTPATIENT
Start: 2025-04-02 | End: 2025-04-02

## 2025-04-02 RX ORDER — METOPROLOL SUCCINATE 25 MG/1
25 TABLET, EXTENDED RELEASE ORAL EVERY MORNING
Status: DISCONTINUED | OUTPATIENT
Start: 2025-04-03 | End: 2025-04-04 | Stop reason: HOSPADM

## 2025-04-02 RX ORDER — FENTANYL CITRATE 50 UG/ML
INJECTION, SOLUTION INTRAMUSCULAR; INTRAVENOUS
Status: DISCONTINUED | OUTPATIENT
Start: 2025-04-02 | End: 2025-04-02

## 2025-04-02 RX ORDER — PROTAMINE SULFATE 10 MG/ML
INJECTION, SOLUTION INTRAVENOUS
Status: DISCONTINUED | OUTPATIENT
Start: 2025-04-02 | End: 2025-04-02

## 2025-04-02 RX ORDER — DIGOXIN 125 MCG
0.25 TABLET ORAL DAILY
Status: DISCONTINUED | OUTPATIENT
Start: 2025-04-03 | End: 2025-04-03

## 2025-04-02 RX ORDER — HEPARIN SODIUM 1000 [USP'U]/ML
INJECTION, SOLUTION INTRAVENOUS; SUBCUTANEOUS
Status: DISCONTINUED | OUTPATIENT
Start: 2025-04-02 | End: 2025-04-02

## 2025-04-02 RX ADMIN — CEFAZOLIN 2 G: 2 INJECTION, POWDER, FOR SOLUTION INTRAMUSCULAR; INTRAVENOUS at 08:04

## 2025-04-02 RX ADMIN — HYDROMORPHONE HYDROCHLORIDE 0.5 MG: 2 INJECTION, SOLUTION INTRAMUSCULAR; INTRAVENOUS; SUBCUTANEOUS at 05:04

## 2025-04-02 RX ADMIN — PHENYLEPHRINE HYDROCHLORIDE 100 MCG: 10 INJECTION INTRAVENOUS at 04:04

## 2025-04-02 RX ADMIN — DEXAMETHASONE SODIUM PHOSPHATE 8 MG: 4 INJECTION INTRA-ARTICULAR; INTRALESIONAL; INTRAMUSCULAR; INTRAVENOUS; SOFT TISSUE at 01:04

## 2025-04-02 RX ADMIN — HEPARIN SODIUM 2000 UNITS: 1000 INJECTION, SOLUTION INTRAVENOUS; SUBCUTANEOUS at 02:04

## 2025-04-02 RX ADMIN — DEXMEDETOMIDINE 8 MCG: 100 INJECTION, SOLUTION, CONCENTRATE INTRAVENOUS at 03:04

## 2025-04-02 RX ADMIN — SODIUM CHLORIDE: 0.9 INJECTION, SOLUTION INTRAVENOUS at 12:04

## 2025-04-02 RX ADMIN — ROCURONIUM BROMIDE 20 MG: 10 INJECTION, SOLUTION INTRAVENOUS at 01:04

## 2025-04-02 RX ADMIN — MUPIROCIN: 20 OINTMENT TOPICAL at 08:04

## 2025-04-02 RX ADMIN — HEPARIN SODIUM 9000 UNITS: 1000 INJECTION, SOLUTION INTRAVENOUS; SUBCUTANEOUS at 01:04

## 2025-04-02 RX ADMIN — ROCURONIUM BROMIDE 30 MG: 10 INJECTION, SOLUTION INTRAVENOUS at 01:04

## 2025-04-02 RX ADMIN — ACETAMINOPHEN 1000 MG: 500 TABLET ORAL at 07:04

## 2025-04-02 RX ADMIN — ROPINIROLE HYDROCHLORIDE 1 MG: 0.25 TABLET, FILM COATED ORAL at 08:04

## 2025-04-02 RX ADMIN — ACETAMINOPHEN 1000 MG: 500 TABLET ORAL at 11:04

## 2025-04-02 RX ADMIN — PROPOFOL 20 MG: 10 INJECTION, EMULSION INTRAVENOUS at 03:04

## 2025-04-02 RX ADMIN — ROCURONIUM BROMIDE 20 MG: 10 INJECTION, SOLUTION INTRAVENOUS at 02:04

## 2025-04-02 RX ADMIN — PROTAMINE SULFATE 50 MG: 10 INJECTION, SOLUTION INTRAVENOUS at 02:04

## 2025-04-02 RX ADMIN — Medication 15 MG: at 01:04

## 2025-04-02 RX ADMIN — FENTANYL CITRATE 25 MCG: 50 INJECTION INTRAMUSCULAR; INTRAVENOUS at 03:04

## 2025-04-02 RX ADMIN — PROPOFOL 150 MG: 10 INJECTION, EMULSION INTRAVENOUS at 12:04

## 2025-04-02 RX ADMIN — OXYCODONE 5 MG: 5 TABLET ORAL at 07:04

## 2025-04-02 RX ADMIN — SENNOSIDES AND DOCUSATE SODIUM 1 TABLET: 50; 8.6 TABLET ORAL at 08:04

## 2025-04-02 RX ADMIN — SUGAMMADEX 200 MG: 100 INJECTION, SOLUTION INTRAVENOUS at 03:04

## 2025-04-02 RX ADMIN — LIDOCAINE HYDROCHLORIDE 60 MG: 20 INJECTION, SOLUTION EPIDURAL; INFILTRATION; INTRACAUDAL at 12:04

## 2025-04-02 RX ADMIN — FENTANYL CITRATE 25 MCG: 50 INJECTION INTRAMUSCULAR; INTRAVENOUS at 04:04

## 2025-04-02 RX ADMIN — ONDANSETRON 4 MG: 2 INJECTION INTRAMUSCULAR; INTRAVENOUS at 03:04

## 2025-04-02 RX ADMIN — ROCURONIUM BROMIDE 50 MG: 10 INJECTION, SOLUTION INTRAVENOUS at 12:04

## 2025-04-02 RX ADMIN — HEPARIN SODIUM 5000 UNITS: 5000 INJECTION INTRAVENOUS; SUBCUTANEOUS at 10:04

## 2025-04-02 RX ADMIN — PROTAMINE SULFATE 5 MG: 10 INJECTION, SOLUTION INTRAVENOUS at 02:04

## 2025-04-02 RX ADMIN — CEFAZOLIN 2 G: 2 INJECTION, POWDER, FOR SOLUTION INTRAMUSCULAR; INTRAVENOUS at 12:04

## 2025-04-02 RX ADMIN — FENTANYL CITRATE 100 MCG: 50 INJECTION INTRAMUSCULAR; INTRAVENOUS at 12:04

## 2025-04-02 NOTE — HPI
Lisa Smith is a 74 F status post TAVR 12/20/2023, status post left carotid endarterectomy 12/20/2022,  95+% recurrent left carotid stenosis with known right ICA occlusion. In the last 6 weeks she has had several episodes of dizziness bilateral blurred vision and transient bilateral arm weakness.  She denies any lateralizing symptoms.     She is oxygen-dependent COPD using oxygen since 20/11.  She says that her breathing has improved substantially since the TAVR in December 2023.  She still however can not lay flat for extended periods.     She is on AFib and on Coumadin which was evidently started after the TAVR.  Per the daughter who is a nurse she is being evaluated for a Watchman.     4/5/2024:  This is her initial follow-up after left TCAR 02/29/2024.  This was performed for a greater than 95% left carotid stenosis with contralateral occlusion.  She is very well from this procedure and was discharged on the 1st postoperative day.  However she describes on 03/26/2024 an episode of bilateral blurred vision which resolved within 30 minutes, and then on the next day 03/27/2020 for similar episode which also included 30-60 minutes of right hand weakness that then resolved.  She has had no issues since then.  She states compliance with both her Coumadin and Plavix     03/11/2025:  I have not seen this patient in almost 1 year, status post a left TCAR.  However she is not here for follow up for this rather for 2 month history of significant right foot pain at rest.  She had been as limited ambulator for many many years.  Says the pain right foot is worse at night does improve with getting up and walking.     She denies interval stroke TIA or amaurosis.  She has had a Watchman procedure and after that her  Coumadin was stopped for a here AFib and now is on Plavix only     No VBI symptoms

## 2025-04-02 NOTE — ANESTHESIA PREPROCEDURE EVALUATION
[facility]  Anesthesia Pre-Operative Evaluation    Patient Name: Lisa Smith  YOB: 1950  MRN: [id number]    SUBJECTIVE:     Pre-operative evaluation for Procedure(s) (LRB):  ENDARTERECTOMY, FEMORAL (Right)    04/02/2025    Lisa Smith is a 74 y.o. female with history of aortic stenosis s/p TAVR 12/20/2023, atrial fibrillation s/p watchman, COPD (trelegy, albuterol, home O2 @ 2 lpm), SSS s/p PP,  bilateral carotid stenosis s/p left carotid endarterectomy 12/2022 c/b restenosis of > 95% s/p left TCAR 02/2024, right ICA occlusion, and severe PVD presenting for the above procedure.    Patient now presents for the above procedure(s).    2D ECHO:  TTE:  Results for orders placed during the hospital encounter of 01/13/25    Echo    Interpretation Summary    Left Ventricle: Regional wall motion abnormalities present. Septal motion is consistent with pacing. There is moderately reduced systolic function with a visually estimated ejection fraction of 35 - 40%.    Left Atrium: Left atrium is dilated.    Right Atrium: Right atrium is dilated.    Pericardium: There is no pericardial effusion.    A limited echo was performed using limited 2D.      GRETCHEN:  Results for orders placed or performed during the hospital encounter of 01/13/25   Transesophageal echo (GRETCHEN)   Result Value Ref Range    BSA 1.88 m2    Narrative      Left Ventricle: The left ventricle is normal in size. There is normal   systolic function with a visually estimated ejection fraction of 55 - 60%.    Right Ventricle: Normal right ventricular cavity size. Systolic   function is normal.    Left Atrium: Left atrium is severely dilated. There is no thrombus in   the left atrial appendage.    Right Atrium: Right atrium is moderately dilated.    Aortic Valve: There is a bioprosthetic valve in the aortic position.   Mild to moderate paravalvular regurgitation.    Mitral Valve: There is mild to moderate regurgitation.    Tricuspid  Valve: There is mild to moderate regurgitation.    Watchman device was placed successfully, final images showed no   significant leaks around the device, no pericardial effusion.         LDA: None documented.       Prev airway:       Intubation     Date/Time: 1/13/2025 8:25 AM     Performed by: Ethel Cantu CRNA  Authorized by: Lee Cheatham MD    Intubation:     Induction:  Intravenous    Intubated:  Postinduction    Mask Ventilation:  Easy mask    Attempts:  1    Attempted By:  CRNA    Method of Intubation:  Direct and video laryngoscopy    Blade:  Cantor 3    Laryngeal View Grade: Grade I - full view of cords      Difficult Airway Encountered?: No      Complications:  None    Airway Device:  Oral endotracheal tube    Airway Device Size:  7.5    Style/Cuff Inflation:  Cuffed (inflated to minimal occlusive pressure)    Tube secured:  21    Secured at:  The teeth    Placement Verified By:  Capnometry    Complicating Factors:  None    Findings Post-Intubation:  BS equal bilateral and atraumatic/condition of teeth unchanged       Drips: None documented.      Problem List[1]    Review of patient's allergies indicates:   Allergen Reactions    Sulfa (sulfonamide antibiotics) Itching       Current Inpatient Medications:      Antibiotics (From admission, onward)      None            VTE Risk Mitigation (From admission, onward)      None            Medications Ordered Prior to Encounter[2]    Past Surgical History:   Procedure Laterality Date    CARDIAC ELECTROPHYSIOLOGY STUDY AND ABLATION      CAROTID ENDARTERECTOMY Left 12/22/2022    Procedure: ENDARTERECTOMY-CAROTID;  Surgeon: Maximilian Connolly MD;  Location: [facility];  Service: Peripheral Vascular;  Laterality: Left;    CAROTID STENT Left 02/29/2024    Procedure: INSERTION, STENT, ARTERY, CAROTID;  Surgeon: CIRILO Valdivia III, MD;  Location: [address];  Service: Vascular;  Laterality: Left;  left carotid artery stent placement  mgy  146.29   gymc2   8.0730  fluro time  4.8min    CARPAL TUNNEL RELEASE Left     CHOLECYSTECTOMY  1995    CLOSURE OF LEFT ATRIAL APPENDAGE USING DEVICE Right 01/13/2025    Procedure: Left atrial appendage closure device;  Surgeon: Roxana Cantu MD;  Location: [facility] CATH/EP LAB;  Service: Cardiology;  Laterality: Right;    EYE SURGERY Bilateral March 2012    cataract    HYSTERECTOMY  1988    INSERT / REPLACE / REMOVE PACEMAKER      KNEE ARTHROSCOPY Left     LEFT HEART CATHETERIZATION  11/01/2023    Procedure: Left heart cath;  Surgeon: Puma Shelton MD;  Location: [facility] CATH;  Service: Cardiology;;    REPLACEMENT OF PACEMAKER GENERATOR Left 01/20/2022    Procedure: REPLACEMENT, PACEMAKER GENERATOR;  Surgeon: Raymond Pérez MD;  Location: [facility] CATH/EP LAB;  Service: Cardiology;  Laterality: Left;    SKIN CANCER EXCISION      TRANSCATHETER AORTIC VALVE REPLACEMENT (TAVR) Bilateral 12/06/2023    Procedure: (TAVR);  Surgeon: Puma Shelton MD;  Location: [facility] CATH;  Service: Cardiology;  Laterality: Bilateral;    TRANSCATHETER AORTIC VALVE REPLACEMENT (TAVR) Bilateral 12/06/2023    Procedure: (TAVR) - Surgeon;  Surgeon: Maximilian Connolly MD;  Location: [facility] CATH;  Service: Peripheral Vascular;  Laterality: Bilateral;       Social History[3]    OBJECTIVE:     Vital Signs Range (Last 24H):  Temp:  [36.5 °C (97.7 °F)]   Pulse:  [64]   BP: (149)/(64)       Significant Labs:  Lab Results   Component Value Date    WBC 7.72 04/01/2025    HGB 9.9 (L) 04/01/2025    HCT 32.4 (L) 04/01/2025     04/01/2025    CHOL 199 05/10/2022    TRIG 140 05/10/2022    HDL 59 05/10/2022    ALT 6 (L) 01/15/2025    AST 19 01/15/2025     04/01/2025    K 5.2 (H) 04/01/2025     04/01/2025    CREATININE 0.9 04/01/2025    BUN 43 (H) 04/01/2025    CO2 31 (H) 04/01/2025    TSH 1.062 02/11/2025    INR 1.0 01/09/2025    HGBA1C 5.2 02/17/2020       Diagnostic Studies: No relevant studies.    EKG:   Results for orders placed or performed in visit on  01/09/25   IN OFFICE EKG 12-LEAD (to Elliott)    Collection Time: 01/09/25 12:33 PM   Result Value Ref Range    QRS Duration 178 ms    [facility] QTC Calculation 442 ms    Narrative    Test Reason : I48.21,Z98.890,Z86.79,I35.0,Z95.2,I50.43,I25.10,I49.5,Z95.0,I65.29,Z98.890,    Vent. Rate :  61 BPM     Atrial Rate :  31 BPM     P-R Int :    ms          QRS Dur : 178 ms      QT Int : 440 ms       P-R-T Axes :    -80  86 degrees    QTcB Int : 442 ms    Atrial fibrillation with ventricular pacing  When compared with ECG of 05-Dec-2024 07:46,  No significant change was found  Confirmed by Preston Reagan ([id number]) on 1/10/2025 8:18:04 AM    Referred By:            Confirmed By: Preston Reagan         ASSESSMENT/PLAN:     Pre-op Assessment    I have reviewed the Patient Summary Reports.     I have reviewed the Nursing Notes. I have reviewed the NPO Status.   I have reviewed the Medications.     Review of Systems  Anesthesia Hx:   History of prior surgery of interest to airway management or planning:          Denies Family Hx of Anesthesia complications.    Denies Personal Hx of Anesthesia complications.                           Anesthesia Plan  Type of Anesthesia, risks & benefits discussed:    Anesthesia Type: Gen ETT  Intra-op Monitoring Plan: Standard ASA Monitors and Art Line  Post Op Pain Control Plan: multimodal analgesia and IV/PO Opioids PRN  Induction:  IV  Airway Plan: Direct and Video, Post-Induction  ASA Score: 3  Day of Surgery Review of History & Physical: H&P Update referred to the surgeon/provider.    Ready For Surgery From Anesthesia Perspective.     .           [1]   Patient Active Problem List  Diagnosis    Chronic cough    Hypoxemia    Chronic respiratory failure with hypoxia    Acute on chronic respiratory failure with hypoxia    Multifocal pneumonia    CAD (coronary artery disease)    Severe aortic valve stenosis    S/P placement of cardiac pacemaker    Pneumonia    Hyponatremia    Hypoxia    S/P ablation of atrial  fibrillation    Pacemaker battery depletion    Restless legs    Bilateral carotid bruits    Occlusion and stenosis of unspecified carotid artery    S/P carotid endarterectomy    Palpitations    SSS (sick sinus syndrome)    Atrial fibrillation    Permanent atrial fibrillation    S/P TAVR (transcatheter aortic valve replacement)    Acute on chronic combined systolic and diastolic CHF, NYHA class 3    Bilateral carotid artery stenosis    Aortic atherosclerosis    H/O transcarotid artery revascularization (TCAR)    LV dysfunction    Anemia requiring transfusions    Stenosis of left subclavian artery    Stenosis of right brachial artery    At high risk for bleeding    Anemia    Epistaxis    Presence of Watchman left atrial appendage closure device    Acute blood loss anemia    Hemoptysis    Hypotension after procedure    PAD (peripheral artery disease)    Atherosclerotic peripheral vascular disease with rest pain   [2]   No current facility-administered medications on file prior to encounter.     Current Outpatient Medications on File Prior to Encounter   Medication Sig Dispense Refill    acetaminophen (TYLENOL ARTHRITIS ORAL) Take 2 tablets by mouth daily as needed.      albuterol (PROVENTIL/VENTOLIN HFA) 90 mcg/actuation inhaler INHALE 2 PUFFS EVERY 6 HOURS AS NEEDED FOR WHEEZING (RESCUE) 18 g 6    digoxin (LANOXIN) 250 mcg tablet TAKE 1 TABLET EVERY DAY 90 tablet 3    ezetimibe (ZETIA) 10 mg tablet TAKE 1 TABLET EVERY DAY 90 tablet 3    melatonin 10 mg Tab Take 1 tablet by mouth nightly as needed (insomnia).      metoprolol succinate (TOPROL-XL) 25 MG 24 hr tablet TAKE 1 TABLET EVERY MORNING 90 tablet 3    pantoprazole (PROTONIX) 40 MG tablet TAKE 1 TABLET EVERY DAY 90 tablet 3    rOPINIRole (REQUIP) 1 MG tablet Take 1 tablet (1 mg total) by mouth every evening. 90 tablet 1    rosuvastatin (CRESTOR) 40 MG Tab TAKE 1 TABLET EVERY EVENING 90 tablet 3    sacubitriL-valsartan (ENTRESTO) 24-26 mg per tablet Take 1 tablet  by mouth 2 (two) times daily. 180 tablet 3    spironolactone (ALDACTONE) 25 MG tablet Take 1 tablet (25 mg total) by mouth once daily. 90 tablet 3    torsemide (DEMADEX) 20 MG Tab Take 1 tablet (20 mg total) by mouth once daily. 90 tablet 3    albuterol (ACCUNEB) 1.25 mg/3 mL Nebu INHALE THE CONTENTS OF 1 VIAL VIA NEBULIZER EVERY 6 HOURS AS NEEDED FOR RESCUE 360 mL 5    albuterol-ipratropium (DUO-NEB) 2.5 mg-0.5 mg/3 mL nebulizer solution Take 3 mLs by nebulization every 6 (six) hours as needed for Wheezing or Shortness of Breath. Rescue 90 mL 0    clopidogreL (PLAVIX) 75 mg tablet qd 90 tablet 0    fluticasone propionate (FLONASE) 50 mcg/actuation nasal spray 1 spray by Each Nostril route daily as needed for Rhinitis or Allergies.      fluticasone-umeclidin-vilanter (TRELEGY ELLIPTA) 100-62.5-25 mcg DsDv Inhale 1 puff into the lungs once daily. 90 each 3   [3]   Social History  Socioeconomic History    Marital status:     Number of children: 1   Occupational History    Occupation: RETIRED     Comment: [employer]   Tobacco Use    Smoking status: Former     Current packs/day: 0.00     Average packs/day: 2.0 packs/day for 40.0 years (80.0 ttl pk-yrs)     Types: Cigarettes     Start date: 1971     Quit date: 2011     Years since quittin.1     Passive exposure: Past    Smokeless tobacco: Never    Tobacco comments:          Quit in    Substance and Sexual Activity    Alcohol use: Not Currently     Alcohol/week: 8.0 standard drinks of alcohol     Types: 8 Glasses of wine per week    Drug use: No    Sexual activity: Never     Social Drivers of Health     Financial Resource Strain: Patient Declined (2025)    Overall Financial Resource Strain (CARDIA)     Difficulty of Paying Living Expenses: Patient declined   Recent Concern: Financial Resource Strain - High Risk (2024)    Overall Financial Resource Strain (CARDIA)     Difficulty of Paying Living Expenses: Hard   Food  Insecurity: Patient Declined (1/14/2025)    Hunger Vital Sign     Worried About Running Out of Food in the Last Year: Patient declined     Ran Out of Food in the Last Year: Patient declined   Recent Concern: Food Insecurity - Food Insecurity Present (11/26/2024)    Hunger Vital Sign     Worried About Running Out of Food in the Last Year: Sometimes true     Ran Out of Food in the Last Year: Never true   Transportation Needs: Patient Declined (1/14/2025)    TRANSPORTATION NEEDS     Transportation : Patient declined   Physical Activity: Insufficiently Active (11/26/2024)    Exercise Vital Sign     Days of Exercise per Week: 1 day     Minutes of Exercise per Session: 10 min   Stress: Patient Declined (1/14/2025)    [place] [employer] - Occupational Stress Questionnaire     Feeling of Stress : Patient declined   Housing Stability: Patient Declined (1/14/2025)    Housing Stability Vital Sign     Unable to Pay for Housing in the Last Year: Patient declined     Homeless in the Last Year: Patient declined

## 2025-04-02 NOTE — ANESTHESIA PROCEDURE NOTES
Intubation    Date/Time: 4/2/2025 12:27 PM    Performed by: Hayde Cui MD  Authorized by: Fermin Vidal MD    Intubation:     Induction:  Intravenous    Intubated:  Postinduction    Mask Ventilation:  Easy with oral airway    Attempts:  1    Attempted By:  Resident anesthesiologist    Method of Intubation:  Direct    Blade:  Haque 2    Laryngeal View Grade: Grade I - full view of cords      Difficult Airway Encountered?: No      Complications:  None    Airway Device:  Oral endotracheal tube    Airway Device Size:  7.0    Style/Cuff Inflation:  Cuffed    Tube secured:  23    Secured at:  The lips    Placement Verified By:  Capnometry    Complicating Factors:  None    Findings Post-Intubation:  BS equal bilateral and atraumatic/condition of teeth unchanged

## 2025-04-02 NOTE — BRIEF OP NOTE
[facility] - Surgery (2nd Fl)  Brief Operative Note    SUMMARY     Surgery Date: 2025     Surgeons and Role:     * CIRILO Valdivia III, MD - Primary     * Aurora Khan MD - Resident - Assisting     * Zuhair Bowens MD - Fellow        Pre-op Diagnosis: Severe R PAD with ischemic rest pain    Post-op Diagnosis: same    Procedure(s) (LRB):  ENDARTERECTOMY, FEMORAL (Right) with bovine patch    Anesthesia: General    Implants:  Implant Name Type Inv. Item Serial No.  Lot No. LRB No. Used Action   GRAFT VAS GUARD 0.8X8CM BOVINE - [id number]  GRAFT VAS GUARD 0.8X8CM BOVINE  [employer] [id number] Right 1 Implanted       Operative Findings: Distal R CFA Ca++ occlusion;  endart onto profunda;  Notably, the profunda was directly posterior making this  more technically challenging    Estimated Blood Loss: 100cc           Specimens:   Specimen (24h ago, onward)      None          * No specimens in log *    [id number]

## 2025-04-02 NOTE — SUBJECTIVE & OBJECTIVE
Follow-up For: Procedure(s) (LRB):  ENDARTERECTOMY, FEMORAL (Right)    Post-Operative Day: * Day of Surgery *     Past Medical History:   Diagnosis Date    Anticoagulant long-term use     Arthritis     Atrial fibrillation     Bell's palsy     Boil of buttock     burst 12/19/22    CHF (congestive heart failure)     Chronic cough     COPD (chronic obstructive pulmonary disease)     use O2  3l/m NC at night; also taking during day currently 12/4/23    Dizziness     Encounter for blood transfusion     GI bleed 2011    transfusion    H/O diverticulitis of colon     Hard of hearing     Heart murmur     Hematoma 02/2023    left hand    Heterozygous alpha 1-antitrypsin deficiency     History of home oxygen therapy     3L NC at night    Hypertension     Lung disease     copd    MONSERRAT (obstructive sleep apnea)     resolved with wt loss 131#    Pacemaker     Pneumonia     last episode 2019    Pulmonary edema     Skin cancer     Unspecified visual disturbance     episode of vision disturbance and dizziness...occasional recurrences       Past Surgical History:   Procedure Laterality Date    CARDIAC ELECTROPHYSIOLOGY STUDY AND ABLATION      CAROTID ENDARTERECTOMY Left 12/22/2022    Procedure: ENDARTERECTOMY-CAROTID;  Surgeon: Maximilian Connolly MD;  Location: [facility] OR;  Service: Peripheral Vascular;  Laterality: Left;    CAROTID STENT Left 02/29/2024    Procedure: INSERTION, STENT, ARTERY, CAROTID;  Surgeon: CIRILO Valdivia III, MD;  Location: [facility] OR [address] [place] [address];  Service: Vascular;  Laterality: Left;  left carotid artery stent placement  mgy  146.29   gymc2  8.0730  fluro time  4.8min    CARPAL TUNNEL RELEASE Left     CHOLECYSTECTOMY  1995    CLOSURE OF LEFT ATRIAL APPENDAGE USING DEVICE Right 01/13/2025    Procedure: Left atrial appendage closure device;  Surgeon: Roxana Cantu MD;  Location: [facility] CATH/EP LAB;  Service: Cardiology;  Laterality: Right;    EYE SURGERY Bilateral March 2012    cataract    HYSTERECTOMY  1988    INSERT /  REPLACE / REMOVE PACEMAKER      KNEE ARTHROSCOPY Left     LEFT HEART CATHETERIZATION  2023    Procedure: Left heart cath;  Surgeon: Puma Shelton MD;  Location: [facility] CATH;  Service: Cardiology;;    REPLACEMENT OF PACEMAKER GENERATOR Left 2022    Procedure: REPLACEMENT, PACEMAKER GENERATOR;  Surgeon: Raymond Pérez MD;  Location: [facility] CATH/EP LAB;  Service: Cardiology;  Laterality: Left;    SKIN CANCER EXCISION      TRANSCATHETER AORTIC VALVE REPLACEMENT (TAVR) Bilateral 2023    Procedure: (TAVR);  Surgeon: Puma Shelton MD;  Location: [facility] CATH;  Service: Cardiology;  Laterality: Bilateral;    TRANSCATHETER AORTIC VALVE REPLACEMENT (TAVR) Bilateral 2023    Procedure: (TAVR) - Surgeon;  Surgeon: Maximilian Connolly MD;  Location: [facility] CATH;  Service: Peripheral Vascular;  Laterality: Bilateral;       Review of patient's allergies indicates:   Allergen Reactions    Sulfa (sulfonamide antibiotics) Itching       Family History       Problem Relation (Age of Onset)    Alcohol abuse Father    Arthritis Maternal Grandmother    Cancer Father, Brother, Brother    Kidney failure Mother          Tobacco Use    Smoking status: Former     Current packs/day: 0.00     Average packs/day: 2.0 packs/day for 40.0 years (80.0 ttl pk-yrs)     Types: Cigarettes     Start date: 1971     Quit date: 2011     Years since quittin.1     Passive exposure: Past    Smokeless tobacco: Never    Tobacco comments:          Quit in    Substance and Sexual Activity    Alcohol use: Not Currently     Alcohol/week: 8.0 standard drinks of alcohol     Types: 8 Glasses of wine per week    Drug use: No    Sexual activity: Never      Review of Systems  Objective:     Vital Signs (Most Recent):  Temp: 97.9 °F (36.6 °C) (25 1016)  Pulse: 60 (25 1016)  Resp: 18 (25 1016)  BP: (!) 165/74 (25 1016)  SpO2: 95 % (Patient on 3LNC at home at all times) (25 1016) Vital Signs (24h  Range):  Temp:  [97.9 °F (36.6 °C)] 97.9 °F (36.6 °C)  Pulse:  [60] 60  Resp:  [18] 18  SpO2:  [95 %] 95 %  BP: (165)/(74) 165/74     Weight: 77 kg (169 lb 12.1 oz)  Body mass index is 28.25 kg/m².      Intake/Output Summary (Last 24 hours) at 4/2/2025 1623  Last data filed at 4/2/2025 1606  Gross per 24 hour   Intake 1300 ml   Output --   Net 1300 ml          Physical Exam  Vitals and nursing note reviewed.   Eyes:      Pupils: Pupils are equal, round, and reactive to light.   Cardiovascular:      Rate and Rhythm: Normal rate and regular rhythm.      Pulses: Normal pulses.      Heart sounds: Normal heart sounds.   Pulmonary:      Effort: Pulmonary effort is normal.      Breath sounds: Normal breath sounds.   Abdominal:      General: Abdomen is flat. Bowel sounds are normal.      Palpations: Abdomen is soft.   Genitourinary:     Comments: Joyce catheter with clear yellow urine  Musculoskeletal:         General: Normal range of motion.   Skin:     General: Skin is warm and dry.      Comments: R groin incision site covered with dressing   Neurological:      Mental Status: She is alert and oriented to person, place, and time.            Vents:       Lines/Drains/Airways       Drain  Duration                  Urethral Catheter 04/02/25 1324 Straight-tip <1 day              Peripheral Intravenous Line  Duration                  Peripheral IV - Single Lumen 04/02/25 1012 20 G Anterior;Left Forearm <1 day         Peripheral IV - Single Lumen 04/02/25 1237 18 G Right Forearm <1 day                    Significant Labs:    CBC/Anemia Profile:  Recent Labs   Lab 04/01/25  1143   WBC 7.72   HGB 9.9*   HCT 32.4*      *   RDW 14.7*        Chemistries:  Recent Labs   Lab 04/01/25  1143      K 5.2*      CO2 31*   BUN 43*   CREATININE 0.9   CALCIUM 9.8   GLUCOSE 77       All pertinent labs within the past 24 hours have been reviewed.    Significant Imaging: I have reviewed all pertinent imaging  results/findings within the past 24 hours.

## 2025-04-02 NOTE — TELEPHONE ENCOUNTER
Patient has been identified as having an implanted cardiac rhythm device (CRD); the implanted device is a [employer] pacemaker.      The planned surgical procedure is an Endarterectomy, Femoral (right: groin).      PACEMAKERS    Per protocol, apply a magnet directly over the implanted device during entire surgical procedure if electrocautery will be used.      For additional questions, please contact the Arrhythmia Department at Ext [phone].

## 2025-04-02 NOTE — ASSESSMENT & PLAN NOTE
Neuro/Psych:     - Sedation: None    - Pain:    - Scheduled Tylenol 1g q8h   - Oxy PRN             Cardiac:     - S/P right femoral endarectomy with Dr. Valdivia on 4/2    - BP Goal:     - Cleviprex/Cardene PRN    - Pressors: None    - Anti-HTNs: Will start when appropriate    - Rhythm: NSR    - Beta blocker: Will start when appropriate    - Statin: Atorvastatin 40 mg QD      Pulmonary:     - Goal SpO2 >92%    - Will wean ventilator support as tolerated to extubate    - ABGs PRN      Renal:    - Trend BUN/Cr     - Maintain Joyce, record strict Is/Os    Recent Labs   Lab 04/01/25  1143   BUN 43*   CREATININE 0.9         FEN / GI:     - Daily CMP, PRN K/Mag/Phos per protocol     - Replace electrolytes as needed    - Nutrition: heart Healthy    - Bowel Regimen: Miralax, docusate      ID:     - Afebrile    - WBC stable    - Abx: Complete perioperative cefazolin 2g Q8H x 5 doses    Recent Labs   Lab 04/01/25  1143   WBC 7.72         Heme/Onc:     - Hgb 9.9 pre-operatively    - CBC daily    Recent Labs   Lab 04/01/25  1143   HGB 9.9*            Endocrine:     - CTS Goal -140    - HgbA1c: 5.2    - Endocrinology consulted for insulin management      PPx:   Feeding:heart healthy  Analgesia/Sedation: Oxy  Thromboembolic Prevention: None  HOB >30: Yes  Stress Ulcer: Famotidine  Glucose Control: Yes, insulin management per Endocrinology     Lines/Drains/Airway:   Joyce   PIV      Dispo/Code Status/Palliative:     - SICU    - Full Code

## 2025-04-02 NOTE — OP NOTE
Koko Darden - Surgery ([facility])  Operative Note     Surgery Date: 2025      Surgeons and Role:     * CIRILO Valdivia III, MD - Primary     * Aurora Khan MD - Resident - Assisting     * Zuhair Bowens MD - Fellow     Pre-op Diagnosis: Severe R PAD with ischemic rest pain     Post-op Diagnosis: same     Procedure(s) (LRB):  ENDARTERECTOMY, FEMORAL (Right) with bovine patch     Anesthesia: General     Implants:  Implant Name Type Inv. Item Serial No.  Lot No. LRB No. Used Action   GRAFT VAS GUARD 0.8X8CM BOVINE - [id number]   GRAFT VAS GUARD 0.8X8CM BOVINE   [employer] [id number] Right 1 Implanted         Operative Findings: Distal R CFA Ca++ occlusion;  endart onto profunda;  Notably, the profunda was directly posterior making this  more technically challenging     Estimated Blood Loss: 100cc    Procedure In Detail: After an informed consent was obtained the patient was brought to the operating room and placed in the supine position. Both the patient and procedure site were identified and confirmed during a surgical timeout process. The patient received perioperative antibiotics. The patient's bilateral groins were prepped and draped in usual sterile fashion.     A vertical incision was made over the left common femoral artery.  The skin and soft tissue was incised using a combination of electrocautery and sharp dissection.  The dissection was carried down and the femoral sheath was incised. Crossing veins and lymphatics were ligated with surgical ties and transected. The inguinal ligament was mobilized in order to gain proximal control of the distal external iliac artery for adequate clamp zone. The proximal common femoral artery was encircled with umbilical tape to establish proximal control. The profunda artery and 2 second order branches were encircled and controlled.  The proximal superficial femoral artery was looped with umbilical tape. Side branches including circumflex and  epigastric artery were also controlled with silastic vessels loops.  Intravenous heparin was administered and redosed throughout the case to maintain an ACT value greater than 250.       After establishing adequate control, we then proceeded with our common femoral endarterectomy. All intervening arteries were then clamped and remaining femoral side branches were controlled by coming up on the vessel loops.  Using an 11 blade an arteriotomy was made in the profunda that was posteriorly oriented by rotating the common femoral artery medially and extended with Lockhart scissors.  This was difficult as the profunda was in the 6 o clock position. Once our endarterectomy was complete, 7-0 proline tacking sutures were placed into the origin of the profunda. Proximally, the plaque feathered nicely.      A bovine pericardial patch was then soaked in heparinized saline and was fashioned and secured to the profunda artery and extended onto the CFA using a continuous running 5-0 Prolene proximally and 6-0 distally suture. The inflow and outflow were flushed before the anastamosis of the patch was complete.   We then sequentially unclamped the profunda femoral artery by the proximal common femoral artery. Upon reperfusion there was noted to be a palpable pulse in the profunda femoral arteries which was verified with Doppler and palpable DP in the foot. A repair suture was needed both medially and laterally on the patch with pledgets.     The wound was then copiously irrigated and closed with interrupted 2-0 Vicryl in several layers followed by 3-0 Vicryl deep dermal suture followed by staples at the skin. All instrument counts were correct at the end of the case.  The patient tolerated the procedure well.  Dr. Valdivia was present and scrubbed for the entire case.  The pulse exam was improved from the beginning of the case.  Patient was brought to the PACU for recovery.

## 2025-04-02 NOTE — TRANSFER OF CARE
"Anesthesia Transfer of Care Note    Patient: Lisa Smith    Procedure(s) Performed: Procedure(s) (LRB):  ENDARTERECTOMY, FEMORAL (Right)    Patient location: ICU    Anesthesia Type: general    Transport from OR: Transported from OR on 6-10 L/min O2 by face mask with adequate spontaneous ventilation    Post pain: adequate analgesia    Post assessment: no apparent anesthetic complications and tolerated procedure well    Post vital signs: stable    Level of consciousness: awake, alert and oriented    Nausea/Vomiting: no nausea/vomiting    Complications: none    Transfer of care protocol was followed    Last vitals: Visit Vitals  BP (!) 165/74 (BP Location: Right arm, Patient Position: Lying)   Pulse 60   Temp 36.6 °C (97.9 °F) (Temporal)   Resp 18   Ht 5' 5" (1.651 m)   Wt 77 kg (169 lb 12.1 oz)   LMP  (LMP Unknown)   SpO2 95%   Breastfeeding No   BMI 28.25 kg/m²     "

## 2025-04-03 LAB
ABSOLUTE EOSINOPHIL (OHS): 0 K/UL
ABSOLUTE MONOCYTE (OHS): 0.77 K/UL (ref 0.3–1)
ABSOLUTE NEUTROPHIL COUNT (OHS): 7.05 K/UL (ref 1.8–7.7)
ANION GAP (OHS): 4 MMOL/L (ref 8–16)
ANION GAP (OHS): 5 MMOL/L (ref 8–16)
BASOPHILS # BLD AUTO: 0.01 K/UL
BASOPHILS NFR BLD AUTO: 0.1 %
BUN SERPL-MCNC: 25 MG/DL (ref 8–23)
BUN SERPL-MCNC: 27 MG/DL (ref 8–23)
CALCIUM SERPL-MCNC: 8.4 MG/DL (ref 8.7–10.5)
CALCIUM SERPL-MCNC: 8.6 MG/DL (ref 8.7–10.5)
CHLORIDE SERPL-SCNC: 106 MMOL/L (ref 95–110)
CHLORIDE SERPL-SCNC: 106 MMOL/L (ref 95–110)
CO2 SERPL-SCNC: 27 MMOL/L (ref 23–29)
CO2 SERPL-SCNC: 28 MMOL/L (ref 23–29)
CREAT SERPL-MCNC: 0.7 MG/DL (ref 0.5–1.4)
CREAT SERPL-MCNC: 0.8 MG/DL (ref 0.5–1.4)
DIGOXIN SERPL-MCNC: 1.8 NG/ML (ref 0.8–2)
ERYTHROCYTE [DISTWIDTH] IN BLOOD BY AUTOMATED COUNT: 14.6 % (ref 11.5–14.5)
GFR SERPLBLD CREATININE-BSD FMLA CKD-EPI: >60 ML/MIN/1.73/M2
GFR SERPLBLD CREATININE-BSD FMLA CKD-EPI: >60 ML/MIN/1.73/M2
GLUCOSE SERPL-MCNC: 122 MG/DL (ref 70–110)
GLUCOSE SERPL-MCNC: 133 MG/DL (ref 70–110)
HCT VFR BLD AUTO: 26.1 % (ref 37–48.5)
HGB BLD-MCNC: 8.3 GM/DL (ref 12–16)
IMM GRANULOCYTES # BLD AUTO: 0.03 K/UL (ref 0–0.04)
IMM GRANULOCYTES NFR BLD AUTO: 0.4 % (ref 0–0.5)
LYMPHOCYTES # BLD AUTO: 0.49 K/UL (ref 1–4.8)
MAGNESIUM SERPL-MCNC: 2.1 MG/DL (ref 1.6–2.6)
MAGNESIUM SERPL-MCNC: 2.2 MG/DL (ref 1.6–2.6)
MCH RBC QN AUTO: 31.9 PG (ref 27–31)
MCHC RBC AUTO-ENTMCNC: 31.8 G/DL (ref 32–36)
MCV RBC AUTO: 100 FL (ref 82–98)
NUCLEATED RBC (/100WBC) (OHS): 0 /100 WBC
PHOSPHATE SERPL-MCNC: 3.8 MG/DL (ref 2.7–4.5)
PLATELET # BLD AUTO: 183 K/UL (ref 150–450)
PMV BLD AUTO: 10.3 FL (ref 9.2–12.9)
POTASSIUM SERPL-SCNC: 5.2 MMOL/L (ref 3.5–5.1)
POTASSIUM SERPL-SCNC: 5.3 MMOL/L (ref 3.5–5.1)
RBC # BLD AUTO: 2.6 M/UL (ref 4–5.4)
RELATIVE EOSINOPHIL (OHS): 0 %
RELATIVE LYMPHOCYTE (OHS): 5.9 % (ref 18–48)
RELATIVE MONOCYTE (OHS): 9.2 % (ref 4–15)
RELATIVE NEUTROPHIL (OHS): 84.4 % (ref 38–73)
SODIUM SERPL-SCNC: 138 MMOL/L (ref 136–145)
SODIUM SERPL-SCNC: 138 MMOL/L (ref 136–145)
WBC # BLD AUTO: 8.35 K/UL (ref 3.9–12.7)

## 2025-04-03 PROCEDURE — 83735 ASSAY OF MAGNESIUM: CPT | Mod: HCNC | Performed by: STUDENT IN AN ORGANIZED HEALTH CARE EDUCATION/TRAINING PROGRAM

## 2025-04-03 PROCEDURE — 20600001 HC STEP DOWN PRIVATE ROOM: Mod: HCNC

## 2025-04-03 PROCEDURE — 25000242 PHARM REV CODE 250 ALT 637 W/ HCPCS: Mod: HCNC | Performed by: NURSE PRACTITIONER

## 2025-04-03 PROCEDURE — 82310 ASSAY OF CALCIUM: CPT | Mod: HCNC | Performed by: SURGERY

## 2025-04-03 PROCEDURE — 63600175 PHARM REV CODE 636 W HCPCS: Mod: HCNC

## 2025-04-03 PROCEDURE — 25000003 PHARM REV CODE 250: Mod: HCNC

## 2025-04-03 PROCEDURE — 85025 COMPLETE CBC W/AUTO DIFF WBC: CPT | Mod: HCNC | Performed by: SURGERY

## 2025-04-03 PROCEDURE — 80162 ASSAY OF DIGOXIN TOTAL: CPT | Mod: HCNC | Performed by: NURSE PRACTITIONER

## 2025-04-03 PROCEDURE — 84100 ASSAY OF PHOSPHORUS: CPT | Mod: HCNC | Performed by: SURGERY

## 2025-04-03 PROCEDURE — 97161 PT EVAL LOW COMPLEX 20 MIN: CPT | Mod: HCNC

## 2025-04-03 PROCEDURE — 83735 ASSAY OF MAGNESIUM: CPT | Mod: HCNC | Performed by: SURGERY

## 2025-04-03 PROCEDURE — 63600175 PHARM REV CODE 636 W HCPCS: Mod: HCNC | Performed by: STUDENT IN AN ORGANIZED HEALTH CARE EDUCATION/TRAINING PROGRAM

## 2025-04-03 PROCEDURE — 97165 OT EVAL LOW COMPLEX 30 MIN: CPT | Mod: HCNC

## 2025-04-03 PROCEDURE — 99291 CRITICAL CARE FIRST HOUR: CPT | Mod: HCNC,,, | Performed by: STUDENT IN AN ORGANIZED HEALTH CARE EDUCATION/TRAINING PROGRAM

## 2025-04-03 PROCEDURE — 97116 GAIT TRAINING THERAPY: CPT | Mod: HCNC

## 2025-04-03 PROCEDURE — 25000003 PHARM REV CODE 250: Mod: HCNC | Performed by: STUDENT IN AN ORGANIZED HEALTH CARE EDUCATION/TRAINING PROGRAM

## 2025-04-03 PROCEDURE — 97535 SELF CARE MNGMENT TRAINING: CPT | Mod: HCNC

## 2025-04-03 PROCEDURE — 80048 BASIC METABOLIC PNL TOTAL CA: CPT | Mod: HCNC | Performed by: STUDENT IN AN ORGANIZED HEALTH CARE EDUCATION/TRAINING PROGRAM

## 2025-04-03 RX ORDER — DIGOXIN 125 MCG
0.12 TABLET ORAL DAILY
Status: DISCONTINUED | OUTPATIENT
Start: 2025-04-04 | End: 2025-04-04 | Stop reason: HOSPADM

## 2025-04-03 RX ORDER — TALC
6 POWDER (GRAM) TOPICAL NIGHTLY PRN
Status: DISCONTINUED | OUTPATIENT
Start: 2025-04-04 | End: 2025-04-04 | Stop reason: HOSPADM

## 2025-04-03 RX ORDER — FLUTICASONE FUROATE AND VILANTEROL 100; 25 UG/1; UG/1
1 POWDER RESPIRATORY (INHALATION) DAILY
Status: DISCONTINUED | OUTPATIENT
Start: 2025-04-03 | End: 2025-04-04 | Stop reason: HOSPADM

## 2025-04-03 RX ORDER — OXYCODONE HYDROCHLORIDE 5 MG/1
5 TABLET ORAL ONCE
Refills: 0 | Status: COMPLETED | OUTPATIENT
Start: 2025-04-04 | End: 2025-04-04

## 2025-04-03 RX ORDER — ENOXAPARIN SODIUM 100 MG/ML
40 INJECTION SUBCUTANEOUS EVERY 24 HOURS
Status: DISCONTINUED | OUTPATIENT
Start: 2025-04-03 | End: 2025-04-04 | Stop reason: HOSPADM

## 2025-04-03 RX ADMIN — METOPROLOL SUCCINATE 25 MG: 25 TABLET, EXTENDED RELEASE ORAL at 08:04

## 2025-04-03 RX ADMIN — CEFAZOLIN 2 G: 2 INJECTION, POWDER, FOR SOLUTION INTRAMUSCULAR; INTRAVENOUS at 04:04

## 2025-04-03 RX ADMIN — OXYCODONE 5 MG: 5 TABLET ORAL at 04:04

## 2025-04-03 RX ADMIN — CLOPIDOGREL BISULFATE 75 MG: 75 TABLET ORAL at 08:04

## 2025-04-03 RX ADMIN — FLUTICASONE FUROATE AND VILANTEROL TRIFENATATE 1 PUFF: 100; 25 POWDER RESPIRATORY (INHALATION) at 12:04

## 2025-04-03 RX ADMIN — PANTOPRAZOLE SODIUM 40 MG: 40 TABLET, DELAYED RELEASE ORAL at 08:04

## 2025-04-03 RX ADMIN — SENNOSIDES AND DOCUSATE SODIUM 1 TABLET: 50; 8.6 TABLET ORAL at 08:04

## 2025-04-03 RX ADMIN — DIGOXIN 0.25 MG: 125 TABLET ORAL at 08:04

## 2025-04-03 RX ADMIN — ACETAMINOPHEN 1000 MG: 500 TABLET ORAL at 05:04

## 2025-04-03 RX ADMIN — EZETIMIBE 10 MG: 10 TABLET ORAL at 08:04

## 2025-04-03 RX ADMIN — ENOXAPARIN SODIUM 40 MG: 40 INJECTION SUBCUTANEOUS at 04:04

## 2025-04-03 RX ADMIN — MUPIROCIN: 20 OINTMENT TOPICAL at 08:04

## 2025-04-03 RX ADMIN — ROPINIROLE HYDROCHLORIDE 1 MG: 0.25 TABLET, FILM COATED ORAL at 08:04

## 2025-04-03 RX ADMIN — ATORVASTATIN CALCIUM 80 MG: 40 TABLET, FILM COATED ORAL at 08:04

## 2025-04-03 RX ADMIN — HEPARIN SODIUM 5000 UNITS: 5000 INJECTION INTRAVENOUS; SUBCUTANEOUS at 05:04

## 2025-04-03 RX ADMIN — OXYCODONE 5 MG: 5 TABLET ORAL at 08:04

## 2025-04-03 RX ADMIN — ACETAMINOPHEN 1000 MG: 500 TABLET ORAL at 11:04

## 2025-04-03 NOTE — ASSESSMENT & PLAN NOTE
Neuro/Psych:     - Sedation: None    - Pain:    - Scheduled Tylenol 1g q8h   - Oxy PRN             Cardiac:     - S/P right femoral endarectomy with Dr. Valdivia on 4/2    - BP Goal:     - Cleviprex/Cardene PRN    - Pressors: None    - Anti-HTNs: Will start when appropriate    - Rhythm: NSR    - Beta blocker: Will start when appropriate    - Statin: Atorvastatin 40 mg QD      Pulmonary:     - Goal SpO2 >92%    - Will wean ventilator support as tolerated to extubate    - ABGs PRN      Renal:    - Trend BUN/Cr     - Maintain Joyce, record strict Is/Os    Recent Labs   Lab 04/02/25  1632 04/03/25  0355 04/03/25  0501   BUN 24* 27* 25*   CREATININE 0.7 0.7 0.8           FEN / GI:     - Daily CMP, PRN K/Mag/Phos per protocol     - Replace electrolytes as needed    - Nutrition: heart Healthy    - Bowel Regimen: Miralax, docusate      ID:     - Afebrile    - WBC stable    - Abx: Complete perioperative cefazolin 2g Q8H x 5 doses    Recent Labs   Lab 04/01/25  1143 04/02/25  1632 04/03/25  0501   WBC 7.72 10.14 8.35           Heme/Onc:     - Hgb 9.9 pre-operatively    - CBC daily    Recent Labs   Lab 04/01/25  1143 04/02/25  1632 04/02/25  1634 04/03/25  0501   HGB 9.9* 9.3*  --  8.3*    187  --  183   APTT  --   --  23.9  --    INR  --   --  1.0  --            Endocrine:     - CTS Goal -140    - HgbA1c: 5.2    - Endocrinology consulted for insulin management      PPx:   Feeding:heart healthy  Analgesia/Sedation: Oxy  Thromboembolic Prevention: None  HOB >30: Yes  Stress Ulcer: Famotidine  Glucose Control: Yes, insulin management per Endocrinology     Lines/Drains/Airway:   Joyce   PIV      Dispo/Code Status/Palliative:     - SICU    - Full Code

## 2025-04-03 NOTE — PT/OT/SLP EVAL
Occupational Therapy   Evaluation and Co-Treatment     Name: Lisa Smith  MRN: [id number]  Admitting Diagnosis: Atherosclerotic peripheral vascular disease with rest pain  Recent Surgery: Procedure(s) (LRB):  ENDARTERECTOMY, FEMORAL (Right) 1 Day Post-Op    Recommendations:     Discharge Recommendations: Low Intensity Therapy  Discharge Equipment Recommendations:     Barriers to discharge:       Assessment:     Lisa Smith is a 74 y.o. female with a medical diagnosis of Atherosclerotic peripheral vascular disease with rest pain.  She presents with impaired ADL and mobility performance deficits. Pt found upright in bed and agreeable for therapy. Session focused on mobility to sink and safe setup in chair. Pt required light physical A initially with balance improving once RW utilized after standing ADLs. Pt reporting pain in R foot and generalized back discomfort from being in bed in hospital. Patient currently demonstrates a need for low intensity therapy on a scheduled basis secondary to a decline in functional status due to illness   Performance deficits affecting function: weakness, gait instability, impaired endurance, impaired balance, impaired self care skills, impaired functional mobility, pain.      Rehab Prognosis: Good; patient would benefit from acute skilled OT services to address these deficits and reach maximum level of function.       Plan:     Patient to be seen 4 x/week to address the above listed problems via self-care/home management, therapeutic activities, therapeutic exercises  Plan of Care Expires: 05/03/25  Plan of Care Reviewed with: patient    Subjective     Chief Complaint: 4/10 R foot pain   Patient/Family Comments/goals: to get better     Occupational Profile:  Living Environment: Pt lives alone in a [facility] with 0 MIRTHA. Pt has a jacuzzi for bathing, does also have a shower/tub combo with shower chair.  Previous level of function: I with ADLs and mobility. Pt uses a rollator for in the  community (stores in her car) and a RW prn for gait. Pt on 3L 02 at baseline   Roles and Routines: Pt drives; retired   Equipment Used at Home: rollator, walker, rolling, shower chair, blood pressure machine  Assistance upon Discharge: daughter    Pain/Comfort:  Pain Rating 1: 1/10  Location - Side 1: Right  Location - Orientation 1: generalized  Pain Addressed 1: Reposition, Distraction  Pain Addressed 2: Distraction, Cessation of Activity    Patients cultural, spiritual, Episcopal conflicts given the current situation: no    Objective:   Co-treatment with PT for maximal pt participation, safety, and activity tolerance     Communicated with: RN prior to session.  Patient found HOB elevated with pulse ox (continuous), telemetry, blood pressure cuff, hanson catheter upon OT entry to room.    General Precautions: Standard, fall  Orthopedic Precautions: N/A  Braces: N/A  Respiratory Status: Nasal cannula, flow 2 L/min    Occupational Performance:    Bed Mobility:    Patient completed Rolling/Turning to Left with  stand by assistance  Patient completed Scooting/Bridging with stand by assistance  Patient completed Supine to Sit with stand by assistance    Functional Mobility/Transfers:  Patient completed Sit <> Stand Transfer with minimum assistance  with  hand-held assist   Patient completed Bed <> Chair Transfer using Step Transfer technique with minimum assistance with hand-held assist  Functional Mobility: Pt started mobility with BL HHA to walk to sink for ~5 min of ADLs. Pt then mobilized with RW with SBA (~14ft)    Activities of Daily Living:  Grooming: stand by assistance washing face and brushing teeth standing at sink  Upper Body Dressing: minimum assistance donning gown around back    Cognitive/Visual Perceptual:  Cognitive/Psychosocial Skills:     -       Oriented to: Person, Place, Time, and Situation   -       Follows Commands/attention:Follows multistep  commands  -       Communication:  clear/fluent  -       Memory: No Deficits noted  -       Safety awareness/insight to disability: intact   -       Mood/Affect/Coping skills/emotional control: Appropriate to situation    Physical Exam:  Balance:    -       demo good sitting and fair standing balance with RW   Upper Extremity Range of Motion:     -       Right Upper Extremity: WFL  -       Left Upper Extremity: WFL  Upper Extremity Strength:    -       Right Upper Extremity: WFL  -       Left Upper Extremity: WFL   Strength:    -       Right Upper Extremity: WFL  -       Left Upper Extremity: WFL    AMPAC 6 Click ADL:  AMPAC Total Score: 16    Treatment & Education:  Pt educated on role of occupational therapy, POC, and safety during ADLs and functional mobility. Pt and OT discussed importance of safe, continued mobility to optimize daily living skills. Pt verbalized understanding.     White board updated during session. Pt given instruction to call for medical staff/nurse for assistance.       Patient left up in chair with all lines intact, call button in reach, and RN notified    GOALS:   Multidisciplinary Problems       Occupational Therapy Goals          Problem: Occupational Therapy    Goal Priority Disciplines Outcome Interventions   Occupational Therapy Goal     OT, PT/OT Progressing    Description: Goals to be met by: 5/3/25 (1 mo)     Patient will increase functional independence with ADLs by performing:    UE Dressing with [place].  LE Dressing with [place].  Grooming while standing at sink with [place].  Toileting from toilet with [place] for hygiene and clothing management.   Rolling to Bilateral with [place].   Supine to sit with [place].  Step transfer with [place]  Toilet transfer to toilet with [place].                         DME Justifications:  No DME recommended requiring DME justifications    History:     Past Medical History:   Diagnosis Date    Anticoagulant long-term use      Arthritis     Atrial fibrillation     Bell's palsy     Boil of buttock     burst 12/19/22    CHF (congestive heart failure)     Chronic cough     COPD (chronic obstructive pulmonary disease)     use O2  3l/m NC at night; also taking during day currently 12/4/23    Dizziness     Encounter for blood transfusion     GI bleed 2011    transfusion    H/O diverticulitis of colon     Hard of hearing     Heart murmur     Hematoma 02/2023    left hand    Heterozygous alpha 1-antitrypsin deficiency     History of home oxygen therapy     3L NC at night    Hypertension     Lung disease     copd    MONSERRAT (obstructive sleep apnea)     resolved with wt loss 131#    Pacemaker     Pneumonia     last episode 2019    Pulmonary edema     Skin cancer     Unspecified visual disturbance     episode of vision disturbance and dizziness...occasional recurrences         Past Surgical History:   Procedure Laterality Date    CARDIAC ELECTROPHYSIOLOGY STUDY AND ABLATION      CAROTID ENDARTERECTOMY Left 12/22/2022    Procedure: ENDARTERECTOMY-CAROTID;  Surgeon: Maximilian Connolly MD;  Location: [facility] OR;  Service: Peripheral Vascular;  Laterality: Left;    CAROTID STENT Left 02/29/2024    Procedure: INSERTION, STENT, ARTERY, CAROTID;  Surgeon: CIRILO Valdivia III, MD;  Location: [address];  Service: Vascular;  Laterality: Left;  left carotid artery stent placement  mgy  146.29   gymc2  8.0730  fluro time  4.8min    CARPAL TUNNEL RELEASE Left     CHOLECYSTECTOMY  1995    CLOSURE OF LEFT ATRIAL APPENDAGE USING DEVICE Right 01/13/2025    Procedure: Left atrial appendage closure device;  Surgeon: Roxana Cantu MD;  Location: [facility] CATH/EP LAB;  Service: Cardiology;  Laterality: Right;    ENDARTERECTOMY OF FEMORAL ARTERY Right 4/2/2025    Procedure: ENDARTERECTOMY, FEMORAL;  Surgeon: CIRILO Valdivia III, MD;  Location: [address];  Service: Vascular;  Laterality: Right;    EYE SURGERY Bilateral March 2012    cataract    HYSTERECTOMY  1988     INSERT / REPLACE / REMOVE PACEMAKER      KNEE ARTHROSCOPY Left     LEFT HEART CATHETERIZATION  11/01/2023    Procedure: Left heart cath;  Surgeon: Puma Shelton MD;  Location: [facility] CATH;  Service: Cardiology;;    REPLACEMENT OF PACEMAKER GENERATOR Left 01/20/2022    Procedure: REPLACEMENT, PACEMAKER GENERATOR;  Surgeon: Raymond Pérez MD;  Location: [facility] CATH/EP LAB;  Service: Cardiology;  Laterality: Left;    SKIN CANCER EXCISION      TRANSCATHETER AORTIC VALVE REPLACEMENT (TAVR) Bilateral 12/06/2023    Procedure: (TAVR);  Surgeon: Puma Shelton MD;  Location: [facility] CATH;  Service: Cardiology;  Laterality: Bilateral;    TRANSCATHETER AORTIC VALVE REPLACEMENT (TAVR) Bilateral 12/06/2023    Procedure: (TAVR) - Surgeon;  Surgeon: Maximilian Connolly MD;  Location: [facility] CATH;  Service: Peripheral Vascular;  Laterality: Bilateral;       Time Tracking:     OT Date of Treatment: 04/03/25  OT Start Time: 1029  OT Stop Time: 1054  OT Total Time (min): 25 min    Billable Minutes:Evaluation 10 min  Self Care/Home Management 15 min    4/3/2025

## 2025-04-03 NOTE — PLAN OF CARE
Problem: Occupational Therapy  Goal: Occupational Therapy Goal  Description: Goals to be met by: 5/3/25 (1 mo)     Patient will increase functional independence with ADLs by performing:    UE Dressing with [place].  LE Dressing with [place].  Grooming while standing at sink with [place].  Toileting from toilet with [place] for hygiene and clothing management.   Rolling to Bilateral with [place].   Supine to sit with [place].  Step transfer with [place]  Toilet transfer to toilet with [place].    Evaluated pt and established OT POC. Continue OT as tolerated.  Gris Dhaliwal OT  4/3/2025    Outcome: Progressing

## 2025-04-03 NOTE — ASSESSMENT & PLAN NOTE
73 y/o Female with PMH of PVD now 1 day s/p ENDARTERECTOMY, FEMORAL (Right) with bovine patch. Doing well. Normal post operative course. Improvement of symptoms.    Plan:  - Step down to GISSU  - Aggressive PT  - D/c margie

## 2025-04-03 NOTE — PT/OT/SLP EVAL
Physical Therapy Co-Evaluation and Co-Treatment    Patient Name:  Lisa Smith   MRN:  [id number]  Admit Date: 4/2/2025  Admitting Diagnosis:  Atherosclerotic peripheral vascular disease with rest pain   Length of Stay: 1 days  Recent Surgery: Procedure(s) (LRB):  ENDARTERECTOMY, FEMORAL (Right) 1 Day Post-Op    Recommendations:     Discharge Recommendations:   Low Intensity Therapy    Discharge Equipment Recommendations: transfer tub bench  Barriers to discharge: None    Plan:     During this hospitalization, patient to be seen 4 x/week to address the identified rehab impairments via gait training, therapeutic activities, therapeutic exercises, neuromuscular re-education and progress towards the established goals.  Plan of Care Expires:  05/03/25  Plan of Care Reviewed with: patient    Assessment:     Lisa Smith is a 74 y.o. female admitted with a medical diagnosis of Atherosclerotic peripheral vascular disease with rest pain. Pt presents POD1 s/p R femoral endarterectomy. Pt tolerated session well completing mobility in room with VSS. Pt demo'd improved stability during trial with RW this date. Pt has good potential to progress towards her goals with increased mobility. Patient would benefit from skilled therapy services to maximize safety and independence, increase activity tolerance, decrease fall risk, decrease caregiver burden, improve QOL, improve patient's functional mobility, and decrease risk of contractures and pressure sores. Patient currently demonstrates a need for low intensity therapy on a scheduled basis secondary to a decline in functional status due to illness     Problem List: impaired endurance, impaired self care skills, impaired functional mobility, gait instability, impaired balance, decreased lower extremity function, pain, decreased safety awareness.  Rehab Prognosis: Good; patient would benefit from acute skilled PT services to address these deficits and reach maximum level of  function.      Goals:   Multidisciplinary Problems       Physical Therapy Goals          Problem: Physical Therapy    Goal Priority Disciplines Outcome Interventions   Physical Therapy Goal     PT, PT/OT Progressing    Description: Goals to be met by: 5/3/25     Patient will increase functional independence with mobility by performin. Supine to sit with [place]  2. Sit to stand transfer with Supervision  3. Bed to chair transfer with Supervision using LRAD  4. Gait  x 200 feet with Supervision using LRAD.   5. Stand for 10 minutes with Supervision using LRAD                         Subjective     RN  notified prior to session. No one present upon PT entrance into room. Patient agreeable to PT evaluation.    Chief Complaint: debility   Patient/Family Comments/goals: increase mobility   Pain/Comfort:  Pain Rating 1: 1/10  Location - Side 1: Right  Location - Orientation 1: generalized  Location 1: foot  Pain Addressed 1: Distraction, Reposition  Pain Rating Post-Intervention 1: 1/10    Social History:  Residence: lives alone 1-story house with no MIRTHA and no HR. Pt's bathroom has a Ensequencei tub shower combo.  Support available: family  Equipment Owned (using): shower chair, walker, rolling, rollator, oxygen, nebulizer, blood pressure machine  Prior level of function: independent (has equipment available if needed, keeps rollator in car); drives; retired; on 3L NC at all times    Patient reports they will have assistance from daughters (works during day) upon discharge.    Objective:     Additional staff present: OT; OT for co-evaluation due to suspected patient need for two skilled therapists to appropriately assess patient's functional deficits as well as ensure patient safety, accommodate for limited activity tolerance, and provide appropriate, skilled assistance to maximize functional potential during evaluation.    Patient found supine with: blood pressure cuff, telemetry, pulse ox (continuous), oxygen,  "hanson catheter     General Precautions: Standard, Cardiac fall   Orthopedic Precautions:N/A   Braces: N/A   Body mass index is 28.25 kg/m².  Oxygen Device: Nasal Cannula 2L  Vitals: BP (!) 96/47   Pulse 60   Temp 98.1 °F (36.7 °C) (Oral)   Resp (!) 30   Ht 5' 5" (1.651 m)   Wt 77 kg (169 lb 12.1 oz)   LMP  (LMP Unknown)   SpO2 96%   Breastfeeding No   BMI 28.25 kg/m²     Outcome Measures:  AM-PAC 6 CLICK MOBILITY  Turning over in bed (including adjusting bedclothes, sheets and blankets)?: 3  Sitting down on and standing up from a chair with arms (e.g., wheelchair, bedside commode, etc.): 3  Moving from lying on back to sitting on the side of the bed?: 3  Moving to and from a bed to a chair (including a wheelchair)?: 3  Need to walk in hospital room?: 3  Climbing 3-5 steps with a railing?: 3  Basic Mobility Total Score: 18     Exams:    Cognition:  Alert and Cooperative  Command following: Follows multistep verbal commands  Communication: clear/fluent    Sensation:   Light touch sensation: Intact BLEs    Gross Motor Coordination: No deficits noted during functional mobility tasks     Edema/Skin Integrity: None noted; Visible skin intact    Postural examination/scapula alignment: Rounded shoulder    Lower Extremity Range of Motion:  Right Lower Extremity: WFL  Left Lower Extremity: WFL    Lower Extremity Strength:  Right Lower Extremity: WFL  Left Lower Extremity: WFL      Functional Mobility:    Bed Mobility:  Supine > Sit with stand by assistance    Transfers:   Sit <> Stand Transfer: Stand-by Assistance x 1 trials from EOB with no AD              Gait:  Distance: 10' min A (B) HHA + 14' RW SBA (standing balance between trials)  Gait Deviation(s): occasional unsteady gait, decreased step length, and flexed posture  all lines remained intact throughout ambulation trial  Comments: Pt with fwd flexed posture and mild instability with no AD. Pt with increased stability with RW but still required cues for " upright stance.     Balance:  Sitting Balance  Static: stand by assistance  Dynamic: stand by assistance  Standing:  Static: stand by assistance  Pt worked on standing balance and endurance while completing ADLs with OT at the sink.  Dynamic: stand by assistance and contact guard assistance    Education:  Time provided for education, counseling and discussion of health disposition in regards to patient's current status  All questions answered within PT scope of practice and to patient's satisfaction  PT role in POC to address current functional deficits  Call nursing/pct to transfer to chair/use bathroom. Pt stated understanding.    Patient left up in chair with all lines intact, call button in reach, and RN notified.      History:     Past Medical History:   Diagnosis Date    Anticoagulant long-term use     Arthritis     Atrial fibrillation     Bell's palsy     Boil of buttock     burst 12/19/22    CHF (congestive heart failure)     Chronic cough     COPD (chronic obstructive pulmonary disease)     use O2  3l/m NC at night; also taking during day currently 12/4/23    Dizziness     Encounter for blood transfusion     GI bleed 2011    transfusion    H/O diverticulitis of colon     Hard of hearing     Heart murmur     Hematoma 02/2023    left hand    Heterozygous alpha 1-antitrypsin deficiency     History of home oxygen therapy     3L NC at night    Hypertension     Lung disease     copd    MONSERRAT (obstructive sleep apnea)     resolved with wt loss 131#    Pacemaker     Pneumonia     last episode 2019    Pulmonary edema     Skin cancer     Unspecified visual disturbance     episode of vision disturbance and dizziness...occasional recurrences       Past Surgical History:   Procedure Laterality Date    CARDIAC ELECTROPHYSIOLOGY STUDY AND ABLATION      CAROTID ENDARTERECTOMY Left 12/22/2022    Procedure: ENDARTERECTOMY-CAROTID;  Surgeon: Maximilian Connloly MD;  Location: [facility] OR;  Service: Peripheral Vascular;   Laterality: Left;    CAROTID STENT Left 02/29/2024    Procedure: INSERTION, STENT, ARTERY, CAROTID;  Surgeon: CIRILO Valdivia III, MD;  Location: [facility] OR [address] [place] [address];  Service: Vascular;  Laterality: Left;  left carotid artery stent placement  mgy  146.29   gymc2  8.0730  fluro time  4.8min    CARPAL TUNNEL RELEASE Left     CHOLECYSTECTOMY  1995    CLOSURE OF LEFT ATRIAL APPENDAGE USING DEVICE Right 01/13/2025    Procedure: Left atrial appendage closure device;  Surgeon: Roxana Cantu MD;  Location: [facility] CATH/EP LAB;  Service: Cardiology;  Laterality: Right;    ENDARTERECTOMY OF FEMORAL ARTERY Right 4/2/2025    Procedure: ENDARTERECTOMY, FEMORAL;  Surgeon: CIRILO Valdivia III, MD;  Location: [facility] OR [address] [place] [address];  Service: Vascular;  Laterality: Right;    EYE SURGERY Bilateral March 2012    cataract    HYSTERECTOMY  1988    INSERT / REPLACE / REMOVE PACEMAKER      KNEE ARTHROSCOPY Left     LEFT HEART CATHETERIZATION  11/01/2023    Procedure: Left heart cath;  Surgeon: Puma Shelton MD;  Location: [facility] CATH;  Service: Cardiology;;    REPLACEMENT OF PACEMAKER GENERATOR Left 01/20/2022    Procedure: REPLACEMENT, PACEMAKER GENERATOR;  Surgeon: Raymond Pérez MD;  Location: [facility] CATH/EP LAB;  Service: Cardiology;  Laterality: Left;    SKIN CANCER EXCISION      TRANSCATHETER AORTIC VALVE REPLACEMENT (TAVR) Bilateral 12/06/2023    Procedure: (TAVR);  Surgeon: Puma Shelton MD;  [facility];  Service: Cardiology;  Laterality: Bilateral;    TRANSCATHETER AORTIC VALVE REPLACEMENT (TAVR) Bilateral 12/06/2023    Procedure: (TAVR) - Surgeon;  Surgeon: Maximilian Connolly MD;  Location: [facility] CATH;  Service: Peripheral Vascular;  Laterality: Bilateral;       Family History   Problem Relation Name Age of Onset    Kidney failure Mother      Cancer Father Rm Wray     Alcohol abuse Father Rm Nils     Cancer Brother      Arthritis Maternal Grandmother Domenica Nils     Cancer Brother Rm Wray           pancreatic       Social History     Socioeconomic History    Marital status:     Number of children: 1   Occupational History    Occupation: RETIRED     Comment: [employer]   Tobacco Use    Smoking status: Former     Current packs/day: 0.00     Average packs/day: 2.0 packs/day for 40.0 years (80.0 ttl pk-yrs)     Types: Cigarettes     Start date: 1971     Quit date: 2011     Years since quittin.1     Passive exposure: Past    Smokeless tobacco: Never    Tobacco comments:          Quit in    Substance and Sexual Activity    Alcohol use: Not Currently     Alcohol/week: 8.0 standard drinks of alcohol     Types: 8 Glasses of wine per week    Drug use: No    Sexual activity: Never     Social Drivers of Health     Financial Resource Strain: Patient Declined (2025)    Overall Financial Resource Strain (CARDIA)     Difficulty of Paying Living Expenses: Patient declined   Recent Concern: Financial Resource Strain - High Risk (2024)    Overall Financial Resource Strain (CARDIA)     Difficulty of Paying Living Expenses: Hard   Food Insecurity: Patient Declined (2025)    Hunger Vital Sign     Worried About Running Out of Food in the Last Year: Patient declined     Ran Out of Food in the Last Year: Patient declined   Recent Concern: Food Insecurity - Food Insecurity Present (2024)    Hunger Vital Sign     Worried About Running Out of Food in the Last Year: Sometimes true     Ran Out of Food in the Last Year: Never true   Transportation Needs: Patient Declined (2025)    TRANSPORTATION NEEDS     Transportation : Patient declined   Physical Activity: Insufficiently Active (2024)    Exercise Vital Sign     Days of Exercise per Week: 1 day     Minutes of Exercise per Session: 10 min   Stress: Patient Declined (2025)    [employer] [place] [employer] - Occupational Stress Questionnaire     Feeling of Stress : Patient declined   Housing Stability:  Patient Declined (1/14/2025)    Housing Stability Vital Sign     Unable to Pay for Housing in the Last Year: Patient declined     Homeless in the Last Year: Patient declined       Time Tracking:     PT Received On: 04/03/25  PT Start Time: 1028     PT Stop Time: 1054  PT Total Time (min): 26 min     Billable Minutes: Evaluation 10 minutes and Gait Training 10 minutes    4/3/2025

## 2025-04-03 NOTE — PROGRESS NOTES
VASCULAR STAFF    POD 1 L CFA/profunda endart and patch    She reports complete releif of her severe pre-op ischemic rest pain.  She is very very appreciative  R femoral incision without hematoma  R foot is very warm with excellent pedal signals    Satting 100% on NL O2    A/ Doing very well, complete relief of her ischemic rest pain    P/ Step down  D/C hanson  Aggressive PT    CIRILO. Vitor Valdivia III, MD, FACS  Professor and Chief, Vascular and Endovascular Surgery

## 2025-04-03 NOTE — PLAN OF CARE
Problem: Adult Inpatient Plan of Care  Goal: Plan of Care Review  Outcome: Progressing  Goal: Patient-Specific Goal (Individualized)  Outcome: Progressing  Goal: Absence of Hospital-Acquired Illness or Injury  Outcome: Progressing  Goal: Optimal Comfort and Wellbeing  Outcome: Progressing  Goal: Readiness for Transition of Care  Outcome: Progressing   SICU PLAN OF CARE    Dx: Atherosclerotic peripheral vascular disease with rest pain    Goals of Care: SBP<170    Vital Signs (last 12 hours):   Temp:  [97.4 °F (36.3 °C)-98 °F (36.7 °C)]   Pulse:  [60-61]   Resp:  [10-30]   BP: (103-183)/(50-77)   SpO2:  [95 %-100 %]      Neuro: AAOx4, Follows commands , and Moves all extremities spontaneously     Cardiac: normal sinus rhythm    Respiratory:  3L Nasal Cannula , w/ humidification     Gtts: N/A    Urine Output: Urethral Catheter  590 mL/shift    Drains: N/A    Diet: Cardiac      Labs/Accuchecks: Daily     Skin: .Patient turned q2h, bony prominences protected, and mattress inflated/working correctly.     Shift Events:  Pt AOX4, on 3L O2. No acute event at this shift. R. Groin incision, scan drainage.  See flowsheet for further assessment/details. PRN Oxy, schedule tylenol for pain.  MD updated on current condition, vitals and labs

## 2025-04-03 NOTE — SUBJECTIVE & OBJECTIVE
Medications:  Continuous Infusions:   0.9% NaCl   Intravenous Continuous   Stopped at 04/02/25 1606     Scheduled Meds:   acetaminophen  1,000 mg Oral Q6H    atorvastatin  80 mg Oral Daily    clopidogreL  75 mg Oral Daily    [START ON 4/4/2025] digoxin  0.125 mg Oral Daily    enoxparin  40 mg Subcutaneous Q24H (prophylaxis, 1700)    ezetimibe  10 mg Oral Daily    fluticasone furoate-vilanteroL  1 puff Inhalation Daily    metoprolol succinate  25 mg Oral QAM    mupirocin   Nasal BID    pantoprazole  40 mg Oral Daily    rOPINIRole  1 mg Oral QHS    senna-docusate  1 tablet Oral BID    [START ON 4/4/2025] tiotropium bromide  2 puff Inhalation Daily     PRN Meds:  Current Facility-Administered Medications:     albuterol, 2 puff, Inhalation, Q6H PRN    hydrALAZINE, 10 mg, Intravenous, Q6H PRN    ondansetron, 4 mg, Intravenous, Q12H PRN    oxyCODONE, 5 mg, Oral, Q4H PRN     Objective:     Vital Signs (Most Recent):  Temp: 98.1 °F (36.7 °C) (04/03/25 1100)  Pulse: 60 (04/03/25 1315)  Resp: (!) 30 (04/03/25 1315)  BP: (!) 96/47 (04/03/25 1300)  SpO2: 96 % (04/03/25 1315) Vital Signs (24h Range):  Temp:  [97.4 °F (36.3 °C)-98.2 °F (36.8 °C)] 98.1 °F (36.7 °C)  Pulse:  [59-64] 60  Resp:  [10-38] 30  SpO2:  [95 %-100 %] 96 %  BP: ()/(47-77) 96/47     Date 04/03/25 0700 - 04/04/25 0659   Shift 9648-4752 9154-9559 8526-0899 24 Hour Total   INTAKE   P.O. 420   420   Shift Total(mL/kg) 420(5.5)   420(5.5)   OUTPUT   Urine(mL/kg/hr) 620(1)   620   Shift Total(mL/kg) 620(8.1)   620(8.1)   Weight (kg) 77 77 77 77        Physical Exam  Constitutional:       General: She is not in acute distress.     Appearance: Normal appearance.   HENT:      Head: Normocephalic.   Eyes:      Conjunctiva/sclera: Conjunctivae normal.   Cardiovascular:      Rate and Rhythm: Normal rate.      Pulses: Normal pulses.      Comments: Right leg DP/PT present on doppler  Pulmonary:      Effort: Pulmonary effort is normal.   Abdominal:      General:  Abdomen is flat. There is no distension.      Palpations: Abdomen is soft.      Tenderness: There is no abdominal tenderness.   Musculoskeletal:         General: No swelling or tenderness.      Comments: Dressing on Right thigh C/D/I with minimal strike through   Neurological:      General: No focal deficit present.      Mental Status: She is alert.   Psychiatric:         Mood and Affect: Mood normal.         Behavior: Behavior normal.          Significant Labs:  All pertinent labs from the last 24 hours have been reviewed.    Significant Diagnostics:  I have reviewed all pertinent imaging results/findings within the past 24 hours.

## 2025-04-03 NOTE — PROGRESS NOTES
[facility] - Surgical Intensive Care  Critical Care - Surgery  Progress Note    Patient Name: Lisa Smith  MRN: [id number]  Admission Date: 4/2/2025  Hospital Length of Stay: 1 days  Code Status: Full Code  Attending Provider: CIRILO Valdivia III, MD  Primary Care Provider: Hope Tang FNP   Principal Problem: Atherosclerotic peripheral vascular disease with rest pain    Subjective:     Hospital/ICU Course:  No notes on file    Interval History/Significant Events: NAEON. Stable for Step down. Dig level supratheraputic at 1.8, Home Dose of .250mg decreased to .125mg     Follow-up For: Procedure(s) (LRB):  ENDARTERECTOMY, FEMORAL (Right)    Post-Operative Day: 1 Day Post-Op    Objective:     Vital Signs (Most Recent):  Temp: 98.2 °F (36.8 °C) (04/03/25 0715)  Pulse: 60 (04/03/25 0715)  Resp: (!) 38 (04/03/25 0715)  BP: 131/61 (04/03/25 0700)  SpO2: 97 % (04/03/25 0715) Vital Signs (24h Range):  Temp:  [97.4 °F (36.3 °C)-98.2 °F (36.8 °C)] 98.2 °F (36.8 °C)  Pulse:  [60-61] 60  Resp:  [10-38] 38  SpO2:  [95 %-100 %] 97 %  BP: (103-183)/(50-77) 131/61     Weight: 77 kg (169 lb 12.1 oz)  Body mass index is 28.25 kg/m².      Intake/Output Summary (Last 24 hours) at 4/3/2025 0749  Last data filed at 4/3/2025 0715  Gross per 24 hour   Intake 1300 ml   Output 1055 ml   Net 245 ml          Physical Exam  Vitals and nursing note reviewed.   Eyes:      Pupils: Pupils are equal, round, and reactive to light.   Cardiovascular:      Rate and Rhythm: Normal rate and regular rhythm.      Pulses: Normal pulses.      Heart sounds: Normal heart sounds.   Pulmonary:      Effort: Pulmonary effort is normal.      Breath sounds: Normal breath sounds.   Abdominal:      General: Abdomen is flat. Bowel sounds are normal.      Palpations: Abdomen is soft.   Genitourinary:     Comments: Joyce catheter with clear yellow urine  Musculoskeletal:         General: Normal range of motion.   Skin:     General: Skin is warm and dry.       Comments: R groin incision site covered with dressing   Neurological:      Mental Status: She is alert and oriented to person, place, and time.            Vents:  Oxygen Concentration (%): 36 (04/02/25 2251)    Lines/Drains/Airways       Drain  Duration                  Urethral Catheter 04/02/25 1324 Straight-tip <1 day              Peripheral Intravenous Line  Duration                  Peripheral IV - Single Lumen 04/02/25 1012 20 G Anterior;Left Forearm <1 day         Peripheral IV - Single Lumen 04/02/25 1237 18 G Right Forearm <1 day                    Significant Labs:    CBC/Anemia Profile:  Recent Labs   Lab 04/01/25  1143 04/02/25  1632 04/03/25  0501   WBC 7.72 10.14 8.35   HGB 9.9* 9.3* 8.3*   HCT 32.4* 29.9* 26.1*    187 183   * 100* 100*   RDW 14.7* 14.6* 14.6*        Chemistries:  Recent Labs   Lab 04/02/25  1632 04/03/25  0355 04/03/25  0501    138 138   K 5.0 5.3* 5.2*    106 106   CO2 27 27 28   BUN 24* 27* 25*   CREATININE 0.7 0.7 0.8   CALCIUM 8.7 8.4* 8.6*   ALBUMIN 3.7  --   --    BILITOT 0.5  --   --    ALKPHOS 79  --   --    ALT 10  --   --    AST 33  --   --    GLUCOSE 126* 133* 122*   MG 2.2 2.2 2.1   PHOS 3.3  --  3.8       All pertinent labs within the past 24 hours have been reviewed.    Significant Imaging:  I have reviewed all pertinent imaging results/findings within the past 24 hours.  Assessment/Plan:     Cardiac/Vascular  * Atherosclerotic peripheral vascular disease with rest pain    Neuro/Psych:     - Sedation: None    - Pain:    - Scheduled Tylenol 1g q8h   - Oxy PRN             Cardiac:     - S/P right femoral endarectomy with Dr. Valdivia on 4/2    - BP Goal:     - Cleviprex/Cardene PRN    - Pressors: None    - Anti-HTNs: Will start when appropriate    - Rhythm: NSR    - Beta blocker: Will start when appropriate    - Statin: Atorvastatin 40 mg QD    - Hold home Entresto     - Continue Home Digoxin     - Dig level supratheraputic at 1.8, Home Dose of  .250 decreased to .125mg       Pulmonary:     - Goal SpO2 >92%    - Will wean ventilator support as tolerated to extubate    - ABGs PRN      Renal:    - Trend BUN/Cr     - Maintain Joyce, record strict Is/Os    Recent Labs   Lab 04/02/25  1632 04/03/25  0355 04/03/25  0501   BUN 24* 27* 25*   CREATININE 0.7 0.7 0.8           FEN / GI:     - Daily CMP, PRN K/Mag/Phos per protocol     - Replace electrolytes as needed    - Nutrition: heart Healthy    - Bowel Regimen: Miralax, docusate      ID:     - Afebrile    - WBC stable    - Abx: Complete perioperative cefazolin 2g Q8H x 5 doses    Recent Labs   Lab 04/01/25  1143 04/02/25  1632 04/03/25  0501   WBC 7.72 10.14 8.35           Heme/Onc:    - Plavix    - Hgb 9.9 pre-operatively    - CBC daily    Recent Labs   Lab 04/01/25  1143 04/02/25  1632 04/02/25  1634 04/03/25  0501   HGB 9.9* 9.3*  --  8.3*    187  --  183   APTT  --   --  23.9  --    INR  --   --  1.0  --            Endocrine:     - CTS Goal -140    - HgbA1c: 5.2    - Endocrinology consulted for insulin management      PPx:   Feeding:heart healthy  Analgesia/Sedation: Oxy  Thromboembolic Prevention: Lovanox  HOB >30: Yes  Stress Ulcer: Famotidine  Glucose Control: Yes, insulin management per Endocrinology     Lines/Drains/Airway:   [place] WILHELM      Dispo/Code Status/Palliative:     - SICU    - Full Code          Lucia Guillermo DO  Critical Care - Surgery  [facility] - Surgical Intensive Care

## 2025-04-03 NOTE — SUBJECTIVE & OBJECTIVE
Interval History/Significant Events: NAEON. Stable for Step down     Follow-up For: Procedure(s) (LRB):  ENDARTERECTOMY, FEMORAL (Right)    Post-Operative Day: 1 Day Post-Op    Objective:     Vital Signs (Most Recent):  Temp: 98.2 °F (36.8 °C) (04/03/25 0715)  Pulse: 60 (04/03/25 0715)  Resp: (!) 38 (04/03/25 0715)  BP: 131/61 (04/03/25 0700)  SpO2: 97 % (04/03/25 0715) Vital Signs (24h Range):  Temp:  [97.4 °F (36.3 °C)-98.2 °F (36.8 °C)] 98.2 °F (36.8 °C)  Pulse:  [60-61] 60  Resp:  [10-38] 38  SpO2:  [95 %-100 %] 97 %  BP: (103-183)/(50-77) 131/61     Weight: 77 kg (169 lb 12.1 oz)  Body mass index is 28.25 kg/m².      Intake/Output Summary (Last 24 hours) at 4/3/2025 0749  Last data filed at 4/3/2025 0715  Gross per 24 hour   Intake 1300 ml   Output 1055 ml   Net 245 ml          Physical Exam  Vitals and nursing note reviewed.   Eyes:      Pupils: Pupils are equal, round, and reactive to light.   Cardiovascular:      Rate and Rhythm: Normal rate and regular rhythm.      Pulses: Normal pulses.      Heart sounds: Normal heart sounds.   Pulmonary:      Effort: Pulmonary effort is normal.      Breath sounds: Normal breath sounds.   Abdominal:      General: Abdomen is flat. Bowel sounds are normal.      Palpations: Abdomen is soft.   Genitourinary:     Comments: Joyce catheter with clear yellow urine  Musculoskeletal:         General: Normal range of motion.   Skin:     General: Skin is warm and dry.      Comments: R groin incision site covered with dressing   Neurological:      Mental Status: She is alert and oriented to person, place, and time.            Vents:  Oxygen Concentration (%): 36 (04/02/25 2251)    Lines/Drains/Airways       Drain  Duration                  Urethral Catheter 04/02/25 1324 Straight-tip <1 day              Peripheral Intravenous Line  Duration                  Peripheral IV - Single Lumen 04/02/25 1012 20 G Anterior;Left Forearm <1 day         Peripheral IV - Single Lumen 04/02/25 1237 18  G Right Forearm <1 day                    Significant Labs:    CBC/Anemia Profile:  Recent Labs   Lab 04/01/25  1143 04/02/25  1632 04/03/25  0501   WBC 7.72 10.14 8.35   HGB 9.9* 9.3* 8.3*   HCT 32.4* 29.9* 26.1*    187 183   * 100* 100*   RDW 14.7* 14.6* 14.6*        Chemistries:  Recent Labs   Lab 04/02/25  1632 04/03/25  0355 04/03/25  0501    138 138   K 5.0 5.3* 5.2*    106 106   CO2 27 27 28   BUN 24* 27* 25*   CREATININE 0.7 0.7 0.8   CALCIUM 8.7 8.4* 8.6*   ALBUMIN 3.7  --   --    BILITOT 0.5  --   --    ALKPHOS 79  --   --    ALT 10  --   --    AST 33  --   --    GLUCOSE 126* 133* 122*   MG 2.2 2.2 2.1   PHOS 3.3  --  3.8       All pertinent labs within the past 24 hours have been reviewed.    Significant Imaging:  I have reviewed all pertinent imaging results/findings within the past 24 hours.

## 2025-04-03 NOTE — H&P
[facility]  Critical Care - Surgery  History & Physical    Patient Name: Lisa Smith  MRN: [id number]  Admission Date: 4/2/2025  Code Status: Full Code  Attending Physician: CIRILO Valdivia III, MD   Primary Care Provider: Hope Tang FNP   Principal Problem: Atherosclerotic peripheral vascular disease with rest pain    Subjective:     HPI:  iLsa Smith is a 74 F status post TAVR 12/20/2023, status post left carotid endarterectomy 12/20/2022,  95+% recurrent left carotid stenosis with known right ICA occlusion. In the last 6 weeks she has had several episodes of dizziness bilateral blurred vision and transient bilateral arm weakness.  She denies any lateralizing symptoms.     She is oxygen-dependent COPD using oxygen since 20/11.  She says that her breathing has improved substantially since the TAVR in December 2023.  She still however can not lay flat for extended periods.     She is on AFib and on Coumadin which was evidently started after the TAVR.  Per the daughter who is a nurse she is being evaluated for a Watchman.     4/5/2024:  This is her initial follow-up after left TCAR 02/29/2024.  This was performed for a greater than 95% left carotid stenosis with contralateral occlusion.  She is very well from this procedure and was discharged on the 1st postoperative day.  However she describes on 03/26/2024 an episode of bilateral blurred vision which resolved within 30 minutes, and then on the next day 03/27/2020 for similar episode which also included 30-60 minutes of right hand weakness that then resolved.  She has had no issues since then.  She states compliance with both her Coumadin and Plavix     03/11/2025:  I have not seen this patient in almost 1 year, status post a left TCAR.  However she is not here for follow up for this rather for 2 month history of significant right foot pain at rest.  She had been as limited ambulator for many many years.  Says the pain right  foot is worse at night does improve with getting up and walking.     She denies interval stroke TIA or amaurosis.  She has had a Watchman procedure and after that her  Coumadin was stopped for a here AFib and now is on Plavix only     No VBI symptoms    Hospital/ICU Course:  No notes on file    Follow-up For: Procedure(s) (LRB):  ENDARTERECTOMY, FEMORAL (Right)    Post-Operative Day: * Day of Surgery *     Past Medical History:   Diagnosis Date    Anticoagulant long-term use     Arthritis     Atrial fibrillation     Bell's palsy     Boil of buttock     burst 12/19/22    CHF (congestive heart failure)     Chronic cough     COPD (chronic obstructive pulmonary disease)     use O2  3l/m NC at night; also taking during day currently 12/4/23    Dizziness     Encounter for blood transfusion     GI bleed 2011    transfusion    H/O diverticulitis of colon     Hard of hearing     Heart murmur     Hematoma 02/2023    left hand    Heterozygous alpha 1-antitrypsin deficiency     History of home oxygen therapy     3L NC at night    Hypertension     Lung disease     copd    MONSERRAT (obstructive sleep apnea)     resolved with wt loss 131#    Pacemaker     Pneumonia     last episode 2019    Pulmonary edema     Skin cancer     Unspecified visual disturbance     episode of vision disturbance and dizziness...occasional recurrences       Past Surgical History:   Procedure Laterality Date    CARDIAC ELECTROPHYSIOLOGY STUDY AND ABLATION      CAROTID ENDARTERECTOMY Left 12/22/2022    Procedure: ENDARTERECTOMY-CAROTID;  Surgeon: Maximilian Connolly MD;  Location: [facility] OR;  Service: Peripheral Vascular;  Laterality: Left;    CAROTID STENT Left 02/29/2024    Procedure: INSERTION, STENT, ARTERY, CAROTID;  Surgeon: CIRILO Valdivia III, MD;  Location: [address];  Service: Vascular;  Laterality: Left;  left carotid artery stent placement  mgy  146.29   gymc2  8.0730  fluro time  4.8min    CARPAL TUNNEL RELEASE Left     CHOLECYSTECTOMY  1995     CLOSURE OF LEFT ATRIAL APPENDAGE USING DEVICE Right 2025    Procedure: Left atrial appendage closure device;  Surgeon: Roxana Cantu MD;  Location: [facility] CATH/EP LAB;  Service: Cardiology;  Laterality: Right;    EYE SURGERY Bilateral 2012    cataract    HYSTERECTOMY  1988    INSERT / REPLACE / REMOVE PACEMAKER      KNEE ARTHROSCOPY Left     LEFT HEART CATHETERIZATION  2023    Procedure: Left heart cath;  Surgeon: Puma Shelton MD;  Location: [facility] CATH;  Service: Cardiology;;    REPLACEMENT OF PACEMAKER GENERATOR Left 2022    Procedure: REPLACEMENT, PACEMAKER GENERATOR;  Surgeon: Raymond Pérez MD;  Location: [facility] CATH/EP LAB;  Service: Cardiology;  Laterality: Left;    SKIN CANCER EXCISION      TRANSCATHETER AORTIC VALVE REPLACEMENT (TAVR) Bilateral 2023    Procedure: (TAVR);  Surgeon: Puma Shelton MD;  Location: [facility] CATH;  Service: Cardiology;  Laterality: Bilateral;    TRANSCATHETER AORTIC VALVE REPLACEMENT (TAVR) Bilateral 2023    Procedure: (TAVR) - Surgeon;  Surgeon: Maximilian Connolly MD;  Location: [facility] CATH;  Service: Peripheral Vascular;  Laterality: Bilateral;       Review of patient's allergies indicates:   Allergen Reactions    Sulfa (sulfonamide antibiotics) Itching       Family History       Problem Relation (Age of Onset)    Alcohol abuse Father    Arthritis Maternal Grandmother    Cancer Father, Brother, Brother    Kidney failure Mother          Tobacco Use    Smoking status: Former     Current packs/day: 0.00     Average packs/day: 2.0 packs/day for 40.0 years (80.0 ttl pk-yrs)     Types: Cigarettes     Start date: 1971     Quit date: 2011     Years since quittin.1     Passive exposure: Past    Smokeless tobacco: Never    Tobacco comments:          Quit in    Substance and Sexual Activity    Alcohol use: Not Currently     Alcohol/week: 8.0 standard drinks of alcohol     Types: 8 Glasses of wine per week    Drug use: No    Sexual  activity: Never      Review of Systems  Objective:     Vital Signs (Most Recent):  Temp: 97.9 °F (36.6 °C) (04/02/25 1016)  Pulse: 60 (04/02/25 1016)  Resp: 18 (04/02/25 1016)  BP: (!) 165/74 (04/02/25 1016)  SpO2: 95 % (Patient on 3LNC at home at all times) (04/02/25 1016) Vital Signs (24h Range):  Temp:  [97.9 °F (36.6 °C)] 97.9 °F (36.6 °C)  Pulse:  [60] 60  Resp:  [18] 18  SpO2:  [95 %] 95 %  BP: (165)/(74) 165/74     Weight: 77 kg (169 lb 12.1 oz)  Body mass index is 28.25 kg/m².      Intake/Output Summary (Last 24 hours) at 4/2/2025 1623  Last data filed at 4/2/2025 1606  Gross per 24 hour   Intake 1300 ml   Output --   Net 1300 ml          Physical Exam  Vitals and nursing note reviewed.   Eyes:      Pupils: Pupils are equal, round, and reactive to light.   Cardiovascular:      Rate and Rhythm: Normal rate and regular rhythm.      Pulses: Normal pulses.      Heart sounds: Normal heart sounds.   Pulmonary:      Effort: Pulmonary effort is normal.      Breath sounds: Normal breath sounds.   Abdominal:      General: Abdomen is flat. Bowel sounds are normal.      Palpations: Abdomen is soft.   Genitourinary:     Comments: Joyce catheter with clear yellow urine  Musculoskeletal:         General: Normal range of motion.   Skin:     General: Skin is warm and dry.      Comments: R groin incision site covered with dressing   Neurological:      Mental Status: She is alert and oriented to person, place, and time.            Vents:       Lines/Drains/Airways       Drain  Duration                  Urethral Catheter 04/02/25 1324 Straight-tip <1 day              Peripheral Intravenous Line  Duration                  Peripheral IV - Single Lumen 04/02/25 1012 20 G Anterior;Left Forearm <1 day         Peripheral IV - Single Lumen 04/02/25 1237 18 G Right Forearm <1 day                    Significant Labs:    CBC/Anemia Profile:  Recent Labs   Lab 04/01/25  1143   WBC 7.72   HGB 9.9*   HCT 32.4*      *   RDW  14.7*        Chemistries:  Recent Labs   Lab 04/01/25  1143      K 5.2*      CO2 31*   BUN 43*   CREATININE 0.9   CALCIUM 9.8   GLUCOSE 77       All pertinent labs within the past 24 hours have been reviewed.    Significant Imaging: I have reviewed all pertinent imaging results/findings within the past 24 hours.  Assessment/Plan:     Cardiac/Vascular  * Atherosclerotic peripheral vascular disease with rest pain    Neuro/Psych:     - Sedation: None    - Pain:    - Scheduled Tylenol 1g q8h   - Oxy PRN             Cardiac:     - S/P right femoral endarectomy with Dr. Valdivia on 4/2    - BP Goal:     - Cleviprex/Cardene PRN    - Pressors: None    - Anti-HTNs: Will start when appropriate    - Rhythm: NSR    - Beta blocker: Will start when appropriate    - Statin: Atorvastatin 40 mg QD      Pulmonary:     - Goal SpO2 >92%    - Will wean ventilator support as tolerated to extubate    - ABGs PRN      Renal:    - Trend BUN/Cr     - Maintain Joyce, record strict Is/Os    Recent Labs   Lab 04/01/25  1143   BUN 43*   CREATININE 0.9         FEN / GI:     - Daily CMP, PRN K/Mag/Phos per protocol     - Replace electrolytes as needed    - Nutrition: heart Healthy    - Bowel Regimen: Miralax, docusate      ID:     - Afebrile    - WBC stable    - Abx: Complete perioperative cefazolin 2g Q8H x 5 doses    Recent Labs   Lab 04/01/25  1143   WBC 7.72         Heme/Onc:     - Hgb 9.9 pre-operatively    - CBC daily    Recent Labs   Lab 04/01/25  1143   HGB 9.9*            Endocrine:     - CTS Goal -140    - HgbA1c: 5.2    - Endocrinology consulted for insulin management      PPx:   Feeding:heart healthy  Analgesia/Sedation: Oxy  Thromboembolic Prevention: None  HOB >30: Yes  Stress Ulcer: Famotidine  Glucose Control: Yes, insulin management per Endocrinology     Lines/Drains/Airway:   Joyce   PIV      Dispo/Code Status/Palliative:     - SICU    - Full Code          Lucia Guillermo, DO  Critical Care -  Surgery  [facility] - Surgical Intensive Care

## 2025-04-03 NOTE — PROGRESS NOTES
[facility]  Vascular Surgery  Progress Note    Patient Name: Lisa Smith  MRN: [id number]  Admission Date: 4/2/2025  Primary Care Provider: Hope Tang FNP    Subjective:     Interval History: NAEON. VSS, afebrile. 1 day s/p Right femoral endarterectomy. Patient very satisfied with surgery. Notes significant improvement to right leg.     Post-Op Info:  Procedure(s) (LRB):  ENDARTERECTOMY, FEMORAL (Right)   1 Day Post-Op     Medications:  Continuous Infusions:   0.9% NaCl   Intravenous Continuous   Stopped at 04/02/25 1606     Scheduled Meds:   acetaminophen  1,000 mg Oral Q6H    atorvastatin  80 mg Oral Daily    clopidogreL  75 mg Oral Daily    [START ON 4/4/2025] digoxin  0.125 mg Oral Daily    enoxparin  40 mg Subcutaneous Q24H (prophylaxis, 1700)    ezetimibe  10 mg Oral Daily    fluticasone furoate-vilanteroL  1 puff Inhalation Daily    metoprolol succinate  25 mg Oral QAM    mupirocin   Nasal BID    pantoprazole  40 mg Oral Daily    rOPINIRole  1 mg Oral QHS    senna-docusate  1 tablet Oral BID    [START ON 4/4/2025] tiotropium bromide  2 puff Inhalation Daily     PRN Meds:  Current Facility-Administered Medications:     albuterol, 2 puff, Inhalation, Q6H PRN    hydrALAZINE, 10 mg, Intravenous, Q6H PRN    ondansetron, 4 mg, Intravenous, Q12H PRN    oxyCODONE, 5 mg, Oral, Q4H PRN     Objective:     Vital Signs (Most Recent):  Temp: 98.1 °F (36.7 °C) (04/03/25 1100)  Pulse: 60 (04/03/25 1315)  Resp: (!) 30 (04/03/25 1315)  BP: (!) 96/47 (04/03/25 1300)  SpO2: 96 % (04/03/25 1315) Vital Signs (24h Range):  Temp:  [97.4 °F (36.3 °C)-98.2 °F (36.8 °C)] 98.1 °F (36.7 °C)  Pulse:  [59-64] 60  Resp:  [10-38] 30  SpO2:  [95 %-100 %] 96 %  BP: ()/(47-77) 96/47     Date 04/03/25 0700 - 04/04/25 0659   Shift 4859-2766 7045-0679 7717-6266 24 Hour Total   INTAKE   P.O. 420   420   Shift Total(mL/kg) 420(5.5)   420(5.5)   OUTPUT   Urine(mL/kg/hr) 620(1)   620   Shift Total(mL/kg)  620(8.1)   620(8.1)   Weight (kg) 77 77 77 77        Physical Exam  Constitutional:       General: She is not in acute distress.     Appearance: Normal appearance.   HENT:      Head: Normocephalic.   Eyes:      Conjunctiva/sclera: Conjunctivae normal.   Cardiovascular:      Rate and Rhythm: Normal rate.      Pulses: Normal pulses.      Comments: Right leg DP/PT present on doppler  Pulmonary:      Effort: Pulmonary effort is normal.   Abdominal:      General: Abdomen is flat. There is no distension.      Palpations: Abdomen is soft.      Tenderness: There is no abdominal tenderness.   Musculoskeletal:         General: No swelling or tenderness.      Comments: Dressing on Right thigh C/D/I with minimal strike through   Neurological:      General: No focal deficit present.      Mental Status: She is alert.   Psychiatric:         Mood and Affect: Mood normal.         Behavior: Behavior normal.          Significant Labs:  All pertinent labs from the last 24 hours have been reviewed.    Significant Diagnostics:  I have reviewed all pertinent imaging results/findings within the past 24 hours.  Assessment/Plan:     * Atherosclerotic peripheral vascular disease with rest pain  73 y/o Female with PMH of PVD now 1 day s/p ENDARTERECTOMY, FEMORAL (Right) with bovine patch. Doing well. Normal post operative course. Improvement of symptoms.    Plan:  - Step down to GISSU  - Aggressive PT  - D/c margie Emerson MD  Vascular Surgery  [facility]

## 2025-04-03 NOTE — PLAN OF CARE
PT evaluation completed- see note for details. PT POC and goals established.    Problem: Physical Therapy  Goal: Physical Therapy Goal  Description: Goals to be met by: 5/3/25     Patient will increase functional independence with mobility by performin. Supine to sit with [place]  2. Sit to stand transfer with Supervision  3. Bed to chair transfer with Supervision using LRAD  4. Gait  x 200 feet with Supervision using LRAD.   5. Stand for 10 minutes with Supervision using LRAD    Outcome: Progressing

## 2025-04-04 ENCOUNTER — TELEPHONE (OUTPATIENT)
Dept: FAMILY MEDICINE | Facility: CLINIC | Age: 75
End: 2025-04-04
Payer: MEDICARE

## 2025-04-04 VITALS
WEIGHT: 169.75 LBS | HEIGHT: 65 IN | DIASTOLIC BLOOD PRESSURE: 73 MMHG | SYSTOLIC BLOOD PRESSURE: 159 MMHG | OXYGEN SATURATION: 97 % | BODY MASS INDEX: 28.28 KG/M2 | HEART RATE: 60 BPM | TEMPERATURE: 98 F | RESPIRATION RATE: 28 BRPM

## 2025-04-04 LAB
ABSOLUTE EOSINOPHIL (OHS): 0.13 K/UL
ABSOLUTE MONOCYTE (OHS): 0.7 K/UL (ref 0.3–1)
ABSOLUTE NEUTROPHIL COUNT (OHS): 5.26 K/UL (ref 1.8–7.7)
ANION GAP (OHS): 5 MMOL/L (ref 8–16)
BASOPHILS # BLD AUTO: 0.02 K/UL
BASOPHILS NFR BLD AUTO: 0.3 %
BUN SERPL-MCNC: 20 MG/DL (ref 8–23)
CALCIUM SERPL-MCNC: 8.5 MG/DL (ref 8.7–10.5)
CHLORIDE SERPL-SCNC: 103 MMOL/L (ref 95–110)
CO2 SERPL-SCNC: 29 MMOL/L (ref 23–29)
CREAT SERPL-MCNC: 0.6 MG/DL (ref 0.5–1.4)
ERYTHROCYTE [DISTWIDTH] IN BLOOD BY AUTOMATED COUNT: 14.8 % (ref 11.5–14.5)
GFR SERPLBLD CREATININE-BSD FMLA CKD-EPI: >60 ML/MIN/1.73/M2
GLUCOSE SERPL-MCNC: 94 MG/DL (ref 70–110)
HCT VFR BLD AUTO: 24.5 % (ref 37–48.5)
HGB BLD-MCNC: 7.9 GM/DL (ref 12–16)
IMM GRANULOCYTES # BLD AUTO: 0.03 K/UL (ref 0–0.04)
IMM GRANULOCYTES NFR BLD AUTO: 0.4 % (ref 0–0.5)
LYMPHOCYTES # BLD AUTO: 1.1 K/UL (ref 1–4.8)
MAGNESIUM SERPL-MCNC: 2 MG/DL (ref 1.6–2.6)
MCH RBC QN AUTO: 31.5 PG (ref 27–31)
MCHC RBC AUTO-ENTMCNC: 32.2 G/DL (ref 32–36)
MCV RBC AUTO: 98 FL (ref 82–98)
NUCLEATED RBC (/100WBC) (OHS): 0 /100 WBC
PLATELET # BLD AUTO: 162 K/UL (ref 150–450)
PMV BLD AUTO: 10.4 FL (ref 9.2–12.9)
POTASSIUM SERPL-SCNC: 4.7 MMOL/L (ref 3.5–5.1)
RBC # BLD AUTO: 2.51 M/UL (ref 4–5.4)
RELATIVE EOSINOPHIL (OHS): 1.8 %
RELATIVE LYMPHOCYTE (OHS): 15.2 % (ref 18–48)
RELATIVE MONOCYTE (OHS): 9.7 % (ref 4–15)
RELATIVE NEUTROPHIL (OHS): 72.6 % (ref 38–73)
SODIUM SERPL-SCNC: 137 MMOL/L (ref 136–145)
WBC # BLD AUTO: 7.24 K/UL (ref 3.9–12.7)

## 2025-04-04 PROCEDURE — 63600175 PHARM REV CODE 636 W HCPCS: Mod: HCNC

## 2025-04-04 PROCEDURE — 99233 SBSQ HOSP IP/OBS HIGH 50: CPT | Mod: HCNC,95,, | Performed by: STUDENT IN AN ORGANIZED HEALTH CARE EDUCATION/TRAINING PROGRAM

## 2025-04-04 PROCEDURE — 97116 GAIT TRAINING THERAPY: CPT | Mod: HCNC

## 2025-04-04 PROCEDURE — 25000003 PHARM REV CODE 250: Mod: HCNC | Performed by: STUDENT IN AN ORGANIZED HEALTH CARE EDUCATION/TRAINING PROGRAM

## 2025-04-04 PROCEDURE — 25000003 PHARM REV CODE 250: Mod: HCNC

## 2025-04-04 PROCEDURE — 85025 COMPLETE CBC W/AUTO DIFF WBC: CPT | Mod: HCNC | Performed by: STUDENT IN AN ORGANIZED HEALTH CARE EDUCATION/TRAINING PROGRAM

## 2025-04-04 PROCEDURE — 25000003 PHARM REV CODE 250: Mod: HCNC | Performed by: NURSE PRACTITIONER

## 2025-04-04 PROCEDURE — 25000242 PHARM REV CODE 250 ALT 637 W/ HCPCS: Mod: HCNC | Performed by: NURSE PRACTITIONER

## 2025-04-04 PROCEDURE — 82310 ASSAY OF CALCIUM: CPT | Mod: HCNC | Performed by: STUDENT IN AN ORGANIZED HEALTH CARE EDUCATION/TRAINING PROGRAM

## 2025-04-04 PROCEDURE — 83735 ASSAY OF MAGNESIUM: CPT | Mod: HCNC | Performed by: STUDENT IN AN ORGANIZED HEALTH CARE EDUCATION/TRAINING PROGRAM

## 2025-04-04 RX ORDER — NAPROXEN SODIUM 220 MG/1
81 TABLET, FILM COATED ORAL DAILY
Qty: 30 TABLET | Refills: 2 | Status: SHIPPED | OUTPATIENT
Start: 2025-04-04 | End: 2025-07-03

## 2025-04-04 RX ORDER — DIGOXIN 125 MCG
0.12 TABLET ORAL DAILY
Qty: 30 TABLET | Refills: 11 | Status: SHIPPED | OUTPATIENT
Start: 2025-04-05 | End: 2026-04-05

## 2025-04-04 RX ORDER — HYDROMORPHONE HYDROCHLORIDE 2 MG/ML
0.2 INJECTION, SOLUTION INTRAMUSCULAR; INTRAVENOUS; SUBCUTANEOUS ONCE
Status: COMPLETED | OUTPATIENT
Start: 2025-04-04 | End: 2025-04-04

## 2025-04-04 RX ORDER — POLYETHYLENE GLYCOL 3350 17 G/17G
17 POWDER, FOR SOLUTION ORAL DAILY
Status: DISCONTINUED | OUTPATIENT
Start: 2025-04-04 | End: 2025-04-04 | Stop reason: HOSPADM

## 2025-04-04 RX ADMIN — DIGOXIN 0.12 MG: 125 TABLET ORAL at 07:04

## 2025-04-04 RX ADMIN — METOPROLOL SUCCINATE 25 MG: 25 TABLET, EXTENDED RELEASE ORAL at 07:04

## 2025-04-04 RX ADMIN — SENNOSIDES AND DOCUSATE SODIUM 1 TABLET: 50; 8.6 TABLET ORAL at 08:04

## 2025-04-04 RX ADMIN — TIOTROPIUM BROMIDE INHALATION SPRAY 2 PUFF: 3.12 SPRAY, METERED RESPIRATORY (INHALATION) at 08:04

## 2025-04-04 RX ADMIN — PANTOPRAZOLE SODIUM 40 MG: 40 TABLET, DELAYED RELEASE ORAL at 08:04

## 2025-04-04 RX ADMIN — ATORVASTATIN CALCIUM 80 MG: 40 TABLET, FILM COATED ORAL at 08:04

## 2025-04-04 RX ADMIN — EZETIMIBE 10 MG: 10 TABLET ORAL at 08:04

## 2025-04-04 RX ADMIN — ACETAMINOPHEN 1000 MG: 500 TABLET ORAL at 12:04

## 2025-04-04 RX ADMIN — FLUTICASONE FUROATE AND VILANTEROL TRIFENATATE 1 PUFF: 100; 25 POWDER RESPIRATORY (INHALATION) at 08:04

## 2025-04-04 RX ADMIN — HYDROMORPHONE HYDROCHLORIDE 0.2 MG: 2 INJECTION, SOLUTION INTRAMUSCULAR; INTRAVENOUS; SUBCUTANEOUS at 03:04

## 2025-04-04 RX ADMIN — CLOPIDOGREL BISULFATE 75 MG: 75 TABLET ORAL at 08:04

## 2025-04-04 RX ADMIN — Medication 6 MG: at 12:04

## 2025-04-04 RX ADMIN — ACETAMINOPHEN 1000 MG: 500 TABLET ORAL at 06:04

## 2025-04-04 RX ADMIN — MUPIROCIN: 20 OINTMENT TOPICAL at 08:04

## 2025-04-04 RX ADMIN — POLYETHYLENE GLYCOL 3350 17 G: 17 POWDER, FOR SOLUTION ORAL at 11:04

## 2025-04-04 RX ADMIN — OXYCODONE 5 MG: 5 TABLET ORAL at 12:04

## 2025-04-04 RX ADMIN — ACETAMINOPHEN 1000 MG: 500 TABLET ORAL at 11:04

## 2025-04-04 NOTE — SUBJECTIVE & OBJECTIVE
Interval History/Significant Events: NAEON. Per Vascular, pt stable for Discharge from ICU. Home Digoxin dose of .25mg halfed to .125mg 2/2 to supratheraputic digoxin level, will need follow up with cardiology.      Follow-up For: Procedure(s) (LRB):  ENDARTERECTOMY, FEMORAL (Right)    Post-Operative Day: 2 Days Post-Op    Objective:     Vital Signs (Most Recent):  Temp: 98.3 °F (36.8 °C) (04/04/25 0705)  Pulse: 60 (04/04/25 1015)  Resp: (!) 36 (04/04/25 1015)  BP: 131/60 (04/04/25 0900)  SpO2: 98 % (04/04/25 1015) Vital Signs (24h Range):  Temp:  [98.1 °F (36.7 °C)-98.8 °F (37.1 °C)] 98.3 °F (36.8 °C)  Pulse:  [59-63] 60  Resp:  [16-45] 36  SpO2:  [86 %-100 %] 98 %  BP: ()/(47-70) 131/60     Weight: 77 kg (169 lb 12.1 oz)  Body mass index is 28.25 kg/m².      Intake/Output Summary (Last 24 hours) at 4/4/2025 1056  Last data filed at 4/4/2025 1003  Gross per 24 hour   Intake 420 ml   Output 1425 ml   Net -1005 ml          Physical Exam  Constitutional:       General: She is not in acute distress.     Appearance: Normal appearance.   HENT:      Head: Normocephalic.   Eyes:      Conjunctiva/sclera: Conjunctivae normal.   Cardiovascular:      Rate and Rhythm: Normal rate.      Pulses: Normal pulses.      Comments: Right leg DP/PT present on doppler  Pulmonary:      Effort: Pulmonary effort is normal.   Abdominal:      General: Abdomen is flat. There is no distension.      Palpations: Abdomen is soft.      Tenderness: There is no abdominal tenderness.   Musculoskeletal:         General: No swelling or tenderness.      Comments: Dressing on Right thigh C/D/I with minimal strike through   Neurological:      General: No focal deficit present.      Mental Status: She is alert.   Psychiatric:         Mood and Affect: Mood normal.         Behavior: Behavior normal.            Vents:  Oxygen Concentration (%): 36 (04/02/25 2251)    Lines/Drains/Airways       Peripheral Intravenous Line  Duration                   Peripheral IV - Single Lumen 04/02/25 1012 20 G Anterior;Left Forearm 2 days         Peripheral IV - Single Lumen 04/02/25 1237 18 G Right Forearm 1 day                    Significant Labs:    CBC/Anemia Profile:  Recent Labs   Lab 04/02/25  1632 04/03/25  0501 04/04/25  0331   WBC 10.14 8.35 7.24   HGB 9.3* 8.3* 7.9*   HCT 29.9* 26.1* 24.5*    183 162   * 100* 98   RDW 14.6* 14.6* 14.8*        Chemistries:  Recent Labs   Lab 04/02/25  1632 04/03/25  0355 04/03/25  0501 04/04/25  0331    138 138 137   K 5.0 5.3* 5.2* 4.7    106 106 103   CO2 27 27 28 29   BUN 24* 27* 25* 20   CREATININE 0.7 0.7 0.8 0.6   CALCIUM 8.7 8.4* 8.6* 8.5*   ALBUMIN 3.7  --   --   --    BILITOT 0.5  --   --   --    ALKPHOS 79  --   --   --    ALT 10  --   --   --    AST 33  --   --   --    GLUCOSE 126* 133* 122* 94   MG 2.2 2.2 2.1 2.0   PHOS 3.3  --  3.8  --        All pertinent labs within the past 24 hours have been reviewed.    Significant Imaging:  I have reviewed all pertinent imaging results/findings within the past 24 hours.

## 2025-04-04 NOTE — PLAN OF CARE
CHW spoke with patient navigator Madhuri, and message sent to Hope Tang NP nursing staff to call patient back to schedule a 1 week hospital follow up, due to no available appointments.

## 2025-04-04 NOTE — TELEPHONE ENCOUNTER
----- Message from Madhuri sent at 4/4/2025 11:36 AM CDT -----  Yolanda with [facility] case management is calling to schedule HFU appt. She is being discharge today. Please contact patient back

## 2025-04-04 NOTE — PROGRESS NOTES
[facility]  Vascular Surgery  Progress Note    Patient Name: Lisa Smith  MRN: [id number]  Admission Date: 4/2/2025  Primary Care Provider: Hope Tang FNP    Subjective:     Interval History: POD 2 femoral endarterectomy. Pt stable for dc today - groin soft, incisions cdi. Dc orders placed. Discussed with Dr Valdivia    Post-Op Info:  Procedure(s) (LRB):  ENDARTERECTOMY, FEMORAL (Right)   2 Days Post-Op     Medications:  Continuous Infusions:   0.9% NaCl   Intravenous Continuous   Stopped at 04/02/25 1606     Scheduled Meds:   acetaminophen  1,000 mg Oral Q6H    atorvastatin  80 mg Oral Daily    clopidogreL  75 mg Oral Daily    digoxin  0.125 mg Oral Daily    enoxparin  40 mg Subcutaneous Q24H (prophylaxis, 1700)    ezetimibe  10 mg Oral Daily    fluticasone furoate-vilanteroL  1 puff Inhalation Daily    metoprolol succinate  25 mg Oral QAM    mupirocin   Nasal BID    pantoprazole  40 mg Oral Daily    polyethylene glycol  17 g Oral Daily    rOPINIRole  1 mg Oral QHS    senna-docusate  1 tablet Oral BID    tiotropium bromide  2 puff Inhalation Daily     PRN Meds:  Current Facility-Administered Medications:     albuterol, 2 puff, Inhalation, Q6H PRN    hydrALAZINE, 10 mg, Intravenous, Q6H PRN    melatonin, 6 mg, Oral, Nightly PRN    ondansetron, 4 mg, Intravenous, Q12H PRN    oxyCODONE, 5 mg, Oral, Q4H PRN     Objective:     Vital Signs (Most Recent):  Temp: 98.3 °F (36.8 °C) (04/04/25 0705)  Pulse: 61 (04/04/25 1115)  Resp: (!) 35 (04/04/25 1115)  BP: 131/60 (04/04/25 0900)  SpO2: (!) 92 % (04/04/25 1115) Vital Signs (24h Range):  Temp:  [98.1 °F (36.7 °C)-98.8 °F (37.1 °C)] 98.3 °F (36.8 °C)  Pulse:  [59-63] 61  Resp:  [16-45] 35  SpO2:  [86 %-100 %] 92 %  BP: ()/(47-64) 131/60     Date 04/04/25 0700 - 04/05/25 0659   Shift 1028-1322 7813-3901 5415-6694 24 Hour Total   INTAKE   P.O. 300   300   Shift Total(mL/kg) 300(3.9)   300(3.9)   OUTPUT   Shift Total(mL/kg)        Weight (kg) 77 77 77 77        Physical Exam  Constitutional:       General: She is not in acute distress.     Appearance: Normal appearance.   HENT:      Head: Normocephalic.   Eyes:      Conjunctiva/sclera: Conjunctivae normal.   Cardiovascular:      Rate and Rhythm: Normal rate.      Pulses: Normal pulses.      Comments: Right leg DP/PT present on doppler  Pulmonary:      Effort: Pulmonary effort is normal.   Abdominal:      General: Abdomen is flat. There is no distension.      Palpations: Abdomen is soft.      Tenderness: There is no abdominal tenderness.   Musculoskeletal:         General: No swelling or tenderness.      Comments: Dressing on Right thigh C/D/I with minimal strike through   Neurological:      General: No focal deficit present.      Mental Status: She is alert.   Psychiatric:         Mood and Affect: Mood normal.         Behavior: Behavior normal.          Significant Labs:  All pertinent labs from the last 24 hours have been reviewed.    Significant Diagnostics:  I have reviewed all pertinent imaging results/findings within the past 24 hours.  Assessment/Plan:     * Atherosclerotic peripheral vascular disease with rest pain  73 y/o Female with PMH of PVD now 1 day s/p ENDARTERECTOMY, FEMORAL (Right) with bovine patch. Doing well. Normal post operative course. Improvement of symptoms.    Plan:  -Pt is medically stable for dc today. She was seen by our team and Dr Valdivia today  -Will follow up in clinic in 2 weeks with repeat LANIE. Vascular team will coordinate / schedule  -Pt is to continue aspirin and plavix upon discharge        Brooke Graf MD  Vascular Surgery  [facility]

## 2025-04-04 NOTE — PT/OT/SLP PROGRESS
Physical Therapy Treatment    Patient Name:  Lisa Smith   MRN:  [id number]  Admitting Diagnosis:  Atherosclerotic peripheral vascular disease with rest pain   Recent Surgery: Procedure(s) (LRB):  ENDARTERECTOMY, FEMORAL (Right) 2 Days Post-Op  Admit Date: 4/2/2025  Length of Stay: 2 days    Recommendations:     Discharge Recommendations:   Low Intensity Therapy    Discharge Equipment Recommendations: other (see comments) (transfer tub bench)   Barriers to discharge: None    Plan:     During this hospitalization, patient to be seen 4 x/week to address the identified rehab impairments via gait training, therapeutic activities, therapeutic exercises, neuromuscular re-education and progress towards the established goals.  Plan of Care Expires:  05/03/25  Plan of Care Reviewed with: patient    Assessment:     Lisa Smith is a 74 y.o. female admitted with a medical diagnosis of Atherosclerotic peripheral vascular disease with rest pain. Pt found up in chair agreeable to therapy session. Pt demo'd improvements as she increased distance gait trained this session. Pt has good potential to progress towards her goals with increased mobility. Patient would benefit from skilled therapy services to maximize safety and independence, increase activity tolerance, decrease fall risk, decrease caregiver burden, improve QOL, improve patient's functional mobility, and decrease risk of contractures and pressure sores.  Patient continues to demonstrate the need for low intensity therapy on a scheduled basis exhibited by decreased independence with functional mobility     Problem List: impaired endurance, impaired functional mobility, gait instability, impaired balance, impaired self care skills, decreased lower extremity function, decreased safety awareness, impaired cardiopulmonary response to activity.  Rehab Prognosis: Good; patient would benefit from acute skilled PT services to address these deficits and reach maximum level  "of function.      Goals:   Multidisciplinary Problems       Physical Therapy Goals          Problem: Physical Therapy    Goal Priority Disciplines Outcome Interventions   Physical Therapy Goal     PT, PT/OT Progressing    Description: Goals to be met by: 5/3/25     Patient will increase functional independence with mobility by performin. Supine to sit with [place]  2. Sit to stand transfer with Supervision  3. Bed to chair transfer with Supervision using LRAD  4. Gait  x 200 feet with Supervision using LRAD.   5. Stand for 10 minutes with Supervision using LRAD                         Subjective     RN notified prior to session. No one present upon PT entrance into room. Patient agreeable to PT treatment session.    Chief Complaint: "I think I'm going home this morning"  Patient/Family Comments/goals: increase mobility   Pain/Comfort:  Pain Rating 1: 0/10  Pain Rating Post-Intervention 1: 0/10      Objective:     Additional staff present: N/A    Patient found up in chair with: blood pressure cuff, telemetry, pulse ox (continuous), oxygen   Cognition:   Alert and Cooperative  Patient is oriented to Person, Place, Time, Situation  General Precautions: Standard, Cardiac fall   Orthopedic Precautions:N/A   Braces: N/A   Body mass index is 28.25 kg/m².  Oxygen Device: Nasal Cannula 2L (3L NC used for mobility)  Vitals: /60   Pulse 60   Temp 98.3 °F (36.8 °C) (Oral)   Resp 20   Ht 5' 5" (1.651 m)   Wt 77 kg (169 lb 12.1 oz)   LMP  (LMP Unknown)   SpO2 98%   Breastfeeding No   BMI 28.25 kg/m²     Outcome Measures:  AM-PAC 6 CLICK MOBILITY  Turning over in bed (including adjusting bedclothes, sheets and blankets)?: 3  Sitting down on and standing up from a chair with arms (e.g., wheelchair, bedside commode, etc.): 3  Moving from lying on back to sitting on the side of the bed?: 3  Moving to and from a bed to a chair (including a wheelchair)?: 3  Need to walk in hospital room?: 3  Climbing 3-5 " steps with a railing?: 3  Basic Mobility Total Score: 18     Functional Mobility:    Bed Mobility:   Pt found/returned to bedside chair    Transfers:   Sit <> Stand Transfer: stand by assistance with rolling walker from chair and toilet    Balance:  Standing:  Static: stand by assistance  Dynamic: stand by assistance      Gait:  Patient ambulated: 132' + 10'  Patient required: standy by assistance with occasional CGA  Patient used:  rolling walker   Gait Deviation(s): occasional unsteady gait and decreased step length  Portable Supplemental O2 3L utilized  Portable monitor utilized  all lines remained intact throughout ambulation trial  Nurse present for vital sign monitoring  Comments: Patient required occasional tactile cues for RW management as pt with increased path deviation throughout.     Education:  Time provided for education, counseling and discussion of health disposition in regards to patient's current status  All questions answered within PT scope of practice and to patient's satisfaction  PT role in POC to address current functional deficits  Call nursing/pct to transfer to chair/use bathroom. Pt stated understanding.    Patient left up in chair with all lines intact, call button in reach, and RN notified.    Time Tracking:     PT Received On: 04/04/25  PT Start Time: 0857     PT Stop Time: 0913  PT Total Time (min): 16 min     Billable Minutes:   Gait Training 10 mnutes    Treatment Type: Treatment  PT/PTA: PT       4/4/2025

## 2025-04-04 NOTE — ASSESSMENT & PLAN NOTE
75 y/o Female with PMH of PVD now 1 day s/p ENDARTERECTOMY, FEMORAL (Right) with bovine patch. Doing well. Normal post operative course. Improvement of symptoms.    Plan:  -Pt is medically stable for dc today. She was seen by our team and Dr Valdivia today  -Will follow up in clinic in 2 weeks with repeat LANIE. Vascular team will coordinate / schedule  -Pt is to continue aspirin and plavix upon discharge

## 2025-04-04 NOTE — SUBJECTIVE & OBJECTIVE
Medications:  Continuous Infusions:   0.9% NaCl   Intravenous Continuous   Stopped at 04/02/25 1606     Scheduled Meds:   acetaminophen  1,000 mg Oral Q6H    atorvastatin  80 mg Oral Daily    clopidogreL  75 mg Oral Daily    digoxin  0.125 mg Oral Daily    enoxparin  40 mg Subcutaneous Q24H (prophylaxis, 1700)    ezetimibe  10 mg Oral Daily    fluticasone furoate-vilanteroL  1 puff Inhalation Daily    metoprolol succinate  25 mg Oral QAM    mupirocin   Nasal BID    pantoprazole  40 mg Oral Daily    polyethylene glycol  17 g Oral Daily    rOPINIRole  1 mg Oral QHS    senna-docusate  1 tablet Oral BID    tiotropium bromide  2 puff Inhalation Daily     PRN Meds:  Current Facility-Administered Medications:     albuterol, 2 puff, Inhalation, Q6H PRN    hydrALAZINE, 10 mg, Intravenous, Q6H PRN    melatonin, 6 mg, Oral, Nightly PRN    ondansetron, 4 mg, Intravenous, Q12H PRN    oxyCODONE, 5 mg, Oral, Q4H PRN     Objective:     Vital Signs (Most Recent):  Temp: 98.3 °F (36.8 °C) (04/04/25 0705)  Pulse: 60 (04/04/25 1015)  Resp: (!) 36 (04/04/25 1015)  BP: 131/60 (04/04/25 0900)  SpO2: 98 % (04/04/25 1015) Vital Signs (24h Range):  Temp:  [98.1 °F (36.7 °C)-98.8 °F (37.1 °C)] 98.3 °F (36.8 °C)  Pulse:  [59-63] 60  Resp:  [16-45] 36  SpO2:  [86 %-100 %] 98 %  BP: ()/(47-64) 131/60     Date 04/04/25 0700 - 04/05/25 0659   Shift 1040-9236 1832-8733 2769-5535 24 Hour Total   INTAKE   P.O. 300   300   Shift Total(mL/kg) 300(3.9)   300(3.9)   OUTPUT   Shift Total(mL/kg)       Weight (kg) 77 77 77 77        Physical Exam  Constitutional:       General: She is not in acute distress.     Appearance: Normal appearance.   HENT:      Head: Normocephalic.   Eyes:      Conjunctiva/sclera: Conjunctivae normal.   Cardiovascular:      Rate and Rhythm: Normal rate.      Pulses: Normal pulses.      Comments: Right leg DP/PT present on doppler  Pulmonary:      Effort: Pulmonary effort is normal.   Abdominal:      General: Abdomen is  flat. There is no distension.      Palpations: Abdomen is soft.      Tenderness: There is no abdominal tenderness.   Musculoskeletal:         General: No swelling or tenderness.      Comments: Dressing on Right thigh C/D/I with minimal strike through   Neurological:      General: No focal deficit present.      Mental Status: She is alert.   Psychiatric:         Mood and Affect: Mood normal.         Behavior: Behavior normal.          Significant Labs:  All pertinent labs from the last 24 hours have been reviewed.    Significant Diagnostics:  I have reviewed all pertinent imaging results/findings within the past 24 hours.

## 2025-04-04 NOTE — PLAN OF CARE
Problem: Adult Inpatient Plan of Care  Goal: Plan of Care Review  Outcome: Progressing  Goal: Patient-Specific Goal (Individualized)  Outcome: Progressing  Goal: Absence of Hospital-Acquired Illness or Injury  Outcome: Progressing  Goal: Optimal Comfort and Wellbeing  Outcome: Progressing  Goal: Readiness for Transition of Care  Outcome: Progressing   SICU PLAN OF CARE    Dx: Atherosclerotic peripheral vascular disease with rest pain    Goals of Care: SBP<170     Vital Signs (last 12 hours):   Temp:  [98.1 °F (36.7 °C)-98.8 °F (37.1 °C)]   Pulse:  [60-63]   Resp:  [16-45]   BP: (112-140)/(51-64)   SpO2:  [89 %-100 %]      Neuro: AAOx4, Follows commands , and Moves all extremities spontaneously     Cardiac:  paced rhythm     Respiratory:  2L Nasal Cannula     Gtts: N/A    Urine Output: External Catheter  750 mL/shift    Drains: N/A    Diet: Cardiac      Labs/Accuchecks: Dily labs     Skin: Pt changes positions independently.   Shift Events:  No acute event this shift.  See flowsheet for further assessment/details.  Pain management overnight, Oxy and X1 dilaudid. See MAR for details.    MD updated on current condition, vitals and labs.

## 2025-04-04 NOTE — ASSESSMENT & PLAN NOTE
73 y/o Female with PMH of PVD now 1 day s/p ENDARTERECTOMY, FEMORAL (Right) with bovine patch. Doing well. Normal post operative course. Improvement of symptoms.    Plan:  -Pt is medically stable for dc today. She was seen by our team and Dr Valdivia today  -Will follow up in clinic in 2 weeks with repeat LANIE. Vascular team will coordinate / schedule  -Pt is to continue aspirin and plavix upon discharge

## 2025-04-04 NOTE — DISCHARGE INSTRUCTIONS
Vascular Surgery Discharge Instructions:  -Please take daily aspirin and plavix!  -Please follow up in clinic in 2 weeks for post op visit. Vascular surgery team will coordinate and schedule    Okay to remove outer clear dressing and gauze 48 hours after your surgery.   Okay to shower 48 hours after your surgery and after removing dressing.   Please do not scrub or rub incisions. Pat incision dry.   Do not use lotion on your incision. May cover sterile gauze and tape as needed.     No heavy lifting anything over 15 lbs until your follow up visit with your surgeon.     Take 650mg to 1000mg tylenol every 6 hours.     - Take a full shower daily beginning 2 days after the surgery. Allow soap and water to run over your incision. Pad the incision dry afterward  - When resting or sleeping, try to keep your arm elevated to shoulder level on pillows to reduce swelling  - If you notice clear drainage from your incision, you can apply dry gauze daily and secure in place with tape or gentle elastic wrap     Activity:  -- No swimming in pools, [employer], [employer] etc. for 2 weeks after your surgery     Diet:  -Resume your pre-operative home diet     Call Vascular Surgery Office at [phone] if you experience:  -Increased redness, warmth, tenderness, or draining pus at your incision(s)

## 2025-04-04 NOTE — HOSPITAL COURSE
Patient was admitted for severe right PAD with ischemic rest pain on 4/2/2025 and underwent right femoral endarterectomy with bovine patch without periprocedural complications and was monitored closely in the ICU setting without issue. At the time of discharge, the patient was tolerating PO intake without N/V, dysphagia, denied bowel or bladder dysfunction, denied new neurological symptoms, and reported pain controlled with current regimen. The patient was accompanied woke from anesthesia at baseline neuro-status. The groin was soft without a hematoma, distal pulses were palpable, and vital signs were stable. The patient will follow up in clinic as indicated in discharge instructions. All questions were answered and continued treatment/woundcare instructions were discussed in detail prior to discharge.

## 2025-04-04 NOTE — PROGRESS NOTES
[facility] - [facility]ensive [facility]  Critical Care - Surgery  Progress Note    Patient Name: Lisa Smith  MRN: [id number]  Admission Date: 4/2/2025  Hospital Length of Stay: 2 days  Code Status: Full Code  Attending Provider: CIRILO Valdivia III, MD  Primary Care Provider: Hope Tang FNP   Principal Problem: Atherosclerotic peripheral vascular disease with rest pain    Subjective:     Hospital/ICU Course:  No notes on file    Interval History/Significant Events: NAEON. Per Vascular, pt stable for Discharge from ICU. Home Digoxin dose of .25mg halfed to .125mg 2/2 to supratheraputic digoxin level, will need follow up with cardiology.      Follow-up For: Procedure(s) (LRB):  ENDARTERECTOMY, FEMORAL (Right)    Post-Operative Day: 2 Days Post-Op    Objective:     Vital Signs (Most Recent):  Temp: 98.3 °F (36.8 °C) (04/04/25 0705)  Pulse: 60 (04/04/25 1015)  Resp: (!) 36 (04/04/25 1015)  BP: 131/60 (04/04/25 0900)  SpO2: 98 % (04/04/25 1015) Vital Signs (24h Range):  Temp:  [98.1 °F (36.7 °C)-98.8 °F (37.1 °C)] 98.3 °F (36.8 °C)  Pulse:  [59-63] 60  Resp:  [16-45] 36  SpO2:  [86 %-100 %] 98 %  BP: ()/(47-70) 131/60     Weight: 77 kg (169 lb 12.1 oz)  Body mass index is 28.25 kg/m².      Intake/Output Summary (Last 24 hours) at 4/4/2025 1056  Last data filed at 4/4/2025 1003  Gross per 24 hour   Intake 420 ml   Output 1425 ml   Net -1005 ml          Physical Exam  Constitutional:       General: She is not in acute distress.     Appearance: Normal appearance.   HENT:      Head: Normocephalic.   Eyes:      Conjunctiva/sclera: Conjunctivae normal.   Cardiovascular:      Rate and Rhythm: Normal rate.      Pulses: Normal pulses.      Comments: Right leg DP/PT present on doppler  Pulmonary:      Effort: Pulmonary effort is normal.   Abdominal:      General: Abdomen is flat. There is no distension.      Palpations: Abdomen is soft.      Tenderness: There is no abdominal tenderness.   Musculoskeletal:          General: No swelling or tenderness.      Comments: Dressing on Right thigh C/D/I with minimal strike through   Neurological:      General: No focal deficit present.      Mental Status: She is alert.   Psychiatric:         Mood and Affect: Mood normal.         Behavior: Behavior normal.            Vents:  Oxygen Concentration (%): 36 (04/02/25 2251)    Lines/Drains/Airways       Peripheral Intravenous Line  Duration                  Peripheral IV - Single Lumen 04/02/25 1012 20 G Anterior;Left Forearm 2 days         Peripheral IV - Single Lumen 04/02/25 1237 18 G Right Forearm 1 day                    Significant Labs:    CBC/Anemia Profile:  Recent Labs   Lab 04/02/25  1632 04/03/25  0501 04/04/25  0331   WBC 10.14 8.35 7.24   HGB 9.3* 8.3* 7.9*   HCT 29.9* 26.1* 24.5*    183 162   * 100* 98   RDW 14.6* 14.6* 14.8*        Chemistries:  Recent Labs   Lab 04/02/25  1632 04/03/25  0355 04/03/25  0501 04/04/25  0331    138 138 137   K 5.0 5.3* 5.2* 4.7    106 106 103   CO2 27 27 28 29   BUN 24* 27* 25* 20   CREATININE 0.7 0.7 0.8 0.6   CALCIUM 8.7 8.4* 8.6* 8.5*   ALBUMIN 3.7  --   --   --    BILITOT 0.5  --   --   --    ALKPHOS 79  --   --   --    ALT 10  --   --   --    AST 33  --   --   --    GLUCOSE 126* 133* 122* 94   MG 2.2 2.2 2.1 2.0   PHOS 3.3  --  3.8  --        All pertinent labs within the past 24 hours have been reviewed.    Significant Imaging:  I have reviewed all pertinent imaging results/findings within the past 24 hours.  Assessment/Plan:     Cardiac/Vascular  * Atherosclerotic peripheral vascular disease with rest pain    Neuro/Psych:     - Sedation: None    - Pain:    - Scheduled Tylenol 1g q8h   - Oxy PRN             Cardiac:     - S/P right femoral endarectomy with Dr. Valdivia on 4/2    - BP Goal:     - Cleviprex/Cardene PRN    - Pressors: None    - Anti-HTNs: Will start when appropriate    - Rhythm: NSR    - Beta blocker: Will start when appropriate    - Statin:  Atorvastatin 40 mg QD         - Hold home Entresto while inpatient         - Continue Home Digoxin                  - Dig level supratheraputic at 1.8, Home Dose of .250mg decreased to .125mg while inpatient      - F/u with outpatient cardiology to manage       Pulmonary:     - Goal SpO2 >92%    - Will wean ventilator support as tolerated to extubate    - ABGs PRN      Renal:    - Trend BUN/Cr     - Maintain [place], record strict Is/Os    Recent Labs   Lab 04/03/25  0355 04/03/25  0501 04/04/25  0331   BUN 27* 25* 20   CREATININE 0.7 0.8 0.6           FEN / GI:     - Daily CMP, PRN K/Mag/Phos per protocol     - Replace electrolytes as needed    - Nutrition: heart Healthy    - Bowel Regimen: Miralax, docusate      ID:     - Afebrile    - WBC stable    - Abx: Complete perioperative cefazolin 2g Q8H x 5 doses    Recent Labs   Lab 04/02/25  1632 04/03/25  0501 04/04/25  0331   WBC 10.14 8.35 7.24           Heme/Onc:     - Hgb 9.9 pre-operatively    - CBC daily    Recent Labs   Lab 04/02/25  1632 04/02/25  1634 04/03/25  0501 04/04/25  0331   HGB 9.3*  --  8.3* 7.9*     --  183 162   APTT  --  23.9  --   --    INR  --  1.0  --   --            Endocrine:     - CTS Goal -140    - HgbA1c: 5.2    - Endocrinology consulted for insulin management      PPx:   Feeding:heart healthy  Analgesia/Sedation: Oxy  Thromboembolic Prevention: Lovonox  HOB >30: Yes  Stress Ulcer: none  Glucose Control: Yes, insulin management per Endocrinology     Lines/Drains/Airway:   PIV      Dispo/Code Status/Palliative:     - SICU    - Full Code            Lucia Guillermo DO  Critical Care - Surgery  [facility]

## 2025-04-04 NOTE — DISCHARGE SUMMARY
[facility]  Vascular Surgery  Discharge Summary      Patient Name: Lisa Smith  MRN: [id number]  Admission Date: 4/2/2025  Hospital Length of Stay: 2 days  Discharge Date and Time: 04/04/2025 11:19 AM  Attending Physician: CIRILO Valdivia III, MD   Discharging Provider: Brooke Graf MD  Primary Care Provider: Hope Tang FNP    HPI:   No notes on file    Procedure(s) (LRB):  ENDARTERECTOMY, FEMORAL (Right)     Hospital Course: Patient was admitted for severe right PAD with ischemic rest pain on 4/2/2025 and underwent right femoral endarterectomy with bovine patch without periprocedural complications and was monitored closely in the ICU setting without issue. At the time of discharge, the patient was tolerating PO intake without N/V, dysphagia, denied bowel or bladder dysfunction, denied new neurological symptoms, and reported pain controlled with current regimen. The patient was accompanied woke from anesthesia at baseline neuro-status. The groin was soft without a hematoma, distal pulses were palpable, and vital signs were stable. The patient will follow up in clinic as indicated in discharge instructions. All questions were answered and continued treatment/woundcare instructions were discussed in detail prior to discharge.     Goals of Care Treatment Preferences:  Code Status: Full Code      Consults:     Significant Diagnostic Studies: Angiography: Distal R CFA Ca++ occlusion; endart onto profunda; Notably, the profunda was directly posterior making this more technically challenging     Pending Diagnostic Studies:       None          Final Active Diagnoses:    Diagnosis Date Noted POA    PRINCIPAL PROBLEM:  Atherosclerotic peripheral vascular disease with rest pain [I70.229] 03/11/2025 Yes    CAD (coronary artery disease) [I25.10] 11/25/2019 Yes     Chronic    Chronic respiratory failure with hypoxia [J96.11] 12/05/2018 Yes    Hypoxemia [R09.02] 11/05/2018 Yes      Problems  Resolved During this Admission:      Discharged Condition: stable    Disposition: Home or Self Care    Follow Up:   Follow-up Information       CIRILO Valdivia III, MD Follow up in 2 week(s).    Specialty: Vascular Surgery  Why: For post op visit  Contact information:  [address]  [place] [address]  [phone]                           Vascular Surgery Discharge Instructions:  -Please take daily aspirin and plavix!  -Please follow up in clinic in 2 weeks for post op visit. Vascular surgery team will coordinate and schedule    Okay to remove outer clear dressing and gauze 48 hours after your surgery.   Okay to shower 48 hours after your surgery and after removing dressing.   Please do not scrub or rub incisions. Pat incision dry.   Do not use lotion on your incision. May cover sterile gauze and tape as needed.     No heavy lifting anything over 15 lbs until your follow up visit with your surgeon.     Take 650mg to 1000mg tylenol every 6 hours.     - Take a full shower daily beginning 2 days after the surgery. Allow soap and water to run over your incision. Pad the incision dry afterward  - When resting or sleeping, try to keep your arm elevated to shoulder level on pillows to reduce swelling  - If you notice clear drainage from your incision, you can apply dry gauze daily and secure in place with tape or gentle elastic wrap     Activity:  -- No swimming in pools, [employer], [employer] etc. for 2 weeks after your surgery     Diet:  -Resume your pre-operative home diet     Call Vascular Surgery Office at [phone] if you experience:  -Increased redness, warmth, tenderness, or draining pus at your incision(s)    Patient Instructions:      Diet Adult Regular     Notify your health care provider if you experience any of the following:  temperature >100.4     Notify your health care provider if you experience any of the following:  persistent nausea and vomiting or diarrhea     Notify your health care provider if  you experience any of the following:  severe uncontrolled pain     Notify your health care provider if you experience any of the following:  redness, tenderness, or signs of infection (pain, swelling, redness, odor or green/yellow discharge around incision site)     Notify your health care provider if you experience any of the following:  difficulty breathing or increased cough     Notify your health care provider if you experience any of the following:  severe persistent headache     Notify your health care provider if you experience any of the following:  worsening rash     Notify your health care provider if you experience any of the following:  persistent dizziness, light-headedness, or visual disturbances     Notify your health care provider if you experience any of the following:  increased confusion or weakness     Remove dressing in 24 hours     Activity as tolerated     Medications:  Reconciled Home Medications:      Medication List        START taking these medications      aspirin 81 MG Chew  Take 1 tablet (81 mg total) by mouth once daily.            CHANGE how you take these medications      albuterol 90 mcg/actuation inhaler  Commonly known as: PROVENTIL/VENTOLIN HFA  INHALE 2 PUFFS EVERY 6 HOURS AS NEEDED FOR WHEEZING (RESCUE)  What changed: Another medication with the same name was removed. Continue taking this medication, and follow the directions you see here.     digoxin 125 mcg tablet  Commonly known as: LANOXIN  Take 1 tablet (0.125 mg total) by mouth once daily.  Start taking on: April 5, 2025  What changed:   medication strength  how much to take  when to take this            CONTINUE taking these medications      clopidogreL 75 mg tablet  Commonly known as: PLAVIX  qd     ezetimibe 10 mg tablet  Commonly known as: ZETIA  TAKE 1 TABLET EVERY DAY     fluticasone propionate 50 mcg/actuation nasal spray  Commonly known as: FLONASE  1 spray by Each Nostril route daily as needed for Rhinitis or  Allergies.     melatonin 10 mg Tab  Take 1 tablet by mouth nightly as needed (insomnia).     metoprolol succinate 25 MG 24 hr tablet  Commonly known as: TOPROL-XL  TAKE 1 TABLET EVERY MORNING     oxyCODONE 5 MG immediate release tablet  Commonly known as: ROXICODONE  Take 1 tablet (5 mg total) by mouth every 4 (four) hours as needed for Pain.     pantoprazole 40 MG tablet  Commonly known as: PROTONIX  TAKE 1 TABLET EVERY DAY     rOPINIRole 1 MG tablet  Commonly known as: REQUIP  Take 1 tablet (1 mg total) by mouth every evening.     rosuvastatin 40 MG Tab  Commonly known as: CRESTOR  TAKE 1 TABLET EVERY EVENING     sacubitriL-valsartan 24-26 mg per tablet  Commonly known as: ENTRESTO  Take 1 tablet by mouth 2 (two) times daily.     spironolactone 25 MG tablet  Commonly known as: ALDACTONE  Take 1 tablet (25 mg total) by mouth once daily.     torsemide 20 MG Tab  Commonly known as: DEMADEX  Take 1 tablet (20 mg total) by mouth once daily.     TRELEGY ELLIPTA 100-62.5-25 mcg Dsdv  Generic drug: fluticasone-umeclidin-vilanter  Inhale 1 puff into the lungs once daily.     TYLENOL ARTHRITIS ORAL  Take 2 tablets by mouth daily as needed.            STOP taking these medications      albuterol-ipratropium 2.5 mg-0.5 mg/3 mL nebulizer solution  Commonly known as: SHAYNA Graf MD  Vascular Surgery  [facility]

## 2025-04-04 NOTE — SUBJECTIVE & OBJECTIVE
Medications:  Continuous Infusions:   0.9% NaCl   Intravenous Continuous   Stopped at 04/02/25 1606     Scheduled Meds:   acetaminophen  1,000 mg Oral Q6H    atorvastatin  80 mg Oral Daily    clopidogreL  75 mg Oral Daily    digoxin  0.125 mg Oral Daily    enoxparin  40 mg Subcutaneous Q24H (prophylaxis, 1700)    ezetimibe  10 mg Oral Daily    fluticasone furoate-vilanteroL  1 puff Inhalation Daily    metoprolol succinate  25 mg Oral QAM    mupirocin   Nasal BID    pantoprazole  40 mg Oral Daily    polyethylene glycol  17 g Oral Daily    rOPINIRole  1 mg Oral QHS    senna-docusate  1 tablet Oral BID    tiotropium bromide  2 puff Inhalation Daily     PRN Meds:  Current Facility-Administered Medications:     albuterol, 2 puff, Inhalation, Q6H PRN    hydrALAZINE, 10 mg, Intravenous, Q6H PRN    melatonin, 6 mg, Oral, Nightly PRN    ondansetron, 4 mg, Intravenous, Q12H PRN    oxyCODONE, 5 mg, Oral, Q4H PRN     Objective:     Vital Signs (Most Recent):  Temp: 98.3 °F (36.8 °C) (04/04/25 0705)  Pulse: 61 (04/04/25 1115)  Resp: (!) 35 (04/04/25 1115)  BP: 131/60 (04/04/25 0900)  SpO2: (!) 92 % (04/04/25 1115) Vital Signs (24h Range):  Temp:  [98.1 °F (36.7 °C)-98.8 °F (37.1 °C)] 98.3 °F (36.8 °C)  Pulse:  [59-63] 61  Resp:  [16-45] 35  SpO2:  [86 %-100 %] 92 %  BP: ()/(47-64) 131/60     Date 04/04/25 0700 - 04/05/25 0659   Shift 7386-3728 8499-0389 0910-1966 24 Hour Total   INTAKE   P.O. 300   300   Shift Total(mL/kg) 300(3.9)   300(3.9)   OUTPUT   Shift Total(mL/kg)       Weight (kg) 77 77 77 77        Physical Exam  Constitutional:       General: She is not in acute distress.     Appearance: Normal appearance.   HENT:      Head: Normocephalic.   Eyes:      Conjunctiva/sclera: Conjunctivae normal.   Cardiovascular:      Rate and Rhythm: Normal rate.      Pulses: Normal pulses.      Comments: Right leg DP/PT present on doppler  Pulmonary:      Effort: Pulmonary effort is normal.   Abdominal:      General: Abdomen is  flat. There is no distension.      Palpations: Abdomen is soft.      Tenderness: There is no abdominal tenderness.   Musculoskeletal:         General: No swelling or tenderness.      Comments: Dressing on Right thigh C/D/I with minimal strike through   Neurological:      General: No focal deficit present.      Mental Status: She is alert.   Psychiatric:         Mood and Affect: Mood normal.         Behavior: Behavior normal.          Significant Labs:  All pertinent labs from the last 24 hours have been reviewed.    Significant Diagnostics:  I have reviewed all pertinent imaging results/findings within the past 24 hours.

## 2025-04-04 NOTE — TELEPHONE ENCOUNTER
Patient is still in   Unit: [facility] SURGICAL ICU (SICU)     Bed: [address] [facility] A

## 2025-04-04 NOTE — ASSESSMENT & PLAN NOTE
Neuro/Psych:     - Sedation: None    - Pain:    - Scheduled Tylenol 1g q8h   - Oxy PRN             Cardiac:     - S/P right femoral endarectomy with Dr. Valdivia on 4/2    - BP Goal:     - Cleviprex/Cardene PRN    - Pressors: None    - Anti-HTNs: Will start when appropriate    - Rhythm: NSR    - Beta blocker: Will start when appropriate    - Statin: Atorvastatin 40 mg QD         - Hold home Entresto while inpatient         - Continue Home Digoxin                  - Dig level supratheraputic at 1.8, Home Dose of .250mg decreased to .125mg while inpatient      - F/u with outpatient cardiology to manage       Pulmonary:     - Goal SpO2 >92%    - Will wean ventilator support as tolerated to extubate    - ABGs PRN      Renal:    - Trend BUN/Cr     - Maintain Joyce, record strict Is/Os    Recent Labs   Lab 04/03/25  0355 04/03/25  0501 04/04/25  0331   BUN 27* 25* 20   CREATININE 0.7 0.8 0.6           FEN / GI:     - Daily CMP, PRN K/Mag/Phos per protocol     - Replace electrolytes as needed    - Nutrition: heart Healthy    - Bowel Regimen: Miralax, docusate      ID:     - Afebrile    - WBC stable    - Abx: Complete perioperative cefazolin 2g Q8H x 5 doses    Recent Labs   Lab 04/02/25  1632 04/03/25  0501 04/04/25  0331   WBC 10.14 8.35 7.24           Heme/Onc:     - Hgb 9.9 pre-operatively    - CBC daily    Recent Labs   Lab 04/02/25  1632 04/02/25  1634 04/03/25  0501 04/04/25  0331   HGB 9.3*  --  8.3* 7.9*     --  183 162   APTT  --  23.9  --   --    INR  --  1.0  --   --            Endocrine:     - CTS Goal -140    - HgbA1c: 5.2    - Endocrinology consulted for insulin management      PPx:   Feeding:heart healthy  Analgesia/Sedation: Oxy  Thromboembolic Prevention: Lovonox  HOB >30: Yes  Stress Ulcer: none  Glucose Control: Yes, insulin management per Endocrinology     Lines/Drains/Airway:   PIV      Dispo/Code Status/Palliative:     - SICU    - Full Code

## 2025-04-04 NOTE — PLAN OF CARE
Discharge instructions and AVS reviewed with pt. Pt verbalizes understanding of instructions and education provided. Transportation arranged with daughter. All pt belongings sent with pt.

## 2025-04-04 NOTE — PT/OT/SLP PROGRESS
Occupational Therapy      Patient Name:  Lisa Smith   MRN:  [id number]    Patient not seen today secondary to just mobilized in hallway with PT and plans for d/c today  . Will follow-up Monday if pt remains in hospital.    4/4/2025

## 2025-04-07 ENCOUNTER — PATIENT OUTREACH (OUTPATIENT)
Dept: FAMILY MEDICINE | Facility: CLINIC | Age: 75
End: 2025-04-07
Payer: MEDICARE

## 2025-04-07 ENCOUNTER — PATIENT OUTREACH (OUTPATIENT)
Dept: ADMINISTRATIVE | Facility: CLINIC | Age: 75
End: 2025-04-07
Payer: MEDICARE

## 2025-04-07 ENCOUNTER — TELEPHONE (OUTPATIENT)
Dept: VASCULAR SURGERY | Facility: CLINIC | Age: 75
End: 2025-04-07
Payer: MEDICARE

## 2025-04-07 DIAGNOSIS — I73.9 PAD (PERIPHERAL ARTERY DISEASE): Primary | ICD-10-CM

## 2025-04-07 NOTE — PROGRESS NOTES
C3 nurse spoke to Lisa mSith for a TCC post hospital discharge follow up call.  C3 nurse offered to schedule a TCC hospital follow-up discharge follow-up call. The patient declined appointment at this time.

## 2025-04-07 NOTE — TELEPHONE ENCOUNTER
Contacted pt in response to message. Notified pt that sooner appt is available on 4/22/25. Pt accepted. Appts scheduled and appt with Dr. Valdivia on 5/2/25 cancelled.----- Message from Teresa sent at 4/7/2025  9:08 AM CDT -----  Regarding: Reschedule appointment  Contact: [phone]  Type:  Needs Medical AdviceWho Called: Lisa Carrillo by Dr Valdivia to schedule a 2 week follow up after discharge but was unable Appointment for 5/2 Requesting to be rescheduled as advised to have stitches removed Would the patient rather a call back or a response via [facility]? Call [facility] Call Back Number: [phone] Information: No sooner appointments in [facility]

## 2025-04-07 NOTE — TELEPHONE ENCOUNTER
Discharge Information     Discharge Date:   4/4/25    Primary Discharge Diagnosis:   Atherosclerotic peripheral vascular disease with rest pain       Discharge Summary:  Reviewed      Medication & Order Review     Were medication changes made or new medications added?   Yes    If so, has the patient filled the prescriptions?  Yes     Was Home Health ordered? No    If so, has Home Health contacted patient and/or initiated services?  N/A      Name of Home Health Agency? N/A    Durable Medical Equipment ordered?  No     If so, has the DME provider contacted patient and delivered equipment?  N/A    Follow Up               Any problems since discharge? No    How is the patient feeling since returning home?  Feeling much better    Have you set up recommended follow up appointments?  (cardiology, surgery, etc.)    Schedule Hospital Follow-up appointment within 7-14 days (preferably 7).      Notes:  Patient declined HFU; she will just keep her May appointment            Jany Jones

## 2025-04-07 NOTE — PHYSICIAN QUERY
"Provider, please further specify the type of Heart Failure diagnosis.     The medical record contains the following:   Heart failure documented:    (Historical documentation)  (Cards PN  2/18/2025)  Patient has Combined Systolic and Diastolic heart failure that is Chronic.      (Current Encounter)  (H&P 4/1/25)  CHF (congestive heart failure)           Provider, please further specify the type of Heart Failure diagnosis.     Please respond using the following options, or choose "Other" to free-text your response:   Chronic combined systolic and diastolic heart failure   Other (please specify)        Diagnosis:     Chronic combined systolic and diastolic heart failure    CHAPIS Valdivia III, MD, FACS  Professor and Chief, Vascular and Endovascular Surgery        "

## 2025-04-08 NOTE — ANESTHESIA POSTPROCEDURE EVALUATION
Anesthesia Post Evaluation    Patient: Lisa Smith    Procedure(s) Performed: Procedure(s) (LRB):  ENDARTERECTOMY, FEMORAL (Right)    Final Anesthesia Type: general      Patient location during evaluation: ICU  Patient participation: Yes- Able to Participate  Level of consciousness: awake and alert  Post-procedure vital signs: reviewed and stable  Pain management: adequate  Airway patency: patent    PONV status at discharge: No PONV  Anesthetic complications: no      Cardiovascular status: hemodynamically stable  Respiratory status: unassisted, spontaneous ventilation and nasal cannula  Hydration status: euvolemic  Follow-up not needed.              Vitals Value Taken Time   /63 04/04/25 12:14   Temp 36.8 °C (98.3 °F) 04/04/25 07:05   Pulse 62 04/04/25 12:18   Resp 34 04/04/25 12:17   SpO2 98 % 04/04/25 12:18   Vitals shown include unfiled device data.      No case tracking events are documented in the log.      Pain/Vazquez Score: No data recorded         Patient's daughter called stating that patient needs an emergency appointment for pain and numbness in her feet Please advise.

## 2025-04-22 ENCOUNTER — HOSPITAL ENCOUNTER (OUTPATIENT)
Dept: VASCULAR SURGERY | Facility: CLINIC | Age: 75
Discharge: HOME | End: 2025-04-22
Attending: SURGERY
Payer: MEDICARE

## 2025-04-22 ENCOUNTER — TELEPHONE (OUTPATIENT)
Dept: VASCULAR SURGERY | Facility: CLINIC | Age: 75
End: 2025-04-22

## 2025-04-22 ENCOUNTER — OFFICE VISIT (OUTPATIENT)
Dept: VASCULAR SURGERY | Facility: CLINIC | Age: 75
End: 2025-04-22
Attending: SURGERY
Payer: MEDICARE

## 2025-04-22 ENCOUNTER — TELEPHONE (OUTPATIENT)
Dept: WOUND CARE | Facility: CLINIC | Age: 75
End: 2025-04-22
Payer: MEDICARE

## 2025-04-22 VITALS
SYSTOLIC BLOOD PRESSURE: 151 MMHG | WEIGHT: 173.94 LBS | HEIGHT: 65 IN | TEMPERATURE: 97 F | RESPIRATION RATE: 18 BRPM | DIASTOLIC BLOOD PRESSURE: 68 MMHG | BODY MASS INDEX: 28.98 KG/M2 | HEART RATE: 60 BPM

## 2025-04-22 DIAGNOSIS — I70.229 ATHEROSCLEROTIC PERIPHERAL VASCULAR DISEASE WITH REST PAIN: Primary | ICD-10-CM

## 2025-04-22 DIAGNOSIS — I73.9 PAD (PERIPHERAL ARTERY DISEASE): Primary | ICD-10-CM

## 2025-04-22 DIAGNOSIS — I65.23 BILATERAL CAROTID ARTERY STENOSIS: Primary | ICD-10-CM

## 2025-04-22 DIAGNOSIS — I73.9 PAD (PERIPHERAL ARTERY DISEASE): ICD-10-CM

## 2025-04-22 PROCEDURE — 3078F DIAST BP <80 MM HG: CPT | Mod: HCNC,CPTII,S$GLB, | Performed by: SURGERY

## 2025-04-22 PROCEDURE — 1159F MED LIST DOCD IN RCRD: CPT | Mod: HCNC,CPTII,S$GLB, | Performed by: SURGERY

## 2025-04-22 PROCEDURE — 99999 PR PBB SHADOW E&M-EST. PATIENT-LVL III: CPT | Mod: PBBFAC,HCNC,, | Performed by: SURGERY

## 2025-04-22 PROCEDURE — 1160F RVW MEDS BY RX/DR IN RCRD: CPT | Mod: HCNC,CPTII,S$GLB, | Performed by: SURGERY

## 2025-04-22 PROCEDURE — 3077F SYST BP >= 140 MM HG: CPT | Mod: HCNC,CPTII,S$GLB, | Performed by: SURGERY

## 2025-04-22 PROCEDURE — 93923 UPR/LXTR ART STDY 3+ LVLS: CPT | Mod: HCNC,S$GLB,, | Performed by: SURGERY

## 2025-04-22 PROCEDURE — 99024 POSTOP FOLLOW-UP VISIT: CPT | Mod: HCNC,S$GLB,, | Performed by: SURGERY

## 2025-04-22 NOTE — TELEPHONE ENCOUNTER
----- Message from FAM Garcia sent at 4/22/2025  1:43 PM CDT -----  Samantha saw this patient today in Dr. Valdivia's clinic for a right medial ankle wound and wants to see her in clinic in two weeks. Can you call her to schedule?

## 2025-04-22 NOTE — PROGRESS NOTES
VASCULAR SURGERY SERVICE    REFERRING DOCTOR: Madhuri Hunter NP    CHIEF COMPLAINT:  Carotid stenosis    HISTORY OF PRESENT ILLNESS: Lisa Smith is a 74 y.o. female status post TAVR 12/20/2023, status post left carotid endarterectomy 12/20/2022, sent for f/u of 95+% recurrent left carotid stenosis with known right ICA occlusion.  In the last 6 weeks she has had several episodes of dizziness bilateral blurred vision and transient bilateral arm weakness.  She denies any lateralizing symptoms.    She is oxygen-dependent COPD using oxygen since 20/11.  She says that her breathing has improved substantially since the TAVR in December 2023.  She still however can not lay flat for extended periods.    She is on AFib and on Coumadin which was evidently started after the TAVR.  Per the daughter who is a nurse she is being evaluated for a Watchman.    S/p:    Right common femoral/profunda endarterectomy and bovine patch 04/02/2025\   left TCAR 02/29/2024.    4/5/2024:  This is her initial follow-up after left TCAR 02/29/2024.  This was performed for a greater than 95% left carotid stenosis with contralateral occlusion.  She is very well from this procedure and was discharged on the 1st postoperative day.  However she describes on 03/26/2024 an episode of bilateral blurred vision which resolved within 30 minutes, and then on the next day 03/27/2020 for similar episode which also included 30-60 minutes of right hand weakness that then resolved.  She has had no issues since then.  She states compliance with both her Coumadin and Plavix    03/11/2025:  I have not seen this patient in almost 1 year, status post a left TCAR.  However she is not here for follow up for this rather for 2 month history of significant right foot pain at rest.  She had been as limited ambulator for many many years.  Says the pain right foot is worse at night does improve with getting up and walking.    She denies interval stroke TIA or amaurosis.   She has had a Watchman procedure and after that her  Coumadin was stopped for a here AFib and now is on Plavix only    No VBI symptoms    04/01/2025:  She now returns with a CTA.  Preparation for her planned surgery, she has stopped her Plavix greater than a week ago and substituted aspirin 81 mg daily.  She denies stroke TIA or amaurosis.    04/22/2025:  This is her initial visit after right femoral endarterectomy.  On postoperative day 1 she had complete resolution of her severe ischemic pain in her forefoot and toes.  Presentation today that pain continues to be completely gone.  However she is having soreness at the medial ankle where a very superficial wound has developed    Past Medical History:   Diagnosis Date    Anticoagulant long-term use     Arthritis     Atrial fibrillation     Bell's palsy     Boil of buttock     burst 12/19/22    CHF (congestive heart failure)     Chronic cough     COPD (chronic obstructive pulmonary disease)     use O2  3l/m NC at night; also taking during day currently 12/4/23    Dizziness     Encounter for blood transfusion     GI bleed 2011    transfusion    H/O diverticulitis of colon     Hard of hearing     Heart murmur     Hematoma 02/2023    left hand    Heterozygous alpha 1-antitrypsin deficiency     History of home oxygen therapy     3L NC at night    Hypertension     Lung disease     copd    MONSERRAT (obstructive sleep apnea)     resolved with wt loss 131#    Pacemaker     Pneumonia     last episode 2019    Pulmonary edema     Skin cancer     Unspecified visual disturbance     episode of vision disturbance and dizziness...occasional recurrences       Past Surgical History:   Procedure Laterality Date    CARDIAC ELECTROPHYSIOLOGY STUDY AND ABLATION      CAROTID ENDARTERECTOMY Left 12/22/2022    Procedure: ENDARTERECTOMY-CAROTID;  Surgeon: Maximilian Connolly MD;  Location: [facility];  Service: Peripheral Vascular;  Laterality: Left;    CAROTID STENT Left 02/29/2024    Procedure:  INSERTION, STENT, ARTERY, CAROTID;  Surgeon: CIRILO Valdivia III, MD;  Location: [facility] OR [facility] FLR;  Service: Vascular;  Laterality: Left;  left carotid artery stent placement  mgy  146.29   gymc2  8.0730  fluro time  4.8min    CARPAL TUNNEL RELEASE Left     CHOLECYSTECTOMY  1995    CLOSURE OF LEFT ATRIAL APPENDAGE USING DEVICE Right 01/13/2025    Procedure: Left atrial appendage closure device;  Surgeon: Roxana Cantu MD;  Location: [facility] CATH/EP LAB;  Service: Cardiology;  Laterality: Right;    ENDARTERECTOMY OF FEMORAL ARTERY Right 4/2/2025    Procedure: ENDARTERECTOMY, FEMORAL;  Surgeon: CIRILO Valdivia III, MD;  [facility];  Service: Vascular;  Laterality: Right;    EYE SURGERY Bilateral March 2012    cataract    HYSTERECTOMY  1988    INSERT / REPLACE / REMOVE PACEMAKER      KNEE ARTHROSCOPY Left     LEFT HEART CATHETERIZATION  11/01/2023    Procedure: Left heart cath;  Surgeon: Puma Shelton MD;  Location: [facility] CATH;  Service: Cardiology;;    REPLACEMENT OF PACEMAKER GENERATOR Left 01/20/2022    Procedure: REPLACEMENT, PACEMAKER GENERATOR;  Surgeon: Raymond Pérez MD;  Location: [facility] CATH/EP LAB;  Service: Cardiology;  Laterality: Left;    SKIN CANCER EXCISION      TRANSCATHETER AORTIC VALVE REPLACEMENT (TAVR) Bilateral 12/06/2023    Procedure: (TAVR);  Surgeon: Puma Shelton MD;  Location: [facility] CATH;  Service: Cardiology;  Laterality: Bilateral;    TRANSCATHETER AORTIC VALVE REPLACEMENT (TAVR) Bilateral 12/06/2023    Procedure: (TAVR) - Surgeon;  Surgeon: Maximilian Connolly MD;  Location: [facility] CATH;  Service: Peripheral Vascular;  Laterality: Bilateral;         Current Outpatient Medications:     acetaminophen (TYLENOL ARTHRITIS ORAL), Take 2 tablets by mouth daily as needed., Disp: , Rfl:     albuterol (PROVENTIL/VENTOLIN HFA) 90 mcg/actuation inhaler, INHALE 2 PUFFS EVERY 6 HOURS AS NEEDED FOR WHEEZING (RESCUE), Disp: 18 g, Rfl: 6    aspirin 81 MG Chew, Chew and swallow 1 tablet (81 mg  total) by mouth once daily., Disp: 30 tablet, Rfl: 2    clopidogreL (PLAVIX) 75 mg tablet, qd, Disp: 90 tablet, Rfl: 0    digoxin (LANOXIN) 125 mcg tablet, Take 1 tablet (0.125 mg total) by mouth once daily., Disp: 30 tablet, Rfl: 11    ezetimibe (ZETIA) 10 mg tablet, TAKE 1 TABLET EVERY DAY, Disp: 90 tablet, Rfl: 3    fluticasone propionate (FLONASE) 50 mcg/actuation nasal spray, 1 spray by Each Nostril route daily as needed for Rhinitis or Allergies., Disp: , Rfl:     fluticasone-umeclidin-vilanter (TRELEGY ELLIPTA) 100-62.5-25 mcg DsDv, Inhale 1 puff into the lungs once daily., Disp: 90 each, Rfl: 3    melatonin 10 mg Tab, Take 1 tablet by mouth nightly as needed (insomnia)., Disp: , Rfl:     metoprolol succinate (TOPROL-XL) 25 MG 24 hr tablet, TAKE 1 TABLET EVERY MORNING, Disp: 90 tablet, Rfl: 3    oxyCODONE (ROXICODONE) 5 MG immediate release tablet, Take 1 tablet (5 mg total) by mouth every 4 (four) hours as needed for Pain. (Patient not taking: Reported on 4/7/2025), Disp: 10 tablet, Rfl: 0    pantoprazole (PROTONIX) 40 MG tablet, TAKE 1 TABLET EVERY DAY, Disp: 90 tablet, Rfl: 3    rOPINIRole (REQUIP) 1 MG tablet, Take 1 tablet (1 mg total) by mouth every evening., Disp: 90 tablet, Rfl: 1    rosuvastatin (CRESTOR) 40 MG Tab, TAKE 1 TABLET EVERY EVENING, Disp: 90 tablet, Rfl: 3    sacubitriL-valsartan (ENTRESTO) 24-26 mg per tablet, Take 1 tablet by mouth 2 (two) times daily., Disp: 180 tablet, Rfl: 3    spironolactone (ALDACTONE) 25 MG tablet, Take 1 tablet (25 mg total) by mouth once daily., Disp: 90 tablet, Rfl: 3    torsemide (DEMADEX) 20 MG Tab, Take 1 tablet (20 mg total) by mouth once daily., Disp: 90 tablet, Rfl: 3    Review of patient's allergies indicates:   Allergen Reactions    Sulfa (sulfonamide antibiotics) Itching       Family History   Problem Relation Name Age of Onset    Kidney failure Mother      Cancer Father Rm Wray     Alcohol abuse Father Rm Wray     Cancer Brother       "Arthritis Maternal Grandmother Domenica Wray     Cancer Brother Rm Wray JR         pancreatic       Social History     Tobacco Use    Smoking status: Former     Current packs/day: 0.00     Average packs/day: 2.0 packs/day for 40.0 years (80.0 ttl pk-yrs)     Types: Cigarettes     Start date: 1971     Quit date: 2011     Years since quittin.2     Passive exposure: Past    Smokeless tobacco: Never    Tobacco comments:          Quit in    Substance Use Topics    Alcohol use: Not Currently     Alcohol/week: 8.0 standard drinks of alcohol     Types: 8 Glasses of wine per week    Drug use: No     PHYSICAL EXAM: R /73  LEFT 82/50  BP (!) 151/68 (BP Location: Right arm, Patient Position: Lying)   Pulse 60   Temp 97.4 °F (36.3 °C) (Oral)   Resp 18   Ht 5' 5" (1.651 m)   Wt 78.9 kg (173 lb 15.1 oz)   LMP  (LMP Unknown)   BMI 28.95 kg/m²   Constitutional:  Alert,   Well-appearing  In no distress.  Traveling by wheelchair.  Appears quite frail.  She is using supplemental oxygen   Neurological: Normal speech  no focal findings  CN II - XII grossly intact.  Neurologic completely intact   Psychiatric: Mood and affect appropriate and symmetric.   HEENT: Normocephalic / atraumatic  PERRLA  Midline trachea     Cardiac: Regular rate and rhythm.   Pulmonary: Normal pulmonary effort.   Abdomen: Soft, not distended.     Skin: Warm and well perfused.    Vascular:  Could not assess her femoral pulses she was in a wheelchair and really could not get up on the table   Extremities/  Musculoskeletal: Right vertical femoral incision is well healed with staples in place no erythema or drainage    Right foot is warm, good pedal signals.  Very modest left medial malleolar lesion, see picture     2025    IMAGING:  ABIs are 0.42 on the right (0.48 prior) 0.62 on the left (prior higher)..    However the right PVRs at the ankle and toes are significantly improved from prior    Recent preoperative CTA " demonstrates a left common femoral artery calcific occlusion with proximal profunda reconstitution, 5-8 cm proximal SFA occlusion that reconstitutes in the mid segment is patent but highly stenotic throughout.    Notably the profunda is quite posterior    Carotid duplex today reveals a new moderate stenosis on the left with a peak systolic velocity of 318 end-diastolic of 63.  Prior this was widely patent a year ago.  Right ICA is chronically occluded      IMPRESSION:      Three weeks status post right SFA profunda endarterectomy and patch.  Her ischemic pain is completely resolved.  However she has developed a small lesion over the medial malleolus.   While the LANIE suggests lower than preoperatively I think this is an error given that the contralateral side which had no intervention also dropped significantly.  PVR waveforms on the right arm much better, and her ischemic rest pain completely gone   post left TCAR for symptomatic 99% stenosis and contrast occlusion.  She has  asymptomatic moderate restenosis on duplex   status post TAVR, and a Watchman on Plavix.     Severe COPD on home oxygen since 2011.  Really can not lay flat  Likely L subclavian artery occlusion.  Clinicalls Asx      PLAN:  Wound care to see her for small right medial malleolar lesion.  This is not primarily ischemic  Continue current medical therapy  Follow up in 3-4 months with repeat ABIs/toe pressures and carotid duplex, sooner if clinically indicated        CHAPIS Valdivia III, MD, FACS  Professor and Chief, Vascular and Endovascular Surgery      CC: Dr Shelton

## 2025-04-22 NOTE — TELEPHONE ENCOUNTER
Called and spoke with patient regarding scheduling wound care appointment - appointment scheduled for 5/6/25 at 11am

## 2025-04-22 NOTE — TELEPHONE ENCOUNTER
Attempted to contact pt to notify her that [facility] has been added onto upcoming appt with Dr. Valdivia. Voice message left for pt with this information and explaining new appt letter has been placed in mail. Return call requested for questions or concerns.

## 2025-05-05 NOTE — PROGRESS NOTES
"Subjective:       Patient ID: Lisa Smith is a 74 y.o. female.    Chief Complaint: Wound Consult      Patient presents for an evaluation of right medial ankle wound. She reports having a medial and lateral right ankle wound that started in March 2025. She reports the lateral right ankle wound healed. Pt follows with vascular surgery. She is s/p right common femoral/profunda endarterectomy and bovine patch on 4/2/25 with Dr. Valdivia. At her follow up appointment on 4/22/25, she was found to have a medial ankle wound. It was deemed primarily not ischemic and she was referred to wound care. She was instructed to dress her wound with betadine daily. Per Dr. Valdivia's note: "While the LANIE suggests lower than preoperatively I think this is an error given that the contralateral side which had no intervention also dropped significantly. PVR waveforms on the right arm much better, and her ischemic rest pain completely gone." She admits compliance with betadine daily. Pt previously smoked 2-3 ppds for 30 years. She quit in 2011. She wants to follow with wound care closer to her house in [place]. Denies fever, chills, erythema, warmth, purulent drainage, or pain.      4/22/25 ABIs:  Rt LANIE  0.42   Lt LANIE 0.62   Right Leg: Segmental Pressures suggest severe peripheral arterial occlusive disease. However, PVR waveforms suggest mild peripheral arterial occlusive disease.   Left Leg: Segmental Pressures suggest moderate peripheral arterial occlusive disease. However, PVR waveforms suggest minimal to mild peripheral arterial occlusive disease.     3/14/25 CTA runoff ABD PEL Bilateral lower extremity:  1. 70% SMA stenosis.  2. Diffuse bilateral lower extremity peripheral arterial disease as detailed above.  3. Occlusion of right CFA and proximal SFA, with additional multifocal 70% right popliteal artery stenosis.  4. Multifocal 70% left SFA stenosis, with additional multifocal left popliteal artery stenosis reaching " 80-90%.  5. Suspected left posterior tibial artery occlusion.    3/5/25 ABIs:  Rt brachial A syst BP  126 mmHg   Rt low thigh BP    93 mmHg   Rt calf BP 51 mmHg   Rt PTA BP  61 mmHg   Right LANIE ratio use:   post. tibial   Rt LANIE post tibial (rt post tib A BP / max brach A BP) 0.48   Rt ankle BP / max brach A BP   0.48   Lt LANIE  0.90   Right Leg: Segmental Pressures suggest severe peripheral arterial occlusive disease. However, PVR waveforms suggest moderate   peripheral arterial occlusive disease. Unable to detect a reliable Doppler signal in the DPA at this time.   Left Leg: Segmental Pressures suggest minimal peripheral arterial occlusive disease. However, PVR waveforms suggest mild   peripheral arterial occlusive disease.     2/26/25 right arterial lower extremity ultrasound:  1.  Dense calcified plaques of the arteries of the right lower extremity, with markedly abnormal waveforms throughout, most pronounced in the distal SFA, popliteal and calf arteries.  2.  Minimal trickle flow in the right common femoral artery.  3.  Focal relative stenosis in the mid-distal SFA.          Review of Systems   Constitutional:  Negative for activity change, chills, diaphoresis, fatigue and fever.   Respiratory:  Negative for apnea, chest tightness and shortness of breath.    Cardiovascular:  Negative for chest pain, palpitations and leg swelling.   Musculoskeletal:  Negative for gait problem and joint swelling.   Skin:  Positive for wound. Negative for pallor and rash.   Neurological:  Negative for syncope, weakness and numbness.   Psychiatric/Behavioral:  Negative for agitation. The patient is not nervous/anxious.    All other systems reviewed and are negative.      Objective:      Physical Exam  Vitals reviewed.   Constitutional:       General: She is not in acute distress.     Appearance: Normal appearance.   Cardiovascular:      Pulses:           Dorsalis pedis pulses are detected w/ Doppler on the right side.         Posterior tibial pulses are detected w/ Doppler on the right side.   Pulmonary:      Effort: No respiratory distress.   Musculoskeletal:         General: No swelling.   Skin:     General: Skin is warm and dry.      Findings: Wound present. No erythema.          Neurological:      General: No focal deficit present.      Mental Status: She is alert and oriented to person, place, and time.   Psychiatric:         Mood and Affect: Mood normal.         Behavior: Behavior normal.         Thought Content: Thought content normal.         Judgment: Judgment normal.         Assessment:       1. Open wound of left ankle, initial encounter    2. PAD (peripheral artery disease)    3. Acute on chronic combined systolic and diastolic CHF, NYHA class 3    4. Former smoker           Wound Ulceration Right medial Ankle (Active)     Ankle   Present on Original Admission:    Primary Wound Type: Ulceration   Side: Right   Orientation: medial   Wound Approximate Age at First Assessment (Weeks):    Wound Number:    Is this injury device related?:    Incision Type:    Closure Method:    Wound Description (Comments):    Type:    Additional Comments:    Ankle-Brachial Index:    Pulses:    Removal Indication and Assessment:    Wound Outcome:    Wound Image   05/06/25 1035   Dressing Appearance Open to air 05/06/25 1035   Drainage Amount None 05/06/25 1035   Yellow (%), Wound Tissue Color 100 % 05/06/25 1035   Periwound Area Intact;Edematous 05/06/25 1035   Wound Length (cm) 0.6 cm 05/06/25 1035   Wound Width (cm) 0.7 cm 05/06/25 1035   Wound Depth (cm) 0.1 cm 05/06/25 1035   Wound Volume (cm^3) 0.022 cm^3 05/06/25 1035   Wound Surface Area (cm^2) 0.33 cm^2 05/06/25 1035   Care Cleansed with:;Soap and water 05/06/25 1035   Dressing Applied;Island/border;Other (comment) 05/06/25 1035       Lisa was seen in the clinic room and placed in the supine position on the treatment table.  The area was cleansed with Easi-clense sponges and dried  thoroughly. No odor, erythema, or warmth noted. Pt deferred sharp debridement.     Plan of Care: skin prep to periwound, betadine to wound bed, covered with an island dressing.          Plan:       Right ankle wound was dressed as detailed above.  Discussed daily dressing changes. Patient will cleanse the wound with mild soap and water, pat dry, while wearing gloves place betadine to wound bed, and cover with an island dressing.    Discussed nutrition and the role of protein in wound healing with the patient. Instructed patient to optimize protein for wound healing.    Discussed signs and symptoms of cellulitis- none appreciated.    Instructed patient to keep follow ups with vascular surgery.    Pt wants to follow with wound care in [facility]. Referral placed.     Written and verbal instructions given to patient  RTC PRELEAZAR Howell PA-C

## 2025-05-06 ENCOUNTER — OFFICE VISIT (OUTPATIENT)
Dept: WOUND CARE | Facility: CLINIC | Age: 75
End: 2025-05-06
Payer: MEDICARE

## 2025-05-06 VITALS
DIASTOLIC BLOOD PRESSURE: 56 MMHG | HEIGHT: 65 IN | SYSTOLIC BLOOD PRESSURE: 123 MMHG | HEART RATE: 61 BPM | WEIGHT: 169.06 LBS | BODY MASS INDEX: 28.17 KG/M2 | TEMPERATURE: 99 F

## 2025-05-06 DIAGNOSIS — Z87.891 FORMER SMOKER: ICD-10-CM

## 2025-05-06 DIAGNOSIS — S91.001A OPEN WOUND OF RIGHT ANKLE, INITIAL ENCOUNTER: Primary | ICD-10-CM

## 2025-05-06 DIAGNOSIS — I73.9 PAD (PERIPHERAL ARTERY DISEASE): ICD-10-CM

## 2025-05-06 DIAGNOSIS — I50.43 ACUTE ON CHRONIC COMBINED SYSTOLIC AND DIASTOLIC CHF, NYHA CLASS 3: ICD-10-CM

## 2025-05-06 PROCEDURE — 1101F PT FALLS ASSESS-DOCD LE1/YR: CPT | Mod: CPTII,HCNC,S$GLB,

## 2025-05-06 PROCEDURE — 99214 OFFICE O/P EST MOD 30 MIN: CPT | Mod: HCNC,S$GLB,,

## 2025-05-06 PROCEDURE — 1125F AMNT PAIN NOTED PAIN PRSNT: CPT | Mod: CPTII,HCNC,S$GLB,

## 2025-05-06 PROCEDURE — 1159F MED LIST DOCD IN RCRD: CPT | Mod: CPTII,HCNC,S$GLB,

## 2025-05-06 PROCEDURE — 3288F FALL RISK ASSESSMENT DOCD: CPT | Mod: CPTII,HCNC,S$GLB,

## 2025-05-06 PROCEDURE — 3074F SYST BP LT 130 MM HG: CPT | Mod: CPTII,HCNC,S$GLB,

## 2025-05-06 PROCEDURE — 99999 PR PBB SHADOW E&M-EST. PATIENT-LVL IV: CPT | Mod: PBBFAC,HCNC,,

## 2025-05-06 PROCEDURE — 3078F DIAST BP <80 MM HG: CPT | Mod: CPTII,HCNC,S$GLB,

## 2025-05-07 ENCOUNTER — OFFICE VISIT (OUTPATIENT)
Dept: FAMILY MEDICINE | Facility: CLINIC | Age: 75
End: 2025-05-07
Payer: MEDICARE

## 2025-05-07 VITALS
TEMPERATURE: 98 F | HEIGHT: 65 IN | BODY MASS INDEX: 28.25 KG/M2 | OXYGEN SATURATION: 92 % | SYSTOLIC BLOOD PRESSURE: 133 MMHG | RESPIRATION RATE: 20 BRPM | DIASTOLIC BLOOD PRESSURE: 60 MMHG | WEIGHT: 169.56 LBS | HEART RATE: 61 BPM

## 2025-05-07 DIAGNOSIS — I73.9 PAD (PERIPHERAL ARTERY DISEASE): Primary | ICD-10-CM

## 2025-05-07 DIAGNOSIS — S91.001D OPEN WOUND OF RIGHT ANKLE, SUBSEQUENT ENCOUNTER: ICD-10-CM

## 2025-05-07 PROCEDURE — 1125F AMNT PAIN NOTED PAIN PRSNT: CPT | Mod: CPTII,HCNC,S$GLB, | Performed by: NURSE PRACTITIONER

## 2025-05-07 PROCEDURE — 99213 OFFICE O/P EST LOW 20 MIN: CPT | Mod: HCNC,S$GLB,, | Performed by: NURSE PRACTITIONER

## 2025-05-07 PROCEDURE — 1159F MED LIST DOCD IN RCRD: CPT | Mod: CPTII,HCNC,S$GLB, | Performed by: NURSE PRACTITIONER

## 2025-05-07 PROCEDURE — 3288F FALL RISK ASSESSMENT DOCD: CPT | Mod: CPTII,HCNC,S$GLB, | Performed by: NURSE PRACTITIONER

## 2025-05-07 PROCEDURE — 3078F DIAST BP <80 MM HG: CPT | Mod: CPTII,HCNC,S$GLB, | Performed by: NURSE PRACTITIONER

## 2025-05-07 PROCEDURE — 99999 PR PBB SHADOW E&M-EST. PATIENT-LVL V: CPT | Mod: PBBFAC,HCNC,, | Performed by: NURSE PRACTITIONER

## 2025-05-07 PROCEDURE — 3075F SYST BP GE 130 - 139MM HG: CPT | Mod: CPTII,HCNC,S$GLB, | Performed by: NURSE PRACTITIONER

## 2025-05-07 PROCEDURE — 1160F RVW MEDS BY RX/DR IN RCRD: CPT | Mod: CPTII,HCNC,S$GLB, | Performed by: NURSE PRACTITIONER

## 2025-05-07 PROCEDURE — 1101F PT FALLS ASSESS-DOCD LE1/YR: CPT | Mod: CPTII,HCNC,S$GLB, | Performed by: NURSE PRACTITIONER

## 2025-05-07 RX ORDER — POVIDONE-IODINE 10 %
SOLUTION, NON-ORAL TOPICAL
COMMUNITY

## 2025-05-07 RX ORDER — OXYCODONE HYDROCHLORIDE 5 MG/1
5 TABLET ORAL EVERY 4 HOURS PRN
Qty: 10 TABLET | Refills: 0 | Status: SHIPPED | OUTPATIENT
Start: 2025-05-07

## 2025-05-07 NOTE — PROGRESS NOTES
SUBJECTIVE:      Patient ID: Lisa Smith is a 74 y.o. female.    Chief Complaint: Foot Pain (Right )    History of Present Illness    CHIEF COMPLAINT:  Lisa presents today with concerns about an ankle wound and associated pain    POST-SURGICAL COURSE:  She underwent leg surgery for PAD approx 1 mo ago with 18 stitches that were removed 2 weeks ago. She was evaluated by a wound specialist yesterday at a visit. Her current wound care regimen includes daily dressing changes with soap and water cleansing, Betadine application to wound bed, and coverage with island dressing. She experienced severe pain in the wound affecting sleep for two weeks following the procedure, but reports her first good night of sleep last night with mild pain beginning at 4am. She has been managing pain with Tylenol only since the procedure, with last pain medication use in January prior to procedure. Denies redness, warmth or discharge from the wound. Pt does feel it is improving.          Family History   Problem Relation Name Age of Onset    Kidney failure Mother      Cancer Father Rm Wray     Alcohol abuse Father Rm Wray     Cancer Brother      Arthritis Maternal Grandmother Domenica Langeon     Cancer Brother Rm Wray JR         pancreatic      Social History     Socioeconomic History    Marital status:     Number of children: 1   Occupational History    Occupation: RETIRED     Comment: [employer]   Tobacco Use    Smoking status: Former     Current packs/day: 0.00     Average packs/day: 2.0 packs/day for 40.0 years (80.0 ttl pk-yrs)     Types: Cigarettes     Start date: 1971     Quit date: 2011     Years since quittin.2     Passive exposure: Past    Smokeless tobacco: Never    Tobacco comments:          Quit in    Substance and Sexual Activity    Alcohol use: Not Currently     Alcohol/week: 8.0 standard drinks of alcohol     Types: 8 Glasses of wine per week    Drug use:  No    Sexual activity: Never     Social Drivers of Health     Financial Resource Strain: Patient Declined (1/14/2025)    Overall Financial Resource Strain (CARDIA)     Difficulty of Paying Living Expenses: Patient declined   Recent Concern: Financial Resource Strain - High Risk (11/26/2024)    Overall Financial Resource Strain (CARDIA)     Difficulty of Paying Living Expenses: Hard   Food Insecurity: Patient Declined (1/14/2025)    Hunger Vital Sign     Worried About Running Out of Food in the Last Year: Patient declined     Ran Out of Food in the Last Year: Patient declined   Recent Concern: Food Insecurity - Food Insecurity Present (11/26/2024)    Hunger Vital Sign     Worried About Running Out of Food in the Last Year: Sometimes true     Ran Out of Food in the Last Year: Never true   Transportation Needs: Patient Declined (1/14/2025)    TRANSPORTATION NEEDS     Transportation : Patient declined   Physical Activity: Insufficiently Active (11/26/2024)    Exercise Vital Sign     Days of Exercise per Week: 1 day     Minutes of Exercise per Session: 10 min   Stress: Patient Declined (1/14/2025)    [employer] - Occupational Stress Questionnaire     Feeling of Stress : Patient declined   Housing Stability: Patient Declined (1/14/2025)    Housing Stability Vital Sign     Unable to Pay for Housing in the Last Year: Patient declined     Homeless in the Last Year: Patient declined     Current Medications[1]  Review of patient's allergies indicates:   Allergen Reactions    Sulfa (sulfonamide antibiotics) Itching      Past Medical History:   Diagnosis Date    Anticoagulant long-term use     Arthritis     Atrial fibrillation     Bell's palsy     Boil of buttock     burst 12/19/22    CHF (congestive heart failure)     Chronic cough     COPD (chronic obstructive pulmonary disease)     use O2  3l/m NC at night; also taking during day currently 12/4/23    Dizziness     Encounter for blood transfusion      GI bleed 2011    transfusion    H/O diverticulitis of colon     Hard of hearing     Heart murmur     Hematoma 02/2023    left hand    Heterozygous alpha 1-antitrypsin deficiency     History of home oxygen therapy     3L NC at night    Hypertension     Lung disease     copd    MONSERRAT (obstructive sleep apnea)     resolved with wt loss 131#    Pacemaker     Pneumonia     last episode 2019    Pulmonary edema     Skin cancer     Unspecified visual disturbance     episode of vision disturbance and dizziness...occasional recurrences     Past Surgical History:   Procedure Laterality Date    CARDIAC ELECTROPHYSIOLOGY STUDY AND ABLATION      CAROTID ENDARTERECTOMY Left 12/22/2022    Procedure: ENDARTERECTOMY-CAROTID;  Surgeon: Maximilian Connolly MD;  Location: [facility] OR;  Service: Peripheral Vascular;  Laterality: Left;    CAROTID STENT Left 02/29/2024    Procedure: INSERTION, STENT, ARTERY, CAROTID;  Surgeon: CIRILO Valdivia III, MD;  Location: [address];  Service: Vascular;  Laterality: Left;  left carotid artery stent placement  mgy  146.29   gymc2  8.0730  fluro time  4.8min    CARPAL TUNNEL RELEASE Left     CHOLECYSTECTOMY  1995    CLOSURE OF LEFT ATRIAL APPENDAGE USING DEVICE Right 01/13/2025    Procedure: Left atrial appendage closure device;  Surgeon: Roxana Cantu MD;  Location: [facility] CATH/EP LAB;  Service: Cardiology;  Laterality: Right;    ENDARTERECTOMY OF FEMORAL ARTERY Right 4/2/2025    Procedure: ENDARTERECTOMY, FEMORAL;  Surgeon: CIRILO Valdivia III, MD;  Location: [address];  Service: Vascular;  Laterality: Right;    EYE SURGERY Bilateral March 2012    cataract    HYSTERECTOMY  1988    INSERT / REPLACE / REMOVE PACEMAKER      KNEE ARTHROSCOPY Left     LEFT HEART CATHETERIZATION  11/01/2023    Procedure: Left heart cath;  Surgeon: Puma Sheltno MD;  Location: [facility] CATH;  Service: Cardiology;;    REPLACEMENT OF PACEMAKER GENERATOR Left 01/20/2022    Procedure: REPLACEMENT, PACEMAKER GENERATOR;   "Surgeon: Raymond Pérez MD;  Location: [facility] CATH/EP LAB;  Service: Cardiology;  Laterality: Left;    SKIN CANCER EXCISION      TRANSCATHETER AORTIC VALVE REPLACEMENT (TAVR) Bilateral 12/06/2023    Procedure: (TAVR);  Surgeon: Puma Shelton MD;  Location: [facility] CATH;  Service: Cardiology;  Laterality: Bilateral;    TRANSCATHETER AORTIC VALVE REPLACEMENT (TAVR) Bilateral 12/06/2023    Procedure: (TAVR) - Surgeon;  Surgeon: Maximilian Connolly MD;  Location: [facility] CATH;  Service: Peripheral Vascular;  Laterality: Bilateral;       Review of Systems   Constitutional:  Negative for appetite change, chills and fever.   Respiratory:  Negative for shortness of breath.    Cardiovascular:  Negative for chest pain.   Skin:  Positive for wound (improving). Negative for color change.   Neurological:  Negative for weakness and numbness.   Hematological:  Negative for adenopathy.      OBJECTIVE:      Vitals:    05/07/25 0848   BP: 133/60   BP Location: Right arm   Patient Position: Sitting   Pulse: 61   Resp: 20   Temp: 98.1 °F (36.7 °C)   TempSrc: Oral   SpO2: (!) 92%   Weight: 76.9 kg (169 lb 8.5 oz)   Height: 5' 5" (1.651 m)     Physical Exam  Vitals and nursing note reviewed.   Constitutional:       General: She is not in acute distress.     Appearance: Normal appearance. She is well-developed. She is obese. She is not ill-appearing.   HENT:      Head: Normocephalic.      Mouth/Throat:      Mouth: Mucous membranes are moist.   Eyes:      General: No scleral icterus.     Pupils: Pupils are equal, round, and reactive to light.   Cardiovascular:      Rate and Rhythm: Normal rate and regular rhythm.      Heart sounds: Murmur heard.      Systolic murmur is present with a grade of 3/6.   Pulmonary:      Effort: Pulmonary effort is normal. No respiratory distress.   Musculoskeletal:         General: Normal range of motion.      Cervical back: Normal range of motion.      Right lower leg: No edema.      Left lower leg: No edema. "   Skin:     General: Skin is warm and dry.      Coloration: Skin is not jaundiced or pale.      Findings: Wound (right ankle- see photo; no erythema, no warmth, swelling or d/c) present.   Neurological:      Mental Status: She is alert and oriented to person, place, and time.   Psychiatric:         Mood and Affect: Mood normal.         Behavior: Behavior normal.         Thought Content: Thought content normal.         Judgment: Judgment normal.          Assessment:       1. PAD (peripheral artery disease)    2. Open wound of right ankle, subsequent encounter        Plan:      PAD (peripheral artery disease)   -improved since surgical procedure    Open wound of right ankle, subsequent encounter  -     oxyCODONE (ROXICODONE) 5 MG immediate release tablet; Take 1 tablet (5 mg total) by mouth every 4 (four) hours as needed for Pain.  Dispense: 10 tablet; Refill: 0   -does seem to be improving since 2 week initial picture; color of skin pink and normal temp; no warmth, redness or swelling/discharge; no concerns about cellulitis; short Rx of Oxycodone given for severe pain only, patient needs to follow-up with new wound care specialist here locally and referral has been authorized-patient can make appointment      Follow up if symptoms worsen or fail to improve.      5/7/2025 DIAN Oquendo, LIANNAP  This note was generated with the assistance of ambient listening technology. Verbal consent was obtained by the patient and accompanying visitor(s) for the recording of patient appointment to facilitate this note. I attest to having reviewed and edited the generated note for accuracy, though some syntax or spelling errors may persist. Please contact the author of this note for any clarification.     This note was created using [employer] voice recognition software that occasionally misinterprets phrases or words.            [1]   Current Outpatient Medications   Medication Sig Dispense Refill    acetaminophen (TYLENOL  ARTHRITIS ORAL) Take 2 tablets by mouth daily as needed.      albuterol (PROVENTIL/VENTOLIN HFA) 90 mcg/actuation inhaler INHALE 2 PUFFS EVERY 6 HOURS AS NEEDED FOR WHEEZING (RESCUE) 18 g 6    aspirin 81 MG Chew Chew and swallow 1 tablet (81 mg total) by mouth once daily. 30 tablet 2    clopidogreL (PLAVIX) 75 mg tablet qd 90 tablet 0    digoxin (LANOXIN) 125 mcg tablet Take 1 tablet (0.125 mg total) by mouth once daily. 30 tablet 11    ezetimibe (ZETIA) 10 mg tablet TAKE 1 TABLET EVERY DAY 90 tablet 3    fluticasone propionate (FLONASE) 50 mcg/actuation nasal spray 1 spray by Each Nostril route daily as needed for Rhinitis or Allergies.      fluticasone-umeclidin-vilanter (TRELEGY ELLIPTA) 100-62.5-25 mcg DsDv Inhale 1 puff into the lungs once daily. 90 each 3    melatonin 10 mg Tab Take 1 tablet by mouth nightly as needed (insomnia).      metoprolol succinate (TOPROL-XL) 25 MG 24 hr tablet TAKE 1 TABLET EVERY MORNING 90 tablet 3    pantoprazole (PROTONIX) 40 MG tablet TAKE 1 TABLET EVERY DAY 90 tablet 3    povidone-iodine (BETADINE) 10 % external solution Apply topically as needed for Wound Care.      rOPINIRole (REQUIP) 1 MG tablet Take 1 tablet (1 mg total) by mouth every evening. 90 tablet 1    rosuvastatin (CRESTOR) 40 MG Tab TAKE 1 TABLET EVERY EVENING 90 tablet 3    sacubitriL-valsartan (ENTRESTO) 24-26 mg per tablet Take 1 tablet by mouth 2 (two) times daily. 180 tablet 3    spironolactone (ALDACTONE) 25 MG tablet Take 1 tablet (25 mg total) by mouth once daily. 90 tablet 3    torsemide (DEMADEX) 20 MG Tab Take 1 tablet (20 mg total) by mouth once daily. 90 tablet 3    oxyCODONE (ROXICODONE) 5 MG immediate release tablet Take 1 tablet (5 mg total) by mouth every 4 (four) hours as needed for Pain. 10 tablet 0     No current facility-administered medications for this visit.

## 2025-05-20 ENCOUNTER — CLINICAL SUPPORT (OUTPATIENT)
Dept: CARDIOLOGY | Facility: CLINIC | Age: 75
End: 2025-05-20

## 2025-05-20 ENCOUNTER — HOSPITAL ENCOUNTER (OUTPATIENT)
Dept: CARDIOLOGY | Facility: CLINIC | Age: 75
Discharge: HOME | End: 2025-05-20
Attending: INTERNAL MEDICINE
Payer: MEDICARE

## 2025-05-20 ENCOUNTER — OFFICE VISIT (OUTPATIENT)
Dept: WOUND CARE | Facility: HOSPITAL | Age: 75
End: 2025-05-20
Attending: PODIATRIST
Payer: MEDICARE

## 2025-05-20 ENCOUNTER — TELEPHONE (OUTPATIENT)
Facility: CLINIC | Age: 75
End: 2025-05-20
Payer: MEDICARE

## 2025-05-20 VITALS
RESPIRATION RATE: 20 BRPM | HEART RATE: 60 BPM | DIASTOLIC BLOOD PRESSURE: 87 MMHG | TEMPERATURE: 98 F | SYSTOLIC BLOOD PRESSURE: 131 MMHG

## 2025-05-20 DIAGNOSIS — Z95.0 PRESENCE OF CARDIAC PACEMAKER: ICD-10-CM

## 2025-05-20 DIAGNOSIS — Z87.891 FORMER SMOKER: ICD-10-CM

## 2025-05-20 DIAGNOSIS — R00.1 BRADYCARDIA, UNSPECIFIED: ICD-10-CM

## 2025-05-20 DIAGNOSIS — Z91.89 AT HIGH RISK FOR INADEQUATE NUTRITIONAL INTAKE: ICD-10-CM

## 2025-05-20 DIAGNOSIS — R09.89 DECREASED PEDAL PULSES: ICD-10-CM

## 2025-05-20 DIAGNOSIS — L02.419 CELLULITIS AND ABSCESS OF LEG: ICD-10-CM

## 2025-05-20 DIAGNOSIS — L08.9 WOUND INFECTION: ICD-10-CM

## 2025-05-20 DIAGNOSIS — R20.0 NUMBNESS IN FEET: ICD-10-CM

## 2025-05-20 DIAGNOSIS — L03.119 CELLULITIS AND ABSCESS OF LEG: ICD-10-CM

## 2025-05-20 DIAGNOSIS — T14.8XXA WOUND INFECTION: ICD-10-CM

## 2025-05-20 DIAGNOSIS — S91.001A OPEN WOUND OF RIGHT ANKLE, INITIAL ENCOUNTER: ICD-10-CM

## 2025-05-20 DIAGNOSIS — L97.313 SKIN ULCER OF RIGHT ANKLE WITH NECROSIS OF MUSCLE: Primary | ICD-10-CM

## 2025-05-20 DIAGNOSIS — M79.671 FOOT PAIN, RIGHT: ICD-10-CM

## 2025-05-20 DIAGNOSIS — I73.9 PVD (PERIPHERAL VASCULAR DISEASE): ICD-10-CM

## 2025-05-20 DIAGNOSIS — I73.9 PAD (PERIPHERAL ARTERY DISEASE): ICD-10-CM

## 2025-05-20 DIAGNOSIS — R20.2 PARESTHESIAS: ICD-10-CM

## 2025-05-20 PROCEDURE — 87186 SC STD MICRODIL/AGAR DIL: CPT | Mod: 91,HCNC | Performed by: PODIATRIST

## 2025-05-20 PROCEDURE — 99214 OFFICE O/P EST MOD 30 MIN: CPT | Mod: 25,HCNC,PN | Performed by: PODIATRIST

## 2025-05-20 PROCEDURE — 99205 OFFICE O/P NEW HI 60 MIN: CPT | Mod: 25,HCNC,, | Performed by: PODIATRIST

## 2025-05-20 PROCEDURE — 11043 DBRDMT MUSC&/FSCA 1ST 20/<: CPT | Mod: HCNC,PN | Performed by: PODIATRIST

## 2025-05-20 PROCEDURE — 93294 REM INTERROG EVL PM/LDLS PM: CPT | Mod: HCNC,S$GLB,, | Performed by: INTERNAL MEDICINE

## 2025-05-20 PROCEDURE — 3288F FALL RISK ASSESSMENT DOCD: CPT | Mod: CPTII,HCNC,, | Performed by: PODIATRIST

## 2025-05-20 PROCEDURE — 11043 DBRDMT MUSC&/FSCA 1ST 20/<: CPT | Mod: HCNC,,, | Performed by: PODIATRIST

## 2025-05-20 PROCEDURE — 3079F DIAST BP 80-89 MM HG: CPT | Mod: CPTII,HCNC,, | Performed by: PODIATRIST

## 2025-05-20 PROCEDURE — 1159F MED LIST DOCD IN RCRD: CPT | Mod: CPTII,HCNC,, | Performed by: PODIATRIST

## 2025-05-20 PROCEDURE — 1101F PT FALLS ASSESS-DOCD LE1/YR: CPT | Mod: CPTII,HCNC,, | Performed by: PODIATRIST

## 2025-05-20 PROCEDURE — 3075F SYST BP GE 130 - 139MM HG: CPT | Mod: CPTII,HCNC,, | Performed by: PODIATRIST

## 2025-05-20 PROCEDURE — 87075 CULTR BACTERIA EXCEPT BLOOD: CPT | Mod: HCNC | Performed by: PODIATRIST

## 2025-05-20 PROCEDURE — 1160F RVW MEDS BY RX/DR IN RCRD: CPT | Mod: CPTII,HCNC,, | Performed by: PODIATRIST

## 2025-05-20 PROCEDURE — 1125F AMNT PAIN NOTED PAIN PRSNT: CPT | Mod: CPTII,HCNC,, | Performed by: PODIATRIST

## 2025-05-20 PROCEDURE — 93296 REM INTERROG EVL PM/IDS: CPT | Mod: HCNC,PN | Performed by: INTERNAL MEDICINE

## 2025-05-20 RX ORDER — DOXYCYCLINE 100 MG/1
100 CAPSULE ORAL 2 TIMES DAILY
Qty: 28 CAPSULE | Refills: 0 | Status: SHIPPED | OUTPATIENT
Start: 2025-05-20 | End: 2025-06-03

## 2025-05-20 RX ORDER — OXYCODONE AND ACETAMINOPHEN 7.5; 325 MG/1; MG/1
1 TABLET ORAL EVERY 6 HOURS PRN
Qty: 26 TABLET | Refills: 0 | Status: SHIPPED | OUTPATIENT
Start: 2025-05-20

## 2025-05-20 NOTE — PROGRESS NOTES
"  [address] [facility] [address]  [place] [address]  Phone: [phone]   [phone]    Patient's PCP:Hope Tang FNP  Referring Provider: Samantha Howell    Subjective:      Chief Complaint:: Wound Care, Wound Check, Wound Infection, Non-healing Wound, and Poor Circulation    HPI  Lisa Smith is a 74 y.o. female who presents today with a complaint of right medial ankle wound.   See wound docs documentation for full assessment and evaluation of all wounds.       First visit with patient:  Reviewed all notes from patients medical chart, including imaging, procedures, studies, progress notes,  cultures, antibiotic regimens, photos,  etc.     Culture taken of wound.  I will adjust antibiotics accordingly once the results of the culture return    Patient experiencing numbness and tingling bilateral feet:  Paresthesias, and burning bilateral feet with no clearly identified trigger or source.  Referral placed to neurology.    Patient was under the care of a previous wound care provider.  Reviewed note from visits.  Taken from Wound providers last note on 05/06/2025:  "Patient presents for an evaluation of right medial ankle wound. She reports having a medial and lateral right ankle wound that started in March 2025. She reports the lateral right ankle wound healed. Pt follows with vascular surgery. She is s/p right common femoral/profunda endarterectomy and bovine patch on 4/2/25 with Dr. Valdivia. At her follow up appointment on 4/22/25, she was found to have a medial ankle wound. It was deemed primarily not ischemic and she was referred to wound care. She was instructed to dress her wound with betadine daily. Per Dr. Valdivia's note: "While the LANIE suggests lower than preoperatively I think this is an error given that the contralateral side which had no intervention also dropped significantly. PVR waveforms on the right arm much better, and her ischemic rest pain completely gone." She admits " "compliance with betadine daily. Pt previously smoked 2-3 ppds for 30 years. She quit in 2011. She wants to follow with wound care closer to her house in [place]. Denies fever, chills, erythema, warmth, purulent drainage, or pain."        Vascular Status/LANIE:  Patient under the care of vascular surgery.  Recent vascular intervention, will order arterial ultrasounds.   Nutrition Risk/Labs: "Tips and Tricks for wound healing" handout given with detailed guide to optimize nutritional status for wound healing.   Counseled patient on increasing protein intake, following a healthy diet, vitamins  Dietary:   As tolerated: 3 well-balanced meals  Increased protein: Premier, Boost, or Ensure, [place] Instant Breakfast (purchase sugarfree if Diabetic). If you have kidney or are on dialysis, patient please check with your Nephrologist as to how much protein you are allowed to take with your condition.   Faustino 1-2 times daily  Supplement with: Multivitamin with Zinc and Vitamin C 500mg twice a day. If you are on Coumadin, please contact the Coumadin Clinic before beginning a Multivitamin. Vitamin D3.  Recent Cultures & Dates:  See micro tab in chart  Antibiotics:   Radiology/Xray: See imaging tab in chart  Diabetes Status/HbA1c: See labs tab in chart  Offloading/Immobilization: Offloading fel placed in order to remove pressure from wound  Tobacco Use:  Previous smoker.  None currently  Additional Patient instructions: Keep wrap/ wound dressing clean, dry, and intact. May shower, using cast protector, plastic bag, or other methods as discussed to keep wrap dry, or may sponge bathe. If wrap gets wet, it needs to be removed by unwrapping it. Place temporary dressing of antibiotic ointment and bandage to wound and notify clinic as soon as possible for earlier appointment.        Patient to follow with primary doctor regularly to address ongoing medical conditions such as abnormal blood pressure readings.         EVALUATION, " ASSESSMENT, & PLAN:     1. Debridement.See Wound Docs for assessment of wounds and procedure notes  2. Continue taking all medications as prescribed  3. Dressing changes, see Wound docs for dressing change orders  4. RTC one week   5. Counseled patient on increasing protein intake, not getting wound wet, keeping dressing clean dry and intact, following a healthy diet, elevating legs when able, removing pressure from wound    Proper ulcer care and the possible need for serial debridements, topical medications, specific dressings and biological engineered skin substitutes if indicated  Discussed general issues surrounding recurrent/ nonhealing ulcers, along with the advantages & disadvantages of various treatment strategies.    Patient should call the office immediately if any signs of infection, such as fever, chills, sweats, increased redness or pain.    Patient was instructed to call the clinic or go to the emergency department if their symptoms do not improve, worsens, or if new symptoms develop.  Patient was advised that if any increased swelling, pain, or numbness arise to go immediately to the ED. Patient knows to call any time if an emergency arises. Shared decision making occurred and patient verbalized understanding in agreement with this plan.       I spent a total of 65 minutes on the day of the visit.This includes face to face time and non-face to face time preparing to see the patient (eg, review of tests), obtaining and/or reviewing separately obtained history, documenting clinical information in the electronic or other health record, independently interpreting results and communicating results to the patient/family/caregiver, or care coordinator.       Much of the documentation for this visit was completed in the Wound Docs system.  Please see the attached documentation for further details about the patient's care. Scanned under the Media tab.        Izabella Benoit DPM            Vitals:    05/20/25  1259   BP: 131/87   Pulse: 60   Resp: 20   Temp: 98.3 °F (36.8 °C)   PainSc: 10-Worst pain ever      Shoe Size:     Past Surgical History:   Procedure Laterality Date    CARDIAC ELECTROPHYSIOLOGY STUDY AND ABLATION      CAROTID ENDARTERECTOMY Left 12/22/2022    Procedure: ENDARTERECTOMY-CAROTID;  Surgeon: Maximilian Connolly MD;  Location: [facility] OR;  Service: Peripheral Vascular;  Laterality: Left;    CAROTID STENT Left 02/29/2024    Procedure: INSERTION, STENT, ARTERY, CAROTID;  Surgeon: CIRILO Valdivia III, MD;  Location: [facility] OR [facility] FLR;  Service: Vascular;  Laterality: Left;  left carotid artery stent placement  mgy  146.29   gymc2  8.0730  fluro time  4.8min    CARPAL TUNNEL RELEASE Left     CHOLECYSTECTOMY  1995    CLOSURE OF LEFT ATRIAL APPENDAGE USING DEVICE Right 01/13/2025    Procedure: Left atrial appendage closure device;  Surgeon: Roxana Cantu MD;  Location: [facility] CATH/EP LAB;  Service: Cardiology;  Laterality: Right;    ENDARTERECTOMY OF FEMORAL ARTERY Right 4/2/2025    Procedure: ENDARTERECTOMY, FEMORAL;  Surgeon: CIRILO Valdivia III, MD;  [facility];  Service: Vascular;  Laterality: Right;    EYE SURGERY Bilateral March 2012    cataract    HYSTERECTOMY  1988    INSERT / REPLACE / REMOVE PACEMAKER      KNEE ARTHROSCOPY Left     LEFT HEART CATHETERIZATION  11/01/2023    Procedure: Left heart cath;  Surgeon: Puma Shelton MD;  Location: [facility] CATH;  Service: Cardiology;;    REPLACEMENT OF PACEMAKER GENERATOR Left 01/20/2022    Procedure: REPLACEMENT, PACEMAKER GENERATOR;  Surgeon: Raymond Pérez MD;  Location: [facility] CATH/EP LAB;  Service: Cardiology;  Laterality: Left;    SKIN CANCER EXCISION      TRANSCATHETER AORTIC VALVE REPLACEMENT (TAVR) Bilateral 12/06/2023    Procedure: (TAVR);  Surgeon: Puma Shelton MD;  Location: [facility] CATH;  Service: Cardiology;  Laterality: Bilateral;    TRANSCATHETER AORTIC VALVE REPLACEMENT (TAVR) Bilateral 12/06/2023    Procedure: (TAVR) - Surgeon;   Surgeon: Maximilian Connolly MD;  Location: [facility];  Service: Peripheral Vascular;  Laterality: Bilateral;     Past Medical History:   Diagnosis Date    Anticoagulant long-term use     Arthritis     Atrial fibrillation     Bell's palsy     Boil of buttock     burst 12/19/22    CHF (congestive heart failure)     Chronic cough     COPD (chronic obstructive pulmonary disease)     use O2  3l/m NC at night; also taking during day currently 12/4/23    Dizziness     Encounter for blood transfusion     GI bleed 2011    transfusion    H/O diverticulitis of colon     Hard of hearing     Heart murmur     Hematoma 02/2023    left hand    Heterozygous alpha 1-antitrypsin deficiency     History of home oxygen therapy     3L NC at night    Hypertension     Lung disease     copd    MONSERRAT (obstructive sleep apnea)     resolved with wt loss 131#    Pacemaker     Pneumonia     last episode 2019    Pulmonary edema     Skin cancer     Unspecified visual disturbance     episode of vision disturbance and dizziness...occasional recurrences     Family History   Problem Relation Name Age of Onset    Kidney failure Mother      Cancer Father Rm Nils     Alcohol abuse Father Rm Langeon     Cancer Brother      Arthritis Maternal Grandmother Domenica Wray     Cancer Brother Rm Wray JR         pancreatic        Social History:   Marital Status:   Alcohol History:  reports that she does not currently use alcohol after a past usage of about 8.0 standard drinks of alcohol per week.  Tobacco History:  reports that she quit smoking about 14 years ago. Her smoking use included cigarettes. She started smoking about 54 years ago. She has a 80 pack-year smoking history. She has been exposed to tobacco smoke. She has never used smokeless tobacco.  Drug History:  reports no history of drug use.    Review of patient's allergies indicates:   Allergen Reactions    Sulfa (sulfonamide antibiotics) Itching       Current  Medications[1]    Review of Systems   Constitutional:  Negative for chills, fatigue, fever and unexpected weight change.   HENT:  Negative for hearing loss and trouble swallowing.    Eyes:  Negative for photophobia and visual disturbance.   Respiratory:  Negative for cough, shortness of breath and wheezing.    Cardiovascular:  Positive for leg swelling. Negative for chest pain and palpitations.   Gastrointestinal:  Negative for abdominal pain and nausea.   Genitourinary:  Negative for dysuria and frequency.   Musculoskeletal:  Negative for arthralgias, back pain, joint swelling and myalgias.   Skin:  Positive for wound. Negative for rash.   Neurological:  Negative for tremors, seizures, weakness and headaches.   Hematological:  Does not bruise/bleed easily.         Objective:        Physical Exam:   Foot Exam  Physical Exam  Ortho/SPM Exam     Imaging:            Assessment:       1. Skin ulcer of right ankle with necrosis of muscle    2. Open wound of right ankle, initial encounter    3. PAD (peripheral artery disease)    4. Former smoker    5. PVD (peripheral vascular disease)    6. At high risk for inadequate nutritional intake    7. Decreased pedal pulses    8. Wound infection    9. Cellulitis and abscess of leg    10. Paresthesias    11. Foot pain, right    12. Numbness in feet      Plan:   Skin ulcer of right ankle with necrosis of muscle  -     US Lower Extremity Arteries Bilateral; Future; Expected date: 05/20/2025    Open wound of right ankle, initial encounter  -     Ambulatory referral/consult to Wound Clinic    PAD (peripheral artery disease)  -     US Lower Extremity Arteries Bilateral; Future; Expected date: 05/20/2025    Former smoker    PVD (peripheral vascular disease)    At high risk for inadequate nutritional intake    Decreased pedal pulses    Wound infection  -     Culture, Anaerobic  -     Aerobic culture  -     doxycycline (MONODOX) 100 MG capsule; Take 1 capsule (100 mg total) by mouth 2  (two) times daily. for 14 days  Dispense: 28 capsule; Refill: 0    Cellulitis and abscess of leg  -     Culture, Anaerobic  -     Aerobic culture    Paresthesias    Foot pain, right  -     oxyCODONE-acetaminophen (PERCOCET) 7.5-325 mg per tablet; Take 1 tablet by mouth every 6 (six) hours as needed for Pain.  Dispense: 26 tablet; Refill: 0    Numbness in feet  -     Ambulatory referral/consult to Neurology; Future; Expected date: 05/27/2025      Follow up in about 1 week (around 5/27/2025).    Procedures          Counseling:     I provided patient education verbally regarding:   Patient diagnosis, treatment options, as well as alternatives, risks, and benefits.     This note was created using Dragon voice recognition software that occasionally misinterpreted phrases or words.                      [1]   Current Outpatient Medications   Medication Sig Dispense Refill    acetaminophen (TYLENOL ARTHRITIS ORAL) Take 2 tablets by mouth daily as needed.      albuterol (PROVENTIL/VENTOLIN HFA) 90 mcg/actuation inhaler INHALE 2 PUFFS EVERY 6 HOURS AS NEEDED FOR WHEEZING (RESCUE) 18 g 6    aspirin 81 MG Chew Chew and swallow 1 tablet (81 mg total) by mouth once daily. 30 tablet 2    clopidogreL (PLAVIX) 75 mg tablet qd 90 tablet 0    digoxin (LANOXIN) 125 mcg tablet Take 1 tablet (0.125 mg total) by mouth once daily. 30 tablet 11    doxycycline (MONODOX) 100 MG capsule Take 1 capsule (100 mg total) by mouth 2 (two) times daily. for 14 days 28 capsule 0    ezetimibe (ZETIA) 10 mg tablet TAKE 1 TABLET EVERY DAY 90 tablet 3    fluticasone propionate (FLONASE) 50 mcg/actuation nasal spray 1 spray by Each Nostril route daily as needed for Rhinitis or Allergies.      fluticasone-umeclidin-vilanter (TRELEGY ELLIPTA) 100-62.5-25 mcg DsDv Inhale 1 puff into the lungs once daily. 90 each 3    melatonin 10 mg Tab Take 1 tablet by mouth nightly as needed (insomnia).      metoprolol succinate (TOPROL-XL) 25 MG 24 hr tablet TAKE 1 TABLET  EVERY MORNING 90 tablet 3    oxyCODONE (ROXICODONE) 5 MG immediate release tablet Take 1 tablet (5 mg total) by mouth every 4 (four) hours as needed for Pain. 10 tablet 0    oxyCODONE-acetaminophen (PERCOCET) 7.5-325 mg per tablet Take 1 tablet by mouth every 6 (six) hours as needed for Pain. 26 tablet 0    pantoprazole (PROTONIX) 40 MG tablet TAKE 1 TABLET EVERY DAY 90 tablet 3    povidone-iodine (BETADINE) 10 % external solution Apply topically as needed for Wound Care.      rOPINIRole (REQUIP) 1 MG tablet Take 1 tablet (1 mg total) by mouth every evening. 90 tablet 1    rosuvastatin (CRESTOR) 40 MG Tab TAKE 1 TABLET EVERY EVENING 90 tablet 3    sacubitriL-valsartan (ENTRESTO) 24-26 mg per tablet Take 1 tablet by mouth 2 (two) times daily. 180 tablet 3    spironolactone (ALDACTONE) 25 MG tablet Take 1 tablet (25 mg total) by mouth once daily. 90 tablet 3    torsemide (DEMADEX) 20 MG Tab Take 1 tablet (20 mg total) by mouth once daily. 90 tablet 3     No current facility-administered medications for this visit.

## 2025-05-20 NOTE — TELEPHONE ENCOUNTER
LVM to confirm appointment scheduled for 5/27 at 2:45 with DR. Addison and remind pt to call the office and schedule labs that are due prior to visit.

## 2025-05-22 ENCOUNTER — PATIENT MESSAGE (OUTPATIENT)
Dept: OBSTETRICS AND GYNECOLOGY | Facility: CLINIC | Age: 75
End: 2025-05-22
Payer: MEDICARE

## 2025-05-23 ENCOUNTER — RESULTS FOLLOW-UP (OUTPATIENT)
Dept: WOUND CARE | Facility: HOSPITAL | Age: 75
End: 2025-05-23

## 2025-05-23 LAB
BACTERIA SPEC AEROBE CULT: ABNORMAL
BACTERIA SPEC AEROBE CULT: ABNORMAL
BACTERIA SPEC ANAEROBE CULT: NORMAL

## 2025-05-27 ENCOUNTER — OFFICE VISIT (OUTPATIENT)
Dept: WOUND CARE | Facility: HOSPITAL | Age: 75
End: 2025-05-27
Attending: PODIATRIST
Payer: MEDICARE

## 2025-05-27 VITALS
DIASTOLIC BLOOD PRESSURE: 60 MMHG | TEMPERATURE: 98 F | SYSTOLIC BLOOD PRESSURE: 145 MMHG | HEART RATE: 60 BPM | RESPIRATION RATE: 18 BRPM

## 2025-05-27 DIAGNOSIS — Z87.891 FORMER SMOKER: ICD-10-CM

## 2025-05-27 DIAGNOSIS — L03.119 CELLULITIS AND ABSCESS OF LEG: ICD-10-CM

## 2025-05-27 DIAGNOSIS — L97.313 SKIN ULCER OF RIGHT ANKLE WITH NECROSIS OF MUSCLE: ICD-10-CM

## 2025-05-27 DIAGNOSIS — R20.2 PARESTHESIAS: ICD-10-CM

## 2025-05-27 DIAGNOSIS — Z91.89 AT HIGH RISK FOR INADEQUATE NUTRITIONAL INTAKE: ICD-10-CM

## 2025-05-27 DIAGNOSIS — L08.9 WOUND INFECTION: ICD-10-CM

## 2025-05-27 DIAGNOSIS — T14.8XXA WOUND INFECTION: ICD-10-CM

## 2025-05-27 DIAGNOSIS — I73.9 PVD (PERIPHERAL VASCULAR DISEASE): ICD-10-CM

## 2025-05-27 DIAGNOSIS — S91.001A OPEN WOUND OF RIGHT ANKLE, INITIAL ENCOUNTER: Primary | ICD-10-CM

## 2025-05-27 DIAGNOSIS — R09.89 DECREASED PEDAL PULSES: ICD-10-CM

## 2025-05-27 DIAGNOSIS — I73.9 PAD (PERIPHERAL ARTERY DISEASE): ICD-10-CM

## 2025-05-27 DIAGNOSIS — L02.419 CELLULITIS AND ABSCESS OF LEG: ICD-10-CM

## 2025-05-27 DIAGNOSIS — M79.671 FOOT PAIN, RIGHT: ICD-10-CM

## 2025-05-27 DIAGNOSIS — R20.0 NUMBNESS IN FEET: ICD-10-CM

## 2025-05-27 PROCEDURE — 1125F AMNT PAIN NOTED PAIN PRSNT: CPT | Mod: CPTII,HCNC,, | Performed by: PODIATRIST

## 2025-05-27 PROCEDURE — 3078F DIAST BP <80 MM HG: CPT | Mod: CPTII,HCNC,, | Performed by: PODIATRIST

## 2025-05-27 PROCEDURE — 11043 DBRDMT MUSC&/FSCA 1ST 20/<: CPT | Mod: HCNC,,, | Performed by: PODIATRIST

## 2025-05-27 PROCEDURE — 11043 DBRDMT MUSC&/FSCA 1ST 20/<: CPT | Mod: HCNC,PN | Performed by: PODIATRIST

## 2025-05-27 PROCEDURE — 1160F RVW MEDS BY RX/DR IN RCRD: CPT | Mod: CPTII,HCNC,, | Performed by: PODIATRIST

## 2025-05-27 PROCEDURE — 99213 OFFICE O/P EST LOW 20 MIN: CPT | Mod: 25,HCNC,, | Performed by: PODIATRIST

## 2025-05-27 PROCEDURE — 1159F MED LIST DOCD IN RCRD: CPT | Mod: CPTII,HCNC,, | Performed by: PODIATRIST

## 2025-05-27 PROCEDURE — 3077F SYST BP >= 140 MM HG: CPT | Mod: CPTII,HCNC,, | Performed by: PODIATRIST

## 2025-05-27 NOTE — PROGRESS NOTES
"  [address] [facility] [address]  [place] [address]  Phone: [phone]   [phone]    Patient's PCP:Hope Tang FNP  Referring Provider: No ref. provider found    Subjective:      Chief Complaint:: Wound Care, Peripheral Neuropathy, Non-healing Wound, Wound Check, and Wound Infection    HPI  Lisa Smith is a 74 y.o. female who presents today with a complaint of right medial ankle wound.   See wound docs documentation for full assessment and evaluation of all wounds.         Culture taken of wound at last visit reviewed.    Patient continues to experience numbness and tingling in bilateral feet.  Paresthesias, and burning bilateral feet with no clearly identified trigger or source.  Referral placed to neurology.     Patient was under the care of a previous wound care provider.  Reviewed note from visits.  Taken from Wound providers last note on 05/06/2025:  "Patient presents for an evaluation of right medial ankle wound. She reports having a medial and lateral right ankle wound that started in March 2025. She reports the lateral right ankle wound healed. Pt follows with vascular surgery. She is s/p right common femoral/profunda endarterectomy and bovine patch on 4/2/25 with Dr. Valdivia. At her follow up appointment on 4/22/25, she was found to have a medial ankle wound. It was deemed primarily not ischemic and she was referred to wound care. She was instructed to dress her wound with betadine daily. Per Dr. Valdivia's note: "While the LANIE suggests lower than preoperatively I think this is an error given that the contralateral side which had no intervention also dropped significantly. PVR waveforms on the right arm much better, and her ischemic rest pain completely gone." She admits compliance with betadine daily. Pt previously smoked 2-3 ppds for 30 years. She quit in 2011. She wants to follow with wound care closer to her house in [place]. Denies fever, chills, erythema, warmth, purulent " "drainage, or pain."           Vascular Status/LANIE:  Patient under the care of vascular surgery.  Recent vascular intervention, will order arterial ultrasounds.   Nutrition Risk/Labs: "Tips and Tricks for wound healing" handout given with detailed guide to optimize nutritional status for wound healing.   Counseled patient on increasing protein intake, following a healthy diet, vitamins  Dietary:   As tolerated: 3 well-balanced meals  Increased protein: Premier, Boost, or Ensure, [place] Instant Breakfast (purchase sugarfree if Diabetic). If you have kidney or are on dialysis, patient please check with your Nephrologist as to how much protein you are allowed to take with your condition.   Faustino 1-2 times daily  Supplement with: Multivitamin with Zinc and Vitamin C 500mg twice a day. If you are on Coumadin, please contact the Coumadin Clinic before beginning a Multivitamin. Vitamin D3.  Recent Cultures & Dates:  See micro tab in chart  Antibiotics:   Radiology/Xray: See imaging tab in chart  Diabetes Status/HbA1c: See labs tab in chart  Offloading/Immobilization: Offloading fel placed in order to remove pressure from wound  Tobacco Use:  Previous smoker.  None currently  Additional Patient instructions: Keep wrap/ wound dressing clean, dry, and intact. May shower, using cast protector, plastic bag, or other methods as discussed to keep wrap dry, or may sponge bathe. If wrap gets wet, it needs to be removed by unwrapping it. Place temporary dressing of antibiotic ointment and bandage to wound and notify clinic as soon as possible for earlier appointment.         Patient to follow with primary doctor regularly to address ongoing medical conditions such as abnormal blood pressure readings.           EVALUATION, ASSESSMENT, & PLAN:      1. Debridement.See Wound Docs for assessment of wounds and procedure notes  2. Continue taking all medications as prescribed  3. Dressing changes, see Wound docs for dressing change " orders  4. RTC one week   5. Counseled patient on increasing protein intake, not getting wound wet, keeping dressing clean dry and intact, following a healthy diet, elevating legs when able, removing pressure from wound     Proper ulcer care and the possible need for serial debridements, topical medications, specific dressings and biological engineered skin substitutes if indicated  Discussed general issues surrounding recurrent/ nonhealing ulcers, along with the advantages & disadvantages of various treatment strategies.     Patient should call the office immediately if any signs of infection, such as fever, chills, sweats, increased redness or pain.     Patient was instructed to call the clinic or go to the emergency department if their symptoms do not improve, worsens, or if new symptoms develop.  Patient was advised that if any increased swelling, pain, or numbness arise to go immediately to the ED. Patient knows to call any time if an emergency arises. Shared decision making occurred and patient verbalized understanding in agreement with this plan.         I spent a total of 65 minutes on the day of the visit.This includes face to face time and non-face to face time preparing to see the patient (eg, review of tests), obtaining and/or reviewing separately obtained history, documenting clinical information in the electronic or other health record, independently interpreting results and communicating results to the patient/family/caregiver, or care coordinator.        Much of the documentation for this visit was completed in the Wound Docs system.  Please see the attached documentation for further details about the patient's care. Scanned under the Media tab.         Izabella Benoit DPM   Vitals:    05/27/25 1320   BP: (!) 145/60   Pulse: 60   Resp: 18   Temp: 97.5 °F (36.4 °C)   PainSc: 10-Worst pain ever      Shoe Size:     Past Surgical History:   Procedure Laterality Date    CARDIAC ELECTROPHYSIOLOGY STUDY AND  ABLATION      CAROTID ENDARTERECTOMY Left 12/22/2022    Procedure: ENDARTERECTOMY-CAROTID;  Surgeon: Maximilian Connolly MD;  Location: [facility] OR;  Service: Peripheral Vascular;  Laterality: Left;    CAROTID STENT Left 02/29/2024    Procedure: INSERTION, STENT, ARTERY, CAROTID;  Surgeon: CIRILO Valdivia III, MD;  Location: [facility] OR 2ND FLR;  Service: Vascular;  Laterality: Left;  left carotid artery stent placement  mgy  146.29   gymc2  8.0730  fluro time  4.8min    CARPAL TUNNEL RELEASE Left     CHOLECYSTECTOMY  1995    CLOSURE OF LEFT ATRIAL APPENDAGE USING DEVICE Right 01/13/2025    Procedure: Left atrial appendage closure device;  Surgeon: Roxana Cantu MD;  Location: [facility] CATH/EP LAB;  Service: Cardiology;  Laterality: Right;    ENDARTERECTOMY OF FEMORAL ARTERY Right 4/2/2025    Procedure: ENDARTERECTOMY, FEMORAL;  Surgeon: CIRILO Valdivia III, MD;  Location: [facility] OR 2ND FLR;  Service: Vascular;  Laterality: Right;    EYE SURGERY Bilateral March 2012    cataract    HYSTERECTOMY  1988    INSERT / REPLACE / REMOVE PACEMAKER      KNEE ARTHROSCOPY Left     LEFT HEART CATHETERIZATION  11/01/2023    Procedure: Left heart cath;  Surgeon: Puma Shelton MD;  Location: [facility] CATH;  Service: Cardiology;;    REPLACEMENT OF PACEMAKER GENERATOR Left 01/20/2022    Procedure: REPLACEMENT, PACEMAKER GENERATOR;  Surgeon: Raymond Pérez MD;  Location: [facility] CATH/EP LAB;  Service: Cardiology;  Laterality: Left;    SKIN CANCER EXCISION      TRANSCATHETER AORTIC VALVE REPLACEMENT (TAVR) Bilateral 12/06/2023    Procedure: (TAVR);  Surgeon: Puma Shelton MD;  Location: [facility] CATH;  Service: Cardiology;  Laterality: Bilateral;    TRANSCATHETER AORTIC VALVE REPLACEMENT (TAVR) Bilateral 12/06/2023    Procedure: (TAVR) - Surgeon;  Surgeon: Maximilian Connolly MD;  Location: [facility] CATH;  Service: Peripheral Vascular;  Laterality: Bilateral;     Past Medical History:   Diagnosis Date    Anticoagulant long-term use     Arthritis     Atrial  fibrillation     Bell's palsy     Boil of buttock     burst 12/19/22    CHF (congestive heart failure)     Chronic cough     COPD (chronic obstructive pulmonary disease)     use O2  3l/m NC at night; also taking during day currently 12/4/23    Dizziness     Encounter for blood transfusion     GI bleed 2011    transfusion    H/O diverticulitis of colon     Hard of hearing     Heart murmur     Hematoma 02/2023    left hand    Heterozygous alpha 1-antitrypsin deficiency     History of home oxygen therapy     3L NC at night    Hypertension     Lung disease     copd    MONSERRAT (obstructive sleep apnea)     resolved with wt loss 131#    Pacemaker     Pneumonia     last episode 2019    Pulmonary edema     Skin cancer     Unspecified visual disturbance     episode of vision disturbance and dizziness...occasional recurrences     Family History   Problem Relation Name Age of Onset    Kidney failure Mother      Cancer Father Rm Wray     Alcohol abuse Father Rm Nils     Cancer Brother      Arthritis Maternal Grandmother Domenica Nils     Cancer Brother Rm Wray JR         pancreatic        Social History:   Marital Status:   Alcohol History:  reports that she does not currently use alcohol after a past usage of about 8.0 standard drinks of alcohol per week.  Tobacco History:  reports that she quit smoking about 14 years ago. Her smoking use included cigarettes. She started smoking about 54 years ago. She has a 80 pack-year smoking history. She has been exposed to tobacco smoke. She has never used smokeless tobacco.  Drug History:  reports no history of drug use.    Review of patient's allergies indicates:   Allergen Reactions    Sulfa (sulfonamide antibiotics) Itching       Current Medications[1]    Review of Systems   Constitutional:  Negative for chills, fatigue, fever and unexpected weight change.   HENT:  Negative for hearing loss and trouble swallowing.    Eyes:  Negative for photophobia and  visual disturbance.   Respiratory:  Negative for cough, shortness of breath and wheezing.    Cardiovascular:  Positive for leg swelling. Negative for chest pain and palpitations.   Gastrointestinal:  Negative for abdominal pain and nausea.   Genitourinary:  Negative for dysuria and frequency.   Musculoskeletal:  Negative for arthralgias, back pain, joint swelling and myalgias.   Skin:  Positive for wound. Negative for rash.   Neurological:  Negative for tremors, seizures, weakness and headaches.   Hematological:  Does not bruise/bleed easily.         Objective:        Physical Exam:   Foot Exam  Physical Exam  Ortho/SPM Exam     Imaging:            Assessment:       1. Open wound of right ankle, initial encounter    2. PAD (peripheral artery disease)    3. Former smoker    4. Decreased pedal pulses    5. At high risk for inadequate nutritional intake    6. Wound infection    7. Cellulitis and abscess of leg    8. Skin ulcer of right ankle with necrosis of muscle    9. PVD (peripheral vascular disease)    10. Paresthesias    11. Foot pain, right    12. Numbness in feet      Plan:   Open wound of right ankle, initial encounter    PAD (peripheral artery disease)    Former smoker    Decreased pedal pulses    At high risk for inadequate nutritional intake    Wound infection    Cellulitis and abscess of leg    Skin ulcer of right ankle with necrosis of muscle    PVD (peripheral vascular disease)    Paresthesias    Foot pain, right    Numbness in feet      Follow up in about 2 weeks (around 6/10/2025).    Procedures          Counseling:     I provided patient education verbally regarding:   Patient diagnosis, treatment options, as well as alternatives, risks, and benefits.     This note was created using Dragon voice recognition software that occasionally misinterpreted phrases or words.                      [1]   Current Outpatient Medications   Medication Sig Dispense Refill    acetaminophen (TYLENOL ARTHRITIS ORAL) Take  2 tablets by mouth daily as needed.      albuterol (PROVENTIL/VENTOLIN HFA) 90 mcg/actuation inhaler INHALE 2 PUFFS EVERY 6 HOURS AS NEEDED FOR WHEEZING (RESCUE) 18 g 6    aspirin 81 MG Chew Chew and swallow 1 tablet (81 mg total) by mouth once daily. 30 tablet 2    clopidogreL (PLAVIX) 75 mg tablet qd 90 tablet 0    digoxin (LANOXIN) 125 mcg tablet Take 1 tablet (0.125 mg total) by mouth once daily. 30 tablet 11    doxycycline (MONODOX) 100 MG capsule Take 1 capsule (100 mg total) by mouth 2 (two) times daily. for 14 days 28 capsule 0    ezetimibe (ZETIA) 10 mg tablet TAKE 1 TABLET EVERY DAY 90 tablet 3    fluticasone propionate (FLONASE) 50 mcg/actuation nasal spray 1 spray by Each Nostril route daily as needed for Rhinitis or Allergies.      fluticasone-umeclidin-vilanter (TRELEGY ELLIPTA) 100-62.5-25 mcg DsDv Inhale 1 puff into the lungs once daily. 90 each 3    melatonin 10 mg Tab Take 1 tablet by mouth nightly as needed (insomnia).      metoprolol succinate (TOPROL-XL) 25 MG 24 hr tablet TAKE 1 TABLET EVERY MORNING 90 tablet 3    oxyCODONE (ROXICODONE) 5 MG immediate release tablet Take 1 tablet (5 mg total) by mouth every 4 (four) hours as needed for Pain. 10 tablet 0    oxyCODONE-acetaminophen (PERCOCET) 7.5-325 mg per tablet Take 1 tablet by mouth every 6 (six) hours as needed for Pain. 26 tablet 0    pantoprazole (PROTONIX) 40 MG tablet TAKE 1 TABLET EVERY DAY 90 tablet 3    povidone-iodine (BETADINE) 10 % external solution Apply topically as needed for Wound Care.      rOPINIRole (REQUIP) 1 MG tablet Take 1 tablet (1 mg total) by mouth every evening. 90 tablet 1    rosuvastatin (CRESTOR) 40 MG Tab TAKE 1 TABLET EVERY EVENING 90 tablet 3    sacubitriL-valsartan (ENTRESTO) 24-26 mg per tablet Take 1 tablet by mouth 2 (two) times daily. 180 tablet 3    spironolactone (ALDACTONE) 25 MG tablet Take 1 tablet (25 mg total) by mouth once daily. 90 tablet 3    torsemide (DEMADEX) 20 MG Tab Take 1 tablet (20 mg  total) by mouth once daily. 90 tablet 3     No current facility-administered medications for this visit.

## 2025-05-28 DIAGNOSIS — Z78.0 MENOPAUSE: ICD-10-CM

## 2025-05-30 ENCOUNTER — DOCUMENT SCAN (OUTPATIENT)
Dept: HOME HEALTH SERVICES | Facility: HOSPITAL | Age: 75
End: 2025-05-30
Payer: MEDICARE

## 2025-05-30 DIAGNOSIS — G25.81 RESTLESS LEGS: ICD-10-CM

## 2025-05-30 RX ORDER — CLOPIDOGREL BISULFATE 75 MG/1
75 TABLET ORAL
Qty: 90 TABLET | Refills: 3 | Status: SHIPPED | OUTPATIENT
Start: 2025-05-30

## 2025-06-02 ENCOUNTER — HOSPITAL ENCOUNTER (OUTPATIENT)
Dept: RADIOLOGY | Facility: HOSPITAL | Age: 75
Discharge: HOME | End: 2025-06-02
Attending: PODIATRIST
Payer: MEDICARE

## 2025-06-02 DIAGNOSIS — I73.9 PAD (PERIPHERAL ARTERY DISEASE): ICD-10-CM

## 2025-06-02 DIAGNOSIS — L97.313 SKIN ULCER OF RIGHT ANKLE WITH NECROSIS OF MUSCLE: ICD-10-CM

## 2025-06-02 PROCEDURE — 93925 LOWER EXTREMITY STUDY: CPT | Mod: 26,,, | Performed by: RADIOLOGY

## 2025-06-02 PROCEDURE — 93925 LOWER EXTREMITY STUDY: CPT | Mod: TC

## 2025-06-02 RX ORDER — ROPINIROLE 1 MG/1
1 TABLET, FILM COATED ORAL NIGHTLY
Qty: 90 TABLET | Refills: 1 | Status: SHIPPED | OUTPATIENT
Start: 2025-06-02

## 2025-06-02 NOTE — PROGRESS NOTES
I have reviewed your recent arterial ultrasound results. The findings indicate significant peripheral vascular disease in both your right and left extremity, with concerns for a possible blockage in your left side. Therefore, I recommend scheduling a follow-up with your vascular surgeon to discuss these results in detail.  We will continue your current treatment regimen as discussed, and I look forward to seeing you at your next Wound Care appointment. If you have any questions, please dont hesitate to reach out.    Best regards,    Dr. Benoit   
Waiting final susceptibility results for cultures prior to determining if antibiotics need to be switched.  Continue current antibiotics at this time.  
Statement Selected

## 2025-06-04 ENCOUNTER — RESULTS FOLLOW-UP (OUTPATIENT)
Facility: CLINIC | Age: 75
End: 2025-06-04

## 2025-06-09 ENCOUNTER — OFFICE VISIT (OUTPATIENT)
Dept: WOUND CARE | Facility: HOSPITAL | Age: 75
End: 2025-06-09
Attending: PODIATRIST
Payer: MEDICARE

## 2025-06-09 VITALS
DIASTOLIC BLOOD PRESSURE: 58 MMHG | SYSTOLIC BLOOD PRESSURE: 149 MMHG | RESPIRATION RATE: 18 BRPM | TEMPERATURE: 98 F | HEART RATE: 60 BPM

## 2025-06-09 DIAGNOSIS — R20.2 PARESTHESIAS: ICD-10-CM

## 2025-06-09 DIAGNOSIS — L03.119 CELLULITIS AND ABSCESS OF LEG: ICD-10-CM

## 2025-06-09 DIAGNOSIS — S91.001A OPEN WOUND OF RIGHT ANKLE, INITIAL ENCOUNTER: Primary | ICD-10-CM

## 2025-06-09 DIAGNOSIS — Z91.89 AT HIGH RISK FOR INADEQUATE NUTRITIONAL INTAKE: ICD-10-CM

## 2025-06-09 DIAGNOSIS — L02.419 CELLULITIS AND ABSCESS OF LEG: ICD-10-CM

## 2025-06-09 DIAGNOSIS — L97.313 SKIN ULCER OF RIGHT ANKLE WITH NECROSIS OF MUSCLE: ICD-10-CM

## 2025-06-09 DIAGNOSIS — L08.9 WOUND INFECTION: ICD-10-CM

## 2025-06-09 DIAGNOSIS — R09.89 DECREASED PEDAL PULSES: ICD-10-CM

## 2025-06-09 DIAGNOSIS — I73.9 PAD (PERIPHERAL ARTERY DISEASE): ICD-10-CM

## 2025-06-09 DIAGNOSIS — M25.571 ACUTE RIGHT ANKLE PAIN: ICD-10-CM

## 2025-06-09 DIAGNOSIS — L89.514 STAGE IV PRESSURE ULCER OF RIGHT ANKLE: ICD-10-CM

## 2025-06-09 DIAGNOSIS — T14.8XXA WOUND INFECTION: ICD-10-CM

## 2025-06-09 DIAGNOSIS — M79.671 FOOT PAIN, RIGHT: ICD-10-CM

## 2025-06-09 DIAGNOSIS — Z87.891 FORMER SMOKER: ICD-10-CM

## 2025-06-09 DIAGNOSIS — I73.9 PVD (PERIPHERAL VASCULAR DISEASE): ICD-10-CM

## 2025-06-09 PROCEDURE — 1126F AMNT PAIN NOTED NONE PRSNT: CPT | Mod: CPTII,,, | Performed by: PODIATRIST

## 2025-06-09 PROCEDURE — 1159F MED LIST DOCD IN RCRD: CPT | Mod: CPTII,,, | Performed by: PODIATRIST

## 2025-06-09 PROCEDURE — 3078F DIAST BP <80 MM HG: CPT | Mod: CPTII,,, | Performed by: PODIATRIST

## 2025-06-09 PROCEDURE — 11043 DBRDMT MUSC&/FSCA 1ST 20/<: CPT | Mod: ,,, | Performed by: PODIATRIST

## 2025-06-09 PROCEDURE — 99214 OFFICE O/P EST MOD 30 MIN: CPT | Mod: 25,,, | Performed by: PODIATRIST

## 2025-06-09 PROCEDURE — 1160F RVW MEDS BY RX/DR IN RCRD: CPT | Mod: CPTII,,, | Performed by: PODIATRIST

## 2025-06-09 PROCEDURE — 3077F SYST BP >= 140 MM HG: CPT | Mod: CPTII,,, | Performed by: PODIATRIST

## 2025-06-09 PROCEDURE — 11043 DBRDMT MUSC&/FSCA 1ST 20/<: CPT | Mod: PN | Performed by: PODIATRIST

## 2025-06-09 RX ORDER — OXYCODONE AND ACETAMINOPHEN 10; 325 MG/1; MG/1
1 TABLET ORAL EVERY 4 HOURS PRN
Qty: 26 TABLET | Refills: 0 | Status: SHIPPED | OUTPATIENT
Start: 2025-06-09

## 2025-06-09 RX ORDER — HYDROCODONE BITARTRATE AND ACETAMINOPHEN 10; 325 MG/1; MG/1
1 TABLET ORAL EVERY 6 HOURS PRN
Qty: 26 TABLET | Refills: 0 | Status: SHIPPED | OUTPATIENT
Start: 2025-06-09 | End: 2025-06-09

## 2025-06-09 RX ORDER — DOXYCYCLINE 100 MG/1
100 CAPSULE ORAL 2 TIMES DAILY
Qty: 28 CAPSULE | Refills: 0 | Status: SHIPPED | OUTPATIENT
Start: 2025-06-09 | End: 2025-06-09

## 2025-06-09 RX ORDER — DOXYCYCLINE 100 MG/1
100 CAPSULE ORAL 2 TIMES DAILY
Qty: 28 CAPSULE | Refills: 0 | Status: SHIPPED | OUTPATIENT
Start: 2025-06-09 | End: 2025-06-23

## 2025-06-10 NOTE — PROGRESS NOTES
"  [address] [facility] [address]  [place] [address]  Phone: [phone]   [phone]    Patient's PCP:Hope Tang FNP  Referring Provider: No ref. provider found    Subjective:      Chief Complaint:: Wound Care, Wound Check, Non-healing Wound, Wound Infection, and Foot Ulcer    HPI  Lisa Smith is a 74 y.o. female who presents today with a complaint of right medial ankle wound.   See wound docs documentation for full assessment and evaluation of all wounds.      Patient continues to experience numbness and tingling in bilateral feet.  Paresthesias, and burning bilateral feet with no clearly identified trigger or source.  Referral has been placed to neurology.     Patient was under the care of a previous wound care provider.  Reviewed note from visits.  Taken from Wound providers last note on 05/06/2025:  "Patient presents for an evaluation of right medial ankle wound. She reports having a medial and lateral right ankle wound that started in March 2025. She reports the lateral right ankle wound healed. Pt follows with vascular surgery. She is s/p right common femoral/profunda endarterectomy and bovine patch on 4/2/25 with Dr. Valdivia. At her follow up appointment on 4/22/25, she was found to have a medial ankle wound. It was deemed primarily not ischemic and she was referred to wound care. She was instructed to dress her wound with betadine daily. Per Dr. Valdivia's note: "While the LANIE suggests lower than preoperatively I think this is an error given that the contralateral side which had no intervention also dropped significantly. PVR waveforms on the right arm much better, and her ischemic rest pain completely gone." She admits compliance with betadine daily. Pt previously smoked 2-3 ppds for 30 years. She quit in 2011. She wants to follow with wound care closer to her house in [place]. Denies fever, chills, erythema, warmth, purulent drainage, or pain."           Vascular Status/LANIE:  " "Patient under the care of vascular surgery.  Recent vascular intervention, will order arterial ultrasounds.   Nutrition Risk/Labs: "Tips and Tricks for wound healing" handout given with detailed guide to optimize nutritional status for wound healing.   Counseled patient on increasing protein intake, following a healthy diet, vitamins  Dietary:   As tolerated: 3 well-balanced meals  Increased protein: Premier, Boost, or Ensure, [place] Instant Breakfast (purchase sugarfree if Diabetic). If you have kidney or are on dialysis, patient please check with your Nephrologist as to how much protein you are allowed to take with your condition.   Faustino 1-2 times daily  Supplement with: Multivitamin with Zinc and Vitamin C 500mg twice a day. If you are on Coumadin, please contact the Coumadin Clinic before beginning a Multivitamin. Vitamin D3.  Recent Cultures & Dates:  See micro tab in chart  Antibiotics: doxycline  Radiology/Xray: See imaging tab in chart  Diabetes Status/HbA1c: See labs tab in chart  Offloading/Immobilization: Offloading fel placed in order to remove pressure from wound  Tobacco Use:  Previous smoker.  None currently  Additional Patient instructions: Keep wrap/ wound dressing clean, dry, and intact. May shower, using cast protector, plastic bag, or other methods as discussed to keep wrap dry, or may sponge bathe. If wrap gets wet, it needs to be removed by unwrapping it. Place temporary dressing of antibiotic ointment and bandage to wound and notify clinic as soon as possible for earlier appointment.         Patient to follow with primary doctor regularly to address ongoing medical conditions such as abnormal blood pressure readings.        EVALUATION, ASSESSMENT, & PLAN:      1. Debridement.See Wound Docs for assessment of wounds and procedure notes  2. Continue taking all medications as prescribed  3. Dressing changes, see Wound docs for dressing change orders  4. RTC one week   5. Counseled patient on " increasing protein intake, not getting wound wet, keeping dressing clean dry and intact, following a healthy diet, elevating legs when able, removing pressure from wound     Proper ulcer care and the possible need for serial debridements, topical medications, specific dressings and biological engineered skin substitutes if indicated  Discussed general issues surrounding recurrent/ nonhealing ulcers, along with the advantages & disadvantages of various treatment strategies.     Patient should call the office immediately if any signs of infection, such as fever, chills, sweats, increased redness or pain.     Patient was instructed to call the clinic or go to the emergency department if their symptoms do not improve, worsens, or if new symptoms develop.  Patient was advised that if any increased swelling, pain, or numbness arise to go immediately to the ED. Patient knows to call any time if an emergency arises. Shared decision making occurred and patient verbalized understanding in agreement with this plan.         I spent a total of 65 minutes on the day of the visit.This includes face to face time and non-face to face time preparing to see the patient (eg, review of tests), obtaining and/or reviewing separately obtained history, documenting clinical information in the electronic or other health record, independently interpreting results and communicating results to the patient/family/caregiver, or care coordinator.        Much of the documentation for this visit was completed in the Wound Docs system.  Please see the attached documentation for further details about the patient's care. Scanned under the Media tab.         Izabella Benoit DPM     Vitals:    06/09/25 1536   BP: (!) 149/58   Pulse: 60   Resp: 18   Temp: 98.2 °F (36.8 °C)   PainSc: 0-No pain      Shoe Size:     Past Surgical History:   Procedure Laterality Date    CARDIAC ELECTROPHYSIOLOGY STUDY AND ABLATION      CAROTID ENDARTERECTOMY Left 12/22/2022     Procedure: ENDARTERECTOMY-CAROTID;  Surgeon: Maximilian Connolly MD;  Location: [facility] OR;  Service: Peripheral Vascular;  Laterality: Left;    CAROTID STENT Left 02/29/2024    Procedure: INSERTION, STENT, ARTERY, CAROTID;  Surgeon: CIRILO Valdivia III, MD;  Location: [facility] OR [facility] FLR;  Service: Vascular;  Laterality: Left;  left carotid artery stent placement  mgy  146.29   gymc2  8.0730  fluro time  4.8min    CARPAL TUNNEL RELEASE Left     CHOLECYSTECTOMY  1995    CLOSURE OF LEFT ATRIAL APPENDAGE USING DEVICE Right 01/13/2025    Procedure: Left atrial appendage closure device;  Surgeon: Roxana Cantu MD;  Location: [facility] CATH/EP LAB;  Service: Cardiology;  Laterality: Right;    ENDARTERECTOMY OF FEMORAL ARTERY Right 4/2/2025    Procedure: ENDARTERECTOMY, FEMORAL;  Surgeon: CIRILO Valdivia III, MD;  Location: [facility] OR [address] [place] [address];  Service: Vascular;  Laterality: Right;    EYE SURGERY Bilateral March 2012    cataract    HYSTERECTOMY  1988    INSERT / REPLACE / REMOVE PACEMAKER      KNEE ARTHROSCOPY Left     LEFT HEART CATHETERIZATION  11/01/2023    Procedure: Left heart cath;  Surgeon: Puma Shelton MD;  Location: [facility] CATH;  Service: Cardiology;;    REPLACEMENT OF PACEMAKER GENERATOR Left 01/20/2022    Procedure: REPLACEMENT, PACEMAKER GENERATOR;  Surgeon: Raymond Pérez MD;  Location: [facility] CATH/EP LAB;  Service: Cardiology;  Laterality: Left;    SKIN CANCER EXCISION      TRANSCATHETER AORTIC VALVE REPLACEMENT (TAVR) Bilateral 12/06/2023    Procedure: (TAVR);  Surgeon: Puma Shelton MD;  Location: [facility] CATH;  Service: Cardiology;  Laterality: Bilateral;    TRANSCATHETER AORTIC VALVE REPLACEMENT (TAVR) Bilateral 12/06/2023    Procedure: (TAVR) - Surgeon;  Surgeon: Maximilian Connolly MD;  Location: [facility] CATH;  Service: Peripheral Vascular;  Laterality: Bilateral;     Past Medical History:   Diagnosis Date    Anticoagulant long-term use     Arthritis     Atrial fibrillation     Bell's palsy     Boil of buttock      burst 12/19/22    CHF (congestive heart failure)     Chronic cough     COPD (chronic obstructive pulmonary disease)     use O2  3l/m NC at night; also taking during day currently 12/4/23    Dizziness     Encounter for blood transfusion     GI bleed 2011    transfusion    H/O diverticulitis of colon     Hard of hearing     Heart murmur     Hematoma 02/2023    left hand    Heterozygous alpha 1-antitrypsin deficiency     History of home oxygen therapy     3L NC at night    Hypertension     Lung disease     copd    MONSERRAT (obstructive sleep apnea)     resolved with wt loss 131#    Pacemaker     Pneumonia     last episode 2019    Pulmonary edema     Skin cancer     Unspecified visual disturbance     episode of vision disturbance and dizziness...occasional recurrences     Family History   Problem Relation Name Age of Onset    Kidney failure Mother      Cancer Father Rm Wray     Alcohol abuse Father Rm Wary     Cancer Brother      Arthritis Maternal Grandmother Domenica Wray     Cancer Brother Rm Nils JR         pancreatic        Social History:   Marital Status:   Alcohol History:  reports that she does not currently use alcohol after a past usage of about 8.0 standard drinks of alcohol per week.  Tobacco History:  reports that she quit smoking about 14 years ago. Her smoking use included cigarettes. She started smoking about 54 years ago. She has a 80 pack-year smoking history. She has been exposed to tobacco smoke. She has never used smokeless tobacco.  Drug History:  reports no history of drug use.    Review of patient's allergies indicates:   Allergen Reactions    Sulfa (sulfonamide antibiotics) Itching       Current Medications[1]    Review of Systems   Constitutional:  Negative for chills, fatigue, fever and unexpected weight change.   HENT:  Negative for hearing loss and trouble swallowing.    Eyes:  Negative for photophobia and visual disturbance.   Respiratory:  Negative for cough,  shortness of breath and wheezing.    Cardiovascular:  Positive for leg swelling. Negative for chest pain and palpitations.   Gastrointestinal:  Negative for abdominal pain and nausea.   Genitourinary:  Negative for dysuria and frequency.   Musculoskeletal:  Negative for arthralgias, back pain, joint swelling and myalgias.   Skin:  Positive for wound. Negative for rash.   Neurological:  Negative for tremors, seizures, weakness and headaches.   Hematological:  Does not bruise/bleed easily.         Objective:        Physical Exam:   Foot Exam  Physical Exam  Ortho/SPM Exam     Imaging:            Assessment:       1. Open wound of right ankle, initial encounter    2. Former smoker    3. At high risk for inadequate nutritional intake    4. PAD (peripheral artery disease)    5. Wound infection    6. PVD (peripheral vascular disease)    7. Skin ulcer of right ankle with necrosis of muscle    8. Cellulitis and abscess of leg    9. Decreased pedal pulses    10. Paresthesias    11. 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 Foot pain, right    13. Acute right ankle pain      Plan:   Open wound of right ankle, initial encounter    Former smoker    At high risk for inadequate nutritional intake    PAD (peripheral artery disease)    Wound infection    PVD (peripheral vascular disease)    Skin ulcer of right ankle with necrosis of muscle    Cellulitis and abscess of leg  -     Discontinue: doxycycline (MONODOX) 100 MG capsule; Take 1 capsule (100 mg total) by mouth 2 (two) times daily. 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  Dispense: 26 tablet; Refill: 0  -     doxycycline (MONODOX) 100 MG capsule; Take 1 capsule (100 mg total) by mouth 2 (two) times  daily. for 14 days  Dispense: 28 capsule; Refill: 0  -     oxyCODONE-acetaminophen (PERCOCET)  mg per tablet; Take 1 tablet by mouth every 4 (four) hours as needed for Pain.  Dispense: 26 tablet; Refill: 0      Follow up in 1 week (on 6/16/2025) for Wound Care.    Procedures          Counseling:     I provided patient education verbally regarding:   Patient diagnosis, treatment options, as well as alternatives, risks, and benefits.     This note was created using Dragon voice recognition software that occasionally misinterpreted phrases or words.                      [1]   Current Outpatient Medications   Medication Sig Dispense Refill    acetaminophen (TYLENOL ARTHRITIS ORAL) Take 2 tablets by mouth daily as needed.      albuterol (PROVENTIL/VENTOLIN HFA) 90 mcg/actuation inhaler INHALE 2 PUFFS EVERY 6 HOURS AS NEEDED FOR WHEEZING (RESCUE) 18 g 6    aspirin 81 MG Chew Chew and swallow 1 tablet (81 mg total) by mouth once daily. 30 tablet 2    clopidogreL (PLAVIX) 75 mg tablet TAKE 1 TABLET EVERY DAY 90 tablet 3    digoxin (LANOXIN) 125 mcg tablet Take 1 tablet (0.125 mg total) by mouth once daily. 30 tablet 11    doxycycline (MONODOX) 100 MG capsule Take 1 capsule (100 mg total) by mouth 2 (two) times daily. for 14 days 28 capsule 0    ezetimibe (ZETIA) 10 mg tablet TAKE 1 TABLET EVERY DAY 90 tablet 3    fluticasone propionate (FLONASE) 50 mcg/actuation nasal spray 1 spray by Each Nostril route daily as needed for Rhinitis or Allergies.      fluticasone-umeclidin-vilanter (TRELEGY ELLIPTA) 100-62.5-25 mcg DsDv Inhale 1 puff into the lungs once daily. 90 each 3    melatonin 10 mg Tab Take 1 tablet by mouth nightly as needed (insomnia).      metoprolol succinate (TOPROL-XL) 25 MG 24 hr tablet TAKE 1 TABLET EVERY MORNING 90 tablet 3    oxyCODONE (ROXICODONE) 5 MG immediate release tablet Take 1 tablet (5 mg total) by mouth every 4 (four) hours as needed for Pain. 10 tablet 0    oxyCODONE-acetaminophen (PERCOCET)   mg per tablet Take 1 tablet by mouth every 4 (four) hours as needed for Pain. 26 tablet 0    oxyCODONE-acetaminophen (PERCOCET) 7.5-325 mg per tablet Take 1 tablet by mouth every 6 (six) hours as needed for Pain. 26 tablet 0    pantoprazole (PROTONIX) 40 MG tablet TAKE 1 TABLET EVERY DAY 90 tablet 3    povidone-iodine (BETADINE) 10 % external solution Apply topically as needed for Wound Care.      rOPINIRole (REQUIP) 1 MG tablet TAKE 1 TABLET EVERY EVENING 90 tablet 1    rosuvastatin (CRESTOR) 40 MG Tab TAKE 1 TABLET EVERY EVENING 90 tablet 3    sacubitriL-valsartan (ENTRESTO) 24-26 mg per tablet Take 1 tablet by mouth 2 (two) times daily. 180 tablet 3    spironolactone (ALDACTONE) 25 MG tablet Take 1 tablet (25 mg total) by mouth once daily. 90 tablet 3    torsemide (DEMADEX) 20 MG Tab Take 1 tablet (20 mg total) by mouth once daily. 90 tablet 3     No current facility-administered medications for this visit.

## 2025-06-11 NOTE — PROGRESS NOTES
[facility]  PROGRESS NOTE - 2nd Follow-up Visit      Subjective:       Patient ID:   NAME: Lisa Smith : 1950     74 y.o. female    Referring Doc: Hope Tang FNP  Other Physicians:Raymond Pérez Parker Janine           Chief Complaint: Anemia f/u    History of Present Illness:     Patient returns today for a 2nd regularly scheduled follow-up visit.  The patient is here today to go over the results of the recently ordered labs, tests and studies. She is here by herself.    She was seen by Rosaura Nguyen NP for initial visit in 2024 and is just follow-up today for follow-up visit.     She was hospitalized in 2025 with pneumonia. She had watchman procedure in 2025. In 2025 she required surgery for severe right lower extremity PAD with Dr NEFTALY Valdivia. She is on aspirin and plavix long-term.    She has a chronic foot wound in right foot and is followed by Dr YVONNE Benoit with podiatry and is currently in [facility]. She is on antibiotics (oral).     She required blood transfusions in  and 2025.      She saw Earl NP with pulmonary in 2026.    Her O2 sats are low but she is refusing to take O2    ROS:   GEN: normal without any fever, night sweats or weight loss; fatigue, chronic back pain ; foot wound issues    HEENT: normal with no HA's, sore throat, stiff neck, changes in vision  CV: normal with no CP, SOB, PND, GAN or orthopnea  PULM: normal with no SOB, cough, hemoptysis, sputum or pleuritic pain  GI: normal with no abdominal pain, nausea, vomiting, constipation, diarrhea, melanotic stools, BRBPR, or hematemesis  : normal with no hematuria, dysuria  BREAST: normal with no mass, discharge, pain  SKIN: normal with no rash, erythema, bruising, or swelling    Pain Scale: intermittent right foot pain 4-8    Allergies:  Review of patient's allergies indicates:   Allergen Reactions    Sulfa (sulfonamide antibiotics) Itching  "      Medications:  Current Medications[1]    PMHx/PSHx Updates:  See patient's last visit on 11/26/2024.  See H&P on 11/26/2024        Pathology:   Cancer Staging   No matching staging information was found for the patient.      Bone Marrow Biopsy  12/18/2024:    BONE MARROW, LEFT ILIAC CREST, ASPIRATE, CLOT SECTION, AND CORE BIOPSY:     --NORMOCELLULAR MARROW (APPROXIMATELY 30% TO 40%)      WITH TRILINEAGE HEMATOPOIETIC ELEMENTS.     --MARKEDLY ECBUHYKIA-LR-[id number] STAINABLE IRON.   --PERIPHERAL BLOOD WITH NORMAL THROMBOCYTE COUNT (263,000/MICROLITER); ANEMIA (HEMOGLOBIN 7.8 GRAM/DECILITER); AND LEUKOCYTOSIS (14,220/MICROLITER), WITH INCREASED    NEUTROPHILS BUT OTHERWISE MOSTLY UNREMARKABLE   MORPHOLOGY.     INTERPRETATION:   Immunophenotyping fails to identify any abnormal cell populations.     Objective:     Vitals:  Blood pressure (!) 123/42, pulse (!) 38, temperature 98.2 °F (36.8 °C), temperature source Temporal, resp. rate 16, height 5' 5" (1.651 m), weight 76.2 kg (168 lb), SpO2 (!) 87%.    Physical Examination:   GEN: no apparent distress, comfortable; AAOx3; elderly  HEAD: atraumatic and normocephalic  EYES: no pallor, no icterus, PERRLA  ENT: OMM, no pharyngeal erythema, external ears WNL; no nasal discharge; no thrush  NECK: no masses, thyroid normal, trachea midline, no LAD/LN's, supple  CV: RRR with + murmur ; normal pulse; normal S1 and S2; no pedal edema  CHEST: Normal respiratory effort; CTAB; normal breath sounds; no wheeze or crackles  ABDOM: nontender and nondistended; soft; normal bowel sounds; no rebound/guarding  MUSC/Skeletal: ROM normal; no crepitus; joints normal; no deformities or arthropathy; using walker today  EXTREM: no clubbing, cyanosis, inflammation or swelling; right foot in boot/wound issues  SKIN: no rashes, lesions, ulcers, petechiae or subcutaneous nodules  : no hanson  NEURO: grossly intact; motor/sensory WNL; AAOx3; no tremors  PSYCH: normal mood, affect and " behavior  LYMPH: normal cervical, supraclavicular, axillary and groin LN's            Labs:     Lab Results   Component Value Date    WBC 8.07 06/02/2025    HGB 10.5 (L) 06/02/2025    HCT 32.9 (L) 06/02/2025    MCV 98 06/02/2025     06/02/2025       Lab Results   Component Value Date    IRON 87 02/26/2025    TRANSFERRIN 336 02/26/2025    TIBC 470 (H) 02/26/2025    FESATURATED 19 (L) 02/26/2025      Lab Results   Component Value Date    FERRITIN 48.7 02/26/2025     CMP  Sodium   Date Value Ref Range Status   06/02/2025 139 136 - 145 mmol/L Final     Potassium   Date Value Ref Range Status   06/02/2025 5.2 (H) 3.5 - 5.1 mmol/L Final     Chloride   Date Value Ref Range Status   06/02/2025 102 95 - 110 mmol/L Final     CO2   Date Value Ref Range Status   06/02/2025 30 (H) 23 - 29 mmol/L Final     Glucose   Date Value Ref Range Status   06/02/2025 117 (H) 70 - 110 mg/dL Final     BUN   Date Value Ref Range Status   06/02/2025 56 (H) 8 - 23 mg/dL Final   04/04/2025 20 8 - 23 mg/dL Final     Creatinine   Date Value Ref Range Status   06/02/2025 0.8 0.5 - 1.4 mg/dL Final   04/04/2025 0.6 0.5 - 1.4 mg/dL Final     Calcium   Date Value Ref Range Status   06/02/2025 10.0 8.7 - 10.5 mg/dL Final     Protein Total   Date Value Ref Range Status   06/02/2025 7.7 6.0 - 8.4 gm/dL Final     Albumin   Date Value Ref Range Status   06/02/2025 4.3 3.5 - 5.2 g/dL Final     Bilirubin Total   Date Value Ref Range Status   06/02/2025 0.6 0.1 - 1.0 mg/dL Final     Comment:     For infants and newborns, interpretation of results should be based   on gestational age, weight and in agreement with clinical   observations.    Premature Infant recommended reference ranges:   0-24 hours:  <8.0 mg/dL   24-48 hours: <12.0 mg/dL   3-5 days:    <15.0 mg/dL   6-29 days:   <15.0 mg/dL     ALP   Date Value Ref Range Status   06/02/2025 81 55 - 135 unit/L Final     AST   Date Value Ref Range Status   06/02/2025 29 10 - 40 unit/L Final     ALT   Date  Value Ref Range Status   06/02/2025 9 (L) 10 - 44 unit/L Final     Anion Gap   Date Value Ref Range Status   06/02/2025 7 (L) 8 - 16 mmol/L Final   04/04/2025 5 (L) 8 - 16 mmol/L Final     eGFR   Date Value Ref Range Status   06/02/2025 >60 >60 mL/min/1.73/m2 Final   04/04/2025 >60 >60 mL/min/1.73/m2 Final     Comment:     Estimated GFR calculated using the CKD-EPI creatinine (2021) equation.   03/14/2025 >60.0 >60 mL/min/1.73 m^2 Final           Radiology/Diagnostic Studies:        I have reviewed all available lab results and radiology reports.    Assessment/Plan:   (1) 74 y.o. female  with diagnosis of anemia who was referred to our clinic by PCP Vani BAPTISTE. Patient had labs drawn during a follow up appointment and was found to have a hemoglobin of 7.9. Her iron panel was normal, bilirubin was normal, cmp normal, b 12 normal.   - one negative occult stool   - case discussed with Dr. Addison   - repeat cbc, hemoglobin electrophoresis, LDH, and thalessemia panel   - bone marrow biopsy ordered   - referral to GI for scopes, patient has never had a colonscopy     6/12/2025:  - She was seen by Rosaura Nguyen NP for initial visit in Nov 2024  - She was hospitalized in Jan 2025 with pneumonia.   - She has a chronic foot wound in right foot and is followed by Dr YVONNE Benoit with podiatry and is currently in boot. She is on antibiotics (oral).   - She required blood transfusions in Jan and Feb 2025.    - latest hgb at 10.5; with low iron sats  - discussed setting up some IV iron and she is willing to proceed  - she did not see GI as previously requested and declines to go see any other doctors at this time  - she had bone marrow biopsy in Dec 2024 - Immunophenotyping failed to identify any abnormal cell populations        (2) Atrial fib and hx/of  TAVR in 2023; s/p pacemaker  - follows with Dr. Pérez and Dr. Shelton   - no longer on coumadin per their direction      6/12/2025:  - She had watchman procedure in Feb 2025.    - In Apr 2025 she required surgery for severe right lower extremity PAD with Dr NEFTALY Valdivia.  - She is on aspirin and plavix long-term and has since been off coumadin      (3) PAD with Carotid Stenosis   - followed by Dr. MASOUD Pérez   - has had a stent and endardectomy in the past         (4) COPD   - on oxygen at home   - sees Dr. Kathryn Haro     6/12/2025:  - She saw Earl NP with pulmonary in march 2026.  - Her O2 sats are low but she is refusing to take O2 portable               VISIT DIAGNOSES:      Anemia, unspecified type  -     CBC Auto Differential; Standing  -     Comprehensive Metabolic Panel; Standing  -     Iron and TIBC; Standing  -     Ferritin; Standing  -     Vitamin B12; Standing  -     Folate; Standing    Iron deficiency anemia, unspecified iron deficiency anemia type  -     CBC Auto Differential; Standing  -     Comprehensive Metabolic Panel; Standing  -     Iron and TIBC; Standing  -     Ferritin; Standing  -     Vitamin B12; Standing  -     Folate; Standing    Other specified nutritional anemias  -     CBC Auto Differential; Standing  -     Comprehensive Metabolic Panel; Standing  -     Iron and TIBC; Standing  -     Ferritin; Standing  -     Vitamin B12; Standing  -     Folate; Standing    Nutritional anemia, unspecified  -     Vitamin B12; Standing  -     Folate; Standing          PLAN:  Set up IV iron x 2; check labs monthly incl iron panel  2. Encouraged compliance with lab requests and hematology clinic f/u  3. She is declining to go see GI specialist   4. F/u with cardiology  5. Follow up PCP   6. F/u with podiatry for continued wound care     RTC in  3 months     Fax note to Tahir/Earl, MASOUD Hernández, Hope Tang, OLIVA,      Discussion:     Iron Infusion Therapy Discussion:     Provided literature/learning materials on the particular IV iron regimen and discussed the potential side-effect profiles of the drug(s). Discussed the importance of compliance with  obtaining and monitoring requested lab work, and went over the potential risk for the development of anaphylactic shock, bronchospasm, dysrhythmia, liver and/or kidney damage, and respiratory/cardiovascular arrest and/or failure. I discussed the potential risks for development of alopecia, fevers, itching, chills and/or rigors, cold sensory issues, ringing in ears, vertigo and neuropathy, all of which are usually acute but sometimes could end up being chronic and life-long. Discussed the risks of hand-foot syndrome and rashes, and development of other autoimmune mediated processes such as pneumonitis and colitis which could be life threatening.     The patient's consent has been obtained to proceed with the IV iron therapy. The patient will either be referred to [facility] or one of the Hematology Clinic Nurses for training and education on IV iron therapy, use of antiemetics and/or anti-diarrheals, use of NSAID's, potential IV iron therapy side-effects, and any specific recommendations and precautions with the particular IV iron agents.      Answered all of the patient's (and family's, if applicable) questions to the best of my ability and to their complete satisfaction. The patient acknowledged full understanding of the risks, recommendations and plan(s).     I reviewed results of the recently ordered labs, tests and studies; made directives with regards to the results. I have explained all of the above in detail and the patient understands all of the current recommendation(s). I have answered all of their questions to the best of my ability and to their complete satisfaction. The patient is to continue with the current management plan.            Electronically signed by Enrique Addison MD          Answers submitted by the patient for this visit:  Review of Systems Questionnaire (Submitted on 6/5/2025)  appetite change : No  unexpected weight change: No  mouth sores: No  visual  disturbance: No  cough: Yes  shortness of breath: Yes  chest pain: No  abdominal pain: No  diarrhea: No  frequency: No  back pain: Yes  rash: No  headaches: No  adenopathy: No  nervous/ anxious: No         [1]   Current Outpatient Medications:     acetaminophen (TYLENOL ARTHRITIS ORAL), Take 2 tablets by mouth daily as needed., Disp: , Rfl:     albuterol (PROVENTIL/VENTOLIN HFA) 90 mcg/actuation inhaler, INHALE 2 PUFFS EVERY 6 HOURS AS NEEDED FOR WHEEZING (RESCUE), Disp: 18 g, Rfl: 6    aspirin 81 MG Chew, Chew and swallow 1 tablet (81 mg total) by mouth once daily., Disp: 30 tablet, Rfl: 2    clopidogreL (PLAVIX) 75 mg tablet, TAKE 1 TABLET EVERY DAY, Disp: 90 tablet, Rfl: 3    digoxin (LANOXIN) 125 mcg tablet, Take 1 tablet (0.125 mg total) by mouth once daily., Disp: 30 tablet, Rfl: 11    doxycycline (MONODOX) 100 MG capsule, Take 1 capsule (100 mg total) by mouth 2 (two) times daily. for 14 days, Disp: 28 capsule, Rfl: 0    ezetimibe (ZETIA) 10 mg tablet, TAKE 1 TABLET EVERY DAY, Disp: 90 tablet, Rfl: 3    fluticasone propionate (FLONASE) 50 mcg/actuation nasal spray, 1 spray by Each Nostril route daily as needed for Rhinitis or Allergies., Disp: , Rfl:     fluticasone-umeclidin-vilanter (TRELEGY ELLIPTA) 100-62.5-25 mcg DsDv, Inhale 1 puff into the lungs once daily., Disp: 90 each, Rfl: 3    melatonin 10 mg Tab, Take 1 tablet by mouth nightly as needed (insomnia)., Disp: , Rfl:     metoprolol succinate (TOPROL-XL) 25 MG 24 hr tablet, TAKE 1 TABLET EVERY MORNING, Disp: 90 tablet, Rfl: 3    oxyCODONE (ROXICODONE) 5 MG immediate release tablet, Take 1 tablet (5 mg total) by mouth every 4 (four) hours as needed for Pain., Disp: 10 tablet, Rfl: 0    oxyCODONE-acetaminophen (PERCOCET)  mg per tablet, Take 1 tablet by mouth every 4 (four) hours as needed for Pain., Disp: 26 tablet, Rfl: 0    oxyCODONE-acetaminophen (PERCOCET) 7.5-325 mg per tablet, Take 1 tablet by mouth every 6 (six) hours as needed for Pain.,  Disp: 26 tablet, Rfl: 0    pantoprazole (PROTONIX) 40 MG tablet, TAKE 1 TABLET EVERY DAY, Disp: 90 tablet, Rfl: 3    povidone-iodine (BETADINE) 10 % external solution, Apply topically as needed for Wound Care., Disp: , Rfl:     rOPINIRole (REQUIP) 1 MG tablet, TAKE 1 TABLET EVERY EVENING, Disp: 90 tablet, Rfl: 1    rosuvastatin (CRESTOR) 40 MG Tab, TAKE 1 TABLET EVERY EVENING, Disp: 90 tablet, Rfl: 3    sacubitriL-valsartan (ENTRESTO) 24-26 mg per tablet, Take 1 tablet by mouth 2 (two) times daily., Disp: 180 tablet, Rfl: 3    spironolactone (ALDACTONE) 25 MG tablet, Take 1 tablet (25 mg total) by mouth once daily., Disp: 90 tablet, Rfl: 3    torsemide (DEMADEX) 20 MG Tab, Take 1 tablet (20 mg total) by mouth once daily., Disp: 90 tablet, Rfl: 3

## 2025-06-12 ENCOUNTER — OFFICE VISIT (OUTPATIENT)
Facility: CLINIC | Age: 75
End: 2025-06-12
Payer: MEDICARE

## 2025-06-12 VITALS
WEIGHT: 168 LBS | SYSTOLIC BLOOD PRESSURE: 123 MMHG | BODY MASS INDEX: 27.99 KG/M2 | TEMPERATURE: 98 F | RESPIRATION RATE: 16 BRPM | OXYGEN SATURATION: 87 % | DIASTOLIC BLOOD PRESSURE: 42 MMHG | HEART RATE: 38 BPM | HEIGHT: 65 IN

## 2025-06-12 DIAGNOSIS — D64.9 ANEMIA, UNSPECIFIED TYPE: Primary | ICD-10-CM

## 2025-06-12 DIAGNOSIS — D53.9 NUTRITIONAL ANEMIA, UNSPECIFIED: ICD-10-CM

## 2025-06-12 DIAGNOSIS — D53.8 OTHER SPECIFIED NUTRITIONAL ANEMIAS: ICD-10-CM

## 2025-06-12 DIAGNOSIS — D50.9 IRON DEFICIENCY ANEMIA, UNSPECIFIED IRON DEFICIENCY ANEMIA TYPE: ICD-10-CM

## 2025-06-12 PROCEDURE — 99999 PR PBB SHADOW E&M-EST. PATIENT-LVL IV: CPT | Mod: PBBFAC,HCNC,, | Performed by: INTERNAL MEDICINE

## 2025-06-12 RX ORDER — FAMOTIDINE 10 MG/ML
20 INJECTION, SOLUTION INTRAVENOUS
Status: CANCELLED | OUTPATIENT
Start: 2025-06-18 | End: 2025-06-18

## 2025-06-12 RX ORDER — HEPARIN 100 UNIT/ML
500 SYRINGE INTRAVENOUS
Status: CANCELLED | OUTPATIENT
Start: 2025-06-18

## 2025-06-12 RX ORDER — EPINEPHRINE 0.3 MG/.3ML
0.3 INJECTION SUBCUTANEOUS ONCE AS NEEDED
Status: CANCELLED | OUTPATIENT
Start: 2025-06-18

## 2025-06-12 RX ORDER — SODIUM CHLORIDE 0.9 % (FLUSH) 0.9 %
10 SYRINGE (ML) INJECTION
Status: CANCELLED | OUTPATIENT
Start: 2025-06-18

## 2025-06-12 RX ORDER — ACETAMINOPHEN 325 MG/1
650 TABLET ORAL
Status: CANCELLED | OUTPATIENT
Start: 2025-06-18 | End: 2025-06-18

## 2025-06-13 ENCOUNTER — TELEPHONE (OUTPATIENT)
Dept: FAMILY MEDICINE | Facility: CLINIC | Age: 75
End: 2025-06-13
Payer: MEDICARE

## 2025-06-13 RX ORDER — ACETAMINOPHEN 325 MG/1
650 TABLET ORAL
OUTPATIENT
Start: 2025-06-13 | End: 2025-06-13

## 2025-06-13 RX ORDER — EPINEPHRINE 0.3 MG/.3ML
0.3 INJECTION SUBCUTANEOUS ONCE AS NEEDED
OUTPATIENT
Start: 2025-06-13

## 2025-06-13 RX ORDER — HEPARIN 100 UNIT/ML
500 SYRINGE INTRAVENOUS
OUTPATIENT
Start: 2025-06-13

## 2025-06-13 RX ORDER — SODIUM CHLORIDE 0.9 % (FLUSH) 0.9 %
10 SYRINGE (ML) INJECTION
OUTPATIENT
Start: 2025-06-13

## 2025-06-13 RX ORDER — FAMOTIDINE 10 MG/ML
20 INJECTION, SOLUTION INTRAVENOUS
OUTPATIENT
Start: 2025-06-13 | End: 2025-06-13

## 2025-06-13 NOTE — TELEPHONE ENCOUNTER
I received a phone call from [facility] from [facility] regarding the patient. The patient was advised to go to the emergency room but decline. Her blood pressure was taken manually by  and was 86-48, then with patients machine. First reading came up Low - second one read 130/45. Patient had complaints of increased SOB, light headedness and was coughing up yellow sputum. Patient did not have her oxygen on but finally did put it back on and was stating at 98%. Her pulse rate was 62. She saw Dr. Graham 6-12-25 and her BP was 123/42 pulse 38 and patient refused to wear her oxygen at that appointment per notes. The office note stated that they sent you a letter. I also advised for the patient to report to the emergency room which she declined. I told Dinah to advise the patient that if it continued or became worse she did need to be evaluated in the emergency room. Channel expressed verbal understanding.

## 2025-06-13 NOTE — TELEPHONE ENCOUNTER
Called patient to advise she needed to be evaluated in ER for low blood pressure or contact her heart doctor for an appointment. Patient stated that she has an appointment with wound care on Monday but she will contact  and take it from there.

## 2025-06-13 NOTE — PROGRESS NOTES
Notified that injectafer is non-preferred by patient's insurance. Reviewed with OMAR Llanes and per her verbal order, changed to Venofer. Patient made aware of above and verbalized understanding.

## 2025-06-16 ENCOUNTER — EXTERNAL HOME HEALTH (OUTPATIENT)
Dept: HOME HEALTH SERVICES | Facility: HOSPITAL | Age: 75
End: 2025-06-16
Payer: MEDICARE

## 2025-06-16 ENCOUNTER — OFFICE VISIT (OUTPATIENT)
Dept: WOUND CARE | Facility: HOSPITAL | Age: 75
End: 2025-06-16
Attending: PODIATRIST
Payer: MEDICARE

## 2025-06-16 VITALS
TEMPERATURE: 98 F | BODY MASS INDEX: 27.99 KG/M2 | SYSTOLIC BLOOD PRESSURE: 120 MMHG | HEIGHT: 65 IN | WEIGHT: 168 LBS | DIASTOLIC BLOOD PRESSURE: 50 MMHG | RESPIRATION RATE: 16 BRPM | HEART RATE: 50 BPM

## 2025-06-16 DIAGNOSIS — R09.89 DECREASED PEDAL PULSES: ICD-10-CM

## 2025-06-16 DIAGNOSIS — I73.9 PAD (PERIPHERAL ARTERY DISEASE): ICD-10-CM

## 2025-06-16 DIAGNOSIS — Z87.891 FORMER SMOKER: ICD-10-CM

## 2025-06-16 DIAGNOSIS — L97.313 SKIN ULCER OF RIGHT ANKLE WITH NECROSIS OF MUSCLE: Primary | ICD-10-CM

## 2025-06-16 DIAGNOSIS — R20.2 PARESTHESIAS: ICD-10-CM

## 2025-06-16 DIAGNOSIS — I73.9 PVD (PERIPHERAL VASCULAR DISEASE): ICD-10-CM

## 2025-06-16 DIAGNOSIS — L08.9 WOUND INFECTION: ICD-10-CM

## 2025-06-16 DIAGNOSIS — Z91.89 AT HIGH RISK FOR INADEQUATE NUTRITIONAL INTAKE: ICD-10-CM

## 2025-06-16 DIAGNOSIS — S91.001A OPEN WOUND OF RIGHT ANKLE, INITIAL ENCOUNTER: ICD-10-CM

## 2025-06-16 DIAGNOSIS — T14.8XXA WOUND INFECTION: ICD-10-CM

## 2025-06-16 DIAGNOSIS — L02.419 CELLULITIS AND ABSCESS OF LEG: ICD-10-CM

## 2025-06-16 DIAGNOSIS — L03.119 CELLULITIS AND ABSCESS OF LEG: ICD-10-CM

## 2025-06-16 DIAGNOSIS — L89.514 STAGE IV PRESSURE ULCER OF RIGHT ANKLE: ICD-10-CM

## 2025-06-16 PROCEDURE — 99214 OFFICE O/P EST MOD 30 MIN: CPT | Mod: HCNC,PN | Performed by: PODIATRIST

## 2025-06-16 PROCEDURE — 1126F AMNT PAIN NOTED NONE PRSNT: CPT | Mod: CPTII,HCNC,, | Performed by: PODIATRIST

## 2025-06-16 PROCEDURE — 3288F FALL RISK ASSESSMENT DOCD: CPT | Mod: CPTII,HCNC,, | Performed by: PODIATRIST

## 2025-06-16 PROCEDURE — 1101F PT FALLS ASSESS-DOCD LE1/YR: CPT | Mod: CPTII,HCNC,, | Performed by: PODIATRIST

## 2025-06-16 PROCEDURE — 99214 OFFICE O/P EST MOD 30 MIN: CPT | Mod: HCNC,,, | Performed by: PODIATRIST

## 2025-06-16 PROCEDURE — 1159F MED LIST DOCD IN RCRD: CPT | Mod: CPTII,HCNC,, | Performed by: PODIATRIST

## 2025-06-16 PROCEDURE — 3078F DIAST BP <80 MM HG: CPT | Mod: CPTII,HCNC,, | Performed by: PODIATRIST

## 2025-06-16 PROCEDURE — 1160F RVW MEDS BY RX/DR IN RCRD: CPT | Mod: CPTII,HCNC,, | Performed by: PODIATRIST

## 2025-06-16 PROCEDURE — 3074F SYST BP LT 130 MM HG: CPT | Mod: CPTII,HCNC,, | Performed by: PODIATRIST

## 2025-06-17 NOTE — PROGRESS NOTES
"  [address] [facility] [address]  [place] [address]  Phone: [phone]   [phone]    Patient's PCP:Hope Tang FNP  Referring Provider: No ref. provider found    Subjective:      Chief Complaint:: Wound Care and Wound Check    HPI  Lisa Smith is a 74 y.o. female who presents today with a complaint of right medial ankle wound.   See wound docs documentation for full assessment and evaluation of all wounds.      Patient continues to experience numbness and tingling in bilateral feet.  Paresthesias, and burning bilateral feet with no clearly identified trigger or source.  Referral has been placed to neurology.     Patient was under the care of a previous wound care provider.  Reviewed note from visits.  Taken from Wound providers last note on 05/06/2025:  "Patient presents for an evaluation of right medial ankle wound. She reports having a medial and lateral right ankle wound that started in March 2025. She reports the lateral right ankle wound healed. Pt follows with vascular surgery. She is s/p right common femoral/profunda endarterectomy and bovine patch on 4/2/25 with Dr. Valdivia. At her follow up appointment on 4/22/25, she was found to have a medial ankle wound. It was deemed primarily not ischemic and she was referred to wound care. She was instructed to dress her wound with betadine daily. Per Dr. Valdivia's note: "While the LANIE suggests lower than preoperatively I think this is an error given that the contralateral side which had no intervention also dropped significantly. PVR waveforms on the right arm much better, and her ischemic rest pain completely gone." She admits compliance with betadine daily. Pt previously smoked 2-3 ppds for 30 years. She quit in 2011. She wants to follow with wound care closer to her house in [place]. Denies fever, chills, erythema, warmth, purulent drainage, or pain."           Vascular Status/LANIE:  Patient under the care of vascular surgery.  Recent " "vascular intervention, will order arterial ultrasounds.   Nutrition Risk/Labs: "Tips and Tricks for wound healing" handout given with detailed guide to optimize nutritional status for wound healing.   Counseled patient on increasing protein intake, following a healthy diet, vitamins  Dietary:   As tolerated: 3 well-balanced meals  Increased protein: Premier, Boost, or Ensure, [place] Instant Breakfast (purchase sugarfree if Diabetic). If you have kidney or are on dialysis, patient please check with your Nephrologist as to how much protein you are allowed to take with your condition.   Faustino 1-2 times daily  Supplement with: Multivitamin with Zinc and Vitamin C 500mg twice a day. If you are on Coumadin, please contact the Coumadin Clinic before beginning a Multivitamin. Vitamin D3.  Recent Cultures & Dates:  See micro tab in chart  Antibiotics: doxycline  Radiology/Xray: See imaging tab in chart  Diabetes Status/HbA1c: See labs tab in chart  Offloading/Immobilization: Offloading fel placed in order to remove pressure from wound  Tobacco Use:  Previous smoker.  None currently  Additional Patient instructions: Keep wrap/ wound dressing clean, dry, and intact. May shower, using cast protector, plastic bag, or other methods as discussed to keep wrap dry, or may sponge bathe. If wrap gets wet, it needs to be removed by unwrapping it. Place temporary dressing of antibiotic ointment and bandage to wound and notify clinic as soon as possible for earlier appointment.         Patient to follow with primary doctor regularly to address ongoing medical conditions such as abnormal blood pressure readings.        EVALUATION, ASSESSMENT, & PLAN:      1. Debridement.See Wound Docs for assessment of wounds and procedure notes  2. Continue taking all medications as prescribed  3. Dressing changes, see Wound docs for dressing change orders  4. RTC one week   5. Counseled patient on increasing protein intake, not getting wound wet, " "keeping dressing clean dry and intact, following a healthy diet, elevating legs when able, removing pressure from wound     Proper ulcer care and the possible need for serial debridements, topical medications, specific dressings and biological engineered skin substitutes if indicated  Discussed general issues surrounding recurrent/ nonhealing ulcers, along with the advantages & disadvantages of various treatment strategies.     Patient should call the office immediately if any signs of infection, such as fever, chills, sweats, increased redness or pain.     Patient was instructed to call the clinic or go to the emergency department if their symptoms do not improve, worsens, or if new symptoms develop.  Patient was advised that if any increased swelling, pain, or numbness arise to go immediately to the ED. Patient knows to call any time if an emergency arises. Shared decision making occurred and patient verbalized understanding in agreement with this plan.         I spent a total of 45 minutes on the day of the visit.This includes face to face time and non-face to face time preparing to see the patient (eg, review of tests), obtaining and/or reviewing separately obtained history, documenting clinical information in the electronic or other health record, independently interpreting results and communicating results to the patient/family/caregiver, or care coordinator.        Much of the documentation for this visit was completed in the Wound Docs system.  Please see the attached documentation for further details about the patient's care. Scanned under the Media tab.         Izabella Benoit DPM        Vitals:    06/16/25 1401   BP: (!) 120/50   Pulse: (!) 50   Resp: 16   Temp: 98.3 °F (36.8 °C)   Weight: 76.2 kg (167 lb 15.9 oz)   Height: 5' 5" (1.651 m)   PainSc: 0-No pain      Shoe Size:     Past Surgical History:   Procedure Laterality Date    CARDIAC ELECTROPHYSIOLOGY STUDY AND ABLATION      CAROTID ENDARTERECTOMY " Left 12/22/2022    Procedure: ENDARTERECTOMY-CAROTID;  Surgeon: Maximilian Connolly MD;  Location: [facility] OR;  Service: Peripheral Vascular;  Laterality: Left;    CAROTID STENT Left 02/29/2024    Procedure: INSERTION, STENT, ARTERY, CAROTID;  Surgeon: CIRILO Valdivia III, MD;  Location: [facility] OR 2ND FLR;  Service: Vascular;  Laterality: Left;  left carotid artery stent placement  mgy  146.29   gymc2  8.0730  fluro time  4.8min    CARPAL TUNNEL RELEASE Left     CHOLECYSTECTOMY  1995    CLOSURE OF LEFT ATRIAL APPENDAGE USING DEVICE Right 01/13/2025    Procedure: Left atrial appendage closure device;  Surgeon: Roxana Cantu MD;  Location: [facility] CATH/EP LAB;  Service: Cardiology;  Laterality: Right;    ENDARTERECTOMY OF FEMORAL ARTERY Right 4/2/2025    Procedure: ENDARTERECTOMY, FEMORAL;  Surgeon: CIRILO Valdivia III, MD;  Location: [facility] OR 2ND FLR;  Service: Vascular;  Laterality: Right;    EYE SURGERY Bilateral March 2012    cataract    HYSTERECTOMY  1988    INSERT / REPLACE / REMOVE PACEMAKER      KNEE ARTHROSCOPY Left     LEFT HEART CATHETERIZATION  11/01/2023    Procedure: Left heart cath;  Surgeon: Puma Shelton MD;  Location: [facility] CATH;  Service: Cardiology;;    REPLACEMENT OF PACEMAKER GENERATOR Left 01/20/2022    Procedure: REPLACEMENT, PACEMAKER GENERATOR;  Surgeon: Raymond Pérez MD;  Location: [facility] CATH/EP LAB;  Service: Cardiology;  Laterality: Left;    SKIN CANCER EXCISION      TRANSCATHETER AORTIC VALVE REPLACEMENT (TAVR) Bilateral 12/06/2023    Procedure: (TAVR);  Surgeon: Puma Shelton MD;  Location: [facility] CATH;  Service: Cardiology;  Laterality: Bilateral;    TRANSCATHETER AORTIC VALVE REPLACEMENT (TAVR) Bilateral 12/06/2023    Procedure: (TAVR) - Surgeon;  Surgeon: Maximilian Connolly MD;  Location: [facility] CATH;  Service: Peripheral Vascular;  Laterality: Bilateral;     Past Medical History:   Diagnosis Date    Anticoagulant long-term use     Arthritis     Atrial fibrillation     Bell's palsy      Boil of buttock     burst 12/19/22    CHF (congestive heart failure)     Chronic cough     COPD (chronic obstructive pulmonary disease)     use O2  3l/m NC at night; also taking during day currently 12/4/23    Dizziness     Encounter for blood transfusion     GI bleed 2011    transfusion    H/O diverticulitis of colon     Hard of hearing     Heart murmur     Hematoma 02/2023    left hand    Heterozygous alpha 1-antitrypsin deficiency     History of home oxygen therapy     3L NC at night    Hypertension     Iron deficiency anemia 06/12/2025    Lung disease     copd    MONSERRAT (obstructive sleep apnea)     resolved with wt loss 131#    Pacemaker     Pneumonia     last episode 2019    Pulmonary edema     Skin cancer     Unspecified visual disturbance     episode of vision disturbance and dizziness...occasional recurrences     Family History   Problem Relation Name Age of Onset    Kidney failure Mother      Cancer Father Rm Wray     Alcohol abuse Father Rm Nils     Cancer Brother      Arthritis Maternal Grandmother Domenica Langeon     Cancer Brother Rm Wray JR         pancreatic        Social History:   Marital Status:   Alcohol History:  reports that she does not currently use alcohol after a past usage of about 8.0 standard drinks of alcohol per week.  Tobacco History:  reports that she quit smoking about 14 years ago. Her smoking use included cigarettes. She started smoking about 54 years ago. She has a 80 pack-year smoking history. She has been exposed to tobacco smoke. She has never used smokeless tobacco.  Drug History:  reports no history of drug use.    Review of patient's allergies indicates:   Allergen Reactions    Sulfa (sulfonamide antibiotics) Itching       Current Medications[1]    Review of Systems   Constitutional:  Negative for chills, fatigue, fever and unexpected weight change.   HENT:  Negative for hearing loss and trouble swallowing.    Eyes:  Negative for photophobia and  visual disturbance.   Respiratory:  Negative for cough, shortness of breath and wheezing.    Cardiovascular:  Positive for leg swelling. Negative for chest pain and palpitations.   Gastrointestinal:  Negative for abdominal pain and nausea.   Genitourinary:  Negative for dysuria and frequency.   Musculoskeletal:  Negative for arthralgias, back pain, joint swelling and myalgias.   Skin:  Positive for wound. Negative for rash.   Neurological:  Negative for tremors, seizures, weakness and headaches.   Hematological:  Does not bruise/bleed easily.         Objective:        Physical Exam:   Foot Exam  Physical Exam  Ortho/SPM Exam     Imaging:            Assessment:       1. Skin ulcer of right ankle with necrosis of muscle    2. Former smoker    3. Open wound of right ankle, initial encounter    4. PAD (peripheral artery disease)    5. Wound infection    6. PVD (peripheral vascular disease)    7. At high risk for inadequate nutritional intake    8. Cellulitis and abscess of leg    9. Decreased pedal pulses    10. 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    Procedures          Counseling:     I provided patient education verbally regarding:   Patient diagnosis, treatment options, as well as alternatives, risks, and benefits.     This note was created using Dragon voice recognition software that occasionally misinterpreted phrases or words.                      [1]   Current Outpatient Medications   Medication Sig Dispense Refill    acetaminophen (TYLENOL ARTHRITIS ORAL) Take  2 tablets by mouth daily as needed.      albuterol (PROVENTIL/VENTOLIN HFA) 90 mcg/actuation inhaler INHALE 2 PUFFS EVERY 6 HOURS AS NEEDED FOR WHEEZING (RESCUE) 18 g 6    aspirin 81 MG Chew Chew and swallow 1 tablet (81 mg total) by mouth once daily. 30 tablet 2    clopidogreL (PLAVIX) 75 mg tablet TAKE 1 TABLET EVERY DAY 90 tablet 3    digoxin (LANOXIN) 125 mcg tablet Take 1 tablet (0.125 mg total) by mouth once daily. 30 tablet 11    doxycycline (MONODOX) 100 MG capsule Take 1 capsule (100 mg total) by mouth 2 (two) times daily. for 14 days 28 capsule 0    ezetimibe (ZETIA) 10 mg tablet TAKE 1 TABLET EVERY DAY 90 tablet 3    fluticasone propionate (FLONASE) 50 mcg/actuation nasal spray 1 spray by Each Nostril route daily as needed for Rhinitis or Allergies.      fluticasone-umeclidin-vilanter (TRELEGY ELLIPTA) 100-62.5-25 mcg DsDv Inhale 1 puff into the lungs once daily. 90 each 3    melatonin 10 mg Tab Take 1 tablet by mouth nightly as needed (insomnia).      metoprolol succinate (TOPROL-XL) 25 MG 24 hr tablet TAKE 1 TABLET EVERY MORNING 90 tablet 3    oxyCODONE (ROXICODONE) 5 MG immediate release tablet Take 1 tablet (5 mg total) by mouth every 4 (four) hours as needed for Pain. 10 tablet 0    oxyCODONE-acetaminophen (PERCOCET)  mg per tablet Take 1 tablet by mouth every 4 (four) hours as needed for Pain. 26 tablet 0    oxyCODONE-acetaminophen (PERCOCET) 7.5-325 mg per tablet Take 1 tablet by mouth every 6 (six) hours as needed for Pain. 26 tablet 0    pantoprazole (PROTONIX) 40 MG tablet TAKE 1 TABLET EVERY DAY 90 tablet 3    povidone-iodine (BETADINE) 10 % external solution Apply topically as needed for Wound Care.      rOPINIRole (REQUIP) 1 MG tablet TAKE 1 TABLET EVERY EVENING 90 tablet 1    rosuvastatin (CRESTOR) 40 MG Tab TAKE 1 TABLET EVERY EVENING 90 tablet 3    sacubitriL-valsartan (ENTRESTO) 24-26 mg per tablet Take 1 tablet by mouth 2 (two) times daily. 180 tablet 3    spironolactone  (ALDACTONE) 25 MG tablet Take 1 tablet (25 mg total) by mouth once daily. 90 tablet 3    torsemide (DEMADEX) 20 MG Tab Take 1 tablet (20 mg total) by mouth once daily. 90 tablet 3     No current facility-administered medications for this visit.

## 2025-06-23 ENCOUNTER — OFFICE VISIT (OUTPATIENT)
Dept: WOUND CARE | Facility: HOSPITAL | Age: 75
End: 2025-06-23
Attending: PODIATRIST
Payer: MEDICARE

## 2025-06-23 VITALS
TEMPERATURE: 98 F | DIASTOLIC BLOOD PRESSURE: 62 MMHG | HEART RATE: 61 BPM | RESPIRATION RATE: 18 BRPM | SYSTOLIC BLOOD PRESSURE: 123 MMHG

## 2025-06-23 DIAGNOSIS — L08.9 WOUND INFECTION: ICD-10-CM

## 2025-06-23 DIAGNOSIS — L02.419 CELLULITIS AND ABSCESS OF LEG: ICD-10-CM

## 2025-06-23 DIAGNOSIS — R20.2 PARESTHESIAS: ICD-10-CM

## 2025-06-23 DIAGNOSIS — I73.9 PAD (PERIPHERAL ARTERY DISEASE): ICD-10-CM

## 2025-06-23 DIAGNOSIS — R09.89 DECREASED PEDAL PULSES: ICD-10-CM

## 2025-06-23 DIAGNOSIS — T14.8XXA WOUND INFECTION: ICD-10-CM

## 2025-06-23 DIAGNOSIS — S91.001A OPEN WOUND OF RIGHT ANKLE, INITIAL ENCOUNTER: ICD-10-CM

## 2025-06-23 DIAGNOSIS — L97.313 SKIN ULCER OF RIGHT ANKLE WITH NECROSIS OF MUSCLE: Primary | ICD-10-CM

## 2025-06-23 DIAGNOSIS — L03.119 CELLULITIS AND ABSCESS OF LEG: ICD-10-CM

## 2025-06-23 DIAGNOSIS — L89.514 STAGE IV PRESSURE ULCER OF RIGHT ANKLE: ICD-10-CM

## 2025-06-23 DIAGNOSIS — Z87.891 FORMER SMOKER: ICD-10-CM

## 2025-06-23 DIAGNOSIS — Z91.89 AT HIGH RISK FOR INADEQUATE NUTRITIONAL INTAKE: ICD-10-CM

## 2025-06-23 DIAGNOSIS — M25.571 ACUTE RIGHT ANKLE PAIN: ICD-10-CM

## 2025-06-23 DIAGNOSIS — I73.9 PVD (PERIPHERAL VASCULAR DISEASE): ICD-10-CM

## 2025-06-23 PROCEDURE — 99214 OFFICE O/P EST MOD 30 MIN: CPT | Mod: HCNC,,, | Performed by: PODIATRIST

## 2025-06-23 PROCEDURE — 99213 OFFICE O/P EST LOW 20 MIN: CPT | Mod: HCNC,PN | Performed by: PODIATRIST

## 2025-06-23 PROCEDURE — 3078F DIAST BP <80 MM HG: CPT | Mod: CPTII,HCNC,, | Performed by: PODIATRIST

## 2025-06-23 PROCEDURE — 1126F AMNT PAIN NOTED NONE PRSNT: CPT | Mod: CPTII,HCNC,, | Performed by: PODIATRIST

## 2025-06-23 PROCEDURE — 3074F SYST BP LT 130 MM HG: CPT | Mod: CPTII,HCNC,, | Performed by: PODIATRIST

## 2025-06-23 PROCEDURE — 1159F MED LIST DOCD IN RCRD: CPT | Mod: CPTII,HCNC,, | Performed by: PODIATRIST

## 2025-06-23 PROCEDURE — 1160F RVW MEDS BY RX/DR IN RCRD: CPT | Mod: CPTII,HCNC,, | Performed by: PODIATRIST

## 2025-06-23 RX ORDER — OXYCODONE AND ACETAMINOPHEN 10; 325 MG/1; MG/1
1 TABLET ORAL EVERY 4 HOURS PRN
Qty: 26 TABLET | Refills: 0 | Status: SHIPPED | OUTPATIENT
Start: 2025-06-23

## 2025-06-23 NOTE — PROGRESS NOTES
"  [address] [facility] [address]  [place] [address]  Phone: [phone]   [phone]    Patient's PCP:Hope Tang FNP  Referring Provider: No ref. provider found    Subjective:      Chief Complaint:: Wound Care    HPI  Lisa Smith is a 74 y.o. female who presents today with a complaint of right medial ankle wound.   See wound docs documentation for full assessment and evaluation of all wounds.      Patient continues to experience numbness and tingling in bilateral feet.  Paresthesias, and burning bilateral feet with no clearly identified trigger or source.  Referral has been placed to neurology.     Patient was under the care of a previous wound care provider.  Reviewed note from visits.  Taken from Wound providers last note on 05/06/2025:  "Patient presents for an evaluation of right medial ankle wound. She reports having a medial and lateral right ankle wound that started in March 2025. She reports the lateral right ankle wound healed. Pt follows with vascular surgery. She is s/p right common femoral/profunda endarterectomy and bovine patch on 4/2/25 with Dr. Valdivia. At her follow up appointment on 4/22/25, she was found to have a medial ankle wound. It was deemed primarily not ischemic and she was referred to wound care. She was instructed to dress her wound with betadine daily. Per Dr. Valdivia's note: "While the LANIE suggests lower than preoperatively I think this is an error given that the contralateral side which had no intervention also dropped significantly. PVR waveforms on the right arm much better, and her ischemic rest pain completely gone." She admits compliance with betadine daily. Pt previously smoked 2-3 ppds for 30 years. She quit in 2011. She wants to follow with wound care closer to her house in [place]. Denies fever, chills, erythema, warmth, purulent drainage, or pain."           Vascular Status/LANIE:  Patient under the care of vascular surgery.  Recent vascular " "intervention, will order arterial ultrasounds.   Nutrition Risk/Labs: "Tips and Tricks for wound healing" handout given with detailed guide to optimize nutritional status for wound healing.   Counseled patient on increasing protein intake, following a healthy diet, vitamins  Dietary:   As tolerated: 3 well-balanced meals  Increased protein: Premier, Boost, or Ensure, Hope Instant Breakfast (purchase sugarfree if Diabetic). If you have kidney or are on dialysis, patient please check with your Nephrologist as to how much protein you are allowed to take with your condition.   Faustino 1-2 times daily  Supplement with: Multivitamin with Zinc and Vitamin C 500mg twice a day. If you are on Coumadin, please contact the Coumadin Clinic before beginning a Multivitamin. Vitamin D3.  Recent Cultures & Dates:  See micro tab in chart  Antibiotics: doxycline  Radiology/Xray: See imaging tab in chart  Diabetes Status/HbA1c: See labs tab in chart  Offloading/Immobilization: Offloading fel placed in order to remove pressure from wound  Tobacco Use:  Previous smoker.  None currently  Additional Patient instructions: Keep wrap/ wound dressing clean, dry, and intact. May shower, using cast protector, plastic bag, or other methods as discussed to keep wrap dry, or may sponge bathe. If wrap gets wet, it needs to be removed by unwrapping it. Place temporary dressing of antibiotic ointment and bandage to wound and notify clinic as soon as possible for earlier appointment.         Patient to follow with primary doctor regularly to address ongoing medical conditions such as abnormal blood pressure readings.        EVALUATION, ASSESSMENT, & PLAN:      1. .See Wound Docs for assessment of wounds and procedure notes  2. Continue taking all medications as prescribed  3. Dressing changes, see Wound docs for dressing change orders  4. RTC one week  5. Counseled patient on increasing protein intake, not getting wound wet, keeping dressing clean " dry and intact, following a healthy diet, elevating legs when able, removing pressure from wound     Proper ulcer care and the possible need for serial debridements, topical medications, specific dressings and biological engineered skin substitutes if indicated  Discussed general issues surrounding recurrent/ nonhealing ulcers, along with the advantages & disadvantages of various treatment strategies.     Patient should call the office immediately if any signs of infection, such as fever, chills, sweats, increased redness or pain.     Patient was instructed to call the clinic or go to the emergency department if their symptoms do not improve, worsens, or if new symptoms develop.  Patient was advised that if any increased swelling, pain, or numbness arise to go immediately to the ED. Patient knows to call any time if an emergency arises. Shared decision making occurred and patient verbalized understanding in agreement with this plan.         I spent a total of 45 minutes on the day of the visit.This includes face to face time and non-face to face time preparing to see the patient (eg, review of tests), obtaining and/or reviewing separately obtained history, documenting clinical information in the electronic or other health record, independently interpreting results and communicating results to the patient/family/caregiver, or care coordinator.        Much of the documentation for this visit was completed in the Wound Docs system.  Please see the attached documentation for further details about the patient's care. Scanned under the Media tab.         Izabella Benoit DPM      Vitals:    06/23/25 1213   BP: 123/62   Pulse: 61   Resp: 18   Temp: 98.1 °F (36.7 °C)   TempSrc: Skin   PainSc: 0-No pain      Shoe Size:     Past Surgical History:   Procedure Laterality Date    CARDIAC ELECTROPHYSIOLOGY STUDY AND ABLATION      CAROTID ENDARTERECTOMY Left 12/22/2022    Procedure: ENDARTERECTOMY-CAROTID;  Surgeon: Maximilian PLASCENCIA  MD Mohsen;  Location: [facility] OR;  Service: Peripheral Vascular;  Laterality: Left;    CAROTID STENT Left 02/29/2024    Procedure: INSERTION, STENT, ARTERY, CAROTID;  Surgeon: CIRILO Valdivia III, MD;  [facility];  Service: Vascular;  Laterality: Left;  left carotid artery stent placement  mgy  146.29   gymc2  8.0730  fluro time  4.8min    CARPAL TUNNEL RELEASE Left     CHOLECYSTECTOMY  1995    CLOSURE OF LEFT ATRIAL APPENDAGE USING DEVICE Right 01/13/2025    Procedure: Left atrial appendage closure device;  Surgeon: Roxana Cantu MD;  Location: [facility] CATH/EP LAB;  Service: Cardiology;  Laterality: Right;    ENDARTERECTOMY OF FEMORAL ARTERY Right 4/2/2025    Procedure: ENDARTERECTOMY, FEMORAL;  Surgeon: CIRILO Valdivia III, MD;  Location: [facility] OR [address] [place] [address];  Service: Vascular;  Laterality: Right;    EYE SURGERY Bilateral March 2012    cataract    HYSTERECTOMY  1988    INSERT / REPLACE / REMOVE PACEMAKER      KNEE ARTHROSCOPY Left     LEFT HEART CATHETERIZATION  11/01/2023    Procedure: Left heart cath;  Surgeon: Puma Shelton MD;  Location: [facility] CATH;  Service: Cardiology;;    REPLACEMENT OF PACEMAKER GENERATOR Left 01/20/2022    Procedure: REPLACEMENT, PACEMAKER GENERATOR;  Surgeon: Raymond Pérez MD;  Location: [facility] CATH/EP LAB;  Service: Cardiology;  Laterality: Left;    SKIN CANCER EXCISION      TRANSCATHETER AORTIC VALVE REPLACEMENT (TAVR) Bilateral 12/06/2023    Procedure: (TAVR);  Surgeon: Puma Shelton MD;  Location: [facility] CATH;  Service: Cardiology;  Laterality: Bilateral;    TRANSCATHETER AORTIC VALVE REPLACEMENT (TAVR) Bilateral 12/06/2023    Procedure: (TAVR) - Surgeon;  Surgeon: Maximilian Connolly MD;  Location: [facility] CATH;  Service: Peripheral Vascular;  Laterality: Bilateral;     Past Medical History:   Diagnosis Date    Anticoagulant long-term use     Arthritis     Atrial fibrillation     Bell's palsy     Boil of buttock     burst 12/19/22    CHF (congestive heart failure)      Chronic cough     COPD (chronic obstructive pulmonary disease)     use O2  3l/m NC at night; also taking during day currently 12/4/23    Dizziness     Encounter for blood transfusion     GI bleed 2011    transfusion    H/O diverticulitis of colon     Hard of hearing     Heart murmur     Hematoma 02/2023    left hand    Heterozygous alpha 1-antitrypsin deficiency     History of home oxygen therapy     3L NC at night    Hypertension     Iron deficiency anemia 06/12/2025    Lung disease     copd    MONSERRAT (obstructive sleep apnea)     resolved with wt loss 131#    Pacemaker     Pneumonia     last episode 2019    Pulmonary edema     Skin cancer     Unspecified visual disturbance     episode of vision disturbance and dizziness...occasional recurrences     Family History   Problem Relation Name Age of Onset    Kidney failure Mother      Cancer Father Rm Wray     Alcohol abuse Father Rm Wray     Cancer Brother      Arthritis Maternal Grandmother Domenica Wray     Cancer Brother Rm Nils JR         pancreatic        Social History:   Marital Status:   Alcohol History:  reports that she does not currently use alcohol after a past usage of about 8.0 standard drinks of alcohol per week.  Tobacco History:  reports that she quit smoking about 14 years ago. Her smoking use included cigarettes. She started smoking about 54 years ago. She has a 80 pack-year smoking history. She has been exposed to tobacco smoke. She has never used smokeless tobacco.  Drug History:  reports no history of drug use.    Review of patient's allergies indicates:   Allergen Reactions    Sulfa (sulfonamide antibiotics) Itching       Current Medications[1]    Review of Systems   Constitutional:  Negative for chills, fatigue, fever and unexpected weight change.   HENT:  Negative for hearing loss and trouble swallowing.    Eyes:  Negative for photophobia and visual disturbance.   Respiratory:  Negative for cough, shortness of breath  and wheezing.    Cardiovascular:  Positive for leg swelling. Negative for chest pain and palpitations.   Gastrointestinal:  Negative for abdominal pain and nausea.   Genitourinary:  Negative for dysuria and frequency.   Musculoskeletal:  Negative for arthralgias, back pain, joint swelling and myalgias.   Skin:  Positive for wound. Negative for rash.   Neurological:  Negative for tremors, seizures, weakness and headaches.   Hematological:  Does not bruise/bleed easily.         Objective:        Physical Exam:   Foot Exam  Physical Exam  Ortho/SPM Exam     Imaging:            Assessment:       1. Skin ulcer of right ankle with necrosis of muscle    2. Former smoker    3. Open wound of right ankle, initial encounter    4. PVD (peripheral vascular disease)    5. PAD (peripheral artery disease)    6. Wound infection    7. Cellulitis and abscess of leg    8. Decreased pedal pulses    9. At high risk for inadequate nutritional intake    10. 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 Paresthesias    12. 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  Dispense: 26 tablet; Refill: 0      Follow up in about 1 week (around 6/30/2025).    Procedures          Counseling:     I provided patient education verbally regarding:   Patient diagnosis, treatment options, as well as alternatives, risks, and benefits.     This note was created using Dragon voice recognition software that occasionally misinterpreted phrases or  words.                      [1]   Current Outpatient Medications   Medication Sig Dispense Refill    acetaminophen (TYLENOL ARTHRITIS ORAL) Take 2 tablets by mouth daily as needed.      albuterol (PROVENTIL/VENTOLIN HFA) 90 mcg/actuation inhaler INHALE 2 PUFFS EVERY 6 HOURS AS NEEDED FOR WHEEZING (RESCUE) 18 g 6    aspirin 81 MG Chew Chew and swallow 1 tablet (81 mg total) by mouth once daily. 30 tablet 2    clopidogreL (PLAVIX) 75 mg tablet TAKE 1 TABLET EVERY DAY 90 tablet 3    digoxin (LANOXIN) 125 mcg tablet Take 1 tablet (0.125 mg total) by mouth once daily. 30 tablet 11    doxycycline (MONODOX) 100 MG capsule Take 1 capsule (100 mg total) by mouth 2 (two) times daily. for 14 days 28 capsule 0    ezetimibe (ZETIA) 10 mg tablet TAKE 1 TABLET EVERY DAY 90 tablet 3    fluticasone propionate (FLONASE) 50 mcg/actuation nasal spray 1 spray by Each Nostril route daily as needed for Rhinitis or Allergies.      fluticasone-umeclidin-vilanter (TRELEGY ELLIPTA) 100-62.5-25 mcg DsDv Inhale 1 puff into the lungs once daily. 90 each 3    melatonin 10 mg Tab Take 1 tablet by mouth nightly as needed (insomnia).      metoprolol succinate (TOPROL-XL) 25 MG 24 hr tablet TAKE 1 TABLET EVERY MORNING 90 tablet 3    oxyCODONE (ROXICODONE) 5 MG immediate release tablet Take 1 tablet (5 mg total) by mouth every 4 (four) hours as needed for Pain. 10 tablet 0    oxyCODONE-acetaminophen (PERCOCET)  mg per tablet Take 1 tablet by mouth every 4 (four) hours as needed for Pain. 26 tablet 0    oxyCODONE-acetaminophen (PERCOCET)  mg per tablet Take 1 tablet by mouth every 4 (four) hours as needed for Pain. 26 tablet 0    oxyCODONE-acetaminophen (PERCOCET) 7.5-325 mg per tablet Take 1 tablet by mouth every 6 (six) hours as needed for Pain. 26 tablet 0    pantoprazole (PROTONIX) 40 MG tablet TAKE 1 TABLET EVERY DAY 90 tablet 3    povidone-iodine (BETADINE) 10 % external solution Apply topically as needed for Wound Care.       rOPINIRole (REQUIP) 1 MG tablet TAKE 1 TABLET EVERY EVENING 90 tablet 1    rosuvastatin (CRESTOR) 40 MG Tab TAKE 1 TABLET EVERY EVENING 90 tablet 3    sacubitriL-valsartan (ENTRESTO) 24-26 mg per tablet Take 1 tablet by mouth 2 (two) times daily. 180 tablet 3    spironolactone (ALDACTONE) 25 MG tablet Take 1 tablet (25 mg total) by mouth once daily. 90 tablet 3    torsemide (DEMADEX) 20 MG Tab Take 1 tablet (20 mg total) by mouth once daily. 90 tablet 3     No current facility-administered medications for this visit.

## 2025-06-24 ENCOUNTER — DOCUMENT SCAN (OUTPATIENT)
Dept: HOME HEALTH SERVICES | Facility: HOSPITAL | Age: 75
End: 2025-06-24
Payer: MEDICARE

## 2025-06-24 ENCOUNTER — INFUSION (OUTPATIENT)
Dept: INFUSION THERAPY | Facility: HOSPITAL | Age: 75
End: 2025-06-24
Attending: INTERNAL MEDICINE
Payer: MEDICARE

## 2025-06-24 VITALS
OXYGEN SATURATION: 94 % | WEIGHT: 168 LBS | TEMPERATURE: 98 F | HEART RATE: 60 BPM | RESPIRATION RATE: 16 BRPM | BODY MASS INDEX: 27.99 KG/M2 | DIASTOLIC BLOOD PRESSURE: 61 MMHG | SYSTOLIC BLOOD PRESSURE: 135 MMHG | HEIGHT: 65 IN

## 2025-06-24 DIAGNOSIS — D50.8 OTHER IRON DEFICIENCY ANEMIA: Primary | ICD-10-CM

## 2025-06-24 PROCEDURE — 63600175 PHARM REV CODE 636 W HCPCS: Performed by: INTERNAL MEDICINE

## 2025-06-24 PROCEDURE — A4216 STERILE WATER/SALINE, 10 ML: HCPCS | Performed by: INTERNAL MEDICINE

## 2025-06-24 PROCEDURE — 25000003 PHARM REV CODE 250: Performed by: INTERNAL MEDICINE

## 2025-06-24 PROCEDURE — 96365 THER/PROPH/DIAG IV INF INIT: CPT

## 2025-06-24 RX ORDER — HEPARIN 100 UNIT/ML
500 SYRINGE INTRAVENOUS
OUTPATIENT
Start: 2025-07-01

## 2025-06-24 RX ORDER — METHYLPREDNISOLONE SOD SUCC 125 MG
40 VIAL (EA) INJECTION
OUTPATIENT
Start: 2025-07-01 | End: 2025-07-01

## 2025-06-24 RX ORDER — FAMOTIDINE 10 MG/ML
20 INJECTION, SOLUTION INTRAVENOUS
OUTPATIENT
Start: 2025-07-01 | End: 2025-07-01

## 2025-06-24 RX ORDER — SODIUM CHLORIDE 0.9 % (FLUSH) 0.9 %
10 SYRINGE (ML) INJECTION
Status: DISCONTINUED | OUTPATIENT
Start: 2025-06-24 | End: 2025-06-24 | Stop reason: HOSPADM

## 2025-06-24 RX ORDER — ACETAMINOPHEN 325 MG/1
650 TABLET ORAL
OUTPATIENT
Start: 2025-07-01 | End: 2025-07-01

## 2025-06-24 RX ORDER — EPINEPHRINE 0.3 MG/.3ML
0.3 INJECTION SUBCUTANEOUS ONCE AS NEEDED
OUTPATIENT
Start: 2025-07-01

## 2025-06-24 RX ORDER — SODIUM CHLORIDE 0.9 % (FLUSH) 0.9 %
10 SYRINGE (ML) INJECTION
OUTPATIENT
Start: 2025-07-01

## 2025-06-24 RX ADMIN — Medication 10 ML: at 11:06

## 2025-06-24 RX ADMIN — IRON SUCROSE 200 MG: 20 INJECTION, SOLUTION INTRAVENOUS at 09:06

## 2025-06-24 NOTE — PLAN OF CARE
Problem: Adult Inpatient Plan of Care  Goal: Optimal Comfort and Wellbeing  Outcome: Progressing  Intervention: Provide Person-Centered Care  Flowsheets (Taken 6/24/2025 1000)  Trust Relationship/Rapport:   thoughts/feelings acknowledged   choices provided   emotional support provided   care explained   empathic listening provided   questions answered   questions encouraged   reassurance provided

## 2025-06-30 ENCOUNTER — OFFICE VISIT (OUTPATIENT)
Dept: WOUND CARE | Facility: HOSPITAL | Age: 75
End: 2025-06-30
Attending: PODIATRIST
Payer: MEDICARE

## 2025-06-30 VITALS
RESPIRATION RATE: 18 BRPM | HEART RATE: 60 BPM | DIASTOLIC BLOOD PRESSURE: 49 MMHG | TEMPERATURE: 99 F | SYSTOLIC BLOOD PRESSURE: 114 MMHG

## 2025-06-30 DIAGNOSIS — I73.9 PAD (PERIPHERAL ARTERY DISEASE): ICD-10-CM

## 2025-06-30 DIAGNOSIS — M25.571 ACUTE RIGHT ANKLE PAIN: ICD-10-CM

## 2025-06-30 DIAGNOSIS — S91.001A OPEN WOUND OF RIGHT ANKLE, INITIAL ENCOUNTER: ICD-10-CM

## 2025-06-30 DIAGNOSIS — I73.9 PVD (PERIPHERAL VASCULAR DISEASE): ICD-10-CM

## 2025-06-30 DIAGNOSIS — R20.0 NUMBNESS IN FEET: ICD-10-CM

## 2025-06-30 DIAGNOSIS — R09.89 DECREASED PEDAL PULSES: ICD-10-CM

## 2025-06-30 DIAGNOSIS — Z87.891 FORMER SMOKER: ICD-10-CM

## 2025-06-30 DIAGNOSIS — I50.43 ACUTE ON CHRONIC COMBINED SYSTOLIC AND DIASTOLIC CHF, NYHA CLASS 3: ICD-10-CM

## 2025-06-30 DIAGNOSIS — L97.313 SKIN ULCER OF RIGHT ANKLE WITH NECROSIS OF MUSCLE: Primary | ICD-10-CM

## 2025-06-30 DIAGNOSIS — Z91.89 AT HIGH RISK FOR INADEQUATE NUTRITIONAL INTAKE: ICD-10-CM

## 2025-06-30 DIAGNOSIS — M79.671 FOOT PAIN, RIGHT: ICD-10-CM

## 2025-06-30 DIAGNOSIS — L89.514 STAGE IV PRESSURE ULCER OF RIGHT ANKLE: ICD-10-CM

## 2025-06-30 PROCEDURE — 1126F AMNT PAIN NOTED NONE PRSNT: CPT | Mod: CPTII,HCNC,, | Performed by: PODIATRIST

## 2025-06-30 PROCEDURE — 1159F MED LIST DOCD IN RCRD: CPT | Mod: CPTII,HCNC,, | Performed by: PODIATRIST

## 2025-06-30 PROCEDURE — 99213 OFFICE O/P EST LOW 20 MIN: CPT | Mod: HCNC,PN | Performed by: PODIATRIST

## 2025-06-30 PROCEDURE — 3074F SYST BP LT 130 MM HG: CPT | Mod: CPTII,HCNC,, | Performed by: PODIATRIST

## 2025-06-30 PROCEDURE — 1160F RVW MEDS BY RX/DR IN RCRD: CPT | Mod: CPTII,HCNC,, | Performed by: PODIATRIST

## 2025-06-30 PROCEDURE — 3078F DIAST BP <80 MM HG: CPT | Mod: CPTII,HCNC,, | Performed by: PODIATRIST

## 2025-06-30 PROCEDURE — 99214 OFFICE O/P EST MOD 30 MIN: CPT | Mod: HCNC,,, | Performed by: PODIATRIST

## 2025-06-30 NOTE — PROGRESS NOTES
"  [address] [facility] [address]  [place] [address]  Phone: [phone]   [phone]    Patient's PCP:Hope Tang FNP  Referring Provider: No ref. provider found    Subjective:      Chief Complaint:: Wound Care and Wound Check    HPI  Lisa Smith is a 74 y.o. female who presents today with a complaint of right medial ankle wound.   See wound docs documentation for full assessment and evaluation of all wounds.      Patient continues to experience numbness and tingling in bilateral feet.  Paresthesias, and burning bilateral feet with no clearly identified trigger or source.  Referral has been placed to neurology.     Patient was under the care of a previous wound care provider.  Reviewed note from visits.  Taken from Wound providers last note on 05/06/2025:  "Patient presents for an evaluation of right medial ankle wound. She reports having a medial and lateral right ankle wound that started in March 2025. She reports the lateral right ankle wound healed. Pt follows with vascular surgery. She is s/p right common femoral/profunda endarterectomy and bovine patch on 4/2/25 with Dr. Valdivia. At her follow up appointment on 4/22/25, she was found to have a medial ankle wound. It was deemed primarily not ischemic and she was referred to wound care. She was instructed to dress her wound with betadine daily. Per Dr. Valdivia's note: "While the LANIE suggests lower than preoperatively I think this is an error given that the contralateral side which had no intervention also dropped significantly. PVR waveforms on the right arm much better, and her ischemic rest pain completely gone." She admits compliance with betadine daily. Pt previously smoked 2-3 ppds for 30 years. She quit in 2011. She wants to follow with wound care closer to her house in [place]. Denies fever, chills, erythema, warmth, purulent drainage, or pain."           Vascular Status/LANIE:  Patient under the care of vascular surgery.  Recent " "vascular intervention, will order arterial ultrasounds.   Nutrition Risk/Labs: "Tips and Tricks for wound healing" handout given with detailed guide to optimize nutritional status for wound healing.   Counseled patient on increasing protein intake, following a healthy diet, vitamins  Dietary:   As tolerated: 3 well-balanced meals  Increased protein: Premier, Boost, or Ensure, Cortez Instant Breakfast (purchase sugarfree if Diabetic). If you have kidney or are on dialysis, patient please check with your Nephrologist as to how much protein you are allowed to take with your condition.   Faustino 1-2 times daily  Supplement with: Multivitamin with Zinc and Vitamin C 500mg twice a day. If you are on Coumadin, please contact the Coumadin Clinic before beginning a Multivitamin. Vitamin D3.  Recent Cultures & Dates:  See micro tab in chart  Antibiotics: doxycline  Radiology/Xray: See imaging tab in chart  Diabetes Status/HbA1c: See labs tab in chart  Offloading/Immobilization: Offloading fel placed in order to remove pressure from wound  Tobacco Use:  Previous smoker.  None currently  Additional Patient instructions: Keep wrap/ wound dressing clean, dry, and intact. May shower, using cast protector, plastic bag, or other methods as discussed to keep wrap dry, or may sponge bathe. If wrap gets wet, it needs to be removed by unwrapping it. Place temporary dressing of antibiotic ointment and bandage to wound and notify clinic as soon as possible for earlier appointment.         Patient to follow with primary doctor regularly to address ongoing medical conditions such as abnormal blood pressure readings.        EVALUATION, ASSESSMENT, & PLAN:      1. .See Wound Docs for assessment of wounds and procedure notes  2. Continue taking all medications as prescribed  3. Dressing changes, see Wound docs for dressing change orders  4. RTC one week  5. Counseled patient on increasing protein intake, not getting wound wet, keeping " dressing clean dry and intact, following a healthy diet, elevating legs when able, removing pressure from wound     Proper ulcer care and the possible need for serial debridements, topical medications, specific dressings and biological engineered skin substitutes if indicated  Discussed general issues surrounding recurrent/ nonhealing ulcers, along with the advantages & disadvantages of various treatment strategies.     Patient should call the office immediately if any signs of infection, such as fever, chills, sweats, increased redness or pain.     Patient was instructed to call the clinic or go to the emergency department if their symptoms do not improve, worsens, or if new symptoms develop.  Patient was advised that if any increased swelling, pain, or numbness arise to go immediately to the ED. Patient knows to call any time if an emergency arises. Shared decision making occurred and patient verbalized understanding in agreement with this plan.         I spent a total of 45 minutes on the day of the visit.This includes face to face time and non-face to face time preparing to see the patient (eg, review of tests), obtaining and/or reviewing separately obtained history, documenting clinical information in the electronic or other health record, independently interpreting results and communicating results to the patient/family/caregiver, or care coordinator.        Much of the documentation for this visit was completed in the Wound Docs system.  Please see the attached documentation for further details about the patient's care. Scanned under the Media tab.         Izabella Benoit DPM     Vitals:    06/30/25 1036   BP: (!) 114/49   Pulse: 60   Resp: 18   Temp: 98.5 °F (36.9 °C)   TempSrc: Skin   PainSc: 0-No pain      Shoe Size:     Past Surgical History:   Procedure Laterality Date    CARDIAC ELECTROPHYSIOLOGY STUDY AND ABLATION      CAROTID ENDARTERECTOMY Left 12/22/2022    Procedure: ENDARTERECTOMY-CAROTID;   Surgeon: Maximilian Connolly MD;  Location: [facility] OR;  Service: Peripheral Vascular;  Laterality: Left;    CAROTID STENT Left 02/29/2024    Procedure: INSERTION, STENT, ARTERY, CAROTID;  Surgeon: CIRILO Valdivia III, MD;  Location: [facility] OR [facility] FLR;  Service: Vascular;  Laterality: Left;  left carotid artery stent placement  mgy  146.29   gymc2  8.0730  fluro time  4.8min    CARPAL TUNNEL RELEASE Left     CHOLECYSTECTOMY  1995    CLOSURE OF LEFT ATRIAL APPENDAGE USING DEVICE Right 01/13/2025    Procedure: Left atrial appendage closure device;  Surgeon: Roxana Cantu MD;  Location: [facility] CATH/EP LAB;  Service: Cardiology;  Laterality: Right;    ENDARTERECTOMY OF FEMORAL ARTERY Right 4/2/2025    Procedure: ENDARTERECTOMY, FEMORAL;  Surgeon: CIRILO Valdivia III, MD;  [address]  Service: Vascular;  Laterality: Right;    EYE SURGERY Bilateral March 2012    cataract    HYSTERECTOMY  1988    INSERT / REPLACE / REMOVE PACEMAKER      KNEE ARTHROSCOPY Left     LEFT HEART CATHETERIZATION  11/01/2023    Procedure: Left heart cath;  Surgeon: Puma Shelton MD;  Location: [facility] CATH;  Service: Cardiology;;    REPLACEMENT OF PACEMAKER GENERATOR Left 01/20/2022    Procedure: REPLACEMENT, PACEMAKER GENERATOR;  Surgeon: Raymond Pérez MD;  Location: [facility] CATH/EP LAB;  Service: Cardiology;  Laterality: Left;    SKIN CANCER EXCISION      TRANSCATHETER AORTIC VALVE REPLACEMENT (TAVR) Bilateral 12/06/2023    Procedure: (TAVR);  Surgeon: Puma Shelton MD;  Location: [facility] CATH;  Service: Cardiology;  Laterality: Bilateral;    TRANSCATHETER AORTIC VALVE REPLACEMENT (TAVR) Bilateral 12/06/2023    Procedure: (TAVR) - Surgeon;  Surgeon: Maximilian Connolly MD;  Location: [facility] CATH;  Service: Peripheral Vascular;  Laterality: Bilateral;     Past Medical History:   Diagnosis Date    Anticoagulant long-term use     Arthritis     Atrial fibrillation     Bell's palsy     Boil of buttock     burst 12/19/22    CHF (congestive  heart failure)     Chronic cough     COPD (chronic obstructive pulmonary disease)     use O2  3l/m NC at night; also taking during day currently 12/4/23    Dizziness     Encounter for blood transfusion     GI bleed 2011    transfusion    H/O diverticulitis of colon     Hard of hearing     Heart murmur     Hematoma 02/2023    left hand    Heterozygous alpha 1-antitrypsin deficiency     History of home oxygen therapy     3L NC at night    Hypertension     Iron deficiency anemia 06/12/2025    Lung disease     copd    MONSERRAT (obstructive sleep apnea)     resolved with wt loss 131#    Pacemaker     Pneumonia     last episode 2019    Pulmonary edema     Skin cancer     Unspecified visual disturbance     episode of vision disturbance and dizziness...occasional recurrences     Family History   Problem Relation Name Age of Onset    Kidney failure Mother      Cancer Father Rm Wray     Alcohol abuse Father Rm Wray     Cancer Brother      Arthritis Maternal Grandmother Domenica Wray     Cancer Brother Rm Nils JR         pancreatic        Social History:   Marital Status:   Alcohol History:  reports that she does not currently use alcohol after a past usage of about 8.0 standard drinks of alcohol per week.  Tobacco History:  reports that she quit smoking about 14 years ago. Her smoking use included cigarettes. She started smoking about 54 years ago. She has a 80 pack-year smoking history. She has been exposed to tobacco smoke. She has never used smokeless tobacco.  Drug History:  reports no history of drug use.    Review of patient's allergies indicates:   Allergen Reactions    Sulfa (sulfonamide antibiotics) Itching       Current Medications[1]    Review of Systems   Constitutional:  Negative for chills, fatigue, fever and unexpected weight change.   HENT:  Negative for hearing loss and trouble swallowing.    Eyes:  Negative for photophobia and visual disturbance.   Respiratory:  Negative for cough,  shortness of breath and wheezing.    Cardiovascular:  Positive for leg swelling. Negative for chest pain and palpitations.   Gastrointestinal:  Negative for abdominal pain and nausea.   Genitourinary:  Negative for dysuria and frequency.   Musculoskeletal:  Negative for arthralgias, back pain, joint swelling and myalgias.   Skin:  Positive for wound. Negative for rash.   Neurological:  Negative for tremors, seizures, weakness and headaches.   Hematological:  Does not bruise/bleed easily.         Objective:        Physical Exam:   Foot Exam  Physical Exam  Ortho/SPM Exam     Imaging:            Assessment:       1. Skin ulcer of right ankle with necrosis of muscle    2. Former smoker    3. Acute on chronic combined systolic and diastolic CHF, NYHA class 3    4. Numbness in feet    5. Acute right ankle pain    6. PAD (peripheral artery disease)    7. Decreased pedal pulses    8. 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 Open wound of right ankle, initial encounter    10. PVD (peripheral vascular disease)    11. At high risk for inadequate nutritional intake    12. Foot pain, right      Plan:   Skin ulcer of right ankle with necrosis of muscle    Former smoker    Acute on chronic combined systolic and diastolic CHF, NYHA class 3    Numbness in feet    Acute right ankle pain    PAD (peripheral artery disease)    Decreased pedal pulses    Stage IV pressure ulcer of right ankle    Open wound of right ankle, initial encounter    PVD (peripheral vascular disease)    At high risk for inadequate nutritional intake    Foot pain, right      Follow up in about 2 weeks (around 7/14/2025) for wound care .    Procedures          Counseling:     I provided patient education verbally regarding:   Patient diagnosis, treatment options, as well as alternatives, risks, and benefits.     This note was created using Dragon voice recognition software that occasionally misinterpreted phrases or words.                      [1]   Current  Outpatient Medications   Medication Sig Dispense Refill    acetaminophen (TYLENOL ARTHRITIS ORAL) Take 2 tablets by mouth daily as needed.      albuterol (PROVENTIL/VENTOLIN HFA) 90 mcg/actuation inhaler INHALE 2 PUFFS EVERY 6 HOURS AS NEEDED FOR WHEEZING (RESCUE) 18 g 6    aspirin 81 MG Chew Chew and swallow 1 tablet (81 mg total) by mouth once daily. 30 tablet 2    clopidogreL (PLAVIX) 75 mg tablet TAKE 1 TABLET EVERY DAY 90 tablet 3    digoxin (LANOXIN) 125 mcg tablet Take 1 tablet (0.125 mg total) by mouth once daily. 30 tablet 11    ezetimibe (ZETIA) 10 mg tablet TAKE 1 TABLET EVERY DAY 90 tablet 3    fluticasone propionate (FLONASE) 50 mcg/actuation nasal spray 1 spray by Each Nostril route daily as needed for Rhinitis or Allergies.      fluticasone-umeclidin-vilanter (TRELEGY ELLIPTA) 100-62.5-25 mcg DsDv Inhale 1 puff into the lungs once daily. 90 each 3    melatonin 10 mg Tab Take 1 tablet by mouth nightly as needed (insomnia).      metoprolol succinate (TOPROL-XL) 25 MG 24 hr tablet TAKE 1 TABLET EVERY MORNING 90 tablet 3    oxyCODONE (ROXICODONE) 5 MG immediate release tablet Take 1 tablet (5 mg total) by mouth every 4 (four) hours as needed for Pain. 10 tablet 0    oxyCODONE-acetaminophen (PERCOCET)  mg per tablet Take 1 tablet by mouth every 4 (four) hours as needed for Pain. 26 tablet 0    oxyCODONE-acetaminophen (PERCOCET)  mg per tablet Take 1 tablet by mouth every 4 (four) hours as needed for Pain. 26 tablet 0    oxyCODONE-acetaminophen (PERCOCET) 7.5-325 mg per tablet Take 1 tablet by mouth every 6 (six) hours as needed for Pain. 26 tablet 0    pantoprazole (PROTONIX) 40 MG tablet TAKE 1 TABLET EVERY DAY 90 tablet 3    povidone-iodine (BETADINE) 10 % external solution Apply topically as needed for Wound Care.      rOPINIRole (REQUIP) 1 MG tablet TAKE 1 TABLET EVERY EVENING 90 tablet 1    rosuvastatin (CRESTOR) 40 MG Tab TAKE 1 TABLET EVERY EVENING 90 tablet 3    sacubitriL-valsartan  (ENTRESTO) 24-26 mg per tablet Take 1 tablet by mouth 2 (two) times daily. 180 tablet 3    spironolactone (ALDACTONE) 25 MG tablet Take 1 tablet (25 mg total) by mouth once daily. 90 tablet 3    torsemide (DEMADEX) 20 MG Tab Take 1 tablet (20 mg total) by mouth once daily. 90 tablet 3     No current facility-administered medications for this visit.

## 2025-07-01 ENCOUNTER — INFUSION (OUTPATIENT)
Dept: INFUSION THERAPY | Facility: HOSPITAL | Age: 75
End: 2025-07-01
Attending: INTERNAL MEDICINE
Payer: MEDICARE

## 2025-07-01 VITALS
HEIGHT: 65 IN | HEART RATE: 60 BPM | TEMPERATURE: 98 F | BODY MASS INDEX: 27.96 KG/M2 | SYSTOLIC BLOOD PRESSURE: 106 MMHG | OXYGEN SATURATION: 100 % | RESPIRATION RATE: 15 BRPM | DIASTOLIC BLOOD PRESSURE: 51 MMHG

## 2025-07-01 DIAGNOSIS — D50.8 OTHER IRON DEFICIENCY ANEMIA: Primary | ICD-10-CM

## 2025-07-01 PROCEDURE — 63600175 PHARM REV CODE 636 W HCPCS: Performed by: INTERNAL MEDICINE

## 2025-07-01 PROCEDURE — 96365 THER/PROPH/DIAG IV INF INIT: CPT

## 2025-07-01 PROCEDURE — 25000003 PHARM REV CODE 250: Performed by: INTERNAL MEDICINE

## 2025-07-01 PROCEDURE — A4216 STERILE WATER/SALINE, 10 ML: HCPCS | Performed by: INTERNAL MEDICINE

## 2025-07-01 RX ORDER — EPINEPHRINE 0.3 MG/.3ML
0.3 INJECTION SUBCUTANEOUS ONCE AS NEEDED
OUTPATIENT
Start: 2025-07-08

## 2025-07-01 RX ORDER — FAMOTIDINE 10 MG/ML
20 INJECTION, SOLUTION INTRAVENOUS
OUTPATIENT
Start: 2025-07-08 | End: 2025-07-08

## 2025-07-01 RX ORDER — FAMOTIDINE 10 MG/ML
20 INJECTION, SOLUTION INTRAVENOUS
Status: DISCONTINUED | OUTPATIENT
Start: 2025-07-01 | End: 2025-07-01 | Stop reason: HOSPADM

## 2025-07-01 RX ORDER — HEPARIN 100 UNIT/ML
500 SYRINGE INTRAVENOUS
OUTPATIENT
Start: 2025-07-08

## 2025-07-01 RX ORDER — EPINEPHRINE 0.3 MG/.3ML
0.3 INJECTION SUBCUTANEOUS ONCE AS NEEDED
Status: DISCONTINUED | OUTPATIENT
Start: 2025-07-01 | End: 2025-07-01 | Stop reason: HOSPADM

## 2025-07-01 RX ORDER — SODIUM CHLORIDE 0.9 % (FLUSH) 0.9 %
10 SYRINGE (ML) INJECTION
Status: DISCONTINUED | OUTPATIENT
Start: 2025-07-01 | End: 2025-07-01 | Stop reason: HOSPADM

## 2025-07-01 RX ORDER — SODIUM CHLORIDE 0.9 % (FLUSH) 0.9 %
10 SYRINGE (ML) INJECTION
OUTPATIENT
Start: 2025-07-08

## 2025-07-01 RX ORDER — METHYLPREDNISOLONE SOD SUCC 125 MG
40 VIAL (EA) INJECTION
Status: DISCONTINUED | OUTPATIENT
Start: 2025-07-01 | End: 2025-07-01 | Stop reason: HOSPADM

## 2025-07-01 RX ORDER — ACETAMINOPHEN 325 MG/1
650 TABLET ORAL
OUTPATIENT
Start: 2025-07-08 | End: 2025-07-08

## 2025-07-01 RX ORDER — ACETAMINOPHEN 325 MG/1
650 TABLET ORAL
Status: DISCONTINUED | OUTPATIENT
Start: 2025-07-01 | End: 2025-07-01 | Stop reason: HOSPADM

## 2025-07-01 RX ORDER — HEPARIN 100 UNIT/ML
500 SYRINGE INTRAVENOUS
Status: DISCONTINUED | OUTPATIENT
Start: 2025-07-01 | End: 2025-07-01 | Stop reason: HOSPADM

## 2025-07-01 RX ORDER — METHYLPREDNISOLONE SOD SUCC 125 MG
40 VIAL (EA) INJECTION
OUTPATIENT
Start: 2025-07-08 | End: 2025-07-08

## 2025-07-01 RX ADMIN — Medication 10 ML: at 09:07

## 2025-07-01 RX ADMIN — Medication 10 ML: at 11:07

## 2025-07-01 RX ADMIN — IRON SUCROSE 200 MG: 20 INJECTION, SOLUTION INTRAVENOUS at 09:07

## 2025-07-03 ENCOUNTER — LAB VISIT (OUTPATIENT)
Dept: LAB | Facility: HOSPITAL | Age: 75
End: 2025-07-03
Attending: INTERNAL MEDICINE
Payer: MEDICARE

## 2025-07-03 DIAGNOSIS — D53.9 NUTRITIONAL ANEMIA, UNSPECIFIED: ICD-10-CM

## 2025-07-03 DIAGNOSIS — D53.8 OTHER SPECIFIED NUTRITIONAL ANEMIAS: ICD-10-CM

## 2025-07-03 DIAGNOSIS — D64.9 ANEMIA, UNSPECIFIED TYPE: ICD-10-CM

## 2025-07-03 DIAGNOSIS — D50.9 IRON DEFICIENCY ANEMIA, UNSPECIFIED IRON DEFICIENCY ANEMIA TYPE: ICD-10-CM

## 2025-07-03 LAB
ABSOLUTE EOSINOPHIL (SMH): 0.12 K/UL
ABSOLUTE MONOCYTE (SMH): 0.9 K/UL (ref 0.3–1)
ABSOLUTE NEUTROPHIL COUNT (SMH): 10.2 K/UL (ref 1.8–7.7)
ALBUMIN SERPL-MCNC: 4.2 G/DL (ref 3.5–5.2)
ALP SERPL-CCNC: 81 UNIT/L (ref 55–135)
ALT SERPL-CCNC: 9 UNIT/L (ref 10–44)
ANION GAP (SMH): 9 MMOL/L (ref 8–16)
AST SERPL-CCNC: 21 UNIT/L (ref 10–40)
BASOPHILS # BLD AUTO: 0.03 K/UL
BASOPHILS NFR BLD AUTO: 0.2 %
BILIRUB SERPL-MCNC: 0.5 MG/DL (ref 0.1–1)
BUN SERPL-MCNC: 60 MG/DL (ref 8–23)
CALCIUM SERPL-MCNC: 9.6 MG/DL (ref 8.7–10.5)
CHLORIDE SERPL-SCNC: 97 MMOL/L (ref 95–110)
CO2 SERPL-SCNC: 31 MMOL/L (ref 23–29)
CREAT SERPL-MCNC: 0.8 MG/DL (ref 0.5–1.4)
ERYTHROCYTE [DISTWIDTH] IN BLOOD BY AUTOMATED COUNT: 15.8 % (ref 11.5–14.5)
FERRITIN SERPL-MCNC: 247.9 NG/ML (ref 20–300)
FOLATE SERPL-MCNC: 18.7 NG/ML (ref 4–24)
GFR SERPLBLD CREATININE-BSD FMLA CKD-EPI: >60 ML/MIN/1.73/M2
GLUCOSE SERPL-MCNC: 106 MG/DL (ref 70–110)
HCT VFR BLD AUTO: 29.8 % (ref 37–48.5)
HGB BLD-MCNC: 9.6 GM/DL (ref 12–16)
IMM GRANULOCYTES # BLD AUTO: 0.1 K/UL (ref 0–0.04)
IMM GRANULOCYTES NFR BLD AUTO: 0.8 % (ref 0–0.5)
IRON SATN MFR SERPL: 11 % (ref 20–50)
IRON SERPL-MCNC: 39 UG/DL (ref 30–160)
LYMPHOCYTES # BLD AUTO: 1.27 K/UL (ref 1–4.8)
MCH RBC QN AUTO: 31.9 PG (ref 27–31)
MCHC RBC AUTO-ENTMCNC: 32.2 G/DL (ref 32–36)
MCV RBC AUTO: 99 FL (ref 82–98)
NUCLEATED RBC (/100WBC) (SMH): 0 /100 WBC
PLATELET # BLD AUTO: 247 K/UL (ref 150–450)
PMV BLD AUTO: 10.8 FL (ref 9.2–12.9)
POTASSIUM SERPL-SCNC: 4.4 MMOL/L (ref 3.5–5.1)
PROT SERPL-MCNC: 7.4 GM/DL (ref 6–8.4)
RBC # BLD AUTO: 3.01 M/UL (ref 4–5.4)
RELATIVE EOSINOPHIL (SMH): 0.9 % (ref 0–8)
RELATIVE LYMPHOCYTE (SMH): 10 % (ref 18–48)
RELATIVE MONOCYTE (SMH): 7.1 % (ref 4–15)
RELATIVE NEUTROPHIL (SMH): 81 % (ref 38–73)
SODIUM SERPL-SCNC: 137 MMOL/L (ref 136–145)
TIBC SERPL-MCNC: 347 UG/DL (ref 250–450)
TRANSFERRIN SERPL-MCNC: 248 MG/DL (ref 200–375)
VIT B12 SERPL-MCNC: 468 PG/ML (ref 210–950)
WBC # BLD AUTO: 12.66 K/UL (ref 3.9–12.7)

## 2025-07-03 PROCEDURE — 83540 ASSAY OF IRON: CPT

## 2025-07-03 PROCEDURE — 82728 ASSAY OF FERRITIN: CPT

## 2025-07-03 PROCEDURE — 36415 COLL VENOUS BLD VENIPUNCTURE: CPT

## 2025-07-03 PROCEDURE — 82607 VITAMIN B-12: CPT

## 2025-07-03 PROCEDURE — 80053 COMPREHEN METABOLIC PANEL: CPT

## 2025-07-03 PROCEDURE — 82746 ASSAY OF FOLIC ACID SERUM: CPT

## 2025-07-03 PROCEDURE — 85025 COMPLETE CBC W/AUTO DIFF WBC: CPT

## 2025-07-08 ENCOUNTER — DOCUMENT SCAN (OUTPATIENT)
Dept: HOME HEALTH SERVICES | Facility: HOSPITAL | Age: 75
End: 2025-07-08
Payer: MEDICARE

## 2025-07-08 ENCOUNTER — INFUSION (OUTPATIENT)
Dept: INFUSION THERAPY | Facility: HOSPITAL | Age: 75
End: 2025-07-08
Attending: INTERNAL MEDICINE
Payer: MEDICARE

## 2025-07-08 VITALS
SYSTOLIC BLOOD PRESSURE: 116 MMHG | DIASTOLIC BLOOD PRESSURE: 58 MMHG | HEART RATE: 60 BPM | TEMPERATURE: 98 F | WEIGHT: 167.56 LBS | BODY MASS INDEX: 27.88 KG/M2

## 2025-07-08 DIAGNOSIS — D50.8 OTHER IRON DEFICIENCY ANEMIA: Primary | ICD-10-CM

## 2025-07-08 PROCEDURE — 63600175 PHARM REV CODE 636 W HCPCS: Performed by: INTERNAL MEDICINE

## 2025-07-08 PROCEDURE — 25000003 PHARM REV CODE 250: Performed by: INTERNAL MEDICINE

## 2025-07-08 PROCEDURE — 96365 THER/PROPH/DIAG IV INF INIT: CPT

## 2025-07-08 RX ORDER — SODIUM CHLORIDE 0.9 % (FLUSH) 0.9 %
10 SYRINGE (ML) INJECTION
OUTPATIENT
Start: 2025-07-15

## 2025-07-08 RX ORDER — HEPARIN 100 UNIT/ML
500 SYRINGE INTRAVENOUS
Status: DISCONTINUED | OUTPATIENT
Start: 2025-07-08 | End: 2025-07-08 | Stop reason: HOSPADM

## 2025-07-08 RX ORDER — HEPARIN 100 UNIT/ML
500 SYRINGE INTRAVENOUS
OUTPATIENT
Start: 2025-07-15

## 2025-07-08 RX ORDER — ACETAMINOPHEN 325 MG/1
650 TABLET ORAL
OUTPATIENT
Start: 2025-07-15 | End: 2025-07-15

## 2025-07-08 RX ORDER — FAMOTIDINE 10 MG/ML
20 INJECTION, SOLUTION INTRAVENOUS
OUTPATIENT
Start: 2025-07-15 | End: 2025-07-15

## 2025-07-08 RX ORDER — METHYLPREDNISOLONE SOD SUCC 125 MG
40 VIAL (EA) INJECTION
Status: DISCONTINUED | OUTPATIENT
Start: 2025-07-08 | End: 2025-07-08 | Stop reason: HOSPADM

## 2025-07-08 RX ORDER — METHYLPREDNISOLONE SOD SUCC 125 MG
40 VIAL (EA) INJECTION
OUTPATIENT
Start: 2025-07-15 | End: 2025-07-15

## 2025-07-08 RX ORDER — ACETAMINOPHEN 325 MG/1
650 TABLET ORAL
Status: DISCONTINUED | OUTPATIENT
Start: 2025-07-08 | End: 2025-07-08 | Stop reason: HOSPADM

## 2025-07-08 RX ORDER — EPINEPHRINE 0.3 MG/.3ML
0.3 INJECTION SUBCUTANEOUS ONCE AS NEEDED
OUTPATIENT
Start: 2025-07-15

## 2025-07-08 RX ORDER — SODIUM CHLORIDE 0.9 % (FLUSH) 0.9 %
10 SYRINGE (ML) INJECTION
Status: DISCONTINUED | OUTPATIENT
Start: 2025-07-08 | End: 2025-07-08 | Stop reason: HOSPADM

## 2025-07-08 RX ADMIN — IRON SUCROSE 200 MG: 20 INJECTION, SOLUTION INTRAVENOUS at 09:07

## 2025-07-08 NOTE — PLAN OF CARE
Problem: Adult Inpatient Plan of Care  Goal: Plan of Care Review  7/8/2025 0940 by Hannah Levy RN  Outcome: Met  7/8/2025 0940 by Hannah Levy RN  Outcome: Progressing  Goal: Patient-Specific Goal (Individualized)  7/8/2025 0940 by Hannah Levy RN  Outcome: Met  7/8/2025 0940 by Hannah Levy RN  Outcome: Progressing  Goal: Absence of Hospital-Acquired Illness or Injury  7/8/2025 0940 by Hannah Levy RN  Outcome: Met  7/8/2025 0940 by Hannah Levy RN  Outcome: Progressing  Goal: Optimal Comfort and Wellbeing  7/8/2025 0940 by Hannah Levy RN  Outcome: Met  7/8/2025 0940 by Hannah Levy RN  Outcome: Progressing  Goal: Readiness for Transition of Care  7/8/2025 0940 by Hannah Levy RN  Outcome: Met  7/8/2025 0940 by Hannah Levy RN  Outcome: Progressing

## 2025-07-09 ENCOUNTER — TELEPHONE (OUTPATIENT)
Dept: CARDIOLOGY | Facility: CLINIC | Age: 75
End: 2025-07-09
Payer: MEDICARE

## 2025-07-09 NOTE — TELEPHONE ENCOUNTER
----- Message from FAM Giordano sent at 7/9/2025 11:12 AM CDT -----  Good afternoon,    Mrs. Smith had her Watchman implant in January and needs her follow up GRETCHEN.     Thank you,  FAM Giordano

## 2025-07-10 DIAGNOSIS — Z95.3 S/P TAVR (TRANSCATHETER AORTIC VALVE REPLACEMENT): Primary | ICD-10-CM

## 2025-07-14 ENCOUNTER — OFFICE VISIT (OUTPATIENT)
Dept: WOUND CARE | Facility: HOSPITAL | Age: 75
End: 2025-07-14
Attending: PODIATRIST
Payer: MEDICARE

## 2025-07-14 DIAGNOSIS — L89.514 STAGE IV PRESSURE ULCER OF RIGHT ANKLE: ICD-10-CM

## 2025-07-14 DIAGNOSIS — R09.89 DECREASED PEDAL PULSES: ICD-10-CM

## 2025-07-14 DIAGNOSIS — S91.001A OPEN WOUND OF RIGHT ANKLE, INITIAL ENCOUNTER: ICD-10-CM

## 2025-07-14 DIAGNOSIS — I73.9 PVD (PERIPHERAL VASCULAR DISEASE): ICD-10-CM

## 2025-07-14 DIAGNOSIS — I50.43 ACUTE ON CHRONIC COMBINED SYSTOLIC AND DIASTOLIC CHF, NYHA CLASS 3: ICD-10-CM

## 2025-07-14 DIAGNOSIS — Z91.89 AT HIGH RISK FOR INADEQUATE NUTRITIONAL INTAKE: ICD-10-CM

## 2025-07-14 DIAGNOSIS — I73.9 PAD (PERIPHERAL ARTERY DISEASE): ICD-10-CM

## 2025-07-14 DIAGNOSIS — M25.571 ACUTE RIGHT ANKLE PAIN: ICD-10-CM

## 2025-07-14 DIAGNOSIS — M79.671 FOOT PAIN, RIGHT: ICD-10-CM

## 2025-07-14 DIAGNOSIS — R20.0 NUMBNESS IN FEET: ICD-10-CM

## 2025-07-14 DIAGNOSIS — L97.313 SKIN ULCER OF RIGHT ANKLE WITH NECROSIS OF MUSCLE: Primary | ICD-10-CM

## 2025-07-14 DIAGNOSIS — Z87.891 FORMER SMOKER: ICD-10-CM

## 2025-07-14 PROCEDURE — 99213 OFFICE O/P EST LOW 20 MIN: CPT | Mod: 25,HCNC,, | Performed by: PODIATRIST

## 2025-07-14 PROCEDURE — 11042 DBRDMT SUBQ TIS 1ST 20SQCM/<: CPT | Mod: HCNC,PN | Performed by: PODIATRIST

## 2025-07-14 PROCEDURE — 1159F MED LIST DOCD IN RCRD: CPT | Mod: CPTII,HCNC,, | Performed by: PODIATRIST

## 2025-07-14 PROCEDURE — 1160F RVW MEDS BY RX/DR IN RCRD: CPT | Mod: CPTII,HCNC,, | Performed by: PODIATRIST

## 2025-07-14 PROCEDURE — 11042 DBRDMT SUBQ TIS 1ST 20SQCM/<: CPT | Mod: HCNC,,, | Performed by: PODIATRIST

## 2025-07-14 RX ORDER — OXYCODONE AND ACETAMINOPHEN 10; 325 MG/1; MG/1
1 TABLET ORAL EVERY 4 HOURS PRN
Qty: 26 TABLET | Refills: 0 | Status: SHIPPED | OUTPATIENT
Start: 2025-07-14

## 2025-07-15 ENCOUNTER — INFUSION (OUTPATIENT)
Dept: INFUSION THERAPY | Facility: HOSPITAL | Age: 75
End: 2025-07-15
Attending: INTERNAL MEDICINE
Payer: MEDICARE

## 2025-07-15 VITALS
BODY MASS INDEX: 27.88 KG/M2 | WEIGHT: 167.56 LBS | TEMPERATURE: 98 F | SYSTOLIC BLOOD PRESSURE: 135 MMHG | HEART RATE: 36 BPM | OXYGEN SATURATION: 100 % | DIASTOLIC BLOOD PRESSURE: 59 MMHG

## 2025-07-15 DIAGNOSIS — D50.8 OTHER IRON DEFICIENCY ANEMIA: Primary | ICD-10-CM

## 2025-07-15 PROCEDURE — 96365 THER/PROPH/DIAG IV INF INIT: CPT

## 2025-07-15 PROCEDURE — 63600175 PHARM REV CODE 636 W HCPCS: Performed by: INTERNAL MEDICINE

## 2025-07-15 PROCEDURE — 25000003 PHARM REV CODE 250: Performed by: INTERNAL MEDICINE

## 2025-07-15 RX ORDER — FAMOTIDINE 10 MG/ML
20 INJECTION, SOLUTION INTRAVENOUS
OUTPATIENT
Start: 2025-07-22 | End: 2025-07-22

## 2025-07-15 RX ORDER — HEPARIN 100 UNIT/ML
500 SYRINGE INTRAVENOUS
OUTPATIENT
Start: 2025-07-22

## 2025-07-15 RX ORDER — ACETAMINOPHEN 325 MG/1
650 TABLET ORAL
OUTPATIENT
Start: 2025-07-22 | End: 2025-07-22

## 2025-07-15 RX ORDER — SODIUM CHLORIDE 0.9 % (FLUSH) 0.9 %
10 SYRINGE (ML) INJECTION
OUTPATIENT
Start: 2025-07-22

## 2025-07-15 RX ORDER — HEPARIN 100 UNIT/ML
500 SYRINGE INTRAVENOUS
Status: DISCONTINUED | OUTPATIENT
Start: 2025-07-15 | End: 2025-07-15 | Stop reason: HOSPADM

## 2025-07-15 RX ORDER — EPINEPHRINE 0.3 MG/.3ML
0.3 INJECTION SUBCUTANEOUS ONCE AS NEEDED
OUTPATIENT
Start: 2025-07-22

## 2025-07-15 RX ORDER — SODIUM CHLORIDE 0.9 % (FLUSH) 0.9 %
10 SYRINGE (ML) INJECTION
Status: DISCONTINUED | OUTPATIENT
Start: 2025-07-15 | End: 2025-07-15 | Stop reason: HOSPADM

## 2025-07-15 RX ORDER — METHYLPREDNISOLONE SOD SUCC 125 MG
40 VIAL (EA) INJECTION
OUTPATIENT
Start: 2025-07-22 | End: 2025-07-22

## 2025-07-15 RX ORDER — EPINEPHRINE 0.3 MG/.3ML
0.3 INJECTION SUBCUTANEOUS ONCE AS NEEDED
Status: DISCONTINUED | OUTPATIENT
Start: 2025-07-15 | End: 2025-07-15 | Stop reason: HOSPADM

## 2025-07-15 RX ADMIN — IRON SUCROSE 200 MG: 20 INJECTION, SOLUTION INTRAVENOUS at 09:07

## 2025-07-15 NOTE — PROGRESS NOTES
"  [address] [facility] [address]  [place] [address]  Phone: [phone]   [phone]    Patient's PCP:Hope Tang FNP  Referring Provider: No ref. provider found    Subjective:      Chief Complaint:: Wound Check, Wound Care, Foot Pain, and Non-healing Wound    HPI  Lisa Smith is a 74 y.o. female who presents today with a complaint of right medial ankle wound.   See wound docs documentation for full assessment and evaluation of all wounds.      Patient continues to experience numbness and tingling in bilateral feet.  Paresthesias, and burning bilateral feet with no clearly identified trigger or source.  Referral has been placed to neurology.     Patient was under the care of a previous wound care provider.  Reviewed note from visits.  Taken from Wound providers last note on 05/06/2025:  "Patient presents for an evaluation of right medial ankle wound. She reports having a medial and lateral right ankle wound that started in March 2025. She reports the lateral right ankle wound healed. Pt follows with vascular surgery. She is s/p right common femoral/profunda endarterectomy and bovine patch on 4/2/25 with Dr. Valdivia. At her follow up appointment on 4/22/25, she was found to have a medial ankle wound. It was deemed primarily not ischemic and she was referred to wound care. She was instructed to dress her wound with betadine daily. Per Dr. Valdivia's note: "While the LANIE suggests lower than preoperatively I think this is an error given that the contralateral side which had no intervention also dropped significantly. PVR waveforms on the right arm much better, and her ischemic rest pain completely gone." She admits compliance with betadine daily. Pt previously smoked 2-3 ppds for 30 years. She quit in 2011. She wants to follow with wound care closer to her house in [place]. Denies fever, chills, erythema, warmth, purulent drainage, or pain."           Vascular Status/LANIE:  Patient under the care " "of vascular surgery.  Recent vascular intervention, will order arterial ultrasounds.   Nutrition Risk/Labs: "Tips and Tricks for wound healing" handout given with detailed guide to optimize nutritional status for wound healing.   Counseled patient on increasing protein intake, following a healthy diet, vitamins  Dietary:   As tolerated: 3 well-balanced meals  Increased protein: Premier, Boost, or Ensure, Jackson Instant Breakfast (purchase sugarfree if Diabetic). If you have kidney or are on dialysis, patient please check with your Nephrologist as to how much protein you are allowed to take with your condition.   Faustino 1-2 times daily  Supplement with: Multivitamin with Zinc and Vitamin C 500mg twice a day. If you are on Coumadin, please contact the Coumadin Clinic before beginning a Multivitamin. Vitamin D3.  Recent Cultures & Dates:  See micro tab in chart  Antibiotics: doxycline  Radiology/Xray: See imaging tab in chart  Diabetes Status/HbA1c: See labs tab in chart  Offloading/Immobilization: Offloading fel placed in order to remove pressure from wound  Tobacco Use:  Previous smoker.  None currently  Additional Patient instructions: Keep wrap/ wound dressing clean, dry, and intact. May shower, using cast protector, plastic bag, or other methods as discussed to keep wrap dry, or may sponge bathe. If wrap gets wet, it needs to be removed by unwrapping it. Place temporary dressing of antibiotic ointment and bandage to wound and notify clinic as soon as possible for earlier appointment.         Patient to follow with primary doctor regularly to address ongoing medical conditions such as abnormal blood pressure readings.        EVALUATION, ASSESSMENT, & PLAN:      1. debridement.See Wound Docs for assessment of wounds and procedure notes  2. Continue taking all medications as prescribed  3. Dressing changes, see Wound docs for dressing change orders  4. RTC one week  5. Counseled patient on increasing protein " intake, not getting wound wet, keeping dressing clean dry and intact, following a healthy diet, elevating legs when able, removing pressure from wound     Proper ulcer care and the possible need for serial debridements, topical medications, specific dressings and biological engineered skin substitutes if indicated  Discussed general issues surrounding recurrent/ nonhealing ulcers, along with the advantages & disadvantages of various treatment strategies.     Patient should call the office immediately if any signs of infection, such as fever, chills, sweats, increased redness or pain.     Patient was instructed to call the clinic or go to the emergency department if their symptoms do not improve, worsens, or if new symptoms develop.  Patient was advised that if any increased swelling, pain, or numbness arise to go immediately to the ED. Patient knows to call any time if an emergency arises. Shared decision making occurred and patient verbalized understanding in agreement with this plan.         I spent a total of 45 minutes on the day of the visit.This includes face to face time and non-face to face time preparing to see the patient (eg, review of tests), obtaining and/or reviewing separately obtained history, documenting clinical information in the electronic or other health record, independently interpreting results and communicating results to the patient/family/caregiver, or care coordinator.        Much of the documentation for this visit was completed in the Wound Docs system.  Please see the attached documentation for further details about the patient's care. Scanned under the Media tab.         Izabella Benoit DPM     There were no vitals filed for this visit.     Shoe Size:     Past Surgical History:   Procedure Laterality Date    CARDIAC ELECTROPHYSIOLOGY STUDY AND ABLATION      CAROTID ENDARTERECTOMY Left 12/22/2022    Procedure: ENDARTERECTOMY-CAROTID;  Surgeon: Maximilian Connolly MD;  Location: [facility] OR;   Service: Peripheral Vascular;  Laterality: Left;    CAROTID STENT Left 02/29/2024    Procedure: INSERTION, STENT, ARTERY, CAROTID;  Surgeon: CIRILO Valdivia III, MD;  Location: [facility] OR [address] [place] [address];  Service: Vascular;  Laterality: Left;  left carotid artery stent placement  mgy  146.29   gymc2  8.0730  fluro time  4.8min    CARPAL TUNNEL RELEASE Left     CHOLECYSTECTOMY  1995    CLOSURE OF LEFT ATRIAL APPENDAGE USING DEVICE Right 01/13/2025    Procedure: Left atrial appendage closure device;  Surgeon: Roxana Cantu MD;  Location: [facility] CATH/EP LAB;  Service: Cardiology;  Laterality: Right;    ENDARTERECTOMY OF FEMORAL ARTERY Right 4/2/2025    Procedure: ENDARTERECTOMY, FEMORAL;  Surgeon: CIRILO Valdivia III, MD;  Location: [facility] OR [address] [place] [address];  Service: Vascular;  Laterality: Right;    EYE SURGERY Bilateral March 2012    cataract    HYSTERECTOMY  1988    INSERT / REPLACE / REMOVE PACEMAKER      KNEE ARTHROSCOPY Left     LEFT HEART CATHETERIZATION  11/01/2023    Procedure: Left heart cath;  Surgeon: Puma Shelton MD;  Location: [facility] CATH;  Service: Cardiology;;    REPLACEMENT OF PACEMAKER GENERATOR Left 01/20/2022    Procedure: REPLACEMENT, PACEMAKER GENERATOR;  Surgeon: Raymond Pérez MD;  Location: [facility] CATH/EP LAB;  Service: Cardiology;  Laterality: Left;    SKIN CANCER EXCISION      TRANSCATHETER AORTIC VALVE REPLACEMENT (TAVR) Bilateral 12/06/2023    Procedure: (TAVR);  Surgeon: Puma Shelton MD;  Location: [facility] CATH;  Service: Cardiology;  Laterality: Bilateral;    TRANSCATHETER AORTIC VALVE REPLACEMENT (TAVR) Bilateral 12/06/2023    Procedure: (TAVR) - Surgeon;  Surgeon: Maximilian Connolly MD;  Location: [facility] CATH;  Service: Peripheral Vascular;  Laterality: Bilateral;     Past Medical History:   Diagnosis Date    Anticoagulant long-term use     Arthritis     Atrial fibrillation     Bell's palsy     Boil of buttock     burst 12/19/22    CHF (congestive heart failure)     Chronic cough     COPD (chronic  obstructive pulmonary disease)     use O2  3l/m NC at night; also taking during day currently 12/4/23    Dizziness     Encounter for blood transfusion     GI bleed 2011    transfusion    H/O diverticulitis of colon     Hard of hearing     Heart murmur     Hematoma 02/2023    left hand    Heterozygous alpha 1-antitrypsin deficiency     History of home oxygen therapy     3L NC at night    Hypertension     Iron deficiency anemia 06/12/2025    Lung disease     copd    MONSERRAT (obstructive sleep apnea)     resolved with wt loss 131#    Pacemaker     Pneumonia     last episode 2019    Pulmonary edema     Skin cancer     Unspecified visual disturbance     episode of vision disturbance and dizziness...occasional recurrences     Family History   Problem Relation Name Age of Onset    Kidney failure Mother      Cancer Father Rm Wray     Alcohol abuse Father Rm Wray     Cancer Brother      Arthritis Maternal Grandmother Domenica Wray     Cancer Brother Rm Wray JR         pancreatic        Social History:   Marital Status:   Alcohol History:  reports that she does not currently use alcohol after a past usage of about 8.0 standard drinks of alcohol per week.  Tobacco History:  reports that she quit smoking about 14 years ago. Her smoking use included cigarettes. She started smoking about 54 years ago. She has a 80 pack-year smoking history. She has been exposed to tobacco smoke. She has never used smokeless tobacco.  Drug History:  reports no history of drug use.    Review of patient's allergies indicates:   Allergen Reactions    Sulfa (sulfonamide antibiotics) Itching       Current Medications[1]    Review of Systems   Constitutional:  Negative for chills, fatigue, fever and unexpected weight change.   HENT:  Negative for hearing loss and trouble swallowing.    Eyes:  Negative for photophobia and visual disturbance.   Respiratory:  Negative for cough, shortness of breath and wheezing.     Cardiovascular:  Positive for leg swelling. Negative for chest pain and palpitations.   Gastrointestinal:  Negative for abdominal pain and nausea.   Genitourinary:  Negative for dysuria and frequency.   Musculoskeletal:  Negative for arthralgias, back pain, joint swelling and myalgias.   Skin:  Positive for wound. Negative for rash.   Neurological:  Negative for tremors, seizures, weakness and headaches.   Hematological:  Does not bruise/bleed easily.         Objective:        Physical Exam:   Foot Exam  Physical Exam  Ortho/SPM Exam     Imaging:            Assessment:       1. Skin ulcer of right ankle with necrosis of muscle    2. Acute on chronic combined systolic and diastolic CHF, NYHA class 3    3. Numbness in feet    4. PAD (peripheral artery disease)    5. Acute right ankle pain    6. At high risk for inadequate nutritional intake    7. Foot pain, right    8. Open wound of right ankle, initial encounter    9. PVD (peripheral vascular disease)    10. 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 Former smoker    12. Decreased pedal pulses      Plan:   Skin ulcer of right ankle with necrosis of muscle    Acute on chronic combined systolic and diastolic CHF, NYHA class 3    Numbness in feet    PAD (peripheral artery disease)    Acute right ankle pain    At high risk for inadequate nutritional intake    Foot pain, right  -     oxyCODONE-acetaminophen (PERCOCET)  mg per tablet; Take 1 tablet by mouth every 4 (four) hours as needed for Pain.  Dispense: 26 tablet; Refill: 0    Open wound of right ankle, initial encounter    PVD (peripheral vascular disease)    Stage IV pressure ulcer of right ankle    Former smoker    Decreased pedal pulses      No follow-ups on file.    Procedures          Counseling:     I provided patient education verbally regarding:   Patient diagnosis, treatment options, as well as alternatives, risks, and benefits.     This note was created using Dragon voice recognition software  that occasionally misinterpreted phrases or words.                        [1]   Current Outpatient Medications   Medication Sig Dispense Refill    acetaminophen (TYLENOL ARTHRITIS ORAL) Take 2 tablets by mouth daily as needed.      albuterol (PROVENTIL/VENTOLIN HFA) 90 mcg/actuation inhaler INHALE 2 PUFFS EVERY 6 HOURS AS NEEDED FOR WHEEZING (RESCUE) 18 g 6    aspirin 81 MG Chew Chew and swallow 1 tablet (81 mg total) by mouth once daily. 30 tablet 2    clopidogreL (PLAVIX) 75 mg tablet TAKE 1 TABLET EVERY DAY 90 tablet 3    digoxin (LANOXIN) 125 mcg tablet Take 1 tablet (0.125 mg total) by mouth once daily. 30 tablet 11    ezetimibe (ZETIA) 10 mg tablet TAKE 1 TABLET EVERY DAY 90 tablet 3    fluticasone propionate (FLONASE) 50 mcg/actuation nasal spray 1 spray by Each Nostril route daily as needed for Rhinitis or Allergies.      fluticasone-umeclidin-vilanter (TRELEGY ELLIPTA) 100-62.5-25 mcg DsDv Inhale 1 puff into the lungs once daily. 90 each 3    melatonin 10 mg Tab Take 1 tablet by mouth nightly as needed (insomnia).      metoprolol succinate (TOPROL-XL) 25 MG 24 hr tablet TAKE 1 TABLET EVERY MORNING 90 tablet 3    oxyCODONE (ROXICODONE) 5 MG immediate release tablet Take 1 tablet (5 mg total) by mouth every 4 (four) hours as needed for Pain. 10 tablet 0    oxyCODONE-acetaminophen (PERCOCET)  mg per tablet Take 1 tablet by mouth every 4 (four) hours as needed for Pain. 26 tablet 0    oxyCODONE-acetaminophen (PERCOCET)  mg per tablet Take 1 tablet by mouth every 4 (four) hours as needed for Pain. 26 tablet 0    oxyCODONE-acetaminophen (PERCOCET)  mg per tablet Take 1 tablet by mouth every 4 (four) hours as needed for Pain. 26 tablet 0    oxyCODONE-acetaminophen (PERCOCET) 7.5-325 mg per tablet Take 1 tablet by mouth every 6 (six) hours as needed for Pain. 26 tablet 0    pantoprazole (PROTONIX) 40 MG tablet TAKE 1 TABLET EVERY DAY 90 tablet 3    povidone-iodine (BETADINE) 10 % external solution  Apply topically as needed for Wound Care.      rOPINIRole (REQUIP) 1 MG tablet TAKE 1 TABLET EVERY EVENING 90 tablet 1    rosuvastatin (CRESTOR) 40 MG Tab TAKE 1 TABLET EVERY EVENING 90 tablet 3    sacubitriL-valsartan (ENTRESTO) 24-26 mg per tablet Take 1 tablet by mouth 2 (two) times daily. 180 tablet 3    spironolactone (ALDACTONE) 25 MG tablet Take 1 tablet (25 mg total) by mouth once daily. 90 tablet 3    torsemide (DEMADEX) 20 MG Tab Take 1 tablet (20 mg total) by mouth once daily. 90 tablet 3     No current facility-administered medications for this visit.     Facility-Administered Medications Ordered in Other Visits   Medication Dose Route Frequency Provider Last Rate Last Admin    0.9% NaCl 250 mL flush bag   Intravenous PRN Enrique Addison MD        alteplase injection 2 mg  2 mg Intra-Catheter PRN Enrique Addison MD        EPINEPHrine (EPIPEN) 0.3 mg/0.3 mL pen injection 0.3 mg  0.3 mg Intramuscular Once PRN Enrique Addison MD        heparin, porcine (PF) 100 unit/mL injection flush 500 Units  500 Units Intravenous PRN Enrique Addison MD        hydrocortisone sodium succinate injection 100 mg  100 mg Intravenous Once PRN Enrique Addison MD        iron sucrose (VENOFER) 200 mg in 0.9% NaCl 100 mL IVPB  200 mg Intravenous 1 time in Clinic/HOD Enrique Addison  mL/hr at 07/15/25 0959 200 mg at 07/15/25 0959    sodium chloride 0.9% flush 10 mL  10 mL Intravenous PRN Enrique Addison MD

## 2025-07-15 NOTE — PLAN OF CARE
Problem: Adult Inpatient Plan of Care  Goal: Plan of Care Review  7/15/2025 1031 by Hannah Levy RN  Outcome: Met  7/15/2025 1031 by Hannah Levy RN  Outcome: Progressing  Goal: Patient-Specific Goal (Individualized)  7/15/2025 1031 by Hannah Levy RN  Outcome: Met  7/15/2025 1031 by Hannah Levy RN  Outcome: Progressing  Goal: Absence of Hospital-Acquired Illness or Injury  7/15/2025 1031 by Hannah Levy RN  Outcome: Met  7/15/2025 1031 by Hannah Levy RN  Outcome: Progressing  Goal: Optimal Comfort and Wellbeing  7/15/2025 1031 by Hannah Levy RN  Outcome: Met  7/15/2025 1031 by Hannah Levy RN  Outcome: Progressing  Goal: Readiness for Transition of Care  7/15/2025 1031 by Hannah Levy RN  Outcome: Met  7/15/2025 1031 by Hannah Levy RN  Outcome: Progressing

## 2025-07-16 ENCOUNTER — TELEPHONE (OUTPATIENT)
Dept: VASCULAR SURGERY | Facility: CLINIC | Age: 75
End: 2025-07-16
Payer: MEDICARE

## 2025-07-16 VITALS — TEMPERATURE: 98 F | RESPIRATION RATE: 18 BRPM

## 2025-07-16 NOTE — TELEPHONE ENCOUNTER
Contacted pt in response to message. States Dr. Benoit with wound care wants pt to FU with Dr. Valdivia as she feels wound is healing very slowly and that this may be due to decreased blood flow. Appts with Dr. Valdivia and with vascular lab rescheduled to sooner date, pt confirmed.     Copied from CRM #[id number]. Topic: General Inquiry - Patient Advice  >> Jul 16, 2025 10:52 AM Kenia wrote:  Type:  Needs Medical Advice    Who Called: Lisa called in regards to getting someone to please give her a call right ankle with an open wound   Would the patient rather a call back or a response via [facility]? Call back   Best Call Back Number: pt [phone]   Additional Information:

## 2025-07-16 NOTE — TELEPHONE ENCOUNTER
See previous note.     Copied from CRM #[id number]. Topic: General Inquiry - Patient Advice  >> Jul 16, 2025 10:52 AM Kenia wrote:  Type:  Needs Medical Advice    Who Called: Lisa called in regards to getting someone to please give her a call right ankle with an open wound   Would the patient rather a call back or a response via [facility]? Call back   Best Call Back Number: pt [phone]   Additional Information:

## 2025-07-21 ENCOUNTER — OFFICE VISIT (OUTPATIENT)
Dept: WOUND CARE | Facility: HOSPITAL | Age: 75
End: 2025-07-21
Attending: PODIATRIST
Payer: MEDICARE

## 2025-07-21 VITALS
HEART RATE: 62 BPM | TEMPERATURE: 98 F | RESPIRATION RATE: 16 BRPM | DIASTOLIC BLOOD PRESSURE: 58 MMHG | SYSTOLIC BLOOD PRESSURE: 130 MMHG

## 2025-07-21 DIAGNOSIS — Z87.891 FORMER SMOKER: ICD-10-CM

## 2025-07-21 DIAGNOSIS — Z91.89 AT HIGH RISK FOR INADEQUATE NUTRITIONAL INTAKE: ICD-10-CM

## 2025-07-21 DIAGNOSIS — I73.9 PAD (PERIPHERAL ARTERY DISEASE): ICD-10-CM

## 2025-07-21 DIAGNOSIS — R09.89 DECREASED PEDAL PULSES: ICD-10-CM

## 2025-07-21 DIAGNOSIS — L89.514 STAGE IV PRESSURE ULCER OF RIGHT ANKLE: Primary | ICD-10-CM

## 2025-07-21 DIAGNOSIS — I73.9 PVD (PERIPHERAL VASCULAR DISEASE): ICD-10-CM

## 2025-07-21 DIAGNOSIS — I50.43 ACUTE ON CHRONIC COMBINED SYSTOLIC AND DIASTOLIC CHF, NYHA CLASS 3: ICD-10-CM

## 2025-07-21 DIAGNOSIS — L97.313 SKIN ULCER OF RIGHT ANKLE WITH NECROSIS OF MUSCLE: ICD-10-CM

## 2025-07-21 DIAGNOSIS — R20.2 PARESTHESIAS: ICD-10-CM

## 2025-07-21 DIAGNOSIS — S91.001A OPEN WOUND OF RIGHT ANKLE, INITIAL ENCOUNTER: ICD-10-CM

## 2025-07-21 PROCEDURE — 99214 OFFICE O/P EST MOD 30 MIN: CPT | Mod: ,,, | Performed by: PODIATRIST

## 2025-07-21 PROCEDURE — 99213 OFFICE O/P EST LOW 20 MIN: CPT | Mod: PN | Performed by: PODIATRIST

## 2025-07-21 PROCEDURE — 1126F AMNT PAIN NOTED NONE PRSNT: CPT | Mod: CPTII,,, | Performed by: PODIATRIST

## 2025-07-21 PROCEDURE — 1159F MED LIST DOCD IN RCRD: CPT | Mod: CPTII,,, | Performed by: PODIATRIST

## 2025-07-21 PROCEDURE — 3075F SYST BP GE 130 - 139MM HG: CPT | Mod: CPTII,,, | Performed by: PODIATRIST

## 2025-07-21 PROCEDURE — 1160F RVW MEDS BY RX/DR IN RCRD: CPT | Mod: CPTII,,, | Performed by: PODIATRIST

## 2025-07-21 PROCEDURE — G0179 MD RECERTIFICATION HHA PT: HCPCS | Mod: ,,, | Performed by: PODIATRIST

## 2025-07-21 PROCEDURE — 3078F DIAST BP <80 MM HG: CPT | Mod: CPTII,,, | Performed by: PODIATRIST

## 2025-07-21 NOTE — PROGRESS NOTES
"  [address] [facility] [address]  [place] [address]  Phone: [phone]   [phone]    Patient's PCP:Hope Tang FNP  Referring Provider: No ref. provider found    Subjective:      Chief Complaint:: Wound Care and Wound Check    HPI  Lisa Smith is a 74 y.o. female who presents today with a complaint of right medial ankle wound.   See wound docs documentation for full assessment and evaluation of all wounds.      Patient continues to experience numbness and tingling in bilateral feet.  Paresthesias, and burning bilateral feet with no clearly identified trigger or source.  Referral has been placed to neurology.     Patient was under the care of a previous wound care provider.  Reviewed note from visits.  Taken from Wound providers last note on 05/06/2025:  "Patient presents for an evaluation of right medial ankle wound. She reports having a medial and lateral right ankle wound that started in March 2025. She reports the lateral right ankle wound healed. Pt follows with vascular surgery. She is s/p right common femoral/profunda endarterectomy and bovine patch on 4/2/25 with Dr. Valdivia. At her follow up appointment on 4/22/25, she was found to have a medial ankle wound. It was deemed primarily not ischemic and she was referred to wound care. She was instructed to dress her wound with betadine daily. Per Dr. Valdivia's note: "While the LANIE suggests lower than preoperatively I think this is an error given that the contralateral side which had no intervention also dropped significantly. PVR waveforms on the right arm much better, and her ischemic rest pain completely gone." She admits compliance with betadine daily. Pt previously smoked 2-3 ppds for 30 years. She quit in 2011. She wants to follow with wound care closer to her house in [place]. Denies fever, chills, erythema, warmth, purulent drainage, or pain."           Vascular Status/LANIE:  Patient under the care of vascular surgery.  Recent " "vascular intervention, will order arterial ultrasounds.   Nutrition Risk/Labs: "Tips and Tricks for wound healing" handout given with detailed guide to optimize nutritional status for wound healing.   Counseled patient on increasing protein intake, following a healthy diet, vitamins  Dietary:   As tolerated: 3 well-balanced meals  Increased protein: Premier, Boost, or Ensure, [place] Instant Breakfast (purchase sugarfree if Diabetic). If you have kidney or are on dialysis, patient please check with your Nephrologist as to how much protein you are allowed to take with your condition.   Faustino 1-2 times daily  Supplement with: Multivitamin with Zinc and Vitamin C 500mg twice a day. If you are on Coumadin, please contact the Coumadin Clinic before beginning a Multivitamin. Vitamin D3.  Recent Cultures & Dates:  See micro tab in chart  Antibiotics: doxycline  Radiology/Xray: See imaging tab in chart  Diabetes Status/HbA1c: See labs tab in chart  Offloading/Immobilization: Offloading fel placed in order to remove pressure from wound  Tobacco Use:  Previous smoker.  None currently  Additional Patient instructions: Keep wrap/ wound dressing clean, dry, and intact. May shower, using cast protector, plastic bag, or other methods as discussed to keep wrap dry, or may sponge bathe. If wrap gets wet, it needs to be removed by unwrapping it. Place temporary dressing of antibiotic ointment and bandage to wound and notify clinic as soon as possible for earlier appointment.         Patient to follow with primary doctor regularly to address ongoing medical conditions such as abnormal blood pressure readings.        EVALUATION, ASSESSMENT, & PLAN:      1. .See Wound Docs for assessment of wounds and procedure notes  2. Continue taking all medications as prescribed  3. Dressing changes, see Wound docs for dressing change orders  4. RTC one week  5. Counseled patient on increasing protein intake, not getting wound wet, keeping " dressing clean dry and intact, following a healthy diet, elevating legs when able, removing pressure from wound     Proper ulcer care and the possible need for serial debridements, topical medications, specific dressings and biological engineered skin substitutes if indicated  Discussed general issues surrounding recurrent/ nonhealing ulcers, along with the advantages & disadvantages of various treatment strategies.     Patient should call the office immediately if any signs of infection, such as fever, chills, sweats, increased redness or pain.     Patient was instructed to call the clinic or go to the emergency department if their symptoms do not improve, worsens, or if new symptoms develop.  Patient was advised that if any increased swelling, pain, or numbness arise to go immediately to the ED. Patient knows to call any time if an emergency arises. Shared decision making occurred and patient verbalized understanding in agreement with this plan.         I spent a total of 45 minutes on the day of the visit.This includes face to face time and non-face to face time preparing to see the patient (eg, review of tests), obtaining and/or reviewing separately obtained history, documenting clinical information in the electronic or other health record, independently interpreting results and communicating results to the patient/family/caregiver, or care coordinator.        Much of the documentation for this visit was completed in the Wound Docs system.  Please see the attached documentation for further details about the patient's care. Scanned under the Media tab.         Izabella Benoit DPM     Vitals:    07/21/25 1006   BP: (!) 130/58   Pulse: 62   Resp: 16   Temp: 98.2 °F (36.8 °C)   PainSc: 0-No pain        Shoe Size:     Past Surgical History:   Procedure Laterality Date    CARDIAC ELECTROPHYSIOLOGY STUDY AND ABLATION      CAROTID ENDARTERECTOMY Left 12/22/2022    Procedure: ENDARTERECTOMY-CAROTID;  Surgeon: Maximilian PLASCENCIA  MD Mohsen;  Location: [facility] [place] [facility] OR;  Service: Peripheral Vascular;  Laterality: Left;    CAROTID STENT Left 02/29/2024    Procedure: INSERTION, STENT, ARTERY, CAROTID;  Surgeon: CIRILO Valdivia III, MD;  [facility];  Service: Vascular;  Laterality: Left;  left carotid artery stent placement  mgy  146.29   gymc2  8.0730  fluro time  4.8min    CARPAL TUNNEL RELEASE Left     CHOLECYSTECTOMY  1995    CLOSURE OF LEFT ATRIAL APPENDAGE USING DEVICE Right 01/13/2025    Procedure: Left atrial appendage closure device;  Surgeon: Roxana Cantu MD;  Location: [facility] [place] [facility] CATH/EP LAB;  Service: Cardiology;  Laterality: Right;    ENDARTERECTOMY OF FEMORAL ARTERY Right 4/2/2025    Procedure: ENDARTERECTOMY, FEMORAL;  Surgeon: CIRILO Valdivia III, MD;  [facility] [facility] [place] [address] [place] [address];  Service: Vascular;  Laterality: Right;    EYE SURGERY Bilateral March 2012    cataract    HYSTERECTOMY  1988    INSERT / REPLACE / REMOVE PACEMAKER      KNEE ARTHROSCOPY Left     LEFT HEART CATHETERIZATION  11/01/2023    Procedure: Left heart cath;  Surgeon: Puma Shelton MD;  Location: [facility] CATH;  Service: Cardiology;;    REPLACEMENT OF PACEMAKER GENERATOR Left 01/20/2022    Procedure: REPLACEMENT, PACEMAKER GENERATOR;  Surgeon: Raymond Pérez MD;  Location: [facility] [place] [facility] CATH/EP LAB;  Service: Cardiology;  Laterality: Left;    SKIN CANCER EXCISION      TRANSCATHETER AORTIC VALVE REPLACEMENT (TAVR) Bilateral 12/06/2023    Procedure: (TAVR);  Surgeon: Puma Shelton MD;  Location: [facility] CATH;  Service: Cardiology;  Laterality: Bilateral;    TRANSCATHETER AORTIC VALVE REPLACEMENT (TAVR) Bilateral 12/06/2023    Procedure: (TAVR) - Surgeon;  Surgeon: Maximilian Connolly MD;  Location: [facility] CATH;  Service: Peripheral Vascular;  Laterality: Bilateral;     Past Medical History:   Diagnosis Date    Anticoagulant long-term use     Arthritis     Atrial fibrillation     Bell's palsy     Boil of buttock     burst 12/19/22    CHF (congestive heart failure)      Chronic cough     COPD (chronic obstructive pulmonary disease)     use O2  3l/m NC at night; also taking during day currently 12/4/23    Dizziness     Encounter for blood transfusion     GI bleed 2011    transfusion    H/O diverticulitis of colon     Hard of hearing     Heart murmur     Hematoma 02/2023    left hand    Heterozygous alpha 1-antitrypsin deficiency     History of home oxygen therapy     3L NC at night    Hypertension     Iron deficiency anemia 06/12/2025    Lung disease     copd    MONSERRAT (obstructive sleep apnea)     resolved with wt loss 131#    Pacemaker     Pneumonia     last episode 2019    Pulmonary edema     Skin cancer     Unspecified visual disturbance     episode of vision disturbance and dizziness...occasional recurrences     Family History   Problem Relation Name Age of Onset    Kidney failure Mother      Cancer Father Rm Wray     Alcohol abuse Father Rm Wray     Cancer Brother      Arthritis Maternal Grandmother Domenica Wray     Cancer Brother Rm Nils JR         pancreatic        Social History:   Marital Status:   Alcohol History:  reports that she does not currently use alcohol after a past usage of about 8.0 standard drinks of alcohol per week.  Tobacco History:  reports that she quit smoking about 14 years ago. Her smoking use included cigarettes. She started smoking about 54 years ago. She has a 80 pack-year smoking history. She has been exposed to tobacco smoke. She has never used smokeless tobacco.  Drug History:  reports no history of drug use.    Review of patient's allergies indicates:   Allergen Reactions    Sulfa (sulfonamide antibiotics) Itching       Current Medications[1]    Review of Systems   Constitutional:  Negative for chills, fatigue, fever and unexpected weight change.   HENT:  Negative for hearing loss and trouble swallowing.    Eyes:  Negative for photophobia and visual disturbance.   Respiratory:  Negative for cough, shortness of breath  and wheezing.    Cardiovascular:  Positive for leg swelling. Negative for chest pain and palpitations.   Gastrointestinal:  Negative for abdominal pain and nausea.   Genitourinary:  Negative for dysuria and frequency.   Musculoskeletal:  Negative for arthralgias, back pain, joint swelling and myalgias.   Skin:  Positive for wound. Negative for rash.   Neurological:  Negative for tremors, seizures, weakness and headaches.   Hematological:  Does not bruise/bleed easily.         Objective:        Physical Exam:   Foot Exam  Physical Exam  Ortho/SPM Exam     Imaging:            Assessment:       1. 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 At high risk for inadequate nutritional intake    3. PAD (peripheral artery disease)    4. Decreased pedal pulses    5. Skin ulcer of right ankle with necrosis of muscle    6. Acute on chronic combined systolic and diastolic CHF, NYHA class 3    7. Open wound of right ankle, initial encounter    8. PVD (peripheral vascular disease)    9. Former smoker    10. 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    Procedures          Counseling:     I provided patient education verbally regarding:   Patient diagnosis, treatment options, as well as alternatives, risks, and benefits.     This note was created using Dragon voice recognition software that occasionally misinterpreted phrases or words.                          [1]   Current Outpatient Medications   Medication Sig Dispense Refill    acetaminophen (TYLENOL ARTHRITIS ORAL) Take 2 tablets by mouth daily as needed.       albuterol (PROVENTIL/VENTOLIN HFA) 90 mcg/actuation inhaler INHALE 2 PUFFS EVERY 6 HOURS AS NEEDED FOR WHEEZING (RESCUE) 18 g 6    aspirin 81 MG Chew Chew and swallow 1 tablet (81 mg total) by mouth once daily. 30 tablet 2    clopidogreL (PLAVIX) 75 mg tablet TAKE 1 TABLET EVERY DAY 90 tablet 3    digoxin (LANOXIN) 125 mcg tablet Take 1 tablet (0.125 mg total) by mouth once daily. 30 tablet 11    ezetimibe (ZETIA) 10 mg tablet TAKE 1 TABLET EVERY DAY 90 tablet 3    fluticasone propionate (FLONASE) 50 mcg/actuation nasal spray 1 spray by Each Nostril route daily as needed for Rhinitis or Allergies.      fluticasone-umeclidin-vilanter (TRELEGY ELLIPTA) 100-62.5-25 mcg DsDv Inhale 1 puff into the lungs once daily. 90 each 3    melatonin 10 mg Tab Take 1 tablet by mouth nightly as needed (insomnia).      metoprolol succinate (TOPROL-XL) 25 MG 24 hr tablet TAKE 1 TABLET EVERY MORNING 90 tablet 3    oxyCODONE (ROXICODONE) 5 MG immediate release tablet Take 1 tablet (5 mg total) by mouth every 4 (four) hours as needed for Pain. 10 tablet 0    oxyCODONE-acetaminophen (PERCOCET)  mg per tablet Take 1 tablet by mouth every 4 (four) hours as needed for Pain. 26 tablet 0    oxyCODONE-acetaminophen (PERCOCET)  mg per tablet Take 1 tablet by mouth every 4 (four) hours as needed for Pain. 26 tablet 0    oxyCODONE-acetaminophen (PERCOCET)  mg per tablet Take 1 tablet by mouth every 4 (four) hours as needed for Pain. 26 tablet 0    oxyCODONE-acetaminophen (PERCOCET) 7.5-325 mg per tablet Take 1 tablet by mouth every 6 (six) hours as needed for Pain. 26 tablet 0    pantoprazole (PROTONIX) 40 MG tablet TAKE 1 TABLET EVERY DAY 90 tablet 3    povidone-iodine (BETADINE) 10 % external solution Apply topically as needed for Wound Care.      rOPINIRole (REQUIP) 1 MG tablet TAKE 1 TABLET EVERY EVENING 90 tablet 1    rosuvastatin (CRESTOR) 40 MG Tab TAKE 1 TABLET EVERY EVENING 90 tablet 3    sacubitriL-valsartan (ENTRESTO)  24-26 mg per tablet Take 1 tablet by mouth 2 (two) times daily. 180 tablet 3    spironolactone (ALDACTONE) 25 MG tablet Take 1 tablet (25 mg total) by mouth once daily. 90 tablet 3    torsemide (DEMADEX) 20 MG Tab Take 1 tablet (20 mg total) by mouth once daily. 90 tablet 3     No current facility-administered medications for this visit.

## 2025-07-22 ENCOUNTER — TELEPHONE (OUTPATIENT)
Dept: CARDIOLOGY | Facility: CLINIC | Age: 75
End: 2025-07-22
Payer: MEDICARE

## 2025-07-22 ENCOUNTER — INFUSION (OUTPATIENT)
Dept: INFUSION THERAPY | Facility: HOSPITAL | Age: 75
End: 2025-07-22
Attending: INTERNAL MEDICINE
Payer: MEDICARE

## 2025-07-22 VITALS
DIASTOLIC BLOOD PRESSURE: 63 MMHG | SYSTOLIC BLOOD PRESSURE: 140 MMHG | RESPIRATION RATE: 18 BRPM | TEMPERATURE: 98 F | OXYGEN SATURATION: 98 % | HEART RATE: 60 BPM

## 2025-07-22 DIAGNOSIS — D50.8 OTHER IRON DEFICIENCY ANEMIA: Primary | ICD-10-CM

## 2025-07-22 PROCEDURE — 96365 THER/PROPH/DIAG IV INF INIT: CPT

## 2025-07-22 PROCEDURE — 63600175 PHARM REV CODE 636 W HCPCS: Performed by: INTERNAL MEDICINE

## 2025-07-22 PROCEDURE — 25000003 PHARM REV CODE 250: Performed by: INTERNAL MEDICINE

## 2025-07-22 PROCEDURE — A4216 STERILE WATER/SALINE, 10 ML: HCPCS | Performed by: INTERNAL MEDICINE

## 2025-07-22 RX ORDER — EPINEPHRINE 0.3 MG/.3ML
0.3 INJECTION SUBCUTANEOUS ONCE AS NEEDED
Status: CANCELLED | OUTPATIENT
Start: 2025-07-22

## 2025-07-22 RX ORDER — ACETAMINOPHEN 325 MG/1
650 TABLET ORAL
Status: DISCONTINUED | OUTPATIENT
Start: 2025-07-22 | End: 2025-07-22

## 2025-07-22 RX ORDER — SODIUM CHLORIDE 0.9 % (FLUSH) 0.9 %
10 SYRINGE (ML) INJECTION
Status: CANCELLED | OUTPATIENT
Start: 2025-07-22

## 2025-07-22 RX ORDER — ACETAMINOPHEN 325 MG/1
650 TABLET ORAL
Status: CANCELLED | OUTPATIENT
Start: 2025-07-22 | End: 2025-07-22

## 2025-07-22 RX ORDER — METHYLPREDNISOLONE SOD SUCC 125 MG
40 VIAL (EA) INJECTION
Status: DISCONTINUED | OUTPATIENT
Start: 2025-07-22 | End: 2025-07-22

## 2025-07-22 RX ORDER — FAMOTIDINE 10 MG/ML
20 INJECTION, SOLUTION INTRAVENOUS
Status: CANCELLED | OUTPATIENT
Start: 2025-07-22 | End: 2025-07-22

## 2025-07-22 RX ORDER — HEPARIN 100 UNIT/ML
500 SYRINGE INTRAVENOUS
Status: DISCONTINUED | OUTPATIENT
Start: 2025-07-22 | End: 2025-07-22

## 2025-07-22 RX ORDER — SODIUM CHLORIDE 0.9 % (FLUSH) 0.9 %
10 SYRINGE (ML) INJECTION
Status: DISCONTINUED | OUTPATIENT
Start: 2025-07-22 | End: 2025-07-22

## 2025-07-22 RX ORDER — HEPARIN 100 UNIT/ML
500 SYRINGE INTRAVENOUS
Status: CANCELLED | OUTPATIENT
Start: 2025-07-22

## 2025-07-22 RX ORDER — DIGOXIN 125 MCG
125 TABLET ORAL DAILY
Qty: 90 TABLET | Refills: 3 | Status: SHIPPED | OUTPATIENT
Start: 2025-07-22 | End: 2025-07-23 | Stop reason: SDUPTHER

## 2025-07-22 RX ORDER — FAMOTIDINE 10 MG/ML
20 INJECTION, SOLUTION INTRAVENOUS
Status: DISCONTINUED | OUTPATIENT
Start: 2025-07-22 | End: 2025-07-22

## 2025-07-22 RX ORDER — METHYLPREDNISOLONE SOD SUCC 125 MG
40 VIAL (EA) INJECTION
Status: CANCELLED | OUTPATIENT
Start: 2025-07-22 | End: 2025-07-22

## 2025-07-22 RX ADMIN — Medication 10 ML: at 09:07

## 2025-07-22 RX ADMIN — IRON SUCROSE 200 MG: 20 INJECTION, SOLUTION INTRAVENOUS at 09:07

## 2025-07-22 NOTE — TELEPHONE ENCOUNTER
LVM to CB about coming in tomorrow for office visit prior to GRETCHEN/ Cardioversion for 07/25/2025

## 2025-07-23 ENCOUNTER — OFFICE VISIT (OUTPATIENT)
Dept: CARDIOLOGY | Facility: CLINIC | Age: 75
End: 2025-07-23
Payer: MEDICARE

## 2025-07-23 ENCOUNTER — LAB VISIT (OUTPATIENT)
Dept: LAB | Facility: HOSPITAL | Age: 75
End: 2025-07-23
Attending: NURSE PRACTITIONER
Payer: MEDICARE

## 2025-07-23 DIAGNOSIS — Z95.818 PRESENCE OF WATCHMAN LEFT ATRIAL APPENDAGE CLOSURE DEVICE: ICD-10-CM

## 2025-07-23 DIAGNOSIS — Z95.0 CARDIAC PACEMAKER IN SITU: ICD-10-CM

## 2025-07-23 DIAGNOSIS — I50.43 ACUTE ON CHRONIC COMBINED SYSTOLIC AND DIASTOLIC CHF, NYHA CLASS 3: ICD-10-CM

## 2025-07-23 DIAGNOSIS — I50.23 ACUTE ON CHRONIC HFREF (HEART FAILURE WITH REDUCED EJECTION FRACTION): Primary | ICD-10-CM

## 2025-07-23 DIAGNOSIS — I50.23 ACUTE ON CHRONIC HFREF (HEART FAILURE WITH REDUCED EJECTION FRACTION): ICD-10-CM

## 2025-07-23 DIAGNOSIS — Z95.3 S/P TAVR (TRANSCATHETER AORTIC VALVE REPLACEMENT): ICD-10-CM

## 2025-07-23 DIAGNOSIS — I48.21 PERMANENT ATRIAL FIBRILLATION: ICD-10-CM

## 2025-07-23 DIAGNOSIS — I73.9 PAD (PERIPHERAL ARTERY DISEASE): ICD-10-CM

## 2025-07-23 LAB
ABSOLUTE EOSINOPHIL (SMH): 0.17 K/UL
ABSOLUTE MONOCYTE (SMH): 0.93 K/UL (ref 0.3–1)
ABSOLUTE NEUTROPHIL COUNT (SMH): 11.4 K/UL (ref 1.8–7.7)
ANION GAP (SMH): 7 MMOL/L (ref 8–16)
BASOPHILS # BLD AUTO: 0.05 K/UL
BASOPHILS NFR BLD AUTO: 0.4 %
BUN SERPL-MCNC: 34 MG/DL (ref 8–23)
CALCIUM SERPL-MCNC: 9.8 MG/DL (ref 8.7–10.5)
CHLORIDE SERPL-SCNC: 101 MMOL/L (ref 95–110)
CO2 SERPL-SCNC: 31 MMOL/L (ref 23–29)
CREAT SERPL-MCNC: 0.6 MG/DL (ref 0.5–1.4)
ERYTHROCYTE [DISTWIDTH] IN BLOOD BY AUTOMATED COUNT: 15.4 % (ref 11.5–14.5)
GFR SERPLBLD CREATININE-BSD FMLA CKD-EPI: >60 ML/MIN/1.73/M2
GLUCOSE SERPL-MCNC: 109 MG/DL (ref 70–110)
HCT VFR BLD AUTO: 31.3 % (ref 37–48.5)
HGB BLD-MCNC: 9.9 GM/DL (ref 12–16)
IMM GRANULOCYTES # BLD AUTO: 0.11 K/UL (ref 0–0.04)
IMM GRANULOCYTES NFR BLD AUTO: 0.8 % (ref 0–0.5)
LYMPHOCYTES # BLD AUTO: 1.51 K/UL (ref 1–4.8)
MAGNESIUM SERPL-MCNC: 1.8 MG/DL (ref 1.6–2.6)
MCH RBC QN AUTO: 31.5 PG (ref 27–31)
MCHC RBC AUTO-ENTMCNC: 31.6 G/DL (ref 32–36)
MCV RBC AUTO: 100 FL (ref 82–98)
NUCLEATED RBC (/100WBC) (SMH): 0 /100 WBC
PLATELET # BLD AUTO: 344 K/UL (ref 150–450)
PMV BLD AUTO: 9.8 FL (ref 9.2–12.9)
POTASSIUM SERPL-SCNC: 4.9 MMOL/L (ref 3.5–5.1)
RBC # BLD AUTO: 3.14 M/UL (ref 4–5.4)
RELATIVE EOSINOPHIL (SMH): 1.2 % (ref 0–8)
RELATIVE LYMPHOCYTE (SMH): 10.7 % (ref 18–48)
RELATIVE MONOCYTE (SMH): 6.6 % (ref 4–15)
RELATIVE NEUTROPHIL (SMH): 80.3 % (ref 38–73)
SODIUM SERPL-SCNC: 139 MMOL/L (ref 136–145)
WBC # BLD AUTO: 14.16 K/UL (ref 3.9–12.7)

## 2025-07-23 PROCEDURE — 83735 ASSAY OF MAGNESIUM: CPT

## 2025-07-23 PROCEDURE — 85025 COMPLETE CBC W/AUTO DIFF WBC: CPT

## 2025-07-23 PROCEDURE — 82374 ASSAY BLOOD CARBON DIOXIDE: CPT

## 2025-07-23 PROCEDURE — 36415 COLL VENOUS BLD VENIPUNCTURE: CPT

## 2025-07-23 PROCEDURE — 99999 PR PBB SHADOW E&M-EST. PATIENT-LVL I: CPT | Mod: PBBFAC,,, | Performed by: NURSE PRACTITIONER

## 2025-07-23 RX ORDER — DIGOXIN 125 MCG
125 TABLET ORAL DAILY
Qty: 90 TABLET | Refills: 3 | Status: SHIPPED | OUTPATIENT
Start: 2025-07-23

## 2025-07-23 RX ORDER — POLYETHYLENE GLYCOL 3350 17 G/17G
POWDER, FOR SOLUTION ORAL
COMMUNITY

## 2025-07-23 NOTE — ASSESSMENT & PLAN NOTE
Pt is scheduled for 6 mo post Watchman GRETCHEN on 7/25/25 with dr Pérez  Risks of GRETCHEN were discussed with patient the risks include but not limited to oropharyngeal trauma, dental trauma, trauma to the esophagus, possible aspiration, and reaction to anesthesia.    She has dentures. No bleeding tendencies or esophageal issues  Dr Pérez requested BMP, CBC and magnesium to be done today

## 2025-07-23 NOTE — PROGRESS NOTES
Subjective:    Patient ID:  Lisa Smith is a 74 y.o. female patient here for evaluation Follow-up ( 5-6 month f/u ), Palpitations, and Fatigue    History of Present Illness:     Pt is s/p Watchman device implantation on 1/13/25 per Dr Cantu  Our office was contacted by Dr Cantu's staff requesting 6 mo follow up GRETCHEN  See Below  ----- Message from FAM Giordano sent at 7/9/2025 11:12 AM CDT -----  Good afternoon,  Mrs. Smith had her Watchman implant in January and needs her follow up GRETCHEN.      Thank you,  FAM Giordano      Pt had GRETCHEN previously on day of procedure  She denies any anesthesia complications in past  Pt has chronic wound on her right inner ankle since April for which she is getting wound care per DR Benoit  She has chronic cough from COPD but no URI symptoms;  She has dentures, no bleeding tendencies  Had labs a few months ago  She has pacer, no device check in at least 2 years noted          Most Recent Echocardiogram Results  Results for orders placed during the hospital encounter of 01/13/25    Echo    Interpretation Summary    Left Ventricle: Regional wall motion abnormalities present. Septal motion is consistent with pacing. There is moderately reduced systolic function with a visually estimated ejection fraction of 35 - 40%.    Left Atrium: Left atrium is dilated.    Right Atrium: Right atrium is dilated.    Pericardium: There is no pericardial effusion.    A limited echo was performed using limited 2D.      Most Recent Nuclear Stress Test Results  No results found for this or any previous visit.      Most Recent Cardiac PET Stress Test Results  No results found for this or any previous visit.      Most Recent Cardiovascular Angiogram results  Results for orders placed during the hospital encounter of 01/13/25    Cardiac catheterization    Conclusion  LEFT ATRIAL APPENDAGE CLOSURE PROCEDURE NOTE      Date of procedure: 01/13/2025    : Roxana Cantu MD      Procedure:  Left atrial  appendage closure using 27-mm Watchman FLX Pro    Anesthesia: General anesthesia with endotracheal intubation  Additional arterial and venous lines were placed by anesthesiology as needed.    Indication:  Atrial fibrillation with [id number] score 6 and HAS-BLED score 5.    Description of the Procedure:  Following informed consent, the patient was bought to the Cath Lab and placed under anesthesia as above.  Preprocedure RUY sizing was performed using gated CT. GRETCHEN was used to aid in guidance of the procedure; this demonstrated no thrombus in the left atrial appendage and the following baseline ostial measurements:    Right femoral vein access was obtained with a micropuncture needle and ultrasound guidance and a short 8 French sheath was placed. 100 units/kg IV heparin was administered with additional heparin as needed to achieve and maintain ACT greater than 300 seconds.    Transseptal puncture was performed under GRETCHEN and fluoroscopy guidance using a VersaCross system.  The TruSteer Watchman Access Sheath (WAS) was advanced into the left atrium over the VersaCross wire. The wire and the WAS dilator were then removed. A Pigtail catheter was advanced into the left atrial appendage under fluoroscopy and GRETCHEN guidance.  Baseline LA pressure was 18.    Angiography through the pigtail catheter was used to confirm RUY shape and plan a landing zone.  A 27-mm Watchman FLX Pro device was chosen. The WAS was advanced over the pigtail catheter to deployment position.    The Watchman Delivery System (WDS) was prepped in the usual fashion.  The pigtail catheter was removed from the WAS and the WDS was advanced under fluoroscopy and GRETCHEN guidance.  The device was deployed under fluoroscopy and GRETCHEN in the usual manner.    Device release criteria were verified as follows:  Position was assessed on GRETCHEN and using angiography and deemed to be satisfactory  [place] was assessed on GRETCHEN and fluoroscopy and deemed to be satisfactory  Size  was assessed on GRETCHEN and a compression ratio of 10-19% was achieved  Seal was assessed on GRETCHEN and there was no peridevice leak.    The device was released in a standard fashion and reevaluation with GRETCHEN and fluoroscopy ruled out embolization/peridevice leak.    The WDS was removed.  The WAS was then removed and hemostasis achieved using manual pressure.  Protamine IV was administered.  The patient was extubated in the Cath Lab and demonstrated no neurological deficits.    Complications: none  Blood loss: <50 mL  Contrast: 20 mL        Conclusions:  Successful percutaneous left atrial appendage closure using 27-mm Watchman FLX Pro  Normal TAV leaflet excursion with moderate paravalvular leak    Plan:  Bedrest for 2 hours  Limited TTE to rule out pericardial effusion and device embolization at 4 hours.  Discharge in 6 hours if no complications.  Planned postprocedure antithrombotic therapy provided follow-up doesn't reveal late complications:  Clopidogrel monotherapy - check platelet response assay in follow-up  Endocarditis prophylaxis indefinitely (history of LULU)  Office visit in 6 weeks per protocol  GRETCHEN in 3 months.      Roxana Cantu MD, [facility]  Interventional Cardiology/Structural Heart Disease  [facility]  Office: [phone]      The procedure log was documented by Documenter: Babs Morris RT and verified by Roxana Cantu MD.    Date: 1/13/2025  Time: 10:15 AM      Other Most Recent Cardiology Results  Results for orders placed during the hospital encounter of 04/02/25    Cardiac monitoring strips      REVIEW OF SYSTEMS: As noted in HPI       Past Medical History:   Diagnosis Date    Anticoagulant long-term use     Arthritis     Atrial fibrillation     Bell's palsy     Boil of buttock     burst 12/19/22    CHF (congestive heart failure)     Chronic cough     COPD (chronic obstructive pulmonary disease)     use O2  3l/m NC at night; also taking during day currently  12/4/23    Dizziness     Encounter for blood transfusion     GI bleed 2011    transfusion    H/O diverticulitis of colon     Hard of hearing     Heart murmur     Hematoma 02/2023    left hand    Heterozygous alpha 1-antitrypsin deficiency     History of home oxygen therapy     3L NC at night    Hypertension     Iron deficiency anemia 06/12/2025    Lung disease     copd    MONSERRAT (obstructive sleep apnea)     resolved with wt loss 131#    Pacemaker     Pneumonia     last episode 2019    Pulmonary edema     Skin cancer     Unspecified visual disturbance     episode of vision disturbance and dizziness...occasional recurrences     Past Surgical History:   Procedure Laterality Date    CARDIAC ELECTROPHYSIOLOGY STUDY AND ABLATION      CAROTID ENDARTERECTOMY Left 12/22/2022    Procedure: ENDARTERECTOMY-CAROTID;  Surgeon: Maximilian Connolly MD;  Location: [facility] OR;  Service: Peripheral Vascular;  Laterality: Left;    CAROTID STENT Left 02/29/2024    Procedure: INSERTION, STENT, ARTERY, CAROTID;  Surgeon: CIRILO Valdivia III, MD;  Location: [facility] OR [address] [place] [address];  Service: Vascular;  Laterality: Left;  left carotid artery stent placement  mgy  146.29   gymc2  8.0730  fluro time  4.8min    CARPAL TUNNEL RELEASE Left     CHOLECYSTECTOMY  1995    CLOSURE OF LEFT ATRIAL APPENDAGE USING DEVICE Right 01/13/2025    Procedure: Left atrial appendage closure device;  Surgeon: Roxana Cantu MD;  Location: [facility] CATH/EP LAB;  Service: Cardiology;  Laterality: Right;    ENDARTERECTOMY OF FEMORAL ARTERY Right 4/2/2025    Procedure: ENDARTERECTOMY, FEMORAL;  Surgeon: CIRILO Valdivia III, MD;  [facility];  Service: Vascular;  Laterality: Right;    EYE SURGERY Bilateral March 2012    cataract    HYSTERECTOMY  1988    INSERT / REPLACE / REMOVE PACEMAKER      KNEE ARTHROSCOPY Left     LEFT HEART CATHETERIZATION  11/01/2023    Procedure: Left heart cath;  Surgeon: Puma Shelton MD;  Location: [facility] CATH;  Service: Cardiology;;     REPLACEMENT OF PACEMAKER GENERATOR Left 01/20/2022    Procedure: REPLACEMENT, PACEMAKER GENERATOR;  Surgeon: Raymond Pérez MD;  Location: [facility] CATH/EP LAB;  Service: Cardiology;  Laterality: Left;    SKIN CANCER EXCISION      TRANSCATHETER AORTIC VALVE REPLACEMENT (TAVR) Bilateral 12/06/2023    Procedure: (TAVR);  Surgeon: Puma Shelton MD;  Location: [facility] CATH;  Service: Cardiology;  Laterality: Bilateral;    TRANSCATHETER AORTIC VALVE REPLACEMENT (TAVR) Bilateral 12/06/2023    Procedure: (TAVR) - Surgeon;  Surgeon: Maximilian Connolly MD;  Location: [facility] CATH;  Service: Peripheral Vascular;  Laterality: Bilateral;     Social History[1]      Objective    There were no vitals filed for this visit.    LAST EKG  Results for orders placed or performed in visit on 01/09/25   IN OFFICE EKG 12-LEAD (to [place])    Collection Time: 01/09/25 12:33 PM   Result Value Ref Range    QRS Duration 178 ms    [facility] QTC Calculation 442 ms    Narrative    Test Reason : I48.21,Z98.890,Z86.79,I35.0,Z95.2,I50.43,I25.10,I49.5,Z95.0,I65.29,Z98.890,    Vent. Rate :  61 BPM     Atrial Rate :  31 BPM     P-R Int :    ms          QRS Dur : 178 ms      QT Int : 440 ms       P-R-T Axes :    -80  86 degrees    QTcB Int : 442 ms    Atrial fibrillation with ventricular pacing  When compared with ECG of 05-Dec-2024 07:46,  No significant change was found  Confirmed by Preston Reagan ([id number]) on 1/10/2025 8:18:04 AM    Referred By:            Confirmed By: Preston Reagan     LIPIDS - LAST 2   Lab Results   Component Value Date    CHOL 199 05/10/2022    CHOL 190 02/17/2020    HDL 59 05/10/2022    HDL 68 02/17/2020    LDLCALC 112.0 05/10/2022    LDLCALC 106.0 02/17/2020    TRIG 140 05/10/2022    TRIG 80 02/17/2020    CHOLHDL 29.6 05/10/2022    CHOLHDL 35.8 02/17/2020     CARDIAC PROFILE - LAST 2  Lab Results   Component Value Date     (H) 02/11/2025     (H) 12/05/2024    TROPONINI <0.030 11/25/2019      CBC - LAST 2  Lab Results   Component  Value Date    WBC 14.16 (H) 07/23/2025    WBC 12.66 07/03/2025    RBC 3.14 (L) 07/23/2025    RBC 3.01 (L) 07/03/2025    HGB 9.9 (L) 07/23/2025    HGB 9.6 (L) 07/03/2025    HCT 31.3 (L) 07/23/2025    HCT 29.8 (L) 07/03/2025     07/23/2025     07/03/2025     Lab Results   Component Value Date    LABPT 23.0 (H) 01/23/2024    LABPT 23.1 (H) 01/23/2024    INR 1.0 04/02/2025    INR 1.0 01/09/2025    APTT 23.9 04/02/2025    APTT 28.1 01/09/2025     CHEMISTRY - LAST 2  Lab Results   Component Value Date     07/03/2025     06/02/2025    K 4.4 07/03/2025    K 5.2 (H) 06/02/2025    CL 97 07/03/2025     06/02/2025    CO2 31 (H) 07/03/2025    CO2 30 (H) 06/02/2025    ANIONGAP 9 07/03/2025    ANIONGAP 7 (L) 06/02/2025    BUN 60 (H) 07/03/2025    BUN 56 (H) 06/02/2025    CREATININE 0.8 07/03/2025    CREATININE 0.8 06/02/2025     07/03/2025     (H) 06/02/2025    CALCIUM 9.6 07/03/2025    CALCIUM 10.0 06/02/2025    PH 7.402 12/05/2024    MG 2.0 04/04/2025    MG 2.1 04/03/2025    ALBUMIN 4.2 07/03/2025    ALBUMIN 4.3 06/02/2025    PROT 7.4 07/03/2025    PROT 7.7 06/02/2025    ALKPHOS 81 07/03/2025    ALKPHOS 81 06/02/2025    ALT 9 (L) 07/03/2025    ALT 9 (L) 06/02/2025    AST 21 07/03/2025    AST 29 06/02/2025    BILITOT 0.5 07/03/2025    BILITOT 0.6 06/02/2025      ENDOCRINE - LAST 2  Lab Results   Component Value Date    HGBA1C 5.2 02/17/2020    TSH 1.062 02/11/2025    TSH 1.040 05/10/2022        PHYSICAL EXAM  CONSTITUTIONAL: elderly female breathing comfortably in no apparent distress  NECK: no carotid bruit, no JVD  LUNGS: scattered rhonchi clear w/ coughing  CHEST WALL: no tenderness; left CW pacer device  HEART: regular rate and rhythm, S1, S2 normal, + murmur  ABDOMEN: soft, non-tender; bowel sounds normal; no masses  EXTREMITIES: Extremities normal, no edema, no calf tenderness noted  NEURO: AAO X 3, speech clear, memory clear    I HAVE REVIEWED :    The vital signs, most recent  cardiac testing, and most recent pertinent non-cardiology provider notes.    Current Outpatient Medications   Medication Instructions    acetaminophen (TYLENOL ARTHRITIS ORAL) 2 tablets, Daily PRN    albuterol (PROVENTIL/VENTOLIN HFA) 90 mcg/actuation inhaler INHALE 2 PUFFS EVERY 6 HOURS AS NEEDED FOR WHEEZING (RESCUE)    aspirin 81 MG Chew Chew and swallow 1 tablet (81 mg total) by mouth once daily.    clopidogreL (PLAVIX) 75 mg, Oral    digoxin (LANOXIN) 125 mcg, Oral, Daily    ezetimibe (ZETIA) 10 mg, Oral    fluticasone propionate (FLONASE) 50 mcg/actuation nasal spray 1 spray, Daily PRN    fluticasone-umeclidin-vilanter (TRELEGY ELLIPTA) 100-62.5-25 mcg DsDv 1 puff, Inhalation, Daily    melatonin 10 mg Tab 1 tablet, Nightly PRN    metoprolol succinate (TOPROL-XL) 25 mg, Oral, Every morning    oxyCODONE (ROXICODONE) 5 mg, Oral, Every 4 hours PRN    oxyCODONE-acetaminophen (PERCOCET)  mg per tablet 1 tablet, Oral, Every 4 hours PRN    pantoprazole (PROTONIX) 40 mg, Oral    polyethylene glycol (GLYCOLAX) 17 gram PwPk Oral    povidone-iodine (BETADINE) 10 % external solution As needed (PRN)    rOPINIRole (REQUIP) 1 mg, Oral, Nightly    rosuvastatin (CRESTOR) 40 mg, Oral, Nightly    sacubitriL-valsartan (ENTRESTO) 24-26 mg per tablet 1 tablet, Oral, 2 times daily    spironolactone (ALDACTONE) 25 mg, Oral, Daily    torsemide (DEMADEX) 20 mg, Oral, Daily      Assessment & Plan     Presence of Watchman left atrial appendage closure device  Pt is scheduled for 6 mo post Watchman GRETCHEN on 7/25/25 with dr Pérez  Risks of GRETCHEN were discussed with patient the risks include but not limited to oropharyngeal trauma, dental trauma, trauma to the esophagus, possible aspiration, and reaction to anesthesia.    She has dentures. No bleeding tendencies or esophageal issues  Dr Pérez requested BMP, CBC and magnesium to be done today      PAD (peripheral artery disease)  Followed by vascular surgeon; she is on ASA, plavix,  zetia, and statin  She has non healing wound right ankle from PAD  Dr Pérez aware and OK to proceed with GRETCHEN on Friday    Permanent atrial fibrillation  Pt has watchman  On DAPT for PAD  Continue digoxin and metoprolol                       SHAYY Hampton                 [1]   Social History  Tobacco Use    Smoking status: Former     Current packs/day: 0.00     Average packs/day: 2.0 packs/day for 40.0 years (80.0 ttl pk-yrs)     Types: Cigarettes     Start date: 1971     Quit date: 2011     Years since quittin.4     Passive exposure: Past    Smokeless tobacco: Never    Tobacco comments:          Quit in    Substance Use Topics    Alcohol use: Not Currently     Alcohol/week: 8.0 standard drinks of alcohol     Types: 8 Glasses of wine per week    Drug use: No

## 2025-07-23 NOTE — H&P (VIEW-ONLY)
Subjective:    Patient ID:  Lisa Smith is a 74 y.o. female patient here for evaluation Follow-up ( 5-6 month f/u ), Palpitations, and Fatigue    History of Present Illness:     Pt is s/p Watchman device implantation on 1/13/25 per Dr Cantu  Our office was contacted by Dr Cantu's staff requesting 6 mo follow up GRETCHEN  See Below  ----- Message from FAM Giordano sent at 7/9/2025 11:12 AM CDT -----  Good afternoon,  Mrs. Smith had her Watchman implant in January and needs her follow up GRETCHEN.      Thank you,  FAM Giordano      Pt had GRETCHEN previously on day of procedure  She denies any anesthesia complications in past  Pt has chronic wound on her right inner ankle since April for which she is getting wound care per DR Benoit  She has chronic cough from COPD but no URI symptoms;  She has dentures, no bleeding tendencies  Had labs a few months ago  She has pacer, no device check in at least 2 years noted          Most Recent Echocardiogram Results  Results for orders placed during the hospital encounter of 01/13/25    Echo    Interpretation Summary    Left Ventricle: Regional wall motion abnormalities present. Septal motion is consistent with pacing. There is moderately reduced systolic function with a visually estimated ejection fraction of 35 - 40%.    Left Atrium: Left atrium is dilated.    Right Atrium: Right atrium is dilated.    Pericardium: There is no pericardial effusion.    A limited echo was performed using limited 2D.      Most Recent Nuclear Stress Test Results  No results found for this or any previous visit.      Most Recent Cardiac PET Stress Test Results  No results found for this or any previous visit.      Most Recent Cardiovascular Angiogram results  Results for orders placed during the hospital encounter of 01/13/25    Cardiac catheterization    Conclusion  LEFT ATRIAL APPENDAGE CLOSURE PROCEDURE NOTE      Date of procedure: 01/13/2025    : Roxana Cantu MD      Procedure:  Left atrial  appendage closure using 27-mm Watchman FLX Pro    Anesthesia: General anesthesia with endotracheal intubation  Additional arterial and venous lines were placed by anesthesiology as needed.    Indication:  Atrial fibrillation with [id number] score 6 and HAS-BLED score 5.    Description of the Procedure:  Following informed consent, the patient was bought to the Cath Lab and placed under anesthesia as above.  Preprocedure RUY sizing was performed using gated CT. GRETCHEN was used to aid in guidance of the procedure; this demonstrated no thrombus in the left atrial appendage and the following baseline ostial measurements:    Right femoral vein access was obtained with a micropuncture needle and ultrasound guidance and a short 8 French sheath was placed. 100 units/kg IV heparin was administered with additional heparin as needed to achieve and maintain ACT greater than 300 seconds.    Transseptal puncture was performed under GRETCHEN and fluoroscopy guidance using a VersaCross system.  The TruSteer Watchman Access Sheath (WAS) was advanced into the left atrium over the VersaCross wire. The wire and the WAS dilator were then removed. A Pigtail catheter was advanced into the left atrial appendage under fluoroscopy and GRETCHEN guidance.  Baseline LA pressure was 18.    Angiography through the pigtail catheter was used to confirm RUY shape and plan a landing zone.  A 27-mm Watchman FLX Pro device was chosen. The WAS was advanced over the pigtail catheter to deployment position.    The Watchman Delivery System (WDS) was prepped in the usual fashion.  The pigtail catheter was removed from the WAS and the WDS was advanced under fluoroscopy and GRETCHEN guidance.  The device was deployed under fluoroscopy and GRETCHEN in the usual manner.    Device release criteria were verified as follows:  Position was assessed on GRETCHEN and using angiography and deemed to be satisfactory  [place] was assessed on GRETCHEN and fluoroscopy and deemed to be satisfactory  Size  was assessed on GRETCHEN and a compression ratio of 10-19% was achieved  Seal was assessed on GRETCHEN and there was no peridevice leak.    The device was released in a standard fashion and reevaluation with GRETCHEN and fluoroscopy ruled out embolization/peridevice leak.    The WDS was removed.  The WAS was then removed and hemostasis achieved using manual pressure.  Protamine IV was administered.  The patient was extubated in the Cath Lab and demonstrated no neurological deficits.    Complications: none  Blood loss: <50 mL  Contrast: 20 mL        Conclusions:  Successful percutaneous left atrial appendage closure using 27-mm Watchman FLX Pro  Normal TAV leaflet excursion with moderate paravalvular leak    Plan:  Bedrest for 2 hours  Limited TTE to rule out pericardial effusion and device embolization at 4 hours.  Discharge in 6 hours if no complications.  Planned postprocedure antithrombotic therapy provided follow-up doesn't reveal late complications:  Clopidogrel monotherapy - check platelet response assay in follow-up  Endocarditis prophylaxis indefinitely (history of LULU)  Office visit in 6 weeks per protocol  GRETCHEN in 3 months.      Roxana Cantu MD, [facility]  Interventional Cardiology/Structural Heart Disease  [facility]  Office: [phone]      The procedure log was documented by Documenter: Babs Morris RT and verified by Roxana Cantu MD.    Date: 1/13/2025  Time: 10:15 AM      Other Most Recent Cardiology Results  Results for orders placed during the hospital encounter of 04/02/25    Cardiac monitoring strips      REVIEW OF SYSTEMS: As noted in HPI       Past Medical History:   Diagnosis Date    Anticoagulant long-term use     Arthritis     Atrial fibrillation     Bell's palsy     Boil of buttock     burst 12/19/22    CHF (congestive heart failure)     Chronic cough     COPD (chronic obstructive pulmonary disease)     use O2  3l/m NC at night; also taking during day currently  12/4/23    Dizziness     Encounter for blood transfusion     GI bleed 2011    transfusion    H/O diverticulitis of colon     Hard of hearing     Heart murmur     Hematoma 02/2023    left hand    Heterozygous alpha 1-antitrypsin deficiency     History of home oxygen therapy     3L NC at night    Hypertension     Iron deficiency anemia 06/12/2025    Lung disease     copd    MONSERRAT (obstructive sleep apnea)     resolved with wt loss 131#    Pacemaker     Pneumonia     last episode 2019    Pulmonary edema     Skin cancer     Unspecified visual disturbance     episode of vision disturbance and dizziness...occasional recurrences     Past Surgical History:   Procedure Laterality Date    CARDIAC ELECTROPHYSIOLOGY STUDY AND ABLATION      CAROTID ENDARTERECTOMY Left 12/22/2022    Procedure: ENDARTERECTOMY-CAROTID;  Surgeon: Maximilian Connolly MD;  Location: [facility] OR;  Service: Peripheral Vascular;  Laterality: Left;    CAROTID STENT Left 02/29/2024    Procedure: INSERTION, STENT, ARTERY, CAROTID;  Surgeon: CIRILO Valdivia III, MD;  Location: [facility] OR [address] [place] [address];  Service: Vascular;  Laterality: Left;  left carotid artery stent placement  mgy  146.29   gymc2  8.0730  fluro time  4.8min    CARPAL TUNNEL RELEASE Left     CHOLECYSTECTOMY  1995    CLOSURE OF LEFT ATRIAL APPENDAGE USING DEVICE Right 01/13/2025    Procedure: Left atrial appendage closure device;  Surgeon: Roxana Cantu MD;  Location: [facility] CATH/EP LAB;  Service: Cardiology;  Laterality: Right;    ENDARTERECTOMY OF FEMORAL ARTERY Right 4/2/2025    Procedure: ENDARTERECTOMY, FEMORAL;  Surgeon: CIRILO Valdivia III, MD;  [facility];  Service: Vascular;  Laterality: Right;    EYE SURGERY Bilateral March 2012    cataract    HYSTERECTOMY  1988    INSERT / REPLACE / REMOVE PACEMAKER      KNEE ARTHROSCOPY Left     LEFT HEART CATHETERIZATION  11/01/2023    Procedure: Left heart cath;  Surgeon: Puma Shelton MD;  Location: [facility] CATH;  Service: Cardiology;;     REPLACEMENT OF PACEMAKER GENERATOR Left 01/20/2022    Procedure: REPLACEMENT, PACEMAKER GENERATOR;  Surgeon: Raymond Pérez MD;  Location: [facility] CATH/EP LAB;  Service: Cardiology;  Laterality: Left;    SKIN CANCER EXCISION      TRANSCATHETER AORTIC VALVE REPLACEMENT (TAVR) Bilateral 12/06/2023    Procedure: (TAVR);  Surgeon: Puma Shelton MD;  Location: [facility] CATH;  Service: Cardiology;  Laterality: Bilateral;    TRANSCATHETER AORTIC VALVE REPLACEMENT (TAVR) Bilateral 12/06/2023    Procedure: (TAVR) - Surgeon;  Surgeon: Maximilian Connolly MD;  Location: [facility] CATH;  Service: Peripheral Vascular;  Laterality: Bilateral;     Social History[1]      Objective    There were no vitals filed for this visit.    LAST EKG  Results for orders placed or performed in visit on 01/09/25   IN OFFICE EKG 12-LEAD (to [place])    Collection Time: 01/09/25 12:33 PM   Result Value Ref Range    QRS Duration 178 ms    [facility] QTC Calculation 442 ms    Narrative    Test Reason : I48.21,Z98.890,Z86.79,I35.0,Z95.2,I50.43,I25.10,I49.5,Z95.0,I65.29,Z98.890,    Vent. Rate :  61 BPM     Atrial Rate :  31 BPM     P-R Int :    ms          QRS Dur : 178 ms      QT Int : 440 ms       P-R-T Axes :    -80  86 degrees    QTcB Int : 442 ms    Atrial fibrillation with ventricular pacing  When compared with ECG of 05-Dec-2024 07:46,  No significant change was found  Confirmed by Preston Reagan ([id number]) on 1/10/2025 8:18:04 AM    Referred By:            Confirmed By: Preston Reagan     LIPIDS - LAST 2   Lab Results   Component Value Date    CHOL 199 05/10/2022    CHOL 190 02/17/2020    HDL 59 05/10/2022    HDL 68 02/17/2020    LDLCALC 112.0 05/10/2022    LDLCALC 106.0 02/17/2020    TRIG 140 05/10/2022    TRIG 80 02/17/2020    CHOLHDL 29.6 05/10/2022    CHOLHDL 35.8 02/17/2020     CARDIAC PROFILE - LAST 2  Lab Results   Component Value Date     (H) 02/11/2025     (H) 12/05/2024    TROPONINI <0.030 11/25/2019      CBC - LAST 2  Lab Results   Component  Value Date    WBC 14.16 (H) 07/23/2025    WBC 12.66 07/03/2025    RBC 3.14 (L) 07/23/2025    RBC 3.01 (L) 07/03/2025    HGB 9.9 (L) 07/23/2025    HGB 9.6 (L) 07/03/2025    HCT 31.3 (L) 07/23/2025    HCT 29.8 (L) 07/03/2025     07/23/2025     07/03/2025     Lab Results   Component Value Date    LABPT 23.0 (H) 01/23/2024    LABPT 23.1 (H) 01/23/2024    INR 1.0 04/02/2025    INR 1.0 01/09/2025    APTT 23.9 04/02/2025    APTT 28.1 01/09/2025     CHEMISTRY - LAST 2  Lab Results   Component Value Date     07/03/2025     06/02/2025    K 4.4 07/03/2025    K 5.2 (H) 06/02/2025    CL 97 07/03/2025     06/02/2025    CO2 31 (H) 07/03/2025    CO2 30 (H) 06/02/2025    ANIONGAP 9 07/03/2025    ANIONGAP 7 (L) 06/02/2025    BUN 60 (H) 07/03/2025    BUN 56 (H) 06/02/2025    CREATININE 0.8 07/03/2025    CREATININE 0.8 06/02/2025     07/03/2025     (H) 06/02/2025    CALCIUM 9.6 07/03/2025    CALCIUM 10.0 06/02/2025    PH 7.402 12/05/2024    MG 2.0 04/04/2025    MG 2.1 04/03/2025    ALBUMIN 4.2 07/03/2025    ALBUMIN 4.3 06/02/2025    PROT 7.4 07/03/2025    PROT 7.7 06/02/2025    ALKPHOS 81 07/03/2025    ALKPHOS 81 06/02/2025    ALT 9 (L) 07/03/2025    ALT 9 (L) 06/02/2025    AST 21 07/03/2025    AST 29 06/02/2025    BILITOT 0.5 07/03/2025    BILITOT 0.6 06/02/2025      ENDOCRINE - LAST 2  Lab Results   Component Value Date    HGBA1C 5.2 02/17/2020    TSH 1.062 02/11/2025    TSH 1.040 05/10/2022        PHYSICAL EXAM  CONSTITUTIONAL: elderly female breathing comfortably in no apparent distress  NECK: no carotid bruit, no JVD  LUNGS: scattered rhonchi clear w/ coughing  CHEST WALL: no tenderness; left CW pacer device  HEART: regular rate and rhythm, S1, S2 normal, + murmur  ABDOMEN: soft, non-tender; bowel sounds normal; no masses  EXTREMITIES: Extremities normal, no edema, no calf tenderness noted  NEURO: AAO X 3, speech clear, memory clear    I HAVE REVIEWED :    The vital signs, most recent  cardiac testing, and most recent pertinent non-cardiology provider notes.    Current Outpatient Medications   Medication Instructions    acetaminophen (TYLENOL ARTHRITIS ORAL) 2 tablets, Daily PRN    albuterol (PROVENTIL/VENTOLIN HFA) 90 mcg/actuation inhaler INHALE 2 PUFFS EVERY 6 HOURS AS NEEDED FOR WHEEZING (RESCUE)    aspirin 81 MG Chew Chew and swallow 1 tablet (81 mg total) by mouth once daily.    clopidogreL (PLAVIX) 75 mg, Oral    digoxin (LANOXIN) 125 mcg, Oral, Daily    ezetimibe (ZETIA) 10 mg, Oral    fluticasone propionate (FLONASE) 50 mcg/actuation nasal spray 1 spray, Daily PRN    fluticasone-umeclidin-vilanter (TRELEGY ELLIPTA) 100-62.5-25 mcg DsDv 1 puff, Inhalation, Daily    melatonin 10 mg Tab 1 tablet, Nightly PRN    metoprolol succinate (TOPROL-XL) 25 mg, Oral, Every morning    oxyCODONE (ROXICODONE) 5 mg, Oral, Every 4 hours PRN    oxyCODONE-acetaminophen (PERCOCET)  mg per tablet 1 tablet, Oral, Every 4 hours PRN    pantoprazole (PROTONIX) 40 mg, Oral    polyethylene glycol (GLYCOLAX) 17 gram PwPk Oral    povidone-iodine (BETADINE) 10 % external solution As needed (PRN)    rOPINIRole (REQUIP) 1 mg, Oral, Nightly    rosuvastatin (CRESTOR) 40 mg, Oral, Nightly    sacubitriL-valsartan (ENTRESTO) 24-26 mg per tablet 1 tablet, Oral, 2 times daily    spironolactone (ALDACTONE) 25 mg, Oral, Daily    torsemide (DEMADEX) 20 mg, Oral, Daily      Assessment & Plan     Presence of Watchman left atrial appendage closure device  Pt is scheduled for 6 mo post Watchman GRETCHEN on 7/25/25 with dr Pérez  Risks of GRETCHEN were discussed with patient the risks include but not limited to oropharyngeal trauma, dental trauma, trauma to the esophagus, possible aspiration, and reaction to anesthesia.    She has dentures. No bleeding tendencies or esophageal issues  Dr Pérez requested BMP, CBC and magnesium to be done today      PAD (peripheral artery disease)  Followed by vascular surgeon; she is on ASA, plavix,  zetia, and statin  She has non healing wound right ankle from PAD  Dr Pérez aware and OK to proceed with GRETCHEN on Friday    Permanent atrial fibrillation  Pt has watchman  On DAPT for PAD  Continue digoxin and metoprolol                       SHAYY Hampton                 [1]   Social History  Tobacco Use    Smoking status: Former     Current packs/day: 0.00     Average packs/day: 2.0 packs/day for 40.0 years (80.0 ttl pk-yrs)     Types: Cigarettes     Start date: 1971     Quit date: 2011     Years since quittin.4     Passive exposure: Past    Smokeless tobacco: Never    Tobacco comments:          Quit in    Substance Use Topics    Alcohol use: Not Currently     Alcohol/week: 8.0 standard drinks of alcohol     Types: 8 Glasses of wine per week    Drug use: No

## 2025-07-24 NOTE — NURSING
1132: Notified Dr. Pérez of abnormal labs.   1543: Notified patient of procedure arrival time / pre-procedure instructions.

## 2025-07-25 ENCOUNTER — CLINICAL SUPPORT (OUTPATIENT)
Dept: CARDIOLOGY | Facility: HOSPITAL | Age: 75
End: 2025-07-25
Attending: INTERNAL MEDICINE
Payer: MEDICARE

## 2025-07-25 ENCOUNTER — ANESTHESIA EVENT (OUTPATIENT)
Dept: CARDIOLOGY | Facility: HOSPITAL | Age: 75
End: 2025-07-25
Payer: MEDICARE

## 2025-07-25 ENCOUNTER — ANESTHESIA (OUTPATIENT)
Dept: CARDIOLOGY | Facility: HOSPITAL | Age: 75
End: 2025-07-25
Payer: MEDICARE

## 2025-07-25 ENCOUNTER — HOSPITAL ENCOUNTER (OUTPATIENT)
Facility: HOSPITAL | Age: 75
Discharge: HOME | End: 2025-07-25
Attending: INTERNAL MEDICINE | Admitting: INTERNAL MEDICINE
Payer: MEDICARE

## 2025-07-25 VITALS
OXYGEN SATURATION: 100 % | HEART RATE: 61 BPM | SYSTOLIC BLOOD PRESSURE: 109 MMHG | RESPIRATION RATE: 36 BRPM | DIASTOLIC BLOOD PRESSURE: 55 MMHG

## 2025-07-25 VITALS
TEMPERATURE: 98 F | DIASTOLIC BLOOD PRESSURE: 55 MMHG | HEART RATE: 68 BPM | RESPIRATION RATE: 16 BRPM | OXYGEN SATURATION: 100 % | SYSTOLIC BLOOD PRESSURE: 104 MMHG

## 2025-07-25 DIAGNOSIS — Z95.818 PRESENCE OF WATCHMAN LEFT ATRIAL APPENDAGE CLOSURE DEVICE: ICD-10-CM

## 2025-07-25 DIAGNOSIS — Z95.3 S/P TAVR (TRANSCATHETER AORTIC VALVE REPLACEMENT): ICD-10-CM

## 2025-07-25 LAB
APICAL FOUR CHAMBER EJECTION FRACTION: 45 %
APICAL TWO CHAMBER EJECTION FRACTION: 42 %
LEFT VENTRICLE DIASTOLIC VOLUME: 213 ML
LEFT VENTRICLE END DIASTOLIC VOLUME APICAL 2 CHAMBER: 257 ML
LEFT VENTRICLE END DIASTOLIC VOLUME APICAL 4 CHAMBER: 152 ML
LEFT VENTRICLE SYSTOLIC VOLUME: 121 ML
OHS LV EJECTION FRACTION SIMPSONS BIPLANE MOD: 43 %

## 2025-07-25 PROCEDURE — 93312 ECHO TRANSESOPHAGEAL: CPT | Mod: 26,,, | Performed by: INTERNAL MEDICINE

## 2025-07-25 PROCEDURE — 25000003 PHARM REV CODE 250: Performed by: NURSE ANESTHETIST, CERTIFIED REGISTERED

## 2025-07-25 PROCEDURE — 37000008 HC ANESTHESIA 1ST 15 MINUTES: Performed by: INTERNAL MEDICINE

## 2025-07-25 PROCEDURE — 93321 DOPPLER ECHO F-UP/LMTD STD: CPT | Mod: 26,,, | Performed by: INTERNAL MEDICINE

## 2025-07-25 PROCEDURE — 93319 3D ECHO IMG CGEN CAR ANOMAL: CPT | Mod: ,,, | Performed by: INTERNAL MEDICINE

## 2025-07-25 PROCEDURE — 63600175 PHARM REV CODE 636 W HCPCS: Performed by: NURSE ANESTHETIST, CERTIFIED REGISTERED

## 2025-07-25 PROCEDURE — 93325 DOPPLER ECHO COLOR FLOW MAPG: CPT

## 2025-07-25 PROCEDURE — 93005 ELECTROCARDIOGRAM TRACING: CPT | Performed by: INTERNAL MEDICINE

## 2025-07-25 PROCEDURE — 93010 ELECTROCARDIOGRAM REPORT: CPT | Mod: XE,,, | Performed by: INTERNAL MEDICINE

## 2025-07-25 RX ORDER — SODIUM CHLORIDE 9 MG/ML
INJECTION, SOLUTION INTRAVENOUS CONTINUOUS PRN
Status: DISCONTINUED | OUTPATIENT
Start: 2025-07-25 | End: 2025-07-25

## 2025-07-25 RX ORDER — PROPOFOL 10 MG/ML
VIAL (ML) INTRAVENOUS
Status: DISCONTINUED | OUTPATIENT
Start: 2025-07-25 | End: 2025-07-25

## 2025-07-25 RX ADMIN — PROPOFOL 70 MG: 10 INJECTION, EMULSION INTRAVENOUS at 08:07

## 2025-07-25 RX ADMIN — PROPOFOL 30 MG: 10 INJECTION, EMULSION INTRAVENOUS at 08:07

## 2025-07-25 RX ADMIN — PROPOFOL 20 MG: 10 INJECTION, EMULSION INTRAVENOUS at 08:07

## 2025-07-25 RX ADMIN — SODIUM CHLORIDE: 0.9 INJECTION, SOLUTION INTRAVENOUS at 08:07

## 2025-07-25 NOTE — DISCHARGE INSTRUCTIONS
GRETCHEN Discharge Instructions:  Start with soft foods, once you can tolerate soft foods easily, you may begin eating solid foods.   You can not drive for 24 hours    Call your doctor immediately if:  You have fever or chills  You taste blood in your mouth  You have severe sore throat  You have difficulty swallowing  You have questions or concerns about your condition or care.

## 2025-07-25 NOTE — TRANSFER OF CARE
Anesthesia Transfer of Care Note    Patient: Lisa Smith    Procedure(s) Performed: Procedure(s) (LRB):  Transesophageal echo (GRETCHEN) intra-procedure log documentation (N/A)  Cardioversion or Defibrillation (N/A)    Patient location: Other: Heart center    Anesthesia Type: general    Transport from OR: Transported from OR on 6-10 L/min O2 by face mask with adequate spontaneous ventilation    Post pain: adequate analgesia    Post assessment: no apparent anesthetic complications    Post vital signs: stable    Level of consciousness: awake and alert    Nausea/Vomiting: no nausea/vomiting    Complications: none    Transfer of care protocol was followed      Last vitals: Visit Vitals  BP (!) 157/68   Pulse 60   Temp 36.8 °C (98.3 °F) (Oral)   Resp 11   LMP  (LMP Unknown)   SpO2 98%   Breastfeeding No

## 2025-07-25 NOTE — ANESTHESIA PREPROCEDURE EVALUATION
07/25/2025  Lisa Smith is a 74 y.o., female.      Problem List[1]    Past Surgical History:   Procedure Laterality Date    CARDIAC ELECTROPHYSIOLOGY STUDY AND ABLATION      CAROTID ENDARTERECTOMY Left 12/22/2022    Procedure: ENDARTERECTOMY-CAROTID;  Surgeon: Maximilian Connolly MD;  Location: [facility] OR;  Service: Peripheral Vascular;  Laterality: Left;    CAROTID STENT Left 02/29/2024    Procedure: INSERTION, STENT, ARTERY, CAROTID;  Surgeon: CIRILO Valdivia III, MD;  Location: [facility] OR [facility];  Service: Vascular;  Laterality: Left;  left carotid artery stent placement  mgy  146.29   gymc2  8.0730  fluro time  4.8min    CARPAL TUNNEL RELEASE Left     CHOLECYSTECTOMY  1995    CLOSURE OF LEFT ATRIAL APPENDAGE USING DEVICE Right 01/13/2025    Procedure: Left atrial appendage closure device;  Surgeon: Roxana Cantu MD;  Location: [facility] CATH/EP LAB;  Service: Cardiology;  Laterality: Right;    ENDARTERECTOMY OF FEMORAL ARTERY Right 4/2/2025    Procedure: ENDARTERECTOMY, FEMORAL;  Surgeon: CIRILO Valdivia III, MD;  [facility];  Service: Vascular;  Laterality: Right;    EYE SURGERY Bilateral March 2012    cataract    HYSTERECTOMY  1988    INSERT / REPLACE / REMOVE PACEMAKER      KNEE ARTHROSCOPY Left     LEFT HEART CATHETERIZATION  11/01/2023    Procedure: Left heart cath;  Surgeon: Puma Shelton MD;  Location: [facility] CATH;  Service: Cardiology;;    REPLACEMENT OF PACEMAKER GENERATOR Left 01/20/2022    Procedure: REPLACEMENT, PACEMAKER GENERATOR;  Surgeon: Raymond Pérez MD;  Location: [facility] CATH/EP LAB;  Service: Cardiology;  Laterality: Left;    SKIN CANCER EXCISION      TRANSCATHETER AORTIC VALVE REPLACEMENT (TAVR) Bilateral 12/06/2023    Procedure: (TAVR);  Surgeon: Puma Shelton MD;  Location: [facility] CATH;  Service: Cardiology;  Laterality: Bilateral;    TRANSCATHETER AORTIC VALVE  REPLACEMENT (TAVR) Bilateral 12/06/2023    Procedure: (TAVR) - Surgeon;  Surgeon: Maximilian Connolly MD;  [facility];  Service: Peripheral Vascular;  Laterality: Bilateral;        Tobacco Use:  The patient  reports that she quit smoking about 14 years ago. Her smoking use included cigarettes. She started smoking about 54 years ago. She has a 80 pack-year smoking history. She has been exposed to tobacco smoke. She has never used smokeless tobacco.     Results for orders placed or performed in visit on 01/09/25   IN OFFICE EKG 12-LEAD (to [place])    Collection Time: 01/09/25 12:33 PM   Result Value Ref Range    QRS Duration 178 ms    OHS QTC Calculation 442 ms    Narrative    Test Reason : I48.21,Z98.890,Z86.79,I35.0,Z95.2,I50.43,I25.10,I49.5,Z95.0,I65.29,Z98.890,    Vent. Rate :  61 BPM     Atrial Rate :  31 BPM     P-R Int :    ms          QRS Dur : 178 ms      QT Int : 440 ms       P-R-T Axes :    -80  86 degrees    QTcB Int : 442 ms    Atrial fibrillation with ventricular pacing  When compared with ECG of 05-Dec-2024 07:46,  No significant change was found  Confirmed by Preston Reagan ([id number]) on 1/10/2025 8:18:04 AM    Referred By:            Confirmed By: Preston Reagan             Lab Results   Component Value Date    WBC 14.16 (H) 07/23/2025    HGB 9.9 (L) 07/23/2025    HCT 31.3 (L) 07/23/2025     (H) 07/23/2025     07/23/2025     BMP  Lab Results   Component Value Date     07/23/2025    K 4.9 07/23/2025     07/23/2025    CO2 31 (H) 07/23/2025    BUN 34 (H) 07/23/2025    CREATININE 0.6 07/23/2025    CALCIUM 9.8 07/23/2025    ANIONGAP 7 (L) 07/23/2025     07/23/2025     07/03/2025     (H) 06/02/2025       Results for orders placed during the hospital encounter of 01/13/25    Echo    Interpretation Summary    Left Ventricle: Regional wall motion abnormalities present. Septal motion is consistent with pacing. There is moderately reduced systolic function with a  visually estimated ejection fraction of 35 - 40%.    Left Atrium: Left atrium is dilated.    Right Atrium: Right atrium is dilated.    Pericardium: There is no pericardial effusion.    A limited echo was performed using limited 2D.              Pre-op Assessment    I have reviewed the Patient Summary Reports.     I have reviewed the Nursing Notes.    I have reviewed the Medications.     Review of Systems  Anesthesia Hx:  No problems with previous Anesthesia             Denies Family Hx of Anesthesia complications.    Denies Personal Hx of Anesthesia complications.                    Social:  Former Smoker       Hematology/Oncology:       -- Anemia:                                  Cardiovascular:    Pacemaker Hypertension, well controlled Valvular problems/Murmurs (s/p TAVR 12/2023)  CAD    Dysrhythmias (hx of A fib, Sick sinus, s/p watchman device placement) atrial fibrillation  CHF (reduced EF)   PVD (hx of carotid disease, recent femoral endarterectomy)                                Pulmonary:   COPD (home O2), moderate      Home O2 3 l continuous               Hepatic/GI:      Liver Disease, (alpha 1 anti trypsin deficiency)               Musculoskeletal:  Musculoskeletal Normal                Neurological:  Neurology Normal                                      Endocrine:  Endocrine Normal            Dermatological:  R ankle wound        Physical Exam  General: Well nourished, Cooperative, Alert and Oriented    Airway:  Mallampati: III / II  Mouth Opening: Normal  TM Distance: Normal  Tongue: Normal  Neck ROM: Normal ROM    Dental:  Intact    Chest/Lungs:  Clear to auscultation    Heart:  Rate: Normal  Rhythm: Regular Rhythm  Sounds: Normal    Abdomen:  Normal, Soft, Nontender        Anesthesia Plan  Type of Anesthesia, risks & benefits discussed:    Anesthesia Type: Gen Natural Airway  Intra-op Monitoring Plan: Standard ASA Monitors  Post Op Pain Control Plan:   (medical reason for not using multimodal pain  management)  Induction:  IV  Informed Consent: Informed consent signed with the Patient and all parties understand the risks and agree with anesthesia plan.  All questions answered. Patient consented to blood products? No  ASA Score: 3  Anesthesia Plan Notes:   General Natural Airway  POM  Propofol  Zofran    Ready For Surgery From Anesthesia Perspective.     .           [1]   Patient Active Problem List  Diagnosis    Chronic cough    Hypoxemia    Chronic respiratory failure with hypoxia    Acute on chronic respiratory failure with hypoxia    Multifocal pneumonia    CAD (coronary artery disease)    Severe aortic valve stenosis    S/P placement of cardiac pacemaker    Pneumonia    Hyponatremia    Hypoxia    S/P ablation of atrial fibrillation    Pacemaker battery depletion    Restless legs    Bilateral carotid bruits    Occlusion and stenosis of unspecified carotid artery    S/P carotid endarterectomy    Palpitations    SSS (sick sinus syndrome)    Atrial fibrillation    Permanent atrial fibrillation    S/P TAVR (transcatheter aortic valve replacement)    Acute on chronic combined systolic and diastolic CHF, NYHA class 3    Bilateral carotid artery stenosis    Aortic atherosclerosis    H/O transcarotid artery revascularization (TCAR)    LV dysfunction    Anemia requiring transfusions    Stenosis of left subclavian artery    Stenosis of right brachial artery    At high risk for bleeding    Anemia    Epistaxis    Presence of Watchman left atrial appendage closure device    Acute blood loss anemia    Hemoptysis    Hypotension after procedure    PAD (peripheral artery disease)    Atherosclerotic peripheral vascular disease with rest pain    Iron deficiency anemia

## 2025-07-25 NOTE — ANESTHESIA POSTPROCEDURE EVALUATION
Anesthesia Post Evaluation    Patient: Lisa Smith    Procedure(s) Performed: Procedure(s) (LRB):  Transesophageal echo (GRETCHEN) intra-procedure log documentation (N/A)  Cardioversion or Defibrillation (N/A)    Final Anesthesia Type: general      Patient location during evaluation: Cath Lab  Patient participation: Yes- Able to Participate  Level of consciousness: awake and alert and oriented  Post-procedure vital signs: reviewed and stable  Pain management: adequate  Airway patency: patent    PONV status at discharge: No PONV  Anesthetic complications: no      Cardiovascular status: blood pressure returned to baseline  Respiratory status: unassisted, spontaneous ventilation and nasal cannula  Hydration status: euvolemic  Follow-up not needed.              Vitals Value Taken Time   /52 07/25/25 09:00   Temp 36.6 07/25/25 09:10   Pulse 60 07/25/25 09:08   Resp 27 07/25/25 09:08   SpO2 100 % 07/25/25 09:08   Vitals shown include unfiled device data.      No case tracking events are documented in the log.      Pain/Vazquez Score: Vazquez Score: 10 (7/25/2025  8:22 AM)

## 2025-07-25 NOTE — DISCHARGE SUMMARY
Critical access hospital  Discharge Note  Short Stay    Procedure(s) (LRB):  Transesophageal echo (GRETCHEN) intra-procedure log documentation (N/A)  Cardioversion or Defibrillation (N/A)      OUTCOME: Patient tolerated treatment/procedure well without complication and is now ready for discharge.    DISPOSITION: Home or Self Care    FINAL DIAGNOSIS:  <principal problem not specified>    FOLLOWUP: In clinic 4 weeks    DISCHARGE INSTRUCTIONS:    Patient has been given detailed instructions regarding follow-up care any symptoms  Symptoms including discomfort in the throat, esophagus, swelling, swallowing difficulty,   fevers or excessive salivation patient has been instructed to return to the emergency room      TIME SPENT ON DISCHARGE: 10 minutes

## 2025-07-25 NOTE — NURSING
0628:Pt pulled back to [place] [facility]. Pre- Procedure prep in progress.    0704:Pre procedure prep complete. Awaiting procedure.    0945:Ambulated around unit without difficulty. Returned to room with no complaints of pain or distress noted. VSS.  Proceed with discharge as ordered.   1001:Patient verbalizes understanding of discharge instructions, and follow up care. Belongings gathered and dressed in personal clothing. VSS/NADN at time of discharge. Wheeled to private vehicle with designated ride home.

## 2025-07-26 LAB
OHS QRS DURATION: 184 MS
OHS QRS DURATION: 188 MS
OHS QTC CALCULATION: 450 MS
OHS QTC CALCULATION: 456 MS

## 2025-07-28 ENCOUNTER — OFFICE VISIT (OUTPATIENT)
Dept: WOUND CARE | Facility: HOSPITAL | Age: 75
End: 2025-07-28
Attending: PODIATRIST
Payer: MEDICARE

## 2025-07-28 VITALS
SYSTOLIC BLOOD PRESSURE: 131 MMHG | HEART RATE: 61 BPM | DIASTOLIC BLOOD PRESSURE: 55 MMHG | RESPIRATION RATE: 18 BRPM | TEMPERATURE: 98 F

## 2025-07-28 DIAGNOSIS — I50.43 ACUTE ON CHRONIC COMBINED SYSTOLIC AND DIASTOLIC CHF, NYHA CLASS 3: ICD-10-CM

## 2025-07-28 DIAGNOSIS — R09.89 DECREASED PEDAL PULSES: ICD-10-CM

## 2025-07-28 DIAGNOSIS — Z91.89 AT HIGH RISK FOR INADEQUATE NUTRITIONAL INTAKE: ICD-10-CM

## 2025-07-28 DIAGNOSIS — I73.9 PVD (PERIPHERAL VASCULAR DISEASE): ICD-10-CM

## 2025-07-28 DIAGNOSIS — L89.514 STAGE IV PRESSURE ULCER OF RIGHT ANKLE: Primary | ICD-10-CM

## 2025-07-28 DIAGNOSIS — L97.313 SKIN ULCER OF RIGHT ANKLE WITH NECROSIS OF MUSCLE: ICD-10-CM

## 2025-07-28 DIAGNOSIS — R20.0 NUMBNESS IN FEET: ICD-10-CM

## 2025-07-28 DIAGNOSIS — R20.2 PARESTHESIAS: ICD-10-CM

## 2025-07-28 DIAGNOSIS — I73.9 PAD (PERIPHERAL ARTERY DISEASE): ICD-10-CM

## 2025-07-28 DIAGNOSIS — S91.001A OPEN WOUND OF RIGHT ANKLE, INITIAL ENCOUNTER: ICD-10-CM

## 2025-07-28 DIAGNOSIS — Z87.891 FORMER SMOKER: ICD-10-CM

## 2025-07-28 DIAGNOSIS — M25.571 ACUTE RIGHT ANKLE PAIN: ICD-10-CM

## 2025-07-28 PROCEDURE — 99214 OFFICE O/P EST MOD 30 MIN: CPT | Mod: HCNC,,, | Performed by: PODIATRIST

## 2025-07-28 PROCEDURE — 1160F RVW MEDS BY RX/DR IN RCRD: CPT | Mod: CPTII,HCNC,, | Performed by: PODIATRIST

## 2025-07-28 PROCEDURE — 3075F SYST BP GE 130 - 139MM HG: CPT | Mod: CPTII,HCNC,, | Performed by: PODIATRIST

## 2025-07-28 PROCEDURE — 1159F MED LIST DOCD IN RCRD: CPT | Mod: CPTII,HCNC,, | Performed by: PODIATRIST

## 2025-07-28 PROCEDURE — 99213 OFFICE O/P EST LOW 20 MIN: CPT | Mod: HCNC,PN | Performed by: PODIATRIST

## 2025-07-28 PROCEDURE — 3078F DIAST BP <80 MM HG: CPT | Mod: CPTII,HCNC,, | Performed by: PODIATRIST

## 2025-07-28 PROCEDURE — 1126F AMNT PAIN NOTED NONE PRSNT: CPT | Mod: CPTII,HCNC,, | Performed by: PODIATRIST

## 2025-07-28 NOTE — PROGRESS NOTES
"  [address]  [place] [address]  Phone: [phone]   [phone]    Patient's PCP:Hope Tang FNP  Referring Provider: No ref. provider found    Subjective:      Chief Complaint:: Wound Care    HPI  Lisa Smith is a 74 y.o. female who presents today with a complaint of right medial ankle wound.   See wound docs documentation for full assessment and evaluation of all wounds.      Patient continues to experience numbness and tingling in bilateral feet.  Paresthesias, and burning bilateral feet with no clearly identified trigger or source.  Referral has been placed to neurology.     Patient was under the care of a previous wound care provider.  Reviewed note from visits.  Taken from Wound providers last note on 05/06/2025:  "Patient presents for an evaluation of right medial ankle wound. She reports having a medial and lateral right ankle wound that started in March 2025. She reports the lateral right ankle wound healed. Pt follows with vascular surgery. She is s/p right common femoral/profunda endarterectomy and bovine patch on 4/2/25 with Dr. Valdivia. At her follow up appointment on 4/22/25, she was found to have a medial ankle wound. It was deemed primarily not ischemic and she was referred to wound care. She was instructed to dress her wound with betadine daily. Per Dr. Valdivia's note: "While the LANIE suggests lower than preoperatively I think this is an error given that the contralateral side which had no intervention also dropped significantly. PVR waveforms on the right arm much better, and her ischemic rest pain completely gone." She admits compliance with betadine daily. Pt previously smoked 2-3 ppds for 30 years. She quit in 2011. She wants to follow with wound care closer to her house in [place]. Denies fever, chills, erythema, warmth, purulent drainage, or pain."           Vascular Status/LANIE:  Patient under the care of vascular surgery.  Recent vascular " "intervention, will order arterial ultrasounds.   Nutrition Risk/Labs: "Tips and Tricks for wound healing" handout given with detailed guide to optimize nutritional status for wound healing.   Counseled patient on increasing protein intake, following a healthy diet, vitamins  Dietary:   As tolerated: 3 well-balanced meals  Increased protein: Premier, Boost, or Ensure, [place] Instant Breakfast (purchase sugarfree if Diabetic). If you have kidney or are on dialysis, patient please check with your Nephrologist as to how much protein you are allowed to take with your condition.   Faustino 1-2 times daily  Supplement with: Multivitamin with Zinc and Vitamin C 500mg twice a day. If you are on Coumadin, please contact the Coumadin Clinic before beginning a Multivitamin. Vitamin D3.  Recent Cultures & Dates:  See micro tab in chart  Antibiotics: doxycline  Radiology/Xray: See imaging tab in chart  Diabetes Status/HbA1c: See labs tab in chart  Offloading/Immobilization: Offloading fel placed in order to remove pressure from wound  Tobacco Use:  Previous smoker.  None currently  Additional Patient instructions: Keep wrap/ wound dressing clean, dry, and intact. May shower, using cast protector, plastic bag, or other methods as discussed to keep wrap dry, or may sponge bathe. If wrap gets wet, it needs to be removed by unwrapping it. Place temporary dressing of antibiotic ointment and bandage to wound and notify clinic as soon as possible for earlier appointment.         Patient to follow with primary doctor regularly to address ongoing medical conditions such as abnormal blood pressure readings.        EVALUATION, ASSESSMENT, & PLAN:      1. .See Wound Docs for assessment of wounds and procedure notes  2. Continue taking all medications as prescribed  3. Dressing changes, see Wound docs for dressing change orders  4. RTC one week  5. Counseled patient on increasing protein intake, not getting wound wet, keeping dressing clean " dry and intact, following a healthy diet, elevating legs when able, removing pressure from wound     Proper ulcer care and the possible need for serial debridements, topical medications, specific dressings and biological engineered skin substitutes if indicated  Discussed general issues surrounding recurrent/ nonhealing ulcers, along with the advantages & disadvantages of various treatment strategies.     Patient should call the office immediately if any signs of infection, such as fever, chills, sweats, increased redness or pain.     Patient was instructed to call the clinic or go to the emergency department if their symptoms do not improve, worsens, or if new symptoms develop.  Patient was advised that if any increased swelling, pain, or numbness arise to go immediately to the ED. Patient knows to call any time if an emergency arises. Shared decision making occurred and patient verbalized understanding in agreement with this plan.         I spent a total of 45 minutes on the day of the visit.This includes face to face time and non-face to face time preparing to see the patient (eg, review of tests), obtaining and/or reviewing separately obtained history, documenting clinical information in the electronic or other health record, independently interpreting results and communicating results to the patient/family/caregiver, or care coordinator.        Much of the documentation for this visit was completed in the Wound Docs system.  Please see the attached documentation for further details about the patient's care. Scanned under the Media tab.         Izabella Benoit DPM     Vitals:    07/28/25 1029   BP: (!) 131/55   Pulse: 61   Resp: 18   Temp: 98.2 °F (36.8 °C)   PainSc: 0-No pain        Shoe Size:     Past Surgical History:   Procedure Laterality Date    CARDIAC ELECTROPHYSIOLOGY STUDY AND ABLATION      CAROTID ENDARTERECTOMY Left 12/22/2022    Procedure: ENDARTERECTOMY-CAROTID;  Surgeon: Maximilian Connolly MD;   Location: [facility] OR;  Service: Peripheral Vascular;  Laterality: Left;    CAROTID STENT Left 02/29/2024    Procedure: INSERTION, STENT, ARTERY, CAROTID;  Surgeon: CIRILO Valdivia III, MD;  [facility];  Service: Vascular;  Laterality: Left;  left carotid artery stent placement  mgy  146.29   gymc2  8.0730  fluro time  4.8min    CARPAL TUNNEL RELEASE Left     CHOLECYSTECTOMY  1995    CLOSURE OF LEFT ATRIAL APPENDAGE USING DEVICE Right 01/13/2025    Procedure: Left atrial appendage closure device;  Surgeon: Roxana Cantu MD;  Location: [facility] CATH/EP LAB;  Service: Cardiology;  Laterality: Right;    ENDARTERECTOMY OF FEMORAL ARTERY Right 4/2/2025    Procedure: ENDARTERECTOMY, FEMORAL;  Surgeon: CIRILO Valdivia III, MD;  [facility];  Service: Vascular;  Laterality: Right;    EYE SURGERY Bilateral March 2012    cataract    HYSTERECTOMY  1988    INSERT / REPLACE / REMOVE PACEMAKER      KNEE ARTHROSCOPY Left     LEFT HEART CATHETERIZATION  11/01/2023    Procedure: Left heart cath;  Surgeon: Puma Shelton MD;  Location: [facility] CATH;  Service: Cardiology;;    REPLACEMENT OF PACEMAKER GENERATOR Left 01/20/2022    Procedure: REPLACEMENT, PACEMAKER GENERATOR;  Surgeon: Raymond Pérez MD;  Location: [facility] CATH/EP LAB;  Service: Cardiology;  Laterality: Left;    SKIN CANCER EXCISION      TRANSCATHETER AORTIC VALVE REPLACEMENT (TAVR) Bilateral 12/06/2023    Procedure: (TAVR);  Surgeon: Puma Shelton MD;  Location: [facility] CATH;  Service: Cardiology;  Laterality: Bilateral;    TRANSCATHETER AORTIC VALVE REPLACEMENT (TAVR) Bilateral 12/06/2023    Procedure: (TAVR) - Surgeon;  Surgeon: Maximilian Connolly MD;  Location: [facility] CATH;  Service: Peripheral Vascular;  Laterality: Bilateral;     Past Medical History:   Diagnosis Date    Anticoagulant long-term use     Arthritis     Atrial fibrillation     Bell's palsy     Boil of buttock     burst 12/19/22    CHF (congestive heart failure)     Chronic cough      COPD (chronic obstructive pulmonary disease)     use O2  3l/m NC at night; also taking during day currently 12/4/23    Dizziness     Encounter for blood transfusion     GI bleed 2011    transfusion    H/O diverticulitis of colon     Hard of hearing     Heart murmur     Hematoma 02/2023    left hand    Heterozygous alpha 1-antitrypsin deficiency     History of home oxygen therapy     3L NC at night    Hypertension     Iron deficiency anemia 06/12/2025    Lung disease     copd    MONSERRAT (obstructive sleep apnea)     resolved with wt loss 131#    Pacemaker     Pneumonia     last episode 2019    Pulmonary edema     Skin cancer     Unspecified visual disturbance     episode of vision disturbance and dizziness...occasional recurrences     Family History   Problem Relation Name Age of Onset    Kidney failure Mother      Cancer Father Rm Wray     Alcohol abuse Father Rm Wray     Cancer Brother      Arthritis Maternal Grandmother Domenica Wray     Cancer Brother Rm Wray JR         pancreatic        Social History:   Marital Status:   Alcohol History:  reports that she does not currently use alcohol after a past usage of about 8.0 standard drinks of alcohol per week.  Tobacco History:  reports that she quit smoking about 14 years ago. Her smoking use included cigarettes. She started smoking about 54 years ago. She has a 80 pack-year smoking history. She has been exposed to tobacco smoke. She has never used smokeless tobacco.  Drug History:  reports no history of drug use.    Review of patient's allergies indicates:   Allergen Reactions    Sulfa (sulfonamide antibiotics) Itching       Current Medications[1]    Review of Systems   Constitutional:  Negative for chills, fatigue, fever and unexpected weight change.   HENT:  Negative for hearing loss and trouble swallowing.    Eyes:  Negative for photophobia and visual disturbance.   Respiratory:  Negative for cough, shortness of breath and wheezing.     Cardiovascular:  Positive for leg swelling. Negative for chest pain and palpitations.   Gastrointestinal:  Negative for abdominal pain and nausea.   Genitourinary:  Negative for dysuria and frequency.   Musculoskeletal:  Negative for arthralgias, back pain, joint swelling and myalgias.   Skin:  Positive for wound. Negative for rash.   Neurological:  Negative for tremors, seizures, weakness and headaches.   Hematological:  Does not bruise/bleed easily.         Objective:        Physical Exam:   Foot Exam  Physical Exam  Ortho/SPM Exam     Imaging:            Assessment:       1. 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 At high risk for inadequate nutritional intake    3. PAD (peripheral artery disease)    4. Acute on chronic combined systolic and diastolic CHF, NYHA class 3    5. Decreased pedal pulses    6. Skin ulcer of right ankle with necrosis of muscle    7. Former smoker    8. PVD (peripheral vascular disease)    9. Open wound of right ankle, initial encounter    10. Paresthesias    11. Numbness in feet    12. Acute right ankle pain      Plan:   Stage IV pressure ulcer of right ankle    At high risk for inadequate nutritional intake    PAD (peripheral artery disease)    Acute on chronic combined systolic and diastolic CHF, NYHA class 3    Decreased pedal pulses    Skin ulcer of right ankle with necrosis of muscle    Former smoker    PVD (peripheral vascular disease)    Open wound of right ankle, initial encounter    Paresthesias    Numbness in feet    Acute right ankle pain      Follow up in 1 week (on 8/4/2025) for Wound Care.    Procedures          Counseling:     I provided patient education verbally regarding:   Patient diagnosis, treatment options, as well as alternatives, risks, and benefits.     This note was created using Dragon voice recognition software that occasionally misinterpreted phrases or words.                          [1]   Current Outpatient Medications   Medication Sig Dispense  Refill    acetaminophen (TYLENOL ARTHRITIS ORAL) Take 2 tablets by mouth daily as needed.      albuterol (PROVENTIL/VENTOLIN HFA) 90 mcg/actuation inhaler INHALE 2 PUFFS EVERY 6 HOURS AS NEEDED FOR WHEEZING (RESCUE) 18 g 6    aspirin 81 MG Chew Chew and swallow 1 tablet (81 mg total) by mouth once daily. 30 tablet 2    clopidogreL (PLAVIX) 75 mg tablet TAKE 1 TABLET EVERY DAY 90 tablet 3    digoxin (LANOXIN) 125 mcg tablet Take 1 tablet (125 mcg total) by mouth once daily. 90 tablet 3    ezetimibe (ZETIA) 10 mg tablet TAKE 1 TABLET EVERY DAY 90 tablet 3    fluticasone propionate (FLONASE) 50 mcg/actuation nasal spray 1 spray by Each Nostril route daily as needed for Rhinitis or Allergies.      fluticasone-umeclidin-vilanter (TRELEGY ELLIPTA) 100-62.5-25 mcg DsDv Inhale 1 puff into the lungs once daily. 90 each 3    melatonin 10 mg Tab Take 1 tablet by mouth nightly as needed (insomnia).      metoprolol succinate (TOPROL-XL) 25 MG 24 hr tablet TAKE 1 TABLET EVERY MORNING 90 tablet 3    oxyCODONE (ROXICODONE) 5 MG immediate release tablet Take 1 tablet (5 mg total) by mouth every 4 (four) hours as needed for Pain. (Patient not taking: Reported on 7/23/2025) 10 tablet 0    oxyCODONE-acetaminophen (PERCOCET)  mg per tablet Take 1 tablet by mouth every 4 (four) hours as needed for Pain. 26 tablet 0    pantoprazole (PROTONIX) 40 MG tablet TAKE 1 TABLET EVERY DAY 90 tablet 3    polyethylene glycol (GLYCOLAX) 17 gram PwPk Take by mouth.      povidone-iodine (BETADINE) 10 % external solution Apply topically as needed for Wound Care.      rOPINIRole (REQUIP) 1 MG tablet TAKE 1 TABLET EVERY EVENING 90 tablet 1    rosuvastatin (CRESTOR) 40 MG Tab TAKE 1 TABLET EVERY EVENING 90 tablet 3    sacubitriL-valsartan (ENTRESTO) 24-26 mg per tablet Take 1 tablet by mouth 2 (two) times daily. 180 tablet 3    spironolactone (ALDACTONE) 25 MG tablet Take 1 tablet (25 mg total) by mouth once daily. 90 tablet 3    torsemide (DEMADEX)  20 MG Tab Take 1 tablet (20 mg total) by mouth once daily. 90 tablet 3     No current facility-administered medications for this visit.

## 2025-07-29 ENCOUNTER — HOSPITAL ENCOUNTER (OUTPATIENT)
Dept: VASCULAR SURGERY | Facility: CLINIC | Age: 75
Discharge: HOME | End: 2025-07-29
Attending: SURGERY
Payer: MEDICARE

## 2025-07-29 ENCOUNTER — OFFICE VISIT (OUTPATIENT)
Dept: VASCULAR SURGERY | Facility: CLINIC | Age: 75
End: 2025-07-29
Attending: SURGERY
Payer: MEDICARE

## 2025-07-29 VITALS
DIASTOLIC BLOOD PRESSURE: 65 MMHG | TEMPERATURE: 98 F | HEART RATE: 61 BPM | HEIGHT: 65 IN | SYSTOLIC BLOOD PRESSURE: 149 MMHG | WEIGHT: 162 LBS | BODY MASS INDEX: 26.99 KG/M2

## 2025-07-29 DIAGNOSIS — I65.23 BILATERAL CAROTID ARTERY STENOSIS: ICD-10-CM

## 2025-07-29 DIAGNOSIS — Z98.890 S/P CAROTID ENDARTERECTOMY: ICD-10-CM

## 2025-07-29 DIAGNOSIS — I73.9 PAD (PERIPHERAL ARTERY DISEASE): Primary | ICD-10-CM

## 2025-07-29 DIAGNOSIS — I73.9 PAD (PERIPHERAL ARTERY DISEASE): ICD-10-CM

## 2025-07-29 PROBLEM — I70.229 ATHEROSCLEROTIC PERIPHERAL VASCULAR DISEASE WITH REST PAIN: Status: RESOLVED | Noted: 2025-03-11 | Resolved: 2025-07-29

## 2025-07-29 PROCEDURE — 1101F PT FALLS ASSESS-DOCD LE1/YR: CPT | Mod: CPTII,HCNC,S$GLB, | Performed by: SURGERY

## 2025-07-29 PROCEDURE — 1125F AMNT PAIN NOTED PAIN PRSNT: CPT | Mod: CPTII,HCNC,S$GLB, | Performed by: SURGERY

## 2025-07-29 PROCEDURE — 1160F RVW MEDS BY RX/DR IN RCRD: CPT | Mod: CPTII,HCNC,S$GLB, | Performed by: SURGERY

## 2025-07-29 PROCEDURE — 3078F DIAST BP <80 MM HG: CPT | Mod: CPTII,HCNC,S$GLB, | Performed by: SURGERY

## 2025-07-29 PROCEDURE — 99214 OFFICE O/P EST MOD 30 MIN: CPT | Mod: HCNC,S$GLB,, | Performed by: SURGERY

## 2025-07-29 PROCEDURE — 99999 PR PBB SHADOW E&M-EST. PATIENT-LVL IV: CPT | Mod: PBBFAC,HCNC,, | Performed by: SURGERY

## 2025-07-29 PROCEDURE — 3077F SYST BP >= 140 MM HG: CPT | Mod: CPTII,HCNC,S$GLB, | Performed by: SURGERY

## 2025-07-29 PROCEDURE — 3288F FALL RISK ASSESSMENT DOCD: CPT | Mod: CPTII,HCNC,S$GLB, | Performed by: SURGERY

## 2025-07-29 PROCEDURE — 1159F MED LIST DOCD IN RCRD: CPT | Mod: CPTII,HCNC,S$GLB, | Performed by: SURGERY

## 2025-07-29 NOTE — PROGRESS NOTES
VASCULAR SURGERY SERVICE    REFERRING DOCTOR: Madhuri Hunter NP    CHIEF COMPLAINT:  Carotid stenosis    HISTORY OF PRESENT ILLNESS: Lisa Smith is a 74 y.o. female status post TAVR 12/20/2023, status post left carotid endarterectomy 12/20/2022, sent for f/u of 95+% recurrent left carotid stenosis with known right ICA occlusion.  In the last 6 weeks she has had several episodes of dizziness bilateral blurred vision and transient bilateral arm weakness.  She denies any lateralizing symptoms.    She is oxygen-dependent COPD using oxygen since 20/11.  She says that her breathing has improved substantially since the TAVR in December 2023.  She still however can not lay flat for extended periods.    She is on AFib and on Coumadin which was evidently started after the TAVR.  Per the daughter who is a nurse she is being evaluated for a Watchman.    S/p:    Right common femoral/profunda endarterectomy and bovine patch 04/02/2025\   left TCAR 02/29/2024.    4/5/2024:  This is her initial follow-up after left TCAR 02/29/2024.  This was performed for a greater than 95% left carotid stenosis with contralateral occlusion.  She is very well from this procedure and was discharged on the 1st postoperative day.  However she describes on 03/26/2024 an episode of bilateral blurred vision which resolved within 30 minutes, and then on the next day 03/27/2020 for similar episode which also included 30-60 minutes of right hand weakness that then resolved.  She has had no issues since then.  She states compliance with both her Coumadin and Plavix    03/11/2025:  I have not seen this patient in almost 1 year, status post a left TCAR.  However she is not here for follow up for this rather for 2 month history of significant right foot pain at rest.  She had been as limited ambulator for many many years.  Says the pain right foot is worse at night does improve with getting up and walking.    She denies interval stroke TIA or amaurosis.   She has had a Watchman procedure and after that her  Coumadin was stopped for a here AFib and now is on Plavix only    No VBI symptoms    04/01/2025:  She now returns with a CTA.  Preparation for her planned surgery, she has stopped her Plavix greater than a week ago and substituted aspirin 81 mg daily.  She denies stroke TIA or amaurosis.    04/22/2025:  This is her initial visit after right femoral endarterectomy.  On postoperative day 1 she had complete resolution of her severe ischemic pain in her forefoot and toes.  Presentation today that pain continues to be completely gone.  However she is having soreness at the medial ankle where a very superficial wound has developed    07/29/2025:  This is a three-month follow-up.  She denies interval stroke TIA or amaurosis.  She reports near healing of the right medial malleolar ulcer.  She is now being maintained on dual antiplatelet therapy, status post TAVR and Watchman    Past Medical History:   Diagnosis Date    Anticoagulant long-term use     Arthritis     Atrial fibrillation     Bell's palsy     Boil of buttock     burst 12/19/22    CHF (congestive heart failure)     Chronic cough     COPD (chronic obstructive pulmonary disease)     use O2  3l/m NC at night; also taking during day currently 12/4/23    Dizziness     Encounter for blood transfusion     GI bleed 2011    transfusion    H/O diverticulitis of colon     Hard of hearing     Heart murmur     Hematoma 02/2023    left hand    Heterozygous alpha 1-antitrypsin deficiency     History of home oxygen therapy     3L NC at night    Hypertension     Iron deficiency anemia 06/12/2025    Lung disease     copd    MONSERRAT (obstructive sleep apnea)     resolved with wt loss 131#    Pacemaker     Pneumonia     last episode 2019    Pulmonary edema     Skin cancer     Unspecified visual disturbance     episode of vision disturbance and dizziness...occasional recurrences       Past Surgical History:   Procedure Laterality Date     CARDIAC ELECTROPHYSIOLOGY STUDY AND ABLATION      CAROTID ENDARTERECTOMY Left 12/22/2022    Procedure: ENDARTERECTOMY-CAROTID;  Surgeon: Maximilian Connolly MD;  Location: [facility] OR;  Service: Peripheral Vascular;  Laterality: Left;    CAROTID STENT Left 02/29/2024    Procedure: INSERTION, STENT, ARTERY, CAROTID;  Surgeon: CIRILO Valdivia III, MD;  Location: [facility] OR 2ND FLR;  Service: Vascular;  Laterality: Left;  left carotid artery stent placement  mgy  146.29   gymc2  8.0730  fluro time  4.8min    CARPAL TUNNEL RELEASE Left     CHOLECYSTECTOMY  1995    CLOSURE OF LEFT ATRIAL APPENDAGE USING DEVICE Right 01/13/2025    Procedure: Left atrial appendage closure device;  Surgeon: Roxana Cantu MD;  Location: [facility] CATH/EP LAB;  Service: Cardiology;  Laterality: Right;    ENDARTERECTOMY OF FEMORAL ARTERY Right 4/2/2025    Procedure: ENDARTERECTOMY, FEMORAL;  Surgeon: CIRILO Valdivia III, MD;  Location: [facility] OR 2ND FLR;  Service: Vascular;  Laterality: Right;    EYE SURGERY Bilateral March 2012    cataract    HYSTERECTOMY  1988    INSERT / REPLACE / REMOVE PACEMAKER      KNEE ARTHROSCOPY Left     LEFT HEART CATHETERIZATION  11/01/2023    Procedure: Left heart cath;  Surgeon: Puma Shelton MD;  Location: [facility] CATH;  Service: Cardiology;;    REPLACEMENT OF PACEMAKER GENERATOR Left 01/20/2022    Procedure: REPLACEMENT, PACEMAKER GENERATOR;  Surgeon: Raymond Pérez MD;  Location: [facility] CATH/EP LAB;  Service: Cardiology;  Laterality: Left;    SKIN CANCER EXCISION      TRANSCATHETER AORTIC VALVE REPLACEMENT (TAVR) Bilateral 12/06/2023    Procedure: (TAVR);  Surgeon: Puma Shelton MD;  Location: [facility] CATH;  Service: Cardiology;  Laterality: Bilateral;    TRANSCATHETER AORTIC VALVE REPLACEMENT (TAVR) Bilateral 12/06/2023    Procedure: (TAVR) - Surgeon;  Surgeon: Maximilian Connolly MD;  Location: [facility] CATH;  Service: Peripheral Vascular;  Laterality: Bilateral;         Current Outpatient Medications:     acetaminophen  (TYLENOL ARTHRITIS ORAL), Take 2 tablets by mouth daily as needed., Disp: , Rfl:     albuterol (PROVENTIL/VENTOLIN HFA) 90 mcg/actuation inhaler, INHALE 2 PUFFS EVERY 6 HOURS AS NEEDED FOR WHEEZING (RESCUE), Disp: 18 g, Rfl: 6    clopidogreL (PLAVIX) 75 mg tablet, TAKE 1 TABLET EVERY DAY, Disp: 90 tablet, Rfl: 3    digoxin (LANOXIN) 125 mcg tablet, Take 1 tablet (125 mcg total) by mouth once daily., Disp: 90 tablet, Rfl: 3    ezetimibe (ZETIA) 10 mg tablet, TAKE 1 TABLET EVERY DAY, Disp: 90 tablet, Rfl: 3    fluticasone propionate (FLONASE) 50 mcg/actuation nasal spray, 1 spray by Each Nostril route daily as needed for Rhinitis or Allergies., Disp: , Rfl:     fluticasone-umeclidin-vilanter (TRELEGY ELLIPTA) 100-62.5-25 mcg DsDv, Inhale 1 puff into the lungs once daily., Disp: 90 each, Rfl: 3    melatonin 10 mg Tab, Take 1 tablet by mouth nightly as needed (insomnia)., Disp: , Rfl:     metoprolol succinate (TOPROL-XL) 25 MG 24 hr tablet, TAKE 1 TABLET EVERY MORNING, Disp: 90 tablet, Rfl: 3    oxyCODONE (ROXICODONE) 5 MG immediate release tablet, Take 1 tablet (5 mg total) by mouth every 4 (four) hours as needed for Pain., Disp: 10 tablet, Rfl: 0    oxyCODONE-acetaminophen (PERCOCET)  mg per tablet, Take 1 tablet by mouth every 4 (four) hours as needed for Pain., Disp: 26 tablet, Rfl: 0    pantoprazole (PROTONIX) 40 MG tablet, TAKE 1 TABLET EVERY DAY, Disp: 90 tablet, Rfl: 3    polyethylene glycol (GLYCOLAX) 17 gram PwPk, Take by mouth., Disp: , Rfl:     povidone-iodine (BETADINE) 10 % external solution, Apply topically as needed for Wound Care., Disp: , Rfl:     rOPINIRole (REQUIP) 1 MG tablet, TAKE 1 TABLET EVERY EVENING, Disp: 90 tablet, Rfl: 1    rosuvastatin (CRESTOR) 40 MG Tab, TAKE 1 TABLET EVERY EVENING, Disp: 90 tablet, Rfl: 3    sacubitriL-valsartan (ENTRESTO) 24-26 mg per tablet, Take 1 tablet by mouth 2 (two) times daily., Disp: 180 tablet, Rfl: 3    spironolactone (ALDACTONE) 25 MG tablet, Take 1  "tablet (25 mg total) by mouth once daily., Disp: 90 tablet, Rfl: 3    torsemide (DEMADEX) 20 MG Tab, Take 1 tablet (20 mg total) by mouth once daily., Disp: 90 tablet, Rfl: 3    aspirin 81 MG Chew, Chew and swallow 1 tablet (81 mg total) by mouth once daily., Disp: 30 tablet, Rfl: 2    Review of patient's allergies indicates:   Allergen Reactions    Sulfa (sulfonamide antibiotics) Itching       Family History   Problem Relation Name Age of Onset    Kidney failure Mother      Cancer Father Rm Wray     Alcohol abuse Father Rm Wray     Cancer Brother      Arthritis Maternal Grandmother Domenica Wray     Cancer Brother Rm Wray JR         pancreatic       Social History     Tobacco Use    Smoking status: Former     Current packs/day: 0.00     Average packs/day: 2.0 packs/day for 40.0 years (80.0 ttl pk-yrs)     Types: Cigarettes     Start date: 1971     Quit date: 2011     Years since quittin.4     Passive exposure: Past    Smokeless tobacco: Never    Tobacco comments:          Quit in    Substance Use Topics    Alcohol use: Not Currently     Alcohol/week: 8.0 standard drinks of alcohol     Types: 8 Glasses of wine per week    Drug use: No     PHYSICAL EXAM: R /73  LEFT 82/50  BP (!) 149/65 (BP Location: Right arm, Patient Position: Sitting)   Pulse 61   Temp 98 °F (36.7 °C) (Oral)   Ht 5' 5" (1.651 m)   Wt 73.5 kg (162 lb)   LMP  (LMP Unknown)   BMI 26.96 kg/m²   Constitutional:  Alert,   Well-appearing  In no distress.  Traveling by wheelchair.  Continues to Appears quite frail.  She is using supplemental oxygen   Neurological: Normal speech  no focal findings  CN II - XII grossly intact.  Neurologic completely intact   Psychiatric: Mood and affect appropriate and symmetric.   HEENT: Normocephalic / atraumatic  PERRLA  Midline trachea     Cardiac: Regular rate and rhythm.   Pulmonary: Normal pulmonary effort.   Abdomen: Soft, not distended.     Skin: Warm and well " perfused.    Vascular:  Could not assess her femoral pulses she was in a wheelchair and really could not get up on the table   Extremities/  Musculoskeletal: Right vertical femoral incision is well healed with staples in place no erythema or drainage    Right foot is warm, good pedal signals.  Very modest left medial malleolar lesion, now almost completely healed     04/22/2025    IMAGING:  ABIs are 0.58 (prior 0.42 on the right (0.48 prior) 0.88 (prior 0.62 on the left (prior higher)..  Good pulsatility of PVRs    Carotid duplex today was personally reviewed shows chronically occluded right ICA as before, and stable moderate left carotid stenosis with a peak systolic velocity of 305 ,end-diastolic of 59.    Recent preoperative CTA demonstrates a left common femoral artery calcific occlusion with proximal profunda reconstitution, 5-8 cm proximal SFA occlusion that reconstitutes in the mid segment is patent but highly stenotic throughout.    Notably the profunda is quite posterior        IMPRESSION:      Four months status post right SFA profunda endarterectomy and patch.  Her ischemic pain continues to be completely resolved.  The nonischemic small ulcer over the medial malleolus is almost healed  Eighteen months status post left TCAR, with stable moderate restenosis.  Known chronic contralateral (right) ICA occlusion   status post TAVR, and a Watchman on Plavix.     Severe COPD on home oxygen since 2011.  Really can not lay flat  Likely L subclavian artery occlusion.  Clinicalls Asx      PLAN:  Continue current medical therapy with dual antiplatelet therapy and statin  Continue to follow up with her local wound care on the [place] until right medial malleolar ulcer completely healed    Follow up with me in 6 months with carotid duplex and ABIs      CHAPIS Valdivia III, MD, FACS  Professor and Chief, Vascular and Endovascular Surgery      CC: Dr Shelton

## 2025-08-01 LAB
OHS CV DC REMOTE DEVICE TYPE: NORMAL
OHS CV DC REMOTE DEVICE TYPE: NORMAL
OHS CV RV PACING PERCENT: 99.36 %
OHS CV RV PACING PERCENT: 99.71 %

## 2025-08-04 ENCOUNTER — OFFICE VISIT (OUTPATIENT)
Dept: WOUND CARE | Facility: HOSPITAL | Age: 75
End: 2025-08-04
Attending: PODIATRIST
Payer: MEDICARE

## 2025-08-04 VITALS
HEART RATE: 61 BPM | TEMPERATURE: 98 F | RESPIRATION RATE: 20 BRPM | SYSTOLIC BLOOD PRESSURE: 138 MMHG | DIASTOLIC BLOOD PRESSURE: 76 MMHG

## 2025-08-04 DIAGNOSIS — Z91.89 AT HIGH RISK FOR INADEQUATE NUTRITIONAL INTAKE: ICD-10-CM

## 2025-08-04 DIAGNOSIS — I50.43 ACUTE ON CHRONIC COMBINED SYSTOLIC AND DIASTOLIC CHF, NYHA CLASS 3: ICD-10-CM

## 2025-08-04 DIAGNOSIS — I73.9 PVD (PERIPHERAL VASCULAR DISEASE): Primary | ICD-10-CM

## 2025-08-04 DIAGNOSIS — R20.2 PARESTHESIAS: ICD-10-CM

## 2025-08-04 DIAGNOSIS — L97.313 SKIN ULCER OF RIGHT ANKLE WITH NECROSIS OF MUSCLE: ICD-10-CM

## 2025-08-04 DIAGNOSIS — Z87.891 FORMER SMOKER: ICD-10-CM

## 2025-08-04 DIAGNOSIS — L89.514 STAGE IV PRESSURE ULCER OF RIGHT ANKLE: ICD-10-CM

## 2025-08-04 DIAGNOSIS — I73.9 PAD (PERIPHERAL ARTERY DISEASE): ICD-10-CM

## 2025-08-04 DIAGNOSIS — S91.001A OPEN WOUND OF RIGHT ANKLE, INITIAL ENCOUNTER: ICD-10-CM

## 2025-08-04 DIAGNOSIS — R09.89 DECREASED PEDAL PULSES: ICD-10-CM

## 2025-08-04 DIAGNOSIS — M25.571 ACUTE RIGHT ANKLE PAIN: ICD-10-CM

## 2025-08-04 PROCEDURE — 1160F RVW MEDS BY RX/DR IN RCRD: CPT | Mod: CPTII,HCNC,, | Performed by: PODIATRIST

## 2025-08-04 PROCEDURE — 99214 OFFICE O/P EST MOD 30 MIN: CPT | Mod: HCNC,,, | Performed by: PODIATRIST

## 2025-08-04 PROCEDURE — 1159F MED LIST DOCD IN RCRD: CPT | Mod: CPTII,HCNC,, | Performed by: PODIATRIST

## 2025-08-04 PROCEDURE — 99213 OFFICE O/P EST LOW 20 MIN: CPT | Mod: HCNC,PN | Performed by: PODIATRIST

## 2025-08-04 PROCEDURE — 1125F AMNT PAIN NOTED PAIN PRSNT: CPT | Mod: CPTII,HCNC,, | Performed by: PODIATRIST

## 2025-08-04 PROCEDURE — 3075F SYST BP GE 130 - 139MM HG: CPT | Mod: CPTII,HCNC,, | Performed by: PODIATRIST

## 2025-08-04 PROCEDURE — 3078F DIAST BP <80 MM HG: CPT | Mod: CPTII,HCNC,, | Performed by: PODIATRIST

## 2025-08-04 RX ORDER — OXYCODONE AND ACETAMINOPHEN 10; 325 MG/1; MG/1
1 TABLET ORAL EVERY 4 HOURS PRN
Qty: 26 TABLET | Refills: 0 | Status: SHIPPED | OUTPATIENT
Start: 2025-08-04

## 2025-08-04 NOTE — PROGRESS NOTES
"  [address] [facility] [address]  [place] [address]  Phone: [phone]   [phone]    Patient's PCP:Hope Tang FNP  Referring Provider: No ref. provider found    Subjective:      Chief Complaint:: Wound Care    HPI  Lias Smith is a 74 y.o. female who presents today with a complaint of right medial ankle wound.   See wound docs documentation for full assessment and evaluation of all wounds.      Patient continues to experience numbness and tingling in bilateral feet.  Paresthesias, and burning bilateral feet with no clearly identified trigger or source.  Referral has been placed to neurology.     Patient was under the care of a previous wound care provider.  Reviewed note from visits.  Taken from Wound providers last note on 05/06/2025:  "Patient presents for an evaluation of right medial ankle wound. She reports having a medial and lateral right ankle wound that started in March 2025. She reports the lateral right ankle wound healed. Pt follows with vascular surgery. She is s/p right common femoral/profunda endarterectomy and bovine patch on 4/2/25 with Dr. Valdivia. At her follow up appointment on 4/22/25, she was found to have a medial ankle wound. It was deemed primarily not ischemic and she was referred to wound care. She was instructed to dress her wound with betadine daily. Per Dr. Valdivia's note: "While the LANIE suggests lower than preoperatively I think this is an error given that the contralateral side which had no intervention also dropped significantly. PVR waveforms on the right arm much better, and her ischemic rest pain completely gone." She admits compliance with betadine daily. Pt previously smoked 2-3 ppds for 30 years. She quit in 2011. She wants to follow with wound care closer to her house in [place]. Denies fever, chills, erythema, warmth, purulent drainage, or pain."           Vascular Status/LANIE:  Patient under the care of vascular surgery.  Recent vascular " "intervention, will order arterial ultrasounds.   Nutrition Risk/Labs: "Tips and Tricks for wound healing" handout given with detailed guide to optimize nutritional status for wound healing.   Counseled patient on increasing protein intake, following a healthy diet, vitamins  Dietary:   As tolerated: 3 well-balanced meals  Increased protein: Premier, Boost, or Ensure, [place] Instant Breakfast (purchase sugarfree if Diabetic). If you have kidney or are on dialysis, patient please check with your Nephrologist as to how much protein you are allowed to take with your condition.   Faustino 1-2 times daily  Supplement with: Multivitamin with Zinc and Vitamin C 500mg twice a day. If you are on Coumadin, please contact the Coumadin Clinic before beginning a Multivitamin. Vitamin D3.  Recent Cultures & Dates:  See micro tab in chart  Antibiotics: doxycline  Radiology/Xray: See imaging tab in chart  Diabetes Status/HbA1c: See labs tab in chart  Offloading/Immobilization: Offloading fel placed in order to remove pressure from wound  Tobacco Use:  Previous smoker.  None currently  Additional Patient instructions: Keep wrap/ wound dressing clean, dry, and intact. May shower, using cast protector, plastic bag, or other methods as discussed to keep wrap dry, or may sponge bathe. If wrap gets wet, it needs to be removed by unwrapping it. Place temporary dressing of antibiotic ointment and bandage to wound and notify clinic as soon as possible for earlier appointment.         Patient to follow with primary doctor regularly to address ongoing medical conditions such as abnormal blood pressure readings.        EVALUATION, ASSESSMENT, & PLAN:      1. .See Wound Docs for assessment of wounds and procedure notes  2. Continue taking all medications as prescribed  3. Dressing changes, see Wound docs for dressing change orders  4. RTC one week  5. Counseled patient on increasing protein intake, not getting wound wet, keeping dressing clean " dry and intact, following a healthy diet, elevating legs when able, removing pressure from wound     Proper ulcer care and the possible need for serial debridements, topical medications, specific dressings and biological engineered skin substitutes if indicated  Discussed general issues surrounding recurrent/ nonhealing ulcers, along with the advantages & disadvantages of various treatment strategies.     Patient should call the office immediately if any signs of infection, such as fever, chills, sweats, increased redness or pain.     Patient was instructed to call the clinic or go to the emergency department if their symptoms do not improve, worsens, or if new symptoms develop.  Patient was advised that if any increased swelling, pain, or numbness arise to go immediately to the ED. Patient knows to call any time if an emergency arises. Shared decision making occurred and patient verbalized understanding in agreement with this plan.         I spent a total of 45 minutes on the day of the visit.This includes face to face time and non-face to face time preparing to see the patient (eg, review of tests), obtaining and/or reviewing separately obtained history, documenting clinical information in the electronic or other health record, independently interpreting results and communicating results to the patient/family/caregiver, or care coordinator.        Much of the documentation for this visit was completed in the Wound Docs system.  Please see the attached documentation for further details about the patient's care. Scanned under the Media tab.         Izabella Benoit DPM     Vitals:    08/04/25 1018   BP: 138/76   Pulse: 61   Resp: 20   Temp: 98.3 °F (36.8 °C)   PainSc:   4        Shoe Size:     Past Surgical History:   Procedure Laterality Date    CARDIAC ELECTROPHYSIOLOGY STUDY AND ABLATION      CAROTID ENDARTERECTOMY Left 12/22/2022    Procedure: ENDARTERECTOMY-CAROTID;  Surgeon: Maximilian Connolly MD;  Location:  [facility] OR;  Service: Peripheral Vascular;  Laterality: Left;    CAROTID STENT Left 02/29/2024    Procedure: INSERTION, STENT, ARTERY, CAROTID;  Surgeon: CIRILO Valdivia III, MD;  [facility];  Service: Vascular;  Laterality: Left;  left carotid artery stent placement  mgy  146.29   gymc2  8.0730  fluro time  4.8min    CARPAL TUNNEL RELEASE Left     CHOLECYSTECTOMY  1995    CLOSURE OF LEFT ATRIAL APPENDAGE USING DEVICE Right 01/13/2025    Procedure: Left atrial appendage closure device;  Surgeon: Roxana Cantu MD;  Location: [facility] CATH/EP LAB;  Service: Cardiology;  Laterality: Right;    ECHOCARDIOGRAM,TRANSESOPHAGEAL N/A 7/25/2025    Procedure: Transesophageal echo (GRETCHEN) intra-procedure log documentation;  Surgeon: Raymond Pérez MD;  Location: [facility] CATH/EP LAB;  Service: Cardiology;  Laterality: N/A;    ENDARTERECTOMY OF FEMORAL ARTERY Right 4/2/2025    Procedure: ENDARTERECTOMY, FEMORAL;  Surgeon: CIRILO Valdivia III, MD;  [facility];  Service: Vascular;  Laterality: Right;    EYE SURGERY Bilateral March 2012    cataract    HYSTERECTOMY  1988    INSERT / REPLACE / REMOVE PACEMAKER      KNEE ARTHROSCOPY Left     LEFT HEART CATHETERIZATION  11/01/2023    Procedure: Left heart cath;  Surgeon: Puma Shelton MD;  Location: [email or web address] CATH;  Service: Cardiology;;    REPLACEMENT OF PACEMAKER GENERATOR Left 01/20/2022    Procedure: REPLACEMENT, PACEMAKER GENERATOR;  Surgeon: Raymond Pérez MD;  Location: [facility] CATH/EP LAB;  Service: Cardiology;  Laterality: Left;    SKIN CANCER EXCISION      TRANSCATHETER AORTIC VALVE REPLACEMENT (TAVR) Bilateral 12/06/2023    Procedure: (TAVR);  Surgeon: Puma Shelton MD;  Location: [email or web address] CATH;  Service: Cardiology;  Laterality: Bilateral;    TRANSCATHETER AORTIC VALVE REPLACEMENT (TAVR) Bilateral 12/06/2023    Procedure: (TAVR) - Surgeon;  Surgeon: Maximilian Connolly MD;  Location: [email or web address] CATH;  Service: Peripheral Vascular;  Laterality: Bilateral;     TREATMENT OF CARDIAC ARRHYTHMIA N/A 7/25/2025    Procedure: Cardioversion or Defibrillation;  Surgeon: Raymond Pérez MD;  Location: [facility] CATH/EP LAB;  Service: Cardiology;  Laterality: N/A;     Past Medical History:   Diagnosis Date    Anticoagulant long-term use     Arthritis     Atrial fibrillation     Bell's palsy     Boil of buttock     burst 12/19/22    CHF (congestive heart failure)     Chronic cough     COPD (chronic obstructive pulmonary disease)     use O2  3l/m NC at night; also taking during day currently 12/4/23    Dizziness     Encounter for blood transfusion     GI bleed 2011    transfusion    H/O diverticulitis of colon     Hard of hearing     Heart murmur     Hematoma 02/2023    left hand    Heterozygous alpha 1-antitrypsin deficiency     History of home oxygen therapy     3L NC at night    Hypertension     Iron deficiency anemia 06/12/2025    Lung disease     copd    MONSERRAT (obstructive sleep apnea)     resolved with wt loss 131#    Pacemaker     Pneumonia     last episode 2019    Pulmonary edema     Skin cancer     Unspecified visual disturbance     episode of vision disturbance and dizziness...occasional recurrences     Family History   Problem Relation Name Age of Onset    Kidney failure Mother      Cancer Father Rm Wray     Alcohol abuse Father Rm Langeon     Cancer Brother      Arthritis Maternal Grandmother Domenica Wray     Cancer Brother Rm Wray JR         pancreatic        Social History:   Marital Status:   Alcohol History:  reports that she does not currently use alcohol after a past usage of about 8.0 standard drinks of alcohol per week.  Tobacco History:  reports that she quit smoking about 14 years ago. Her smoking use included cigarettes. She started smoking about 54 years ago. She has a 80 pack-year smoking history. She has been exposed to tobacco smoke. She has never used smokeless tobacco.  Drug History:  reports no history of drug use.    Review of  patient's allergies indicates:   Allergen Reactions    Sulfa (sulfonamide antibiotics) Itching       Current Medications[1]    Review of Systems   Constitutional:  Negative for chills, fatigue, fever and unexpected weight change.   HENT:  Negative for hearing loss and trouble swallowing.    Eyes:  Negative for photophobia and visual disturbance.   Respiratory:  Negative for cough, shortness of breath and wheezing.    Cardiovascular:  Positive for leg swelling. Negative for chest pain and palpitations.   Gastrointestinal:  Negative for abdominal pain and nausea.   Genitourinary:  Negative for dysuria and frequency.   Musculoskeletal:  Negative for arthralgias, back pain, joint swelling and myalgias.   Skin:  Positive for wound. Negative for rash.   Neurological:  Negative for tremors, seizures, weakness and headaches.   Hematological:  Does not bruise/bleed easily.         Objective:        Physical Exam:   Foot Exam  Physical Exam  Ortho/SPM Exam     Imaging:            Assessment:       1. PVD (peripheral vascular disease)    2. Skin ulcer of right ankle with necrosis of muscle    3. PAD (peripheral artery disease)    4. Acute on chronic combined systolic and diastolic CHF, NYHA class 3    5. Decreased pedal pulses    6. 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 At high risk for inadequate nutritional intake    8. Former smoker    9. Open wound of right ankle, initial encounter    10. Paresthesias    11. 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  Dispense: 26 tablet; Refill: 0      Follow up in about 1 week (around 8/11/2025).    Procedures          Counseling:     I provided patient education verbally regarding:   Patient diagnosis, treatment options, as well as alternatives, risks, and benefits.     This note was created using Dragon voice recognition software that occasionally misinterpreted phrases or words.                            [1]   Current Outpatient Medications   Medication Sig Dispense Refill    acetaminophen (TYLENOL ARTHRITIS ORAL) Take 2 tablets by mouth daily as needed.      albuterol (PROVENTIL/VENTOLIN HFA) 90 mcg/actuation inhaler INHALE 2 PUFFS EVERY 6 HOURS AS NEEDED FOR WHEEZING (RESCUE) 18 g 6    aspirin 81 MG Chew Chew and swallow 1 tablet (81 mg total) by mouth once daily. 30 tablet 2    clopidogreL (PLAVIX) 75 mg tablet TAKE 1 TABLET EVERY DAY 90 tablet 3    digoxin (LANOXIN) 125 mcg tablet Take 1 tablet (125 mcg total) by mouth once daily. 90 tablet 3    ezetimibe (ZETIA) 10 mg tablet TAKE 1 TABLET EVERY DAY 90 tablet 3    fluticasone propionate (FLONASE) 50 mcg/actuation nasal spray 1 spray by Each Nostril route daily as needed for Rhinitis or Allergies.      fluticasone-umeclidin-vilanter (TRELEGY ELLIPTA) 100-62.5-25 mcg DsDv Inhale 1 puff into the lungs once daily. 90 each 3    melatonin 10 mg Tab Take 1 tablet by mouth nightly as needed (insomnia).      metoprolol succinate (TOPROL-XL) 25 MG 24 hr tablet TAKE 1 TABLET EVERY MORNING 90 tablet 3    oxyCODONE (ROXICODONE) 5 MG immediate release tablet Take 1 tablet (5 mg total) by mouth every 4 (four) hours as needed for Pain. 10 tablet 0    oxyCODONE-acetaminophen (PERCOCET)  mg per tablet Take 1 tablet by mouth every 4 (four) hours as needed for Pain. 26 tablet 0    oxyCODONE-acetaminophen (PERCOCET)  mg per tablet Take 1 tablet by mouth every 4 (four) hours as needed for Pain. 26 tablet 0     pantoprazole (PROTONIX) 40 MG tablet TAKE 1 TABLET EVERY DAY 90 tablet 3    polyethylene glycol (GLYCOLAX) 17 gram PwPk Take by mouth.      povidone-iodine (BETADINE) 10 % external solution Apply topically as needed for Wound Care.      rOPINIRole (REQUIP) 1 MG tablet TAKE 1 TABLET EVERY EVENING 90 tablet 1    rosuvastatin (CRESTOR) 40 MG Tab TAKE 1 TABLET EVERY EVENING 90 tablet 3    sacubitriL-valsartan (ENTRESTO) 24-26 mg per tablet Take 1 tablet by mouth 2 (two) times daily. 180 tablet 3    spironolactone (ALDACTONE) 25 MG tablet Take 1 tablet (25 mg total) by mouth once daily. 90 tablet 3    torsemide (DEMADEX) 20 MG Tab Take 1 tablet (20 mg total) by mouth once daily. 90 tablet 3     No current facility-administered medications for this visit.

## 2025-08-05 ENCOUNTER — DOCUMENT SCAN (OUTPATIENT)
Dept: HOME HEALTH SERVICES | Facility: HOSPITAL | Age: 75
End: 2025-08-05
Payer: MEDICARE

## 2025-08-11 ENCOUNTER — LAB VISIT (OUTPATIENT)
Dept: LAB | Facility: HOSPITAL | Age: 75
End: 2025-08-11
Attending: INTERNAL MEDICINE
Payer: MEDICARE

## 2025-08-11 ENCOUNTER — OFFICE VISIT (OUTPATIENT)
Dept: WOUND CARE | Facility: HOSPITAL | Age: 75
End: 2025-08-11
Attending: PODIATRIST
Payer: MEDICARE

## 2025-08-11 VITALS
DIASTOLIC BLOOD PRESSURE: 76 MMHG | HEART RATE: 61 BPM | RESPIRATION RATE: 16 BRPM | HEIGHT: 65 IN | TEMPERATURE: 98 F | SYSTOLIC BLOOD PRESSURE: 138 MMHG | BODY MASS INDEX: 27 KG/M2 | WEIGHT: 162.06 LBS

## 2025-08-11 DIAGNOSIS — S91.001A OPEN WOUND OF RIGHT ANKLE, INITIAL ENCOUNTER: ICD-10-CM

## 2025-08-11 DIAGNOSIS — R20.0 NUMBNESS IN FEET: ICD-10-CM

## 2025-08-11 DIAGNOSIS — M25.571 ACUTE RIGHT ANKLE PAIN: ICD-10-CM

## 2025-08-11 DIAGNOSIS — Z91.89 AT HIGH RISK FOR INADEQUATE NUTRITIONAL INTAKE: ICD-10-CM

## 2025-08-11 DIAGNOSIS — Z87.891 FORMER SMOKER: ICD-10-CM

## 2025-08-11 DIAGNOSIS — I73.9 PVD (PERIPHERAL VASCULAR DISEASE): Primary | ICD-10-CM

## 2025-08-11 DIAGNOSIS — I73.9 PAD (PERIPHERAL ARTERY DISEASE): ICD-10-CM

## 2025-08-11 DIAGNOSIS — D64.9 ANEMIA, UNSPECIFIED TYPE: ICD-10-CM

## 2025-08-11 DIAGNOSIS — L89.514 STAGE IV PRESSURE ULCER OF RIGHT ANKLE: ICD-10-CM

## 2025-08-11 DIAGNOSIS — D50.9 IRON DEFICIENCY ANEMIA, UNSPECIFIED IRON DEFICIENCY ANEMIA TYPE: ICD-10-CM

## 2025-08-11 DIAGNOSIS — R09.89 DECREASED PEDAL PULSES: ICD-10-CM

## 2025-08-11 DIAGNOSIS — D53.9 NUTRITIONAL ANEMIA, UNSPECIFIED: ICD-10-CM

## 2025-08-11 DIAGNOSIS — I50.43 ACUTE ON CHRONIC COMBINED SYSTOLIC AND DIASTOLIC CHF, NYHA CLASS 3: ICD-10-CM

## 2025-08-11 DIAGNOSIS — R20.2 PARESTHESIAS: ICD-10-CM

## 2025-08-11 DIAGNOSIS — D53.8 OTHER SPECIFIED NUTRITIONAL ANEMIAS: ICD-10-CM

## 2025-08-11 DIAGNOSIS — M79.671 FOOT PAIN, RIGHT: ICD-10-CM

## 2025-08-11 DIAGNOSIS — L97.313 SKIN ULCER OF RIGHT ANKLE WITH NECROSIS OF MUSCLE: ICD-10-CM

## 2025-08-11 LAB
ABSOLUTE EOSINOPHIL (SMH): 0.1 K/UL
ABSOLUTE MONOCYTE (SMH): 0.55 K/UL (ref 0.3–1)
ABSOLUTE NEUTROPHIL COUNT (SMH): 7.1 K/UL (ref 1.8–7.7)
ALBUMIN SERPL-MCNC: 4.4 G/DL (ref 3.5–5.2)
ALP SERPL-CCNC: 84 UNIT/L (ref 55–135)
ALT SERPL-CCNC: 12 UNIT/L (ref 10–44)
ANION GAP (SMH): 6 MMOL/L (ref 8–16)
AST SERPL-CCNC: 26 UNIT/L (ref 10–40)
BASOPHILS # BLD AUTO: 0.02 K/UL
BASOPHILS NFR BLD AUTO: 0.2 %
BILIRUB SERPL-MCNC: 0.6 MG/DL (ref 0.1–1)
BUN SERPL-MCNC: 49 MG/DL (ref 8–23)
CALCIUM SERPL-MCNC: 10 MG/DL (ref 8.7–10.5)
CHLORIDE SERPL-SCNC: 99 MMOL/L (ref 95–110)
CO2 SERPL-SCNC: 32 MMOL/L (ref 23–29)
CREAT SERPL-MCNC: 0.7 MG/DL (ref 0.5–1.4)
ERYTHROCYTE [DISTWIDTH] IN BLOOD BY AUTOMATED COUNT: 14.8 % (ref 11.5–14.5)
FERRITIN SERPL-MCNC: 329.4 NG/ML (ref 20–300)
GFR SERPLBLD CREATININE-BSD FMLA CKD-EPI: >60 ML/MIN/1.73/M2
GLUCOSE SERPL-MCNC: 108 MG/DL (ref 70–110)
HCT VFR BLD AUTO: 32.4 % (ref 37–48.5)
HGB BLD-MCNC: 10.6 GM/DL (ref 12–16)
IMM GRANULOCYTES # BLD AUTO: 0.03 K/UL (ref 0–0.04)
IMM GRANULOCYTES NFR BLD AUTO: 0.3 % (ref 0–0.5)
IRON SATN MFR SERPL: 25 % (ref 20–50)
IRON SERPL-MCNC: 87 UG/DL (ref 30–160)
LYMPHOCYTES # BLD AUTO: 0.94 K/UL (ref 1–4.8)
MCH RBC QN AUTO: 31.6 PG (ref 27–31)
MCHC RBC AUTO-ENTMCNC: 32.7 G/DL (ref 32–36)
MCV RBC AUTO: 97 FL (ref 82–98)
NUCLEATED RBC (/100WBC) (SMH): 0 /100 WBC
PLATELET # BLD AUTO: 243 K/UL (ref 150–450)
PMV BLD AUTO: 10.2 FL (ref 9.2–12.9)
POTASSIUM SERPL-SCNC: 4.4 MMOL/L (ref 3.5–5.1)
PROT SERPL-MCNC: 7.7 GM/DL (ref 6–8.4)
RBC # BLD AUTO: 3.35 M/UL (ref 4–5.4)
RELATIVE EOSINOPHIL (SMH): 1.1 % (ref 0–8)
RELATIVE LYMPHOCYTE (SMH): 10.8 % (ref 18–48)
RELATIVE MONOCYTE (SMH): 6.3 % (ref 4–15)
RELATIVE NEUTROPHIL (SMH): 81.3 % (ref 38–73)
SODIUM SERPL-SCNC: 137 MMOL/L (ref 136–145)
TIBC SERPL-MCNC: 347 UG/DL (ref 250–450)
TRANSFERRIN SERPL-MCNC: 248 MG/DL (ref 200–375)
VIT B12 SERPL-MCNC: 441 PG/ML (ref 210–950)
WBC # BLD AUTO: 8.71 K/UL (ref 3.9–12.7)

## 2025-08-11 PROCEDURE — 99214 OFFICE O/P EST MOD 30 MIN: CPT | Mod: HCNC,PN | Performed by: PODIATRIST

## 2025-08-11 PROCEDURE — 1160F RVW MEDS BY RX/DR IN RCRD: CPT | Mod: CPTII,HCNC,, | Performed by: PODIATRIST

## 2025-08-11 PROCEDURE — 3075F SYST BP GE 130 - 139MM HG: CPT | Mod: CPTII,HCNC,, | Performed by: PODIATRIST

## 2025-08-11 PROCEDURE — 1126F AMNT PAIN NOTED NONE PRSNT: CPT | Mod: CPTII,HCNC,, | Performed by: PODIATRIST

## 2025-08-11 PROCEDURE — 82607 VITAMIN B-12: CPT

## 2025-08-11 PROCEDURE — 99214 OFFICE O/P EST MOD 30 MIN: CPT | Mod: HCNC,,, | Performed by: PODIATRIST

## 2025-08-11 PROCEDURE — 1101F PT FALLS ASSESS-DOCD LE1/YR: CPT | Mod: CPTII,HCNC,, | Performed by: PODIATRIST

## 2025-08-11 PROCEDURE — 3078F DIAST BP <80 MM HG: CPT | Mod: CPTII,HCNC,, | Performed by: PODIATRIST

## 2025-08-11 PROCEDURE — 82728 ASSAY OF FERRITIN: CPT

## 2025-08-11 PROCEDURE — 36415 COLL VENOUS BLD VENIPUNCTURE: CPT

## 2025-08-11 PROCEDURE — 3288F FALL RISK ASSESSMENT DOCD: CPT | Mod: CPTII,HCNC,, | Performed by: PODIATRIST

## 2025-08-11 PROCEDURE — 85025 COMPLETE CBC W/AUTO DIFF WBC: CPT

## 2025-08-11 PROCEDURE — 1159F MED LIST DOCD IN RCRD: CPT | Mod: CPTII,HCNC,, | Performed by: PODIATRIST

## 2025-08-11 PROCEDURE — 83540 ASSAY OF IRON: CPT

## 2025-08-11 PROCEDURE — 82374 ASSAY BLOOD CARBON DIOXIDE: CPT

## 2025-08-13 ENCOUNTER — EXTERNAL HOME HEALTH (OUTPATIENT)
Dept: HOME HEALTH SERVICES | Facility: HOSPITAL | Age: 75
End: 2025-08-13
Payer: MEDICARE

## 2025-08-18 ENCOUNTER — OFFICE VISIT (OUTPATIENT)
Dept: WOUND CARE | Facility: HOSPITAL | Age: 75
End: 2025-08-18
Attending: PODIATRIST
Payer: MEDICARE

## 2025-08-18 VITALS
SYSTOLIC BLOOD PRESSURE: 165 MMHG | DIASTOLIC BLOOD PRESSURE: 57 MMHG | HEART RATE: 60 BPM | RESPIRATION RATE: 16 BRPM | TEMPERATURE: 98 F

## 2025-08-18 DIAGNOSIS — Z87.891 FORMER SMOKER: ICD-10-CM

## 2025-08-18 DIAGNOSIS — L89.514 STAGE IV PRESSURE ULCER OF RIGHT ANKLE: ICD-10-CM

## 2025-08-18 DIAGNOSIS — M25.571 ACUTE RIGHT ANKLE PAIN: ICD-10-CM

## 2025-08-18 DIAGNOSIS — R09.89 DECREASED PEDAL PULSES: ICD-10-CM

## 2025-08-18 DIAGNOSIS — Z91.89 AT HIGH RISK FOR INADEQUATE NUTRITIONAL INTAKE: ICD-10-CM

## 2025-08-18 DIAGNOSIS — I73.9 PVD (PERIPHERAL VASCULAR DISEASE): Primary | ICD-10-CM

## 2025-08-18 DIAGNOSIS — M79.671 FOOT PAIN, RIGHT: ICD-10-CM

## 2025-08-18 DIAGNOSIS — R20.0 NUMBNESS IN FEET: ICD-10-CM

## 2025-08-18 DIAGNOSIS — R20.2 PARESTHESIAS: ICD-10-CM

## 2025-08-18 DIAGNOSIS — I73.9 PAD (PERIPHERAL ARTERY DISEASE): ICD-10-CM

## 2025-08-18 DIAGNOSIS — L97.313 SKIN ULCER OF RIGHT ANKLE WITH NECROSIS OF MUSCLE: ICD-10-CM

## 2025-08-18 PROCEDURE — 99213 OFFICE O/P EST LOW 20 MIN: CPT | Mod: HCNC,PN | Performed by: PODIATRIST

## 2025-08-18 PROCEDURE — 1159F MED LIST DOCD IN RCRD: CPT | Mod: CPTII,HCNC,, | Performed by: PODIATRIST

## 2025-08-18 PROCEDURE — 3077F SYST BP >= 140 MM HG: CPT | Mod: CPTII,HCNC,, | Performed by: PODIATRIST

## 2025-08-18 PROCEDURE — 1126F AMNT PAIN NOTED NONE PRSNT: CPT | Mod: CPTII,HCNC,, | Performed by: PODIATRIST

## 2025-08-18 PROCEDURE — 3078F DIAST BP <80 MM HG: CPT | Mod: CPTII,HCNC,, | Performed by: PODIATRIST

## 2025-08-18 PROCEDURE — 1160F RVW MEDS BY RX/DR IN RCRD: CPT | Mod: CPTII,HCNC,, | Performed by: PODIATRIST

## 2025-08-18 PROCEDURE — 99214 OFFICE O/P EST MOD 30 MIN: CPT | Mod: HCNC,,, | Performed by: PODIATRIST

## 2025-08-19 ENCOUNTER — CLINICAL SUPPORT (OUTPATIENT)
Dept: CARDIOLOGY | Facility: CLINIC | Age: 75
End: 2025-08-19
Payer: MEDICARE

## 2025-08-19 ENCOUNTER — HOSPITAL ENCOUNTER (OUTPATIENT)
Dept: CARDIOLOGY | Facility: CLINIC | Age: 75
Discharge: HOME | End: 2025-08-19
Attending: INTERNAL MEDICINE
Payer: MEDICARE

## 2025-08-19 DIAGNOSIS — Z95.0 PRESENCE OF CARDIAC PACEMAKER: ICD-10-CM

## 2025-08-19 DIAGNOSIS — R00.1 BRADYCARDIA, UNSPECIFIED: ICD-10-CM

## 2025-08-19 PROCEDURE — 93296 REM INTERROG EVL PM/IDS: CPT | Mod: HCNC,PN | Performed by: INTERNAL MEDICINE

## 2025-08-19 PROCEDURE — 93294 REM INTERROG EVL PM/LDLS PM: CPT | Mod: HCNC,S$GLB,, | Performed by: INTERNAL MEDICINE

## 2025-08-22 LAB
OHS CV DC REMOTE DEVICE TYPE: NORMAL
OHS CV RV PACING PERCENT: 97.83 %

## 2025-08-25 ENCOUNTER — TELEPHONE (OUTPATIENT)
Dept: FAMILY MEDICINE | Facility: CLINIC | Age: 75
End: 2025-08-25
Payer: MEDICARE

## 2025-08-27 ENCOUNTER — OFFICE VISIT (OUTPATIENT)
Dept: WOUND CARE | Facility: HOSPITAL | Age: 75
End: 2025-08-27
Attending: PODIATRIST
Payer: MEDICARE

## 2025-08-27 VITALS
TEMPERATURE: 99 F | DIASTOLIC BLOOD PRESSURE: 53 MMHG | HEART RATE: 60 BPM | RESPIRATION RATE: 16 BRPM | SYSTOLIC BLOOD PRESSURE: 108 MMHG

## 2025-08-27 DIAGNOSIS — I73.9 PVD (PERIPHERAL VASCULAR DISEASE): Primary | ICD-10-CM

## 2025-08-27 DIAGNOSIS — R20.0 NUMBNESS IN FEET: ICD-10-CM

## 2025-08-27 DIAGNOSIS — I50.43 ACUTE ON CHRONIC COMBINED SYSTOLIC AND DIASTOLIC CHF, NYHA CLASS 3: ICD-10-CM

## 2025-08-27 DIAGNOSIS — Z91.89 AT HIGH RISK FOR INADEQUATE NUTRITIONAL INTAKE: ICD-10-CM

## 2025-08-27 DIAGNOSIS — R09.89 DECREASED PEDAL PULSES: ICD-10-CM

## 2025-08-27 DIAGNOSIS — L89.514 STAGE IV PRESSURE ULCER OF RIGHT ANKLE: ICD-10-CM

## 2025-08-27 DIAGNOSIS — S91.001A OPEN WOUND OF RIGHT ANKLE, INITIAL ENCOUNTER: ICD-10-CM

## 2025-08-27 DIAGNOSIS — L97.313 SKIN ULCER OF RIGHT ANKLE WITH NECROSIS OF MUSCLE: ICD-10-CM

## 2025-08-27 DIAGNOSIS — M25.571 ACUTE RIGHT ANKLE PAIN: ICD-10-CM

## 2025-08-27 DIAGNOSIS — M79.671 FOOT PAIN, RIGHT: ICD-10-CM

## 2025-08-27 DIAGNOSIS — Z87.891 FORMER SMOKER: ICD-10-CM

## 2025-08-27 DIAGNOSIS — R20.2 PARESTHESIAS: ICD-10-CM

## 2025-08-27 DIAGNOSIS — I73.9 PAD (PERIPHERAL ARTERY DISEASE): ICD-10-CM

## 2025-08-27 PROCEDURE — 3074F SYST BP LT 130 MM HG: CPT | Mod: CPTII,HCNC,, | Performed by: PODIATRIST

## 2025-08-27 PROCEDURE — 99214 OFFICE O/P EST MOD 30 MIN: CPT | Mod: HCNC,,, | Performed by: PODIATRIST

## 2025-08-27 PROCEDURE — 1160F RVW MEDS BY RX/DR IN RCRD: CPT | Mod: CPTII,HCNC,, | Performed by: PODIATRIST

## 2025-08-27 PROCEDURE — 3078F DIAST BP <80 MM HG: CPT | Mod: CPTII,HCNC,, | Performed by: PODIATRIST

## 2025-08-27 PROCEDURE — 1126F AMNT PAIN NOTED NONE PRSNT: CPT | Mod: CPTII,HCNC,, | Performed by: PODIATRIST

## 2025-08-27 PROCEDURE — 1159F MED LIST DOCD IN RCRD: CPT | Mod: CPTII,HCNC,, | Performed by: PODIATRIST

## 2025-08-27 PROCEDURE — 99213 OFFICE O/P EST LOW 20 MIN: CPT | Mod: HCNC,PN | Performed by: PODIATRIST

## 2025-08-28 ENCOUNTER — TELEPHONE (OUTPATIENT)
Facility: CLINIC | Age: 75
End: 2025-08-28
Payer: MEDICARE

## 2025-09-03 ENCOUNTER — OFFICE VISIT (OUTPATIENT)
Dept: FAMILY MEDICINE | Facility: CLINIC | Age: 75
End: 2025-09-03
Payer: MEDICARE

## 2025-09-03 VITALS
SYSTOLIC BLOOD PRESSURE: 98 MMHG | OXYGEN SATURATION: 90 % | BODY MASS INDEX: 27.29 KG/M2 | DIASTOLIC BLOOD PRESSURE: 58 MMHG | TEMPERATURE: 98 F | HEART RATE: 62 BPM | WEIGHT: 163.81 LBS | HEIGHT: 65 IN | RESPIRATION RATE: 22 BRPM

## 2025-09-03 DIAGNOSIS — G25.81 RESTLESS LEGS: ICD-10-CM

## 2025-09-03 DIAGNOSIS — Z91.09 ENVIRONMENTAL ALLERGIES: Primary | ICD-10-CM

## 2025-09-03 PROCEDURE — 3078F DIAST BP <80 MM HG: CPT | Mod: CPTII,HCNC,S$GLB, | Performed by: NURSE PRACTITIONER

## 2025-09-03 PROCEDURE — 1126F AMNT PAIN NOTED NONE PRSNT: CPT | Mod: CPTII,HCNC,S$GLB, | Performed by: NURSE PRACTITIONER

## 2025-09-03 PROCEDURE — 1160F RVW MEDS BY RX/DR IN RCRD: CPT | Mod: CPTII,HCNC,S$GLB, | Performed by: NURSE PRACTITIONER

## 2025-09-03 PROCEDURE — G2211 COMPLEX E/M VISIT ADD ON: HCPCS | Mod: HCNC,S$GLB,, | Performed by: NURSE PRACTITIONER

## 2025-09-03 PROCEDURE — 1101F PT FALLS ASSESS-DOCD LE1/YR: CPT | Mod: CPTII,HCNC,S$GLB, | Performed by: NURSE PRACTITIONER

## 2025-09-03 PROCEDURE — 1159F MED LIST DOCD IN RCRD: CPT | Mod: CPTII,HCNC,S$GLB, | Performed by: NURSE PRACTITIONER

## 2025-09-03 PROCEDURE — 99214 OFFICE O/P EST MOD 30 MIN: CPT | Mod: HCNC,S$GLB,, | Performed by: NURSE PRACTITIONER

## 2025-09-03 PROCEDURE — 3288F FALL RISK ASSESSMENT DOCD: CPT | Mod: CPTII,HCNC,S$GLB, | Performed by: NURSE PRACTITIONER

## 2025-09-03 PROCEDURE — 3074F SYST BP LT 130 MM HG: CPT | Mod: CPTII,HCNC,S$GLB, | Performed by: NURSE PRACTITIONER

## 2025-09-03 PROCEDURE — 99999 PR PBB SHADOW E&M-EST. PATIENT-LVL IV: CPT | Mod: PBBFAC,HCNC,, | Performed by: NURSE PRACTITIONER

## 2025-09-03 RX ORDER — ROPINIROLE 2 MG/1
2 TABLET, FILM COATED ORAL NIGHTLY
Qty: 90 TABLET | Refills: 1 | Status: SHIPPED | OUTPATIENT
Start: 2025-09-03 | End: 2026-09-03

## 2025-09-03 RX ORDER — FEXOFENADINE HCL 180 MG/1
180 TABLET ORAL DAILY
Qty: 30 TABLET | Refills: 2 | Status: SHIPPED | OUTPATIENT
Start: 2025-09-03 | End: 2026-09-03

## 2025-09-04 ENCOUNTER — OFFICE VISIT (OUTPATIENT)
Facility: CLINIC | Age: 75
End: 2025-09-04
Payer: MEDICARE

## 2025-09-04 VITALS
HEIGHT: 65 IN | WEIGHT: 167 LBS | BODY MASS INDEX: 27.82 KG/M2 | RESPIRATION RATE: 16 BRPM | OXYGEN SATURATION: 90 % | HEART RATE: 64 BPM | TEMPERATURE: 98 F | SYSTOLIC BLOOD PRESSURE: 114 MMHG | DIASTOLIC BLOOD PRESSURE: 62 MMHG

## 2025-09-04 DIAGNOSIS — D64.9 ANEMIA, UNSPECIFIED TYPE: Primary | ICD-10-CM

## 2025-09-04 DIAGNOSIS — D50.8 OTHER IRON DEFICIENCY ANEMIA: ICD-10-CM

## 2025-09-04 PROCEDURE — 3078F DIAST BP <80 MM HG: CPT | Mod: CPTII,HCNC,S$GLB, | Performed by: INTERNAL MEDICINE

## 2025-09-04 PROCEDURE — G2211 COMPLEX E/M VISIT ADD ON: HCPCS | Mod: HCNC,S$GLB,, | Performed by: INTERNAL MEDICINE

## 2025-09-04 PROCEDURE — 1126F AMNT PAIN NOTED NONE PRSNT: CPT | Mod: CPTII,HCNC,S$GLB, | Performed by: INTERNAL MEDICINE

## 2025-09-04 PROCEDURE — 1101F PT FALLS ASSESS-DOCD LE1/YR: CPT | Mod: CPTII,HCNC,S$GLB, | Performed by: INTERNAL MEDICINE

## 2025-09-04 PROCEDURE — 1160F RVW MEDS BY RX/DR IN RCRD: CPT | Mod: CPTII,HCNC,S$GLB, | Performed by: INTERNAL MEDICINE

## 2025-09-04 PROCEDURE — 3074F SYST BP LT 130 MM HG: CPT | Mod: CPTII,HCNC,S$GLB, | Performed by: INTERNAL MEDICINE

## 2025-09-04 PROCEDURE — 99999 PR PBB SHADOW E&M-EST. PATIENT-LVL IV: CPT | Mod: PBBFAC,HCNC,, | Performed by: INTERNAL MEDICINE

## 2025-09-04 PROCEDURE — 99214 OFFICE O/P EST MOD 30 MIN: CPT | Mod: HCNC,S$GLB,, | Performed by: INTERNAL MEDICINE

## 2025-09-04 PROCEDURE — 3288F FALL RISK ASSESSMENT DOCD: CPT | Mod: CPTII,HCNC,S$GLB, | Performed by: INTERNAL MEDICINE

## 2025-09-04 PROCEDURE — 1159F MED LIST DOCD IN RCRD: CPT | Mod: CPTII,HCNC,S$GLB, | Performed by: INTERNAL MEDICINE

## 2025-09-04 RX ORDER — METOPROLOL SUCCINATE 25 MG/1
25 TABLET, EXTENDED RELEASE ORAL EVERY MORNING
Qty: 100 TABLET | Refills: 3 | Status: SHIPPED | OUTPATIENT
Start: 2025-09-04

## (undated) DEVICE — CATH PIGTAIL 5FX110CM

## (undated) DEVICE — Device

## (undated) DEVICE — DRESSING TRANS 4X4 TEGADERM

## (undated) DEVICE — INFLATOR ENCORE

## (undated) DEVICE — PAD BOVIE ADULT

## (undated) DEVICE — BLADE SCALP OPHTL BEVEL STR

## (undated) DEVICE — SUT PROLENE 5-0 24 C-1 BL

## (undated) DEVICE — SUT 2/0 36IN COATED VICRYL

## (undated) DEVICE — TRAY SKIN PREP DRY

## (undated) DEVICE — SUT PROLENE 6-0 C-1 30IN BL

## (undated) DEVICE — CATH ALL PUR URTHL RR 20FR

## (undated) DEVICE — NDL HYPO REG 25G X 1 1/2

## (undated) DEVICE — SUTURE SILK 2-0 PS 18 1588H

## (undated) DEVICE — GAUZE WOVEN STRL 12-PLY 4X4IN

## (undated) DEVICE — SOLUTION PREP IODINE 4OZ

## (undated) DEVICE — BANDAGE ADHESIVE PLAS STRL 1X3

## (undated) DEVICE — GLOVE SURG BIOGEL LTX PF ST SZ 6.5

## (undated) DEVICE — SUTURE MONOCRYL 2-0 CT-1 MCP945H

## (undated) DEVICE — SOL NS 1000CC

## (undated) DEVICE — GUIDEWIRE STF .035X180CM ANG

## (undated) DEVICE — TUBING HIGH PRESSURE

## (undated) DEVICE — KIT MICROINTRO 4F .018X40X7CM

## (undated) DEVICE — SUT CTD VICRYL 4-0 BR PS-2

## (undated) DEVICE — SYRINGE SAFETY 1ML TB 27GA X 1/2 305789

## (undated) DEVICE — GUIDEWIRE SAFE T .035IN 180CM

## (undated) DEVICE — CLIP APPLIER MEDIUM MSM20

## (undated) DEVICE — DRAPE IOBAN 13X13 6640EZ

## (undated) DEVICE — TUBING EASY SPRAY TISSEEL

## (undated) DEVICE — GUIDEWIRE J 3MM .035IN 150

## (undated) DEVICE — DECANTER FLUID TRNSF WHITE 9IN

## (undated) DEVICE — SUTURE PROLENE 5-0 BV-1 24 9702H

## (undated) DEVICE — KIT VERSACROSS DIL D1 230X85CM

## (undated) DEVICE — SUT 2-0 12-18IN SILK

## (undated) DEVICE — SUTURE PROLENE 7-0 BV-1 30 8703H

## (undated) DEVICE — LOOP MINI BLUE 30-713

## (undated) DEVICE — ADHESIVE DERMABOND ADVANCED

## (undated) DEVICE — COVER INSTR ELASTIC BAND 40X20

## (undated) DEVICE — SPONGE XRAY DETECTABLE 4X4

## (undated) DEVICE — PREP CHLORA 26ML

## (undated) DEVICE — GLOVE #7.5 BIOGEL 30475

## (undated) DEVICE — COVERS PROBE NR-48 STERILE

## (undated) DEVICE — BULB DRAIN 100CC 70740

## (undated) DEVICE — COVER LIGHT HANDLE 80/CA

## (undated) DEVICE — SUT 3-0 12-18IN SILK

## (undated) DEVICE — SUT VICRYL 3-0 27 SH

## (undated) DEVICE — CLIP MED TICALL

## (undated) DEVICE — DRESSING TEGADERM 4X4 3/4 TD1004

## (undated) DEVICE — DRAPE U SPLIT SHEET 54X76IN

## (undated) DEVICE — SHEATH CHECK-FLO 18FR 30CM

## (undated) DEVICE — GUIDEWIRE ENROUTE .014IN 95CM

## (undated) DEVICE — SYS WATCHMAN TRUSTEER ACCESS

## (undated) DEVICE — KIT SHUNT CAROTID ARGYLE 577775

## (undated) DEVICE — SUT 3/0 18IN COATED VICRYLP

## (undated) DEVICE — DILATOR VESSEL14FRX20CM

## (undated) DEVICE — SUT 7/0 18IN PROLENE BL MO

## (undated) DEVICE — BLLN VIATRAC 5X30 135CM

## (undated) DEVICE — SUTURE MONOCRYL 3-0 27 PS-1 MCP936H

## (undated) DEVICE — BAG DECANTER 10-102

## (undated) DEVICE — TRAY PERIPHERAL VASCULAR OMC

## (undated) DEVICE — CONTRAST VISIPAQUE 150ML

## (undated) DEVICE — ELECTRODE MEGADYNE RETURN DUAL

## (undated) DEVICE — SHEATH PINNACLE 8FR

## (undated) DEVICE — DRAPE LAPAROTOMY TRANSVERSE 89281

## (undated) DEVICE — LOOP VESSEL BLUE MAXI

## (undated) DEVICE — SUT MCRYL PLUS 4-0 PS2 27IN

## (undated) DEVICE — ELECTROCAUTERY PHOTONBLADE

## (undated) DEVICE — SUT SILK 2-0 SH 18IN BLACK

## (undated) DEVICE — VISE RADIFOCUS MULTI TORQUE

## (undated) DEVICE — SYR MED RAD 150ML

## (undated) DEVICE — APPLICATOR CHLORAPREP ORN 26ML

## (undated) DEVICE — SOLUTION SCRUB IODINE 4OZ

## (undated) DEVICE — SUTURE PROLENE 6-0 C-1 30 8706H

## (undated) DEVICE — SYS ENROUTE TCAR NEURPROTCT

## (undated) DEVICE — CLIP APPLIER SM MCS20

## (undated) DEVICE — TAPE UMBILICAL 1/8X36IN WHITE

## (undated) DEVICE — SUTURE SILK 0 18 A186H

## (undated) DEVICE — TRAY VASCULAR SLIDELL MEMORIAL

## (undated) DEVICE — SUTURE PROLENE 7-0 BV175-6 24 8735H

## (undated) DEVICE — KIT INTRO MICRO NIT VSI 4FR

## (undated) DEVICE — SYRINGE 20CC SLIP TIP 20ML 302831

## (undated) DEVICE — TOWEL OR DISP STRL BLUE 4/PK